# Patient Record
Sex: FEMALE | Race: WHITE | NOT HISPANIC OR LATINO | Employment: OTHER | URBAN - NONMETROPOLITAN AREA
[De-identification: names, ages, dates, MRNs, and addresses within clinical notes are randomized per-mention and may not be internally consistent; named-entity substitution may affect disease eponyms.]

---

## 2017-01-10 ENCOUNTER — HOSPITAL ENCOUNTER (OUTPATIENT)
Dept: NUCLEAR MEDICINE | Facility: HOSPITAL | Age: 47
Discharge: HOME | End: 2017-01-10

## 2017-01-10 DIAGNOSIS — G89.29 CHRONIC PAIN OF RIGHT KNEE: ICD-10-CM

## 2017-01-10 DIAGNOSIS — M25.561 CHRONIC PAIN OF RIGHT KNEE: ICD-10-CM

## 2017-01-10 PROCEDURE — A9547 IN111 OXYQUINOLINE: HCPCS

## 2017-01-10 PROCEDURE — 0 INDIUM-111 OXYQUINOLINE 1 MCI/ML SOLUTION

## 2017-01-10 RX ORDER — INDIUM IN-111 OXYQUINOLINE 1 UG/ML
SOLUTION INTRAVENOUS
Status: COMPLETED | OUTPATIENT
Start: 2017-01-10 | End: 2017-01-10

## 2017-01-10 RX ADMIN — INDIUM IN-111 OXYQUINOLINE: 1 SOLUTION INTRAVENOUS at 11:41

## 2017-01-11 ENCOUNTER — HOSPITAL ENCOUNTER (OUTPATIENT)
Dept: NUCLEAR MEDICINE | Facility: HOSPITAL | Age: 47
Discharge: HOME | End: 2017-01-11

## 2017-01-11 PROCEDURE — 78805 HC NM ABSCESS LOCALIZATION LIMITED: CPT

## 2017-01-11 PROCEDURE — A9541 TC99M SULFUR COLLOID: HCPCS

## 2017-01-11 PROCEDURE — 0 TECHNETIUM SULFUR COLLOID

## 2017-01-11 RX ADMIN — Medication 1 DOSE: at 11:42

## 2017-01-17 ENCOUNTER — HOSPITAL ENCOUNTER (OUTPATIENT)
Facility: HOSPITAL | Age: 47
Setting detail: OBSERVATION
Discharge: HOME | End: 2017-01-19
Attending: EMERGENCY MEDICINE | Admitting: INTERNAL MEDICINE

## 2017-01-17 ENCOUNTER — APPOINTMENT (OUTPATIENT)
Dept: CT IMAGING | Facility: HOSPITAL | Age: 47
End: 2017-01-17

## 2017-01-17 ENCOUNTER — APPOINTMENT (OUTPATIENT)
Dept: GENERAL RADIOLOGY | Facility: HOSPITAL | Age: 47
End: 2017-01-17

## 2017-01-17 DIAGNOSIS — R07.9 CHEST PAIN, UNSPECIFIED TYPE: Primary | ICD-10-CM

## 2017-01-17 DIAGNOSIS — R79.89 ELEVATED D-DIMER: ICD-10-CM

## 2017-01-17 DIAGNOSIS — D61.818 PANCYTOPENIA (HCC): ICD-10-CM

## 2017-01-17 LAB
ABO GROUP BLD: NORMAL
ALBUMIN SERPL-MCNC: 4.3 G/DL (ref 3.5–5)
ALBUMIN/GLOB SERPL: 1.3 G/DL (ref 1.1–2.5)
ALP SERPL-CCNC: 95 U/L (ref 24–120)
ALT SERPL W P-5'-P-CCNC: 31 U/L (ref 0–54)
AMYLASE SERPL-CCNC: 50 U/L (ref 30–110)
ANION GAP SERPL CALCULATED.3IONS-SCNC: 14 MMOL/L (ref 4–13)
APTT PPP: 43 SECONDS (ref 24.1–34.8)
AST SERPL-CCNC: 32 U/L (ref 7–45)
BASOPHILS # BLD AUTO: 0.01 10*3/MM3 (ref 0–0.2)
BASOPHILS NFR BLD AUTO: 0.4 % (ref 0–2)
BILIRUB SERPL-MCNC: 0.7 MG/DL (ref 0.1–1)
BILIRUB UR QL STRIP: NEGATIVE
BLD GP AB SCN SERPL QL: NEGATIVE
BUN BLD-MCNC: 18 MG/DL (ref 5–21)
BUN/CREAT SERPL: 12.3 (ref 7–25)
CALCIUM SPEC-SCNC: 8.8 MG/DL (ref 8.4–10.4)
CHLORIDE SERPL-SCNC: 103 MMOL/L (ref 98–110)
CLARITY UR: CLEAR
CO2 SERPL-SCNC: 23 MMOL/L (ref 24–31)
COLOR UR: YELLOW
CREAT BLD-MCNC: 1.46 MG/DL (ref 0.5–1.4)
D DIMER PPP FEU-MCNC: 2.65 MG/L (FEU) (ref 0–0.5)
DEPRECATED RDW RBC AUTO: 43.7 FL (ref 40–54)
EOSINOPHIL # BLD AUTO: 0.16 10*3/MM3 (ref 0–0.7)
EOSINOPHIL NFR BLD AUTO: 6 % (ref 0–4)
ERYTHROCYTE [DISTWIDTH] IN BLOOD BY AUTOMATED COUNT: 13.4 % (ref 12–15)
FERRITIN SERPL-MCNC: 148 NG/ML (ref 6.24–137)
FOLATE SERPL-MCNC: 12.2 NG/ML
GFR SERPL CREATININE-BSD FRML MDRD: 38 ML/MIN/1.73
GLOBULIN UR ELPH-MCNC: 3.3 GM/DL
GLUCOSE BLD-MCNC: 94 MG/DL (ref 70–100)
GLUCOSE UR STRIP-MCNC: NEGATIVE MG/DL
HCT VFR BLD AUTO: 35.1 % (ref 37–47)
HGB BLD-MCNC: 11.5 G/DL (ref 12–16)
HGB UR QL STRIP.AUTO: NEGATIVE
HOLD SPECIMEN: NORMAL
HOLD SPECIMEN: NORMAL
IMM GRANULOCYTES # BLD: 0 10*3/MM3 (ref 0–0.03)
IMM GRANULOCYTES NFR BLD: 0 % (ref 0–5)
INR PPP: 1.05 (ref 0.91–1.09)
IRON 24H UR-MRATE: 59 MCG/DL (ref 42–180)
IRON SATN MFR SERPL: 21 % (ref 20–45)
KETONES UR QL STRIP: NEGATIVE
LEUKOCYTE ESTERASE UR QL STRIP.AUTO: NEGATIVE
LIPASE SERPL-CCNC: 94 U/L (ref 23–203)
LYMPHOCYTES # BLD AUTO: 0.47 10*3/MM3 (ref 0.72–4.86)
LYMPHOCYTES NFR BLD AUTO: 17.7 % (ref 15–45)
MCH RBC QN AUTO: 29.4 PG (ref 28–32)
MCHC RBC AUTO-ENTMCNC: 32.8 G/DL (ref 33–36)
MCV RBC AUTO: 89.8 FL (ref 82–98)
MONOCYTES # BLD AUTO: 0.45 10*3/MM3 (ref 0.19–1.3)
MONOCYTES NFR BLD AUTO: 16.9 % (ref 4–12)
NEUTROPHILS # BLD AUTO: 1.57 10*3/MM3 (ref 1.87–8.4)
NEUTROPHILS NFR BLD AUTO: 59 % (ref 39–78)
NITRITE UR QL STRIP: NEGATIVE
NT-PROBNP SERPL-MCNC: 556 PG/ML (ref 0–450)
PH UR STRIP.AUTO: 6.5 [PH] (ref 5–8)
PLATELET # BLD AUTO: 121 10*3/MM3 (ref 130–400)
PMV BLD AUTO: 9.8 FL (ref 6–12)
POTASSIUM BLD-SCNC: 3.7 MMOL/L (ref 3.5–5.3)
PROT SERPL-MCNC: 7.6 G/DL (ref 6.3–8.7)
PROT UR QL STRIP: NEGATIVE
PROTHROMBIN TIME: 14 SECONDS (ref 11.9–14.6)
RBC # BLD AUTO: 3.91 10*6/MM3 (ref 4.2–5.4)
RH BLD: POSITIVE
SODIUM BLD-SCNC: 140 MMOL/L (ref 135–145)
SP GR UR STRIP: 1.01 (ref 1–1.03)
TIBC SERPL-MCNC: 280 MCG/DL (ref 225–420)
TROPONIN I SERPL-MCNC: 0 NG/ML (ref 0–0.07)
TROPONIN I SERPL-MCNC: 0 NG/ML (ref 0–0.07)
UROBILINOGEN UR QL STRIP: NORMAL
VIT B12 BLD-MCNC: 480 PG/ML (ref 239–931)
WBC NRBC COR # BLD: 2.66 10*3/MM3 (ref 4.8–10.8)
WHOLE BLOOD HOLD SPECIMEN: NORMAL
WHOLE BLOOD HOLD SPECIMEN: NORMAL

## 2017-01-17 PROCEDURE — 86901 BLOOD TYPING SEROLOGIC RH(D): CPT

## 2017-01-17 PROCEDURE — 25010000002 HYDROMORPHONE PER 4 MG: Performed by: EMERGENCY MEDICINE

## 2017-01-17 PROCEDURE — 93010 ELECTROCARDIOGRAM REPORT: CPT | Performed by: INTERNAL MEDICINE

## 2017-01-17 PROCEDURE — 82746 ASSAY OF FOLIC ACID SERUM: CPT | Performed by: FAMILY MEDICINE

## 2017-01-17 PROCEDURE — 85379 FIBRIN DEGRADATION QUANT: CPT | Performed by: EMERGENCY MEDICINE

## 2017-01-17 PROCEDURE — 81003 URINALYSIS AUTO W/O SCOPE: CPT | Performed by: EMERGENCY MEDICINE

## 2017-01-17 PROCEDURE — 85730 THROMBOPLASTIN TIME PARTIAL: CPT | Performed by: EMERGENCY MEDICINE

## 2017-01-17 PROCEDURE — G0378 HOSPITAL OBSERVATION PER HR: HCPCS

## 2017-01-17 PROCEDURE — 96372 THER/PROPH/DIAG INJ SC/IM: CPT

## 2017-01-17 PROCEDURE — 84484 ASSAY OF TROPONIN QUANT: CPT | Performed by: INTERNAL MEDICINE

## 2017-01-17 PROCEDURE — 82607 VITAMIN B-12: CPT | Performed by: FAMILY MEDICINE

## 2017-01-17 PROCEDURE — 83540 ASSAY OF IRON: CPT | Performed by: FAMILY MEDICINE

## 2017-01-17 PROCEDURE — 84484 ASSAY OF TROPONIN QUANT: CPT

## 2017-01-17 PROCEDURE — 25010000002 ONDANSETRON PER 1 MG: Performed by: EMERGENCY MEDICINE

## 2017-01-17 PROCEDURE — 96376 TX/PRO/DX INJ SAME DRUG ADON: CPT

## 2017-01-17 PROCEDURE — 96374 THER/PROPH/DIAG INJ IV PUSH: CPT

## 2017-01-17 PROCEDURE — 71010 HC CHEST PA OR AP: CPT

## 2017-01-17 PROCEDURE — 83690 ASSAY OF LIPASE: CPT | Performed by: EMERGENCY MEDICINE

## 2017-01-17 PROCEDURE — 85610 PROTHROMBIN TIME: CPT | Performed by: EMERGENCY MEDICINE

## 2017-01-17 PROCEDURE — 25010000002 HYDROMORPHONE PER 4 MG: Performed by: FAMILY MEDICINE

## 2017-01-17 PROCEDURE — 93005 ELECTROCARDIOGRAM TRACING: CPT | Performed by: EMERGENCY MEDICINE

## 2017-01-17 PROCEDURE — 86900 BLOOD TYPING SEROLOGIC ABO: CPT

## 2017-01-17 PROCEDURE — 80053 COMPREHEN METABOLIC PANEL: CPT | Performed by: EMERGENCY MEDICINE

## 2017-01-17 PROCEDURE — 85025 COMPLETE CBC W/AUTO DIFF WBC: CPT | Performed by: EMERGENCY MEDICINE

## 2017-01-17 PROCEDURE — 84443 ASSAY THYROID STIM HORMONE: CPT | Performed by: NURSE PRACTITIONER

## 2017-01-17 PROCEDURE — 96375 TX/PRO/DX INJ NEW DRUG ADDON: CPT

## 2017-01-17 PROCEDURE — 82150 ASSAY OF AMYLASE: CPT | Performed by: EMERGENCY MEDICINE

## 2017-01-17 PROCEDURE — 93005 ELECTROCARDIOGRAM TRACING: CPT

## 2017-01-17 PROCEDURE — 82728 ASSAY OF FERRITIN: CPT | Performed by: FAMILY MEDICINE

## 2017-01-17 PROCEDURE — 83550 IRON BINDING TEST: CPT | Performed by: FAMILY MEDICINE

## 2017-01-17 PROCEDURE — 99285 EMERGENCY DEPT VISIT HI MDM: CPT

## 2017-01-17 PROCEDURE — 25010000002 ONDANSETRON PER 1 MG: Performed by: FAMILY MEDICINE

## 2017-01-17 PROCEDURE — 83880 ASSAY OF NATRIURETIC PEPTIDE: CPT | Performed by: EMERGENCY MEDICINE

## 2017-01-17 PROCEDURE — 86850 RBC ANTIBODY SCREEN: CPT

## 2017-01-17 RX ORDER — TOPIRAMATE 100 MG/1
100 TABLET, FILM COATED ORAL EVERY 12 HOURS SCHEDULED
Status: DISCONTINUED | OUTPATIENT
Start: 2017-01-17 | End: 2017-01-19 | Stop reason: HOSPADM

## 2017-01-17 RX ORDER — HYDROCODONE BITARTRATE AND ACETAMINOPHEN 5; 325 MG/1; MG/1
1 TABLET ORAL EVERY 6 HOURS PRN
Status: DISCONTINUED | OUTPATIENT
Start: 2017-01-17 | End: 2017-01-19 | Stop reason: HOSPADM

## 2017-01-17 RX ORDER — ONDANSETRON 2 MG/ML
4 INJECTION INTRAMUSCULAR; INTRAVENOUS ONCE
Status: COMPLETED | OUTPATIENT
Start: 2017-01-17 | End: 2017-01-17

## 2017-01-17 RX ORDER — ACETAMINOPHEN 325 MG/1
650 TABLET ORAL EVERY 6 HOURS PRN
Status: DISCONTINUED | OUTPATIENT
Start: 2017-01-17 | End: 2017-01-19 | Stop reason: HOSPADM

## 2017-01-17 RX ORDER — SODIUM CHLORIDE 0.9 % (FLUSH) 0.9 %
1-10 SYRINGE (ML) INJECTION AS NEEDED
Status: DISCONTINUED | OUTPATIENT
Start: 2017-01-17 | End: 2017-01-19 | Stop reason: HOSPADM

## 2017-01-17 RX ORDER — NITROGLYCERIN 0.4 MG/1
0.4 TABLET SUBLINGUAL
Status: DISCONTINUED | OUTPATIENT
Start: 2017-01-17 | End: 2017-01-19 | Stop reason: HOSPADM

## 2017-01-17 RX ORDER — GABAPENTIN 300 MG/1
300 CAPSULE ORAL 3 TIMES DAILY
Status: DISCONTINUED | OUTPATIENT
Start: 2017-01-17 | End: 2017-01-19 | Stop reason: HOSPADM

## 2017-01-17 RX ORDER — DOCUSATE SODIUM 100 MG/1
100 CAPSULE, LIQUID FILLED ORAL DAILY PRN
Status: DISCONTINUED | OUTPATIENT
Start: 2017-01-17 | End: 2017-01-19 | Stop reason: HOSPADM

## 2017-01-17 RX ORDER — OXYBUTYNIN CHLORIDE 5 MG/1
5 TABLET, EXTENDED RELEASE ORAL DAILY
Status: DISCONTINUED | OUTPATIENT
Start: 2017-01-18 | End: 2017-01-19 | Stop reason: HOSPADM

## 2017-01-17 RX ORDER — LEVOTHYROXINE SODIUM 88 UG/1
88 TABLET ORAL
Status: DISCONTINUED | OUTPATIENT
Start: 2017-01-18 | End: 2017-01-19 | Stop reason: HOSPADM

## 2017-01-17 RX ORDER — ONDANSETRON 2 MG/ML
4 INJECTION INTRAMUSCULAR; INTRAVENOUS EVERY 6 HOURS PRN
Status: DISCONTINUED | OUTPATIENT
Start: 2017-01-17 | End: 2017-01-19 | Stop reason: HOSPADM

## 2017-01-17 RX ORDER — ASPIRIN 81 MG/1
81 TABLET ORAL DAILY
Status: DISCONTINUED | OUTPATIENT
Start: 2017-01-18 | End: 2017-01-19 | Stop reason: HOSPADM

## 2017-01-17 RX ORDER — DULOXETIN HYDROCHLORIDE 30 MG/1
60 CAPSULE, DELAYED RELEASE ORAL DAILY
Status: DISCONTINUED | OUTPATIENT
Start: 2017-01-18 | End: 2017-01-19 | Stop reason: HOSPADM

## 2017-01-17 RX ORDER — IPRATROPIUM BROMIDE AND ALBUTEROL SULFATE 2.5; .5 MG/3ML; MG/3ML
3 SOLUTION RESPIRATORY (INHALATION) EVERY 4 HOURS PRN
Status: DISCONTINUED | OUTPATIENT
Start: 2017-01-17 | End: 2017-01-19 | Stop reason: HOSPADM

## 2017-01-17 RX ORDER — PANTOPRAZOLE SODIUM 40 MG/1
40 TABLET, DELAYED RELEASE ORAL
Status: DISCONTINUED | OUTPATIENT
Start: 2017-01-18 | End: 2017-01-19 | Stop reason: HOSPADM

## 2017-01-17 RX ORDER — BUSPIRONE HYDROCHLORIDE 5 MG/1
5 TABLET ORAL NIGHTLY
Status: DISCONTINUED | OUTPATIENT
Start: 2017-01-17 | End: 2017-01-19 | Stop reason: HOSPADM

## 2017-01-17 RX ADMIN — ONDANSETRON 4 MG: 2 INJECTION, SOLUTION INTRAMUSCULAR; INTRAVENOUS at 18:01

## 2017-01-17 RX ADMIN — ONDANSETRON HYDROCHLORIDE 4 MG: 2 SOLUTION INTRAMUSCULAR; INTRAVENOUS at 21:02

## 2017-01-17 RX ADMIN — HYDROMORPHONE HYDROCHLORIDE 1 MG: 1 INJECTION, SOLUTION INTRAMUSCULAR; INTRAVENOUS; SUBCUTANEOUS at 18:02

## 2017-01-17 RX ADMIN — GABAPENTIN 300 MG: 300 CAPSULE ORAL at 23:59

## 2017-01-17 RX ADMIN — METOPROLOL TARTRATE 25 MG: 25 TABLET, FILM COATED ORAL at 23:59

## 2017-01-17 RX ADMIN — TOPIRAMATE 100 MG: 100 TABLET, FILM COATED ORAL at 23:59

## 2017-01-17 RX ADMIN — ENOXAPARIN SODIUM 100 MG: 100 INJECTION SUBCUTANEOUS at 23:59

## 2017-01-17 RX ADMIN — HYDROMORPHONE HYDROCHLORIDE 1 MG: 1 INJECTION, SOLUTION INTRAMUSCULAR; INTRAVENOUS; SUBCUTANEOUS at 21:02

## 2017-01-17 RX ADMIN — BUSPIRONE HYDROCHLORIDE 5 MG: 5 TABLET ORAL at 23:59

## 2017-01-17 NOTE — IP AVS SNAPSHOT
AFTER VISIT SUMMARY             Stacy Lynn           About your hospitalization     You were admitted on:  January 17, 2017 You last received care in the:  [address]       Procedures & Surgeries         Medications    If you or your caregiver advised us that you are currently taking a medication and that medication is marked below as “Resume”, this simply indicates that we have reviewed those medications to make sure our new therapy recommendations do not interfere.  If you have concerns about medications other than those new ones which we are prescribing today, please consult the physician who prescribed them (or your primary physician).  Our review of your home medications is not meant to indicate that we are directing their use.             Your Medications      CONTINUE taking these medications     busPIRone 5 MG tablet   Take 1 tablet by mouth every night.   Last time this was given:  1/18/2017  8:59 PM   Commonly known as:  BUSPAR           docusate sodium 100 MG capsule   Take 1 capsule by mouth daily as needed for constipation.   Commonly known as:  COLACE           DULoxetine 60 MG capsule   Take 1 capsule by mouth daily.   Last time this was given:  1/19/2017  8:00 AM   Commonly known as:  CYMBALTA           esomeprazole 40 MG capsule   Take 1 capsule by mouth daily.   Commonly known as:  nexIUM   Notes to Patient:  Resume previous home dosing regimen           gabapentin 300 MG capsule   Take 1 capsule by mouth 3 (three) times a day. Needs night time dose   Last time this was given:  1/19/2017  8:00 AM   Commonly known as:  NEURONTIN           HYDROcodone-acetaminophen 5-500 MG per tablet   Take 1 tablet by mouth every 6 (six) hours. May take two tablets   Commonly known as:  VICODIN   Notes to Patient:  Every 6 hours           levothyroxine 75 MCG tablet   Take 88 mcg by mouth Daily.   Last time this was given:  1/19/2017  5:47 AM   Commonly known as:  SYNTHROID, LEVOTHROID           metoprolol tartrate 50 MG tablet   Take 50 mg by mouth daily.   Last time this was given:  1/19/2017  8:00 AM   Commonly known as:  LOPRESSOR           oxybutynin XL 5 MG 24 hr tablet   Take 1 tablet by mouth daily.   Last time this was given:  1/19/2017  8:00 AM   Commonly known as:  DITROPAN-XL           topiramate 50 MG tablet   Take 2 tablets by mouth 2 (two) times a day. Needs night dose   Last time this was given:  1/19/2017  8:00 AM   Commonly known as:  TOPAMAX             STOP taking these medications     tolterodine LA 4 MG 24 hr capsule   Commonly known as:  DETROL LA                      Your Medications      Your Medication List           Morning Noon Evening Bedtime As Needed    busPIRone 5 MG tablet   Take 1 tablet by mouth every night.   Commonly known as:  BUSPAR                                   docusate sodium 100 MG capsule   Take 1 capsule by mouth daily as needed for constipation.   Commonly known as:  COLACE                                   DULoxetine 60 MG capsule   Take 1 capsule by mouth daily.   Commonly known as:  CYMBALTA                                   esomeprazole 40 MG capsule   Take 1 capsule by mouth daily.   Commonly known as:  nexIUM   Notes to Patient:  Resume previous home dosing regimen                                gabapentin 300 MG capsule   Take 1 capsule by mouth 3 (three) times a day. Needs night time dose   Commonly known as:  NEURONTIN                                         HYDROcodone-acetaminophen 5-500 MG per tablet   Take 1 tablet by mouth every 6 (six) hours. May take two tablets   Commonly known as:  VICODIN   Notes to Patient:  Every 6 hours                                levothyroxine 75 MCG tablet   Take 88 mcg by mouth Daily.   Commonly known as:  SYNTHROID, LEVOTHROID                                   metoprolol tartrate 50 MG tablet   Take 50 mg by mouth daily.   Commonly known as:  LOPRESSOR                                   oxybutynin XL 5 MG 24 hr  tablet   Take 1 tablet by mouth daily.   Commonly known as:  DITROPAN-XL                                   topiramate 50 MG tablet   Take 2 tablets by mouth 2 (two) times a day. Needs night dose   Commonly known as:  TOPAMAX                                               Instructions for After Discharge        Activity Instructions     Activity as Tolerated                 Diet Instructions     Diet: Cardiac; Thin Liquids, No Restrictions       Discharge Diet:  Cardiac   Fluid Consistency:  Thin Liquids, No Restrictions             Discharge References/Attachments     CHEST PAIN (NONSPECIFIC), EASY-TO-READ (ENGLISH)       Follow-ups for After Discharge        Follow-up Information     Follow up with Elliott Waters DO. Call on 1/26/2017.    Specialty:  Internal Medicine    Why:  1/26/2017  st 10:45 am    Contact information:    [address]  [place] [address] [phone]        Referrals and Follow-ups to Schedule     Additional Follow-Up    As directed    Please schedule Lexiscan SPECT study post discharge. Dx is chest pain. Dr. Garcia is ordering physician.             Scheduled Appointments     Jan 26, 2017  8:00 AM [facility] PAD CARD NM INJ with PAD NM INJ ROOM   [facility] ([place])    [address] [place] [id number] [phone]           No food or drink after midnight prior to your test. May have only a sip of water with allowed medications. (Please see list below of meds not allowed) No caffeine or chocolate 24 hours prior to you test. (this includes coffee, tea, soda, all decaffeinated beverages, cappuccino flavorings, noooz or vivarian, diet medications, excedrin, anacin or any energy drinks) Wear comfortable clothing and walking shoes. Bring your insurance cards with you. Bring all medications in their original bottles. If you are diabetic, do not take your diabetic medications after consulting with your primary care physician. If you take insulin, only take half the  dose after consulting with your primary care physician. Please hold the following medications for 48 hours prior to your test after consulting with your primary care physician (acebutolo, aggrenox, atenolol, bisoprolol, betaxolol, betapace, calan, cardizem, carvadilol, coreg, corgard, corzide, dilacor xr, diltiazem, inderal, inderal la, isoptin, karlone, labetolol, levatol, nadolol, normodyne, penbutolol, pindolol, propanolol, sectral, sotalol, tenoretic, tiazic, timolol, bystolic, toprol xl, lopressor, metoprolol, trandata, verapamil, verelan, visken, zebeta, zisc, theophylline, aleyda-dur, sio-phyline, qulbron-t, primatene, persantine, dipyrimadiole)            Jan 26, 2017  9:15 AM CST    PAD CARD NM SCAN with PAD NM SCAN ROOM   [facility] CARDIOLOG[facility] ([place])    [address] [place] [id number] [phone]            Jan 26, 2017 10:00 AM CST    PAD CARD NM STRESS VT with PAD STRESS LAB 3   [address] [address] [phone]            Jan 26, 2017 10:45 AM CST    PAD CARD NM SCAN with PAD NM SCAN ROOM   [address] [address] [phone]            May 05, 2017 11:30 AM CDT   Follow Up with Roby Garcia MD   [facility] (--)    [address] [place] [id number] [phone]           Arrive 15 minutes prior to appointment.            Additional instructions:      Please show up at 7:30 am for your [facility]iscan [facility]. Bring a current medications list with you to this appointment.              MyChart Signup     Our records indicate that you have declined [facility]. If you would like to sign up for MyChart, please email [email or web address] or call [phone] to obtain an activation code.         Summary of Your Hospitalization        Reason for  Hospitalization     Your primary diagnosis was:  Chest Pain    Your diagnoses also included:  High Blood Pressure, Sarcoidosis, Congenital Heart Disease      Care Providers     Provider Service Role Specialty    Barrett Zeng DO -- Attending Provider Hospitalist    Roby Garcia MD -- Consulting Physician  Cardiology      Your Allergies  Date Reviewed: 1/18/2017    Allergen Reactions    Tape Not Noted      Patient Belongings Returned     Document Return of Belongings Flowsheet     Were the patient bedside belongings sent home?   Yes   Belongings Retrieved from Security & Sent Home   N/A    Belongings Sent to Safe   --   Medications Retrieved from Pharmacy & Sent Home   N/A              More Information      Nonspecific Chest Pain  It is often hard to find the cause of chest pain. There is always a chance that your pain could be related to something serious, such as a heart attack or a blood clot in your lungs. Chest pain can also be caused by conditions that are not life-threatening. If you have chest pain, it is very important to follow up with your doctor.    HOME CARE  · If you were prescribed an antibiotic medicine, finish it all even if you start to feel better.  · Avoid any activities that cause chest pain.  · Do not use any tobacco products, including cigarettes, chewing tobacco, or electronic cigarettes. If you need help quitting, ask your doctor.  · Do not drink alcohol.  · Take medicines only as told by your doctor.  · Keep all follow-up visits as told by your doctor. This is important. This includes any further testing if your chest pain does not go away.  · Your doctor may tell you to keep your head raised (elevated) while you sleep.  · Make lifestyle changes as told by your doctor. These may include:    Getting regular exercise. Ask your doctor to suggest some activities that are safe for you.    Eating a heart-healthy diet. Your doctor or a diet specialist (dietitian) can help you to learn healthy  eating options.    Maintaining a healthy weight.    Managing diabetes, if necessary.    Reducing stress.  GET HELP IF:  · Your chest pain does not go away, even after treatment.  · You have a rash with blisters on your chest.  · You have a fever.  GET HELP RIGHT AWAY IF:  · Your chest pain is worse.  · You have an increasing cough, or you cough up blood.  · You have severe belly (abdominal) pain.  · You feel extremely weak.  · You pass out (faint).  · You have chills.  · You have sudden, unexplained chest discomfort.  · You have sudden, unexplained discomfort in your arms, back, neck, or jaw.  · You have shortness of breath at any time.  · You suddenly start to sweat, or your skin gets clammy.  · You feel nauseous.  · You vomit.  · You suddenly feel light-headed or dizzy.  · Your heart begins to beat quickly, or it feels like it is skipping beats.  These symptoms may be an emergency. Do not wait to see if the symptoms will go away. Get medical help right away. Call your local emergency services (911 in the [place] Do not drive yourself to the hospital.     This information is not intended to replace advice given to you by your health care provider. Make sure you discuss any questions you have with your health care provider.     Document Released: 06/05/2009 Document Revised: 01/08/2016 Document Reviewed: 07/24/2015  [email or web address] Interactive Patient Education ©2016 [email or web address]            SYMPTOMS OF A STROKE    Call 911 or have someone take you to the Emergency Department if you have any of the following:    · Sudden numbness or weakness of your face, arm or leg especially on one side of the body  · Sudden confusion, diffiiculty speaking or trouble understanding   · Changes in your vision or loss of sight in one eye  · Sudden severe headache with no known cause  · sudden dizziness, trouble walking, loss of balance or coordination    It is important to seek emergency care right away if you have further stroke symptoms.  If you get emergency help quickly, the powerful clot-dissolving medicines can reduce the disabilities caused by a stroke.     For more information:    American Stroke Association  1-891-6-STROKE  www.strokeassociation.org           IF YOU SMOKE OR USE TOBACCO PLEASE READ THE FOLLOWING:    Why is smoking bad for me?  Smoking increases the risk of heart disease, lung disease, vascular disease, stroke, and cancer.     If you smoke, STOP!    If you would like more information on quitting smoking, please visit the [employer] website: [email or web address]-[email or web address]   This link will provide additional resources including the QUIT line and the Beat the Pack support groups.     For more information:    American Cancer Society  (534) 186-8890    American Heart Association  1-866.606.1039               YOU ARE THE MOST IMPORTANT FACTOR IN YOUR RECOVERY.     Follow all instructions carefully.     I have reviewed my discharge instructions with my nurse, including the following information, if applicable:     Information about my illness and diagnosis   Follow up appointments (including lab draws)   Wound Care   Equipment Needs   Medications (new and continuing) along with side effects   Preventative information such as vaccines and smoking cessations   Diet   Pain   I know when to contact my Doctor's office or seek emergency care      I want my nurse to describe the side effects of my medications: YES NO   If the answer is no, I understand the side effects of my medications: YES NO   My nurse described the side effects of my medications in a way that I could understand: YES NO   I have taken my personal belongings and my own medications with me at discharge: YES NO            I have received this information and my questions have been answered. I have discussed any concerns I see with this plan with the nurse or physician. I understand these instructions.    Signature of Patient or  Responsible Person: _____________________________________    Date: _________________  Time: __________________    Signature of Healthcare Provider: _______________________________________  Date: _________________  Time: __________________

## 2017-01-18 ENCOUNTER — APPOINTMENT (OUTPATIENT)
Dept: NUCLEAR MEDICINE | Facility: HOSPITAL | Age: 47
End: 2017-01-18

## 2017-01-18 PROBLEM — Q21.12 PFO (PATENT FORAMEN OVALE): Status: ACTIVE | Noted: 2017-01-18

## 2017-01-18 LAB
ANION GAP SERPL CALCULATED.3IONS-SCNC: 11 MMOL/L (ref 4–13)
BUN BLD-MCNC: 16 MG/DL (ref 5–21)
BUN/CREAT SERPL: 10.1 (ref 7–25)
CALCIUM SPEC-SCNC: 8.9 MG/DL (ref 8.4–10.4)
CHLORIDE SERPL-SCNC: 105 MMOL/L (ref 98–110)
CO2 SERPL-SCNC: 26 MMOL/L (ref 24–31)
CREAT BLD-MCNC: 1.58 MG/DL (ref 0.5–1.4)
DEPRECATED RDW RBC AUTO: 44.9 FL (ref 40–54)
ERYTHROCYTE [DISTWIDTH] IN BLOOD BY AUTOMATED COUNT: 13.5 % (ref 12–15)
GFR SERPL CREATININE-BSD FRML MDRD: 35 ML/MIN/1.73
GLUCOSE BLD-MCNC: 96 MG/DL (ref 70–100)
HCT VFR BLD AUTO: 31.8 % (ref 37–47)
HGB BLD-MCNC: 10.2 G/DL (ref 12–16)
MCH RBC QN AUTO: 29 PG (ref 28–32)
MCHC RBC AUTO-ENTMCNC: 32.1 G/DL (ref 33–36)
MCV RBC AUTO: 90.3 FL (ref 82–98)
PLATELET # BLD AUTO: 102 10*3/MM3 (ref 130–400)
PMV BLD AUTO: 9.7 FL (ref 6–12)
POTASSIUM BLD-SCNC: 4.3 MMOL/L (ref 3.5–5.3)
RBC # BLD AUTO: 3.52 10*6/MM3 (ref 4.2–5.4)
SODIUM BLD-SCNC: 142 MMOL/L (ref 135–145)
TROPONIN I SERPL-MCNC: <0.012 NG/ML (ref 0–0.03)
TSH SERPL DL<=0.05 MIU/L-ACNC: 3.32 MIU/ML (ref 0.47–4.68)
WBC NRBC COR # BLD: 1.82 10*3/MM3 (ref 4.8–10.8)

## 2017-01-18 PROCEDURE — 84484 ASSAY OF TROPONIN QUANT: CPT | Performed by: INTERNAL MEDICINE

## 2017-01-18 PROCEDURE — A9558 XE133 XENON 10MCI: HCPCS | Performed by: INTERNAL MEDICINE

## 2017-01-18 PROCEDURE — 80048 BASIC METABOLIC PNL TOTAL CA: CPT | Performed by: INTERNAL MEDICINE

## 2017-01-18 PROCEDURE — G0378 HOSPITAL OBSERVATION PER HR: HCPCS

## 2017-01-18 PROCEDURE — A9540 TC99M MAA: HCPCS | Performed by: INTERNAL MEDICINE

## 2017-01-18 PROCEDURE — 78582 LUNG VENTILAT&PERFUS IMAGING: CPT

## 2017-01-18 PROCEDURE — 96372 THER/PROPH/DIAG INJ SC/IM: CPT

## 2017-01-18 PROCEDURE — 25010000002 ENOXAPARIN PER 10 MG: Performed by: INTERNAL MEDICINE

## 2017-01-18 PROCEDURE — 93010 ELECTROCARDIOGRAM REPORT: CPT | Performed by: INTERNAL MEDICINE

## 2017-01-18 PROCEDURE — 99204 OFFICE O/P NEW MOD 45 MIN: CPT | Performed by: INTERNAL MEDICINE

## 2017-01-18 PROCEDURE — 93005 ELECTROCARDIOGRAM TRACING: CPT | Performed by: INTERNAL MEDICINE

## 2017-01-18 PROCEDURE — 85027 COMPLETE CBC AUTOMATED: CPT | Performed by: INTERNAL MEDICINE

## 2017-01-18 PROCEDURE — 0 TECHNETIUM ALBUMIN AGGREGATED: Performed by: INTERNAL MEDICINE

## 2017-01-18 PROCEDURE — 94799 UNLISTED PULMONARY SVC/PX: CPT

## 2017-01-18 PROCEDURE — 0 XENON XE 133: Performed by: INTERNAL MEDICINE

## 2017-01-18 RX ORDER — SODIUM CHLORIDE 9 MG/ML
75 INJECTION, SOLUTION INTRAVENOUS CONTINUOUS
Status: DISCONTINUED | OUTPATIENT
Start: 2017-01-18 | End: 2017-01-19

## 2017-01-18 RX ADMIN — GABAPENTIN 300 MG: 300 CAPSULE ORAL at 20:59

## 2017-01-18 RX ADMIN — HYDROCODONE BITARTRATE AND ACETAMINOPHEN 1 TABLET: 5; 325 TABLET ORAL at 09:22

## 2017-01-18 RX ADMIN — Medication 10.2 MILLICURIE: at 12:55

## 2017-01-18 RX ADMIN — PANTOPRAZOLE SODIUM 40 MG: 40 TABLET, DELAYED RELEASE ORAL at 05:56

## 2017-01-18 RX ADMIN — GABAPENTIN 300 MG: 300 CAPSULE ORAL at 09:21

## 2017-01-18 RX ADMIN — BUSPIRONE HYDROCHLORIDE 5 MG: 5 TABLET ORAL at 20:59

## 2017-01-18 RX ADMIN — METOPROLOL TARTRATE 25 MG: 25 TABLET, FILM COATED ORAL at 09:21

## 2017-01-18 RX ADMIN — Medication 1 DOSE: at 12:59

## 2017-01-18 RX ADMIN — SODIUM CHLORIDE 75 ML/HR: 9 INJECTION, SOLUTION INTRAVENOUS at 10:43

## 2017-01-18 RX ADMIN — ASPIRIN 81 MG: 81 TABLET ORAL at 09:21

## 2017-01-18 RX ADMIN — OXYBUTYNIN CHLORIDE 5 MG: 5 TABLET, FILM COATED, EXTENDED RELEASE ORAL at 09:21

## 2017-01-18 RX ADMIN — GABAPENTIN 300 MG: 300 CAPSULE ORAL at 15:00

## 2017-01-18 RX ADMIN — NITROGLYCERIN 0.4 MG: 0.4 TABLET SUBLINGUAL at 09:09

## 2017-01-18 RX ADMIN — DULOXETINE HYDROCHLORIDE 60 MG: 30 CAPSULE, DELAYED RELEASE ORAL at 09:21

## 2017-01-18 RX ADMIN — TOPIRAMATE 100 MG: 100 TABLET, FILM COATED ORAL at 09:21

## 2017-01-18 RX ADMIN — ENOXAPARIN SODIUM 100 MG: 100 INJECTION SUBCUTANEOUS at 15:00

## 2017-01-18 RX ADMIN — NITROGLYCERIN 0.4 MG: 0.4 TABLET SUBLINGUAL at 09:18

## 2017-01-18 RX ADMIN — TOPIRAMATE 100 MG: 100 TABLET, FILM COATED ORAL at 20:59

## 2017-01-18 RX ADMIN — METOPROLOL TARTRATE 25 MG: 25 TABLET, FILM COATED ORAL at 20:59

## 2017-01-18 RX ADMIN — LEVOTHYROXINE SODIUM 88 MCG: 88 TABLET ORAL at 05:56

## 2017-01-18 NOTE — H&P
DATE OF ENCOUNTER:  01/17/2017   TIME OF ENCOUNTER: 11:08 p.m.     ADMITTING PHYSICIAN:  Enrrique Martin MD     PRIMARY CARE PHYSICIAN: Elliott Waters MD  PRIMARY CARDIOLOGIST:  Roby Garcia MD    HISTORY OF PRESENT ILLNESS: Ms. Lynn is a 46-year-old  female who presents at [facility] due to a chief complaint of chest pain. She describes her chest pain as a pressure sensation. Her chest pain started at approximately 10:30 a.m. this morning, lasted for 1 hour and then resolved. Her chest pain recurred at 1:00 p.m. and she elected to present to the emergency department for evaluation. Her chest pain does not seem to radiate. She describes her chest pain as a pressure sensation, which localizes primarily to the left side. In addition, she also relates a multitude of other symptoms including chest palpitations, shortness of breath with exertion, chronic lower extremity edema, recurring abdominal pain, diarrhea, and GI reflux. Ms. Lynn underwent a cardiac stress testing in 11/2016 and she relates those results were unremarkable. It is my understanding that Dr. Garcia of the Cardiology service interpreted those results. She presents now requesting additional evaluation and treatment for recurring chest pain. Presently, she is resting comfortably and is in no apparent distress. She is noted to have a significant element of psychomotor retardation in her speech. It is my understanding she received a dose of Dilaudid earlier.     REVIEW OF SYSTEMS: Otherwise unremarkable from a cardiovascular, pulmonary, gastrointestinal, genitourinary, neurologic, psychiatric, metabolic, and constitutional standpoint, except as noted. She has had no unusual fatigue, malaise, lethargy, or generalized weakness recently. She has had no definite fevers or chills, although she relates frequently feeling cold. She has had no sweats. She relates her appetite has been poor. She has lost approximately 40 pounds over the past  year. She relates speech pressure-type chest pain as noted above. In addition, she relates recurring episodes of chest palpitations. She experiences shortness of breath with exertion, but not at rest. She has chronic lower extremity edema, which has not apparently worsened recently. She has no orthopnea. She relates having a recurring nonproductive cough. She has had no wheezing or hemoptysis. She relates a history of chronic recurring abdominal pain, which she relates is due to irritable bowel syndrome. She also relates experiencing nausea, but no vomiting. She has difficulties with recurring diarrhea, which she relates is due to irritable bowel syndrome. She has had no constipation. She has had no dysphagia or odynophagia. She relates a history of GI reflux. She has no history of hematemesis, hematochezia, or melena. She has had no flank pain, pelvic pain, hematuria, or dysuria. She has had no skin rashes, arthralgias, or myalgias. She has had no headache, confusion, memory deficits, or loss of consciousness. She has had no changes in her vision or hearing. She has had no acute motor or sensory deficits. She relates a chronic gait deficit. She does not use a cane, walker, or wheelchair, but relates that she has to hold on to furniture when she attempts to ambulate. She has no dizziness or falls.     PAST MEDICAL HISTORY:   1.  Hypertension.   2.  Hypothyroidism.   3.  Irritable bowel syndrome.   4.  Gastroesophageal reflux disease.   5.  Chronic pain syndrome.   6.  Chronic opiate use.   7.  Scoliosis.   8.  Sarcoidosis.   9.  Anxiety disorder.   10.  Depression.   11.  Obesity.     PAST SURGICAL HISTORY:  1.  Status post surgical repair of her right patella on 2 occasions.   2.  Status post left knee surgery.   3.  Status post left heel surgery with nerve resection due to chronic pain.   4.  Status post left shoulder repair.   5.  Status post bilateral ulnar nerve release due to entrapment.   6.  Status post  appendectomy.   7.  Status post hysterectomy.   8.  Status post cholecystectomy.   9.  Status post esophageal dilatation.     ALLERGIES:  SHE IS ALLERGIC TO INTOLERANT TO TAPE ADHESIVE.     USUAL HOME MEDICATIONS:  1.  BuSpar 5 mg p.o. at bedtime.   2.  Colace 100 mg p.o. daily p.r.n. for constipation.   3.  Cymbalta 60 mg p.o. daily.   4.  Nexium 40 mg p.o. daily.   5.  Neurontin 300 mg p.o. t.i.d.   6.  Norco 5 mg 1 p.o. q.6 h. p.r.n. for pain.   7.  Synthroid 0.088 mg p.o. daily.   8.  Metoprolol 50 mg p.o. daily.   9.  Ditropan XL 5 mg p.o. daily.   10.  Detrol LA 4 mg p.o. daily.   11.  Topamax 50 mg b.i.d.     SOCIAL HISTORY: Significant for being a resident of [place], [place]. She lives with her sister, brother, 6 cats, and 2 dogs. She is not . She has no children. She is disabled. She has a high school education. She has no history of tobacco, alcohol, or drug use. She has no particular [employer] affiliation. She has no recent history of travel outside this region.     She designates her sister, Caroline Baxter, to serve as a SURROGATE FOR HEALTHCARE MATTERS should such become necessary.     She is a FULL CODE.     FAMILY HISTORY: Significant for having a brother and sister. Her brother has a history of Parkinson's disease. Her sister has diabetes and thyroid disease. Her father is now  due to uncertain causes. Her mother is  due to cerebral hemorrhage. Her mother also had a history of lupus, heart disease, and thyroid disease.     PHYSICAL EXAMINATION:  VITAL SIGNS: Temperature is 97, pulse 103, respirations 20 and unlabored, blood pressure is 126/73, and O2 saturation is 99%, breathing ambient air. Weight is 219 pounds.   GENERAL: This is a 46-year-old  female appearing her documented age. She is resting comfortably in bed. She is in no apparent distress. She is articulate in her speech. She is interactive and cooperative. She proves to be a fairly good historian, although  she is unable to recall certain elements of her medical history. There is also noticeable psychomotor retardation in her speech.   HEAD AND NECK: Essentially unremarkable except as noted. I see no signs of acute trauma.   EYES, NOSE, AND THROAT:  Appear grossly unremarkable. Sclerae are clear. There is no discharge from the nostrils. Mucous membranes are moist. She is edentulous.   NECK: Supple. She has no cervical or clavicular adenopathy. She has no definite carotid bruits. There are no masses of the head or neck. Neck veins do not appear pathologically distended. Head and neck exam is suboptimal due to the patient's body habitus.   CARDIAC: Reveals S1 and S2 with a regular rhythm. She has no definite murmurs, rubs, or gallops.   LUNGS: Bilateral breath sounds are clear to auscultation throughout. She has no rales, wheezes, or rhonchi.   ABDOMEN: Reveals bowel sounds to be present. Her abdomen is nontender, nondistended, soft, and obese.   EXTREMITIES: Lower extremity exam reveals 2+ bilateral lower extremity edema. She has no calf tenderness or erythema.   NEUROLOGIC: Reveals the patient to be awake and alert. She seems oriented to person, place, time, and situation. Cranial nerves II-XII are intact. She exhibits no definite focal, motor, or sensory deficits. She seems able to move her extremities without difficulty. Her gait was not tested.   PSYCHIATRIC: Reveals her mood to be stable. Affect is flat. Thought processes are organized in that she is able to answer questions appropriately and provide a coherent history. Speech is fluent.  However, there is an element of psychomotor retardation as noted. There is no flight of ideas. There are no obvious short-term or long-term memory deficits.     DIAGNOSTIC DATA: Laboratory studies demonstrate a sodium of 140, potassium 3.7, chloride 103, CO2 of 23, BUN 18, creatinine 1.46, glucose 94, and total calcium 8.8.     Liver function testing is essentially unremarkable.  CBC demonstrates a white blood cell count of 2.66, hemoglobin 11.5, hematocrit 35.1, and platelet count of 121,000.     Amylase and lipase are unremarkable.     Prothrombin time is 14.0 with a PTT of 43.     D-dimer is elevated at 2.65.     Vitamin B12 level is 48.     Folate is 12.2.     Ferritin level is 148.     Iron, total iron binding capacity, and iron saturation are all normal limits.     ProBNP is 556.     Urinalysis is uniformly unremarkable.     Troponin level is 0.00.      EKG demonstrates sinus rhythm of 81 beats per minute. Nonspecific and T wave changes are noted. Cardiac echocardiogram dated 09/26/2016 demonstrates a left ventricular function with an estimated ejection of 65%. Diastolic dysfunction is noted. No valvular abnormalities are identified. There is right to left shunt seen on the bubble study consistent with PFO.     IMPRESSION:  1.  Atypical chest pain.   2.  Elevated D-dimer.   3.  Hypertension.   4.  Chronic kidney disease?   5.  Hypothyroidism.   6.  Sarcoidosis.   7.  Irritable bowel syndrome.   8.  Chronic pain syndrome.   9.  Mild pancytopenia.   10.  Obesity.     PLAN: At this time, Ms. Lynn will be admitted to [facility] for further evaluation and treatment. Her admitting diagnoses are as noted. Her condition at this time is judged to be stable. She will be placed on telemetry.     I have asked the nursing staff to obtain vital signs per protocol. She will be confined to bedrest with bathroom privileges with assistance. Her ALLERGY TO TAPE ADHESIVE is noted. I have asked the nursing staff to monitor input and output. Daily weights will be obtained. She will be maintained on a regular diet. IV fluids will be saline locked. Oxygen will be used as needed to maintain her O2 saturation greater than 92%. She is a FULL CODE. Fall precautions are to be instituted.     Initial admitting medications:   1.  Aspirin 81 mg p.o. daily.   2.  BuSpar 5 mg p.o. at bedtime.   3.   Cymbalta 60 mg p.o. daily.   4.  Lovenox (pharmacy to dose).   5.  Neurontin 300 mg p.o. t.i.d.   6.  Synthroid 0.088 mg p.o. daily.   7.  Metoprolol 25 mg p.o. q.12 h.   8.  Ditropan XL 5 mg p.o. daily.   9.  Protonix 40 mg p.o. daily.   10.  Topamax 100 mg p.o. q.12 h.   11.  Tylenol 650 mg p.o. q.6 h. p.r.n. for fever and/or discomfort.   12.  Colace 100 mg p.o. daily p.r.n. for constipation.   13.  Norco 5 mg 1 p.o. q.6 h. p.r.n. for pain.   14.  DuoNeb 1 unit q.4 h. p.r.n. for shortness of breath.   15.  Nitroglycerin sublingual tablets 0.4 mg p.o. p.r.n. for chest pain.   16.  Zofran 4 mg IV q.6 h. p.r.n. for nausea and vomiting.     I have asked the pharmacy to dose Lovenox due to her elevated D-dimer and chest pain. Of note, she appears to have an element of chronic kidney disease.     I will schedule a nuclear medicine stress test.     I will schedule routine followup laboratory studies from in the morning. Additional troponin levels are pending.     I will continue to follow Ms. Lynn closely through the night pending the return of the hospitalist team in the morning. The nursing staff may certainly call should they have any questions or concerns. Please refer to the medical record for additional information, orders, and/or comments.         cc:               ELBERT Jaquez/[id number]  D:  01/18/2017 00:28:09(Eastern Time)  T:  01/18/2017 09:03:52(Eastern Time)  Voice ID:  [id number]/Document ID:  [id number]

## 2017-01-18 NOTE — ED PROVIDER NOTES
Subjective chest pain  History of Present Illness   Stacy is a 46-year-old white female who presents today with chief complaint of chest pain.  She tells me she has a very strong family history of coronary artery disease.  She however has no stent placement.  She has had no nausea vomiting. s He does have a history of PSVT and the tract has been ablated as a result.  She sees an EP doctor.  Although he is from [place] he comes here and sees her.  She is currently being maintained on metoprolol.  She has had no recent fevers chills.    Review of Systems   Constitutional: Negative.    HENT: Negative.    Eyes: Negative.    Respiratory: Positive for shortness of breath.    Cardiovascular: Positive for chest pain.   Gastrointestinal: Negative.    Endocrine: Negative.    Genitourinary: Negative.    Musculoskeletal: Negative.    Skin: Negative.    Allergic/Immunologic: Negative.    Neurological: Negative.    Hematological: Negative.    Psychiatric/Behavioral: Negative.    All other systems reviewed and are negative.      Past Medical History   Diagnosis Date   • Chronic pain    • Degeneration of intervertebral disc of high cervical region    • Hypertension    • Hyperthyroidism    • Irritable bowel syndrome    • Macular degeneration    • PFO (patent foramen ovale) 09/2016   • PSVT (paroxysmal supraventricular tachycardia)      s/p ablation per Dr. Diallo 4/2016   • Restless leg syndrome    • Sarcoidosis    • Scoliosis        Allergies   Allergen Reactions   • Tape        Past Surgical History   Procedure Laterality Date   • Replacement total knee     • Patella surgery     • Lateral epicondyle release     • Shoulder arthroscopy     • Ulnar nerve decompression     • Knee arthroscopy     • Hysterectomy     • Cholecystectomy     • Kidney stone surgery     • Cardiac ablation  04/2016     SVT; Dr. Diallo        Family History   Problem Relation Age of Onset   • Arrhythmia Mother    • Heart disease Mother    • Heart disease Father     • Heart disease Brother    • Heart disease Maternal Aunt    • Heart disease Maternal Uncle    • Heart disease Paternal Aunt    • Heart disease Paternal Uncle    • Heart disease Maternal Grandmother    • Heart disease Maternal Grandfather    • Heart disease Paternal Grandmother    • Heart disease Paternal Grandfather        Social History     Social History   • Marital status: Single     Spouse name: N/A   • Number of children: N/A   • Years of education: N/A     Social History Main Topics   • Smoking status: Never Smoker   • Smokeless tobacco: None   • Alcohol use No   • Drug use: No   • Sexual activity: Defer     Other Topics Concern   • None     Social History Narrative       Prior to Admission medications    Medication Sig Start Date End Date Taking? Authorizing Provider   busPIRone (BUSPAR) 5 MG tablet Take 1 tablet by mouth every night.   Yes Historical Provider, MD   docusate sodium (COLACE) 100 MG capsule Take 1 capsule by mouth daily as needed for constipation.   Yes Historical Provider, MD   DULoxetine (CYMBALTA) 60 MG capsule Take 1 capsule by mouth daily.   Yes Historical Provider, MD   esomeprazole (NexIUM) 40 MG capsule Take 1 capsule by mouth daily.   Yes Historical Provider, MD   gabapentin (NEURONTIN) 300 MG capsule Take 1 capsule by mouth 3 (three) times a day. Needs night time dose   Yes Historical Provider, MD   HYDROcodone-acetaminophen (VICODIN) 5-500 MG per tablet Take 1 tablet by mouth every 6 (six) hours. May take two tablets   Yes Historical Provider, MD   levothyroxine (SYNTHROID, LEVOTHROID) 75 MCG tablet Take 88 mcg by mouth Daily.   Yes Historical Provider, MD   metoprolol tartrate (LOPRESSOR) 50 MG tablet Take 50 mg by mouth daily.   Yes Historical Provider, MD   oxybutynin XL (DITROPAN-XL) 5 MG 24 hr tablet Take 1 tablet by mouth daily.   Yes Historical Provider, MD   tolterodine LA (DETROL LA) 4 MG 24 hr capsule Take 4 mg by mouth Daily.   Yes Historical Provider, MD   topiramate  (TOPAMAX) 50 MG tablet Take 2 tablets by mouth 2 (two) times a day. Needs night dose   Yes Historical Provider, MD       Medications   ondansetron (ZOFRAN) injection 4 mg (4 mg Intravenous Given 1/17/17 1801)   HYDROmorphone (DILAUDID) injection 1 mg (1 mg Intravenous Given 1/17/17 1802)   HYDROmorphone (DILAUDID) injection 1 mg (1 mg Intravenous Given 1/17/17 2102)   ondansetron (ZOFRAN) injection 4 mg (4 mg Intravenous Given 1/17/17 2102)   enoxaparin (LOVENOX) syringe 100 mg (100 mg Subcutaneous Given 1/17/17 2359)   xenon xe 133 gas 10 mCi 10.2 millicurie (10.2 millicuries Inhalation Given 1/18/17 1255)   technetium albumin aggregated (MAA) solution 1 dose (1 dose Intravenous Given 1/18/17 1259)       Objective   Physical Exam   Constitutional: She is oriented to person, place, and time. She appears well-developed and well-nourished.   HENT:   Head: Normocephalic and atraumatic.   Right Ear: External ear normal.   Left Ear: External ear normal.   Nose: Nose normal.   Mouth/Throat: Oropharynx is clear and moist.   Eyes: Conjunctivae and EOM are normal. Pupils are equal, round, and reactive to light. Right eye exhibits no discharge. Left eye exhibits no discharge.   Neck: Normal range of motion. Neck supple. No thyromegaly present.   Cardiovascular: Normal rate, regular rhythm, normal heart sounds and intact distal pulses.  Exam reveals no friction rub.    No murmur heard.  Pulmonary/Chest: Effort normal and breath sounds normal. No respiratory distress.   Abdominal: Soft. Bowel sounds are normal. She exhibits no distension. There is no tenderness.   Musculoskeletal: Normal range of motion. She exhibits no edema or deformity.   Neurological: She is alert and oriented to person, place, and time. She has normal reflexes. No cranial nerve deficit.   Skin: Skin is warm and dry. No rash noted.   Psychiatric: Judgment normal.   Nursing note and vitals reviewed.      Procedures         Visit Vitals   • /69 (BP  "Location: Left arm, Patient Position: Lying)   • Pulse 62   • Temp 97.2 °F (36.2 °C) (Temporal Artery )   • Resp 16   • Ht 63\" (160 cm)   • Wt 225 lb 6.4 oz (102 kg)   • SpO2 98%   • BMI 39.93 kg/m2       Lab Results (last 24 hours)     ** No results found for the last 24 hours. **          NM Lung Ventilation Perfusion   Final Result      XR Chest 1 View   Final Result   1. Poor inspiration with vascular crowding centrally.   2. Patchy infiltrate in the lung bases, likely atelectasis. Pneumonia   less likely.       Electronically Signed By-Dr. Rohan Blackmon MD On:1/17/2017 6:08 PM EST   This report was finalized on 01/17/2017 17:08 by Dr. Rohan Blackmon MD.              ED Course  ED Course   Comment By Time   She will be signed out to Dr. arms for final disposition purposes thank you Avani Arce MD 01/17 1850   Patient found to have mild pancytopenia that is chronic.  Also found to have an elevated d-dimer.  Patient refused VQ scan also refused CT scan secondary to concerns about dye use.  Patient will be admitted to the service of Dr. Enrrique Martin.  Patient in improved condition pain at this time resolved. Casi Willard,  01/17 2051          MDM  Number of Diagnoses or Management Options  Chest pain, unspecified type: new and requires workup  Elevated d-dimer: new and requires workup  Pancytopenia: new and requires workup     Amount and/or Complexity of Data Reviewed  Clinical lab tests: ordered and reviewed  Tests in the radiology section of CPT®: ordered and reviewed  Discuss the patient with other providers: yes    Risk of Complications, Morbidity, and/or Mortality  Presenting problems: high  Diagnostic procedures: high  Management options: high    Patient Progress  Patient progress: stable      Differential diagnosis included but was not limited to Chest pain: Myocardial Ischemia, Pneumonia, GERD, PE, Pneumothrax, pericarditis, aspiration pneumonia and myocarditis>>. All labs and imaging " results were reviewed by Avani Arce MD    Final diagnoses:   Chest pain, unspecified type   Elevated d-dimer   Pancytopenia        Avani Arce MD  01/29/17 1284

## 2017-01-18 NOTE — PLAN OF CARE
Problem: Patient Care Overview (Adult)  Goal: Plan of Care Review  Outcome: Ongoing (interventions implemented as appropriate)    01/18/17 0415   Coping/Psychosocial Response Interventions   Plan Of Care Reviewed With patient   Patient Care Overview   Progress progress toward functional goals as expected   Outcome Evaluation   Outcome Summary/Follow up Plan Pt admitted with unspecified cp. tele nsr. fall risk. pt resting comfortably. vss.         Problem: Pain, Acute (Adult)  Goal: Identify Related Risk Factors and Signs and Symptoms  Outcome: Ongoing (interventions implemented as appropriate)  Goal: Acceptable Pain Control/Comfort Level  Outcome: Ongoing (interventions implemented as appropriate)    Problem: Fall Risk (Adult)  Goal: Identify Related Risk Factors and Signs and Symptoms  Outcome: Ongoing (interventions implemented as appropriate)  Goal: Absence of Falls  Outcome: Ongoing (interventions implemented as appropriate)

## 2017-01-18 NOTE — CONSULTS
"Pharmacy Anticoagulation Note - Lovenox  Stacy Lynn is a 46 y.o. female on Enoxaparin 1 mg/kg sq Q12H to rule out DVT/PE.    [Ht: 63\" (160 cm); Wt: 219 lb 3.2 oz (99.4 kg);  BMI: Body mass index is 38.83 kg/(m^2).]  Estimated Creatinine Clearance: 53 mL/min (by C-G formula based on Cr of 1.46).   Lab Results   Component Value Date    INR 1.05 01/17/2017    INR 1.12 (H) 01/17/2016    INR 1.06 12/23/2015    PROTIME 14.0 01/17/2017    PROTIME 14.7 (H) 01/17/2016    PROTIME 14.1 12/23/2015      Lab Results   Component Value Date    HGB 11.5 (L) 01/17/2017    HGB 11.6 (L) 09/27/2016    HGB 10.6 (L) 09/26/2016      Lab Results   Component Value Date     (L) 01/17/2017     (L) 09/27/2016    PLT 95 (L) 09/26/2016     Assessment/Plan:  Pharmacy to dose Lovenox initial consult for DVT prophylaxis, followed by Pharmacy consult to dose for concern of possible PE. Based on current renal function, started Lovenox 1 mg/kg sq q12h until DVT/PE can be ruled out. Pharmacy will continue to monitor renal function and adjust dose accordingly.    Lalo Costa, [facility]  01/17/17 11:41 PM     "

## 2017-01-18 NOTE — PLAN OF CARE
Problem: Pain, Acute (Adult)  Goal: Identify Related Risk Factors and Signs and Symptoms  Outcome: Outcome(s) achieved Date Met:  01/18/17 01/18/17 1516   Pain, Acute   Related Risk Factors (Acute Pain) persistent pain;knowledge deficit;patient perception   Signs and Symptoms (Acute Pain) sleep pattern alteration       Goal: Acceptable Pain Control/Comfort Level  Outcome: Ongoing (interventions implemented as appropriate)    Problem: Fall Risk (Adult)  Goal: Identify Related Risk Factors and Signs and Symptoms  Outcome: Outcome(s) achieved Date Met:  01/18/17  Goal: Absence of Falls  Outcome: Ongoing (interventions implemented as appropriate)

## 2017-01-18 NOTE — CONSULTS
[facility] HEART GROUP CONSULT NOTE    Referring Provider: Enrrique Martin MD    Reason for Consultation: Chest Pain     Chief Complaint   Patient presents with   • Chest Pain       Subjective .     History of present illness:  Stacy Lynn is a 46 y.o. female with a known PMH significant for HTN, SVT, PFO,  Sarcoidosis, DDD with chronic pain, and hyperthyroidism, who presented to [facility] via ED on 1/17 with complaints of chest pain. She was admitted to  telemetry under the care of the hospitalist team. Cardiology was consulted on 1/18. Troponin negative x4. No acute ECG changes. Lexiscan has been ordered by the primary team. Patient had a stress echo obtained 9/2016, which was ready by Dr. Garcia as low risk for ischemia.    History  Past Medical History   Diagnosis Date   • Chronic pain    • Degeneration of intervertebral disc of high cervical region    • Hypertension    • Hyperthyroidism    • Irritable bowel syndrome    • Macular degeneration    • PFO (patent foramen ovale) 09/2016   • PSVT (paroxysmal supraventricular tachycardia)      s/p ablation per Dr. Diallo 4/2016   • Restless leg syndrome    • Sarcoidosis    • Scoliosis    ,   Past Surgical History   Procedure Laterality Date   • Replacement total knee     • Patella surgery     • Lateral epicondyle release     • Shoulder arthroscopy     • Ulnar nerve decompression     • Knee arthroscopy     • Hysterectomy     • Cholecystectomy     • Kidney stone surgery     • Cardiac ablation  04/2016     SVT; Dr. Diallo    ,   Family History   Problem Relation Age of Onset   • Arrhythmia Mother    • Heart disease Mother    • Heart disease Father    • Heart disease Brother    • Heart disease Maternal Aunt    • Heart disease Maternal Uncle    • Heart disease Paternal Aunt    • Heart disease Paternal Uncle    • Heart disease Maternal Grandmother    • Heart disease Maternal Grandfather    • Heart disease Paternal Grandmother    • Heart disease Paternal Grandfather    ,    Social History   Substance Use Topics   • Smoking status: Never Smoker   • Smokeless tobacco: None   • Alcohol use No   ,     Medications  Current Facility-Administered Medications   Medication Dose Route Frequency Provider Last Rate Last Dose   • acetaminophen (TYLENOL) tablet 650 mg  650 mg Oral Q6H PRN Enrrique Martin MD       • aspirin EC tablet 81 mg  81 mg Oral Daily Enrrique Martin MD   81 mg at 01/18/17 0921   • busPIRone (BUSPAR) tablet 5 mg  5 mg Oral Nightly Enrrique Martin MD   5 mg at 01/17/17 9069   • docusate sodium (COLACE) capsule 100 mg  100 mg Oral Daily PRN Enrrique Martin MD       • DULoxetine (CYMBALTA) DR capsule 60 mg  60 mg Oral Daily Enrrique Martin MD   60 mg at 01/18/17 0921   • [START ON 1/19/2017] enoxaparin (LOVENOX) syringe 30 mg  30 mg Subcutaneous Q24H Barrett Zeng DO       • gabapentin (NEURONTIN) capsule 300 mg  300 mg Oral TID Enrrique Martin MD   300 mg at 01/18/17 1500   • HYDROcodone-acetaminophen (NORCO) 5-325 MG per tablet 1 tablet  1 tablet Oral Q6H PRN Enrrique Martin MD   1 tablet at 01/18/17 0922   • ipratropium-albuterol (DUO-NEB) nebulizer solution 3 mL  3 mL Nebulization Q4H PRN Enrrique Martin MD       • levothyroxine (SYNTHROID, LEVOTHROID) tablet 88 mcg  88 mcg Oral Q AM Enrrique Martin MD   88 mcg at 01/18/17 0556   • metoprolol tartrate (LOPRESSOR) tablet 25 mg  25 mg Oral Q12H Enrrique Martin MD   25 mg at 01/18/17 0921   • nitroglycerin (NITROSTAT) SL tablet 0.4 mg  0.4 mg Sublingual Q5 Min PRN Enrrique Martin MD   0.4 mg at 01/18/17 0918   • ondansetron (ZOFRAN) injection 4 mg  4 mg Intravenous Q6H PRN Enrrique Martin MD       • oxybutynin XL (DITROPAN-XL) 24 hr tablet 5 mg  5 mg Oral Daily Enrrique Martin MD   5 mg at 01/18/17 0921   • pantoprazole (PROTONIX) EC tablet 40 mg  40 mg Oral Q AM Enrrique Martin MD   40 mg at 01/18/17 0556   • Pharmacy Consult   Does not apply Continuous PRN Enrrique Martin MD       • sodium chloride 0.9 % flush 1-10 mL  1-10 mL Intravenous PRN Enrrique EDDY  "MD Mario       • sodium chloride 0.9 % infusion  75 mL/hr Intravenous Continuous BETINA Zavala 75 mL/hr at 01/18/17 1455 75 mL/hr at 01/18/17 1455   • topiramate (TOPAMAX) tablet 100 mg  100 mg Oral Q12H Enrrique Martin MD   100 mg at 01/18/17 0921       Allergies:  Tape    Review of Systems  Review of Systems   Constitution: Positive for weakness and malaise/fatigue. Negative for fever.   Cardiovascular: Positive for chest pain and leg swelling (chronic). Negative for irregular heartbeat, near-syncope, orthopnea, palpitations, paroxysmal nocturnal dyspnea and syncope.   Respiratory: Positive for shortness of breath. Negative for cough, sputum production and wheezing.    Gastrointestinal: Positive for nausea. Negative for bloating, abdominal pain and vomiting.   Psychiatric/Behavioral: The patient is nervous/anxious.        Objective     Physical Exam:  Patient Vitals for the past 24 hrs:   BP Temp Temp src Pulse Resp SpO2 Height Weight   01/18/17 1202 105/63 97.9 °F (36.6 °C) Tympanic 70 18 97 % - -   01/18/17 0922 116/69 - - 75 - 100 % - -   01/18/17 0919 115/60 - - 75 - - - -   01/18/17 0905 111/70 - - 72 18 100 % - -   01/18/17 0735 116/80 97.6 °F (36.4 °C) Oral 88 18 100 % - -   01/17/17 2225 126/73 97 °F (36.1 °C) Temporal Art 107 20 99 % 63\" (160 cm) 219 lb 3.2 oz (99.4 kg)   01/17/17 2119 - 98.1 °F (36.7 °C) - - - - - -   01/17/17 2101 119/82 - - 93 - 99 % - -   01/17/17 2047 106/84 - - 95 - 100 % - -   01/17/17 2032 108/64 - - 81 - 98 % - -   01/17/17 2017 129/87 - - 96 - 100 % - -   01/17/17 2002 117/92 - - 90 - 100 % - -   01/17/17 1959 145/87 - - 80 - 99 % - -   01/17/17 1947 132/84 - - 84 - 98 % - -   01/17/17 1931 131/84 - - 85 - 100 % - -   01/17/17 1916 126/77 - - 87 - 100 % - -   01/17/17 1901 133/84 - - 84 - 98 % - -   01/17/17 1601 122/82 98.6 °F (37 °C) Temporal Art 93 20 100 % 63\" (160 cm) 223 lb (101 kg)     Physical Exam   Constitutional: She is oriented to person, place, and time. She " appears well-developed and well-nourished. She is cooperative. No distress.   HENT:   Head: Normocephalic and atraumatic.   Cardiovascular: Normal rate, regular rhythm and normal heart sounds.    Pulmonary/Chest: Effort normal. No respiratory distress. She exhibits tenderness.   Musculoskeletal: She exhibits edema (chronic BLE non pitting edema).   Neurological: She is alert and oriented to person, place, and time.   Skin: Skin is warm and dry. No rash noted. She is not diaphoretic.   Vitals reviewed.      Results Review:   I reviewed the patient's new clinical results.  Lab Results   Component Value Date    WBC 1.82 (C) 01/18/2017    HGB 10.2 (L) 01/18/2017    HCT 31.8 (L) 01/18/2017    MCV 90.3 01/18/2017     (L) 01/18/2017     Lab Results   Component Value Date    GLUCOSE 96 01/18/2017    CALCIUM 8.9 01/18/2017     01/18/2017    K 4.3 01/18/2017    CO2 26.0 01/18/2017     01/18/2017    BUN 16 01/18/2017    CREATININE 1.58 (H) 01/18/2017    EGFRIFAFRI  09/25/2016      Comment:      <15 Indicative of kidney failure.    EGFRIFNONA 35 (L) 01/18/2017    BCR 10.1 01/18/2017    ANIONGAP 11.0 01/18/2017     Lab Results   Component Value Date    CKMB 0.25 01/17/2016    TROPONINI <0.012 01/18/2017     Lab Results   Component Value Date    CHOL 159 09/26/2016     Lab Results   Component Value Date    TRIG 117 09/26/2016     Lab Results   Component Value Date    HDL 37 (L) 09/26/2016       Lab Results   Component Value Date    ECHOEFEST 65 09/26/2016       Imaging Results (last 72 hours)     Procedure Component Value Units Date/Time    XR Chest 1 View [[id number]] Collected:  01/17/17 1707     Updated:  01/17/17 1710    Narrative:       EXAMINATION:  XR CHEST 1 VW-  1/17/2017 4:49 PM CST     HISTORY: Chest pain.     COMPARISON: 07/27/2016.     FINDINGS:  There is poor inspiration. There is vascular crowding  centrally. There is minimal infiltrate in the lung bases. There is no  pleural effusion. The heart is  normal in size. There is scoliosis of the  thoracic spine versus positioning artifact.       Impression:       1. Poor inspiration with vascular crowding centrally.  2. Patchy infiltrate in the lung bases, likely atelectasis. Pneumonia  less likely.     Electronically Signed By-Dr. Rohan Blackmon MD On:1/17/2017 6:08 PM EST  This report was finalized on 01/17/2017 17:08 by Dr. Rohan Blackmon MD.            Principal Problem:    Chest pain, unspecified, atypical with recent low risk DSE     Active Problems:    Sarcoidosis    Essential hypertension    PFO (patent foramen ovale)      Plan   1. Lexiscan ordered per primary team- unable to be obtained today as the patient is currently drinking a Coke. Will defer obtaining Deb to Dr. Prabhakar.   2. Monitor telemetry    Further orders per Dr. Prabhakar upon his evaluation of the patient. Dr. Garcia will resume care tomorrow.     Thank you for the consultation, cardiology will gladly continue to follow.     BETINA Bloom        Please note this cardiology consultation note is the result of a face to face consultation with the patient, in addition to reviewing medical records at length by myself, BETINA Flores.     Time: More than 50% of time spent in counseling and coordination of care:  Total face-to-face/floor time 40 min.  Time spent in counseling 25 min. Counseling included the following topics: lab results, ECG results, assessment, plan of care

## 2017-01-18 NOTE — PROGRESS NOTES
[facility]  INPATIENT PROGRESS NOTE    Length of Stay: 0  Date of Admission: 1/17/2017  Primary Care Physician: Elliott Waters DO    Subjective   Chief Complaint: Chest pain    HPI   Patient admitted for workup due to chest pain.  This is been occurring since late the last year at which time she underwent complete cardiac workup by Dr. Garcia in September.  Today she reports intermittent chest pressure and squeezing, occurred earlier this morning none currently.  She has chronic back pain and is disabled.  She denies shortness of breathing or abdominal pain.  She reports intermittent lower extremity edema however there is none at this time. She sees [facility], states she has had an upper GI study in the past positive for reflux for which she takes Nexium.  She also has IBS.  Looking back at previous labs she has chronic kidney disease stage III, however she is unaware of this.She is stable at this time.    Review of Systems   Cardiovascular: Positive for chest pain (intermittent, squeezing pressure).   Musculoskeletal: Positive for back pain (chronic).      All pertinent negatives and positives are as above. All other systems have been reviewed and are negative unless otherwise stated.     Objective    Temp:  [97 °F (36.1 °C)-98.6 °F (37 °C)] 97.6 °F (36.4 °C)  Heart Rate:  [] 75  Resp:  [18-20] 18  BP: (106-145)/(60-92) 116/69    Physical Exam   Constitutional: She is oriented to person, place, and time. She appears well-developed and well-nourished. No distress.   Morbidly obese   HENT:   Head: Normocephalic and atraumatic.   Eyes: Conjunctivae and EOM are normal. Pupils are equal, round, and reactive to light. No scleral icterus.   Neck: Normal range of motion. Neck supple. No JVD present. No tracheal deviation present.   Cardiovascular: Normal rate, regular rhythm, normal heart sounds and intact distal pulses.  Exam reveals no gallop.    No murmur  heard.  Pulmonary/Chest: Effort normal and breath sounds normal. No respiratory distress. She has no wheezes. She has no rales.   Abdominal: Soft. Bowel sounds are normal. She exhibits no distension. There is no tenderness. There is no guarding.   Musculoskeletal: Normal range of motion. She exhibits no edema.   Neurological: She is alert and oriented to person, place, and time.   No obvious deficits noted.   Skin: Skin is warm and dry. No rash noted. She is not diaphoretic. No erythema. No pallor.   Psychiatric: She has a normal mood and affect. Her behavior is normal.   Vitals reviewed.    Results Review:  Recent Results (from the past 12 hour(s))   Troponin    Collection Time: 01/17/17 11:37 PM   Result Value Ref Range    Troponin I <0.012 0.000 - 0.034 ng/mL   Troponin    Collection Time: 01/18/17  7:13 AM   Result Value Ref Range    Troponin I <0.012 0.000 - 0.034 ng/mL   Basic Metabolic Panel    Collection Time: 01/18/17  7:13 AM   Result Value Ref Range    Glucose 96 70 - 100 mg/dL    BUN 16 5 - 21 mg/dL    Creatinine 1.58 (H) 0.50 - 1.40 mg/dL    Sodium 142 135 - 145 mmol/L    Potassium 4.3 3.5 - 5.3 mmol/L    Chloride 105 98 - 110 mmol/L    CO2 26.0 24.0 - 31.0 mmol/L    Calcium 8.9 8.4 - 10.4 mg/dL    eGFR Non African Amer 35 (L) >60 mL/min/1.73    BUN/Creatinine Ratio 10.1 7.0 - 25.0    Anion Gap 11.0 4.0 - 13.0 mmol/L   CBC (No Diff)    Collection Time: 01/18/17  7:13 AM   Result Value Ref Range    WBC 1.82 (C) 4.80 - 10.80 10*3/mm3    RBC 3.52 (L) 4.20 - 5.40 10*6/mm3    Hemoglobin 10.2 (L) 12.0 - 16.0 g/dL    Hematocrit 31.8 (L) 37.0 - 47.0 %    MCV 90.3 82.0 - 98.0 fL    MCH 29.0 28.0 - 32.0 pg    MCHC 32.1 (L) 33.0 - 36.0 g/dL    RDW 13.5 12.0 - 15.0 %    RDW-SD 44.9 40.0 - 54.0 fl    MPV 9.7 6.0 - 12.0 fL    Platelets 102 (L) 130 - 400 10*3/mm3     Radiology Data:    Imaging Results (last 24 hours)     Procedure Component Value Units Date/Time    XR Chest 1 View [[id number]] Collected:  01/17/17 1388      Updated:  01/17/17 1710    Narrative:       EXAMINATION:  XR CHEST 1 VW-  1/17/2017 4:49 PM CST     HISTORY: Chest pain.     COMPARISON: 07/27/2016.     FINDINGS:  There is poor inspiration. There is vascular crowding  centrally. There is minimal infiltrate in the lung bases. There is no  pleural effusion. The heart is normal in size. There is scoliosis of the  thoracic spine versus positioning artifact.       Impression:       1. Poor inspiration with vascular crowding centrally.  2. Patchy infiltrate in the lung bases, likely atelectasis. Pneumonia  less likely.     Electronically Signed By-Dr. Rohan Blackmon MD On:1/17/2017 6:08 PM EST  This report was finalized on 01/17/2017 17:08 by Dr. Rohan Blackmon MD.        No intake or output data in the 24 hours ending 01/18/17 1103    Allergies   Allergen Reactions   • Tape      Scheduled meds:     aspirin 81 mg Oral Daily   busPIRone 5 mg Oral Nightly   DULoxetine 60 mg Oral Daily   enoxaparin 1 mg/kg Subcutaneous Q12H   gabapentin 300 mg Oral TID   levothyroxine 88 mcg Oral Q AM   metoprolol tartrate 25 mg Oral Q12H   oxybutynin XL 5 mg Oral Daily   pantoprazole 40 mg Oral Q AM   topiramate 100 mg Oral Q12H     PRN meds:  •  acetaminophen  •  docusate sodium  •  HYDROcodone-acetaminophen  •  ipratropium-albuterol  •  nitroglycerin  •  ondansetron  •  Pharmacy Consult  •  sodium chloride    Assessment/Plan     Active Problems:  Chest pain - previous w/u 9/2016 negative  Elevated d-dimer  CKD stage 3  Hypothyroidism  Sarcoidosis  Pancytopenia (anemia substrates wnl)  HTN  Chronic pain syndrome  ECHO 9/26/2016  Interpretation Summary   · Left ventricular function is normal. Estimated EF = 65%.  · Left ventricular diastolic dysfunction (grade I) consistent with impaired relaxation.  · There is normal right ventricular size and function.  · All vavles are structurally and functionally normal with no hemodynamically significant disease.  · There is right to left shunt  seen on bubble study consistent with a PFO.         Plan:  1. Cardiology consulted, Dr Garcia  2. Stat VQ scan  3. Labs in am: CBC with diff, BMP  4. Add gentle hydration: NS at 75ml/hr  5. TSH today    Discharge Planning:  Possibly tomorrow.     BETINA Colindres   01/18/17   11:03 AM    I personally evaluated and examined the patient in conjunction with BETINA Cassidy and agree with the assessment, treatment plan, and disposition of the patient as recorded by her. My history, exam, and further recommendations are:     She was admitted to Dr. Roby Garcia in September 2016.  At that point in time she was also complaining of chest pain.  She had a negative dobutamine stress echocardiogram. She comes back again with atypical chest pain. She had an elevated d-dimer which is likely secondary to her sarcoidosis and chronic kidney disease.  A VQ scan was done today read as low probability for pulmonary embolism.  She was evaluated by cardiology earlier today.  It seems a Lexiscan was in order.  They were going to do this today, but the patient was drinking caffeine today. Dr. Prabhakar is going to evaluate her in the absence of Dr. Garcia according to Lisbeth Tapia's note.  I will defer further cardiac risk stratification to him.    I am going to discontinue the therapeutic Lovenox.  I do not think this represents acute coronary syndrome.    Check a lipid panel and HbA1c. Her TSH is adequate at 3.320.    Barrett Zeng,   01/18/17  2:55 PM

## 2017-01-19 VITALS
BODY MASS INDEX: 39.94 KG/M2 | SYSTOLIC BLOOD PRESSURE: 128 MMHG | DIASTOLIC BLOOD PRESSURE: 69 MMHG | RESPIRATION RATE: 16 BRPM | OXYGEN SATURATION: 98 % | TEMPERATURE: 97.2 F | HEIGHT: 63 IN | WEIGHT: 225.4 LBS | HEART RATE: 62 BPM

## 2017-01-19 LAB
ANION GAP SERPL CALCULATED.3IONS-SCNC: 12 MMOL/L (ref 4–13)
ARTICHOKE IGE QN: 95 MG/DL (ref 0–99)
BASOPHILS # BLD AUTO: 0.03 10*3/MM3 (ref 0–0.2)
BASOPHILS NFR BLD AUTO: 1 % (ref 0–2)
BUN BLD-MCNC: 13 MG/DL (ref 5–21)
BUN/CREAT SERPL: 8.8 (ref 7–25)
CALCIUM SPEC-SCNC: 9.1 MG/DL (ref 8.4–10.4)
CHLORIDE SERPL-SCNC: 104 MMOL/L (ref 98–110)
CHOLEST SERPL-MCNC: 163 MG/DL (ref 130–200)
CO2 SERPL-SCNC: 26 MMOL/L (ref 24–31)
CREAT BLD-MCNC: 1.48 MG/DL (ref 0.5–1.4)
DEPRECATED RDW RBC AUTO: 42.6 FL (ref 40–54)
EOSINOPHIL # BLD AUTO: 0.19 10*3/MM3 (ref 0–0.7)
EOSINOPHIL NFR BLD AUTO: 6.5 % (ref 0–4)
ERYTHROCYTE [DISTWIDTH] IN BLOOD BY AUTOMATED COUNT: 13.1 % (ref 12–15)
GFR SERPL CREATININE-BSD FRML MDRD: 38 ML/MIN/1.73
GLUCOSE BLD-MCNC: 104 MG/DL (ref 70–100)
HBA1C MFR BLD: 5 %
HCT VFR BLD AUTO: 36.8 % (ref 37–47)
HDLC SERPL-MCNC: 44 MG/DL
HGB BLD-MCNC: 12 G/DL (ref 12–16)
LDLC/HDLC SERPL: 2.2 {RATIO}
LYMPHOCYTES # BLD AUTO: 0.81 10*3/MM3 (ref 0.72–4.86)
LYMPHOCYTES NFR BLD AUTO: 27.7 % (ref 15–45)
MCH RBC QN AUTO: 29.4 PG (ref 28–32)
MCHC RBC AUTO-ENTMCNC: 32.6 G/DL (ref 33–36)
MCV RBC AUTO: 90.2 FL (ref 82–98)
MONOCYTES # BLD AUTO: 0.32 10*3/MM3 (ref 0.19–1.3)
MONOCYTES NFR BLD AUTO: 11 % (ref 4–12)
NEUTROPHILS # BLD AUTO: 1.57 10*3/MM3 (ref 1.87–8.4)
NEUTROPHILS NFR BLD AUTO: 53.8 % (ref 39–78)
PLATELET # BLD AUTO: 154 10*3/MM3 (ref 130–400)
PMV BLD AUTO: 9.7 FL (ref 6–12)
POTASSIUM BLD-SCNC: 3.8 MMOL/L (ref 3.5–5.3)
RBC # BLD AUTO: 4.08 10*6/MM3 (ref 4.2–5.4)
SODIUM BLD-SCNC: 142 MMOL/L (ref 135–145)
TRIGL SERPL-MCNC: 110 MG/DL (ref 0–149)
WBC NRBC COR # BLD: 2.92 10*3/MM3 (ref 4.8–10.8)

## 2017-01-19 PROCEDURE — 85025 COMPLETE CBC W/AUTO DIFF WBC: CPT | Performed by: NURSE PRACTITIONER

## 2017-01-19 PROCEDURE — 80048 BASIC METABOLIC PNL TOTAL CA: CPT | Performed by: NURSE PRACTITIONER

## 2017-01-19 PROCEDURE — G0378 HOSPITAL OBSERVATION PER HR: HCPCS

## 2017-01-19 PROCEDURE — 80061 LIPID PANEL: CPT | Performed by: INTERNAL MEDICINE

## 2017-01-19 PROCEDURE — 96361 HYDRATE IV INFUSION ADD-ON: CPT

## 2017-01-19 PROCEDURE — 99213 OFFICE O/P EST LOW 20 MIN: CPT | Performed by: INTERNAL MEDICINE

## 2017-01-19 PROCEDURE — 96372 THER/PROPH/DIAG INJ SC/IM: CPT

## 2017-01-19 PROCEDURE — 25010000002 ENOXAPARIN PER 10 MG: Performed by: INTERNAL MEDICINE

## 2017-01-19 PROCEDURE — 83036 HEMOGLOBIN GLYCOSYLATED A1C: CPT | Performed by: INTERNAL MEDICINE

## 2017-01-19 RX ADMIN — SODIUM CHLORIDE 75 ML/HR: 9 INJECTION, SOLUTION INTRAVENOUS at 00:37

## 2017-01-19 RX ADMIN — METOPROLOL TARTRATE 25 MG: 25 TABLET, FILM COATED ORAL at 08:00

## 2017-01-19 RX ADMIN — ASPIRIN 81 MG: 81 TABLET ORAL at 08:00

## 2017-01-19 RX ADMIN — ENOXAPARIN SODIUM 30 MG: 30 INJECTION SUBCUTANEOUS at 08:04

## 2017-01-19 RX ADMIN — GABAPENTIN 300 MG: 300 CAPSULE ORAL at 08:00

## 2017-01-19 RX ADMIN — LEVOTHYROXINE SODIUM 88 MCG: 88 TABLET ORAL at 05:47

## 2017-01-19 RX ADMIN — OXYBUTYNIN CHLORIDE 5 MG: 5 TABLET, FILM COATED, EXTENDED RELEASE ORAL at 08:00

## 2017-01-19 RX ADMIN — TOPIRAMATE 100 MG: 100 TABLET, FILM COATED ORAL at 08:00

## 2017-01-19 RX ADMIN — DULOXETINE HYDROCHLORIDE 60 MG: 30 CAPSULE, DELAYED RELEASE ORAL at 08:00

## 2017-01-19 RX ADMIN — PANTOPRAZOLE SODIUM 40 MG: 40 TABLET, DELAYED RELEASE ORAL at 05:47

## 2017-01-19 NOTE — CONSULTS
"Pharmacy Anticoagulation Note - Lovenox  Stacy Lynn is a 46 y.o. female on Enoxaparin 30 mg Q24H for indication of vte ppx.    [Ht: 63\" (160 cm); Wt: 225 lb 6.4 oz (102 kg);  BMI: Body mass index is 39.93 kg/(m^2).]  Estimated Creatinine Clearance: 53 mL/min (by C-G formula based on Cr of 1.48).   Lab Results   Component Value Date    INR 1.05 01/17/2017    INR 1.12 (H) 01/17/2016    INR 1.06 12/23/2015    PROTIME 14.0 01/17/2017    PROTIME 14.7 (H) 01/17/2016    PROTIME 14.1 12/23/2015      Lab Results   Component Value Date    HGB 12.0 01/19/2017    HGB 10.2 (L) 01/18/2017    HGB 11.5 (L) 01/17/2017      Lab Results   Component Value Date     01/19/2017     (L) 01/18/2017     (L) 01/17/2017     Assessment/Plan:  Changed dose to lovenox 40 mg q 24hrs due to renal function    PATRICE Mi RPH  01/19/17 8:55 AM     "

## 2017-01-19 NOTE — PLAN OF CARE
Problem: Patient Care Overview (Adult)  Goal: Plan of Care Review  Outcome: Ongoing (interventions implemented as appropriate)    01/19/17 0235   Coping/Psychosocial Response Interventions   Plan Of Care Reviewed With patient   Patient Care Overview   Progress improving   Outcome Evaluation   Outcome Summary/Follow up Plan rested well. pt to have stress test this am. npo since midnight       Goal: Adult Individualization and Mutuality  Outcome: Ongoing (interventions implemented as appropriate)  Goal: Discharge Needs Assessment  Outcome: Ongoing (interventions implemented as appropriate)    Problem: Pain, Acute (Adult)  Goal: Acceptable Pain Control/Comfort Level  Outcome: Ongoing (interventions implemented as appropriate)    Problem: Fall Risk (Adult)  Goal: Absence of Falls  Outcome: Outcome(s) achieved Date Met:  01/19/17

## 2017-01-19 NOTE — DISCHARGE SUMMARY
[facility] Medicine Services  DISCHARGE SUMMARY       Date of Admission: 1/17/2017  Date of Discharge:  1/19/2017  Primary Care Physician: Elliott Waters DO    Presenting Problem/History of Present Illness:  Chest pain, unspecified type [R07.9]     Final Discharge Diagnoses:  Hospital Problem List     * (Principal)Chest pain, unspecified    Sarcoidosis    Essential hypertension    PFO (patent foramen ovale)    Overview Signed 1/18/2017 11:59 AM by BETINA Bloom     Per 9/2016 Echo            Discharge Diagnoses:  Chest pain - previous w/u 9/2016 negative  Elevated d-dimer  CKD stage 3  Hypothyroidism  Sarcoidosis  Pancytopenia (anemia substrates wnl)  HTN  Chronic pain syndrome  ECHO 9/26/2016 Interpretation Summary   · Left ventricular function is normal. Estimated EF = 65%.  · Left ventricular diastolic dysfunction (grade I) consistent with impaired relaxation.  · There is normal right ventricular size and function.  · All vavles are structurally and functionally normal with no hemodynamically significant disease.  · There is right to left shunt seen on bubble study consistent with a PFO.             Consults: Dr. Roby Garcia Cardiology    Procedures Performed: none    Pertinent Test Results:   Imaging Results (last 72 hours)     Procedure Component Value Units Date/Time    XR Chest 1 View [[id number]] Collected:  01/17/17 1707     Updated:  01/17/17 1710    Narrative:       EXAMINATION:  XR CHEST 1 VW-  1/17/2017 4:49 PM CST     HISTORY: Chest pain.     COMPARISON: 07/27/2016.     FINDINGS:  There is poor inspiration. There is vascular crowding  centrally. There is minimal infiltrate in the lung bases. There is no  pleural effusion. The heart is normal in size. There is scoliosis of the  thoracic spine versus positioning artifact.       Impression:       1. Poor inspiration with vascular crowding centrally.  2. Patchy infiltrate in the lung bases, likely atelectasis.  Pneumonia  less likely.     Electronically Signed By-Dr. Rohan Blackmon MD On:1/17/2017 6:08 PM EST  This report was finalized on 01/17/2017 17:08 by Dr. Rohan Blackmon MD.    NM Lung Ventilation Perfusion [[id number]] Collected:  01/18/17 1344     Updated:  01/18/17 1347    Narrative:       EXAMINATION: NM LUNG VENTILATION PERFUSION-     1/18/2017 12:59 PM CST     HISTORY: chest pain, elevated d-dimer; R07.9-Chest pain, unspecified;  R79.1-Abnormal coagulation profile; D61.818-Other pancytopenia     Nuclear medicine ventilation/perfusion lung scan.     Comparison is made with a chest x-ray from 01/17/2017 at 4:47 PM.     Dose: 10.2 mCi xenon gas.  Route of administration: Ventilation.  Dose: 4.9 mCi technetium MAA.  Route of administration: IV injection.     Ventilation imaging shows mild air trapping.  Perfusion imaging shows no focal mismatched defect.     Summary:  1. Low probability for pulmonary embolism.           Electronically Signed By-Dr. Gene Burch MD On:1/18/2017 2:45 PM EST  This report was finalized on 01/18/2017 13:45 by Dr. Gene Burch MD.        Lab Results (last 72 hours)     Procedure Component Value Units Date/Time    POC Troponin, Rapid [[id number]]  (Normal) Collected:  01/17/17 1625    Specimen:  Blood Updated:  01/17/17 1641     Troponin I 0.00 ng/mL       Serial Number: [id number]    : [id number]       CBC Auto Differential [[id number]]  (Abnormal) Collected:  01/17/17 1622    Specimen:  Blood Updated:  01/17/17 1656     WBC 2.66 (L) 10*3/mm3      RBC 3.91 (L) 10*6/mm3      Hemoglobin 11.5 (L) g/dL      Hematocrit 35.1 (L) %      MCV 89.8 fL      MCH 29.4 pg      MCHC 32.8 (L) g/dL      RDW 13.4 %      RDW-SD 43.7 fl      MPV 9.8 fL      Platelets 121 (L) 10*3/mm3      Neutrophil % 59.0 %      Lymphocyte % 17.7 %      Monocyte % 16.9 (H) %      Eosinophil % 6.0 (H) %      Basophil % 0.4 %      Immature Grans % 0.0 %      Neutrophils, Absolute 1.57 (L) 10*3/mm3      Lymphocytes, Absolute  0.47 (L) 10*3/mm3      Monocytes, Absolute 0.45 10*3/mm3      Eosinophils, Absolute 0.16 10*3/mm3      Basophils, Absolute 0.01 10*3/mm3      Immature Grans, Absolute 0.00 10*3/mm3     CBC & Differential [[id number]] Collected:  01/17/17 1622    Specimen:  Blood Updated:  01/17/17 1656    Narrative:       The following orders were created for panel order CBC & Differential.  Procedure                               Abnormality         Status                     ---------                               -----------         ------                     CBC Auto Differential[[id number]]         Abnormal            Final result                 Please view results for these tests on the individual orders.    Protime-INR [[id number]]  (Normal) Collected:  01/17/17 1623    Specimen:  Blood Updated:  01/17/17 1657     Protime 14.0 Seconds      INR 1.05     aPTT [[id number]]  (Abnormal) Collected:  01/17/17 1623    Specimen:  Blood Updated:  01/17/17 1657     PTT 43.0 (H) seconds     D-dimer, Quantitative [[id number]]  (Abnormal) Collected:  01/17/17 1623    Specimen:  Blood Updated:  01/17/17 1657     D-Dimer, Quantitative 2.65 (H) mg/L (FEU)     Narrative:       Reference Range is 0-0.50 mg/L FEU. However, results <0.50 mg/L FEU tends to rule out DVT or PE. Results >0.50 mg/L FEU are not useful in predicting absence or presence of DVT or PE.    BNP [[id number]]  (Abnormal) Collected:  01/17/17 1622    Specimen:  Blood Updated:  01/17/17 1706     proBNP 556.0 (H) pg/mL     Comprehensive Metabolic Panel [[id number]]  (Abnormal) Collected:  01/17/17 1622    Specimen:  Blood Updated:  01/17/17 1932     Glucose 94 mg/dL      BUN 18 mg/dL      Creatinine 1.46 (H) mg/dL      Sodium 140 mmol/L      Potassium 3.7 mmol/L      Chloride 103 mmol/L      CO2 23.0 (L) mmol/L      Calcium 8.8 mg/dL      Total Protein 7.6 g/dL      Albumin 4.30 g/dL      ALT (SGPT) 31 U/L      AST (SGOT) 32 U/L      Alkaline Phosphatase 95 U/L      Total Bilirubin 0.7 mg/dL       eGFR Non  Amer 38 (L) mL/min/1.73      Globulin 3.3 gm/dL      A/G Ratio 1.3 g/dL      BUN/Creatinine Ratio 12.3      Anion Gap 14.0 (H) mmol/L     Amylase [[id number]]  (Normal) Collected:  01/17/17 1622    Specimen:  Blood Updated:  01/17/17 1932     Amylase 50 U/L     Lipase [[id number]]  (Normal) Collected:  01/17/17 1622    Specimen:  Blood Updated:  01/17/17 1932     Lipase 94 U/L     POC Troponin, Rapid [[id number]]  (Normal) Collected:  01/17/17 1950    Specimen:  Blood Updated:  01/17/17 2005     Troponin I 0.00 ng/mL       Serial Number: [id number]    : [id number]       Urinalysis With / Culture If Indicated [[id number]]  (Normal) Collected:  01/17/17 1959    Specimen:  Urine from Urine, Clean Catch Updated:  01/17/17 2011     Color, UA Yellow      Appearance, UA Clear      pH, UA 6.5      Specific Gravity, UA 1.011      Glucose, UA Negative      Ketones, UA Negative      Bilirubin, UA Negative      Blood, UA Negative      Protein, UA Negative      Leuk Esterase, UA Negative      Nitrite, UA Negative      Urobilinogen, UA 0.2 E.U./dL     Narrative:       Urine microscopic not indicated.    Light Blue Top [[id number]] Collected:  01/17/17 1623    Specimen:  Blood Updated:  01/17/17 2101     Extra Tube hold for add-on       Auto resulted       Green Top (Gel) [[id number]] Collected:  01/17/17 1622    Specimen:  Blood Updated:  01/17/17 2101     Extra Tube Hold for add-ons.       Auto resulted.       Lavender Top [[id number]] Collected:  01/17/17 1622    Specimen:  Blood Updated:  01/17/17 2101     Extra Tube hold for add-on       Auto resulted       Red Top [[id number]] Collected:  01/17/17 1623    Specimen:  Blood Updated:  01/17/17 2101     Extra Tube Hold for add-ons.       Auto resulted.       [place] Draw [[id number]] Collected:  01/17/17 1622    Specimen:  Blood Updated:  01/17/17 2105    Narrative:       The following orders were created for panel order [place] Draw.  Procedure                                Abnormality         Status                     ---------                               -----------         ------                     Light Blue Top[[id number]]                                    Final result               Green Top (Gel)[[id number]]                                   Final result               Lavender Top[[id number]]                                      Final result               Red Top[[id number]]                                           Final result               Green Top (No Gel)[[id number]]                                                             Please view results for these tests on the individual orders.    Iron Profile [[id number]]  (Normal) Collected:  01/17/17 1623    Specimen:  Blood Updated:  01/17/17 2111     Iron 59 mcg/dL      TIBC 280 mcg/dL      Iron Saturation 21 %     Ferritin [[id number]]  (Abnormal) Collected:  01/17/17 1623    Specimen:  Blood Updated:  01/17/17 2139     Ferritin 148.00 (H) ng/mL     Vitamin B12 [[id number]]  (Normal) Collected:  01/17/17 1623    Specimen:  Blood Updated:  01/17/17 2210     Vitamin B-12 480 pg/mL     Folate [[id number]] Collected:  01/17/17 1623    Specimen:  Blood Updated:  01/17/17 2210     Folate 12.20 ng/mL     Troponin [[id number]]  (Normal) Collected:  01/17/17 2337    Specimen:  Blood Updated:  01/18/17 0102     Troponin I <0.012 ng/mL     CBC (No Diff) [[id number]]  (Abnormal) Collected:  01/18/17 0713    Specimen:  Blood Updated:  01/18/17 0744     WBC 1.82 (C) 10*3/mm3      RBC 3.52 (L) 10*6/mm3      Hemoglobin 10.2 (L) g/dL      Hematocrit 31.8 (L) %      MCV 90.3 fL      MCH 29.0 pg      MCHC 32.1 (L) g/dL      RDW 13.5 %      RDW-SD 44.9 fl      MPV 9.7 fL      Platelets 102 (L) 10*3/mm3     Basic Metabolic Panel [[id number]]  (Abnormal) Collected:  01/18/17 0713    Specimen:  Blood Updated:  01/18/17 0759     Glucose 96 mg/dL      BUN 16 mg/dL      Creatinine 1.58 (H) mg/dL      Sodium 142 mmol/L      Potassium 4.3 mmol/L      Chloride 105 mmol/L       CO2 26.0 mmol/L      Calcium 8.9 mg/dL      eGFR Non African Amer 35 (L) mL/min/1.73      BUN/Creatinine Ratio 10.1      Anion Gap 11.0 mmol/L     Narrative:       GFR Normal >60  Chronic Kidney Disease <60  Kidney Failure <15    Troponin [[id number]]  (Normal) Collected:  01/18/17 0713    Specimen:  Blood Updated:  01/18/17 0812     Troponin I <0.012 ng/mL     TSH [[id number]]  (Normal) Collected:  01/17/17 1623    Specimen:  Blood Updated:  01/18/17 1134     TSH 3.320 mIU/mL     Troponin [[id number]]  (Normal) Collected:  01/18/17 1340    Specimen:  Blood Updated:  01/18/17 1422     Troponin I <0.012 ng/mL     Basic Metabolic Panel [[id number]]  (Abnormal) Collected:  01/19/17 0538    Specimen:  Blood Updated:  01/19/17 0647     Glucose 104 (H) mg/dL      BUN 13 mg/dL      Creatinine 1.48 (H) mg/dL      Sodium 142 mmol/L      Potassium 3.8 mmol/L      Chloride 104 mmol/L      CO2 26.0 mmol/L      Calcium 9.1 mg/dL      eGFR Non African Amer 38 (L) mL/min/1.73      BUN/Creatinine Ratio 8.8      Anion Gap 12.0 mmol/L     Narrative:       GFR Normal >60  Chronic Kidney Disease <60  Kidney Failure <15    Lipid Panel [[id number]]  (Abnormal) Collected:  01/19/17 0538    Specimen:  Blood Updated:  01/19/17 0658     Total Cholesterol 163 mg/dL      Triglycerides 110 mg/dL      HDL Cholesterol 44 (L) mg/dL      LDL Cholesterol  95 mg/dL      LDL/HDL Ratio 2.20     CBC & Differential [[id number]] Collected:  01/19/17 0538    Specimen:  Blood Updated:  01/19/17 0707    Narrative:       The following orders were created for panel order CBC & Differential.  Procedure                               Abnormality         Status                     ---------                               -----------         ------                     CBC Auto Differential[[id number]]         Abnormal            Final result                 Please view results for these tests on the individual orders.    CBC Auto Differential [[id number]]  (Abnormal)  Collected:  01/19/17 0538    Specimen:  Blood Updated:  01/19/17 0707     WBC 2.92 (L) 10*3/mm3      RBC 4.08 (L) 10*6/mm3      Hemoglobin 12.0 g/dL      Hematocrit 36.8 (L) %      MCV 90.2 fL      MCH 29.4 pg      MCHC 32.6 (L) g/dL      RDW 13.1 %      RDW-SD 42.6 fl      MPV 9.7 fL      Platelets 154 10*3/mm3      Neutrophil % 53.8 %      Lymphocyte % 27.7 %      Monocyte % 11.0 %      Eosinophil % 6.5 (H) %      Basophil % 1.0 %      Neutrophils, Absolute 1.57 (L) 10*3/mm3      Lymphocytes, Absolute 0.81 10*3/mm3      Monocytes, Absolute 0.32 10*3/mm3      Eosinophils, Absolute 0.19 10*3/mm3      Basophils, Absolute 0.03 10*3/mm3     Hemoglobin A1c [[id number]] Collected:  01/19/17 0538    Specimen:  Blood Updated:  01/19/17 0835     Hemoglobin A1C 5.0 %     Narrative:       Less than 6.0           Non-Diabetic Range  6.0-7.0                 ADA Therapeutic Target  Greater than 7.0        Action Suggested          Chief Complaint on Day of Discharge: weakness/tiredness    Hospital Course:  The patient is a 46 y.o. female who follows with Dr. Elliott Waters for her primary care. She has a past medical history significant for HTN, IBS, GERD, sarcoidosis, chronic pain syndrome, and hypothyroidism. She [facility]/17/2017 with complaints of chest pain. It started as left sided chest pressure. She was admitted to the hospitalist service and consultation was sought from Dr. Roby Garcia with cardiology. Serial troponin's were negative. She underwent VQ scan to rule out pulmonary embolism which revealed low probability of pulmonary embolism. She was scheduled for a Lexiscan SPECT study today, however, secondary to the VQ scan and the fact that she was drinking a caffeinated beverage last pm, this will be done as an outpatient. Her chest pain resolved. Hgb A1C was 5.0. She is deemed stable for discharge to have outpatient RAJAN SPECT study. We did suggest if her cardiac testing is once again negative, consideration of  "esophageal spasm would be appropriate. Please note that pt has already had a dobutamine stress echo in the past which was negative.     Condition on Discharge:  stable    Physical Exam on Discharge:  Visit Vitals   • /69 (BP Location: Left arm, Patient Position: Lying)   • Pulse 62   • Temp 97.2 °F (36.2 °C) (Temporal Artery )   • Resp 16   • Ht 63\" (160 cm)   • Wt 225 lb 6.4 oz (102 kg)   • SpO2 98%   • BMI 39.93 kg/m2     Physical Exam   Constitutional: She is oriented to person, place, and time. She appears well-developed and well-nourished.   HENT:   Head: Normocephalic and atraumatic.   Eyes: Conjunctivae and EOM are normal. Pupils are equal, round, and reactive to light.   Neck: Neck supple. No JVD present. No thyromegaly present.   Cardiovascular: Normal rate, regular rhythm, normal heart sounds and intact distal pulses.  Exam reveals no gallop and no friction rub.    No murmur heard.  Pulmonary/Chest: Effort normal and breath sounds normal. No respiratory distress. She has no wheezes. She has no rales. She exhibits no tenderness.   Abdominal: Soft. Bowel sounds are normal. She exhibits no distension. There is no tenderness. There is no rebound and no guarding.   Musculoskeletal: Normal range of motion. She exhibits no edema, tenderness or deformity.   Lymphadenopathy:     She has no cervical adenopathy.   Neurological: She is alert and oriented to person, place, and time. She displays normal reflexes. No cranial nerve deficit. She exhibits normal muscle tone.   Skin: Skin is warm and dry. No rash noted.   Psychiatric: She has a normal mood and affect. Her behavior is normal. Judgment and thought content normal.     Discharge Disposition:  Home or Self Care    Discharge Medications:   Stacy yLnn   Home Medication Instructions RAMOS:[id number]    Printed on:01/19/17 0907   Medication Information                      busPIRone (BUSPAR) 5 MG tablet  Take 1 tablet by mouth every night.           "   docusate sodium (COLACE) 100 MG capsule  Take 1 capsule by mouth daily as needed for constipation.             DULoxetine (CYMBALTA) 60 MG capsule  Take 1 capsule by mouth daily.             esomeprazole (NexIUM) 40 MG capsule  Take 1 capsule by mouth daily.             gabapentin (NEURONTIN) 300 MG capsule  Take 1 capsule by mouth 3 (three) times a day. Needs night time dose             HYDROcodone-acetaminophen (VICODIN) 5-500 MG per tablet  Take 1 tablet by mouth every 6 (six) hours. May take two tablets             levothyroxine (SYNTHROID, LEVOTHROID) 75 MCG tablet  Take 88 mcg by mouth Daily.             metoprolol tartrate (LOPRESSOR) 50 MG tablet  Take 50 mg by mouth daily.             oxybutynin XL (DITROPAN-XL) 5 MG 24 hr tablet  Take 1 tablet by mouth daily.             topiramate (TOPAMAX) 50 MG tablet  Take 2 tablets by mouth 2 (two) times a day. Needs night dose               Discharge Diet:   Diet Instructions     Diet: Cardiac; Thin Liquids, No Restrictions       Discharge Diet:  Cardiac   Fluid Consistency:  Thin Liquids, No Restrictions               Activity at Discharge:   Activity Instructions     Activity as Tolerated                   Discharge Care Plan/Instructions: pt will be discharged today to follow up with her PCP. Bartolo will be outpatient.     Follow-up Appointments:   Dr. Waters in 1 week.  Future Appointments  Date Time Provider Department Center   5/5/2017 11:30 AM Roby Garcia MD [facility] CD [facility] None     Test Results Pending at Discharge: none    BETINA Chung  01/19/17  9:07 AM    Time: 35 minutes    Please note that portions of this note may have been completed with a voice recognition program. Efforts were made to edit the dictations, but occasionally words are mistranscribed.    I personally evaluated and examined the patient in conjunction with BEITNA Birch and agree with the assessment, treatment plan, and disposition of the patient as recorded by her.  My history, exam, and further recommendations are:     She is unable to complete a Lexiscan for several more days given the fact that she just underwent a VQ scan.  This was discussed with Dr. Roby Garcia from cardiology.  He feels the patient is suitable for discharge and outpatient Lexiscan.  This will be set up by he and Dr. Waters.  I discussed this with Mrs. Miller today.  She feels fine with doing this.  She feels well enough to go home.  No recurrence of chest pain.  Dr. Garcia and I have both fell in the past that this is musculoskeletal in nature.  It is typically described as a soreness. Her kidney function is better than baseline.  Her platelets are normal today.    Barrett Zeng,   01/19/17  10:16 AM

## 2017-01-19 NOTE — PROGRESS NOTES
"    RE:  Stacy Lynn  :  1970         Subjective: Patient continues to complain of chest tightness today.  No other new complaints or issues.      ROS: Pertinent positives and negatives listed above.  All other systems reviewed and negative.    Objective:   Visit Vitals   • /77 (BP Location: Left arm, Patient Position: Lying)   • Pulse 67   • Temp 97.4 °F (36.3 °C) (Temporal Artery )   • Resp 18   • Ht 63\" (160 cm)   • Wt 225 lb 6.4 oz (102 kg)   • SpO2 97%   • BMI 39.93 kg/m2     Temp:  [97.4 °F (36.3 °C)-98.5 °F (36.9 °C)] 97.4 °F (36.3 °C)  Heart Rate:  [67-88] 67  Resp:  [18] 18  BP: (105-141)/(45-80) 141/77    Intake/Output Summary (Last 24 hours) at 17 0730  Last data filed at 17 0037   Gross per 24 hour   Intake   1450 ml   Output      0 ml   Net   1450 ml       Current Facility-Administered Medications:   •  acetaminophen (TYLENOL) tablet 650 mg, 650 mg, Oral, Q6H PRN, Enrrique Martin MD  •  aspirin EC tablet 81 mg, 81 mg, Oral, Daily, Enrrique Martin MD, 81 mg at 17  •  busPIRone (BUSPAR) tablet 5 mg, 5 mg, Oral, Nightly, Enrrique Martin MD, 5 mg at 17  •  docusate sodium (COLACE) capsule 100 mg, 100 mg, Oral, Daily PRN, Enrrique Martin MD  •  DULoxetine (CYMBALTA) DR capsule 60 mg, 60 mg, Oral, Daily, Enrrique Martin MD, 60 mg at 17  •  enoxaparin (LOVENOX) syringe 30 mg, 30 mg, Subcutaneous, Q24H, Barrett Zeng DO  •  gabapentin (NEURONTIN) capsule 300 mg, 300 mg, Oral, TID, Enrrique Martin MD, 300 mg at 17  •  HYDROcodone-acetaminophen (NORCO) 5-325 MG per tablet 1 tablet, 1 tablet, Oral, Q6H PRN, Enrrique Martin MD, 1 tablet at 17 0922  •  ipratropium-albuterol (DUO-NEB) nebulizer solution 3 mL, 3 mL, Nebulization, Q4H PRN, Enrrique Martin MD  •  levothyroxine (SYNTHROID, LEVOTHROID) tablet 88 mcg, 88 mcg, Oral, Q AM, Enrrique Matrin MD, 88 mcg at 17 0547  •  metoprolol tartrate (LOPRESSOR) tablet 25 mg, 25 mg, Oral, Q12H, Enrrique Martin, " MD, 25 mg at 01/18/17 2059  •  nitroglycerin (NITROSTAT) SL tablet 0.4 mg, 0.4 mg, Sublingual, Q5 Min PRN, Enrrique Martin MD, 0.4 mg at 01/18/17 0918  •  ondansetron (ZOFRAN) injection 4 mg, 4 mg, Intravenous, Q6H PRN, Enrrique Martin MD  •  oxybutynin XL (DITROPAN-XL) 24 hr tablet 5 mg, 5 mg, Oral, Daily, Enrrique Martin MD, 5 mg at 01/18/17 0921  •  pantoprazole (PROTONIX) EC tablet 40 mg, 40 mg, Oral, Q AM, Enrrique Martin MD, 40 mg at 01/19/17 0547  •  Pharmacy Consult, , Does not apply, Continuous PRN, Enrrique Martin MD  •  sodium chloride 0.9 % flush 1-10 mL, 1-10 mL, Intravenous, PRN, Enrrique Martin MD  •  topiramate (TOPAMAX) tablet 100 mg, 100 mg, Oral, Q12H, Enrrique Martin MD, 100 mg at 01/18/17 2059    Physical Exam:   Gen: Alert and oriented x3, no acute distress  Heart: Regular rate and rhythm, no murmurs, rubs, or gallops  Chest: Lungs clear to auscultation bilaterally, normal respiratory effort  Abd: Obese, soft, non tender, bowel sounds are positive  Ext: No clubbing, cyanosis, or edema      Lab Results (last 24 hours)     Procedure Component Value Units Date/Time    CBC (No Diff) [[id number]]  (Abnormal) Collected:  01/18/17 0713    Specimen:  Blood Updated:  01/18/17 0744     WBC 1.82 (C) 10*3/mm3      RBC 3.52 (L) 10*6/mm3      Hemoglobin 10.2 (L) g/dL      Hematocrit 31.8 (L) %      MCV 90.3 fL      MCH 29.0 pg      MCHC 32.1 (L) g/dL      RDW 13.5 %      RDW-SD 44.9 fl      MPV 9.7 fL      Platelets 102 (L) 10*3/mm3     Basic Metabolic Panel [[id number]]  (Abnormal) Collected:  01/18/17 0713    Specimen:  Blood Updated:  01/18/17 0759     Glucose 96 mg/dL      BUN 16 mg/dL      Creatinine 1.58 (H) mg/dL      Sodium 142 mmol/L      Potassium 4.3 mmol/L      Chloride 105 mmol/L      CO2 26.0 mmol/L      Calcium 8.9 mg/dL      eGFR Non African Amer 35 (L) mL/min/1.73      BUN/Creatinine Ratio 10.1      Anion Gap 11.0 mmol/L     Narrative:       GFR Normal >60  Chronic Kidney Disease <60  Kidney Failure <15     Troponin [[id number]]  (Normal) Collected:  01/18/17 0713    Specimen:  Blood Updated:  01/18/17 0812     Troponin I <0.012 ng/mL     TSH [[id number]]  (Normal) Collected:  01/17/17 1623    Specimen:  Blood Updated:  01/18/17 1134     TSH 3.320 mIU/mL     Troponin [[id number]]  (Normal) Collected:  01/18/17 1340    Specimen:  Blood Updated:  01/18/17 1422     Troponin I <0.012 ng/mL     Hemoglobin A1c [[id number]] Collected:  01/19/17 0538    Specimen:  Blood Updated:  01/19/17 0618    Basic Metabolic Panel [[id number]]  (Abnormal) Collected:  01/19/17 0538    Specimen:  Blood Updated:  01/19/17 0647     Glucose 104 (H) mg/dL      BUN 13 mg/dL      Creatinine 1.48 (H) mg/dL      Sodium 142 mmol/L      Potassium 3.8 mmol/L      Chloride 104 mmol/L      CO2 26.0 mmol/L      Calcium 9.1 mg/dL      eGFR Non African Amer 38 (L) mL/min/1.73      BUN/Creatinine Ratio 8.8      Anion Gap 12.0 mmol/L     Narrative:       GFR Normal >60  Chronic Kidney Disease <60  Kidney Failure <15    Lipid Panel [[id number]]  (Abnormal) Collected:  01/19/17 0538    Specimen:  Blood Updated:  01/19/17 0658     Total Cholesterol 163 mg/dL      Triglycerides 110 mg/dL      HDL Cholesterol 44 (L) mg/dL      LDL Cholesterol  95 mg/dL      LDL/HDL Ratio 2.20     CBC & Differential [[id number]] Collected:  01/19/17 0538    Specimen:  Blood Updated:  01/19/17 0707    Narrative:       The following orders were created for panel order CBC & Differential.  Procedure                               Abnormality         Status                     ---------                               -----------         ------                     CBC Auto Differential[[id number]]         Abnormal            Final result                 Please view results for these tests on the individual orders.    CBC Auto Differential [[id number]]  (Abnormal) Collected:  01/19/17 0538    Specimen:  Blood Updated:  01/19/17 0707     WBC 2.92 (L) 10*3/mm3      RBC 4.08 (L) 10*6/mm3      Hemoglobin 12.0  g/dL      Hematocrit 36.8 (L) %      MCV 90.2 fL      MCH 29.4 pg      MCHC 32.6 (L) g/dL      RDW 13.1 %      RDW-SD 42.6 fl      MPV 9.7 fL      Platelets 154 10*3/mm3      Neutrophil % 53.8 %      Lymphocyte % 27.7 %      Monocyte % 11.0 %      Eosinophil % 6.5 (H) %      Basophil % 1.0 %      Neutrophils, Absolute 1.57 (L) 10*3/mm3      Lymphocytes, Absolute 0.81 10*3/mm3      Monocytes, Absolute 0.32 10*3/mm3      Eosinophils, Absolute 0.19 10*3/mm3      Basophils, Absolute 0.03 10*3/mm3         Imaging Results (last 24 hours)     Procedure Component Value Units Date/Time    NM Lung Ventilation Perfusion [[id number]] Collected:  01/18/17 1344     Updated:  01/18/17 1347    Narrative:       EXAMINATION: NM LUNG VENTILATION PERFUSION-     1/18/2017 12:59 PM CST     HISTORY: chest pain, elevated d-dimer; R07.9-Chest pain, unspecified;  R79.1-Abnormal coagulation profile; D61.818-Other pancytopenia     Nuclear medicine ventilation/perfusion lung scan.     Comparison is made with a chest x-ray from 01/17/2017 at 4:47 PM.     Dose: 10.2 mCi xenon gas.  Route of administration: Ventilation.  Dose: 4.9 mCi technetium MAA.  Route of administration: IV injection.     Ventilation imaging shows mild air trapping.  Perfusion imaging shows no focal mismatched defect.     Summary:  1. Low probability for pulmonary embolism.           Electronically Signed By-Dr. Gene uBrch MD On:1/18/2017 2:45 PM EST  This report was finalized on 01/18/2017 13:45 by Dr. Gene Burch MD.            Assessment:   1. Atypical chest pain: troponin negative x 3, recent negative DSE.  Now admitted with recurrent symptoms.  2. Sarcoidosis  3. Hypertension  4. CKD stage III  5. Paroxysmal SVT  6. Obesity      Plan:   - Lexiscan SPECT has been ordered for today  - If negative for ischemia can be discharged home without need for any further cardiac work up

## 2017-01-19 NOTE — DISCHARGE INSTR - APPOINTMENTS
[facility] show up at 7:30 am [facility]. Bring a current medications list with you to this appointment.

## 2017-01-26 ENCOUNTER — APPOINTMENT (OUTPATIENT)
Dept: CARDIOLOGY | Facility: HOSPITAL | Age: 47
End: 2017-01-26
Attending: INTERNAL MEDICINE

## 2017-01-30 ENCOUNTER — APPOINTMENT (OUTPATIENT)
Dept: CARDIOLOGY | Facility: HOSPITAL | Age: 47
End: 2017-01-30
Attending: INTERNAL MEDICINE

## 2017-01-30 ENCOUNTER — HOSPITAL ENCOUNTER (OUTPATIENT)
Dept: CARDIOLOGY | Facility: HOSPITAL | Age: 47
End: 2017-01-30
Attending: INTERNAL MEDICINE

## 2017-02-01 ENCOUNTER — TRANSCRIBE ORDERS (OUTPATIENT)
Dept: ADMINISTRATIVE | Facility: HOSPITAL | Age: 47
End: 2017-02-01

## 2017-02-01 ENCOUNTER — HOSPITAL ENCOUNTER (OUTPATIENT)
Dept: CARDIOLOGY | Facility: HOSPITAL | Age: 47
Discharge: HOME | End: 2017-02-01
Attending: INTERNAL MEDICINE

## 2017-02-01 ENCOUNTER — HOSPITAL ENCOUNTER (OUTPATIENT)
Dept: CARDIOLOGY | Facility: HOSPITAL | Age: 47
Discharge: HOME | End: 2017-02-01
Attending: INTERNAL MEDICINE | Admitting: INTERNAL MEDICINE

## 2017-02-01 VITALS — DIASTOLIC BLOOD PRESSURE: 97 MMHG | SYSTOLIC BLOOD PRESSURE: 129 MMHG | HEART RATE: 83 BPM

## 2017-02-01 DIAGNOSIS — R07.9 CHEST PAIN, UNSPECIFIED TYPE: ICD-10-CM

## 2017-02-01 DIAGNOSIS — R07.9 CHEST PAIN, UNSPECIFIED TYPE: Primary | ICD-10-CM

## 2017-02-01 PROCEDURE — A9500 TC99M SESTAMIBI: HCPCS | Performed by: INTERNAL MEDICINE

## 2017-02-01 PROCEDURE — 93017 CV STRESS TEST TRACING ONLY: CPT

## 2017-02-01 PROCEDURE — 25010000002 REGADENOSON 0.4 MG/5ML SOLUTION: Performed by: INTERNAL MEDICINE

## 2017-02-01 PROCEDURE — 78451 HT MUSCLE IMAGE SPECT SING: CPT

## 2017-02-01 PROCEDURE — 78452 HT MUSCLE IMAGE SPECT MULT: CPT

## 2017-02-01 PROCEDURE — 78452 HT MUSCLE IMAGE SPECT MULT: CPT | Performed by: INTERNAL MEDICINE

## 2017-02-01 PROCEDURE — 93018 CV STRESS TEST I&R ONLY: CPT | Performed by: INTERNAL MEDICINE

## 2017-02-01 PROCEDURE — 0 TECHNETIUM SESTAMIBI: Performed by: INTERNAL MEDICINE

## 2017-02-01 RX ADMIN — Medication 1 DOSE: at 08:15

## 2017-02-01 RX ADMIN — REGADENOSON 0.4 MG: 0.08 INJECTION, SOLUTION INTRAVENOUS at 10:15

## 2017-02-01 RX ADMIN — Medication 1 DOSE: at 10:25

## 2017-02-09 LAB
BH CV NUCLEAR PRIOR STUDY: 2
BH CV STRESS BP STAGE 1: NORMAL
BH CV STRESS COMMENTS STAGE 1: NORMAL
BH CV STRESS DOSE REGADENOSON STAGE 1: 0.4
BH CV STRESS DURATION MIN STAGE 1: 0
BH CV STRESS DURATION SEC STAGE 1: 10
BH CV STRESS HR STAGE 1: 149
BH CV STRESS PROTOCOL 1: NORMAL
BH CV STRESS RECOVERY BP: NORMAL MMHG
BH CV STRESS RECOVERY HR: 100 BPM
BH CV STRESS STAGE 1: 1
LV EF NUC BP: 72 %
MAXIMAL PREDICTED HEART RATE: 172 BPM
PERCENT MAX PREDICTED HR: 86.63 %
STRESS BASELINE BP: NORMAL MMHG
STRESS BASELINE HR: 83 BPM
STRESS PERCENT HR: 102 %
STRESS POST EXERCISE DUR SEC: 10 SEC
STRESS POST PEAK BP: NORMAL MMHG
STRESS POST PEAK HR: 149 BPM
STRESS TARGET HR: 146 BPM

## 2017-03-06 ENCOUNTER — TRANSCRIBE ORDERS (OUTPATIENT)
Dept: ADMINISTRATIVE | Facility: HOSPITAL | Age: 47
End: 2017-03-06

## 2017-03-06 ENCOUNTER — HOSPITAL ENCOUNTER (OUTPATIENT)
Dept: GENERAL RADIOLOGY | Facility: HOSPITAL | Age: 47
Discharge: HOME | End: 2017-03-06
Attending: INTERNAL MEDICINE | Admitting: INTERNAL MEDICINE

## 2017-03-06 ENCOUNTER — HOSPITAL ENCOUNTER (OUTPATIENT)
Dept: GENERAL RADIOLOGY | Facility: HOSPITAL | Age: 47
Discharge: HOME | End: 2017-03-06
Attending: PAIN MEDICINE

## 2017-03-06 ENCOUNTER — HOSPITAL ENCOUNTER (OUTPATIENT)
Dept: GENERAL RADIOLOGY | Facility: HOSPITAL | Age: 47
Discharge: HOME | End: 2017-03-06
Attending: PAIN MEDICINE | Admitting: PAIN MEDICINE

## 2017-03-06 DIAGNOSIS — M51.16 NEURITIS OR RADICULITIS DUE TO RUPTURE OF LUMBAR INTERVERTEBRAL DISC: ICD-10-CM

## 2017-03-06 DIAGNOSIS — M47.812 CERVICAL SPONDYLOSIS WITHOUT MYELOPATHY: Primary | ICD-10-CM

## 2017-03-06 DIAGNOSIS — D86.1 LYMPH NODE SARCOIDOSIS: Primary | ICD-10-CM

## 2017-03-06 DIAGNOSIS — M47.816 FACET DEGENERATION OF LUMBAR REGION: ICD-10-CM

## 2017-03-06 PROCEDURE — 72050 X-RAY EXAM NECK SPINE 4/5VWS: CPT

## 2017-03-06 PROCEDURE — 72110 X-RAY EXAM L-2 SPINE 4/>VWS: CPT

## 2017-03-06 PROCEDURE — 71020 HC CHEST PA AND LATERAL: CPT

## 2017-03-11 ENCOUNTER — APPOINTMENT (OUTPATIENT)
Dept: CT IMAGING | Facility: HOSPITAL | Age: 47
End: 2017-03-11

## 2017-03-11 ENCOUNTER — APPOINTMENT (OUTPATIENT)
Dept: GENERAL RADIOLOGY | Facility: HOSPITAL | Age: 47
End: 2017-03-11

## 2017-03-11 ENCOUNTER — HOSPITAL ENCOUNTER (EMERGENCY)
Facility: HOSPITAL | Age: 47
Discharge: HOME | End: 2017-03-11
Attending: EMERGENCY MEDICINE | Admitting: EMERGENCY MEDICINE

## 2017-03-11 VITALS
RESPIRATION RATE: 16 BRPM | BODY MASS INDEX: 38.8 KG/M2 | HEIGHT: 63 IN | OXYGEN SATURATION: 98 % | SYSTOLIC BLOOD PRESSURE: 101 MMHG | HEART RATE: 74 BPM | DIASTOLIC BLOOD PRESSURE: 58 MMHG | TEMPERATURE: 98.4 F | WEIGHT: 219 LBS

## 2017-03-11 DIAGNOSIS — R06.00 DYSPNEA, UNSPECIFIED TYPE: ICD-10-CM

## 2017-03-11 DIAGNOSIS — R19.7 DIARRHEA OF PRESUMED INFECTIOUS ORIGIN: Primary | ICD-10-CM

## 2017-03-11 DIAGNOSIS — R10.9 ABDOMINAL PAIN, UNSPECIFIED LOCATION: ICD-10-CM

## 2017-03-11 DIAGNOSIS — R42 DIZZINESS: ICD-10-CM

## 2017-03-11 LAB
ALBUMIN SERPL-MCNC: 3.9 G/DL (ref 3.5–5)
ALBUMIN/GLOB SERPL: 1.3 G/DL (ref 1.1–2.5)
ALP SERPL-CCNC: 69 U/L (ref 24–120)
ALT SERPL W P-5'-P-CCNC: 22 U/L (ref 0–54)
AMYLASE SERPL-CCNC: 62 U/L (ref 30–110)
ANION GAP SERPL CALCULATED.3IONS-SCNC: 10 MMOL/L (ref 4–13)
APTT PPP: 40.8 SECONDS (ref 24.1–34.8)
AST SERPL-CCNC: 21 U/L (ref 7–45)
BASOPHILS # BLD MANUAL: 0.02 10*3/MM3 (ref 0–0.2)
BASOPHILS NFR BLD AUTO: 1 % (ref 0–2)
BILIRUB SERPL-MCNC: 0.6 MG/DL (ref 0.1–1)
BILIRUB UR QL STRIP: NEGATIVE
BUN BLD-MCNC: 20 MG/DL (ref 5–21)
BUN/CREAT SERPL: 12.6 (ref 7–25)
CALCIUM SPEC-SCNC: 8.9 MG/DL (ref 8.4–10.4)
CHLORIDE SERPL-SCNC: 107 MMOL/L (ref 98–110)
CLARITY UR: CLEAR
CO2 SERPL-SCNC: 24 MMOL/L (ref 24–31)
COLOR UR: YELLOW
CREAT BLD-MCNC: 1.59 MG/DL (ref 0.5–1.4)
DEPRECATED RDW RBC AUTO: 48.9 FL (ref 40–54)
EOSINOPHIL # BLD MANUAL: 0.1 10*3/MM3 (ref 0–0.7)
EOSINOPHIL NFR BLD MANUAL: 5 % (ref 0–4)
ERYTHROCYTE [DISTWIDTH] IN BLOOD BY AUTOMATED COUNT: 14.6 % (ref 12–15)
FLUAV AG NPH QL: NEGATIVE
FLUBV AG NPH QL IA: NEGATIVE
GFR SERPL CREATININE-BSD FRML MDRD: 35 ML/MIN/1.73
GLOBULIN UR ELPH-MCNC: 3 GM/DL
GLUCOSE BLD-MCNC: 90 MG/DL (ref 70–100)
GLUCOSE UR STRIP-MCNC: NEGATIVE MG/DL
HCT VFR BLD AUTO: 34.6 % (ref 37–47)
HGB BLD-MCNC: 11 G/DL (ref 12–16)
HGB UR QL STRIP.AUTO: NEGATIVE
INR PPP: 1.07 (ref 0.91–1.09)
KETONES UR QL STRIP: NEGATIVE
LEUKOCYTE ESTERASE UR QL STRIP.AUTO: NEGATIVE
LIPASE SERPL-CCNC: 76 U/L (ref 23–203)
LYMPHOCYTES # BLD MANUAL: 0.35 10*3/MM3 (ref 0.72–4.86)
LYMPHOCYTES NFR BLD MANUAL: 14 % (ref 4–12)
LYMPHOCYTES NFR BLD MANUAL: 18 % (ref 15–45)
MCH RBC QN AUTO: 28.9 PG (ref 28–32)
MCHC RBC AUTO-ENTMCNC: 31.8 G/DL (ref 33–36)
MCV RBC AUTO: 91.1 FL (ref 82–98)
MONOCYTES # BLD AUTO: 0.27 10*3/MM3 (ref 0.19–1.3)
NEUTROPHILS # BLD AUTO: 1.19 10*3/MM3 (ref 1.87–8.4)
NEUTROPHILS NFR BLD MANUAL: 62 % (ref 39–78)
NITRITE UR QL STRIP: NEGATIVE
NT-PROBNP SERPL-MCNC: 595 PG/ML (ref 0–450)
PH UR STRIP.AUTO: 5.5 [PH] (ref 5–8)
PLAT MORPH BLD: NORMAL
PLATELET # BLD AUTO: 115 10*3/MM3 (ref 130–400)
PMV BLD AUTO: 9.7 FL (ref 6–12)
POTASSIUM BLD-SCNC: 4 MMOL/L (ref 3.5–5.3)
PROT SERPL-MCNC: 6.9 G/DL (ref 6.3–8.7)
PROT UR QL STRIP: NEGATIVE
PROTHROMBIN TIME: 14.2 SECONDS (ref 11.9–14.6)
RBC # BLD AUTO: 3.8 10*6/MM3 (ref 4.2–5.4)
RBC MORPH BLD: NORMAL
SODIUM BLD-SCNC: 141 MMOL/L (ref 135–145)
SP GR UR STRIP: 1.01 (ref 1–1.03)
TROPONIN I SERPL-MCNC: 0 NG/ML (ref 0–0.07)
UROBILINOGEN UR QL STRIP: NORMAL
WBC MORPH BLD: NORMAL
WBC NRBC COR # BLD: 1.92 10*3/MM3 (ref 4.8–10.8)

## 2017-03-11 PROCEDURE — 85025 COMPLETE CBC W/AUTO DIFF WBC: CPT | Performed by: EMERGENCY MEDICINE

## 2017-03-11 PROCEDURE — 82150 ASSAY OF AMYLASE: CPT | Performed by: EMERGENCY MEDICINE

## 2017-03-11 PROCEDURE — 80053 COMPREHEN METABOLIC PANEL: CPT | Performed by: EMERGENCY MEDICINE

## 2017-03-11 PROCEDURE — 25010000002 PROMETHAZINE PER 50 MG: Performed by: EMERGENCY MEDICINE

## 2017-03-11 PROCEDURE — 71010 HC CHEST PA OR AP: CPT

## 2017-03-11 PROCEDURE — 84484 ASSAY OF TROPONIN QUANT: CPT

## 2017-03-11 PROCEDURE — 74176 CT ABD & PELVIS W/O CONTRAST: CPT

## 2017-03-11 PROCEDURE — 83880 ASSAY OF NATRIURETIC PEPTIDE: CPT | Performed by: EMERGENCY MEDICINE

## 2017-03-11 PROCEDURE — 25010000002 HYDROMORPHONE PER 4 MG: Performed by: EMERGENCY MEDICINE

## 2017-03-11 PROCEDURE — 87804 INFLUENZA ASSAY W/OPTIC: CPT | Performed by: EMERGENCY MEDICINE

## 2017-03-11 PROCEDURE — 85610 PROTHROMBIN TIME: CPT | Performed by: EMERGENCY MEDICINE

## 2017-03-11 PROCEDURE — 85730 THROMBOPLASTIN TIME PARTIAL: CPT | Performed by: EMERGENCY MEDICINE

## 2017-03-11 PROCEDURE — 85007 BL SMEAR W/DIFF WBC COUNT: CPT | Performed by: EMERGENCY MEDICINE

## 2017-03-11 PROCEDURE — 96375 TX/PRO/DX INJ NEW DRUG ADDON: CPT

## 2017-03-11 PROCEDURE — 96374 THER/PROPH/DIAG INJ IV PUSH: CPT

## 2017-03-11 PROCEDURE — 83690 ASSAY OF LIPASE: CPT | Performed by: EMERGENCY MEDICINE

## 2017-03-11 PROCEDURE — 99284 EMERGENCY DEPT VISIT MOD MDM: CPT

## 2017-03-11 PROCEDURE — 70450 CT HEAD/BRAIN W/O DYE: CPT

## 2017-03-11 PROCEDURE — 93010 ELECTROCARDIOGRAM REPORT: CPT | Performed by: INTERNAL MEDICINE

## 2017-03-11 PROCEDURE — 81003 URINALYSIS AUTO W/O SCOPE: CPT | Performed by: EMERGENCY MEDICINE

## 2017-03-11 PROCEDURE — 96361 HYDRATE IV INFUSION ADD-ON: CPT

## 2017-03-11 PROCEDURE — 93005 ELECTROCARDIOGRAM TRACING: CPT | Performed by: EMERGENCY MEDICINE

## 2017-03-11 RX ORDER — DICYCLOMINE HCL 20 MG
20 TABLET ORAL EVERY 6 HOURS
Qty: 20 TABLET | Refills: 0 | Status: SHIPPED | OUTPATIENT
Start: 2017-03-11 | End: 2017-05-15

## 2017-03-11 RX ORDER — PROMETHAZINE HYDROCHLORIDE 25 MG/ML
6.25 INJECTION, SOLUTION INTRAMUSCULAR; INTRAVENOUS ONCE
Status: COMPLETED | OUTPATIENT
Start: 2017-03-11 | End: 2017-03-11

## 2017-03-11 RX ORDER — SODIUM CHLORIDE 9 MG/ML
125 INJECTION, SOLUTION INTRAVENOUS CONTINUOUS
Status: DISCONTINUED | OUTPATIENT
Start: 2017-03-11 | End: 2017-03-11 | Stop reason: HOSPADM

## 2017-03-11 RX ADMIN — PROMETHAZINE HYDROCHLORIDE 6.25 MG: 25 INJECTION INTRAMUSCULAR; INTRAVENOUS at 16:50

## 2017-03-11 RX ADMIN — HYDROMORPHONE HYDROCHLORIDE 1 MG: 1 INJECTION, SOLUTION INTRAMUSCULAR; INTRAVENOUS; SUBCUTANEOUS at 16:50

## 2017-03-11 RX ADMIN — SODIUM CHLORIDE 125 ML/HR: 9 INJECTION, SOLUTION INTRAVENOUS at 16:52

## 2017-03-11 NOTE — ED PROVIDER NOTES
Subjective dizziness, shortness of breath, diarrhea and abdominal pain  History of Present Illness  The pt presents today with complaint of dizziness, shortness of breath, diarrhea and abdominal pain.  She tells me that she started feeling ill last night with the abdominal pain.   She has been short of breath for a long time now.  She tells me that she has a history of sarcoid for which she sees Dr. Scherer.  Dr. Scherer told her that she can no longer get any CAT scans of her chest due to the cancerous potential.  She claims that she has.  She however denies that there is been any blood in the diarrhea.  She has had nausea but has been able to take Zofran so that has prevented her from vomiting.  She has had multiple episodes apparently of diarrhea.  She is now concerned about the possibility of dehydration.  She has been short of breath for quite some time now and she tells me that she does have a cough and the cough is intermittently productive of this the sticky yellowish sputum.  Review of Systems   Constitutional: Negative.    HENT: Negative.    Eyes: Negative.    Respiratory: Positive for shortness of breath.    Cardiovascular: Negative.    Gastrointestinal: Positive for abdominal pain, diarrhea and nausea.   Endocrine: Negative.    Genitourinary: Negative.    Musculoskeletal: Negative.    Skin: Negative.    Allergic/Immunologic: Negative.    Neurological: Positive for dizziness.   Hematological: Negative.    Psychiatric/Behavioral: Negative.    All other systems reviewed and are negative.      Past Medical History   Diagnosis Date   • Chronic pain    • Degeneration of intervertebral disc of high cervical region    • Hypertension    • Hyperthyroidism    • Irritable bowel syndrome    • Macular degeneration    • PFO (patent foramen ovale) 09/2016   • PSVT (paroxysmal supraventricular tachycardia)      s/p ablation per Dr. Diallo 4/2016   • Restless leg syndrome    • Sarcoidosis    • Scoliosis        Allergies    Allergen Reactions   • Tape        Past Surgical History   Procedure Laterality Date   • Replacement total knee     • Patella surgery     • Lateral epicondyle release     • Shoulder arthroscopy     • Ulnar nerve decompression     • Knee arthroscopy     • Hysterectomy     • Cholecystectomy     • Kidney stone surgery     • Cardiac ablation  04/2016     SVT; Dr. Diallo        Family History   Problem Relation Age of Onset   • Arrhythmia Mother    • Heart disease Mother    • Heart disease Father    • Heart disease Brother    • Heart disease Maternal Aunt    • Heart disease Maternal Uncle    • Heart disease Paternal Aunt    • Heart disease Paternal Uncle    • Heart disease Maternal Grandmother    • Heart disease Maternal Grandfather    • Heart disease Paternal Grandmother    • Heart disease Paternal Grandfather        Social History     Social History   • Marital status: Single     Spouse name: N/A   • Number of children: N/A   • Years of education: N/A     Social History Main Topics   • Smoking status: Never Smoker   • Smokeless tobacco: None   • Alcohol use No   • Drug use: No   • Sexual activity: Defer     Other Topics Concern   • None     Social History Narrative           Objective   Physical Exam   Constitutional: She is oriented to person, place, and time. She appears well-developed and well-nourished.   HENT:   Head: Normocephalic and atraumatic.   Right Ear: External ear normal.   Left Ear: External ear normal.   Nose: Nose normal.   Mouth/Throat: Oropharynx is clear and moist.   Eyes: Conjunctivae and EOM are normal. Pupils are equal, round, and reactive to light. Right eye exhibits no discharge. Left eye exhibits no discharge.   Neck: Normal range of motion. Neck supple. No thyromegaly present.   Cardiovascular: Normal rate, regular rhythm, normal heart sounds and intact distal pulses.  Exam reveals no friction rub.    No murmur heard.  Pulmonary/Chest: Effort normal and breath sounds normal. No respiratory  distress.   Abdominal: Soft. Bowel sounds are normal. She exhibits no distension. There is no tenderness.   Musculoskeletal: Normal range of motion. She exhibits no edema or deformity.   Neurological: She is alert and oriented to person, place, and time. She has normal reflexes. No cranial nerve deficit.   Skin: Skin is warm and dry. No rash noted.   Psychiatric: Judgment normal.   Nursing note and vitals reviewed.      Procedures         ED Course  ED Course   Comment By Time   Took over from Dr. Arce at shift change.  Told her testing actually looks pretty good and this seems to mostly viral illness that will pass with time. Landry Benito Jr., MD 03/11 2021                  [facility]    Final diagnoses:   Diarrhea of presumed infectious origin   Abdominal pain, unspecified location   Dyspnea, unspecified type   Dizziness            Landry Benito Jr., MD  03/11/17 2021

## 2017-03-22 LAB
BASOPHILS ABSOLUTE: 0 K/UL (ref 0–0.2)
BASOPHILS RELATIVE PERCENT: 0.7 % (ref 0–1)
C-REACTIVE PROTEIN: 1.1 MG/L (ref 0–5)
EOSINOPHILS ABSOLUTE: 0.1 K/UL (ref 0–0.6)
EOSINOPHILS RELATIVE PERCENT: 3.7 % (ref 0–5)
HCT VFR BLD CALC: 35.4 % (ref 37–47)
HEMOGLOBIN: 11.5 G/DL (ref 12–16)
LYMPHOCYTES ABSOLUTE: 0.5 K/UL (ref 1.1–4.5)
LYMPHOCYTES RELATIVE PERCENT: 16.9 % (ref 20–40)
MCH RBC QN AUTO: 30.2 PG (ref 27–31)
MCHC RBC AUTO-ENTMCNC: 32.5 G/DL (ref 33–37)
MCV RBC AUTO: 92.9 FL (ref 81–99)
MONOCYTES ABSOLUTE: 0.3 K/UL (ref 0–0.9)
MONOCYTES RELATIVE PERCENT: 11 % (ref 0–10)
NEUTROPHILS ABSOLUTE: 1.8 K/UL (ref 1.5–7.5)
NEUTROPHILS RELATIVE PERCENT: 67.7 % (ref 50–65)
PDW BLD-RTO: 14.8 % (ref 11.5–14.5)
PLATELET # BLD: 115 K/UL (ref 130–400)
PMV BLD AUTO: 9.8 FL (ref 7.4–10.4)
RBC # BLD: 3.81 M/UL (ref 4.2–5.4)
SEDIMENTATION RATE, ERYTHROCYTE: 29 MM/HR (ref 0–20)
WBC # BLD: 2.7 K/UL (ref 4.8–10.8)

## 2017-03-27 ENCOUNTER — TRANSCRIBE ORDERS (OUTPATIENT)
Dept: ADMINISTRATIVE | Facility: HOSPITAL | Age: 47
End: 2017-03-27

## 2017-03-27 DIAGNOSIS — D86.1 LYMPH NODE SARCOIDOSIS: Primary | ICD-10-CM

## 2017-04-10 ENCOUNTER — HOSPITAL ENCOUNTER (OUTPATIENT)
Dept: CT IMAGING | Facility: HOSPITAL | Age: 47
Discharge: HOME | End: 2017-04-10
Attending: INTERNAL MEDICINE | Admitting: INTERNAL MEDICINE

## 2017-04-10 DIAGNOSIS — D86.1 LYMPH NODE SARCOIDOSIS: ICD-10-CM

## 2017-04-10 LAB
CREAT BLD-MCNC: 1.4 MG/DL (ref 0.5–1.4)
CREAT BLDA-MCNC: 1.4 MG/DL (ref 0.6–1.3)
GFR SERPL CREATININE-BSD FRML MDRD: 40 ML/MIN/1.73

## 2017-04-10 PROCEDURE — 82565 ASSAY OF CREATININE: CPT

## 2017-04-10 PROCEDURE — 82565 ASSAY OF CREATININE: CPT | Performed by: INTERNAL MEDICINE

## 2017-04-10 PROCEDURE — 0 IOPAMIDOL 61 % SOLUTION: Performed by: INTERNAL MEDICINE

## 2017-04-10 PROCEDURE — 71260 CT THORAX DX C+: CPT

## 2017-04-10 RX ADMIN — IOPAMIDOL 100 ML: 612 INJECTION, SOLUTION INTRAVENOUS at 10:45

## 2017-04-14 ENCOUNTER — HOSPITAL ENCOUNTER (OUTPATIENT)
Dept: PREADMISSION TESTING | Age: 47
Discharge: HOME | End: 2017-04-14
Payer: MEDICARE

## 2017-04-14 VITALS — WEIGHT: 216 LBS | HEIGHT: 63 IN | BODY MASS INDEX: 38.27 KG/M2

## 2017-04-14 LAB
BASE EXCESS ARTERIAL: 2.3 MMOL/L (ref -2–2)
CARBOXYHEMOGLOBIN ARTERIAL: 1.1 % (ref 0–5)
HCO3 ARTERIAL: 26.4 MMOL/L (ref 22–26)
HEMOGLOBIN, ART, EXTENDED: 10 G/DL (ref 12–16)
METHEMOGLOBIN ARTERIAL: 1 %
O2 CONTENT ARTERIAL: 13.8 ML/DL
O2 SAT, ARTERIAL: 96.8 %
O2 THERAPY: ABNORMAL
PCO2 ARTERIAL: 38 MMHG (ref 35–45)
PH ARTERIAL: 7.45 (ref 7.35–7.45)
PO2 ARTERIAL: 94 MMHG (ref 80–100)
POTASSIUM, WHOLE BLOOD: 4.7

## 2017-04-14 PROCEDURE — 87070 CULTURE OTHR SPECIMN AEROBIC: CPT

## 2017-04-14 PROCEDURE — 84132 ASSAY OF SERUM POTASSIUM: CPT

## 2017-04-14 PROCEDURE — 36600 WITHDRAWAL OF ARTERIAL BLOOD: CPT

## 2017-04-14 PROCEDURE — 93005 ELECTROCARDIOGRAM TRACING: CPT

## 2017-04-14 PROCEDURE — 82803 BLOOD GASES ANY COMBINATION: CPT

## 2017-04-14 RX ORDER — HYDROCODONE BITARTRATE AND ACETAMINOPHEN 5; 325 MG/1; MG/1
1 TABLET ORAL EVERY 6 HOURS PRN
COMMUNITY
End: 2019-12-12 | Stop reason: ALTCHOICE

## 2017-04-16 LAB — MRSA CULTURE ONLY: NORMAL

## 2017-04-17 LAB
EKG P AXIS: 51 DEGREES
EKG P-R INTERVAL: 156 MS
EKG Q-T INTERVAL: 338 MS
EKG QRS DURATION: 90 MS
EKG QTC CALCULATION (BAZETT): 399 MS
EKG T AXIS: 31 DEGREES

## 2017-05-01 ENCOUNTER — INFUSION (OUTPATIENT)
Dept: ONCOLOGY | Facility: HOSPITAL | Age: 47
End: 2017-05-01

## 2017-05-01 VITALS
SYSTOLIC BLOOD PRESSURE: 125 MMHG | RESPIRATION RATE: 20 BRPM | TEMPERATURE: 98.3 F | WEIGHT: 218 LBS | BODY MASS INDEX: 38.62 KG/M2 | HEIGHT: 63 IN | HEART RATE: 109 BPM | DIASTOLIC BLOOD PRESSURE: 82 MMHG | OXYGEN SATURATION: 100 %

## 2017-05-01 DIAGNOSIS — D50.9 IRON DEFICIENCY ANEMIA, UNSPECIFIED IRON DEFICIENCY ANEMIA TYPE: Primary | ICD-10-CM

## 2017-05-01 PROCEDURE — 25010000002 IRON DEXTRAN PER 50 MG: Performed by: INTERNAL MEDICINE

## 2017-05-01 PROCEDURE — 96365 THER/PROPH/DIAG IV INF INIT: CPT

## 2017-05-01 PROCEDURE — 25010000002 ONDANSETRON PER 1 MG: Performed by: INTERNAL MEDICINE

## 2017-05-01 PROCEDURE — 96375 TX/PRO/DX INJ NEW DRUG ADDON: CPT

## 2017-05-01 PROCEDURE — 25010000002 HYDROCORTISONE SODIUM SUCCINATE 100 MG RECONSTITUTED SOLUTION: Performed by: INTERNAL MEDICINE

## 2017-05-01 PROCEDURE — 25010000002 DIPHENHYDRAMINE PER 50 MG: Performed by: INTERNAL MEDICINE

## 2017-05-01 RX ORDER — DIPHENHYDRAMINE HYDROCHLORIDE 50 MG/ML
25 INJECTION INTRAMUSCULAR; INTRAVENOUS ONCE
Status: COMPLETED | OUTPATIENT
Start: 2017-05-01 | End: 2017-05-01

## 2017-05-01 RX ORDER — SODIUM CHLORIDE 9 MG/ML
250 INJECTION, SOLUTION INTRAVENOUS ONCE
Status: CANCELLED | OUTPATIENT
Start: 2017-05-01

## 2017-05-01 RX ORDER — ONDANSETRON 2 MG/ML
8 INJECTION INTRAMUSCULAR; INTRAVENOUS ONCE
Status: COMPLETED | OUTPATIENT
Start: 2017-05-01 | End: 2017-05-01

## 2017-05-01 RX ORDER — SODIUM CHLORIDE 9 MG/ML
250 INJECTION, SOLUTION INTRAVENOUS ONCE
Status: COMPLETED | OUTPATIENT
Start: 2017-05-01 | End: 2017-05-01

## 2017-05-01 RX ORDER — FAMOTIDINE 10 MG/ML
20 INJECTION, SOLUTION INTRAVENOUS ONCE
Status: COMPLETED | OUTPATIENT
Start: 2017-05-01 | End: 2017-05-01

## 2017-05-01 RX ADMIN — HYDROCORTISONE SODIUM SUCCINATE 100 MG: 100 INJECTION, POWDER, FOR SOLUTION INTRAMUSCULAR; INTRAVENOUS at 11:18

## 2017-05-01 RX ADMIN — IRON DEXTRAN 500 MG: 50 INJECTION INTRAMUSCULAR; INTRAVENOUS at 09:48

## 2017-05-01 RX ADMIN — FAMOTIDINE 20 MG: 10 INJECTION, SOLUTION INTRAVENOUS at 11:10

## 2017-05-01 RX ADMIN — DIPHENHYDRAMINE HYDROCHLORIDE 25 MG: 50 INJECTION, SOLUTION INTRAMUSCULAR; INTRAVENOUS at 11:05

## 2017-05-01 RX ADMIN — SODIUM CHLORIDE 250 ML: 9 INJECTION, SOLUTION INTRAVENOUS at 09:30

## 2017-05-01 RX ADMIN — ONDANSETRON HYDROCHLORIDE 8 MG: 2 SOLUTION INTRAMUSCULAR; INTRAVENOUS at 11:44

## 2017-05-04 ENCOUNTER — ANESTHESIA (OUTPATIENT)
Dept: OPERATING ROOM | Age: 47
DRG: 467 | End: 2017-05-04
Payer: MEDICARE

## 2017-05-04 ENCOUNTER — HOSPITAL ENCOUNTER (INPATIENT)
Age: 47
LOS: 6 days | Discharge: HOME HEALTH | DRG: 467 | End: 2017-05-10
Attending: ORTHOPAEDIC SURGERY | Admitting: ORTHOPAEDIC SURGERY
Payer: MEDICARE

## 2017-05-04 ENCOUNTER — APPOINTMENT (OUTPATIENT)
Dept: GENERAL RADIOLOGY | Age: 47
DRG: 467 | End: 2017-05-04
Attending: ORTHOPAEDIC SURGERY
Payer: MEDICARE

## 2017-05-04 ENCOUNTER — ANESTHESIA EVENT (OUTPATIENT)
Dept: OPERATING ROOM | Age: 47
DRG: 467 | End: 2017-05-04
Payer: MEDICARE

## 2017-05-04 VITALS
DIASTOLIC BLOOD PRESSURE: 61 MMHG | OXYGEN SATURATION: 100 % | SYSTOLIC BLOOD PRESSURE: 114 MMHG | RESPIRATION RATE: 8 BRPM | TEMPERATURE: 97.6 F

## 2017-05-04 LAB
ABO/RH: NORMAL
ANTIBODY SCREEN: NORMAL
APTT: 36.6 SEC (ref 26–36.2)
C-REACTIVE PROTEIN: 1.1 MG/L (ref 0–5)
INR BLD: 1.11 (ref 0.88–1.18)
PROTHROMBIN TIME: 14.2 SEC (ref 12–14.6)
SEDIMENTATION RATE, ERYTHROCYTE: 36 MM/HR (ref 0–20)

## 2017-05-04 PROCEDURE — 85730 THROMBOPLASTIN TIME PARTIAL: CPT

## 2017-05-04 PROCEDURE — 6370000000 HC RX 637 (ALT 250 FOR IP): Performed by: ORTHOPAEDIC SURGERY

## 2017-05-04 PROCEDURE — 87070 CULTURE OTHR SPECIMN AEROBIC: CPT

## 2017-05-04 PROCEDURE — 2500000003 HC RX 250 WO HCPCS: Performed by: ORTHOPAEDIC SURGERY

## 2017-05-04 PROCEDURE — 2580000003 HC RX 258: Performed by: ANESTHESIOLOGY

## 2017-05-04 PROCEDURE — 3600000005 HC SURGERY LEVEL 5 BASE: Performed by: ORTHOPAEDIC SURGERY

## 2017-05-04 PROCEDURE — 87077 CULTURE AEROBIC IDENTIFY: CPT

## 2017-05-04 PROCEDURE — 86140 C-REACTIVE PROTEIN: CPT

## 2017-05-04 PROCEDURE — 2720000001 HC MISC SURG SUPPLY STERILE $51-500: Performed by: ORTHOPAEDIC SURGERY

## 2017-05-04 PROCEDURE — 2580000003 HC RX 258: Performed by: ORTHOPAEDIC SURGERY

## 2017-05-04 PROCEDURE — 0SPC09Z REMOVAL OF LINER FROM RIGHT KNEE JOINT, OPEN APPROACH: ICD-10-PCS | Performed by: ORTHOPAEDIC SURGERY

## 2017-05-04 PROCEDURE — C1776 JOINT DEVICE (IMPLANTABLE): HCPCS | Performed by: ORTHOPAEDIC SURGERY

## 2017-05-04 PROCEDURE — 7100000001 HC PACU RECOVERY - ADDTL 15 MIN: Performed by: ORTHOPAEDIC SURGERY

## 2017-05-04 PROCEDURE — 3700000000 HC ANESTHESIA ATTENDED CARE: Performed by: ORTHOPAEDIC SURGERY

## 2017-05-04 PROCEDURE — 0SUV09Z SUPPLEMENT RIGHT KNEE JOINT, TIBIAL SURFACE WITH LINER, OPEN APPROACH: ICD-10-PCS | Performed by: ORTHOPAEDIC SURGERY

## 2017-05-04 PROCEDURE — 73560 X-RAY EXAM OF KNEE 1 OR 2: CPT

## 2017-05-04 PROCEDURE — 85610 PROTHROMBIN TIME: CPT

## 2017-05-04 PROCEDURE — 0SRC0J9 REPLACEMENT OF RIGHT KNEE JOINT WITH SYNTHETIC SUBSTITUTE, CEMENTED, OPEN APPROACH: ICD-10-PCS | Performed by: ORTHOPAEDIC SURGERY

## 2017-05-04 PROCEDURE — 6360000002 HC RX W HCPCS: Performed by: ORTHOPAEDIC SURGERY

## 2017-05-04 PROCEDURE — 2700000000 HC OXYGEN THERAPY PER DAY

## 2017-05-04 PROCEDURE — 86850 RBC ANTIBODY SCREEN: CPT

## 2017-05-04 PROCEDURE — 87102 FUNGUS ISOLATION CULTURE: CPT

## 2017-05-04 PROCEDURE — 2500000003 HC RX 250 WO HCPCS: Performed by: NURSE ANESTHETIST, CERTIFIED REGISTERED

## 2017-05-04 PROCEDURE — 87185 SC STD ENZYME DETCJ PER NZM: CPT

## 2017-05-04 PROCEDURE — 3600000015 HC SURGERY LEVEL 5 ADDTL 15MIN: Performed by: ORTHOPAEDIC SURGERY

## 2017-05-04 PROCEDURE — 86900 BLOOD TYPING SEROLOGIC ABO: CPT

## 2017-05-04 PROCEDURE — 87176 TISSUE HOMOGENIZATION CULTR: CPT

## 2017-05-04 PROCEDURE — 1210000000 HC MED SURG R&B

## 2017-05-04 PROCEDURE — 86901 BLOOD TYPING SEROLOGIC RH(D): CPT

## 2017-05-04 PROCEDURE — 3700000001 HC ADD 15 MINUTES (ANESTHESIA): Performed by: ORTHOPAEDIC SURGERY

## 2017-05-04 PROCEDURE — 94664 DEMO&/EVAL PT USE INHALER: CPT

## 2017-05-04 PROCEDURE — 7100000000 HC PACU RECOVERY - FIRST 15 MIN: Performed by: ORTHOPAEDIC SURGERY

## 2017-05-04 PROCEDURE — 85652 RBC SED RATE AUTOMATED: CPT

## 2017-05-04 PROCEDURE — 6360000002 HC RX W HCPCS: Performed by: NURSE ANESTHETIST, CERTIFIED REGISTERED

## 2017-05-04 PROCEDURE — 87205 SMEAR GRAM STAIN: CPT

## 2017-05-04 PROCEDURE — 36415 COLL VENOUS BLD VENIPUNCTURE: CPT

## 2017-05-04 PROCEDURE — C1713 ANCHOR/SCREW BN/BN,TIS/BN: HCPCS | Performed by: ORTHOPAEDIC SURGERY

## 2017-05-04 PROCEDURE — 0SPC0JZ REMOVAL OF SYNTHETIC SUBSTITUTE FROM RIGHT KNEE JOINT, OPEN APPROACH: ICD-10-PCS | Performed by: ORTHOPAEDIC SURGERY

## 2017-05-04 DEVICE — SLEEVE TIB H40MM AP27MM ML37MM MTPHSEAL TI PORCOAT POR REV: Type: IMPLANTABLE DEVICE | Site: KNEE | Status: FUNCTIONAL

## 2017-05-04 DEVICE — IMPLANTABLE DEVICE: Type: IMPLANTABLE DEVICE | Site: KNEE | Status: FUNCTIONAL

## 2017-05-04 DEVICE — STEM FEM L75MM DIA14MM UNIV KNEE FLUT PRESSFIT FOR ROT HNG: Type: IMPLANTABLE DEVICE | Site: KNEE | Status: FUNCTIONAL

## 2017-05-04 DEVICE — COMPONENT PAT DIA35MM DST KNEE POLY OVL DOME 3 PEG NP CEM: Type: IMPLANTABLE DEVICE | Site: KNEE | Status: FUNCTIONAL

## 2017-05-04 DEVICE — STEM FEM L115MM DIA16MM UNIV KNEE PRESSFIT FLUT FOR ROT HNG 867430] JNJ DEPUY SYNTHES ORTHOPEDICS]: Type: IMPLANTABLE DEVICE | Site: KNEE | Status: FUNCTIONAL

## 2017-05-04 DEVICE — DISCONTINUED USE 416978 CEMENT PALACOS R SING DOSE 40GR: Type: IMPLANTABLE DEVICE | Site: KNEE | Status: FUNCTIONAL

## 2017-05-04 DEVICE — ADAPTER FEM 5DEG KNEE PFC SIG: Type: IMPLANTABLE DEVICE | Site: KNEE | Status: FUNCTIONAL

## 2017-05-04 DEVICE — TRAY TIB SZ 3 AP45.8MM ML69.6MM THK4.8MM CEM KEELED MOB: Type: IMPLANTABLE DEVICE | Site: KNEE | Status: FUNCTIONAL

## 2017-05-04 RX ORDER — OXYBUTYNIN CHLORIDE 5 MG/1
5 TABLET, EXTENDED RELEASE ORAL DAILY
Status: DISCONTINUED | OUTPATIENT
Start: 2017-05-04 | End: 2017-05-10 | Stop reason: HOSPADM

## 2017-05-04 RX ORDER — TOPIRAMATE 25 MG/1
50 TABLET ORAL 2 TIMES DAILY
Status: DISCONTINUED | OUTPATIENT
Start: 2017-05-04 | End: 2017-05-10 | Stop reason: HOSPADM

## 2017-05-04 RX ORDER — MORPHINE SULFATE 4 MG/ML
2 INJECTION, SOLUTION INTRAMUSCULAR; INTRAVENOUS EVERY 5 MIN PRN
Status: DISCONTINUED | OUTPATIENT
Start: 2017-05-04 | End: 2017-05-04 | Stop reason: HOSPADM

## 2017-05-04 RX ORDER — SODIUM CHLORIDE 0.9 % (FLUSH) 0.9 %
10 SYRINGE (ML) INJECTION PRN
Status: DISCONTINUED | OUTPATIENT
Start: 2017-05-04 | End: 2017-05-04 | Stop reason: HOSPADM

## 2017-05-04 RX ORDER — CELECOXIB 200 MG/1
200 CAPSULE ORAL DAILY
Status: DISCONTINUED | OUTPATIENT
Start: 2017-05-04 | End: 2017-05-10 | Stop reason: HOSPADM

## 2017-05-04 RX ORDER — FENTANYL CITRATE 50 UG/ML
INJECTION, SOLUTION INTRAMUSCULAR; INTRAVENOUS PRN
Status: DISCONTINUED | OUTPATIENT
Start: 2017-05-04 | End: 2017-05-04 | Stop reason: SDUPTHER

## 2017-05-04 RX ORDER — SCOLOPAMINE TRANSDERMAL SYSTEM 1 MG/1
1 PATCH, EXTENDED RELEASE TRANSDERMAL ONCE
Status: DISCONTINUED | OUTPATIENT
Start: 2017-05-04 | End: 2017-05-07

## 2017-05-04 RX ORDER — MORPHINE SULFATE 4 MG/ML
4 INJECTION, SOLUTION INTRAMUSCULAR; INTRAVENOUS
Status: DISCONTINUED | OUTPATIENT
Start: 2017-05-04 | End: 2017-05-10 | Stop reason: HOSPADM

## 2017-05-04 RX ORDER — ONDANSETRON 2 MG/ML
4 INJECTION INTRAMUSCULAR; INTRAVENOUS EVERY 6 HOURS PRN
Status: DISCONTINUED | OUTPATIENT
Start: 2017-05-04 | End: 2017-05-10 | Stop reason: HOSPADM

## 2017-05-04 RX ORDER — SODIUM CHLORIDE, SODIUM LACTATE, POTASSIUM CHLORIDE, CALCIUM CHLORIDE 600; 310; 30; 20 MG/100ML; MG/100ML; MG/100ML; MG/100ML
INJECTION, SOLUTION INTRAVENOUS CONTINUOUS
Status: DISCONTINUED | OUTPATIENT
Start: 2017-05-04 | End: 2017-05-04

## 2017-05-04 RX ORDER — LABETALOL HYDROCHLORIDE 5 MG/ML
INJECTION, SOLUTION INTRAVENOUS PRN
Status: DISCONTINUED | OUTPATIENT
Start: 2017-05-04 | End: 2017-05-04 | Stop reason: SDUPTHER

## 2017-05-04 RX ORDER — PROMETHAZINE HYDROCHLORIDE 25 MG/ML
6.25 INJECTION, SOLUTION INTRAMUSCULAR; INTRAVENOUS EVERY 6 HOURS PRN
Status: DISCONTINUED | OUTPATIENT
Start: 2017-05-04 | End: 2017-05-10 | Stop reason: HOSPADM

## 2017-05-04 RX ORDER — SODIUM CHLORIDE 0.9 % (FLUSH) 0.9 %
10 SYRINGE (ML) INJECTION EVERY 12 HOURS SCHEDULED
Status: DISCONTINUED | OUTPATIENT
Start: 2017-05-04 | End: 2017-05-04 | Stop reason: HOSPADM

## 2017-05-04 RX ORDER — DEXAMETHASONE SODIUM PHOSPHATE 10 MG/ML
10 INJECTION INTRAMUSCULAR; INTRAVENOUS ONCE
Status: COMPLETED | OUTPATIENT
Start: 2017-05-04 | End: 2017-05-04

## 2017-05-04 RX ORDER — OXYCODONE HYDROCHLORIDE 5 MG/1
10 TABLET ORAL EVERY 4 HOURS PRN
Status: DISCONTINUED | OUTPATIENT
Start: 2017-05-04 | End: 2017-05-05

## 2017-05-04 RX ORDER — MIDAZOLAM HYDROCHLORIDE 1 MG/ML
2 INJECTION INTRAMUSCULAR; INTRAVENOUS
Status: DISCONTINUED | OUTPATIENT
Start: 2017-05-04 | End: 2017-05-04 | Stop reason: HOSPADM

## 2017-05-04 RX ORDER — MORPHINE SULFATE 4 MG/ML
4 INJECTION, SOLUTION INTRAMUSCULAR; INTRAVENOUS EVERY 5 MIN PRN
Status: DISCONTINUED | OUTPATIENT
Start: 2017-05-04 | End: 2017-05-04 | Stop reason: HOSPADM

## 2017-05-04 RX ORDER — MEPERIDINE HYDROCHLORIDE 50 MG/ML
12.5 INJECTION INTRAMUSCULAR; INTRAVENOUS; SUBCUTANEOUS EVERY 5 MIN PRN
Status: DISCONTINUED | OUTPATIENT
Start: 2017-05-04 | End: 2017-05-04 | Stop reason: HOSPADM

## 2017-05-04 RX ORDER — LABETALOL HYDROCHLORIDE 5 MG/ML
5 INJECTION, SOLUTION INTRAVENOUS EVERY 10 MIN PRN
Status: DISCONTINUED | OUTPATIENT
Start: 2017-05-04 | End: 2017-05-04 | Stop reason: HOSPADM

## 2017-05-04 RX ORDER — PROMETHAZINE HYDROCHLORIDE 25 MG/ML
6.25 INJECTION, SOLUTION INTRAMUSCULAR; INTRAVENOUS
Status: DISCONTINUED | OUTPATIENT
Start: 2017-05-04 | End: 2017-05-04 | Stop reason: HOSPADM

## 2017-05-04 RX ORDER — OXYCODONE HCL 10 MG/1
10 TABLET, FILM COATED, EXTENDED RELEASE ORAL ONCE
Status: COMPLETED | OUTPATIENT
Start: 2017-05-04 | End: 2017-05-04

## 2017-05-04 RX ORDER — OXYCODONE HYDROCHLORIDE 5 MG/1
20 TABLET ORAL EVERY 4 HOURS PRN
Status: DISCONTINUED | OUTPATIENT
Start: 2017-05-04 | End: 2017-05-05

## 2017-05-04 RX ORDER — 0.9 % SODIUM CHLORIDE 0.9 %
500 INTRAVENOUS SOLUTION INTRAVENOUS PRN
Status: DISCONTINUED | OUTPATIENT
Start: 2017-05-04 | End: 2017-05-10 | Stop reason: HOSPADM

## 2017-05-04 RX ORDER — FENTANYL CITRATE 50 UG/ML
25 INJECTION, SOLUTION INTRAMUSCULAR; INTRAVENOUS
Status: DISCONTINUED | OUTPATIENT
Start: 2017-05-04 | End: 2017-05-04 | Stop reason: HOSPADM

## 2017-05-04 RX ORDER — DULOXETIN HYDROCHLORIDE 60 MG/1
60 CAPSULE, DELAYED RELEASE ORAL DAILY
Status: DISCONTINUED | OUTPATIENT
Start: 2017-05-04 | End: 2017-05-10 | Stop reason: HOSPADM

## 2017-05-04 RX ORDER — GABAPENTIN 300 MG/1
300 CAPSULE ORAL 3 TIMES DAILY
Status: DISCONTINUED | OUTPATIENT
Start: 2017-05-04 | End: 2017-05-10 | Stop reason: HOSPADM

## 2017-05-04 RX ORDER — OXYBUTYNIN CHLORIDE 5 MG/1
5 TABLET, EXTENDED RELEASE ORAL NIGHTLY
Status: DISCONTINUED | OUTPATIENT
Start: 2017-05-04 | End: 2017-05-10 | Stop reason: HOSPADM

## 2017-05-04 RX ORDER — MORPHINE SULFATE 4 MG/ML
2 INJECTION, SOLUTION INTRAMUSCULAR; INTRAVENOUS
Status: DISCONTINUED | OUTPATIENT
Start: 2017-05-04 | End: 2017-05-10 | Stop reason: HOSPADM

## 2017-05-04 RX ORDER — ASPIRIN 81 MG/1
81 TABLET ORAL 2 TIMES DAILY
Status: DISCONTINUED | OUTPATIENT
Start: 2017-05-04 | End: 2017-05-10 | Stop reason: HOSPADM

## 2017-05-04 RX ORDER — DIPHENHYDRAMINE HCL 25 MG
25 TABLET ORAL EVERY 6 HOURS PRN
Status: DISCONTINUED | OUTPATIENT
Start: 2017-05-04 | End: 2017-05-10 | Stop reason: HOSPADM

## 2017-05-04 RX ORDER — TRANEXAMIC ACID 650 1/1
1950 TABLET ORAL ONCE
Status: COMPLETED | OUTPATIENT
Start: 2017-05-04 | End: 2017-05-04

## 2017-05-04 RX ORDER — ENALAPRILAT 2.5 MG/2ML
1.25 INJECTION INTRAVENOUS
Status: DISCONTINUED | OUTPATIENT
Start: 2017-05-04 | End: 2017-05-04 | Stop reason: HOSPADM

## 2017-05-04 RX ORDER — ONDANSETRON 2 MG/ML
INJECTION INTRAMUSCULAR; INTRAVENOUS PRN
Status: DISCONTINUED | OUTPATIENT
Start: 2017-05-04 | End: 2017-05-04 | Stop reason: SDUPTHER

## 2017-05-04 RX ORDER — LIDOCAINE HYDROCHLORIDE 10 MG/ML
INJECTION, SOLUTION INFILTRATION; PERINEURAL PRN
Status: DISCONTINUED | OUTPATIENT
Start: 2017-05-04 | End: 2017-05-04 | Stop reason: SDUPTHER

## 2017-05-04 RX ORDER — TRAMADOL HYDROCHLORIDE 50 MG/1
50 TABLET ORAL EVERY 6 HOURS PRN
Status: DISCONTINUED | OUTPATIENT
Start: 2017-05-04 | End: 2017-05-10 | Stop reason: HOSPADM

## 2017-05-04 RX ORDER — HYDRALAZINE HYDROCHLORIDE 20 MG/ML
5 INJECTION INTRAMUSCULAR; INTRAVENOUS EVERY 10 MIN PRN
Status: DISCONTINUED | OUTPATIENT
Start: 2017-05-04 | End: 2017-05-04 | Stop reason: HOSPADM

## 2017-05-04 RX ORDER — LEVOTHYROXINE SODIUM 88 UG/1
88 TABLET ORAL DAILY
Status: DISCONTINUED | OUTPATIENT
Start: 2017-05-05 | End: 2017-05-10 | Stop reason: HOSPADM

## 2017-05-04 RX ORDER — FENTANYL CITRATE 50 UG/ML
50 INJECTION, SOLUTION INTRAMUSCULAR; INTRAVENOUS
Status: DISCONTINUED | OUTPATIENT
Start: 2017-05-04 | End: 2017-05-04 | Stop reason: HOSPADM

## 2017-05-04 RX ORDER — SODIUM CHLORIDE 0.9 % (FLUSH) 0.9 %
10 SYRINGE (ML) INJECTION EVERY 12 HOURS SCHEDULED
Status: DISCONTINUED | OUTPATIENT
Start: 2017-05-04 | End: 2017-05-10 | Stop reason: HOSPADM

## 2017-05-04 RX ORDER — DOCUSATE SODIUM 100 MG/1
100 CAPSULE, LIQUID FILLED ORAL 2 TIMES DAILY
Status: DISCONTINUED | OUTPATIENT
Start: 2017-05-04 | End: 2017-05-04

## 2017-05-04 RX ORDER — ACETAMINOPHEN 325 MG/1
650 TABLET ORAL EVERY 4 HOURS PRN
Status: DISCONTINUED | OUTPATIENT
Start: 2017-05-04 | End: 2017-05-10 | Stop reason: HOSPADM

## 2017-05-04 RX ORDER — PROPOFOL 10 MG/ML
INJECTION, EMULSION INTRAVENOUS PRN
Status: DISCONTINUED | OUTPATIENT
Start: 2017-05-04 | End: 2017-05-04 | Stop reason: SDUPTHER

## 2017-05-04 RX ORDER — ACETAMINOPHEN 500 MG
1000 TABLET ORAL ONCE
Status: COMPLETED | OUTPATIENT
Start: 2017-05-04 | End: 2017-05-04

## 2017-05-04 RX ORDER — HYDROCODONE BITARTRATE AND ACETAMINOPHEN 5; 325 MG/1; MG/1
1 TABLET ORAL EVERY 6 HOURS PRN
Status: DISCONTINUED | OUTPATIENT
Start: 2017-05-04 | End: 2017-05-10 | Stop reason: HOSPADM

## 2017-05-04 RX ORDER — PANTOPRAZOLE SODIUM 40 MG/1
40 TABLET, DELAYED RELEASE ORAL
Status: DISCONTINUED | OUTPATIENT
Start: 2017-05-05 | End: 2017-05-10 | Stop reason: HOSPADM

## 2017-05-04 RX ORDER — DIPHENHYDRAMINE HYDROCHLORIDE 50 MG/ML
12.5 INJECTION INTRAMUSCULAR; INTRAVENOUS
Status: DISCONTINUED | OUTPATIENT
Start: 2017-05-04 | End: 2017-05-04 | Stop reason: HOSPADM

## 2017-05-04 RX ORDER — DOCUSATE SODIUM 100 MG/1
100 CAPSULE, LIQUID FILLED ORAL 2 TIMES DAILY
Status: DISCONTINUED | OUTPATIENT
Start: 2017-05-04 | End: 2017-05-10 | Stop reason: HOSPADM

## 2017-05-04 RX ORDER — METOCLOPRAMIDE HYDROCHLORIDE 5 MG/ML
10 INJECTION INTRAMUSCULAR; INTRAVENOUS
Status: DISCONTINUED | OUTPATIENT
Start: 2017-05-04 | End: 2017-05-04 | Stop reason: HOSPADM

## 2017-05-04 RX ORDER — BUSPIRONE HYDROCHLORIDE 5 MG/1
5 TABLET ORAL 2 TIMES DAILY
Status: DISCONTINUED | OUTPATIENT
Start: 2017-05-04 | End: 2017-05-10 | Stop reason: HOSPADM

## 2017-05-04 RX ORDER — SODIUM CHLORIDE 9 MG/ML
INJECTION, SOLUTION INTRAVENOUS CONTINUOUS
Status: DISCONTINUED | OUTPATIENT
Start: 2017-05-04 | End: 2017-05-05

## 2017-05-04 RX ORDER — SODIUM CHLORIDE 0.9 % (FLUSH) 0.9 %
10 SYRINGE (ML) INJECTION PRN
Status: DISCONTINUED | OUTPATIENT
Start: 2017-05-04 | End: 2017-05-10 | Stop reason: HOSPADM

## 2017-05-04 RX ORDER — LIDOCAINE HYDROCHLORIDE 10 MG/ML
1 INJECTION, SOLUTION EPIDURAL; INFILTRATION; INTRACAUDAL; PERINEURAL
Status: DISCONTINUED | OUTPATIENT
Start: 2017-05-04 | End: 2017-05-04 | Stop reason: HOSPADM

## 2017-05-04 RX ORDER — ROCURONIUM BROMIDE 10 MG/ML
INJECTION, SOLUTION INTRAVENOUS PRN
Status: DISCONTINUED | OUTPATIENT
Start: 2017-05-04 | End: 2017-05-04 | Stop reason: SDUPTHER

## 2017-05-04 RX ADMIN — FENTANYL CITRATE 150 MCG: 50 INJECTION INTRAMUSCULAR; INTRAVENOUS at 11:47

## 2017-05-04 RX ADMIN — MORPHINE SULFATE 2 MG: 4 INJECTION, SOLUTION INTRAMUSCULAR; INTRAVENOUS at 21:39

## 2017-05-04 RX ADMIN — ASPIRIN 81 MG: 81 TABLET, COATED ORAL at 21:41

## 2017-05-04 RX ADMIN — OXYCODONE HYDROCHLORIDE 10 MG: 10 TABLET, FILM COATED, EXTENDED RELEASE ORAL at 09:14

## 2017-05-04 RX ADMIN — SODIUM CHLORIDE: 9 INJECTION, SOLUTION INTRAVENOUS at 16:22

## 2017-05-04 RX ADMIN — PROPOFOL 150 MG: 10 INJECTION, EMULSION INTRAVENOUS at 11:47

## 2017-05-04 RX ADMIN — TRANEXAMIC ACID 1950 MG: 650 TABLET ORAL at 09:14

## 2017-05-04 RX ADMIN — Medication 2 G: at 12:00

## 2017-05-04 RX ADMIN — TOPIRAMATE 50 MG: 25 TABLET ORAL at 21:41

## 2017-05-04 RX ADMIN — Medication 2 G: at 21:40

## 2017-05-04 RX ADMIN — SODIUM CHLORIDE, SODIUM LACTATE, POTASSIUM CHLORIDE, AND CALCIUM CHLORIDE: 600; 310; 30; 20 INJECTION, SOLUTION INTRAVENOUS at 11:33

## 2017-05-04 RX ADMIN — BUSPIRONE HYDROCHLORIDE 5 MG: 5 TABLET ORAL at 21:40

## 2017-05-04 RX ADMIN — DEXAMETHASONE SODIUM PHOSPHATE 10 MG: 10 INJECTION INTRAMUSCULAR; INTRAVENOUS at 11:59

## 2017-05-04 RX ADMIN — FENTANYL CITRATE 100 MCG: 50 INJECTION INTRAMUSCULAR; INTRAVENOUS at 12:15

## 2017-05-04 RX ADMIN — ONDANSETRON HYDROCHLORIDE 4 MG: 2 INJECTION, SOLUTION INTRAVENOUS at 13:36

## 2017-05-04 RX ADMIN — LIDOCAINE HYDROCHLORIDE 50 MG: 10 INJECTION, SOLUTION INFILTRATION; PERINEURAL at 11:47

## 2017-05-04 RX ADMIN — HYDROMORPHONE HYDROCHLORIDE 0.5 MG: 1 INJECTION, SOLUTION INTRAMUSCULAR; INTRAVENOUS; SUBCUTANEOUS at 14:16

## 2017-05-04 RX ADMIN — ACETAMINOPHEN 1000 MG: 500 TABLET ORAL at 09:14

## 2017-05-04 RX ADMIN — OXYCODONE HYDROCHLORIDE 10 MG: 5 TABLET ORAL at 19:42

## 2017-05-04 RX ADMIN — OXYBUTYNIN CHLORIDE 5 MG: 5 TABLET, EXTENDED RELEASE ORAL at 21:40

## 2017-05-04 RX ADMIN — SODIUM CHLORIDE, SODIUM LACTATE, POTASSIUM CHLORIDE, AND CALCIUM CHLORIDE: 600; 310; 30; 20 INJECTION, SOLUTION INTRAVENOUS at 13:35

## 2017-05-04 RX ADMIN — GABAPENTIN 300 MG: 300 CAPSULE ORAL at 21:40

## 2017-05-04 RX ADMIN — SUGAMMADEX 200 MG: 100 INJECTION, SOLUTION INTRAVENOUS at 14:16

## 2017-05-04 RX ADMIN — DOCUSATE SODIUM 100 MG: 100 CAPSULE, LIQUID FILLED ORAL at 21:40

## 2017-05-04 RX ADMIN — ONDANSETRON 4 MG: 2 INJECTION INTRAMUSCULAR; INTRAVENOUS at 16:22

## 2017-05-04 RX ADMIN — MORPHINE SULFATE 4 MG: 4 INJECTION, SOLUTION INTRAMUSCULAR; INTRAVENOUS at 16:22

## 2017-05-04 RX ADMIN — SODIUM CHLORIDE, POTASSIUM CHLORIDE, SODIUM LACTATE AND CALCIUM CHLORIDE: 600; 310; 30; 20 INJECTION, SOLUTION INTRAVENOUS at 09:14

## 2017-05-04 RX ADMIN — LABETALOL HYDROCHLORIDE 10 MG: 5 INJECTION, SOLUTION INTRAVENOUS at 12:22

## 2017-05-04 RX ADMIN — HYDROMORPHONE HYDROCHLORIDE 0.5 MG: 1 INJECTION, SOLUTION INTRAMUSCULAR; INTRAVENOUS; SUBCUTANEOUS at 14:44

## 2017-05-04 RX ADMIN — ROCURONIUM BROMIDE 50 MG: 10 INJECTION INTRAVENOUS at 11:47

## 2017-05-04 ASSESSMENT — PAIN SCALES - GENERAL
PAINLEVEL_OUTOF10: 0
PAINLEVEL_OUTOF10: 0
PAINLEVEL_OUTOF10: 10
PAINLEVEL_OUTOF10: 10
PAINLEVEL_OUTOF10: 6

## 2017-05-05 LAB
ANION GAP SERPL CALCULATED.3IONS-SCNC: 11 MMOL/L (ref 7–19)
BUN BLDV-MCNC: 13 MG/DL (ref 6–20)
CALCIUM SERPL-MCNC: 8.4 MG/DL (ref 8.6–10)
CHLORIDE BLD-SCNC: 103 MMOL/L (ref 98–111)
CO2: 25 MMOL/L (ref 22–29)
CREAT SERPL-MCNC: 1.3 MG/DL (ref 0.5–0.9)
FOLATE: 3 NG/ML (ref 4.8–37.3)
GFR NON-AFRICAN AMERICAN: 44
GLUCOSE BLD-MCNC: 120 MG/DL (ref 74–109)
HCT VFR BLD CALC: 26.3 % (ref 37–47)
HEMOGLOBIN: 8.1 G/DL (ref 12–16)
IRON SATURATION: 28 % (ref 14–50)
IRON: 64 UG/DL (ref 37–145)
POTASSIUM SERPL-SCNC: 4.3 MMOL/L (ref 3.5–5)
SODIUM BLD-SCNC: 139 MMOL/L (ref 136–145)
TOTAL IRON BINDING CAPACITY: 227 UG/DL (ref 250–400)
VITAMIN B-12: 491 PG/ML (ref 211–946)
VITAMIN D 25-HYDROXY: <5 NG/ML

## 2017-05-05 PROCEDURE — 2700000000 HC OXYGEN THERAPY PER DAY

## 2017-05-05 PROCEDURE — 82306 VITAMIN D 25 HYDROXY: CPT

## 2017-05-05 PROCEDURE — 82746 ASSAY OF FOLIC ACID SERUM: CPT

## 2017-05-05 PROCEDURE — 85018 HEMOGLOBIN: CPT

## 2017-05-05 PROCEDURE — 97162 PT EVAL MOD COMPLEX 30 MIN: CPT

## 2017-05-05 PROCEDURE — G8979 MOBILITY GOAL STATUS: HCPCS

## 2017-05-05 PROCEDURE — 6370000000 HC RX 637 (ALT 250 FOR IP): Performed by: ORTHOPAEDIC SURGERY

## 2017-05-05 PROCEDURE — 83550 IRON BINDING TEST: CPT

## 2017-05-05 PROCEDURE — 1210000000 HC MED SURG R&B

## 2017-05-05 PROCEDURE — 85014 HEMATOCRIT: CPT

## 2017-05-05 PROCEDURE — G8978 MOBILITY CURRENT STATUS: HCPCS

## 2017-05-05 PROCEDURE — 82607 VITAMIN B-12: CPT

## 2017-05-05 PROCEDURE — 36415 COLL VENOUS BLD VENIPUNCTURE: CPT

## 2017-05-05 PROCEDURE — 83540 ASSAY OF IRON: CPT

## 2017-05-05 PROCEDURE — 80048 BASIC METABOLIC PNL TOTAL CA: CPT

## 2017-05-05 PROCEDURE — 6360000002 HC RX W HCPCS: Performed by: ORTHOPAEDIC SURGERY

## 2017-05-05 RX ORDER — HYDROMORPHONE HYDROCHLORIDE 8 MG/1
8 TABLET ORAL EVERY 4 HOURS PRN
Status: DISCONTINUED | OUTPATIENT
Start: 2017-05-05 | End: 2017-05-10 | Stop reason: HOSPADM

## 2017-05-05 RX ORDER — HYDROMORPHONE HYDROCHLORIDE 2 MG/1
4 TABLET ORAL EVERY 4 HOURS PRN
Status: DISCONTINUED | OUTPATIENT
Start: 2017-05-05 | End: 2017-05-10 | Stop reason: HOSPADM

## 2017-05-05 RX ORDER — ERGOCALCIFEROL 1.25 MG/1
50000 CAPSULE ORAL WEEKLY
Status: DISCONTINUED | OUTPATIENT
Start: 2017-05-05 | End: 2017-05-10 | Stop reason: HOSPADM

## 2017-05-05 RX ADMIN — DOCUSATE SODIUM 100 MG: 100 CAPSULE, LIQUID FILLED ORAL at 08:19

## 2017-05-05 RX ADMIN — HYDROMORPHONE HYDROCHLORIDE 4 MG: 2 TABLET ORAL at 12:44

## 2017-05-05 RX ADMIN — TOPIRAMATE 50 MG: 25 TABLET ORAL at 08:20

## 2017-05-05 RX ADMIN — MORPHINE SULFATE 4 MG: 4 INJECTION, SOLUTION INTRAMUSCULAR; INTRAVENOUS at 06:31

## 2017-05-05 RX ADMIN — MORPHINE SULFATE 4 MG: 4 INJECTION, SOLUTION INTRAMUSCULAR; INTRAVENOUS at 19:56

## 2017-05-05 RX ADMIN — MORPHINE SULFATE 4 MG: 4 INJECTION, SOLUTION INTRAMUSCULAR; INTRAVENOUS at 08:20

## 2017-05-05 RX ADMIN — MORPHINE SULFATE 4 MG: 4 INJECTION, SOLUTION INTRAMUSCULAR; INTRAVENOUS at 00:25

## 2017-05-05 RX ADMIN — MORPHINE SULFATE 4 MG: 4 INJECTION, SOLUTION INTRAMUSCULAR; INTRAVENOUS at 04:15

## 2017-05-05 RX ADMIN — ASPIRIN 81 MG: 81 TABLET, COATED ORAL at 20:08

## 2017-05-05 RX ADMIN — BUSPIRONE HYDROCHLORIDE 5 MG: 5 TABLET ORAL at 08:20

## 2017-05-05 RX ADMIN — OXYBUTYNIN CHLORIDE 5 MG: 5 TABLET, EXTENDED RELEASE ORAL at 20:08

## 2017-05-05 RX ADMIN — DULOXETINE HYDROCHLORIDE 60 MG: 60 CAPSULE, DELAYED RELEASE ORAL at 08:19

## 2017-05-05 RX ADMIN — TOPIRAMATE 50 MG: 25 TABLET ORAL at 20:08

## 2017-05-05 RX ADMIN — MORPHINE SULFATE 4 MG: 4 INJECTION, SOLUTION INTRAMUSCULAR; INTRAVENOUS at 10:51

## 2017-05-05 RX ADMIN — ACETAMINOPHEN 650 MG: 325 TABLET, FILM COATED ORAL at 20:07

## 2017-05-05 RX ADMIN — GABAPENTIN 300 MG: 300 CAPSULE ORAL at 08:19

## 2017-05-05 RX ADMIN — Medication 2 G: at 04:15

## 2017-05-05 RX ADMIN — TRAMADOL HYDROCHLORIDE 50 MG: 50 TABLET, FILM COATED ORAL at 07:51

## 2017-05-05 RX ADMIN — ASPIRIN 81 MG: 81 TABLET, COATED ORAL at 08:19

## 2017-05-05 RX ADMIN — HYDROMORPHONE HYDROCHLORIDE 4 MG: 2 TABLET ORAL at 14:19

## 2017-05-05 RX ADMIN — OXYCODONE HYDROCHLORIDE 10 MG: 5 TABLET ORAL at 05:28

## 2017-05-05 RX ADMIN — PANTOPRAZOLE SODIUM 40 MG: 40 TABLET, DELAYED RELEASE ORAL at 06:12

## 2017-05-05 RX ADMIN — BUSPIRONE HYDROCHLORIDE 5 MG: 5 TABLET ORAL at 20:08

## 2017-05-05 RX ADMIN — GABAPENTIN 300 MG: 300 CAPSULE ORAL at 12:45

## 2017-05-05 RX ADMIN — GABAPENTIN 300 MG: 300 CAPSULE ORAL at 20:07

## 2017-05-05 RX ADMIN — OXYCODONE HYDROCHLORIDE 20 MG: 5 TABLET ORAL at 09:40

## 2017-05-05 RX ADMIN — HYDROMORPHONE HYDROCHLORIDE 4 MG: 2 TABLET ORAL at 23:10

## 2017-05-05 RX ADMIN — OXYBUTYNIN CHLORIDE 5 MG: 5 TABLET, FILM COATED, EXTENDED RELEASE ORAL at 08:20

## 2017-05-05 RX ADMIN — LEVOTHYROXINE SODIUM 88 MCG: 88 TABLET ORAL at 06:12

## 2017-05-05 RX ADMIN — HYDROMORPHONE HYDROCHLORIDE 4 MG: 2 TABLET ORAL at 17:46

## 2017-05-05 RX ADMIN — DOCUSATE SODIUM 100 MG: 100 CAPSULE, LIQUID FILLED ORAL at 20:07

## 2017-05-05 ASSESSMENT — ENCOUNTER SYMPTOMS
SORE THROAT: 0
SPUTUM PRODUCTION: 0
COUGH: 0
DIARRHEA: 0
HEMOPTYSIS: 0
BLOOD IN STOOL: 0
SHORTNESS OF BREATH: 0
HEARTBURN: 0
STRIDOR: 0
NAUSEA: 0
WHEEZING: 0
BLURRED VISION: 0
DOUBLE VISION: 0
CONSTIPATION: 0
ABDOMINAL PAIN: 0

## 2017-05-05 ASSESSMENT — PAIN SCALES - GENERAL
PAINLEVEL_OUTOF10: 4
PAINLEVEL_OUTOF10: 9
PAINLEVEL_OUTOF10: 10
PAINLEVEL_OUTOF10: 10
PAINLEVEL_OUTOF10: 8
PAINLEVEL_OUTOF10: 3
PAINLEVEL_OUTOF10: 10
PAINLEVEL_OUTOF10: 9
PAINLEVEL_OUTOF10: 10
PAINLEVEL_OUTOF10: 4
PAINLEVEL_OUTOF10: 3
PAINLEVEL_OUTOF10: 10
PAINLEVEL_OUTOF10: 10
PAINLEVEL_OUTOF10: 9
PAINLEVEL_OUTOF10: 10

## 2017-05-06 LAB
ANION GAP SERPL CALCULATED.3IONS-SCNC: 10 MMOL/L (ref 7–19)
BUN BLDV-MCNC: 12 MG/DL (ref 6–20)
CALCIUM SERPL-MCNC: 8 MG/DL (ref 8.6–10)
CHLORIDE BLD-SCNC: 101 MMOL/L (ref 98–111)
CO2: 24 MMOL/L (ref 22–29)
CREAT SERPL-MCNC: 1.3 MG/DL (ref 0.5–0.9)
GFR NON-AFRICAN AMERICAN: 44
GLUCOSE BLD-MCNC: 107 MG/DL (ref 74–109)
HCT VFR BLD CALC: 24.8 % (ref 37–47)
HEMOGLOBIN: 7.7 G/DL (ref 12–16)
POTASSIUM SERPL-SCNC: 4 MMOL/L (ref 3.5–5)
SODIUM BLD-SCNC: 135 MMOL/L (ref 136–145)

## 2017-05-06 PROCEDURE — 1210000000 HC MED SURG R&B

## 2017-05-06 PROCEDURE — 80048 BASIC METABOLIC PNL TOTAL CA: CPT

## 2017-05-06 PROCEDURE — 85018 HEMOGLOBIN: CPT

## 2017-05-06 PROCEDURE — 85014 HEMATOCRIT: CPT

## 2017-05-06 PROCEDURE — 6360000002 HC RX W HCPCS: Performed by: ORTHOPAEDIC SURGERY

## 2017-05-06 PROCEDURE — 2580000003 HC RX 258: Performed by: ORTHOPAEDIC SURGERY

## 2017-05-06 PROCEDURE — 6370000000 HC RX 637 (ALT 250 FOR IP): Performed by: ORTHOPAEDIC SURGERY

## 2017-05-06 PROCEDURE — 36415 COLL VENOUS BLD VENIPUNCTURE: CPT

## 2017-05-06 PROCEDURE — 97116 GAIT TRAINING THERAPY: CPT

## 2017-05-06 PROCEDURE — 97530 THERAPEUTIC ACTIVITIES: CPT

## 2017-05-06 RX ADMIN — DOCUSATE SODIUM 100 MG: 100 CAPSULE, LIQUID FILLED ORAL at 22:13

## 2017-05-06 RX ADMIN — BUSPIRONE HYDROCHLORIDE 5 MG: 5 TABLET ORAL at 07:48

## 2017-05-06 RX ADMIN — ONDANSETRON 4 MG: 2 INJECTION INTRAMUSCULAR; INTRAVENOUS at 07:48

## 2017-05-06 RX ADMIN — GABAPENTIN 300 MG: 300 CAPSULE ORAL at 07:48

## 2017-05-06 RX ADMIN — DOCUSATE SODIUM 100 MG: 100 CAPSULE, LIQUID FILLED ORAL at 07:47

## 2017-05-06 RX ADMIN — PANTOPRAZOLE SODIUM 40 MG: 40 TABLET, DELAYED RELEASE ORAL at 07:48

## 2017-05-06 RX ADMIN — MORPHINE SULFATE 4 MG: 4 INJECTION, SOLUTION INTRAMUSCULAR; INTRAVENOUS at 12:09

## 2017-05-06 RX ADMIN — OXYBUTYNIN CHLORIDE 5 MG: 5 TABLET, EXTENDED RELEASE ORAL at 22:12

## 2017-05-06 RX ADMIN — LEVOTHYROXINE SODIUM 88 MCG: 88 TABLET ORAL at 07:47

## 2017-05-06 RX ADMIN — HYDROMORPHONE HYDROCHLORIDE 8 MG: 8 TABLET ORAL at 15:15

## 2017-05-06 RX ADMIN — HYDROMORPHONE HYDROCHLORIDE 8 MG: 8 TABLET ORAL at 03:57

## 2017-05-06 RX ADMIN — ASPIRIN 81 MG: 81 TABLET, COATED ORAL at 07:56

## 2017-05-06 RX ADMIN — Medication 10 ML: at 22:13

## 2017-05-06 RX ADMIN — DULOXETINE HYDROCHLORIDE 60 MG: 60 CAPSULE, DELAYED RELEASE ORAL at 07:48

## 2017-05-06 RX ADMIN — TOPIRAMATE 50 MG: 25 TABLET ORAL at 07:48

## 2017-05-06 RX ADMIN — HYDROMORPHONE HYDROCHLORIDE 8 MG: 8 TABLET ORAL at 09:52

## 2017-05-06 RX ADMIN — MORPHINE SULFATE 4 MG: 4 INJECTION, SOLUTION INTRAMUSCULAR; INTRAVENOUS at 18:40

## 2017-05-06 RX ADMIN — Medication 10 ML: at 07:53

## 2017-05-06 RX ADMIN — BUSPIRONE HYDROCHLORIDE 5 MG: 5 TABLET ORAL at 22:12

## 2017-05-06 RX ADMIN — MORPHINE SULFATE 4 MG: 4 INJECTION, SOLUTION INTRAMUSCULAR; INTRAVENOUS at 01:16

## 2017-05-06 RX ADMIN — TOPIRAMATE 50 MG: 25 TABLET ORAL at 22:12

## 2017-05-06 RX ADMIN — MORPHINE SULFATE 4 MG: 4 INJECTION, SOLUTION INTRAMUSCULAR; INTRAVENOUS at 07:48

## 2017-05-06 RX ADMIN — ASPIRIN 81 MG: 81 TABLET, COATED ORAL at 22:12

## 2017-05-06 RX ADMIN — HYDROMORPHONE HYDROCHLORIDE 8 MG: 8 TABLET ORAL at 22:13

## 2017-05-06 RX ADMIN — OXYBUTYNIN CHLORIDE 5 MG: 5 TABLET, FILM COATED, EXTENDED RELEASE ORAL at 07:47

## 2017-05-06 RX ADMIN — GABAPENTIN 300 MG: 300 CAPSULE ORAL at 14:30

## 2017-05-06 RX ADMIN — GABAPENTIN 300 MG: 300 CAPSULE ORAL at 22:12

## 2017-05-06 ASSESSMENT — PAIN SCALES - GENERAL
PAINLEVEL_OUTOF10: 10
PAINLEVEL_OUTOF10: 9
PAINLEVEL_OUTOF10: 10
PAINLEVEL_OUTOF10: 10
PAINLEVEL_OUTOF10: 8
PAINLEVEL_OUTOF10: 8
PAINLEVEL_OUTOF10: 10
PAINLEVEL_OUTOF10: 10
PAINLEVEL_OUTOF10: 8
PAINLEVEL_OUTOF10: 9
PAINLEVEL_OUTOF10: 10

## 2017-05-06 ASSESSMENT — PAIN DESCRIPTION - ORIENTATION: ORIENTATION: RIGHT

## 2017-05-06 ASSESSMENT — PAIN DESCRIPTION - LOCATION: LOCATION: KNEE

## 2017-05-06 ASSESSMENT — PAIN DESCRIPTION - PAIN TYPE: TYPE: SURGICAL PAIN

## 2017-05-07 LAB
ANION GAP SERPL CALCULATED.3IONS-SCNC: 12 MMOL/L (ref 7–19)
BUN BLDV-MCNC: 13 MG/DL (ref 6–20)
CALCIUM SERPL-MCNC: 8.4 MG/DL (ref 8.6–10)
CHLORIDE BLD-SCNC: 99 MMOL/L (ref 98–111)
CO2: 26 MMOL/L (ref 22–29)
CREAT SERPL-MCNC: 1.1 MG/DL (ref 0.5–0.9)
GFR NON-AFRICAN AMERICAN: 53
GLUCOSE BLD-MCNC: 116 MG/DL (ref 74–109)
HCT VFR BLD CALC: 25.9 % (ref 37–47)
HEMOGLOBIN: 8 G/DL (ref 12–16)
POTASSIUM SERPL-SCNC: 4.3 MMOL/L (ref 3.5–5)
SODIUM BLD-SCNC: 137 MMOL/L (ref 136–145)

## 2017-05-07 PROCEDURE — 2700000000 HC OXYGEN THERAPY PER DAY

## 2017-05-07 PROCEDURE — 6370000000 HC RX 637 (ALT 250 FOR IP): Performed by: ORTHOPAEDIC SURGERY

## 2017-05-07 PROCEDURE — 1210000000 HC MED SURG R&B

## 2017-05-07 PROCEDURE — 2580000003 HC RX 258: Performed by: INTERNAL MEDICINE

## 2017-05-07 PROCEDURE — 2580000003 HC RX 258: Performed by: ORTHOPAEDIC SURGERY

## 2017-05-07 PROCEDURE — 6360000002 HC RX W HCPCS: Performed by: INTERNAL MEDICINE

## 2017-05-07 PROCEDURE — 85014 HEMATOCRIT: CPT

## 2017-05-07 PROCEDURE — 85018 HEMOGLOBIN: CPT

## 2017-05-07 PROCEDURE — 80048 BASIC METABOLIC PNL TOTAL CA: CPT

## 2017-05-07 PROCEDURE — 36415 COLL VENOUS BLD VENIPUNCTURE: CPT

## 2017-05-07 PROCEDURE — 6360000002 HC RX W HCPCS: Performed by: ORTHOPAEDIC SURGERY

## 2017-05-07 RX ORDER — VANCOMYCIN HYDROCHLORIDE 1 G/200ML
1000 INJECTION, SOLUTION INTRAVENOUS EVERY 12 HOURS
Status: DISCONTINUED | OUTPATIENT
Start: 2017-05-07 | End: 2017-05-07 | Stop reason: DRUGHIGH

## 2017-05-07 RX ADMIN — ASPIRIN 81 MG: 81 TABLET, COATED ORAL at 19:50

## 2017-05-07 RX ADMIN — TOPIRAMATE 50 MG: 25 TABLET ORAL at 08:53

## 2017-05-07 RX ADMIN — DOCUSATE SODIUM 100 MG: 100 CAPSULE, LIQUID FILLED ORAL at 08:53

## 2017-05-07 RX ADMIN — HYDROMORPHONE HYDROCHLORIDE 8 MG: 8 TABLET ORAL at 23:48

## 2017-05-07 RX ADMIN — CELECOXIB 200 MG: 200 CAPSULE ORAL at 08:51

## 2017-05-07 RX ADMIN — GABAPENTIN 300 MG: 300 CAPSULE ORAL at 19:50

## 2017-05-07 RX ADMIN — HYDROMORPHONE HYDROCHLORIDE 8 MG: 8 TABLET ORAL at 19:50

## 2017-05-07 RX ADMIN — HYDROMORPHONE HYDROCHLORIDE 8 MG: 8 TABLET ORAL at 02:07

## 2017-05-07 RX ADMIN — ASPIRIN 81 MG: 81 TABLET, COATED ORAL at 08:53

## 2017-05-07 RX ADMIN — TOPIRAMATE 50 MG: 25 TABLET ORAL at 19:50

## 2017-05-07 RX ADMIN — VANCOMYCIN HYDROCHLORIDE 1500 MG: 1 INJECTION, POWDER, LYOPHILIZED, FOR SOLUTION INTRAVENOUS at 21:19

## 2017-05-07 RX ADMIN — BUSPIRONE HYDROCHLORIDE 5 MG: 5 TABLET ORAL at 08:53

## 2017-05-07 RX ADMIN — HYDROMORPHONE HYDROCHLORIDE 8 MG: 8 TABLET ORAL at 08:52

## 2017-05-07 RX ADMIN — OXYBUTYNIN CHLORIDE 5 MG: 5 TABLET, FILM COATED, EXTENDED RELEASE ORAL at 08:52

## 2017-05-07 RX ADMIN — Medication 10 ML: at 08:51

## 2017-05-07 RX ADMIN — Medication 10 ML: at 19:51

## 2017-05-07 RX ADMIN — DULOXETINE HYDROCHLORIDE 60 MG: 60 CAPSULE, DELAYED RELEASE ORAL at 08:51

## 2017-05-07 RX ADMIN — ONDANSETRON 4 MG: 2 INJECTION INTRAMUSCULAR; INTRAVENOUS at 08:51

## 2017-05-07 RX ADMIN — LEVOTHYROXINE SODIUM 88 MCG: 88 TABLET ORAL at 08:52

## 2017-05-07 RX ADMIN — GABAPENTIN 300 MG: 300 CAPSULE ORAL at 08:52

## 2017-05-07 RX ADMIN — PANTOPRAZOLE SODIUM 40 MG: 40 TABLET, DELAYED RELEASE ORAL at 08:53

## 2017-05-07 RX ADMIN — HYDROMORPHONE HYDROCHLORIDE 8 MG: 8 TABLET ORAL at 11:27

## 2017-05-07 RX ADMIN — OXYBUTYNIN CHLORIDE 5 MG: 5 TABLET, EXTENDED RELEASE ORAL at 19:50

## 2017-05-07 RX ADMIN — BUSPIRONE HYDROCHLORIDE 5 MG: 5 TABLET ORAL at 19:50

## 2017-05-07 RX ADMIN — GABAPENTIN 300 MG: 300 CAPSULE ORAL at 14:50

## 2017-05-07 RX ADMIN — DOCUSATE SODIUM 100 MG: 100 CAPSULE, LIQUID FILLED ORAL at 19:50

## 2017-05-07 ASSESSMENT — PAIN DESCRIPTION - ORIENTATION
ORIENTATION: RIGHT
ORIENTATION: RIGHT

## 2017-05-07 ASSESSMENT — ENCOUNTER SYMPTOMS
VOMITING: 0
COUGH: 0
SHORTNESS OF BREATH: 0
NAUSEA: 0
BLURRED VISION: 0
DIARRHEA: 0
DOUBLE VISION: 0

## 2017-05-07 ASSESSMENT — PAIN SCALES - GENERAL
PAINLEVEL_OUTOF10: 10
PAINLEVEL_OUTOF10: 10
PAINLEVEL_OUTOF10: 8
PAINLEVEL_OUTOF10: 4
PAINLEVEL_OUTOF10: 8
PAINLEVEL_OUTOF10: 10
PAINLEVEL_OUTOF10: 9

## 2017-05-07 ASSESSMENT — PAIN DESCRIPTION - PAIN TYPE: TYPE: ACUTE PAIN;SURGICAL PAIN

## 2017-05-07 ASSESSMENT — PAIN DESCRIPTION - LOCATION
LOCATION: KNEE
LOCATION: KNEE

## 2017-05-08 PROCEDURE — 1210000000 HC MED SURG R&B

## 2017-05-08 PROCEDURE — 6370000000 HC RX 637 (ALT 250 FOR IP): Performed by: ORTHOPAEDIC SURGERY

## 2017-05-08 PROCEDURE — 2580000003 HC RX 258: Performed by: ORTHOPAEDIC SURGERY

## 2017-05-08 PROCEDURE — 97116 GAIT TRAINING THERAPY: CPT

## 2017-05-08 PROCEDURE — 6360000002 HC RX W HCPCS: Performed by: INTERNAL MEDICINE

## 2017-05-08 PROCEDURE — 97530 THERAPEUTIC ACTIVITIES: CPT

## 2017-05-08 PROCEDURE — 2700000000 HC OXYGEN THERAPY PER DAY

## 2017-05-08 PROCEDURE — 97165 OT EVAL LOW COMPLEX 30 MIN: CPT

## 2017-05-08 PROCEDURE — G8987 SELF CARE CURRENT STATUS: HCPCS

## 2017-05-08 PROCEDURE — 2580000003 HC RX 258: Performed by: INTERNAL MEDICINE

## 2017-05-08 PROCEDURE — 6360000002 HC RX W HCPCS: Performed by: ORTHOPAEDIC SURGERY

## 2017-05-08 PROCEDURE — G8988 SELF CARE GOAL STATUS: HCPCS

## 2017-05-08 RX ADMIN — PROMETHAZINE HYDROCHLORIDE 6.25 MG: 25 INJECTION, SOLUTION INTRAMUSCULAR; INTRAVENOUS at 09:18

## 2017-05-08 RX ADMIN — OXYBUTYNIN CHLORIDE 5 MG: 5 TABLET, FILM COATED, EXTENDED RELEASE ORAL at 09:12

## 2017-05-08 RX ADMIN — CELECOXIB 200 MG: 200 CAPSULE ORAL at 09:12

## 2017-05-08 RX ADMIN — DOCUSATE SODIUM 100 MG: 100 CAPSULE, LIQUID FILLED ORAL at 09:12

## 2017-05-08 RX ADMIN — LEVOTHYROXINE SODIUM 88 MCG: 88 TABLET ORAL at 06:00

## 2017-05-08 RX ADMIN — ASPIRIN 81 MG: 81 TABLET, COATED ORAL at 21:23

## 2017-05-08 RX ADMIN — HYDROMORPHONE HYDROCHLORIDE 8 MG: 8 TABLET ORAL at 05:59

## 2017-05-08 RX ADMIN — PANTOPRAZOLE SODIUM 40 MG: 40 TABLET, DELAYED RELEASE ORAL at 05:59

## 2017-05-08 RX ADMIN — DULOXETINE HYDROCHLORIDE 60 MG: 60 CAPSULE, DELAYED RELEASE ORAL at 09:12

## 2017-05-08 RX ADMIN — GABAPENTIN 300 MG: 300 CAPSULE ORAL at 13:56

## 2017-05-08 RX ADMIN — Medication 10 ML: at 09:18

## 2017-05-08 RX ADMIN — GABAPENTIN 300 MG: 300 CAPSULE ORAL at 09:12

## 2017-05-08 RX ADMIN — VANCOMYCIN HYDROCHLORIDE 1500 MG: 1 INJECTION, POWDER, LYOPHILIZED, FOR SOLUTION INTRAVENOUS at 21:22

## 2017-05-08 RX ADMIN — TOPIRAMATE 50 MG: 25 TABLET ORAL at 21:23

## 2017-05-08 RX ADMIN — DOCUSATE SODIUM 100 MG: 100 CAPSULE, LIQUID FILLED ORAL at 21:23

## 2017-05-08 RX ADMIN — HYDROMORPHONE HYDROCHLORIDE 8 MG: 8 TABLET ORAL at 10:23

## 2017-05-08 RX ADMIN — BUSPIRONE HYDROCHLORIDE 5 MG: 5 TABLET ORAL at 09:12

## 2017-05-08 RX ADMIN — TOPIRAMATE 50 MG: 25 TABLET ORAL at 09:12

## 2017-05-08 RX ADMIN — GABAPENTIN 300 MG: 300 CAPSULE ORAL at 21:23

## 2017-05-08 RX ADMIN — ASPIRIN 81 MG: 81 TABLET, COATED ORAL at 09:12

## 2017-05-08 RX ADMIN — OXYBUTYNIN CHLORIDE 5 MG: 5 TABLET, EXTENDED RELEASE ORAL at 21:23

## 2017-05-08 RX ADMIN — ONDANSETRON 4 MG: 2 INJECTION INTRAMUSCULAR; INTRAVENOUS at 01:03

## 2017-05-08 RX ADMIN — BUSPIRONE HYDROCHLORIDE 5 MG: 5 TABLET ORAL at 21:23

## 2017-05-08 ASSESSMENT — ENCOUNTER SYMPTOMS
VOMITING: 0
COUGH: 0
BLURRED VISION: 0
SHORTNESS OF BREATH: 0
NAUSEA: 1
DOUBLE VISION: 0
DIARRHEA: 0

## 2017-05-08 ASSESSMENT — PAIN DESCRIPTION - ORIENTATION: ORIENTATION: RIGHT

## 2017-05-08 ASSESSMENT — PAIN SCALES - GENERAL
PAINLEVEL_OUTOF10: 4
PAINLEVEL_OUTOF10: 10
PAINLEVEL_OUTOF10: 9
PAINLEVEL_OUTOF10: 10
PAINLEVEL_OUTOF10: 4
PAINLEVEL_OUTOF10: 10

## 2017-05-08 ASSESSMENT — PAIN DESCRIPTION - DESCRIPTORS: DESCRIPTORS: ACHING

## 2017-05-08 ASSESSMENT — PAIN DESCRIPTION - LOCATION: LOCATION: KNEE

## 2017-05-09 PROCEDURE — 97116 GAIT TRAINING THERAPY: CPT

## 2017-05-09 PROCEDURE — 2580000003 HC RX 258: Performed by: ORTHOPAEDIC SURGERY

## 2017-05-09 PROCEDURE — 2700000000 HC OXYGEN THERAPY PER DAY

## 2017-05-09 PROCEDURE — 1210000000 HC MED SURG R&B

## 2017-05-09 PROCEDURE — 97530 THERAPEUTIC ACTIVITIES: CPT

## 2017-05-09 PROCEDURE — 6370000000 HC RX 637 (ALT 250 FOR IP): Performed by: ORTHOPAEDIC SURGERY

## 2017-05-09 PROCEDURE — 6360000002 HC RX W HCPCS: Performed by: ORTHOPAEDIC SURGERY

## 2017-05-09 RX ORDER — BISACODYL 10 MG
10 SUPPOSITORY, RECTAL RECTAL 2 TIMES DAILY PRN
Status: DISCONTINUED | OUTPATIENT
Start: 2017-05-09 | End: 2017-05-10 | Stop reason: HOSPADM

## 2017-05-09 RX ADMIN — Medication 10 ML: at 21:00

## 2017-05-09 RX ADMIN — LEVOTHYROXINE SODIUM 88 MCG: 88 TABLET ORAL at 05:46

## 2017-05-09 RX ADMIN — ASPIRIN 81 MG: 81 TABLET, COATED ORAL at 22:43

## 2017-05-09 RX ADMIN — DOCUSATE SODIUM 100 MG: 100 CAPSULE, LIQUID FILLED ORAL at 08:24

## 2017-05-09 RX ADMIN — GABAPENTIN 300 MG: 300 CAPSULE ORAL at 21:20

## 2017-05-09 RX ADMIN — TOPIRAMATE 50 MG: 25 TABLET ORAL at 09:59

## 2017-05-09 RX ADMIN — PROMETHAZINE HYDROCHLORIDE 6.25 MG: 25 INJECTION, SOLUTION INTRAMUSCULAR; INTRAVENOUS at 08:25

## 2017-05-09 RX ADMIN — PANTOPRAZOLE SODIUM 40 MG: 40 TABLET, DELAYED RELEASE ORAL at 05:46

## 2017-05-09 RX ADMIN — ASPIRIN 81 MG: 81 TABLET, COATED ORAL at 08:24

## 2017-05-09 RX ADMIN — TOPIRAMATE 50 MG: 25 TABLET ORAL at 21:20

## 2017-05-09 RX ADMIN — HYDROCODONE BITARTRATE AND ACETAMINOPHEN 1 TABLET: 5; 325 TABLET ORAL at 05:46

## 2017-05-09 RX ADMIN — BUSPIRONE HYDROCHLORIDE 5 MG: 5 TABLET ORAL at 08:24

## 2017-05-09 RX ADMIN — DOCUSATE SODIUM 100 MG: 100 CAPSULE, LIQUID FILLED ORAL at 21:20

## 2017-05-09 RX ADMIN — ONDANSETRON 4 MG: 2 INJECTION INTRAMUSCULAR; INTRAVENOUS at 13:59

## 2017-05-09 RX ADMIN — OXYBUTYNIN CHLORIDE 5 MG: 5 TABLET, FILM COATED, EXTENDED RELEASE ORAL at 08:24

## 2017-05-09 RX ADMIN — GABAPENTIN 300 MG: 300 CAPSULE ORAL at 13:59

## 2017-05-09 RX ADMIN — OXYBUTYNIN CHLORIDE 5 MG: 5 TABLET, EXTENDED RELEASE ORAL at 21:20

## 2017-05-09 RX ADMIN — BUSPIRONE HYDROCHLORIDE 5 MG: 5 TABLET ORAL at 21:20

## 2017-05-09 RX ADMIN — CELECOXIB 200 MG: 200 CAPSULE ORAL at 08:24

## 2017-05-09 RX ADMIN — DULOXETINE HYDROCHLORIDE 60 MG: 60 CAPSULE, DELAYED RELEASE ORAL at 08:25

## 2017-05-09 RX ADMIN — Medication 10 ML: at 08:26

## 2017-05-09 RX ADMIN — GABAPENTIN 300 MG: 300 CAPSULE ORAL at 08:24

## 2017-05-09 ASSESSMENT — PAIN SCALES - GENERAL
PAINLEVEL_OUTOF10: 10
PAINLEVEL_OUTOF10: 2
PAINLEVEL_OUTOF10: 10
PAINLEVEL_OUTOF10: 0

## 2017-05-09 ASSESSMENT — ENCOUNTER SYMPTOMS
VOMITING: 0
DIARRHEA: 0
SHORTNESS OF BREATH: 0
COUGH: 0
BLURRED VISION: 0
DOUBLE VISION: 0
NAUSEA: 1

## 2017-05-10 VITALS
TEMPERATURE: 97.9 F | HEIGHT: 63 IN | WEIGHT: 216 LBS | BODY MASS INDEX: 38.27 KG/M2 | OXYGEN SATURATION: 97 % | SYSTOLIC BLOOD PRESSURE: 120 MMHG | HEART RATE: 145 BPM | DIASTOLIC BLOOD PRESSURE: 84 MMHG | RESPIRATION RATE: 14 BRPM

## 2017-05-10 PROCEDURE — 6370000000 HC RX 637 (ALT 250 FOR IP): Performed by: ORTHOPAEDIC SURGERY

## 2017-05-10 PROCEDURE — 2700000000 HC OXYGEN THERAPY PER DAY

## 2017-05-10 RX ORDER — CELECOXIB 200 MG/1
200 CAPSULE ORAL DAILY
Qty: 14 CAPSULE | Refills: 0 | Status: ON HOLD | OUTPATIENT
Start: 2017-05-10 | End: 2018-05-08

## 2017-05-10 RX ORDER — DOCUSATE SODIUM 100 MG/1
100 CAPSULE, LIQUID FILLED ORAL DAILY
Qty: 20 CAPSULE | Refills: 0 | Status: SHIPPED | OUTPATIENT
Start: 2017-05-10 | End: 2020-02-17

## 2017-05-10 RX ORDER — HYDROMORPHONE HYDROCHLORIDE 2 MG/1
TABLET ORAL
Qty: 60 TABLET | Refills: 0 | Status: SHIPPED | OUTPATIENT
Start: 2017-05-10 | End: 2018-05-01

## 2017-05-10 RX ORDER — TRAMADOL HYDROCHLORIDE 50 MG/1
50 TABLET ORAL EVERY 6 HOURS PRN
Qty: 60 TABLET | Refills: 0 | Status: SHIPPED | OUTPATIENT
Start: 2017-05-10 | End: 2017-05-20

## 2017-05-10 RX ORDER — ASPIRIN 81 MG/1
81 TABLET ORAL 2 TIMES DAILY
Qty: 60 TABLET | Refills: 0 | Status: SHIPPED | OUTPATIENT
Start: 2017-05-10 | End: 2018-05-01

## 2017-05-10 RX ADMIN — ASPIRIN 81 MG: 81 TABLET, COATED ORAL at 08:05

## 2017-05-10 RX ADMIN — DULOXETINE HYDROCHLORIDE 60 MG: 60 CAPSULE, DELAYED RELEASE ORAL at 08:04

## 2017-05-10 RX ADMIN — HYDROMORPHONE HYDROCHLORIDE 8 MG: 8 TABLET ORAL at 10:46

## 2017-05-10 RX ADMIN — PANTOPRAZOLE SODIUM 40 MG: 40 TABLET, DELAYED RELEASE ORAL at 05:28

## 2017-05-10 RX ADMIN — BUSPIRONE HYDROCHLORIDE 5 MG: 5 TABLET ORAL at 08:05

## 2017-05-10 RX ADMIN — HYDROCODONE BITARTRATE AND ACETAMINOPHEN 1 TABLET: 5; 325 TABLET ORAL at 01:21

## 2017-05-10 RX ADMIN — DOCUSATE SODIUM 100 MG: 100 CAPSULE, LIQUID FILLED ORAL at 08:05

## 2017-05-10 RX ADMIN — CELECOXIB 200 MG: 200 CAPSULE ORAL at 08:05

## 2017-05-10 RX ADMIN — LEVOTHYROXINE SODIUM 88 MCG: 88 TABLET ORAL at 05:28

## 2017-05-10 RX ADMIN — TOPIRAMATE 50 MG: 25 TABLET ORAL at 08:05

## 2017-05-10 RX ADMIN — GABAPENTIN 300 MG: 300 CAPSULE ORAL at 08:05

## 2017-05-10 ASSESSMENT — ENCOUNTER SYMPTOMS
DIARRHEA: 0
DOUBLE VISION: 0
VOMITING: 0
SHORTNESS OF BREATH: 0
NAUSEA: 1
COUGH: 0
BLURRED VISION: 0

## 2017-05-10 ASSESSMENT — PAIN SCALES - GENERAL
PAINLEVEL_OUTOF10: 0
PAINLEVEL_OUTOF10: 2
PAINLEVEL_OUTOF10: 9
PAINLEVEL_OUTOF10: 4

## 2017-05-14 LAB
ANAEROBIC CULTURE: ABNORMAL
ANAEROBIC CULTURE: NORMAL
ANAEROBIC CULTURE: NORMAL
CULTURE SURGICAL: ABNORMAL
CULTURE SURGICAL: NORMAL
CULTURE SURGICAL: NORMAL
GRAM STAIN RESULT: ABNORMAL
GRAM STAIN RESULT: NORMAL
GRAM STAIN RESULT: NORMAL
ORGANISM: ABNORMAL

## 2017-05-15 ENCOUNTER — HOSPITAL ENCOUNTER (EMERGENCY)
Facility: HOSPITAL | Age: 47
Discharge: HOME | End: 2017-05-15
Attending: EMERGENCY MEDICINE | Admitting: EMERGENCY MEDICINE

## 2017-05-15 ENCOUNTER — APPOINTMENT (OUTPATIENT)
Dept: GENERAL RADIOLOGY | Facility: HOSPITAL | Age: 47
End: 2017-05-15

## 2017-05-15 ENCOUNTER — APPOINTMENT (OUTPATIENT)
Dept: CT IMAGING | Facility: HOSPITAL | Age: 47
End: 2017-05-15

## 2017-05-15 VITALS
DIASTOLIC BLOOD PRESSURE: 78 MMHG | SYSTOLIC BLOOD PRESSURE: 125 MMHG | WEIGHT: 216 LBS | RESPIRATION RATE: 17 BRPM | BODY MASS INDEX: 38.27 KG/M2 | OXYGEN SATURATION: 100 % | HEART RATE: 97 BPM | HEIGHT: 63 IN | TEMPERATURE: 98.6 F

## 2017-05-15 DIAGNOSIS — R00.0 TACHYCARDIA: ICD-10-CM

## 2017-05-15 DIAGNOSIS — R06.00 DYSPNEA, UNSPECIFIED TYPE: Primary | ICD-10-CM

## 2017-05-15 LAB
ALBUMIN SERPL-MCNC: 3.8 G/DL (ref 3.5–5)
ALBUMIN/GLOB SERPL: 1.2 G/DL (ref 1.1–2.5)
ALP SERPL-CCNC: 107 U/L (ref 24–120)
ALT SERPL W P-5'-P-CCNC: 18 U/L (ref 0–54)
ANION GAP SERPL CALCULATED.3IONS-SCNC: 14 MMOL/L (ref 4–13)
AST SERPL-CCNC: 26 U/L (ref 7–45)
BASOPHILS # BLD AUTO: 0.04 10*3/MM3 (ref 0–0.2)
BASOPHILS NFR BLD AUTO: 1.4 % (ref 0–2)
BILIRUB SERPL-MCNC: 0.8 MG/DL (ref 0.1–1)
BUN BLD-MCNC: 17 MG/DL (ref 5–21)
BUN/CREAT SERPL: 12.1 (ref 7–25)
CALCIUM SPEC-SCNC: 9.1 MG/DL (ref 8.4–10.4)
CHLORIDE SERPL-SCNC: 106 MMOL/L (ref 98–110)
CO2 SERPL-SCNC: 23 MMOL/L (ref 24–31)
CREAT BLD-MCNC: 1.4 MG/DL (ref 0.5–1.4)
D DIMER PPP FEU-MCNC: 9.88 MG/L (FEU) (ref 0–0.5)
D-LACTATE SERPL-SCNC: 1.5 MMOL/L (ref 0.5–2)
DEPRECATED RDW RBC AUTO: 48.6 FL (ref 40–54)
EOSINOPHIL # BLD AUTO: 0.15 10*3/MM3 (ref 0–0.7)
EOSINOPHIL NFR BLD AUTO: 5.1 % (ref 0–4)
ERYTHROCYTE [DISTWIDTH] IN BLOOD BY AUTOMATED COUNT: 14.9 % (ref 12–15)
GFR SERPL CREATININE-BSD FRML MDRD: 40 ML/MIN/1.73
GLOBULIN UR ELPH-MCNC: 3.3 GM/DL
GLUCOSE BLD-MCNC: 109 MG/DL (ref 70–100)
HCT VFR BLD AUTO: 28.9 % (ref 37–47)
HGB BLD-MCNC: 9.5 G/DL (ref 12–16)
IMM GRANULOCYTES # BLD: 0.02 10*3/MM3 (ref 0–0.03)
IMM GRANULOCYTES NFR BLD: 0.7 % (ref 0–5)
LYMPHOCYTES # BLD AUTO: 0.47 10*3/MM3 (ref 0.72–4.86)
LYMPHOCYTES NFR BLD AUTO: 15.9 % (ref 15–45)
MCH RBC QN AUTO: 29.9 PG (ref 28–32)
MCHC RBC AUTO-ENTMCNC: 32.9 G/DL (ref 33–36)
MCV RBC AUTO: 90.9 FL (ref 82–98)
MONOCYTES # BLD AUTO: 0.59 10*3/MM3 (ref 0.19–1.3)
MONOCYTES NFR BLD AUTO: 19.9 % (ref 4–12)
NEUTROPHILS # BLD AUTO: 1.69 10*3/MM3 (ref 1.87–8.4)
NEUTROPHILS NFR BLD AUTO: 57 % (ref 39–78)
NT-PROBNP SERPL-MCNC: 216 PG/ML (ref 0–450)
PLATELET # BLD AUTO: 173 10*3/MM3 (ref 130–400)
PMV BLD AUTO: 9 FL (ref 6–12)
POTASSIUM BLD-SCNC: 3.6 MMOL/L (ref 3.5–5.3)
PROT SERPL-MCNC: 7.1 G/DL (ref 6.3–8.7)
RBC # BLD AUTO: 3.18 10*6/MM3 (ref 4.2–5.4)
SODIUM BLD-SCNC: 143 MMOL/L (ref 135–145)
TROPONIN I SERPL-MCNC: 0 NG/ML (ref 0–0.07)
WBC NRBC COR # BLD: 2.96 10*3/MM3 (ref 4.8–10.8)

## 2017-05-15 PROCEDURE — 71010 HC CHEST PA OR AP: CPT

## 2017-05-15 PROCEDURE — 80053 COMPREHEN METABOLIC PANEL: CPT | Performed by: EMERGENCY MEDICINE

## 2017-05-15 PROCEDURE — 0 IOPAMIDOL PER 1 ML: Performed by: EMERGENCY MEDICINE

## 2017-05-15 PROCEDURE — 93010 ELECTROCARDIOGRAM REPORT: CPT | Performed by: INTERNAL MEDICINE

## 2017-05-15 PROCEDURE — 83880 ASSAY OF NATRIURETIC PEPTIDE: CPT | Performed by: EMERGENCY MEDICINE

## 2017-05-15 PROCEDURE — 96361 HYDRATE IV INFUSION ADD-ON: CPT

## 2017-05-15 PROCEDURE — 87040 BLOOD CULTURE FOR BACTERIA: CPT | Performed by: EMERGENCY MEDICINE

## 2017-05-15 PROCEDURE — 85379 FIBRIN DEGRADATION QUANT: CPT | Performed by: EMERGENCY MEDICINE

## 2017-05-15 PROCEDURE — 85025 COMPLETE CBC W/AUTO DIFF WBC: CPT | Performed by: EMERGENCY MEDICINE

## 2017-05-15 PROCEDURE — 25010000002 ONDANSETRON PER 1 MG: Performed by: EMERGENCY MEDICINE

## 2017-05-15 PROCEDURE — 84484 ASSAY OF TROPONIN QUANT: CPT

## 2017-05-15 PROCEDURE — 96374 THER/PROPH/DIAG INJ IV PUSH: CPT

## 2017-05-15 PROCEDURE — 71275 CT ANGIOGRAPHY CHEST: CPT

## 2017-05-15 PROCEDURE — 25010000002 HYDROMORPHONE PER 4 MG: Performed by: EMERGENCY MEDICINE

## 2017-05-15 PROCEDURE — 83605 ASSAY OF LACTIC ACID: CPT | Performed by: EMERGENCY MEDICINE

## 2017-05-15 PROCEDURE — 93005 ELECTROCARDIOGRAM TRACING: CPT | Performed by: EMERGENCY MEDICINE

## 2017-05-15 PROCEDURE — 96375 TX/PRO/DX INJ NEW DRUG ADDON: CPT

## 2017-05-15 PROCEDURE — 99284 EMERGENCY DEPT VISIT MOD MDM: CPT

## 2017-05-15 RX ORDER — TOPIRAMATE 100 MG/1
100 TABLET, FILM COATED ORAL DAILY
COMMUNITY
End: 2018-01-11

## 2017-05-15 RX ORDER — ASPIRIN 81 MG/1
81 TABLET ORAL 2 TIMES DAILY
Status: ON HOLD | COMMUNITY
End: 2017-08-23 | Stop reason: ALTCHOICE

## 2017-05-15 RX ORDER — ONDANSETRON 2 MG/ML
4 INJECTION INTRAMUSCULAR; INTRAVENOUS ONCE
Status: COMPLETED | OUTPATIENT
Start: 2017-05-15 | End: 2017-05-15

## 2017-05-15 RX ORDER — LEVOTHYROXINE SODIUM 88 UG/1
75 TABLET ORAL DAILY
COMMUNITY
End: 2021-06-07 | Stop reason: ALTCHOICE

## 2017-05-15 RX ORDER — TRAMADOL HYDROCHLORIDE 50 MG/1
50 TABLET ORAL EVERY 6 HOURS PRN
COMMUNITY
End: 2017-06-12

## 2017-05-15 RX ORDER — OXYBUTYNIN CHLORIDE 5 MG/1
5 TABLET, EXTENDED RELEASE ORAL NIGHTLY
COMMUNITY
End: 2017-07-27

## 2017-05-15 RX ORDER — TOLTERODINE 4 MG/1
4 CAPSULE, EXTENDED RELEASE ORAL DAILY
Status: ON HOLD | COMMUNITY
End: 2018-01-11 | Stop reason: SDUPTHER

## 2017-05-15 RX ORDER — HYDROCODONE BITARTRATE AND ACETAMINOPHEN 5; 325 MG/1; MG/1
1 TABLET ORAL EVERY 6 HOURS
COMMUNITY
End: 2019-04-26

## 2017-05-15 RX ORDER — HYDROMORPHONE HYDROCHLORIDE 2 MG/1
2 TABLET ORAL EVERY 4 HOURS PRN
COMMUNITY
End: 2017-06-12

## 2017-05-15 RX ORDER — SODIUM CHLORIDE 0.9 % (FLUSH) 0.9 %
10 SYRINGE (ML) INJECTION AS NEEDED
Status: DISCONTINUED | OUTPATIENT
Start: 2017-05-15 | End: 2017-05-15 | Stop reason: HOSPADM

## 2017-05-15 RX ORDER — OXYBUTYNIN CHLORIDE 5 MG/1
5 TABLET ORAL NIGHTLY
COMMUNITY
End: 2017-05-15

## 2017-05-15 RX ADMIN — HYDROMORPHONE HYDROCHLORIDE 1 MG: 1 INJECTION, SOLUTION INTRAMUSCULAR; INTRAVENOUS; SUBCUTANEOUS at 14:48

## 2017-05-15 RX ADMIN — ONDANSETRON 4 MG: 2 INJECTION INTRAMUSCULAR; INTRAVENOUS at 14:48

## 2017-05-15 RX ADMIN — SODIUM CHLORIDE 1000 ML: 9 INJECTION, SOLUTION INTRAVENOUS at 14:02

## 2017-05-15 RX ADMIN — IOPAMIDOL 150 ML: 755 INJECTION, SOLUTION INTRAVENOUS at 16:50

## 2017-05-20 LAB
BACTERIA SPEC AEROBE CULT: NORMAL
BACTERIA SPEC AEROBE CULT: NORMAL

## 2017-06-01 ENCOUNTER — APPOINTMENT (OUTPATIENT)
Dept: GENERAL RADIOLOGY | Facility: HOSPITAL | Age: 47
End: 2017-06-01

## 2017-06-01 ENCOUNTER — HOSPITAL ENCOUNTER (INPATIENT)
Facility: HOSPITAL | Age: 47
LOS: 1 days | Discharge: HOME HEALTH | End: 2017-06-03
Attending: EMERGENCY MEDICINE | Admitting: INTERNAL MEDICINE

## 2017-06-01 DIAGNOSIS — R79.89 ELEVATED SERUM CREATININE: ICD-10-CM

## 2017-06-01 DIAGNOSIS — R10.9 ABDOMINAL PAIN, UNSPECIFIED LOCATION: ICD-10-CM

## 2017-06-01 DIAGNOSIS — R53.1 GENERALIZED WEAKNESS: Primary | ICD-10-CM

## 2017-06-01 DIAGNOSIS — D86.9 SARCOIDOSIS: ICD-10-CM

## 2017-06-01 LAB
ARTERIAL PATENCY WRIST A: ABNORMAL
ATMOSPHERIC PRESS: ABNORMAL MMHG
BASE EXCESS BLDA CALC-SCNC: -1.5 MMOL/L (ref -2–2)
BDY SITE: ABNORMAL
COHGB MFR BLD: 0.1 % (ref 0–5.1)
GAS FLOW AIRWAY: 2 LPM
HCO3 BLDA-SCNC: 19.9 MMOL/L (ref 22–26)
HCT VFR BLD CALC: 32 % (ref 38–51)
HGB BLDA-MCNC: 11 G/DL (ref 12–16)
METHGB BLD QL: 0.3 % (ref 0.4–1.5)
MODALITY: ABNORMAL
OXYHGB MFR BLDV: 98.4 % (ref 94–97)
PCO2 BLDA: 24.2 MM HG (ref 35–45)
PH BLDA: 7.53 PH UNITS (ref 7.35–7.45)
PO2 BLDA: 122.1 MM HG (ref 80–100)
POTASSIUM BLDA-SCNC: 4.08 MMOL/L (ref 3.5–5)
SODIUM BLDA-SCNC: 140.3 MMOL/L (ref 135–145)

## 2017-06-01 PROCEDURE — 71010 HC CHEST PA OR AP: CPT

## 2017-06-01 PROCEDURE — 93005 ELECTROCARDIOGRAM TRACING: CPT

## 2017-06-01 PROCEDURE — 82805 BLOOD GASES W/O2 SATURATION: CPT

## 2017-06-01 PROCEDURE — 93005 ELECTROCARDIOGRAM TRACING: CPT | Performed by: NURSE PRACTITIONER

## 2017-06-01 PROCEDURE — 93010 ELECTROCARDIOGRAM REPORT: CPT | Performed by: INTERNAL MEDICINE

## 2017-06-01 PROCEDURE — 36600 WITHDRAWAL OF ARTERIAL BLOOD: CPT

## 2017-06-01 PROCEDURE — 82375 ASSAY CARBOXYHB QUANT: CPT

## 2017-06-01 PROCEDURE — 99285 EMERGENCY DEPT VISIT HI MDM: CPT

## 2017-06-01 PROCEDURE — 83050 HGB METHEMOGLOBIN QUAN: CPT

## 2017-06-01 RX ORDER — ONDANSETRON 2 MG/ML
4 INJECTION INTRAMUSCULAR; INTRAVENOUS ONCE
Status: COMPLETED | OUTPATIENT
Start: 2017-06-01 | End: 2017-06-02

## 2017-06-02 ENCOUNTER — APPOINTMENT (OUTPATIENT)
Dept: CT IMAGING | Facility: HOSPITAL | Age: 47
End: 2017-06-02

## 2017-06-02 PROBLEM — R53.1 GENERALIZED WEAKNESS: Status: ACTIVE | Noted: 2017-06-02

## 2017-06-02 PROBLEM — N17.9 ARF (ACUTE RENAL FAILURE) (HCC): Status: ACTIVE | Noted: 2017-06-02

## 2017-06-02 PROBLEM — E86.9 VOLUME DEPLETION, GASTROINTESTINAL LOSS: Status: ACTIVE | Noted: 2017-06-02

## 2017-06-02 PROBLEM — A08.4: Status: ACTIVE | Noted: 2017-06-02

## 2017-06-02 LAB
ALBUMIN SERPL-MCNC: 4 G/DL (ref 3.5–5)
ALBUMIN/GLOB SERPL: 1.1 G/DL (ref 1.1–2.5)
ALP SERPL-CCNC: 97 U/L (ref 24–120)
ALT SERPL W P-5'-P-CCNC: 20 U/L (ref 0–54)
AMYLASE SERPL-CCNC: 60 U/L (ref 30–110)
ANION GAP SERPL CALCULATED.3IONS-SCNC: 10 MMOL/L (ref 4–13)
ANION GAP SERPL CALCULATED.3IONS-SCNC: 13 MMOL/L (ref 4–13)
APTT PPP: 38.7 SECONDS (ref 24.1–34.8)
AST SERPL-CCNC: 26 U/L (ref 7–45)
BASOPHILS # BLD AUTO: 0.02 10*3/MM3 (ref 0–0.2)
BASOPHILS NFR BLD AUTO: 0.7 % (ref 0–2)
BILIRUB SERPL-MCNC: 0.9 MG/DL (ref 0.1–1)
BILIRUB UR QL STRIP: NEGATIVE
BUN BLD-MCNC: 15 MG/DL (ref 5–21)
BUN BLD-MCNC: 19 MG/DL (ref 5–21)
BUN/CREAT SERPL: 11.3 (ref 7–25)
BUN/CREAT SERPL: 11.5 (ref 7–25)
CALCIUM SPEC-SCNC: 8.2 MG/DL (ref 8.4–10.4)
CALCIUM SPEC-SCNC: 9.2 MG/DL (ref 8.4–10.4)
CHLORIDE SERPL-SCNC: 107 MMOL/L (ref 98–110)
CHLORIDE SERPL-SCNC: 108 MMOL/L (ref 98–110)
CLARITY UR: CLEAR
CO2 SERPL-SCNC: 22 MMOL/L (ref 24–31)
CO2 SERPL-SCNC: 24 MMOL/L (ref 24–31)
COLOR UR: YELLOW
CREAT BLD-MCNC: 1.3 MG/DL (ref 0.5–1.4)
CREAT BLD-MCNC: 1.68 MG/DL (ref 0.5–1.4)
D-LACTATE SERPL-SCNC: 1.3 MMOL/L (ref 0.5–2)
DEPRECATED RDW RBC AUTO: 49.1 FL (ref 40–54)
EOSINOPHIL # BLD AUTO: 0.2 10*3/MM3 (ref 0–0.7)
EOSINOPHIL NFR BLD AUTO: 7.1 % (ref 0–4)
ERYTHROCYTE [DISTWIDTH] IN BLOOD BY AUTOMATED COUNT: 14.4 % (ref 12–15)
GFR SERPL CREATININE-BSD FRML MDRD: 32 ML/MIN/1.73
GFR SERPL CREATININE-BSD FRML MDRD: 44 ML/MIN/1.73
GLOBULIN UR ELPH-MCNC: 3.5 GM/DL
GLUCOSE BLD-MCNC: 105 MG/DL (ref 70–100)
GLUCOSE BLD-MCNC: 92 MG/DL (ref 70–100)
GLUCOSE UR STRIP-MCNC: NEGATIVE MG/DL
HCT VFR BLD AUTO: 33.5 % (ref 37–47)
HGB BLD-MCNC: 10.7 G/DL (ref 12–16)
HGB UR QL STRIP.AUTO: NEGATIVE
HOLD SPECIMEN: NORMAL
IMM GRANULOCYTES # BLD: 0.01 10*3/MM3 (ref 0–0.03)
IMM GRANULOCYTES NFR BLD: 0.4 % (ref 0–5)
INR PPP: 1.05 (ref 0.91–1.09)
KETONES UR QL STRIP: NEGATIVE
LEUKOCYTE ESTERASE UR QL STRIP.AUTO: NEGATIVE
LIPASE SERPL-CCNC: 205 U/L (ref 23–203)
LYMPHOCYTES # BLD AUTO: 0.42 10*3/MM3 (ref 0.72–4.86)
LYMPHOCYTES NFR BLD AUTO: 14.9 % (ref 15–45)
MCH RBC QN AUTO: 29.6 PG (ref 28–32)
MCHC RBC AUTO-ENTMCNC: 31.9 G/DL (ref 33–36)
MCV RBC AUTO: 92.5 FL (ref 82–98)
MONOCYTES # BLD AUTO: 0.31 10*3/MM3 (ref 0.19–1.3)
MONOCYTES NFR BLD AUTO: 11 % (ref 4–12)
NEUTROPHILS # BLD AUTO: 1.86 10*3/MM3 (ref 1.87–8.4)
NEUTROPHILS NFR BLD AUTO: 65.9 % (ref 39–78)
NITRITE UR QL STRIP: NEGATIVE
PH UR STRIP.AUTO: 8 [PH] (ref 5–8)
PLATELET # BLD AUTO: 131 10*3/MM3 (ref 130–400)
PMV BLD AUTO: 9.2 FL (ref 6–12)
POTASSIUM BLD-SCNC: 4 MMOL/L (ref 3.5–5.3)
POTASSIUM BLD-SCNC: 4.4 MMOL/L (ref 3.5–5.3)
PROCALCITONIN SERPL-MCNC: <0.25 NG/ML
PROT SERPL-MCNC: 7.5 G/DL (ref 6.3–8.7)
PROT UR QL STRIP: ABNORMAL
PROTHROMBIN TIME: 14 SECONDS (ref 11.9–14.6)
RBC # BLD AUTO: 3.62 10*6/MM3 (ref 4.2–5.4)
SODIUM BLD-SCNC: 140 MMOL/L (ref 135–145)
SODIUM BLD-SCNC: 144 MMOL/L (ref 135–145)
SP GR UR STRIP: 1.02 (ref 1–1.03)
TROPONIN I SERPL-MCNC: 0 NG/ML (ref 0–0.07)
TSH SERPL DL<=0.05 MIU/L-ACNC: 3.36 MIU/ML (ref 0.47–4.68)
UROBILINOGEN UR QL STRIP: ABNORMAL
WBC NRBC COR # BLD: 2.82 10*3/MM3 (ref 4.8–10.8)
WHOLE BLOOD HOLD SPECIMEN: NORMAL
WHOLE BLOOD HOLD SPECIMEN: NORMAL

## 2017-06-02 PROCEDURE — 74176 CT ABD & PELVIS W/O CONTRAST: CPT

## 2017-06-02 PROCEDURE — 87040 BLOOD CULTURE FOR BACTERIA: CPT | Performed by: NURSE PRACTITIONER

## 2017-06-02 PROCEDURE — 85025 COMPLETE CBC W/AUTO DIFF WBC: CPT | Performed by: NURSE PRACTITIONER

## 2017-06-02 PROCEDURE — 25010000002 ENOXAPARIN PER 10 MG: Performed by: FAMILY MEDICINE

## 2017-06-02 PROCEDURE — 83605 ASSAY OF LACTIC ACID: CPT | Performed by: NURSE PRACTITIONER

## 2017-06-02 PROCEDURE — 85610 PROTHROMBIN TIME: CPT | Performed by: INTERNAL MEDICINE

## 2017-06-02 PROCEDURE — 84443 ASSAY THYROID STIM HORMONE: CPT | Performed by: NURSE PRACTITIONER

## 2017-06-02 PROCEDURE — 71250 CT THORAX DX C-: CPT

## 2017-06-02 PROCEDURE — 83690 ASSAY OF LIPASE: CPT | Performed by: NURSE PRACTITIONER

## 2017-06-02 PROCEDURE — 25010000002 ONDANSETRON PER 1 MG: Performed by: NURSE PRACTITIONER

## 2017-06-02 PROCEDURE — 80053 COMPREHEN METABOLIC PANEL: CPT | Performed by: NURSE PRACTITIONER

## 2017-06-02 PROCEDURE — 84145 PROCALCITONIN (PCT): CPT | Performed by: NURSE PRACTITIONER

## 2017-06-02 PROCEDURE — 82150 ASSAY OF AMYLASE: CPT | Performed by: NURSE PRACTITIONER

## 2017-06-02 PROCEDURE — 81003 URINALYSIS AUTO W/O SCOPE: CPT | Performed by: INTERNAL MEDICINE

## 2017-06-02 PROCEDURE — 85730 THROMBOPLASTIN TIME PARTIAL: CPT | Performed by: INTERNAL MEDICINE

## 2017-06-02 PROCEDURE — 84484 ASSAY OF TROPONIN QUANT: CPT

## 2017-06-02 RX ORDER — SODIUM CHLORIDE 9 MG/ML
100 INJECTION, SOLUTION INTRAVENOUS CONTINUOUS
Status: DISCONTINUED | OUTPATIENT
Start: 2017-06-02 | End: 2017-06-03 | Stop reason: HOSPADM

## 2017-06-02 RX ORDER — DOCUSATE SODIUM 100 MG/1
100 CAPSULE, LIQUID FILLED ORAL DAILY PRN
Status: DISCONTINUED | OUTPATIENT
Start: 2017-06-02 | End: 2017-06-03 | Stop reason: HOSPADM

## 2017-06-02 RX ORDER — DULOXETIN HYDROCHLORIDE 30 MG/1
60 CAPSULE, DELAYED RELEASE ORAL DAILY
Status: DISCONTINUED | OUTPATIENT
Start: 2017-06-02 | End: 2017-06-03 | Stop reason: HOSPADM

## 2017-06-02 RX ORDER — OXYBUTYNIN CHLORIDE 5 MG/1
5 TABLET, EXTENDED RELEASE ORAL DAILY
Status: DISCONTINUED | OUTPATIENT
Start: 2017-06-02 | End: 2017-06-02

## 2017-06-02 RX ORDER — ASPIRIN 81 MG/1
81 TABLET ORAL 2 TIMES DAILY
Status: DISCONTINUED | OUTPATIENT
Start: 2017-06-02 | End: 2017-06-03 | Stop reason: HOSPADM

## 2017-06-02 RX ORDER — IPRATROPIUM BROMIDE AND ALBUTEROL SULFATE 2.5; .5 MG/3ML; MG/3ML
3 SOLUTION RESPIRATORY (INHALATION) EVERY 4 HOURS PRN
Status: DISCONTINUED | OUTPATIENT
Start: 2017-06-02 | End: 2017-06-03 | Stop reason: HOSPADM

## 2017-06-02 RX ORDER — HYDROMORPHONE HYDROCHLORIDE 2 MG/1
4 TABLET ORAL EVERY 4 HOURS PRN
Status: DISCONTINUED | OUTPATIENT
Start: 2017-06-02 | End: 2017-06-02

## 2017-06-02 RX ORDER — TRAMADOL HYDROCHLORIDE 50 MG/1
50 TABLET ORAL EVERY 6 HOURS PRN
Status: DISCONTINUED | OUTPATIENT
Start: 2017-06-02 | End: 2017-06-03 | Stop reason: HOSPADM

## 2017-06-02 RX ORDER — LANOLIN ALCOHOL/MO/W.PET/CERES
400 CREAM (GRAM) TOPICAL DAILY
COMMUNITY
End: 2017-09-07

## 2017-06-02 RX ORDER — OXYBUTYNIN CHLORIDE 5 MG/1
5 TABLET, EXTENDED RELEASE ORAL NIGHTLY
Status: DISCONTINUED | OUTPATIENT
Start: 2017-06-02 | End: 2017-06-02

## 2017-06-02 RX ORDER — TOPIRAMATE 100 MG/1
100 TABLET, FILM COATED ORAL NIGHTLY
Status: DISCONTINUED | OUTPATIENT
Start: 2017-06-02 | End: 2017-06-03 | Stop reason: HOSPADM

## 2017-06-02 RX ORDER — GABAPENTIN 300 MG/1
300 CAPSULE ORAL 3 TIMES DAILY
Status: DISCONTINUED | OUTPATIENT
Start: 2017-06-02 | End: 2017-06-03 | Stop reason: HOSPADM

## 2017-06-02 RX ORDER — LINEZOLID 2 MG/ML
600 INJECTION, SOLUTION INTRAVENOUS EVERY 12 HOURS
Status: DISCONTINUED | OUTPATIENT
Start: 2017-06-02 | End: 2017-06-02

## 2017-06-02 RX ORDER — ONDANSETRON 2 MG/ML
4 INJECTION INTRAMUSCULAR; INTRAVENOUS EVERY 6 HOURS PRN
Status: DISCONTINUED | OUTPATIENT
Start: 2017-06-02 | End: 2017-06-03 | Stop reason: HOSPADM

## 2017-06-02 RX ORDER — TOPIRAMATE 25 MG/1
50 TABLET ORAL DAILY
Status: DISCONTINUED | OUTPATIENT
Start: 2017-06-02 | End: 2017-06-03 | Stop reason: HOSPADM

## 2017-06-02 RX ORDER — PANTOPRAZOLE SODIUM 40 MG/10ML
40 INJECTION, POWDER, LYOPHILIZED, FOR SOLUTION INTRAVENOUS
Status: DISCONTINUED | OUTPATIENT
Start: 2017-06-02 | End: 2017-06-03 | Stop reason: HOSPADM

## 2017-06-02 RX ORDER — HYDROMORPHONE HYDROCHLORIDE 2 MG/1
2 TABLET ORAL EVERY 4 HOURS PRN
Status: DISCONTINUED | OUTPATIENT
Start: 2017-06-02 | End: 2017-06-02

## 2017-06-02 RX ORDER — BUSPIRONE HYDROCHLORIDE 5 MG/1
5 TABLET ORAL DAILY PRN
Status: DISCONTINUED | OUTPATIENT
Start: 2017-06-02 | End: 2017-06-02

## 2017-06-02 RX ORDER — HYDROCODONE BITARTRATE AND ACETAMINOPHEN 5; 325 MG/1; MG/1
1 TABLET ORAL EVERY 6 HOURS PRN
Status: DISCONTINUED | OUTPATIENT
Start: 2017-06-02 | End: 2017-06-03 | Stop reason: HOSPADM

## 2017-06-02 RX ORDER — LEVOTHYROXINE SODIUM 88 UG/1
88 TABLET ORAL DAILY
Status: DISCONTINUED | OUTPATIENT
Start: 2017-06-02 | End: 2017-06-03 | Stop reason: HOSPADM

## 2017-06-02 RX ADMIN — TOPIRAMATE 50 MG: 25 TABLET, FILM COATED ORAL at 09:51

## 2017-06-02 RX ADMIN — PANTOPRAZOLE SODIUM 40 MG: 40 INJECTION, POWDER, FOR SOLUTION INTRAVENOUS at 06:41

## 2017-06-02 RX ADMIN — GABAPENTIN 300 MG: 300 CAPSULE ORAL at 22:16

## 2017-06-02 RX ADMIN — HYDROCODONE BITARTRATE AND ACETAMINOPHEN 1 TABLET: 5; 325 TABLET ORAL at 07:53

## 2017-06-02 RX ADMIN — ERTAPENEM SODIUM 1 G: 1 INJECTION, POWDER, LYOPHILIZED, FOR SOLUTION INTRAMUSCULAR; INTRAVENOUS at 06:11

## 2017-06-02 RX ADMIN — ASPIRIN 81 MG: 81 TABLET ORAL at 09:51

## 2017-06-02 RX ADMIN — LEVOTHYROXINE SODIUM 88 MCG: 88 TABLET ORAL at 09:51

## 2017-06-02 RX ADMIN — TOPIRAMATE 100 MG: 100 TABLET, FILM COATED ORAL at 22:16

## 2017-06-02 RX ADMIN — DULOXETINE HYDROCHLORIDE 60 MG: 30 CAPSULE, DELAYED RELEASE ORAL at 09:51

## 2017-06-02 RX ADMIN — GABAPENTIN 300 MG: 300 CAPSULE ORAL at 16:15

## 2017-06-02 RX ADMIN — ONDANSETRON 4 MG: 2 INJECTION INTRAMUSCULAR; INTRAVENOUS at 00:34

## 2017-06-02 RX ADMIN — SODIUM CHLORIDE 100 ML/HR: 9 INJECTION, SOLUTION INTRAVENOUS at 06:11

## 2017-06-02 RX ADMIN — SODIUM CHLORIDE 100 ML/HR: 9 INJECTION, SOLUTION INTRAVENOUS at 17:25

## 2017-06-02 RX ADMIN — SODIUM CHLORIDE 1000 ML: 9 INJECTION, SOLUTION INTRAVENOUS at 00:34

## 2017-06-02 RX ADMIN — GABAPENTIN 300 MG: 300 CAPSULE ORAL at 09:51

## 2017-06-02 RX ADMIN — ASPIRIN 81 MG: 81 TABLET ORAL at 17:25

## 2017-06-02 RX ADMIN — ENOXAPARIN SODIUM 40 MG: 40 INJECTION SUBCUTANEOUS at 16:15

## 2017-06-02 NOTE — PLAN OF CARE
Problem: Patient Care Overview (Adult)  Goal: Plan of Care Review  Outcome: Ongoing (interventions implemented as appropriate)    06/02/17 5326   Coping/Psychosocial Response Interventions   Plan Of Care Reviewed With patient   Patient Care Overview   Progress no change       Goal: Adult Individualization and Mutuality  Outcome: Ongoing (interventions implemented as appropriate)  Goal: Discharge Needs Assessment  Outcome: Ongoing (interventions implemented as appropriate)    Problem: Fall Risk (Adult)  Goal: Absence of Falls  Outcome: Ongoing (interventions implemented as appropriate)    Problem: Mobility, Physical Impaired (Adult)  Goal: Enhanced Mobility Skills  Outcome: Ongoing (interventions implemented as appropriate)  Goal: Enhanced Functionality Ability  Outcome: Ongoing (interventions implemented as appropriate)

## 2017-06-02 NOTE — ED PROVIDER NOTES
Subjective   HPI Comments: Patient is a 47-year-old female with history of sarcoidosis for which she takes methotrexate 2.5 mg once weekly.  Patient reportedly has been having generalized weakness, shortness of breath, nausea and abdominal pain for last 2 weeks but this became worse on Thursday (06/01/17).      History provided by:  Patient (Patient's sister)  History limited by:  Acuity of condition      Review of Systems   HENT: Negative.    Eyes: Negative.    Respiratory: Positive for shortness of breath.    Cardiovascular: Negative.    Gastrointestinal: Positive for abdominal pain and nausea. Negative for diarrhea and vomiting.   Endocrine: Negative.    Genitourinary: Negative.    Musculoskeletal: Negative.    Skin: Negative.    Allergic/Immunologic: Negative.    Neurological: Positive for weakness.   Hematological: Negative.    Psychiatric/Behavioral: Negative.    All other systems reviewed and are negative.      Past Medical History:   Diagnosis Date   • Chronic pain    • Degeneration of intervertebral disc of high cervical region    • Hypertension    • Hyperthyroidism    • Irritable bowel syndrome    • Macular degeneration    • PFO (patent foramen ovale) 09/2016   • PSVT (paroxysmal supraventricular tachycardia)     s/p ablation per Dr. Diallo 4/2016   • Restless leg syndrome    • Sarcoidosis    • Scoliosis        Allergies   Allergen Reactions   • Tape        Past Surgical History:   Procedure Laterality Date   • CARDIAC ABLATION  04/2016    SVT; Dr. Diallo    • CHOLECYSTECTOMY     • HYSTERECTOMY     • KIDNEY STONE SURGERY     • KNEE ARTHROSCOPY     • LATERAL EPICONDYLE RELEASE     • PATELLA SURGERY     • REPLACEMENT TOTAL KNEE     • SHOULDER ARTHROSCOPY     • ULNAR NERVE DECOMPRESSION         Family History   Problem Relation Age of Onset   • Arrhythmia Mother    • Heart disease Mother    • Heart disease Father    • Heart disease Brother    • Heart disease Maternal Aunt    • Heart disease Maternal Uncle    •  Heart disease Paternal Aunt    • Heart disease Paternal Uncle    • Heart disease Maternal Grandmother    • Heart disease Maternal Grandfather    • Heart disease Paternal Grandmother    • Heart disease Paternal Grandfather        Social History     Social History   • Marital status: Single     Spouse name: N/A   • Number of children: N/A   • Years of education: N/A     Social History Main Topics   • Smoking status: Never Smoker   • Smokeless tobacco: None   • Alcohol use No   • Drug use: No   • Sexual activity: Defer     Other Topics Concern   • None     Social History Narrative           Objective   Physical Exam   Constitutional: She is oriented to person, place, and time.   Female that appears moderately ill.   HENT:   Head: Normocephalic and atraumatic.   Right Ear: External ear normal.   Left Ear: External ear normal.   Nose: Nose normal.   Mouth/Throat: Oropharynx is clear and moist.   Eyes: Conjunctivae and EOM are normal. Pupils are equal, round, and reactive to light. Right eye exhibits no discharge. Left eye exhibits no discharge.   Neck: Normal range of motion. Neck supple. No tracheal deviation present. No thyromegaly present.   Cardiovascular: Normal rate, regular rhythm, normal heart sounds and intact distal pulses.    No murmur heard.  Pulmonary/Chest: Effort normal and breath sounds normal. No respiratory distress. She exhibits no tenderness.   Abdominal: Soft. She exhibits no distension. There is tenderness (Mild generalized abdominal pain to palpation.).   Musculoskeletal: She exhibits no edema, tenderness or deformity.   Moderate generalized weakness.   Neurological: She is alert and oriented to person, place, and time. No cranial nerve deficit.   Skin: Skin is warm and dry. No erythema. No pallor.   Psychiatric: She has a normal mood and affect. Judgment normal.   Nursing note and vitals reviewed.      Procedures         ED Course  ED Course   Comment By Time   Reviewed pt and pt care plan with   ida- also in agreement with pt care plan. Pending labs and ct scan at this time. Care of pt transferred to dr prieto at this time Naty Woodward, APRN 06/01 2441   Patient's case has just been discussed with Dr. Tania Tapia (hospitalist) and she agrees to admit the patient for observation. Lester Prieto MD 06/02 0412                  MDM  Number of Diagnoses or Management Options  Abdominal pain, unspecified location: new and requires workup  Elevated serum creatinine:   Generalized weakness: new and requires workup  Sarcoidosis: established and worsening     Amount and/or Complexity of Data Reviewed  Clinical lab tests: reviewed  Discuss the patient with other providers: yes    Risk of Complications, Morbidity, and/or Mortality  Presenting problems: moderate  Diagnostic procedures: moderate  Management options: low    Patient Progress  Patient progress: stable      Final diagnoses:   Generalized weakness   Sarcoidosis   Abdominal pain, unspecified location   Elevated serum creatinine            Lester Prieto MD  06/06/17 9500

## 2017-06-02 NOTE — PROGRESS NOTES
Discharge Planning Assessment  [facility]     Patient Name: Stacy Lynn  MRN: [id number]  Today's Date: 6/2/2017    Admit Date: 6/1/2017          Discharge Needs Assessment       06/02/17 1541    Living Environment    Lives With sibling(s)    Transportation Available family or friend will provide;car    Living Environment    Quality Of Family Relationships helpful    Able to Return to Prior Living Arrangements yes    Discharge Needs Assessment    Concerns To Be Addressed no discharge needs identified;denies needs/concerns at this time    Equipment Currently Used at Home walker, rolling    Discharge Planning Comments RN consulted SW for weakness (as previously documented). SW discussed this with MD and he does not feel need for PT evaluation. Patient plans home at discharge as previous. No discharge planning needs identified.             Discharge Plan       06/02/17 1539    Case Management/Social Work Plan    Plan Home Tomorrow per MD    Additional Comments SW spoke to Dr. Lindsay re patient and potential need for PT eval. Dr. Lindsay says patient does not need and plan is home tomorrow.         Discharge Placement     No information found                Demographic Summary     None            Functional Status     None            Psychosocial     None            Abuse/Neglect     None            Legal     None            Substance Abuse     None            Patient Forms     None          JASON GoldW

## 2017-06-02 NOTE — PLAN OF CARE
Problem: Patient Care Overview (Adult)  Goal: Plan of Care Review  Outcome: Ongoing (interventions implemented as appropriate)    06/02/17 0526   Coping/Psychosocial Response Interventions   Plan Of Care Reviewed With patient   Patient Care Overview   Progress no change   Outcome Evaluation   Outcome Summary/Follow up Plan admitted with generalized weakness and abdominal pain. reports history of sarcoidosis. recent right knee by Dr. Pathak at [facility]. Incision healed. O2 at 2L- patient states she wears at home. BLE edema- patient states baseline. A&O       Goal: Adult Individualization and Mutuality  Outcome: Ongoing (interventions implemented as appropriate)    06/02/17 0526   Individualization   Patient Specific Preferences patient prefers the door open          Problem: Fall Risk (Adult)  Goal: Identify Related Risk Factors and Signs and Symptoms  Outcome: Outcome(s) achieved Date Met:  06/02/17  Goal: Absence of Falls  Outcome: Ongoing (interventions implemented as appropriate)    Problem: Mobility, Physical Impaired (Adult)  Goal: Identify Related Risk Factors and Signs and Symptoms  Outcome: Outcome(s) achieved Date Met:  06/02/17  Goal: Enhanced Mobility Skills  Outcome: Ongoing (interventions implemented as appropriate)  Goal: Enhanced Functionality Ability  Outcome: Ongoing (interventions implemented as appropriate)

## 2017-06-02 NOTE — PROGRESS NOTES
Continued Stay Note   [facility]     Patient Name: Stacy Lynn  MRN: [id number]  Today's Date: 6/2/2017    Admit Date: 6/1/2017          Discharge Plan       06/02/17 1618    Case Management/Social Work Plan    Plan [facility]    Final Note    Final Note SW received a call from Yasmin Salinas with [facility] to notify that patient is current with them. Please notify [facility] at time of discharge at [phone]. Discharge summary to be faxed to [phone].            RUKHSANA Gold

## 2017-06-02 NOTE — ED PROVIDER NOTES
Subjective   HPI Comments: Patient is a 47-year-old white female presents with generalized weakness, dyspnea, abdominal pain, nausea and decreased appetite for about the past week.  She states states that she feels much worse today.  She is having some abdominal pain as well.  She denies any fever or chills. Denies chest pain     Patient is a 47 y.o. female presenting with general illness.   History provided by:  Patient and relative   used: No    Illness       Review of Systems   Constitutional: Negative.    HENT: Negative.    Eyes: Negative.    Respiratory:        Pt complains of generalized weakness and fatigue for the past several weeks. She states that she feels much worse today. Pt had knee surgery on 05/12/2017 per dr justice and family states that she has not felt well since. She was seen here on [id number] with dyspnea and weakness and had negative cta of chest. She states that she also started methotrexate 5 days ago for sarcoidosis. She states that was her first dose and she is to take it weekly. She denies any fever or chills.    Cardiovascular: Negative.    Gastrointestinal:        Pt presents with generalized abd pain and nausea for the past week. She denies any fever or chills. No diarrhea.    Endocrine: Negative.    Genitourinary: Negative.    Musculoskeletal: Negative.    Skin: Negative.    Allergic/Immunologic: Negative.    Neurological: Negative.    Hematological: Negative.    Psychiatric/Behavioral: Negative.    All other systems reviewed and are negative.      Past Medical History:   Diagnosis Date   • Chronic pain    • Degeneration of intervertebral disc of high cervical region    • Hypertension    • Hyperthyroidism    • Irritable bowel syndrome    • Macular degeneration    • PFO (patent foramen ovale) 09/2016   • PSVT (paroxysmal supraventricular tachycardia)     s/p ablation per Dr. Diallo 4/2016   • Restless leg syndrome    • Sarcoidosis    • Scoliosis        Allergies    Allergen Reactions   • Tape        Past Surgical History:   Procedure Laterality Date   • CARDIAC ABLATION  04/2016    SVT; Dr. Diallo    • CHOLECYSTECTOMY     • HYSTERECTOMY     • KIDNEY STONE SURGERY     • KNEE ARTHROSCOPY     • LATERAL EPICONDYLE RELEASE     • PATELLA SURGERY     • REPLACEMENT TOTAL KNEE     • SHOULDER ARTHROSCOPY     • ULNAR NERVE DECOMPRESSION         Family History   Problem Relation Age of Onset   • Arrhythmia Mother    • Heart disease Mother    • Heart disease Father    • Heart disease Brother    • Heart disease Maternal Aunt    • Heart disease Maternal Uncle    • Heart disease Paternal Aunt    • Heart disease Paternal Uncle    • Heart disease Maternal Grandmother    • Heart disease Maternal Grandfather    • Heart disease Paternal Grandmother    • Heart disease Paternal Grandfather        Social History     Social History   • Marital status: Single     Spouse name: N/A   • Number of children: N/A   • Years of education: N/A     Social History Main Topics   • Smoking status: Never Smoker   • Smokeless tobacco: None   • Alcohol use No   • Drug use: No   • Sexual activity: Defer     Other Topics Concern   • None     Social History Narrative       Prior to Admission medications    Medication Sig Start Date End Date Taking? Authorizing Provider   aspirin 81 MG EC tablet Take 81 mg by mouth 2 (Two) Times a Day.    Historical Provider, MD   busPIRone (BUSPAR) 5 MG tablet Take 1 tablet by mouth Daily As Needed (anxiety).    Historical Provider, MD   docusate sodium (COLACE) 100 MG capsule Take 1 capsule by mouth daily as needed for constipation.    Historical Provider, MD   DULoxetine (CYMBALTA) 60 MG capsule Take 1 capsule by mouth daily.    Historical Provider, MD   esomeprazole (NexIUM) 40 MG capsule Take 1 capsule by mouth daily.    Historical Provider, MD   gabapentin (NEURONTIN) 300 MG capsule Take 1 capsule by mouth 3 (Three) Times a Day.    Historical Provider, MD  "  HYDROcodone-acetaminophen (NORCO) 5-325 MG per tablet Take 1 tablet by mouth Every 6 (Six) Hours As Needed for Moderate Pain (4-6). 1 to 2 tablets every 4 to 6 hours    Historical Provider, MD   HYDROmorphone (DILAUDID) 2 MG tablet Take 4 mg by mouth Every 4 (Four) Hours As Needed for Moderate Pain (4-6). 2 or 4 tablets every 4 to 6 hours    Historical Provider, MD   levothyroxine (SYNTHROID, LEVOTHROID) 88 MCG tablet Take 88 mcg by mouth Daily.    Historical Provider, MD   oxybutynin XL (DITROPAN-XL) 5 MG 24 hr tablet Take 5 mg by mouth Every Night.    Historical Provider, MD   tolterodine LA (DETROL LA) 4 MG 24 hr capsule Take 4 mg by mouth Daily.    Historical Provider, MD   topiramate (TOPAMAX) 100 MG tablet Take 100 mg by mouth Every Night.    Historical Provider, MD   topiramate (TOPAMAX) 50 MG tablet Take 1 tablet by mouth Daily.    Historical Provider, MD   traMADol (ULTRAM) 50 MG tablet Take 50 mg by mouth Every 6 (Six) Hours As Needed for Moderate Pain (4-6).    Historical Provider, MD       /53 (BP Location: Left arm, Patient Position: Lying)  Pulse 81  Temp 98.3 °F (36.8 °C) (Oral)   Resp 16  Ht 63\" (160 cm)  Wt 205 lb 12.8 oz (93.4 kg)  SpO2 95%  BMI 36.46 kg/m2    Objective   Physical Exam   Constitutional: She is oriented to person, place, and time. She appears well-developed and well-nourished.   Chronically ill appearing    HENT:   Head: Normocephalic and atraumatic.   Right Ear: External ear normal.   Left Ear: External ear normal.   Nose: Nose normal.   Mouth/Throat: Oropharynx is clear and moist.   Eyes: Conjunctivae and EOM are normal. Pupils are equal, round, and reactive to light.   Neck: Normal range of motion. Neck supple. No tracheal deviation present. No thyromegaly present.   Cardiovascular: Normal rate, regular rhythm, normal heart sounds and intact distal pulses.    Pulmonary/Chest: Effort normal and breath sounds normal. No respiratory distress. She has no wheezes. She " has no rales. She exhibits no tenderness.   Abdominal: Soft. Bowel sounds are normal.   Generalized abd tenderness noted. No guarding or rebound    Musculoskeletal: Normal range of motion.   Neurological: She is alert and oriented to person, place, and time. She has normal reflexes. No cranial nerve deficit.   Skin: Skin is warm and dry.   Sl pallor noted    Psychiatric: She has a normal mood and affect. Her behavior is normal. Judgment and thought content normal.   Nursing note and vitals reviewed.      Procedures         Lab Results (last 24 hours)     ** No results found for the last 24 hours. **          CT Abdomen Pelvis Without Contrast   Final Result   1. No CT evidence of acute intra-abdominal/pelvic pathological process.   2. Stable low-attenuation hepatic lesions.   3. Centrally located nonobstructing calculus lower pole left kidney.   4. Subcentimeter lower lobe pulmonary nodules.   5. Scoliotic change of the lumbar spine with spondylosis.    6. Splenomegaly.   This report was finalized on 06/02/2017 09:18 by Dr. Nick Sandoval MD.      CT Chest Without Contrast   Final Result   1. Hilar and mediastinal lymphadenopathy appears relatively stable but   better seen on previous studies with contrast.   2. Scattered tiny pulmonary nodules measuring up to 4 mm appear   relatively stable.   3. Splenomegaly. Prior cholecystectomy.   This report was finalized on 06/02/2017 08:55 by Dr. Rohan Blackmon MD.      XR Chest 1 View   Final Result   1. No acute cardiopulmonary process.       This report was finalized on 06/02/2017 07:44 by Dr. Nick Sandoval MD.          ED Course  ED Course   Comment By Time   Reviewed pt and pt care plan with dr prieto- also in agreement with pt care plan. Pending labs and ct scan at this time. Care of pt transferred to dr prieto at this time Naty Woodward, APRN 06/01 8950   Patient's case has just been discussed with Dr. Tania Tapia (hospitalist) and she agrees to admit the patient  for observation. Lester Mercado MD 06/02 0411          MDM  Number of Diagnoses or Management Options  Abdominal pain, unspecified location:   Elevated serum creatinine:   Generalized weakness:   Sarcoidosis:       Final diagnoses:   Generalized weakness   Sarcoidosis   Abdominal pain, unspecified location   Elevated serum creatinine          Naty Woodward, APRN  06/05/17 5856

## 2017-06-02 NOTE — PROGRESS NOTES
"Received consult from Domi Murray RN for \"weakness\". After reading through patient's chart, patient is clearly weak due to medical illness. However, if MD feels necessary, a PT/OT consult may be appropriate. SW cannot make any recommendations at this point just for \"weakness\".   "

## 2017-06-02 NOTE — PROGRESS NOTES
[facility] Medicine Services  INPATIENT PROGRESS NOTE    Length of Stay: 0  Date of Admission: 6/1/2017  Primary Care Physician: Elliott Waters DO    Subjective     Chief Complaint:     Generalized weakness and diarrhea prior to admission    HPI     The patient has had no further diarrhea since admission.  She is generally tired and has some generalized muscle aches.  Her abdominal pain has significantly reduced.  CBC and overall clinical constellation of symptoms are consistent with viral illness.  The patient denies right knee pain, erythema and swelling.       Review of Systems   Constitutional: Positive for activity change and appetite change.   HENT: Negative.    Eyes: Negative.    Respiratory: Negative.    Cardiovascular: Negative.    Gastrointestinal: Positive for abdominal pain and diarrhea.   Endocrine: Negative.    Genitourinary: Negative.    Musculoskeletal: Positive for arthralgias.   Skin: Negative.    Allergic/Immunologic: Negative.    Neurological: Negative.    Hematological: Negative.    Psychiatric/Behavioral: Negative.           All pertinent negatives and positives are as above. All other systems have been reviewed and are negative unless otherwise stated.     Objective    Temp:  [97.7 °F (36.5 °C)-98.9 °F (37.2 °C)] 97.9 °F (36.6 °C)  Heart Rate:  [] 79  Resp:  [16-22] 16  BP: (110-129)/(42-81) 115/68    Lab Results (last 24 hours)     Procedure Component Value Units Date/Time    Blood Gas, Arterial With Co-Ox [[id number]]  (Abnormal) Collected:  06/01/17 2353    Specimen:  Arterial Blood Updated:  06/01/17 2355     Site Arterial: left radial     Koby's Test --      Documented in Rapid Comm        pH, Arterial 7.533 (H) pH units      pCO2, Arterial 24.2 (L) mm Hg      pO2, Arterial 122.1 (H) mm Hg      HCO3, Arterial 19.9 (L) mmol/L      Base Excess, Arterial -1.5 mmol/L      Hemoglobin, Blood Gas 11.0 (L) g/dL      Hematocrit, Blood Gas 32.0 (L) %       Oxyhemoglobin 98.4 (H) %      Methemoglobin 0.3 (L) %      Carboxyhemoglobin 0.1 %      Sodium, Arterial 140.3 mmol/L      Potassium, Arterial 4.08 mmol/L      Barometric Pressure for Blood Gas -- mmHg       Component not reported at this site.        Modality Cannula     Flow Rate 2.00 lpm     CBC Auto Differential [[id number]]  (Abnormal) Collected:  06/02/17 0017    Specimen:  Blood Updated:  06/02/17 0027     WBC 2.82 (L) 10*3/mm3      RBC 3.62 (L) 10*6/mm3      Hemoglobin 10.7 (L) g/dL      Hematocrit 33.5 (L) %      MCV 92.5 fL      MCH 29.6 pg      MCHC 31.9 (L) g/dL      RDW 14.4 %      RDW-SD 49.1 fl      MPV 9.2 fL      Platelets 131 10*3/mm3      Neutrophil % 65.9 %      Lymphocyte % 14.9 (L) %      Monocyte % 11.0 %      Eosinophil % 7.1 (H) %      Basophil % 0.7 %      Immature Grans % 0.4 %      Neutrophils, Absolute 1.86 (L) 10*3/mm3      Lymphocytes, Absolute 0.42 (L) 10*3/mm3      Monocytes, Absolute 0.31 10*3/mm3      Eosinophils, Absolute 0.20 10*3/mm3      Basophils, Absolute 0.02 10*3/mm3      Immature Grans, Absolute 0.01 10*3/mm3     Lactic Acid, Plasma [[id number]]  (Normal) Collected:  06/02/17 0017    Specimen:  Blood Updated:  06/02/17 0035     Lactate 1.3 mmol/L     Comprehensive Metabolic Panel [[id number]]  (Abnormal) Collected:  06/02/17 0017    Specimen:  Blood Updated:  06/02/17 0037     Glucose 105 (H) mg/dL      BUN 19 mg/dL      Creatinine 1.68 (H) mg/dL      Sodium 144 mmol/L      Potassium 4.4 mmol/L      Chloride 107 mmol/L      CO2 24.0 mmol/L      Calcium 9.2 mg/dL      Total Protein 7.5 g/dL      Albumin 4.00 g/dL      ALT (SGPT) 20 U/L      AST (SGOT) 26 U/L      Alkaline Phosphatase 97 U/L      Total Bilirubin 0.9 mg/dL      eGFR Non African Amer 32 (L) mL/min/1.73      Globulin 3.5 gm/dL      A/G Ratio 1.1 g/dL      BUN/Creatinine Ratio 11.3     Anion Gap 13.0 mmol/L     Amylase [[id number]]  (Normal) Collected:  06/02/17 0017    Specimen:  Blood Updated:  06/02/17 0037      Amylase 60 U/L     Lipase [[id number]]  (Abnormal) Collected:  06/02/17 0017    Specimen:  Blood Updated:  06/02/17 0037     Lipase 205 (H) U/L     POC Troponin, Rapid [[id number]]  (Normal) Collected:  06/02/17 0036    Specimen:  Blood Updated:  06/02/17 0050     Troponin I 0.00 ng/mL       Serial Number: [id number]    : [id number]       TSH [[id number]]  (Normal) Collected:  06/02/17 0017    Specimen:  Blood Updated:  06/02/17 0107     TSH 3.360 mIU/mL     Procalcitonin [[id number]]  (Normal) Collected:  06/02/17 0017    Specimen:  Blood Updated:  06/02/17 0111     Procalcitonin <0.25 ng/mL     Narrative:       Protime-INR [[id number]]  (Normal) Collected:  06/02/17 0534    Specimen:  Blood Updated:  06/02/17 0551     Protime 14.0 Seconds      INR 1.05    aPTT [[id number]]  (Abnormal) Collected:  06/02/17 0534    Specimen:  Blood Updated:  06/02/17 0551     PTT 38.7 (H) seconds     Urinalysis With / Culture If Indicated [[id number]]  (Abnormal) Collected:  06/02/17 1218    Specimen:  Urine from Urine, Clean Catch Updated:  06/02/17 1234     Color, UA Yellow     Appearance, UA Clear     pH, UA 8.0     Specific Gravity, UA 1.020     Glucose, UA Negative     Ketones, UA Negative     Bilirubin, UA Negative     Blood, UA Negative     Protein, UA Trace (A)     Leuk Esterase, UA Negative     Nitrite, UA Negative     Urobilinogen, UA 1.0 E.U./dL    Narrative:       Urine microscopic not indicated.    Blood Culture [[id number]]  (Normal) Collected:  06/02/17 0018    Specimen:  Blood from Blood, Venous Line Updated:  06/02/17 1301     Blood Culture No growth at less than 24 hours    Blood Culture [[id number]]  (Normal) Collected:  06/02/17 0028    Specimen:  Blood from Arm, Left Updated:  06/02/17 1301     Blood Culture No growth at less than 24 hours          Imaging Results (last 24 hours)     Procedure Component Value Units Date/Time    XR Chest 1 View [[id number]] Collected:  06/02/17 0709     Updated:  06/02/17 0747     Narrative:       EXAMINATION: XR CHEST 1 VW-. 6/2/2017 7:02 AM CDT     CHEST, ONE VIEW:     HISTORY: Dyspnea and weakness     COMPARISON: 05/15/2017     A single frontal chest radiograph was obtained.     FINDINGS:     The level of inspiration is shallow. The lungs are clear without  consolidating infiltrates.     The heart is normal in size, pulmonary circulation appropriate, without  heart failure.     The bony structures are intact.                                  Impression:       1. No acute cardiopulmonary process.     This report was finalized on 06/02/2017 07:44 by Dr. Nick Sandoval MD.    CT Chest Without Contrast [[id number]] Collected:  06/02/17 0800     Updated:  06/02/17 0859    Narrative:       EXAMINATION:  CT CHEST WO CONTRAST-  6/2/2017 4:39 CST     HISTORY: Shortness of breath. Sarcoidosis.     COMPARISON: 05/15/2017.     DLP: 807 mGy-cm. Automated dosage control was utilized.     TECHNIQUE: Spiral CT was performed of the chest without contrast.  Multiplanar images were reconstructed.     FINDINGS: There is hilar and mediastinal lymphadenopathy seen better on  the previous study with contrast. I doubt that there is significant  change. There is no parenchymal consolidation or pulmonary fibrosis.  There are scattered small pulmonary nodules measuring up to 4 mm in  size. These appear relatively stable compared back to 12/15/2015. There  are scattered cysts in the liver. There is splenomegaly. There has been  prior cholecystectomy.       Impression:       1. Hilar and mediastinal lymphadenopathy appears relatively stable but  better seen on previous studies with contrast.  2. Scattered tiny pulmonary nodules measuring up to 4 mm appear  relatively stable.  3. Splenomegaly. Prior cholecystectomy.  This report was finalized on 06/02/2017 08:55 by Dr. Rohan Blackmon MD.    CT Abdomen Pelvis Without Contrast [[id number]] Collected:  06/02/17 0758     Updated:  06/02/17 0921    Narrative:        EXAMINATION: CT ABDOMEN PELVIS WO CONTRAST-  6/2/2017 7:31 AM CDT     HISTORY: Diffuse abdominal pain     COMPARISON: Abdomen and pelvis CT dated 3/11/2017 and 12/16/2015     TECHNIQUE:      DLP: 807 mGy-cm. Automated exposure control was also utilized to  decrease patient radiation dose.     FINDINGS:  Lung bases are grossly clear without acute infiltrates. Stable less than  5 mm pulmonary nodule identified in the left lung base posteriorly and  laterally. A less than 5 mm pulmonary nodule is suspected in the right  lung base, along the diaphragm.     Multiple low-attenuation hepatic lesions identified, largest measuring  nearly 1 cm, that were observed previously, differential considerations  include cysts and hemangiomas. Gallbladder is surgically absent. There  is no biliary ductal dilatation.     The spleen is enlarged measuring 14 cm in [id number] length and 15.4 x  8.0 cm in greatest transaxial projection. No focal splenic lesions  identified.     There are no adrenal masses.     No pancreatic abnormality identified.     There is centrally located calcification noted in the lower pole of the  left kidney anteriorly noted previously. There are no additional urinary  tract calculi. There is no pelvocaliectasis, hydroureter, or evidence of  obstruction. Urinary bladder is mildly distended without focal bladder  wall abnormality.     Hysterectomy changes noted. There are no adnexal masses identified.     Stool and gas identified in the nondilated colon without CT evidence of  significant diverticular disease. Appendix is normal without  inflammatory changes.     The small bowel is not dilated. The duodenal sweep is imaged  appropriately.     Stomach is not distended.     Evaluation of the bowel without IV and enteric contrast is suboptimal.     There are no pathologically enlarged lymph nodes identified.     There is no ascites or pneumoperitoneum.     There is levoscoliotic change of the lumbar spine with  associated  spondylosis.       Impression:       1. No CT evidence of acute intra-abdominal/pelvic pathological process.  2. Stable low-attenuation hepatic lesions.  3. Centrally located nonobstructing calculus lower pole left kidney.  4. Subcentimeter lower lobe pulmonary nodules.  5. Scoliotic change of the lumbar spine with spondylosis.   6. Splenomegaly.  This report was finalized on 06/02/2017 09:18 by Dr. Nick Sandoval MD.             Intake/Output Summary (Last 24 hours) at 06/02/17 1512  Last data filed at 06/02/17 0034   Gross per 24 hour   Intake             1000 ml   Output                0 ml   Net             1000 ml       Physical Exam   Constitutional: She is oriented to person, place, and time. She appears well-developed and well-nourished. No distress.   HENT:   Head: Normocephalic and atraumatic.   Nose: Nose normal.   Mouth/Throat: Oropharynx is clear and moist.   Eyes: Conjunctivae are normal. No scleral icterus.   Neck: Neck supple. No JVD present. No tracheal deviation present. No thyromegaly present.   Cardiovascular: Normal rate, regular rhythm, normal heart sounds and intact distal pulses.    Pulmonary/Chest: Effort normal and breath sounds normal. No respiratory distress.   Abdominal: Soft. Bowel sounds are normal. She exhibits no distension. There is tenderness (mild diffuse with palpation).   Musculoskeletal: Normal range of motion. She exhibits no edema.   Neurological: She is oriented to person, place, and time. She has normal reflexes. No cranial nerve deficit.   Skin: Skin is warm and dry. No erythema.   Well-healed postoperative scar noted over the anterior right knee.  There is no erythema or swelling or pain noted.   Psychiatric: She has a normal mood and affect. Her behavior is normal. Judgment and thought content normal.             Results Review:  I have reviewed the labs, radiology results, and diagnostic studies since my last progress note and made treatment changes  reflective of the results.   I have reviewed the current medications.    Assessment/Plan     Hospital Problem List     * (Principal)Volume depletion, gastrointestinal loss    Viral enterocolitis    Sarcoidosis    Essential hypertension    Hypothyroidism    Iron deficiency anemia    Generalized weakness          PLAN:  Continue IV fluids  Advance diet  Activity as tolerated  Recheck BMP this evening and in a.m.  Discontinue Zyvox and Invanz as there is no evidence of acute infectious process  Plan discharge tomorrow    Roel Lindsay DO   06/02/17   3:12 PM

## 2017-06-02 NOTE — PROGRESS NOTES
Continued Stay Note   [place]     Patient Name: Stacy Lynn  MRN: [id number]  Today's Date: 6/2/2017    Admit Date: 6/1/2017          Discharge Plan       06/02/17 1539    Case Management/Social Work Plan    Plan Home Tomorrow per MD    Additional Comments SW spoke to Dr. Lindsay re patient and potential need for PT eval. Dr. Lindsay says patient does not need and plan is home tomorrow.               Discharge Codes       06/02/17 1544    Discharge Codes    Discharge Codes 01  Discharge to home            RUKHSANA Gold

## 2017-06-02 NOTE — H&P
History is obtained from the patient with whom I spoke, family at bedside, endorsement from the ER physician, and old records.     The patient was seen at approximately 4:30 a.m. on 06/02/2017.     CHIEF COMPLAINT: Weak.     HISTORY OF PRESENT ILLNESS: The patient is a 47-year-old white female with multiple medical problems. She has sarcoidosis for which she takes methotrexate and is followed by Dr. Scherer. She also has chronic leukopenia and anemia, and sees Oncology. Double endoscopy has been unrevealing.     A couple of years ago she had a right knee replacement; this subsequently got infected and she required IV antibiotics. Most recently she had her 3rd surgery, which was a revision, done by Dr. Kwon.     Starting around 10:15 p.m. yesterday, she developed weakness, shortness of breath. Denies any chest pain or fever or productive cough. She feels nauseated. She also has 7 out of 10 achy pain that goes across her abdomen. She has some diarrhea with it. She did not endorse any bleeding, however. She has had no food culprits or any recent antibiotics.     She came into the ER for the aforementioned issues and she has been afebrile here, her pulse has been 90s to 110s, and she has been hemodynamically stable saturating at 100%. Workup only revealed an elevated creatinine (history of chronic kidney disease with a baseline creatinine of around 1.4 and she is now up to 1.68). ABGs showed pH of 7.533, CO2 of 24.2, O2 of 122.1, and bicarbonate of 19.9. Lipase is only slightly elevated at 205, but it was not at pancreatitis criteria level. Her lactic acid level was negative.     PAST MEDICAL HISTORY:  1.  Right knee surgery with subsequent knee infection, status post eventual revision done about less than a month ago by Dr. Kwon.   2.  History of cardiac ablation.   3.  Thyroid disease.  4.  Anemia.  5.  Leukopenia.  6.  Sarcoidosis necessitating methotrexate.   7.  History of EGD and colonoscopy.   8.  PFO.  9.   Hypertension.   10.  History of elevated D-dimer.  11.  CKD stage 3.  12.  Hypothyroidism.   13.  Hypertension.  14.  Chronic pain syndrome.  15.  History of chest pain. Recent stress test in 02/2017 showed hyperdynamic EF of 70% and no evidence of ischemia.   16. Irritable bowel syndrome.   17.  GERD.  18.  Chronic opiate use.   19.  Scoliosis   20.  Anxiety and depression.  21.  Obesity.  22.  Left knee surgery.   23.  Left heel surgery with no resection due to chronic pain.  24.  Left shoulder repair.  25.  Bilateral ulnar nerve release due to entrapment.   26.  Appendectomy.   27.  Hysterectomy.  28.  Cholecystectomy.  29.  Esophageal dilation.     FAMILY HISTORY: Hypertension, heart disease, lupus, brain hemorrhage, Alzheimer, skin cancer, diabetes.     SOCIAL HISTORY: The patient does not drink, smoke or use drugs.     ALLERGIES: TAPE.     HOME MEDICATIONS:  1.  Methotrexate 2.5 mg 4 tablets weekly.   2.  Aspirin 81 mg b.i.d.   3.  BuSpar 5 mg daily as needed.  4.  Colace 100 mg daily as needed for constipation.   5.  Cymbalta 60 mg daily.   6.  Nexium 40 mg daily.   7.  Neurontin 300 mg t.i.d.   8.  Norco 5/325 every 6 hours p.r.n. pain.  7.  Dilaudid 4 mg every 4 hours p.r.n. pain.  8.  Synthroid 88 mcg daily.   9.  Oxybutynin XL 5 mg daily.   10.  Detrol LA 4 mg daily.  11.  Topamax 20 mg every night.  12.  Topamax 50 mg daily.  13.  Ultram 50 mg q.6 h. p.r.n. pain.     REVIEW OF SYSTEMS: Otherwise complete review of systems reviewed and negative except as mentioned in the HPI.     PHYSICAL EXAMINATION:  VITAL SIGNS: Temperature 98.9, pulse 92, respiratory rate 22, blood pressure 129/79, saturation 100%.   GENERAL: The patient is awake, alert, in no distress. The patient just looks diffusely weak.   HEENT: No scleral icterus, normal pinnae. Moist oral mucosa.   NECK: No JVD or retractions.   HEART: S1, S2. No murmur, gallop, or rub.   LUNGS: Clear to auscultation bilaterally, normal respiratory effort.    ABDOMEN: Soft, nontender, nondistended. Positive bowel sounds. No mass, hepatosplenomegaly, rebound or hernia.   EXTREMITIES: No edema.   MUSCULOSKELETAL: Normal strength to all 4 extremities. She does have exquisite pain on the right knee where the healed incision is. There is no erythema, pus or drainage. It is very tender to touch. No swelling was noted.  DERMATOLOGIC: No rash, diaphoresis, or jaundice.   PSYCHIATRIC: The patient's judgment, affect and insight are all intact.     DIAGNOSTIC DATA: ABGs showed pH 7.532, CO2 of 24.3, O2 of 122.1, bicarbonate 19.9. Troponin is negative. Sodium 144, potassium 4.4, chloride 107, bicarbonate 24, BUN and creatinine 19 and 1.68, up from 1.40. AST and ALT are 26 and 20, respectively. GFR is down to 32%. TSH was normal. Lactate 1.3. Lipase was slightly elevated at 205. White count 2.82, hemoglobin 10.7, platelets were 131,000. Hemoglobin has actually increased compared to where it was a couple of weeks ago. Diff showed lymphocytes of 14.9, 7.1 eosinophils. Blood cultures are forthcoming. Chest x-ray, per my informal read, showed no cardiomegaly. I do not see any acute cardiopulmonary abnormality. Procalcitonin was negative. EKG showed sinus tachycardia at 120 beats per minute, with no acute changes.       ASSESSMENT/PLAN:  1.  Generalized weakness. The patient actually looks ill despite her looking better on paper. The concern I have is that she is going to be immunosuppressed from taking methotrexate and by virtue of her sarcoidosis, and I am concerned that perhaps that she is developing an infectious process which has not yet been ascertained. Certainly she is at high risk of having infection. Blood cultures have been drawn. Her chest x-ray is negative. Because of her abdominal pain and shortness of breath, I would like to do a CT scan of her chest, abdomen and pelvis without contrast to look deeper for any infection or infiltrate which is not yet seen. IV fluid  hydration. Of concern, her knee is tender to the touch although it is not swollen, red or draining; I would like to have Orthopedic Surgery to reevaluate for possible infection as she has had this infection before. I will put her on broad-spectrum antibiotic therapy with Invanz and zyvox (due to her renal failure, will avoid vancomycin).  2.  Shortness of breath, as above. We will give nebulizers as needed and follow up CT scan of her chest.   3.  Abdominal pain, as previously discussed will obtain abdominal and pelvis CT without contrast. Lipase is only slightly elevated, which I do not think is pancreatitis. Clear liquid diet for now. IV pain medication and antiemetics, as well as IV Protonix.   4.  Acute on chronic renal failure. IV fluid hydration and follow up creatinine.         cc:          Tania Tapia M.D.  /[id number]  D:  06/02/2017 05:44:57(Eastern Time)  T:  06/02/2017 06:13:53(Eastern Time)  Voice ID:  [id number]/Document ID:  [id number]

## 2017-06-03 VITALS
BODY MASS INDEX: 36.46 KG/M2 | TEMPERATURE: 98.3 F | DIASTOLIC BLOOD PRESSURE: 53 MMHG | OXYGEN SATURATION: 95 % | SYSTOLIC BLOOD PRESSURE: 130 MMHG | RESPIRATION RATE: 16 BRPM | HEIGHT: 63 IN | WEIGHT: 205.8 LBS | HEART RATE: 81 BPM

## 2017-06-03 LAB
ANION GAP SERPL CALCULATED.3IONS-SCNC: 9 MMOL/L (ref 4–13)
BUN BLD-MCNC: 11 MG/DL (ref 5–21)
BUN/CREAT SERPL: 8.9 (ref 7–25)
CALCIUM SPEC-SCNC: 8.3 MG/DL (ref 8.4–10.4)
CHLORIDE SERPL-SCNC: 109 MMOL/L (ref 98–110)
CO2 SERPL-SCNC: 23 MMOL/L (ref 24–31)
CREAT BLD-MCNC: 1.24 MG/DL (ref 0.5–1.4)
GFR SERPL CREATININE-BSD FRML MDRD: 46 ML/MIN/1.73
GLUCOSE BLD-MCNC: 88 MG/DL (ref 70–100)
POTASSIUM BLD-SCNC: 4 MMOL/L (ref 3.5–5.3)
SODIUM BLD-SCNC: 141 MMOL/L (ref 135–145)

## 2017-06-03 PROCEDURE — 80048 BASIC METABOLIC PNL TOTAL CA: CPT | Performed by: FAMILY MEDICINE

## 2017-06-03 RX ADMIN — GABAPENTIN 300 MG: 300 CAPSULE ORAL at 16:09

## 2017-06-03 RX ADMIN — DULOXETINE HYDROCHLORIDE 60 MG: 30 CAPSULE, DELAYED RELEASE ORAL at 08:23

## 2017-06-03 RX ADMIN — TOPIRAMATE 50 MG: 25 TABLET, FILM COATED ORAL at 08:22

## 2017-06-03 RX ADMIN — GABAPENTIN 300 MG: 300 CAPSULE ORAL at 08:23

## 2017-06-03 RX ADMIN — ASPIRIN 81 MG: 81 TABLET ORAL at 08:23

## 2017-06-03 RX ADMIN — PANTOPRAZOLE SODIUM 40 MG: 40 INJECTION, POWDER, FOR SOLUTION INTRAVENOUS at 06:48

## 2017-06-03 RX ADMIN — LEVOTHYROXINE SODIUM 88 MCG: 88 TABLET ORAL at 08:23

## 2017-06-03 NOTE — PLAN OF CARE
Problem: Patient Care Overview (Adult)  Goal: Plan of Care Review  Outcome: Ongoing (interventions implemented as appropriate)    06/03/17 0758   Coping/Psychosocial Response Interventions   Plan Of Care Reviewed With patient   Patient Care Overview   Progress improving   Outcome Evaluation   Outcome Summary/Follow up Plan Pt. slept well through the night, O2 @ 2L, bedrest maintained, RLE swelling 3+, 2+ swelling to LLE, VSS, Dr. Felix consulted for management of right knee.          Problem: Fall Risk (Adult)  Goal: Absence of Falls  Outcome: Ongoing (interventions implemented as appropriate)    Problem: Activity Intolerance (Adult)  Goal: Identify Related Risk Factors and Signs and Symptoms  Outcome: Outcome(s) achieved Date Met:  06/03/17  Goal: Activity Tolerance  Outcome: Ongoing (interventions implemented as appropriate)  Goal: Effective Energy Conservation Techniques  Outcome: Ongoing (interventions implemented as appropriate)    Problem: Fluid Volume Deficit (Adult)  Goal: Identify Related Risk Factors and Signs and Symptoms  Outcome: Outcome(s) achieved Date Met:  06/03/17  Goal: Fluid/Electrolyte Balance  Outcome: Ongoing (interventions implemented as appropriate)  Goal: Comfort/Well Being  Outcome: Ongoing (interventions implemented as appropriate)

## 2017-06-03 NOTE — DISCHARGE SUMMARY
[facility] Medicine Services  DISCHARGE SUMMARY       Date of Admission: 6/1/2017  Date of Discharge:  6/3/2017  Primary Care Physician: Elliott Waters DO    Discharge Diagnoses:  Patient Active Problem List   Diagnosis   • Palpitations   • Thrombocytopenia   • Sarcoidosis   • Essential hypertension   • Hypothyroidism   • Paroxysmal SVT (supraventricular tachycardia)   • Dyspnea on exertion   • Obesity   • Chest pain, unspecified   • PFO (patent foramen ovale)   • Iron deficiency anemia   • Generalized weakness   • Volume depletion, gastrointestinal loss   • Viral enterocolitis     Hospital Problem List      * (Principal)Volume depletion, gastrointestinal loss     Viral enterocolitis     Sarcoidosis     Essential hypertension     Hypothyroidism     Iron deficiency anemia     Generalized weakness           Presenting Problem/History of Present Illness:  Generalized weakness [R53.1]  ARF (acute renal failure) [N17.9]     Chief Complaint on Day of Discharge:   Mild generalized weakness    History of Present Illness on Day of Discharge:   The patient's generalized weakness has significantly decreased.  She continues to have no evidence of diarrhea.  She feels very close to her typical baseline.    Hospital Course   The patient is a 47-year-old white female with multiple medical problems. She has sarcoidosis for which she takes methotrexate and is followed by Dr. Scherer. She also has chronic leukopenia and anemia, and sees Oncology. Double endoscopy has been unrevealing.    A couple of years ago she had a right knee replacement; this subsequently got infected and she required IV antibiotics. Most recently she had her 3rd surgery, which was a revision, done by Dr. Kwon.    Starting around 10:15 p.m. yesterday, she developed weakness, shortness of breath. Denies any chest pain or fever or productive cough. She feels nauseated. She also has 7 out of 10 achy pain that goes across her  abdomen. She has some diarrhea with it. She did not endorse any bleeding, however. She has had no food culprits or any recent antibiotics.    She came into the ER for the aforementioned issues and she has been afebrile here, her pulse has been 90s to 110s, and she has been hemodynamically stable saturating at 100%. Workup only revealed an elevated creatinine (history of chronic kidney disease with a baseline creatinine of around 1.4 and she is now up to 1.68). ABGs showed pH of 7.533, CO2 of 24.2, O2 of 122.1, and bicarbonate of 19.9. Lipase is only slightly elevated at 205, but it was not at pancreatitis criteria level. Her lactic acid level was negative.   ASSESSMENT/PLAN:  1. Generalized weakness. The patient actually looks ill despite her looking better on paper. The concern I have is that she is going to be immunosuppressed from taking methotrexate and by virtue of her sarcoidosis, and I am concerned that perhaps that she is developing an infectious process which has not yet been ascertained. Certainly she is at high risk of having infection. Blood cultures have been drawn. Her chest x-ray is negative. Because of her abdominal pain and shortness of breath, I would like to do a CT scan of her chest, abdomen and pelvis without contrast to look deeper for any infection or infiltrate which is not yet seen. IV fluid hydration. Of concern, her knee is tender to the touch although it is not swollen, red or draining; I would like to have Orthopedic Surgery to reevaluate for possible infection as she has had this infection before. I will put her on broad-spectrum antibiotic therapy with Invanz and zyvox (due to her renal failure, will avoid vancomycin).  2. Shortness of breath, as above. We will give nebulizers as needed and follow up CT scan of her chest.   3. Abdominal pain, as previously discussed will obtain abdominal and pelvis CT without contrast. Lipase is only slightly elevated, which I do not think is  pancreatitis. Clear liquid diet for now. IV pain medication and antiemetics, as well as IV Protonix.   4. Acute on chronic renal failure. IV fluid hydration and follow up creatinine.     The patient quickly improved.  Her abdominal pain improved significantly and was resolved at the time of discharge.  The patient's CBC was consistent with viral illness.  ABGs were consistent with a respiratory alkalosis secondary to hyperventilation.  The patient's metabolic panel indicated mild dehydration with a creatinine of 1.68 decreasing to her baseline of 1.2 after rehydration.  The patient denied right knee erythema and swelling.  She was evaluated by orthopedics guarding her knee pain and recommended that if there was further discomfort she should ice and elevate the knee.  She was instructed to follow-up with Dr. Kwon in his clinic.  The patient's gastrointestinal symptoms have completely resolved and she has been afebrile with stable vital signs as felt to be appropriate for discharge home today.       Consults:   Orthopedics    Pertinent Test Results:   Lab Results (last 7 days)     Procedure Component Value Units Date/Time    Blood Gas, Arterial With Co-Ox [[id number]]  (Abnormal) Collected:  06/01/17 2353    Specimen:  Arterial Blood Updated:  06/01/17 2355     Site Arterial: left radial     Koby's Test --      Documented in Rapid Comm        pH, Arterial 7.533 (H) pH units      pCO2, Arterial 24.2 (L) mm Hg      pO2, Arterial 122.1 (H) mm Hg      HCO3, Arterial 19.9 (L) mmol/L      Base Excess, Arterial -1.5 mmol/L      Hemoglobin, Blood Gas 11.0 (L) g/dL      Hematocrit, Blood Gas 32.0 (L) %      Oxyhemoglobin 98.4 (H) %      Methemoglobin 0.3 (L) %      Carboxyhemoglobin 0.1 %      Sodium, Arterial 140.3 mmol/L      Potassium, Arterial 4.08 mmol/L      Barometric Pressure for Blood Gas -- mmHg       Component not reported at this site.        Modality Cannula     Flow Rate 2.00 lpm     CBC Auto Differential  [[id number]]  (Abnormal) Collected:  06/02/17 0017    Specimen:  Blood Updated:  06/02/17 0027     WBC 2.82 (L) 10*3/mm3      RBC 3.62 (L) 10*6/mm3      Hemoglobin 10.7 (L) g/dL      Hematocrit 33.5 (L) %      MCV 92.5 fL      MCH 29.6 pg      MCHC 31.9 (L) g/dL      RDW 14.4 %      RDW-SD 49.1 fl      MPV 9.2 fL      Platelets 131 10*3/mm3      Neutrophil % 65.9 %      Lymphocyte % 14.9 (L) %      Monocyte % 11.0 %      Eosinophil % 7.1 (H) %      Basophil % 0.7 %      Immature Grans % 0.4 %      Neutrophils, Absolute 1.86 (L) 10*3/mm3      Lymphocytes, Absolute 0.42 (L) 10*3/mm3      Monocytes, Absolute 0.31 10*3/mm3      Eosinophils, Absolute 0.20 10*3/mm3      Basophils, Absolute 0.02 10*3/mm3      Immature Grans, Absolute 0.01 10*3/mm3     Lactic Acid, Plasma [[id number]]  (Normal) Collected:  06/02/17 0017    Specimen:  Blood Updated:  06/02/17 0035     Lactate 1.3 mmol/L     Comprehensive Metabolic Panel [[id number]]  (Abnormal) Collected:  06/02/17 0017    Specimen:  Blood Updated:  06/02/17 0037     Glucose 105 (H) mg/dL      BUN 19 mg/dL      Creatinine 1.68 (H) mg/dL      Sodium 144 mmol/L      Potassium 4.4 mmol/L      Chloride 107 mmol/L      CO2 24.0 mmol/L      Calcium 9.2 mg/dL      Total Protein 7.5 g/dL      Albumin 4.00 g/dL      ALT (SGPT) 20 U/L      AST (SGOT) 26 U/L      Alkaline Phosphatase 97 U/L      Total Bilirubin 0.9 mg/dL      eGFR Non African Amer 32 (L) mL/min/1.73      Globulin 3.5 gm/dL      A/G Ratio 1.1 g/dL      BUN/Creatinine Ratio 11.3     Anion Gap 13.0 mmol/L     Amylase [[id number]]  (Normal) Collected:  06/02/17 0017    Specimen:  Blood Updated:  06/02/17 0037     Amylase 60 U/L     Lipase [[id number]]  (Abnormal) Collected:  06/02/17 0017    Specimen:  Blood Updated:  06/02/17 0037     Lipase 205 (H) U/L     POC Troponin, Rapid [[id number]]  (Normal) Collected:  06/02/17 0036    Specimen:  Blood Updated:  06/02/17 0050     Troponin I 0.00 ng/mL       Serial Number: [id number]     : [id number]       TSH [[id number]]  (Normal) Collected:  06/02/17 0017    Specimen:  Blood Updated:  06/02/17 0107     TSH 3.360 mIU/mL     Procalcitonin [[id number]]  (Normal) Collected:  06/02/17 0017    Specimen:  Blood Updated:  06/02/17 0111     Procalcitonin <0.25 ng/mL     Narrative:       Protime-INR [[id number]]  (Normal) Collected:  06/02/17 0534    Specimen:  Blood Updated:  06/02/17 0551     Protime 14.0 Seconds      INR 1.05    aPTT [[id number]]  (Abnormal) Collected:  06/02/17 0534    Specimen:  Blood Updated:  06/02/17 0551     PTT 38.7 (H) seconds     Urinalysis With / Culture If Indicated [[id number]]  (Abnormal) Collected:  06/02/17 1218    Specimen:  Urine from Urine, Clean Catch Updated:  06/02/17 1234     Color, UA Yellow     Appearance, UA Clear     pH, UA 8.0     Specific Gravity, UA 1.020     Glucose, UA Negative     Ketones, UA Negative     Bilirubin, UA Negative     Blood, UA Negative     Protein, UA Trace (A)     Leuk Esterase, UA Negative     Nitrite, UA Negative     Urobilinogen, UA 1.0 E.U./dL    Narrative:       Urine microscopic not indicated.    Basic Metabolic Panel [[id number]]  (Abnormal) Collected:  06/02/17 1457    Specimen:  Blood Updated:  06/02/17 1532     Glucose 92 mg/dL      BUN 15 mg/dL      Creatinine 1.30 mg/dL      Sodium 140 mmol/L      Potassium 4.0 mmol/L      Chloride 108 mmol/L      CO2 22.0 (L) mmol/L      Calcium 8.2 (L) mg/dL      eGFR Non African Amer 44 (L) mL/min/1.73      BUN/Creatinine Ratio 11.5     Anion Gap 10.0 mmol/L     Blood Culture [[id number]]  (Normal) Collected:  06/02/17 0018    Specimen:  Blood from Blood, Venous Line Updated:  06/03/17 0101     Blood Culture No growth at 24 hours    Blood Culture [[id number]]  (Normal) Collected:  06/02/17 0028    Specimen:  Blood from Arm, Left Updated:  06/03/17 0101     Blood Culture No growth at 24 hours    Basic Metabolic Panel [[id number]]  (Abnormal) Collected:  06/03/17 0456    Specimen:  Blood Updated:   06/03/17 0601     Glucose 88 mg/dL      BUN 11 mg/dL      Creatinine 1.24 mg/dL      Sodium 141 mmol/L      Potassium 4.0 mmol/L      Chloride 109 mmol/L      CO2 23.0 (L) mmol/L      Calcium 8.3 (L) mg/dL      eGFR Non African Amer 46 (L) mL/min/1.73      BUN/Creatinine Ratio 8.9     Anion Gap 9.0 mmol/L     Narrative:       GFR Normal >60  Chronic Kidney Disease <60  Kidney Failure <15        Imaging Results (last 7 days)     Procedure Component Value Units Date/Time    XR Chest 1 View [[id number]] Collected:  06/02/17 0709     Updated:  06/02/17 0747    Narrative:       EXAMINATION: XR CHEST 1 VW-. 6/2/2017 7:02 AM CDT     CHEST, ONE VIEW:     HISTORY: Dyspnea and weakness     COMPARISON: 05/15/2017     A single frontal chest radiograph was obtained.     FINDINGS:     The level of inspiration is shallow. The lungs are clear without  consolidating infiltrates.     The heart is normal in size, pulmonary circulation appropriate, without  heart failure.     The bony structures are intact.                                  Impression:       1. No acute cardiopulmonary process.     This report was finalized on 06/02/2017 07:44 by Dr. Nick Sandoval MD.    CT Chest Without Contrast [[id number]] Collected:  06/02/17 0800     Updated:  06/02/17 0859    Narrative:       EXAMINATION:  CT CHEST WO CONTRAST-  6/2/2017 4:39 CST     HISTORY: Shortness of breath. Sarcoidosis.     COMPARISON: 05/15/2017.     DLP: 807 mGy-cm. Automated dosage control was utilized.     TECHNIQUE: Spiral CT was performed of the chest without contrast.  Multiplanar images were reconstructed.     FINDINGS: There is hilar and mediastinal lymphadenopathy seen better on  the previous study with contrast. I doubt that there is significant  change. There is no parenchymal consolidation or pulmonary fibrosis.  There are scattered small pulmonary nodules measuring up to 4 mm in  size. These appear relatively stable compared back to 12/15/2015. There  are  scattered cysts in the liver. There is splenomegaly. There has been  prior cholecystectomy.       Impression:       1. Hilar and mediastinal lymphadenopathy appears relatively stable but  better seen on previous studies with contrast.  2. Scattered tiny pulmonary nodules measuring up to 4 mm appear  relatively stable.  3. Splenomegaly. Prior cholecystectomy.  This report was finalized on 06/02/2017 08:55 by Dr. Rohan Blackmon MD.    CT Abdomen Pelvis Without Contrast [[id number]] Collected:  06/02/17 0758     Updated:  06/02/17 0921    Narrative:       EXAMINATION: CT ABDOMEN PELVIS WO CONTRAST-  6/2/2017 7:31 AM CDT     HISTORY: Diffuse abdominal pain     COMPARISON: Abdomen and pelvis CT dated 3/11/2017 and 12/16/2015     TECHNIQUE:      DLP: 807 mGy-cm. Automated exposure control was also utilized to  decrease patient radiation dose.     FINDINGS:  Lung bases are grossly clear without acute infiltrates. Stable less than  5 mm pulmonary nodule identified in the left lung base posteriorly and  laterally. A less than 5 mm pulmonary nodule is suspected in the right  lung base, along the diaphragm.     Multiple low-attenuation hepatic lesions identified, largest measuring  nearly 1 cm, that were observed previously, differential considerations  include cysts and hemangiomas. Gallbladder is surgically absent. There  is no biliary ductal dilatation.     The spleen is enlarged measuring 14 cm in [id number] length and 15.4 x  8.0 cm in greatest transaxial projection. No focal splenic lesions  identified.     There are no adrenal masses.     No pancreatic abnormality identified.     There is centrally located calcification noted in the lower pole of the  left kidney anteriorly noted previously. There are no additional urinary  tract calculi. There is no pelvocaliectasis, hydroureter, or evidence of  obstruction. Urinary bladder is mildly distended without focal bladder  wall abnormality.     Hysterectomy changes noted.  "There are no adnexal masses identified.     Stool and gas identified in the nondilated colon without CT evidence of  significant diverticular disease. Appendix is normal without  inflammatory changes.     The small bowel is not dilated. The duodenal sweep is imaged  appropriately.     Stomach is not distended.     Evaluation of the bowel without IV and enteric contrast is suboptimal.     There are no pathologically enlarged lymph nodes identified.     There is no ascites or pneumoperitoneum.     There is levoscoliotic change of the lumbar spine with associated  spondylosis.       Impression:       1. No CT evidence of acute intra-abdominal/pelvic pathological process.  2. Stable low-attenuation hepatic lesions.  3. Centrally located nonobstructing calculus lower pole left kidney.  4. Subcentimeter lower lobe pulmonary nodules.  5. Scoliotic change of the lumbar spine with spondylosis.   6. Splenomegaly.  This report was finalized on 06/02/2017 09:18 by Dr. Nick Sandoval MD.            Condition on Discharge:    Stable and back to baseline    Physical Exam on Discharge:  /53 (BP Location: Left arm, Patient Position: Lying)  Pulse 81  Temp 98.3 °F (36.8 °C) (Oral)   Resp 16  Ht 63\" (160 cm)  Wt 205 lb 12.8 oz (93.4 kg)  SpO2 95%  BMI 36.46 kg/m2  Physical Exam  Constitutional: She is oriented to person, place, and time. She appears well-developed and well-nourished. No distress.   HENT:   Head: Normocephalic and atraumatic.   Nose: Nose normal.   Mouth/Throat: Oropharynx is clear and moist.   Eyes: Conjunctivae are normal. No scleral icterus.   Neck: Neck supple. No JVD present. No tracheal deviation present. No thyromegaly present.   Cardiovascular: Normal rate, regular rhythm, normal heart sounds and intact distal pulses.   Pulmonary/Chest: Effort normal and breath sounds normal. No respiratory distress.   Abdominal: Soft. Bowel sounds are normal. She exhibits no distension. There is tenderness (mild " diffuse with palpation).   Musculoskeletal: Normal range of motion. She exhibits no edema.   Neurological: She is oriented to person, place, and time. She has normal reflexes. No cranial nerve deficit.   Skin: Skin is warm and dry. No erythema.   Well-healed postoperative scar noted over the anterior right knee. There is no erythema or swelling or pain noted.   Psychiatric: She has a normal mood and affect. Her behavior is normal. Judgment and thought content normal.        Discharge Disposition:  Home or Self Care    Discharge Medications:   Stacy Lynn   Home Medication Instructions RAMOS:[id number]    Printed on:06/03/17 5650   Medication Information                      aspirin 81 MG EC tablet  Take 81 mg by mouth 2 (Two) Times a Day.             busPIRone (BUSPAR) 5 MG tablet  Take 1 tablet by mouth Daily As Needed (anxiety).             docusate sodium (COLACE) 100 MG capsule  Take 1 capsule by mouth daily as needed for constipation.             DULoxetine (CYMBALTA) 60 MG capsule  Take 1 capsule by mouth daily.             esomeprazole (NexIUM) 40 MG capsule  Take 1 capsule by mouth daily.             folic acid (FOLVITE) 400 MCG tablet  Take 400 mcg by mouth Daily.             gabapentin (NEURONTIN) 300 MG capsule  Take 1 capsule by mouth 3 (Three) Times a Day.             HYDROcodone-acetaminophen (NORCO) 5-325 MG per tablet  Take 1 tablet by mouth Every 6 (Six) Hours As Needed for Moderate Pain (4-6). 1 to 2 tablets every 4 to 6 hours             HYDROmorphone (DILAUDID) 2 MG tablet  Take 2 mg by mouth Every 4 (Four) Hours As Needed for Moderate Pain (4-6). 2 or 4 tablets every 4 to 6 hours              levothyroxine (SYNTHROID, LEVOTHROID) 88 MCG tablet  Take 88 mcg by mouth Daily.             methotrexate 2.5 MG tablet  Take 10 mg by mouth 1 (One) Time Per Week. Takes 4 tabs weekly on mondays             oxybutynin XL (DITROPAN-XL) 5 MG 24 hr tablet  Take 5 mg by mouth Every Night.              tolterodine LA (DETROL LA) 4 MG 24 hr capsule  Take 4 mg by mouth Daily.             topiramate (TOPAMAX) 100 MG tablet  Take 100 mg by mouth Every Night.             topiramate (TOPAMAX) 50 MG tablet  Take 1 tablet by mouth Daily.             traMADol (ULTRAM) 50 MG tablet  Take 50 mg by mouth Every 6 (Six) Hours As Needed for Moderate Pain (4-6).                 Discharge Diet:   Diet Instructions     Diet: Regular; Thin Liquids, No Restrictions       Discharge Diet:  Regular   Fluid Consistency:  Thin Liquids, No Restrictions                 Discharge Care Plan / Instructions:   Discharge home    Activity at Discharge:   Activity Instructions     Activity as Tolerated                     Follow-up Appointments:  Primary care physician in one week    Test Results Pending at Discharge:   None     Roel Lindsay DO  06/03/17  1:18 PM    Time: Discharge Less than 30 min    Please note that portions of this note may have been completed with a voice recognition program. Efforts were made to edit the dictations, but occasionally words are mistranscribed.

## 2017-06-03 NOTE — CONSULTS
Orthopaedic Inpatient Consultation    NAME:  Stacy Lynn   : 1970  MRN: [id number]    2017 11:17 PM    Requesting Physician: Tania Tapia MD    CHIEF COMPLAINT:  right knee pain      HISTORY OF PRESENT ILLNESS:   The patient is a 47 y.o. female  Who presents to [facility] with weakness and diarrhea.  Pain is located in the right knee, rated a 3/5, dull and constant, worse with movement, better with rest and medication.  There are no associated symptoms. She states the knee pain has been constant since her revision knee replacement. She states no increase in pain lately.       Past Medical History:    Past Medical History:   Diagnosis Date   • Chronic pain    • Degeneration of intervertebral disc of high cervical region    • Hypertension    • Hyperthyroidism    • Irritable bowel syndrome    • Macular degeneration    • PFO (patent foramen ovale) 2016   • PSVT (paroxysmal supraventricular tachycardia)     s/p ablation per Dr. Diallo 2016   • Restless leg syndrome    • Sarcoidosis    • Scoliosis        Past Surgical History:    Past Surgical History:   Procedure Laterality Date   • CARDIAC ABLATION  2016    SVT; Dr. Diallo    • CHOLECYSTECTOMY     • HYSTERECTOMY     • KIDNEY STONE SURGERY     • KNEE ARTHROSCOPY     • LATERAL EPICONDYLE RELEASE     • PATELLA SURGERY     • REPLACEMENT TOTAL KNEE     • SHOULDER ARTHROSCOPY     • ULNAR NERVE DECOMPRESSION         Current Medications:   Prior to Admission medications    Medication Sig Start Date End Date Taking? Authorizing Provider   aspirin 81 MG EC tablet Take 81 mg by mouth 2 (Two) Times a Day.   Yes Historical Provider, MD   busPIRone (BUSPAR) 5 MG tablet Take 1 tablet by mouth Daily As Needed (anxiety).   Yes Historical Provider, MD   docusate sodium (COLACE) 100 MG capsule Take 1 capsule by mouth daily as needed for constipation.   Yes Historical Provider, MD   DULoxetine (CYMBALTA) 60 MG capsule Take 1 capsule by mouth daily.   Yes  Historical Provider, MD   esomeprazole (NexIUM) 40 MG capsule Take 1 capsule by mouth daily.   Yes Historical Provider, MD   folic acid (FOLVITE) 400 MCG tablet Take 400 mcg by mouth Daily.   Yes Historical Provider, MD   gabapentin (NEURONTIN) 300 MG capsule Take 1 capsule by mouth 3 (Three) Times a Day.   Yes Historical Provider, MD   HYDROcodone-acetaminophen (NORCO) 5-325 MG per tablet Take 1 tablet by mouth Every 6 (Six) Hours As Needed for Moderate Pain (4-6). 1 to 2 tablets every 4 to 6 hours   Yes Historical Provider, MD   HYDROmorphone (DILAUDID) 2 MG tablet Take 2 mg by mouth Every 4 (Four) Hours As Needed for Moderate Pain (4-6). 2 or 4 tablets every 4 to 6 hours    Yes Historical Provider, MD   levothyroxine (SYNTHROID, LEVOTHROID) 88 MCG tablet Take 88 mcg by mouth Daily.   Yes Historical Provider, MD   methotrexate 2.5 MG tablet Take 10 mg by mouth 1 (One) Time Per Week. Takes 4 tabs weekly on mondays   Yes Historical Provider, MD   oxybutynin XL (DITROPAN-XL) 5 MG 24 hr tablet Take 5 mg by mouth Every Night.   Yes Historical Provider, MD   tolterodine LA (DETROL LA) 4 MG 24 hr capsule Take 4 mg by mouth Daily.   Yes Historical Provider, MD   topiramate (TOPAMAX) 50 MG tablet Take 1 tablet by mouth Daily.   Yes Historical Provider, MD   traMADol (ULTRAM) 50 MG tablet Take 50 mg by mouth Every 6 (Six) Hours As Needed for Moderate Pain (4-6).   Yes Historical Provider, MD   topiramate (TOPAMAX) 100 MG tablet Take 100 mg by mouth Every Night.    Historical Provider, MD       Allergies:  Tape    Social History:   Social History     Social History   • Marital status: Single     Spouse name: N/A   • Number of children: N/A   • Years of education: N/A     Occupational History   • Not on file.     Social History Main Topics   • Smoking status: Never Smoker   • Smokeless tobacco: Not on file   • Alcohol use No   • Drug use: No   • Sexual activity: Defer     Other Topics Concern   • Not on file     Social  History Narrative       Family History:   Family History   Problem Relation Age of Onset   • Arrhythmia Mother    • Heart disease Mother    • Heart disease Father    • Heart disease Brother    • Heart disease Maternal Aunt    • Heart disease Maternal Uncle    • Heart disease Paternal Aunt    • Heart disease Paternal Uncle    • Heart disease Maternal Grandmother    • Heart disease Maternal Grandfather    • Heart disease Paternal Grandmother    • Heart disease Paternal Grandfather        REVIEW OF SYSTEMS:  14 point review of systems has been reviewed from the patient's emergency room visit, reviewed with the patient on today's date with no new changes.    PHYSICAL EXAM:      Physical Examination:  Vitals:   Vitals:    06/02/17 2220 06/03/17 0022 06/03/17 0446 06/03/17 0758   BP: 121/65 123/75 133/83 130/53   BP Location: Left arm Left arm Left arm Left arm   Patient Position: Lying Lying Lying Lying   Pulse: 92 84 83 81   Resp: 18 18 18 16   Temp: 97.8 °F (36.6 °C) 97.7 °F (36.5 °C) 99.6 °F (37.6 °C) 98.3 °F (36.8 °C)   TempSrc: Oral Oral Axillary Oral   SpO2: 100% 100% 100% 95%   Weight: 205 lb 12.8 oz (93.4 kg)      Height:         General:  Appears stated age, no distress.  Orientation:  Alert and oriented to time, place, and person.  Mood and Affect:  Cooperative and pleasant.  Gait:  Resting comfortably in bed.  Cardiovascular:  Symmetric 1-2 plus pulses in upper and lower extremities.  Lymph:  No cervical or inguinal lymphadenopathy noted.  Sensation:  Grossly intact to light touch.  DTR:  Normal, no pathologic reflexes.  Coordination/balance:  Normal    Musculoskeletal:  Right upper extremity exam:  There is no tenderness to palpation about the shoulder, elbow, wrist or hand.  Unrestricted full function motion is present.  Stability is normal with provocative tests, 5/5 strength, and skin is normal.      Left upper extremity exam:  There is no tenderness to palpation about the shoulder, elbow, wrist or hand.   Unrestricted full function motion is present.  Stability is normal with provocative tests, 5/5 strength, and skin is normal.     Right lower extremity exam:  There is no tenderness to palpation about the hip, knee, ankle or foot.  Unrestricted full function motion is present.  Stability is normal with provocative tests, 5/5 strength, and skin is normal.     Left lower extremity exam:  There is mild tenderness to palpation about the knee, none around the ankle or foot. Mildly tender to palpation around the knee medially and laterally. Well healed incision w/ no signs of infection/drainage/discharge. No signs of DVT.     DATA:    CBC with Differential:    Lab Results   Component Value Date    WBC 2.82 (L) 06/02/2017    RBC 3.62 (L) 06/02/2017    HGB 10.7 (L) 06/02/2017    HCT 33.5 (L) 06/02/2017     06/02/2017    MCV 92.5 06/02/2017    MCH 29.6 06/02/2017    MCHC 31.9 (L) 06/02/2017    RDW 14.4 06/02/2017    MONOSABS 0.31 06/02/2017    LYMPHSABS 0.42 (L) 06/02/2017    EOSABS 0.20 06/02/2017    BASOSABS 0.02 06/02/2017     CMP:    Lab Results   Component Value Date     06/03/2017    K 4.0 06/03/2017     06/03/2017    CO2 23.0 (L) 06/03/2017    BUN 11 06/03/2017    CREATININE 1.24 06/03/2017    GLUCOSE 88 06/03/2017    CALCIUM 8.3 (L) 06/03/2017    BILITOT 0.9 06/02/2017    ALKPHOS 97 06/02/2017    AST 26 06/02/2017    ALT 20 06/02/2017     BMP:    Lab Results   Component Value Date     06/03/2017    K 4.0 06/03/2017     06/03/2017    CO2 23.0 (L) 06/03/2017    BUN 11 06/03/2017    CREATININE 1.24 06/03/2017    CALCIUM 8.3 (L) 06/03/2017    GLUCOSE 88 06/03/2017       ----------------------------------------------------------------------------------------------------------------------  I have reviewed the radiology images above and agree with the findings dictated below    Radiology:   Imaging Results (last 24 hours)     ** No results found for the last 24 hours. **           ----------------------------------------------------------------------------------------------------------------------  Assessment:  1. Knee pain S/P Right Total Knee Revision Arthroplasty 5/2017  2. Generalized weakness.    Plan:  1) Ice and elevate right knee  2) Admit for pain control, PT/OT  3) Ok to D/C per ortho.  4) F/U with Dr. Kwon in Clinic.     Electronically signed by Colin Weir PA-C on 6/3/2017 at 12:21 PM

## 2017-06-03 NOTE — PLAN OF CARE
Problem: Patient Care Overview (Adult)  Goal: Plan of Care Review  Outcome: Ongoing (interventions implemented as appropriate)    06/03/17 1534   Coping/Psychosocial Response Interventions   Plan Of Care Reviewed With patient   Patient Care Overview   Progress improving   Outcome Evaluation   Outcome Summary/Follow up Plan No complaints of pain. Alert and orientated. Maurice Cortez. NV checks WNL. Up with assist of one.       Goal: Adult Individualization and Mutuality  Outcome: Ongoing (interventions implemented as appropriate)  Goal: Discharge Needs Assessment  Outcome: Ongoing (interventions implemented as appropriate)    Problem: Fall Risk (Adult)  Goal: Absence of Falls  Outcome: Ongoing (interventions implemented as appropriate)    Problem: Activity Intolerance (Adult)  Goal: Activity Tolerance  Outcome: Ongoing (interventions implemented as appropriate)  Goal: Effective Energy Conservation Techniques  Outcome: Ongoing (interventions implemented as appropriate)    Problem: Fluid Volume Deficit (Adult)  Goal: Fluid/Electrolyte Balance  Outcome: Ongoing (interventions implemented as appropriate)  Goal: Comfort/Well Being  Outcome: Ongoing (interventions implemented as appropriate)

## 2017-06-06 ENCOUNTER — HOSPITAL ENCOUNTER (OUTPATIENT)
Dept: NON INVASIVE DIAGNOSTICS | Age: 47
Discharge: HOME | End: 2017-06-06
Payer: MEDICARE

## 2017-06-06 ENCOUNTER — TRANSCRIBE ORDERS (OUTPATIENT)
Dept: ADMINISTRATIVE | Facility: HOSPITAL | Age: 47
End: 2017-06-06

## 2017-06-06 DIAGNOSIS — I47.1 PAROXYSMAL SUPRAVENTRICULAR TACHYCARDIA (HCC): Primary | ICD-10-CM

## 2017-06-06 LAB
FUNGUS (MYCOLOGY) CULTURE: NORMAL
KOH PREP: NORMAL

## 2017-06-06 PROCEDURE — 93225 XTRNL ECG REC<48 HRS REC: CPT

## 2017-06-06 PROCEDURE — 93227 XTRNL ECG REC<48 HR R&I: CPT | Performed by: INTERNAL MEDICINE

## 2017-06-06 PROCEDURE — 93226 XTRNL ECG REC<48 HR SCAN A/R: CPT

## 2017-06-07 LAB
BACTERIA SPEC AEROBE CULT: NORMAL
BACTERIA SPEC AEROBE CULT: NORMAL

## 2017-06-12 ENCOUNTER — OFFICE VISIT (OUTPATIENT)
Dept: CARDIOLOGY | Facility: CLINIC | Age: 47
End: 2017-06-12

## 2017-06-12 VITALS
WEIGHT: 205 LBS | SYSTOLIC BLOOD PRESSURE: 102 MMHG | HEART RATE: 102 BPM | DIASTOLIC BLOOD PRESSURE: 60 MMHG | HEIGHT: 63 IN | OXYGEN SATURATION: 96 % | BODY MASS INDEX: 36.32 KG/M2

## 2017-06-12 DIAGNOSIS — D86.9 SARCOIDOSIS: ICD-10-CM

## 2017-06-12 DIAGNOSIS — I10 ESSENTIAL HYPERTENSION: ICD-10-CM

## 2017-06-12 DIAGNOSIS — R06.09 DYSPNEA ON EXERTION: Primary | ICD-10-CM

## 2017-06-12 DIAGNOSIS — E66.09 NON MORBID OBESITY DUE TO EXCESS CALORIES: ICD-10-CM

## 2017-06-12 DIAGNOSIS — R00.2 PALPITATIONS: ICD-10-CM

## 2017-06-12 PROCEDURE — 99214 OFFICE O/P EST MOD 30 MIN: CPT | Performed by: INTERNAL MEDICINE

## 2017-06-12 PROCEDURE — 93000 ELECTROCARDIOGRAM COMPLETE: CPT | Performed by: INTERNAL MEDICINE

## 2017-06-12 NOTE — PROGRESS NOTES
Reason for Visit: cardiovascular follow up.    HPI:  Stacy Lynn is a 47 y.o. female is here today for follow-up.  She was recently admitted earlier this month with generalized weakness, acute renal failure, and diarrhea.  Ever since her knee surgery in May she has been short of breath, dizzy, weak, and has been unable to complete any activities without significant distress.  She has noted pounding in her chest and tachycardia.  She notes being put on oxygen back in April.  She follows with Dr. Scherer in pulmonary clinic and has an appointment coming up with him in July.  She had a CTA chest done on May 15 that was negative for pulmonary embolism and showed stable mediastinal and hilar lymphadenopathy.    Previous Cardiac Testing and Procedures:  - Dobutamine stress echo (09/26/2016) low risk for ischemia  - Echo (09/26/2016) EF 65%, grade 1 diastolic dysfunction, normal RV size and function, normal valves, right to left shunt on bubble consistent with PFO  - Nuclear stress (02/01/2017) normal myocardial perfusion no evidence of ischemia, EF > 70%  - CTA chest (05/15/2017) no pulmonary embolism, stable mediastinal and hilar lymphadenopathy  - Holter monitor (06/06/2017) average heart rate 97 bpm with range of .  Rare atrial ectopic beats, no symptoms reported    Patient Active Problem List   Diagnosis   • Palpitations   • Thrombocytopenia   • Sarcoidosis   • Essential hypertension   • Hypothyroidism   • Paroxysmal SVT (supraventricular tachycardia)   • Dyspnea on exertion   • Obesity   • Chest pain, unspecified   • PFO (patent foramen ovale)   • Iron deficiency anemia   • Generalized weakness   • Volume depletion, gastrointestinal loss   • Viral enterocolitis       Social History   Substance Use Topics   • Smoking status: Never Smoker   • Smokeless tobacco: Never Used   • Alcohol use No       Family History   Problem Relation Age of Onset   • Arrhythmia Mother    • Heart disease Mother    • Heart disease  Father    • Heart disease Brother    • Heart disease Maternal Aunt    • Heart disease Maternal Uncle    • Heart disease Paternal Aunt    • Heart disease Paternal Uncle    • Heart disease Maternal Grandmother    • Heart disease Maternal Grandfather    • Heart disease Paternal Grandmother    • Heart disease Paternal Grandfather        The following portions of the patient's history were reviewed and updated as appropriate: allergies, current medications, past family history, past medical history, past social history, past surgical history and problem list.      Current Outpatient Prescriptions:   •  aspirin 81 MG EC tablet, Take 81 mg by mouth 2 (Two) Times a Day., Disp: , Rfl:   •  busPIRone (BUSPAR) 5 MG tablet, Take 1 tablet by mouth Daily As Needed (anxiety)., Disp: , Rfl:   •  docusate sodium (COLACE) 100 MG capsule, Take 1 capsule by mouth daily as needed for constipation., Disp: , Rfl:   •  DULoxetine (CYMBALTA) 60 MG capsule, Take 1 capsule by mouth daily., Disp: , Rfl:   •  esomeprazole (NexIUM) 40 MG capsule, Take 1 capsule by mouth daily., Disp: , Rfl:   •  folic acid (FOLVITE) 400 MCG tablet, Take 400 mcg by mouth Daily., Disp: , Rfl:   •  gabapentin (NEURONTIN) 300 MG capsule, Take 1 capsule by mouth 3 (Three) Times a Day., Disp: , Rfl:   •  HYDROcodone-acetaminophen (NORCO) 5-325 MG per tablet, Take 1 tablet by mouth Every 6 (Six) Hours As Needed for Moderate Pain (4-6). 1 to 2 tablets every 4 to 6 hours, Disp: , Rfl:   •  levothyroxine (SYNTHROID, LEVOTHROID) 88 MCG tablet, Take 88 mcg by mouth Daily., Disp: , Rfl:   •  methotrexate 2.5 MG tablet, Take 10 mg by mouth 1 (One) Time Per Week. Takes 4 tabs weekly on mondays, Disp: , Rfl:   •  metoprolol tartrate (LOPRESSOR) 25 MG tablet, Take 25 mg by mouth Daily., Disp: , Rfl:   •  oxybutynin XL (DITROPAN-XL) 5 MG 24 hr tablet, Take 5 mg by mouth Every Night., Disp: , Rfl:   •  tolterodine LA (DETROL LA) 4 MG 24 hr capsule, Take 4 mg by mouth Daily., Disp:  ", Rfl:   •  topiramate (TOPAMAX) 100 MG tablet, Take 100 mg by mouth Every Night., Disp: , Rfl:   •  topiramate (TOPAMAX) 50 MG tablet, Take 1 tablet by mouth Daily., Disp: , Rfl:     Review of Systems   Constitution: Positive for weakness and malaise/fatigue. Negative for chills and fever.   Cardiovascular: Positive for dyspnea on exertion and palpitations. Negative for chest pain and paroxysmal nocturnal dyspnea.   Respiratory: Positive for shortness of breath. Negative for cough.    Skin: Negative for rash.   Gastrointestinal: Negative for abdominal pain and heartburn.   Neurological: Positive for dizziness and light-headedness. Negative for numbness.       Objective   /60 (BP Location: Right arm, Patient Position: Sitting, Cuff Size: Adult)  Pulse 102  Ht 63\" (160 cm)  Wt 205 lb (93 kg)  SpO2 96%  BMI 36.31 kg/m2  Physical Exam   Constitutional: She is oriented to person, place, and time. She appears well-developed and well-nourished.   HENT:   Head: Normocephalic and atraumatic.   Cardiovascular: Regular rhythm and normal heart sounds.  Tachycardia present.    No murmur heard.  Pulmonary/Chest: Effort normal and breath sounds normal.   Musculoskeletal: She exhibits no edema.   Neurological: She is alert and oriented to person, place, and time.   Skin: Skin is warm and dry.   Psychiatric: She has a normal mood and affect.       ECG 12 Lead  Date/Time: 6/12/2017 3:41 PM  Performed by: LIZ ROSALES  Authorized by: LIZ ROSALES   Comparison: compared with previous ECG from 6/1/2017  Similar to previous ECG  Rhythm: sinus tachycardia  Other findings comments: Poor R wave progression                ICD-10-CM ICD-9-CM   1. Dyspnea on exertion R06.09 786.09   2. Palpitations R00.2 785.1   3. Sarcoidosis D86.9 135   4. Essential hypertension I10 401.9   5. Non morbid obesity due to excess calories E66.09 278.00         Assessment/Plan:  1. Palpitations: Her primarily secondary to sinus tachycardia; " although, she did not report any symptoms on her Holter monitor.    2.  Dyspnea on exertion: Likely multifactorial.  No evidence of significant cardiac involvement.  She had a negative dobutamine stress echo in September and nuclear SPECT in February.  No significant structural heart disease on recent echo from September 2016 other than grade 1 diastolic dysfunction.  Her symptoms are significantly out of proportion to the mild diastolic dysfunction.  Her sarcoidosis, obesity, and deconditioning may be playing a role.  Given that her symptoms have significantly worsened since this echo was done we will repeat the echocardiogram.    3.  Sarcoidosis: Follows with Dr. Scherer in pulmonology.  Has an appointment in July.  Pulmonary disease appears stable on recent CT scan.    4.  Hypertension: Blood pressure is well controlled on current medications.    5.  Obesity: BMI 36.  Likely contributing to current symptoms.   on lifestyle modification and weight loss.

## 2017-06-13 ENCOUNTER — TRANSCRIBE ORDERS (OUTPATIENT)
Dept: ADMINISTRATIVE | Facility: HOSPITAL | Age: 47
End: 2017-06-13

## 2017-06-13 DIAGNOSIS — R06.00 DYSPNEA, UNSPECIFIED TYPE: ICD-10-CM

## 2017-06-13 DIAGNOSIS — R00.0 TACHYCARDIA: Primary | ICD-10-CM

## 2017-06-15 ENCOUNTER — HOSPITAL ENCOUNTER (OUTPATIENT)
Dept: CT IMAGING | Facility: HOSPITAL | Age: 47
Discharge: HOME | End: 2017-06-15
Attending: INTERNAL MEDICINE | Admitting: INTERNAL MEDICINE

## 2017-06-15 DIAGNOSIS — R00.0 TACHYCARDIA: ICD-10-CM

## 2017-06-15 DIAGNOSIS — R06.00 DYSPNEA, UNSPECIFIED TYPE: ICD-10-CM

## 2017-06-15 PROCEDURE — 71275 CT ANGIOGRAPHY CHEST: CPT

## 2017-06-15 PROCEDURE — 0 IOPAMIDOL PER 1 ML: Performed by: INTERNAL MEDICINE

## 2017-06-15 RX ADMIN — IOPAMIDOL 150 ML: 755 INJECTION, SOLUTION INTRAVENOUS at 11:15

## 2017-06-23 ENCOUNTER — APPOINTMENT (OUTPATIENT)
Dept: CARDIOLOGY | Facility: HOSPITAL | Age: 47
End: 2017-06-23
Attending: INTERNAL MEDICINE

## 2017-06-28 ENCOUNTER — HOSPITAL ENCOUNTER (OUTPATIENT)
Dept: CARDIOLOGY | Facility: HOSPITAL | Age: 47
Discharge: HOME | End: 2017-06-28
Attending: INTERNAL MEDICINE | Admitting: INTERNAL MEDICINE

## 2017-06-28 VITALS
WEIGHT: 205 LBS | HEIGHT: 63 IN | BODY MASS INDEX: 36.32 KG/M2 | DIASTOLIC BLOOD PRESSURE: 71 MMHG | SYSTOLIC BLOOD PRESSURE: 104 MMHG

## 2017-06-28 PROCEDURE — 93306 TTE W/DOPPLER COMPLETE: CPT

## 2017-06-28 PROCEDURE — 93306 TTE W/DOPPLER COMPLETE: CPT | Performed by: INTERNAL MEDICINE

## 2017-06-29 LAB
BH CV ECHO MEAS - AO MAX PG (FULL): 4.1 MMHG
BH CV ECHO MEAS - AO MAX PG: 6.7 MMHG
BH CV ECHO MEAS - AO MEAN PG (FULL): 2 MMHG
BH CV ECHO MEAS - AO MEAN PG: 3 MMHG
BH CV ECHO MEAS - AO ROOT AREA: 11.3 CM^2
BH CV ECHO MEAS - AO ROOT DIAM: 3.8 CM
BH CV ECHO MEAS - AO V2 MAX: 129 CM/SEC
BH CV ECHO MEAS - AO V2 MEAN: 84.7 CM/SEC
BH CV ECHO MEAS - AO V2 VTI: 26.8 CM
BH CV ECHO MEAS - AVA(I,A): 2.1 CM^2
BH CV ECHO MEAS - AVA(I,D): 2.1 CM^2
BH CV ECHO MEAS - AVA(V,A): 2 CM^2
BH CV ECHO MEAS - AVA(V,D): 2 CM^2
BH CV ECHO MEAS - CONTRAST EF 4CH: 64 ML/M^2
BH CV ECHO MEAS - EDV(CUBED): 85.2 ML
BH CV ECHO MEAS - EDV(MOD-SP4): 120 ML
BH CV ECHO MEAS - EDV(TEICH): 87.7 ML
BH CV ECHO MEAS - EF(CUBED): 73.4 %
BH CV ECHO MEAS - EF(MOD-SP4): 64 %
BH CV ECHO MEAS - EF(TEICH): 65.4 %
BH CV ECHO MEAS - ESV(CUBED): 22.7 ML
BH CV ECHO MEAS - ESV(MOD-SP4): 43.2 ML
BH CV ECHO MEAS - ESV(TEICH): 30.3 ML
BH CV ECHO MEAS - FS: 35.7 %
BH CV ECHO MEAS - IVS/LVPW: 0.92
BH CV ECHO MEAS - IVSD: 1 CM
BH CV ECHO MEAS - LA DIMENSION: 3.6 CM
BH CV ECHO MEAS - LA/AO: 0.95
BH CV ECHO MEAS - LAT PEAK E' VEL: 10 CM/SEC
BH CV ECHO MEAS - LV MASS(C)D: 159.3 GRAMS
BH CV ECHO MEAS - LV MAX PG: 2.6 MMHG
BH CV ECHO MEAS - LV MEAN PG: 1 MMHG
BH CV ECHO MEAS - LV V1 MAX: 80.5 CM/SEC
BH CV ECHO MEAS - LV V1 MEAN: 49.7 CM/SEC
BH CV ECHO MEAS - LV V1 VTI: 17.6 CM
BH CV ECHO MEAS - LVIDD: 4.4 CM
BH CV ECHO MEAS - LVIDS: 2.8 CM
BH CV ECHO MEAS - LVLD AP4: 7.6 CM
BH CV ECHO MEAS - LVLS AP4: 6.4 CM
BH CV ECHO MEAS - LVOT AREA (M): 3.1 CM^2
BH CV ECHO MEAS - LVOT AREA: 3.1 CM^2
BH CV ECHO MEAS - LVOT DIAM: 2 CM
BH CV ECHO MEAS - LVPWD: 1.1 CM
BH CV ECHO MEAS - MV A MAX VEL: 63.5 CM/SEC
BH CV ECHO MEAS - MV DEC TIME: 0.25 SEC
BH CV ECHO MEAS - MV E MAX VEL: 64 CM/SEC
BH CV ECHO MEAS - MV E/A: 1
BH CV ECHO MEAS - RAP SYSTOLE: 5 MMHG
BH CV ECHO MEAS - RVSP: 24.4 MMHG
BH CV ECHO MEAS - SV(AO): 303.9 ML
BH CV ECHO MEAS - SV(CUBED): 62.5 ML
BH CV ECHO MEAS - SV(LVOT): 55.3 ML
BH CV ECHO MEAS - SV(MOD-SP4): 76.8 ML
BH CV ECHO MEAS - SV(TEICH): 57.4 ML
BH CV ECHO MEAS - TR MAX VEL: 220 CM/SEC
E/E' RATIO: 11
LEFT ATRIUM VOLUME: 101 CM3

## 2017-07-13 ENCOUNTER — PATIENT OUTREACH (OUTPATIENT)
Dept: CASE MANAGEMENT | Facility: OTHER | Age: 47
End: 2017-07-13

## 2017-07-19 ENCOUNTER — TRANSCRIBE ORDERS (OUTPATIENT)
Dept: GENERAL RADIOLOGY | Facility: HOSPITAL | Age: 47
End: 2017-07-19

## 2017-07-19 ENCOUNTER — HOSPITAL ENCOUNTER (OUTPATIENT)
Dept: GENERAL RADIOLOGY | Facility: HOSPITAL | Age: 47
Discharge: HOME | End: 2017-07-19
Attending: INTERNAL MEDICINE | Admitting: INTERNAL MEDICINE

## 2017-07-19 DIAGNOSIS — D86.1 SARCOIDOSIS OF LYMPH NODES: Primary | ICD-10-CM

## 2017-07-19 PROCEDURE — 71020 HC CHEST PA AND LATERAL: CPT

## 2017-07-27 ENCOUNTER — OFFICE VISIT (OUTPATIENT)
Dept: GASTROENTEROLOGY | Facility: CLINIC | Age: 47
End: 2017-07-27

## 2017-07-27 VITALS
HEIGHT: 63 IN | WEIGHT: 203 LBS | TEMPERATURE: 97.8 F | OXYGEN SATURATION: 78 % | HEART RATE: 79 BPM | SYSTOLIC BLOOD PRESSURE: 102 MMHG | DIASTOLIC BLOOD PRESSURE: 74 MMHG | BODY MASS INDEX: 35.97 KG/M2

## 2017-07-27 DIAGNOSIS — R10.9 ABDOMINAL CRAMPING: ICD-10-CM

## 2017-07-27 DIAGNOSIS — R10.13 EPIGASTRIC PAIN: Primary | ICD-10-CM

## 2017-07-27 DIAGNOSIS — R63.4 WEIGHT LOSS: ICD-10-CM

## 2017-07-27 DIAGNOSIS — R68.81 EARLY SATIETY: ICD-10-CM

## 2017-07-27 DIAGNOSIS — Z79.1 NSAID LONG-TERM USE: ICD-10-CM

## 2017-07-27 DIAGNOSIS — R11.0 NAUSEA: ICD-10-CM

## 2017-07-27 DIAGNOSIS — R14.0 BLOATING: ICD-10-CM

## 2017-07-27 PROBLEM — K63.5 COLON POLYPS: Status: ACTIVE | Noted: 2017-07-27

## 2017-07-27 PROCEDURE — 99214 OFFICE O/P EST MOD 30 MIN: CPT | Performed by: NURSE PRACTITIONER

## 2017-07-27 RX ORDER — TOLTERODINE 4 MG/1
CAPSULE, EXTENDED RELEASE ORAL
COMMUNITY
End: 2017-07-27

## 2017-07-27 RX ORDER — OXYBUTYNIN CHLORIDE 5 MG/1
TABLET, EXTENDED RELEASE ORAL
Status: ON HOLD | COMMUNITY
End: 2017-08-23 | Stop reason: ALTCHOICE

## 2017-07-27 RX ORDER — ESOMEPRAZOLE MAGNESIUM 40 MG/1
40 CAPSULE, DELAYED RELEASE ORAL
Qty: 60 CAPSULE | Refills: 11 | Status: SHIPPED | OUTPATIENT
Start: 2017-07-27 | End: 2018-08-06 | Stop reason: SDUPTHER

## 2017-07-27 RX ORDER — DULOXETIN HYDROCHLORIDE 60 MG/1
60 CAPSULE, DELAYED RELEASE ORAL DAILY
COMMUNITY
End: 2022-03-14

## 2017-08-03 ENCOUNTER — TRANSCRIBE ORDERS (OUTPATIENT)
Dept: ADMINISTRATIVE | Facility: HOSPITAL | Age: 47
End: 2017-08-03

## 2017-08-03 DIAGNOSIS — M50.90 CERVICAL DISC DISEASE: Primary | ICD-10-CM

## 2017-08-07 ENCOUNTER — HOSPITAL ENCOUNTER (OUTPATIENT)
Dept: MRI IMAGING | Facility: HOSPITAL | Age: 47
Discharge: HOME | End: 2017-08-07
Attending: INTERNAL MEDICINE | Admitting: INTERNAL MEDICINE

## 2017-08-07 ENCOUNTER — TRANSCRIBE ORDERS (OUTPATIENT)
Dept: ADMINISTRATIVE | Facility: HOSPITAL | Age: 47
End: 2017-08-07

## 2017-08-07 ENCOUNTER — TELEPHONE (OUTPATIENT)
Dept: GASTROENTEROLOGY | Facility: CLINIC | Age: 47
End: 2017-08-07

## 2017-08-07 DIAGNOSIS — R20.0 ARM NUMBNESS: Primary | ICD-10-CM

## 2017-08-07 DIAGNOSIS — M50.90 CERVICAL DISC DISEASE: ICD-10-CM

## 2017-08-07 PROCEDURE — 72141 MRI NECK SPINE W/O DYE: CPT

## 2017-08-07 NOTE — TELEPHONE ENCOUNTER
"Patient called stating \" it feels like I am passing a cactus when I have a BM.\"  She denies any abdominal pain, fever, chills, nausea or vomiting. She does state that there is blood sometimes when she wipes.She said that she was having trouble with diarrhea but that has stopped and now this is worse. She also states that this all started after she had knee surgery in May. She is taking Lortab when she needs them for pain.  "

## 2017-08-07 NOTE — TELEPHONE ENCOUNTER
She is already scheduled for an endo on Wednesday, August 23rd and you have your 12 cases that day--9 colons and 3 endos. Eliana said to look at the schedule and see if I can add on colon with the endo? I wanted to ask you if this is ok on that day since this would make 13 cases on an office day?? There are no other doubles scheduled.

## 2017-08-07 NOTE — TELEPHONE ENCOUNTER
We can go ahead and schedule her for a colonoscopy on the same day as she is scheduled for her endoscopy this month.  Please check with Addie to see if there is an opening and notify the patient of this.

## 2017-08-08 RX ORDER — SODIUM, POTASSIUM,MAG SULFATES 17.5-3.13G
2 SOLUTION, RECONSTITUTED, ORAL ORAL ONCE
Qty: 2 BOTTLE | Refills: 0 | Status: SHIPPED | OUTPATIENT
Start: 2017-08-08 | End: 2017-08-08

## 2017-08-08 NOTE — TELEPHONE ENCOUNTER
I left her a v/m letting her know that we have added on the colonoscopy with her endoscopy that is scheduled for 8/23. I told her we will send over a prep to her pharmacy electronically and I will fax prep instructions there.

## 2017-08-23 ENCOUNTER — HOSPITAL ENCOUNTER (OUTPATIENT)
Facility: HOSPITAL | Age: 47
Setting detail: HOSPITAL OUTPATIENT SURGERY
Discharge: HOME | End: 2017-08-23
Attending: INTERNAL MEDICINE | Admitting: INTERNAL MEDICINE

## 2017-08-23 ENCOUNTER — ANESTHESIA EVENT (OUTPATIENT)
Dept: GASTROENTEROLOGY | Facility: HOSPITAL | Age: 47
End: 2017-08-23

## 2017-08-23 ENCOUNTER — ANESTHESIA (OUTPATIENT)
Dept: GASTROENTEROLOGY | Facility: HOSPITAL | Age: 47
End: 2017-08-23

## 2017-08-23 VITALS
OXYGEN SATURATION: 100 % | SYSTOLIC BLOOD PRESSURE: 127 MMHG | WEIGHT: 210 LBS | RESPIRATION RATE: 18 BRPM | TEMPERATURE: 97.1 F | HEIGHT: 63 IN | HEART RATE: 79 BPM | DIASTOLIC BLOOD PRESSURE: 81 MMHG | BODY MASS INDEX: 37.21 KG/M2

## 2017-08-23 DIAGNOSIS — R10.9 ABDOMINAL CRAMPING: ICD-10-CM

## 2017-08-23 DIAGNOSIS — R11.0 NAUSEA: ICD-10-CM

## 2017-08-23 DIAGNOSIS — R10.13 EPIGASTRIC PAIN: ICD-10-CM

## 2017-08-23 DIAGNOSIS — R63.4 WEIGHT LOSS: ICD-10-CM

## 2017-08-23 DIAGNOSIS — R14.0 BLOATING: ICD-10-CM

## 2017-08-23 DIAGNOSIS — R68.81 EARLY SATIETY: ICD-10-CM

## 2017-08-23 PROCEDURE — 45380 COLONOSCOPY AND BIOPSY: CPT | Performed by: INTERNAL MEDICINE

## 2017-08-23 PROCEDURE — 88305 TISSUE EXAM BY PATHOLOGIST: CPT | Performed by: INTERNAL MEDICINE

## 2017-08-23 PROCEDURE — 43239 EGD BIOPSY SINGLE/MULTIPLE: CPT | Performed by: INTERNAL MEDICINE

## 2017-08-23 PROCEDURE — 25010000002 PROPOFOL 10 MG/ML EMULSION: Performed by: NURSE ANESTHETIST, CERTIFIED REGISTERED

## 2017-08-23 RX ORDER — SODIUM CHLORIDE 0.9 % (FLUSH) 0.9 %
3 SYRINGE (ML) INJECTION AS NEEDED
Status: DISCONTINUED | OUTPATIENT
Start: 2017-08-23 | End: 2017-08-23 | Stop reason: HOSPADM

## 2017-08-23 RX ORDER — LIDOCAINE HYDROCHLORIDE 10 MG/ML
0.5 INJECTION, SOLUTION INFILTRATION; PERINEURAL ONCE AS NEEDED
Status: DISCONTINUED | OUTPATIENT
Start: 2017-08-23 | End: 2017-08-23 | Stop reason: HOSPADM

## 2017-08-23 RX ORDER — LIDOCAINE HYDROCHLORIDE 20 MG/ML
INJECTION, SOLUTION INFILTRATION; PERINEURAL AS NEEDED
Status: DISCONTINUED | OUTPATIENT
Start: 2017-08-23 | End: 2017-08-23 | Stop reason: SURG

## 2017-08-23 RX ORDER — PROPOFOL 10 MG/ML
VIAL (ML) INTRAVENOUS AS NEEDED
Status: DISCONTINUED | OUTPATIENT
Start: 2017-08-23 | End: 2017-08-23 | Stop reason: SURG

## 2017-08-23 RX ORDER — SODIUM CHLORIDE 9 MG/ML
500 INJECTION, SOLUTION INTRAVENOUS CONTINUOUS PRN
Status: DISCONTINUED | OUTPATIENT
Start: 2017-08-23 | End: 2017-08-23 | Stop reason: HOSPADM

## 2017-08-23 RX ADMIN — PROPOFOL 200 MG: 10 INJECTION, EMULSION INTRAVENOUS at 12:24

## 2017-08-23 RX ADMIN — PROPOFOL 200 MG: 10 INJECTION, EMULSION INTRAVENOUS at 12:35

## 2017-08-23 RX ADMIN — LIDOCAINE HYDROCHLORIDE 100 MG: 20 INJECTION, SOLUTION INFILTRATION; PERINEURAL at 12:24

## 2017-08-23 RX ADMIN — PROPOFOL 200 MG: 10 INJECTION, EMULSION INTRAVENOUS at 12:45

## 2017-08-23 RX ADMIN — SODIUM CHLORIDE: 0.9 INJECTION, SOLUTION INTRAVENOUS at 12:17

## 2017-08-23 RX ADMIN — PROPOFOL 200 MG: 10 INJECTION, EMULSION INTRAVENOUS at 12:28

## 2017-08-23 NOTE — PLAN OF CARE
Problem: Patient Care Overview (Adult)  Goal: Plan of Care Review  Outcome: Ongoing (interventions implemented as appropriate)    08/23/17 1252   Coping/Psychosocial Response Interventions   Plan Of Care Reviewed With patient   Patient Care Overview   Progress improving   Outcome Evaluation   Outcome Summary/Follow up Plan no problems noted         Problem: GI Endoscopy (Adult)  Goal: Signs and Symptoms of Listed Potential Problems Will be Absent or Manageable (GI Endoscopy)  Outcome: Ongoing (interventions implemented as appropriate)    08/23/17 1252   GI Endoscopy   Problems Assessed (GI Endoscopy) all   Problems Present (GI Endoscopy) none

## 2017-08-23 NOTE — PLAN OF CARE
Problem: Patient Care Overview (Adult)  Goal: Plan of Care Review  Outcome: Outcome(s) achieved Date Met:  08/23/17 08/23/17 2556   Coping/Psychosocial Response Interventions   Plan Of Care Reviewed With patient;sibling   Patient Care Overview   Progress improving   Outcome Evaluation   Outcome Summary/Follow up Plan D/C CRITERIA MET

## 2017-08-23 NOTE — ANESTHESIA PREPROCEDURE EVALUATION
Anesthesia Evaluation     Patient summary reviewed and Nursing notes reviewed   no history of anesthetic complications:  NPO Solid Status: > 8 hours  NPO Liquid Status: > 4 hours     Airway   Mallampati: I  TM distance: >3 FB  Neck ROM: full  no difficulty expected  Dental      Pulmonary     breath sounds clear to auscultation  (+) shortness of breath,     ROS comment: Sarcoidosis, on 2L o2 at all times  Cardiovascular   Exercise tolerance: poor (<4 METS)    ECG reviewed  Patient on routine beta blocker and Beta blocker given within 24 hours of surgery  Rhythm: regular  Rate: normal    (+) hypertension, dysrhythmias (svt on toprol) Tachycardia,     ROS comment: Negative stress test 2 months ago  Echo shows atrial septal aneurysm, pt has h/o PFO    Neuro/Psych- negative ROS  (-) seizures, TIA, CVA  GI/Hepatic/Renal/Endo    (+) obesity,  renal disease CRI, hypothyroidism,   (-) liver disease, diabetes    Musculoskeletal     Abdominal    Substance History - negative use     OB/GYN negative ob/gyn ROS         Other   (+) arthritis                                   Anesthesia Plan    ASA 3     general   (Care with bubbles/pfo precautions)  intravenous induction   Anesthetic plan and risks discussed with patient.

## 2017-08-23 NOTE — H&P (VIEW-ONLY)
Chief Complaint:   Chief Complaint   Patient presents with   • Heartburn     Patient is here today with weightloss, cramps, bloating, and reflux.   • Bloated   • Weight Loss         Patient ID: Stacy Lynn is a 47 y.o. female     History of Present Illness:  This is a very pleasant 47-year-old female who comes today with a referral from Dr. Frye for complaints of epigastric pain, nausea, weight loss, bloating, abdominal cramping, and early satiety.    The patient states that approximately 2 months ago she began to have some epigastric pain and loss of appetite.  She states that she was treated with Megace however this made her very nauseated.  She states over the two-month.  She seemed to begin to have more and more epigastric pain.  She states that she stayed nauseated quite a bit however had no vomiting.  She states that she began to lose her appetite and in the meantime has lost approximately 33 pounds.  He states that she notes that she is more bloated with eating most any food now.  She states she does have some abdominal cramping but this does not necessarily have to be associated with eating.  After much discussion the patient did tell me that she has had a history of NSAID use with diclofenac for approximately 1 month prior to her symptoms beginning.    The patient tells me that she also had an endoscopy at [facility] was diagnosed with sarcoidosis She thinks.  I have obtained the endoscopy performed on 2/10/16 with findings of esophageal mild lumen narrowing in the distal esophagus that was dilated.  Stomach mucosal changes concerning underlying gastritis.  Biopsies were taken along with a check for H. pylori however I do not have those results.    Past Medical History:   Diagnosis Date   • Chronic pain    • Colon polyp    • Degeneration of intervertebral disc of high cervical region    • Hypertension    • Hyperthyroidism    • Irritable bowel syndrome    • Macular degeneration    • PFO (patent foramen  wes) 09/2016   • PSVT (paroxysmal supraventricular tachycardia)     s/p ablation per Dr. Diallo 4/2016   • Restless leg syndrome    • Sarcoidosis    • Scoliosis        Past Surgical History:   Procedure Laterality Date   • CARDIAC ABLATION  04/2016    SVT; Dr. Diallo    • CHOLECYSTECTOMY     • COLONOSCOPY  10/13/2014   • HYSTERECTOMY     • KIDNEY STONE SURGERY     • KNEE ARTHROSCOPY     • KNEE SURGERY Right    • LATERAL EPICONDYLE RELEASE     • PATELLA SURGERY     • REPLACEMENT TOTAL KNEE     • SHOULDER ARTHROSCOPY     • ULNAR NERVE DECOMPRESSION           Current Outpatient Prescriptions:   •  aspirin 81 MG EC tablet, Take 81 mg by mouth 2 (Two) Times a Day., Disp: , Rfl:   •  busPIRone (BUSPAR) 5 MG tablet, Take 1 tablet by mouth Daily As Needed (anxiety)., Disp: , Rfl:   •  docusate sodium (COLACE) 100 MG capsule, Take 1 capsule by mouth daily as needed for constipation., Disp: , Rfl:   •  DULoxetine (CYMBALTA) 60 MG capsule, Take  by mouth., Disp: , Rfl:   •  folic acid (FOLVITE) 400 MCG tablet, Take 400 mcg by mouth Daily., Disp: , Rfl:   •  gabapentin (NEURONTIN) 300 MG capsule, Take 1 capsule by mouth 3 (Three) Times a Day., Disp: , Rfl:   •  HYDROcodone-acetaminophen (NORCO) 5-325 MG per tablet, Take 1 tablet by mouth Every 6 (Six) Hours As Needed for Moderate Pain (4-6). 1 to 2 tablets every 4 to 6 hours, Disp: , Rfl:   •  levothyroxine (SYNTHROID, LEVOTHROID) 88 MCG tablet, Take 88 mcg by mouth Daily., Disp: , Rfl:   •  methotrexate 2.5 MG tablet, Take 10 mg by mouth 1 (One) Time Per Week. Takes 4 tabs weekly on mondays, Disp: , Rfl:   •  metoprolol tartrate (LOPRESSOR) 25 MG tablet, Take 25 mg by mouth Daily., Disp: , Rfl:   •  oxybutynin XL (DITROPAN-XL) 5 MG 24 hr tablet, Take  by mouth., Disp: , Rfl:   •  tolterodine LA (DETROL LA) 4 MG 24 hr capsule, Take 4 mg by mouth Daily., Disp: , Rfl:   •  topiramate (TOPAMAX) 100 MG tablet, Take 100 mg by mouth Every Night., Disp: , Rfl:   •  esomeprazole  "(NEXIUM) 40 MG capsule, Take 1 capsule by mouth 2 (Two) Times a Day Before Meals., Disp: 60 capsule, Rfl: 11    Allergies   Allergen Reactions   • Adhesive Tape    • Tape        Social History     Social History   • Marital status: Single     Spouse name: N/A   • Number of children: N/A   • Years of education: N/A     Occupational History   • Not on file.     Social History Main Topics   • Smoking status: Never Smoker   • Smokeless tobacco: Never Used   • Alcohol use No   • Drug use: No   • Sexual activity: Defer     Other Topics Concern   • Not on file     Social History Narrative       Family History   Problem Relation Age of Onset   • Arrhythmia Mother    • Heart disease Mother    • Heart disease Father    • Heart disease Brother    • Heart disease Maternal Aunt    • Heart disease Maternal Uncle    • Heart disease Paternal Aunt    • Heart disease Paternal Uncle    • Heart disease Maternal Grandmother    • Heart disease Maternal Grandfather    • Heart disease Paternal Grandmother    • Heart disease Paternal Grandfather        Vitals:    07/27/17 1401   BP: 102/74   Pulse: 79   Temp: 97.8 °F (36.6 °C)   SpO2: (!) 78%   Weight: 203 lb (92.1 kg)   Height: 63\" (160 cm)       Review of Systems:    General:    Present -feeling well   Skin:    Not Present-Rash   HEENT:     Not Present-Acute visual changes or Acute hearing changes   Neck :    Not Present- swollen glands   Genitourinary:      Not Present- burning, frequency, urgency hematuria, dysuria,   Cardiovascular:   Not Present-chest pain, palpitations, or pressure   Respiratory:   Not Present- shortness of breath or cough   Gastrointestinal:  Musculoskeletal:  Neurological:  Psychiatric:   Present as mentioned in the HP    Not Present. Recent gait disturbances.    Not Present-Seizures and weakness in extremities.    Not Present- Anxiety or Depression.       Physical Exam:    General Appearance:    Alert, cooperative, in no acute distress   Psych:    Mood appropriate "    Eyes:          conjunctivae and sclerae normal, no   icterus, no pallor   ENMT:    Ears appear intact with no abnormalities noted oral mucosa moist   Neck:   No adenopathy, supple, trachea midline, no thyromegaly, no   carotid bruit, no JVD    Cardiovascular:    Regular rhythm and normal rate, normal S1 and S2, no            murmur, no gallop, no rub, no click   Gastrointestinal:     Inspection normal.  Normal bowel sounds, no masses, no organomegaly, soft round non-tender, non-distended, no guarding, no rebound or tenderness. No hepatosplenomegaly.   Skin:   No bleeding, bruising or rash   Neurologic:   nonfocal       Lab Results   Component Value Date    WBC 2.82 (L) 06/02/2017    WBC 2.96 (L) 05/15/2017    WBC 1.92 (C) 03/11/2017    HGB 10.7 (L) 06/02/2017    HGB 9.5 (L) 05/15/2017    HGB 11.0 (L) 03/11/2017    HCT 33.5 (L) 06/02/2017    HCT 28.9 (L) 05/15/2017    HCT 34.6 (L) 03/11/2017     06/02/2017     05/15/2017     (L) 03/11/2017        Lab Results   Component Value Date     06/03/2017     06/02/2017     06/02/2017    K 4.0 06/03/2017    K 4.0 06/02/2017    K 4.4 06/02/2017     06/03/2017     06/02/2017     06/02/2017    CO2 23.0 (L) 06/03/2017    CO2 22.0 (L) 06/02/2017    CO2 24.0 06/02/2017    BUN 11 06/03/2017    BUN 15 06/02/2017    BUN 19 06/02/2017    CREATININE 1.24 06/03/2017    CREATININE 1.30 06/02/2017    CREATININE 1.68 (H) 06/02/2017    BILITOT 0.9 06/02/2017    BILITOT 0.8 05/15/2017    BILITOT 0.6 03/11/2017    ALKPHOS 97 06/02/2017    ALKPHOS 107 05/15/2017    ALKPHOS 69 03/11/2017    ALT 20 06/02/2017    ALT 18 05/15/2017    ALT 22 03/11/2017    AST 26 06/02/2017    AST 26 05/15/2017    AST 21 03/11/2017    GLUCOSE 88 06/03/2017    GLUCOSE 92 06/02/2017    GLUCOSE 105 (H) 06/02/2017       Lab Results   Component Value Date    INR 1.05 06/02/2017    INR 1.07 03/11/2017    INR 1.05 01/17/2017       Assessment and Plan:    Hope was  seen today for heartburn, bloated and weight loss.    Diagnoses and all orders for this visit:    Epigastric pain  -     Case Request; Standing  -     Case Request Upper endoscopy    Nausea  -     Case Request; Standing  -     Case Request    Bloating  -     Case Request; Standing  -     Case Request    Weight loss  Comments:  Weight loss of 33lbs in approximately 2 monts  Orders:  -     Case Request; Standing  -     Case Request    Early satiety  -     Case Request; Standing  -     Case Request    Abdominal cramping  -     Case Request; Standing  -     Case Request    NSAID long-term use  Avoid NSAID use.  Other orders  -     Implement Anesthesia Orders Day of Procedure; Standing  -     Obtain Informed Consent; Standing  -     esomeprazole (NEXIUM) 40 MG capsule; Take 1 capsule by mouth 2 (Two) Times a Day Before Meals.    I have scheduled the patient for an upper endoscopy.  I will increase her Nexium to 40 mg twice a day.  She no longer takes the diclofenac but I did discuss with her to avoid all NSAIDs.  She gave verbal understanding.  There are no Patient Instructions on file for this visit.    Next follow-up appointment    The risks, benefits, and alternatives of endoscopy were reviewed with the patient today.  Risks including perforation, with or without dilation, possibly requiring surgery.  Additional risks include risk of bleeding.  There is also the risk of a drug reaction or problems with anesthesia.  This will be discussed with the further by the anesthesia team on the day of the procedure. The benefits include the diagnosis and management of disease of the upper digestive tract.  Alternatives to endoscopy include upper GI series, laboratory testing, radiographic evaluation, or no intervention.  The patient verbalizes understanding and agrees.      EMR Dragon/Transcription disclaimer:  Much of this encounter note is an electronic transcription/translation of spoken language to printed text. The  electronic translation of spoken language may permit erroneous, or at times, nonsensical words or phrases to be inadvertently transcribed; although I have reviewed the note for such errors, some may still exist.

## 2017-08-23 NOTE — ANESTHESIA POSTPROCEDURE EVALUATION
"Patient: Stacy Lynn    Procedure Summary     Date Anesthesia Start Anesthesia Stop Room / Location    08/23/17 1217 1252 [facility] ENDOSCOPY 5 / [facility]       Procedure Diagnosis Surgeon Provider    ESOPHAGOGASTRODUODENOSCOPY WITH ANESTHESIA (N/A Esophagus); COLONOSCOPY WITH ANESTHESIA (N/A ) Early satiety; Nausea; Bloating; Epigastric pain; Weight loss; Abdominal cramping  (Early satiety [R68.81]; Nausea [R11.0]; Bloating [R14.0]; Epigastric pain [R10.13]; Weight loss [R63.4]; Abdominal cramping [R10.9]) MD Jr Daniel CRNA          Anesthesia Type: general  Last vitals  BP        Temp        Pulse       Resp        SpO2          Post Anesthesia Care and Evaluation    Patient location during evaluation: PACU  Patient participation: complete - patient participated  Level of consciousness: awake and alert  Pain score: 1  Pain management: adequate  Airway patency: patent  Anesthetic complications: No anesthetic complications    Cardiovascular status: acceptable  Respiratory status: room air  Hydration status: acceptable    Blood pressure 160/100, pulse 115, temperature 97.1 °F (36.2 °C), temperature source Temporal Artery , resp. rate 20, height 63\" (160 cm), weight 210 lb (95.3 kg), SpO2 100 %.      "

## 2017-08-24 LAB
CYTO UR: NORMAL
LAB AP CASE REPORT: NORMAL
LAB AP CLINICAL INFORMATION: NORMAL
Lab: NORMAL
PATH REPORT.FINAL DX SPEC: NORMAL
PATH REPORT.GROSS SPEC: NORMAL

## 2017-08-25 ENCOUNTER — TELEPHONE (OUTPATIENT)
Dept: GASTROENTEROLOGY | Facility: CLINIC | Age: 47
End: 2017-08-25

## 2017-08-25 NOTE — TELEPHONE ENCOUNTER
----- Message from Jeanette Mclaughlin MD sent at 8/25/2017  7:58 AM CDT -----  I am writing to inform you that your colon and small bowel biopsies were normal.  You will need a repeat colonoscopy in 5 years.    If you have any questions, please call our office.      Sincerely,        Jeanette Mclaughlin MD

## 2017-08-28 ENCOUNTER — OFFICE VISIT (OUTPATIENT)
Dept: CARDIOLOGY | Facility: CLINIC | Age: 47
End: 2017-08-28

## 2017-08-28 VITALS
OXYGEN SATURATION: 98 % | BODY MASS INDEX: 38.27 KG/M2 | SYSTOLIC BLOOD PRESSURE: 108 MMHG | HEART RATE: 107 BPM | HEIGHT: 63 IN | DIASTOLIC BLOOD PRESSURE: 74 MMHG | WEIGHT: 216 LBS

## 2017-08-28 DIAGNOSIS — I10 ESSENTIAL HYPERTENSION: Primary | ICD-10-CM

## 2017-08-28 DIAGNOSIS — R00.0 SINUS TACHYCARDIA: ICD-10-CM

## 2017-08-28 DIAGNOSIS — E66.09 NON MORBID OBESITY DUE TO EXCESS CALORIES: ICD-10-CM

## 2017-08-28 DIAGNOSIS — R60.0 LOCALIZED EDEMA: ICD-10-CM

## 2017-08-28 PROBLEM — R07.9 CHEST PAIN, UNSPECIFIED: Status: RESOLVED | Noted: 2017-01-17 | Resolved: 2017-08-28

## 2017-08-28 PROCEDURE — 99214 OFFICE O/P EST MOD 30 MIN: CPT | Performed by: INTERNAL MEDICINE

## 2017-08-28 RX ORDER — METOPROLOL TARTRATE 50 MG/1
50 TABLET, FILM COATED ORAL
COMMUNITY
End: 2017-09-07 | Stop reason: DRUGHIGH

## 2017-08-28 NOTE — PROGRESS NOTES
Reason for Visit: cardiovascular follow up.    HPI:  Stacy Lynn is a 47 y.o. female is here today for follow-up.  She has been feeling better today.  Metoprolol was increased and she feels this has helped her overall.  Breathing has remained stable.  She continues to have lower extremity edema.  Is not able to get compression stockings on.  Has never seen a vascular surgeon.  Her breathing remains at baseline.  Echo done last visit demonstrated normal cardiac structure and function.    Previous Cardiac Testing and Procedures:  - Dobutamine stress echo (09/26/2016) low risk for ischemia  - Echo (09/26/2016) EF 65%, grade 1 diastolic dysfunction, normal RV size and function, normal valves, right to left shunt on bubble consistent with PFO  - Nuclear stress (02/01/2017) normal myocardial perfusion no evidence of ischemia, EF > 70%  - CTA chest (05/15/2017) no pulmonary embolism, stable mediastinal and hilar lymphadenopathy  - Holter monitor (06/06/2017) average heart rate 97 bpm with range of .  Rare atrial ectopic beats, no symptoms reported  - Echo (06/28/2017) EF 61-65%, normal diastolic function, normal RV size and function, no significant valvular dysfunction, atrial septal aneurysm present    Patient Active Problem List   Diagnosis   • Palpitations   • Thrombocytopenia   • Sarcoidosis   • Essential hypertension   • Hypothyroidism   • Dyspnea on exertion   • Obesity   • PFO (patent foramen ovale)   • Iron deficiency anemia   • Generalized weakness   • Volume depletion, gastrointestinal loss   • Viral enterocolitis   • Colon polyps   • Epigastric pain   • Bloating   • Early satiety   • Weight loss   • Abdominal cramping   • Nausea   • NSAID long-term use       Social History   Substance Use Topics   • Smoking status: Never Smoker   • Smokeless tobacco: Never Used   • Alcohol use No       Family History   Problem Relation Age of Onset   • Arrhythmia Mother    • Heart disease Mother    • Heart disease  Father    • Heart disease Brother    • Heart disease Maternal Aunt    • Heart disease Maternal Uncle    • Heart disease Paternal Aunt    • Heart disease Paternal Uncle    • Heart disease Maternal Grandmother    • Heart disease Maternal Grandfather    • Heart disease Paternal Grandmother    • Heart disease Paternal Grandfather        The following portions of the patient's history were reviewed and updated as appropriate: allergies, current medications, past family history, past medical history, past social history, past surgical history and problem list.      Current Outpatient Prescriptions:   •  busPIRone (BUSPAR) 5 MG tablet, Take 1 tablet by mouth Daily As Needed (anxiety)., Disp: , Rfl:   •  docusate sodium (COLACE) 100 MG capsule, Take 1 capsule by mouth daily as needed for constipation., Disp: , Rfl:   •  DULoxetine (CYMBALTA) 60 MG capsule, Take  by mouth., Disp: , Rfl:   •  esomeprazole (NEXIUM) 40 MG capsule, Take 1 capsule by mouth 2 (Two) Times a Day Before Meals., Disp: 60 capsule, Rfl: 11  •  folic acid (FOLVITE) 400 MCG tablet, Take 400 mcg by mouth Daily., Disp: , Rfl:   •  gabapentin (NEURONTIN) 300 MG capsule, Take 1 capsule by mouth 3 (Three) Times a Day., Disp: , Rfl:   •  HYDROcodone-acetaminophen (NORCO) 5-325 MG per tablet, Take 1 tablet by mouth Every 6 (Six) Hours As Needed for Moderate Pain (4-6). 1 to 2 tablets every 4 to 6 hours, Disp: , Rfl:   •  levothyroxine (SYNTHROID, LEVOTHROID) 88 MCG tablet, Take 88 mcg by mouth Daily., Disp: , Rfl:   •  methotrexate 2.5 MG tablet, Take 10 mg by mouth 1 (One) Time Per Week. Takes 4 tabs weekly on mondays, Disp: , Rfl:   •  metoprolol tartrate (LOPRESSOR) 50 MG tablet, Take 50 mg by mouth., Disp: , Rfl:   •  tolterodine LA (DETROL LA) 4 MG 24 hr capsule, Take 4 mg by mouth Daily., Disp: , Rfl:   •  topiramate (TOPAMAX) 100 MG tablet, Take 100 mg by mouth Every Night., Disp: , Rfl:     Review of Systems   Constitution: Negative for chills and fever.  "  Cardiovascular: Positive for leg swelling and palpitations. Negative for chest pain and paroxysmal nocturnal dyspnea.   Respiratory: Positive for shortness of breath. Negative for cough.    Skin: Negative for rash.   Gastrointestinal: Negative for abdominal pain and heartburn.   Neurological: Negative for dizziness and numbness.       Objective   /74 (BP Location: Left arm, Patient Position: Sitting, Cuff Size: Adult)  Pulse 107  Ht 63\" (160 cm)  Wt 216 lb (98 kg)  SpO2 98%  BMI 38.26 kg/m2  Physical Exam   Constitutional: She is oriented to person, place, and time. She appears well-developed and well-nourished.   HENT:   Head: Normocephalic and atraumatic.   Cardiovascular: Normal rate, regular rhythm and normal heart sounds.    No murmur heard.  Pulmonary/Chest: Effort normal and breath sounds normal.   Musculoskeletal: She exhibits edema (moderate to severe lower extremity).   Neurological: She is alert and oriented to person, place, and time.   Skin: Skin is warm and dry.   Psychiatric: She has a normal mood and affect.     Procedures      ICD-10-CM ICD-9-CM   1. Essential hypertension I10 401.9   2. Non morbid obesity due to excess calories E66.09 278.00   3. Localized edema R60.0 782.3   4. Sinus tachycardia R00.0 427.89         Assessment/Plan:  1. Essential hypertension: Blood pressure is well controlled today on current therapy.    2.  Obesity: BMI 38.  Her edema is likely contributing significantly.  Continue to  on diet, excise, weight loss.    3.  Localized edema: Possibly has a component of lymphedema.  This is noncardiogenic edema.  Unable to tolerate compression stockings.  We will refer to vascular surgery to see if there is any other possible options.    4.  Sinus tachycardia: Controlled on metoprolol.  No evidence of significant dysrhythmia on Holter monitor.     "

## 2017-08-31 ENCOUNTER — TRANSCRIBE ORDERS (OUTPATIENT)
Dept: ADMINISTRATIVE | Facility: HOSPITAL | Age: 47
End: 2017-08-31

## 2017-08-31 ENCOUNTER — APPOINTMENT (OUTPATIENT)
Dept: LAB | Facility: HOSPITAL | Age: 47
End: 2017-08-31

## 2017-08-31 ENCOUNTER — INFUSION (OUTPATIENT)
Dept: ONCOLOGY | Facility: HOSPITAL | Age: 47
End: 2017-08-31

## 2017-08-31 VITALS
HEART RATE: 84 BPM | BODY MASS INDEX: 39.51 KG/M2 | RESPIRATION RATE: 20 BRPM | OXYGEN SATURATION: 100 % | HEIGHT: 63 IN | WEIGHT: 223 LBS | SYSTOLIC BLOOD PRESSURE: 126 MMHG | TEMPERATURE: 97.5 F | DIASTOLIC BLOOD PRESSURE: 87 MMHG

## 2017-08-31 DIAGNOSIS — D50.9 IRON DEFICIENCY ANEMIA, UNSPECIFIED: Primary | ICD-10-CM

## 2017-08-31 DIAGNOSIS — D63.1 ANEMIA IN CHRONIC KIDNEY DISEASE (CKD): Primary | ICD-10-CM

## 2017-08-31 DIAGNOSIS — N18.9 ANEMIA IN CHRONIC KIDNEY DISEASE (CKD): Primary | ICD-10-CM

## 2017-08-31 PROBLEM — T45.1X5A ANTINEOPLASTIC CHEMOTHERAPY INDUCED ANEMIA: Status: ACTIVE | Noted: 2017-08-31

## 2017-08-31 PROBLEM — D64.81 ANTINEOPLASTIC CHEMOTHERAPY INDUCED ANEMIA: Status: ACTIVE | Noted: 2017-08-31

## 2017-08-31 LAB
ABO GROUP BLD: NORMAL
BLD GP AB SCN SERPL QL: NEGATIVE
RH BLD: POSITIVE

## 2017-08-31 PROCEDURE — 86850 RBC ANTIBODY SCREEN: CPT | Performed by: INTERNAL MEDICINE

## 2017-08-31 PROCEDURE — 86923 COMPATIBILITY TEST ELECTRIC: CPT

## 2017-08-31 PROCEDURE — 86900 BLOOD TYPING SEROLOGIC ABO: CPT | Performed by: INTERNAL MEDICINE

## 2017-08-31 PROCEDURE — 36415 COLL VENOUS BLD VENIPUNCTURE: CPT

## 2017-08-31 PROCEDURE — 86901 BLOOD TYPING SEROLOGIC RH(D): CPT | Performed by: INTERNAL MEDICINE

## 2017-08-31 PROCEDURE — 96372 THER/PROPH/DIAG INJ SC/IM: CPT

## 2017-08-31 PROCEDURE — 63510000001 EPOETIN ALFA PER 1000 UNITS: Performed by: INTERNAL MEDICINE

## 2017-08-31 RX ADMIN — ERYTHROPOIETIN 40000 UNITS: 40000 INJECTION, SOLUTION INTRAVENOUS; SUBCUTANEOUS at 15:42

## 2017-09-01 ENCOUNTER — INFUSION (OUTPATIENT)
Dept: ONCOLOGY | Facility: HOSPITAL | Age: 47
End: 2017-09-01

## 2017-09-01 DIAGNOSIS — T45.1X5A ANTINEOPLASTIC CHEMOTHERAPY INDUCED ANEMIA: ICD-10-CM

## 2017-09-01 DIAGNOSIS — D64.81 ANTINEOPLASTIC CHEMOTHERAPY INDUCED ANEMIA: ICD-10-CM

## 2017-09-01 PROCEDURE — 86900 BLOOD TYPING SEROLOGIC ABO: CPT

## 2017-09-01 PROCEDURE — 96367 TX/PROPH/DG ADDL SEQ IV INF: CPT

## 2017-09-01 PROCEDURE — 96376 TX/PRO/DX INJ SAME DRUG ADON: CPT

## 2017-09-01 PROCEDURE — 96365 THER/PROPH/DIAG IV INF INIT: CPT

## 2017-09-01 PROCEDURE — 25010000002 FUROSEMIDE PER 20 MG: Performed by: INTERNAL MEDICINE

## 2017-09-01 PROCEDURE — P9016 RBC LEUKOCYTES REDUCED: HCPCS

## 2017-09-01 PROCEDURE — 63710000001 ACETAMINOPHEN 500 MG TABLET: Performed by: INTERNAL MEDICINE

## 2017-09-01 PROCEDURE — 96375 TX/PRO/DX INJ NEW DRUG ADDON: CPT

## 2017-09-01 PROCEDURE — 96374 THER/PROPH/DIAG INJ IV PUSH: CPT

## 2017-09-01 PROCEDURE — 25010000002 DIPHENHYDRAMINE PER 50 MG: Performed by: INTERNAL MEDICINE

## 2017-09-01 PROCEDURE — A9270 NON-COVERED ITEM OR SERVICE: HCPCS | Performed by: INTERNAL MEDICINE

## 2017-09-01 PROCEDURE — 36430 TRANSFUSION BLD/BLD COMPNT: CPT

## 2017-09-01 RX ORDER — FUROSEMIDE 10 MG/ML
20 INJECTION INTRAMUSCULAR; INTRAVENOUS ONCE
Status: DISCONTINUED | OUTPATIENT
Start: 2017-09-01 | End: 2017-09-01 | Stop reason: SDUPTHER

## 2017-09-01 RX ORDER — ACETAMINOPHEN 500 MG
500 TABLET ORAL ONCE
Status: COMPLETED | OUTPATIENT
Start: 2017-09-01 | End: 2017-09-01

## 2017-09-01 RX ORDER — FUROSEMIDE 10 MG/ML
20 INJECTION INTRAMUSCULAR; INTRAVENOUS ONCE
Status: COMPLETED | OUTPATIENT
Start: 2017-09-01 | End: 2017-09-01

## 2017-09-01 RX ORDER — SODIUM CHLORIDE 9 MG/ML
250 INJECTION, SOLUTION INTRAVENOUS AS NEEDED
Status: DISCONTINUED | OUTPATIENT
Start: 2017-09-01 | End: 2017-09-01 | Stop reason: HOSPADM

## 2017-09-01 RX ORDER — ACETAMINOPHEN 500 MG
500 TABLET ORAL ONCE
Status: DISCONTINUED | OUTPATIENT
Start: 2017-09-01 | End: 2017-09-01 | Stop reason: SDUPTHER

## 2017-09-01 RX ADMIN — SODIUM CHLORIDE 250 ML: 9 INJECTION, SOLUTION INTRAVENOUS at 08:45

## 2017-09-01 RX ADMIN — FUROSEMIDE 20 MG: 10 INJECTION, SOLUTION INTRAMUSCULAR; INTRAVENOUS at 14:04

## 2017-09-01 RX ADMIN — FUROSEMIDE 20 MG: 10 INJECTION, SOLUTION INTRAMUSCULAR; INTRAVENOUS at 11:27

## 2017-09-01 RX ADMIN — DIPHENHYDRAMINE HYDROCHLORIDE 25 MG: 50 INJECTION, SOLUTION INTRAMUSCULAR; INTRAVENOUS at 08:53

## 2017-09-01 RX ADMIN — ACETAMINOPHEN 500 MG: 500 TABLET ORAL at 08:57

## 2017-09-01 NOTE — PROGRESS NOTES
0815 Patient has never received blood transfusion before. Blood transfusion teaching sheet given and discussed with patient prior to signing of consent.  Keara ALBERTO      [id number] Blood transfusion after care sheet given and discussed with patient prior to discharge.  Keara ALBERTO

## 2017-09-01 NOTE — PATIENT INSTRUCTIONS
Blood Transfusion, Care After  Refer to this sheet in the next few weeks. These instructions provide you with information about caring for yourself after your procedure. Your health care provider may also give you more specific instructions. Your treatment has been planned according to current medical practices, but problems sometimes occur. Call your health care provider if you have any problems or questions after your procedure.  WHAT TO EXPECT AFTER THE PROCEDURE  After your procedure, it is common to have:  · Bruising and soreness at the IV site.  · Chills or fever.  · Headache.  HOME CARE INSTRUCTIONS  · Take medicines only as directed by your health care provider. Ask your health care provider if you can take an over-the-counter pain reliever in case you have a fever or headache a day or two after your transfusion.  · Return to your normal activities as directed by your health care provider.  SEEK MEDICAL CARE IF:   · You develop redness or irritation at your IV site.  · You have persistent fever, chills, or headache.  · Your urine is darker than normal.  · Your urine turns pink, red, or brown.    · The white part of your eye turns yellow (jaundice).    · You feel weak after doing your normal activities.    SEEK IMMEDIATE MEDICAL CARE IF:   · You have trouble breathing.  · You have fever and chills along with:    Anxiety.    Chest or back pain.    Flushed skin.    Clammy skin.    A rapid heartbeat.    Nausea.     This information is not intended to replace advice given to you by your health care provider. Make sure you discuss any questions you have with your health care provider.     Document Released: 01/08/2016 Document Reviewed: 01/08/2016  [employer] Interactive Patient Education ©2017 [employer]

## 2017-09-02 ENCOUNTER — APPOINTMENT (OUTPATIENT)
Dept: LAB | Facility: HOSPITAL | Age: 47
End: 2017-09-02
Attending: INTERNAL MEDICINE

## 2017-09-02 ENCOUNTER — TRANSCRIBE ORDERS (OUTPATIENT)
Dept: ADMINISTRATIVE | Facility: HOSPITAL | Age: 47
End: 2017-09-02

## 2017-09-02 DIAGNOSIS — E66.9 OBESITY, UNSPECIFIED OBESITY SEVERITY, UNSPECIFIED OBESITY TYPE: ICD-10-CM

## 2017-09-02 DIAGNOSIS — R53.83 FATIGUE DUE TO DEPRESSION: ICD-10-CM

## 2017-09-02 DIAGNOSIS — E23.7: ICD-10-CM

## 2017-09-02 DIAGNOSIS — K21.9 CHRONIC GASTROESOPHAGEAL REFLUX DISEASE: ICD-10-CM

## 2017-09-02 DIAGNOSIS — F32.A DEPRESSION, ACUTE: ICD-10-CM

## 2017-09-02 DIAGNOSIS — D64.9 ANEMIA, UNSPECIFIED TYPE: ICD-10-CM

## 2017-09-02 DIAGNOSIS — K86.1 CHRONIC PANCREATITIS, UNSPECIFIED PANCREATITIS TYPE (HCC): ICD-10-CM

## 2017-09-02 DIAGNOSIS — Z83.79: ICD-10-CM

## 2017-09-02 DIAGNOSIS — D86.9 SARCOIDOSIS: ICD-10-CM

## 2017-09-02 DIAGNOSIS — E53.8 VITAMIN B12 DEFICIENCY: ICD-10-CM

## 2017-09-02 DIAGNOSIS — Z82.0 FH: MIGRAINES: ICD-10-CM

## 2017-09-02 DIAGNOSIS — R32 INCONTINENCE OF URINE IN FEMALE: ICD-10-CM

## 2017-09-02 DIAGNOSIS — M41.9 SCOLIOSIS, UNSPECIFIED SCOLIOSIS TYPE, UNSPECIFIED SPINAL REGION: ICD-10-CM

## 2017-09-02 DIAGNOSIS — M79.7 FIBROMYALGIA: ICD-10-CM

## 2017-09-02 DIAGNOSIS — H35.30 MACULAR DEGENERATION: ICD-10-CM

## 2017-09-02 DIAGNOSIS — N20.0 KIDNEY STONE: ICD-10-CM

## 2017-09-02 DIAGNOSIS — I15.9 SECONDARY HYPERTENSION: ICD-10-CM

## 2017-09-02 DIAGNOSIS — F32.A FATIGUE DUE TO DEPRESSION: ICD-10-CM

## 2017-09-02 DIAGNOSIS — D70.9 NEUTROPENIA, UNSPECIFIED TYPE (HCC): ICD-10-CM

## 2017-09-02 DIAGNOSIS — E61.1 IRON DEFICIENCY: Primary | ICD-10-CM

## 2017-09-02 DIAGNOSIS — M25.569 KNEE PAIN, UNSPECIFIED CHRONICITY, UNSPECIFIED LATERALITY: ICD-10-CM

## 2017-09-02 DIAGNOSIS — C80.1: ICD-10-CM

## 2017-09-02 LAB
ABO + RH BLD: NORMAL
ABO + RH BLD: NORMAL
BASOPHILS # BLD AUTO: 0.02 10*3/MM3 (ref 0–0.2)
BASOPHILS NFR BLD AUTO: 0.8 % (ref 0–2)
BH BB BLOOD EXPIRATION DATE: NORMAL
BH BB BLOOD EXPIRATION DATE: NORMAL
BH BB BLOOD TYPE BARCODE: 5100
BH BB BLOOD TYPE BARCODE: 5100
BH BB DISPENSE STATUS: NORMAL
BH BB DISPENSE STATUS: NORMAL
BH BB PRODUCT CODE: NORMAL
BH BB PRODUCT CODE: NORMAL
BH BB UNIT NUMBER: NORMAL
BH BB UNIT NUMBER: NORMAL
DEPRECATED RDW RBC AUTO: 59 FL (ref 40–54)
EOSINOPHIL # BLD AUTO: 0.15 10*3/MM3 (ref 0–0.7)
EOSINOPHIL NFR BLD AUTO: 5.8 % (ref 0–4)
ERYTHROCYTE [DISTWIDTH] IN BLOOD BY AUTOMATED COUNT: 17.2 % (ref 12–15)
HCT VFR BLD AUTO: 33.5 % (ref 37–47)
HGB BLD-MCNC: 10.4 G/DL (ref 12–16)
IMM GRANULOCYTES # BLD: 0 10*3/MM3 (ref 0–0.03)
IMM GRANULOCYTES NFR BLD: 0 % (ref 0–5)
LYMPHOCYTES # BLD AUTO: 0.44 10*3/MM3 (ref 0.72–4.86)
LYMPHOCYTES NFR BLD AUTO: 17.1 % (ref 15–45)
MCH RBC QN AUTO: 29.5 PG (ref 28–32)
MCHC RBC AUTO-ENTMCNC: 31 G/DL (ref 33–36)
MCV RBC AUTO: 95.2 FL (ref 82–98)
MONOCYTES # BLD AUTO: 0.49 10*3/MM3 (ref 0.19–1.3)
MONOCYTES NFR BLD AUTO: 19.1 % (ref 4–12)
NEUTROPHILS # BLD AUTO: 1.47 10*3/MM3 (ref 1.87–8.4)
NEUTROPHILS NFR BLD AUTO: 57.2 % (ref 39–78)
PLATELET # BLD AUTO: 139 10*3/MM3 (ref 130–400)
PMV BLD AUTO: 9.3 FL (ref 6–12)
RBC # BLD AUTO: 3.52 10*6/MM3 (ref 4.2–5.4)
UNIT  ABO: NORMAL
UNIT  ABO: NORMAL
UNIT  RH: NORMAL
UNIT  RH: NORMAL
WBC NRBC COR # BLD: 2.57 10*3/MM3 (ref 4.8–10.8)

## 2017-09-02 PROCEDURE — 36415 COLL VENOUS BLD VENIPUNCTURE: CPT

## 2017-09-02 PROCEDURE — 85025 COMPLETE CBC W/AUTO DIFF WBC: CPT | Performed by: INTERNAL MEDICINE

## 2017-09-05 VITALS
OXYGEN SATURATION: 100 % | DIASTOLIC BLOOD PRESSURE: 74 MMHG | WEIGHT: 221 LBS | HEIGHT: 63 IN | TEMPERATURE: 97.4 F | SYSTOLIC BLOOD PRESSURE: 123 MMHG | HEART RATE: 90 BPM | RESPIRATION RATE: 18 BRPM | BODY MASS INDEX: 39.16 KG/M2

## 2017-09-07 ENCOUNTER — OFFICE VISIT (OUTPATIENT)
Dept: VASCULAR SURGERY | Facility: CLINIC | Age: 47
End: 2017-09-07

## 2017-09-07 VITALS
HEIGHT: 63 IN | HEART RATE: 121 BPM | OXYGEN SATURATION: 99 % | WEIGHT: 210 LBS | RESPIRATION RATE: 18 BRPM | BODY MASS INDEX: 37.21 KG/M2 | DIASTOLIC BLOOD PRESSURE: 102 MMHG | SYSTOLIC BLOOD PRESSURE: 132 MMHG

## 2017-09-07 DIAGNOSIS — I83.93 VARICOSE VEINS OF BOTH LOWER EXTREMITIES: ICD-10-CM

## 2017-09-07 DIAGNOSIS — I87.2 VENOUS (PERIPHERAL) INSUFFICIENCY: Primary | ICD-10-CM

## 2017-09-07 DIAGNOSIS — I89.0 LYMPHEDEMA: ICD-10-CM

## 2017-09-07 PROBLEM — I83.813 VARICOSE VEINS OF BOTH LOWER EXTREMITIES WITH PAIN: Status: ACTIVE | Noted: 2017-09-07

## 2017-09-07 PROCEDURE — 99214 OFFICE O/P EST MOD 30 MIN: CPT | Performed by: NURSE PRACTITIONER

## 2017-09-07 RX ORDER — KETOCONAZOLE 20 MG/G
CREAM TOPICAL
COMMUNITY
Start: 2017-08-03 | End: 2017-09-07

## 2017-09-07 RX ORDER — CYPROHEPTADINE HYDROCHLORIDE 4 MG/1
4 TABLET ORAL 2 TIMES DAILY PRN
COMMUNITY
Start: 2017-08-03 | End: 2018-03-23

## 2017-09-07 RX ORDER — METOPROLOL SUCCINATE 25 MG/1
100 TABLET, EXTENDED RELEASE ORAL DAILY
COMMUNITY
Start: 2017-08-22

## 2017-09-07 RX ORDER — TRAZODONE HYDROCHLORIDE 50 MG/1
50 TABLET ORAL NIGHTLY PRN
COMMUNITY
Start: 2017-08-03 | End: 2019-10-07

## 2017-09-07 RX ORDER — ERYTHROPOIETIN 40000 [IU]/ML
1 INJECTION, SOLUTION INTRAVENOUS; SUBCUTANEOUS AS NEEDED
COMMUNITY
Start: 2017-09-06

## 2017-09-07 NOTE — PROGRESS NOTES
09/07/2017      Roby Garcia MD  [address] [facility] [address]  [facility] [address]  [place] [address]    Stacy Lynn  1970    Chief Complaint   Patient presents with   • Edema       Dear Roby Garcia MD:      HPI  I had the pleasure of seeing your patient Stacy Lynn in the office today.  Thank you kindly for this consultation.  As you recall, Stacy Lynn is a 47 y.o.  female who you are currently following for tachycardia and hypertension.  She has a history of chronic leg swelling with varicosities noted to bilateral lower extremities for the past 20 years.  She does report heaviness to her legs.  She has been prescribed compression stockings but is unable to apply.      Past Medical History:   Diagnosis Date   • Chronic pain    • Colon polyp    • Degeneration of intervertebral disc of high cervical region    • Hypertension    • Hyperthyroidism    • Irritable bowel syndrome    • Macular degeneration    • PFO (patent foramen ovale) 09/2016   • PSVT (paroxysmal supraventricular tachycardia)     s/p ablation per Dr. Diallo 4/2016   • Restless leg syndrome    • Sarcoidosis    • Scoliosis        Past Surgical History:   Procedure Laterality Date   • CARDIAC ABLATION  04/2016    SVT; Dr. Diallo    • CHOLECYSTECTOMY     • COLONOSCOPY  10/13/2014   • COLONOSCOPY N/A 8/23/2017    Procedure: COLONOSCOPY WITH ANESTHESIA;  Surgeon: Jeanette Mclaughlin MD;  Location: [facility] ENDOSCOPY;  Service:    • ENDOSCOPY N/A 8/23/2017    Procedure: ESOPHAGOGASTRODUODENOSCOPY WITH ANESTHESIA;  Surgeon: Jeanette Mclaughlin MD;  Location: [facility] ENDOSCOPY;  Service:    • HYSTERECTOMY     • KIDNEY STONE SURGERY     • KNEE ARTHROSCOPY     • KNEE SURGERY Right    • LATERAL EPICONDYLE RELEASE     • PATELLA SURGERY     • REPLACEMENT TOTAL KNEE     • SHOULDER ARTHROSCOPY     • ULNAR NERVE DECOMPRESSION         Family History   Problem Relation Age of Onset   • Arrhythmia Mother    • Heart disease Mother    • Heart disease Father    • Heart disease Brother    •  Heart disease Maternal Aunt    • Heart disease Maternal Uncle    • Heart disease Paternal Aunt    • Heart disease Paternal Uncle    • Heart disease Maternal Grandmother    • Heart disease Maternal Grandfather    • Heart disease Paternal Grandmother    • Heart disease Paternal Grandfather        Social History     Social History   • Marital status: Single     Spouse name: N/A   • Number of children: N/A   • Years of education: N/A     Occupational History   • Not on file.     Social History Main Topics   • Smoking status: Never Smoker   • Smokeless tobacco: Never Used   • Alcohol use No   • Drug use: No   • Sexual activity: Defer     Other Topics Concern   • Not on file     Social History Narrative       Allergies   Allergen Reactions   • Adhesive Tape    • Tape        Prior to Admission medications    Medication Sig Start Date End Date Taking? Authorizing Provider   busPIRone (BUSPAR) 5 MG tablet Take 1 tablet by mouth Daily As Needed (anxiety).   Yes Historical Provider, MD   cyproheptadine (PERIACTIN) 4 MG tablet  8/3/17  Yes Historical Provider, MD   docusate sodium (COLACE) 100 MG capsule Take 1 capsule by mouth daily as needed for constipation.   Yes Historical Provider, MD   DULoxetine (CYMBALTA) 60 MG capsule Take  by mouth.   Yes Historical Provider, MD   esomeprazole (NEXIUM) 40 MG capsule Take 1 capsule by mouth 2 (Two) Times a Day Before Meals. 7/27/17  Yes BETINA Matias   gabapentin (NEURONTIN) 300 MG capsule Take 1 capsule by mouth 3 (Three) Times a Day.   Yes Historical Provider, MD   HYDROcodone-acetaminophen (NORCO) 5-325 MG per tablet Take 1 tablet by mouth Every 6 (Six) Hours As Needed for Moderate Pain (4-6). 1 to 2 tablets every 4 to 6 hours   Yes Historical Provider, MD   levothyroxine (SYNTHROID, LEVOTHROID) 88 MCG tablet Take 88 mcg by mouth Daily.   Yes Historical Provider, MD   methotrexate 2.5 MG tablet Take 10 mg by mouth 1 (One) Time Per Week. Takes 4 tabs weekly on mondays   Yes  "Historical Provider, MD   metoprolol succinate XL (TOPROL-XL) 25 MG 24 hr tablet  8/22/17  Yes Historical Provider, MD   tolterodine LA (DETROL LA) 4 MG 24 hr capsule Take 4 mg by mouth Daily.   Yes Historical Provider, MD   topiramate (TOPAMAX) 100 MG tablet Take 100 mg by mouth Every Night.   Yes Historical Provider, MD   traZODone (DESYREL) 50 MG tablet  8/3/17  Yes Historical Provider, MD   PROCRIT 83000 UNIT/ML injection  9/6/17   Historical Provider, MD   folic acid (FOLVITE) 400 MCG tablet Take 400 mcg by mouth Daily.  9/7/17  Historical Provider, MD   ketoconazole (NIZORAL) 2 % cream  8/3/17 9/7/17  Historical Provider, MD   metoprolol tartrate (LOPRESSOR) 50 MG tablet Take 50 mg by mouth.  9/7/17  Historical Provider, MD       Review of Systems   Constitutional: Negative.    HENT: Negative.    Eyes: Negative.    Respiratory: Positive for shortness of breath (unchanged).    Cardiovascular: Positive for leg swelling.   Gastrointestinal: Negative.    Endocrine: Negative.    Genitourinary: Negative.    Musculoskeletal: Negative.    Skin: Negative.    Allergic/Immunologic: Negative.    Neurological: Negative.    Hematological: Negative.    Psychiatric/Behavioral: Negative.        BP (!) 132/102 (BP Location: Left arm, Patient Position: Sitting, Cuff Size: Adult)  Pulse (!) 121  Resp 18  Ht 63\" (160 cm)  Wt 210 lb (95.3 kg)  SpO2 99%  BMI 37.2 kg/m2  Physical Exam   Constitutional: She is oriented to person, place, and time. She appears well-developed and well-nourished. No distress.   obese   HENT:   Head: Normocephalic and atraumatic.   Mouth/Throat: Oropharynx is clear and moist.   Eyes: Pupils are equal, round, and reactive to light. No scleral icterus.   Neck: Normal range of motion. Neck supple. No JVD present. Carotid bruit is not present. No thyromegaly present.   Cardiovascular: Normal rate, regular rhythm, S2 normal, normal heart sounds, intact distal pulses and normal pulses.  Exam reveals no " gallop and no friction rub.    No murmur heard.  Varicose veins to bilateral lower extremities  Chronic swelling lymphedema appearance   Pulmonary/Chest: Effort normal and breath sounds normal.   Abdominal: Soft. Normal aorta and bowel sounds are normal. There is no hepatosplenomegaly.   Obese   Musculoskeletal: Normal range of motion.   Neurological: She is alert and oriented to person, place, and time. No cranial nerve deficit.   Skin: Skin is warm and dry. She is not diaphoretic.   Psychiatric: She has a normal mood and affect. Her behavior is normal. Judgment and thought content normal.   Nursing note and vitals reviewed.      No results found.    Patient Active Problem List   Diagnosis   • Palpitations   • Thrombocytopenia   • Sarcoidosis   • Essential hypertension   • Hypothyroidism   • Dyspnea on exertion   • Obesity   • PFO (patent foramen ovale)   • Iron deficiency anemia   • Generalized weakness   • Volume depletion, gastrointestinal loss   • Viral enterocolitis   • Colon polyps   • Epigastric pain   • Bloating   • Early satiety   • Weight loss   • Abdominal cramping   • Nausea   • NSAID long-term use   • Antineoplastic chemotherapy induced anemia   • Anemia in chronic kidney disease (CKD)   • Varicose veins of both lower extremities with pain   • Lymphedema         ICD-10-CM ICD-9-CM   1. Venous (peripheral) insufficiency I87.2 459.81   2. Lymphedema I89.0 457.1   3. Varicose veins of both lower extremities I83.93 454.9       Lab Frequency Next Occurrence   Holter monitor - 48 hour Once 6/20/2017   EMG & Nerve Conduction Test Once 8/12/2017       Plan: After thoroughly evaluating Stacy Lynn, I believe the best course of action is to Initially remain conservative from vascular standpoint.  She does have compression stockings however reports that she is unable to apply these.  I believe lymphedema pumps would be beneficial for her and will have the representative see her.  We will see her back in 3  months with a venous valvular insufficiency study.  She does have significant varicose veins to bilateral lower extremities and discomfort to her lower extremities with swelling.  Believe most of her swelling is related to lymphedema but she also has evidence of venous insufficiency as well.  The patient can continue taking her current medication regimen as previously planned.  This was all discussed in full with complete understanding.    Thank you for allowing me to participate in the care of your patient.  Please do not hesitate with any questions or concerns.  I will keep you aware of any further encounters with Stacy Lynn.        Sincerely yours,         BETINA Karimi, DO

## 2017-09-20 ENCOUNTER — HOSPITAL ENCOUNTER (OUTPATIENT)
Dept: NEUROLOGY | Facility: HOSPITAL | Age: 47
Discharge: HOME | End: 2017-09-20
Attending: INTERNAL MEDICINE | Admitting: INTERNAL MEDICINE

## 2017-09-20 DIAGNOSIS — R20.0 ARM NUMBNESS: ICD-10-CM

## 2017-09-20 PROCEDURE — 95911 NRV CNDJ TEST 9-10 STUDIES: CPT

## 2017-09-20 PROCEDURE — 95911 NRV CNDJ TEST 9-10 STUDIES: CPT | Performed by: PSYCHIATRY & NEUROLOGY

## 2017-09-20 PROCEDURE — 95886 MUSC TEST DONE W/N TEST COMP: CPT | Performed by: PSYCHIATRY & NEUROLOGY

## 2017-09-20 PROCEDURE — 95886 MUSC TEST DONE W/N TEST COMP: CPT

## 2017-11-09 ENCOUNTER — TELEPHONE (OUTPATIENT)
Dept: NEUROSURGERY | Age: 47
End: 2017-11-09

## 2017-12-05 ENCOUNTER — HOSPITAL ENCOUNTER (OUTPATIENT)
Dept: ULTRASOUND IMAGING | Facility: HOSPITAL | Age: 47
Discharge: HOME | End: 2017-12-05
Admitting: NURSE PRACTITIONER

## 2017-12-05 DIAGNOSIS — I87.2 VENOUS (PERIPHERAL) INSUFFICIENCY: ICD-10-CM

## 2017-12-05 PROCEDURE — 93970 EXTREMITY STUDY: CPT | Performed by: SURGERY

## 2017-12-05 PROCEDURE — 93970 EXTREMITY STUDY: CPT

## 2017-12-07 ENCOUNTER — TELEPHONE (OUTPATIENT)
Dept: VASCULAR SURGERY | Facility: CLINIC | Age: 47
End: 2017-12-07

## 2017-12-07 NOTE — TELEPHONE ENCOUNTER
Left message reminding Mrs Lynn of her appointment tomorrow at 1115 am and advised if she had any questions or needed to reschedule to please give the office a call at [phone].

## 2017-12-08 ENCOUNTER — OFFICE VISIT (OUTPATIENT)
Dept: NEUROLOGY | Facility: CLINIC | Age: 47
End: 2017-12-08

## 2017-12-08 ENCOUNTER — OFFICE VISIT (OUTPATIENT)
Dept: VASCULAR SURGERY | Facility: CLINIC | Age: 47
End: 2017-12-08

## 2017-12-08 VITALS
HEART RATE: 82 BPM | BODY MASS INDEX: 39.69 KG/M2 | WEIGHT: 224 LBS | HEIGHT: 63 IN | SYSTOLIC BLOOD PRESSURE: 138 MMHG | DIASTOLIC BLOOD PRESSURE: 68 MMHG

## 2017-12-08 VITALS
SYSTOLIC BLOOD PRESSURE: 142 MMHG | WEIGHT: 222 LBS | DIASTOLIC BLOOD PRESSURE: 68 MMHG | BODY MASS INDEX: 39.34 KG/M2 | HEIGHT: 63 IN | HEART RATE: 80 BPM

## 2017-12-08 DIAGNOSIS — Z79.02 ENCOUNTER FOR MONITORING ANTIPLATELET THERAPY: ICD-10-CM

## 2017-12-08 DIAGNOSIS — I89.0 LYMPHEDEMA: ICD-10-CM

## 2017-12-08 DIAGNOSIS — I87.2 VENOUS (PERIPHERAL) INSUFFICIENCY: Primary | ICD-10-CM

## 2017-12-08 DIAGNOSIS — G63 NEUROPATHY DUE TO MEDICAL CONDITION (HCC): Primary | ICD-10-CM

## 2017-12-08 DIAGNOSIS — Z51.81 ENCOUNTER FOR MONITORING ANTIPLATELET THERAPY: ICD-10-CM

## 2017-12-08 DIAGNOSIS — Z01.818 PREOP TESTING: ICD-10-CM

## 2017-12-08 PROCEDURE — 99214 OFFICE O/P EST MOD 30 MIN: CPT | Performed by: NURSE PRACTITIONER

## 2017-12-08 PROCEDURE — 99203 OFFICE O/P NEW LOW 30 MIN: CPT | Performed by: PHYSICIAN ASSISTANT

## 2017-12-08 RX ORDER — ONDANSETRON 4 MG/1
4 TABLET, FILM COATED ORAL EVERY 8 HOURS PRN
COMMUNITY

## 2017-12-08 RX ORDER — CARBAMAZEPINE 100 MG/1
100 TABLET, CHEWABLE ORAL 3 TIMES DAILY
Qty: 90 TABLET | Refills: 2 | Status: SHIPPED | OUTPATIENT
Start: 2017-12-08 | End: 2018-02-16 | Stop reason: DRUGHIGH

## 2017-12-19 NOTE — H&P
12/08/2017       Elliott Waters DO  [address] [place] [address]    Stacy Lynn  1970    Chief Complaint   Patient presents with   • Follow-up     Follow up for 2 leg VVI results.Complaint of bilateral leg pain and cramping.       Dear Elliott Waters DO:      HPI  I had the pleasure of seeing your patient Stacy Lynn in the office today.  As you recall, Stacy Lynn is a 47 y.o.  female who you are currently following for tachycardia and hypertension.  She has a history of chronic leg swelling with varicosities noted to bilateral lower extremities for the past 20 years.  She does report heaviness to her legs.  She has been prescribed compression stockings but does not use all the time, but has seen no results.  She has had noninvasive testing performed today, which I did review in office.      Past Medical History:   Diagnosis Date   • Chronic headaches    • Chronic pain    • Colon polyp    • Degeneration of intervertebral disc of high cervical region    • Depression    • GERD (gastroesophageal reflux disease)    • Hypertension    • Hyperthyroidism    • Irritable bowel syndrome    • Kidney stones    • Macular degeneration    • Pancreatitis, chronic    • Peripheral neuropathy    • PFO (patent foramen ovale) 09/2016   • PSVT (paroxysmal supraventricular tachycardia)     s/p ablation per Dr. Diallo 4/2016   • Restless leg syndrome    • Sarcoidosis    • Scoliosis      Past Surgical History:   Procedure Laterality Date   • CARDIAC ABLATION  04/2016    SVT; Dr. Diallo    • CHOLECYSTECTOMY     • COLONOSCOPY  10/13/2014   • COLONOSCOPY N/A 8/23/2017    Procedure: COLONOSCOPY WITH ANESTHESIA;  Surgeon: Jeanette Mclaughlin MD;  Location: [facility] ENDOSCOPY;  Service:    • ENDOSCOPY N/A 8/23/2017    Procedure: ESOPHAGOGASTRODUODENOSCOPY WITH ANESTHESIA;  Surgeon: Jeanette Mclaughlin MD;  Location: [facility] ENDOSCOPY;  Service:    • HYSTERECTOMY     • KIDNEY STONE SURGERY     • KNEE ARTHROSCOPY     • KNEE SURGERY Right    •  LATERAL EPICONDYLE RELEASE     • PATELLA SURGERY     • REPLACEMENT TOTAL KNEE     • SHOULDER ARTHROSCOPY     • ULNAR NERVE DECOMPRESSION       Family History   Problem Relation Age of Onset   • Arrhythmia Mother    • Heart disease Mother    • Kidney disease Mother    • Heart disease Father    • Diabetes Father    • Heart disease Brother    • Heart disease Maternal Aunt    • Heart disease Maternal Uncle    • Heart disease Paternal Aunt    • Heart disease Paternal Uncle    • Heart disease Maternal Grandmother    • Heart disease Maternal Grandfather    • Heart disease Paternal Grandmother    • Heart disease Paternal Grandfather      Social History   Substance Use Topics   • Smoking status: Never Smoker   • Smokeless tobacco: Never Used   • Alcohol use No     Allergies   Allergen Reactions   • Adhesive Tape    • Tape        Current Outpatient Prescriptions:   •  busPIRone (BUSPAR) 5 MG tablet, Take 1 tablet by mouth every night at bedtime., Disp: , Rfl:   •  cyproheptadine (PERIACTIN) 4 MG tablet, Take 4 mg by mouth 2 (Two) Times a Day As Needed., Disp: , Rfl:   •  docusate sodium (COLACE) 100 MG capsule, Take 1 capsule by mouth daily as needed for constipation., Disp: , Rfl:   •  DULoxetine (CYMBALTA) 60 MG capsule, Take 60 mg by mouth Daily., Disp: , Rfl:   •  esomeprazole (NEXIUM) 40 MG capsule, Take 1 capsule by mouth 2 (Two) Times a Day Before Meals. (Patient taking differently: Take 40 mg by mouth Every Morning Before Breakfast.), Disp: 60 capsule, Rfl: 11  •  folic acid (FOLVITE) 400 MCG tablet, Take 400 mcg by mouth Daily., Disp: , Rfl:   •  gabapentin (NEURONTIN) 300 MG capsule, Take 1 capsule by mouth 3 (Three) Times a Day., Disp: , Rfl:   •  HYDROcodone-acetaminophen (NORCO) 5-325 MG per tablet, Take 1 tablet by mouth Every 6 (Six) Hours As Needed for Moderate Pain (4-6). 1 to 2 tablets every 4 to 6 hours, Disp: , Rfl:   •  levothyroxine (SYNTHROID, LEVOTHROID) 88 MCG tablet, Take 88 mcg by mouth Daily.,  "Disp: , Rfl:   •  methotrexate 2.5 MG tablet, Take 10 mg by mouth 1 (One) Time Per Week. Takes 4 tabs weekly on mondays, Disp: , Rfl:   •  metoprolol succinate XL (TOPROL-XL) 25 MG 24 hr tablet, Take 25 mg by mouth 2 (Two) Times a Day., Disp: , Rfl:   •  O2 (OXYGEN), Inhale 2 L/min 1 (One) Time. Continuous, Disp: , Rfl:   •  ondansetron (ZOFRAN) 4 MG tablet, Take 4 mg by mouth Every 8 (Eight) Hours As Needed for Nausea or Vomiting., Disp: , Rfl:   •  oxybutynin (DITROPAN) 5 MG tablet, Take 5 mg by mouth 2 (Two) Times a Day., Disp: , Rfl:   •  PROCRIT 54761 UNIT/ML injection, Inject  under the skin As Needed., Disp: , Rfl:   •  tolterodine LA (DETROL LA) 4 MG 24 hr capsule, Take 4 mg by mouth Daily., Disp: , Rfl:   •  topiramate (TOPAMAX) 100 MG tablet, Take 150 mg by mouth Daily. 50mg in the morning, 100mg qhs, Disp: , Rfl:   •  traZODone (DESYREL) 50 MG tablet, Take 50 mg by mouth Every Night., Disp: , Rfl:   •  carBAMazepine (TEGretol) 100 MG chewable tablet, Chew 1 tablet 3 (Three) Times a Day., Disp: 90 tablet, Rfl: 2      Review of Systems   Constitutional: Negative.    HENT: Negative.    Eyes: Negative.    Respiratory: Positive for shortness of breath (unchanged).    Cardiovascular: Positive for leg swelling.   Gastrointestinal: Negative.    Endocrine: Negative.    Genitourinary: Negative.    Musculoskeletal: Negative.    Skin: Negative.    Allergic/Immunologic: Negative.    Neurological: Negative.    Hematological: Negative.    Psychiatric/Behavioral: Negative.        /68  Pulse 80  Ht 160 cm (63\")  Wt 101 kg (222 lb)  BMI 39.33 kg/m2  Physical Exam   Constitutional: She is oriented to person, place, and time. She appears well-developed and well-nourished. No distress.   obese   HENT:   Head: Normocephalic and atraumatic.   Mouth/Throat: Oropharynx is clear and moist.   Eyes: Pupils are equal, round, and reactive to light. No scleral icterus.   Neck: Normal range of motion. Neck supple. No JVD " present. Carotid bruit is not present. No thyromegaly present.   Cardiovascular: Normal rate, regular rhythm, S2 normal, normal heart sounds, intact distal pulses and normal pulses.  Exam reveals no gallop and no friction rub.    No murmur heard.  Varicose veins to bilateral lower extremities  Chronic swelling lymphedema appearance   Pulmonary/Chest: Effort normal and breath sounds normal.   Abdominal: Soft. Normal aorta and bowel sounds are normal. There is no hepatosplenomegaly.   Obese   Musculoskeletal: Normal range of motion.   Neurological: She is alert and oriented to person, place, and time. No cranial nerve deficit.   Skin: Skin is warm and dry. She is not diaphoretic.   Psychiatric: She has a normal mood and affect. Her behavior is normal. Judgment and thought content normal.   Nursing note and vitals reviewed.      Us Venous Doppler Lower Extremity Bilateral (duplex)    Result Date: 12/6/2017  Narrative: History: Swelling   Comments: Venous valvular insufficiency testing was performed in both legs using duplex ultrasound with rapid cuff deflation technique.  The common femoral veins, popliteal veins, posterior tibial veins, greater saphenous veins, and lesser saphenous veins were interrogated bilaterally.  On the right, the greater saphenous vein at the junction measured 6 mm. In the mid thigh measured 4.6 mm. Above the knee measured 3.6 mm. Below the knee was too small to measure. There is greater than 0.5 seconds of reflux at the junction only. In zones 5 and 6 there are branches of varicose veins that trace back to the anterior accessory saphenous vein that has greater than 0.5 seconds of reflux.  The lesser saphenous vein is within normal limits and no evidence of reflux. There is no evidence of DVT.  On the left, the greater saphenous vein at the junction measured 8.4 mm. In the mid thigh measured 2.9 mm. Above the knee measured 2.4 mm. In the mid calf measured 2.3 mm. At the ankle measured 2 mm.  There is greater than 0.5 seconds of reflux from the junction to the ankle. There is also evidence of varicose veins and zones 5 and 6 with reflux. The lesser saphenous vein is within normal limits and no evidence of reflux. As no evidence of DVT.      Impression: Impression: There is evidence of significant insufficiency in the left lower extremity greater saphenous vein. There is mild insufficiency in the right lower extremity greater saphenous vein but a moderate size anterior accessory saphenous vein branch is identified.    This report was finalized on 12/06/2017 14:44 by Dr. Kvng Montgomery MD.      Patient Active Problem List   Diagnosis   • Palpitations   • Thrombocytopenia   • Sarcoidosis   • Essential hypertension   • Hypothyroidism   • Dyspnea on exertion   • Obesity   • PFO (patent foramen ovale)   • Iron deficiency anemia   • Generalized weakness   • Volume depletion, gastrointestinal loss   • Viral enterocolitis   • Colon polyps   • Epigastric pain   • Bloating   • Early satiety   • Weight loss   • Abdominal cramping   • Nausea   • NSAID long-term use   • Antineoplastic chemotherapy induced anemia   • Anemia in chronic kidney disease (CKD)   • Varicose veins of both lower extremities with pain   • Lymphedema   • Venous (peripheral) insufficiency   • Preop testing   • Neuropathy due to medical condition         ICD-10-CM ICD-9-CM   1. Venous (peripheral) insufficiency I87.2 459.81   2. Preop testing Z01.818 V72.84   3. Encounter for monitoring antiplatelet therapy Z51.81 V58.83    Z79.02 V58.63   4. Lymphedema I89.0 457.1       Lab Frequency Next Occurrence   Holter monitor - 48 hour Once 6/20/2017   EMG & Nerve Conduction Test Once 8/12/2017       Plan: After thoroughly evaluating Stacy Lynn, I believe the best course of action is to proceed with a left lower extremity radiofrequency ablation of the greater saphenous vein.  Risks of radiofrequency ablation include, but are not limited to,  bleeding, infection, vessel damage, nerve damage, DVT, phlebitis, and pulmonary embolus.  The patient understands these risks and wishes to proceed with procedure.  She does have compression stockings however reports that she is unable to apply these.  I believe lymphedema pumps would be beneficial for her and will have the representative see her.   She does have significant varicose veins to bilateral lower extremities and discomfort to her lower extremities with swelling.  Believe most of her swelling is related to lymphedema but she also has evidence of venous insufficiency as well. She does have a family history of lymphedema Q82.0, and her mother experienced swelling for years.  She has hyperpigmentation, pitting edema along with limited mobility.  The patient's consistent use of gradient compression stockings of 20-30 mmhg along with exercise and elevation, has had limited success in managing her swelling for years.  Prognosis is fair; however, the edema will worsen without further treatment. The patient can continue taking her current medication regimen as previously planned.  This was all discussed in full with complete understanding.    Thank you for allowing me to participate in the care of your patient.  Please do not hesitate with any questions or concerns.  I will keep you aware of any further encounters with Stacy Lynn.        Sincerely yours,         BETINA Karimi, DO

## 2017-12-21 NOTE — PROGRESS NOTES
Subjective   Stacy Lynn is a 47 y.o. female is being seen for consultation today at the request of DO AIYANA Casas Comments: The patient has neuralgia symptoms in the upper extremities.  A cervical MRI has been negative EMG nerve conduction studies showing minor neuropathy changes and possible chronic radiculopathy in the C6 to C7 distribution.  The patient has been on several medications without relief.  She has an extremely complex medical history.  Medical history includes substantial issues with sarcoidosis, anemia, thrombocytopenia, chronic pain.    Neurologic Problem   The patient's primary symptoms include focal sensory loss and weakness. This is a chronic problem. The current episode started more than 1 year ago. The neurological problem developed gradually. The problem has been gradually worsening since onset. There was upper extremity focality noted. Associated symptoms include back pain, fatigue, headaches, nausea, neck pain, palpitations, shortness of breath and vomiting. Pertinent negatives include no fever. Past treatments include medication and bed rest. The treatment provided no relief. Her past medical history is significant for mood changes. There is no history of dementia or seizures.   Migraine    This is a chronic problem. The current episode started more than 1 year ago. The problem occurs intermittently. The problem has been waxing and waning. The pain is located in the bilateral, frontal, retro-orbital and temporal region. The pain does not radiate. The pain quality is similar to prior headaches. The quality of the pain is described as aching, stabbing and throbbing. The pain is severe. Associated symptoms include back pain, coughing, nausea, neck pain, numbness, photophobia, vomiting and weakness. Pertinent negatives include no fever. The symptoms are aggravated by activity, bright light, emotional stress, fatigue, noise and Valsalva. She has tried acetaminophen,  antidepressants, beta blockers, Excedrin, darkened room, cold packs, NSAIDs, triptans and oxygen for the symptoms. The treatment provided mild relief. Her past medical history is significant for migraine headaches and migraines in the family.       The following portions of the patient's history were reviewed and updated as appropriate: allergies, current medications, past family history, past medical history, past social history, past surgical history and problem list.    Review of Systems   Constitutional: Positive for fatigue. Negative for chills and fever.   Eyes: Positive for photophobia and visual disturbance.   Respiratory: Positive for cough, chest tightness, shortness of breath and wheezing.    Cardiovascular: Positive for palpitations.   Gastrointestinal: Positive for nausea and vomiting.   Endocrine: Negative.    Genitourinary: Positive for dysuria, frequency and urgency.   Musculoskeletal: Positive for back pain, extremity weakness, gait problem, neck pain and neck stiffness.   Skin: Negative.    Allergic/Immunologic: Negative.    Neurological: Positive for weakness, numbness and headaches. Negative for facial asymmetry and speech difficulty.   Hematological: Negative.    Psychiatric/Behavioral: Positive for dysphoric mood. The patient is nervous/anxious.        Objective   Physical Exam   Constitutional: She is oriented to person, place, and time. Vital signs are normal. She appears well-developed and well-nourished. No distress.   HENT:   Head: Normocephalic and atraumatic.   Right Ear: External ear normal.   Left Ear: External ear normal.   Nose: Nose normal.   Mouth/Throat: Uvula is midline, oropharynx is clear and moist and mucous membranes are normal.   Eyes: Conjunctivae, EOM and lids are normal. Pupils are equal, round, and reactive to light. No scleral icterus.   Neck: Phonation normal. Muscular tenderness present. No spinous process tenderness present. Carotid bruit is not present. Decreased  range of motion present. No thyroid mass and no thyromegaly present.   Cardiovascular: Normal rate, regular rhythm, S1 normal, S2 normal and normal heart sounds.    No murmur heard.  Pulmonary/Chest: Effort normal and breath sounds normal. No stridor. No respiratory distress. She has no wheezes. She has no rales.   Musculoskeletal: She exhibits no edema.        Cervical back: She exhibits decreased range of motion, tenderness and pain.        Lumbar back: She exhibits tenderness and pain.   Lymphadenopathy:     She has no cervical adenopathy.   Neurological: She is alert and oriented to person, place, and time. She displays no atrophy and no tremor. A sensory deficit is present. No cranial nerve deficit. She exhibits normal muscle tone. Gait abnormal. Coordination normal. GCS eye subscore is 4. GCS verbal subscore is 5. GCS motor subscore is 6.   Reflex Scores:       Tricep reflexes are 2+ on the right side and 2+ on the left side.       Bicep reflexes are 2+ on the right side and 2+ on the left side.       Brachioradialis reflexes are 2+ on the right side and 2+ on the left side.       Patellar reflexes are 2+ on the right side and 2+ on the left side.       Achilles reflexes are 2+ on the right side and 2+ on the left side.  Diffuse decrease to light touch bilateral upper extremities most closely approximating a C6 distribution.  She has some breakaway weakness with regards to , finger and wrist extension, biceps function.  There is no atrophy.    Her gait is a bit antalgic.   Skin: Skin is warm and dry. No ecchymosis, no lesion and no rash noted. No cyanosis. Nails show no clubbing.   Psychiatric: She has a normal mood and affect. Her speech is normal and behavior is normal. Judgment and thought content normal. She is not actively hallucinating. Cognition and memory are normal. She is attentive.   Nursing note and vitals reviewed.        Assessment/Plan   Hope was seen today for neurologic problem and  extremity weakness.    Diagnoses and all orders for this visit:    Neuropathy due to medical condition  -     carBAMazepine (TEGretol) 100 MG chewable tablet; Chew 1 tablet 3 (Three) Times a Day.    The patient has changes of chronic radiculopathy on her EMG some minor changes on her nerve conduction study.  Symptoms are consistent with neuralgia and have been unresponsive to several medications.  We recommended trial of low-dose carbamazepine.    No further testing was recommended today.  Cranial CT scan and cervical MRI scan are reviewed, these are normal and this is discussed with patient.    Patient's BMI is above normal parameters. Follow-up plan includes:  no follow-up required.      EMR Dragon transcription disclaimer:  Much of this encounter note is an electronic transcription/translation of spoken language to printed text.  The electronic translation of spoken language may permit erroneous, or at times, nonsensical words or phrases to be inadvertently transcribed.  The author has reviewed the note for such errors, however some may still exist.

## 2017-12-22 ENCOUNTER — TRANSCRIBE ORDERS (OUTPATIENT)
Dept: GENERAL RADIOLOGY | Facility: HOSPITAL | Age: 47
End: 2017-12-22

## 2017-12-22 ENCOUNTER — HOSPITAL ENCOUNTER (OUTPATIENT)
Dept: GENERAL RADIOLOGY | Facility: HOSPITAL | Age: 47
Discharge: HOME | End: 2017-12-22
Attending: INTERNAL MEDICINE | Admitting: INTERNAL MEDICINE

## 2017-12-22 DIAGNOSIS — D86.1 BENIGN LYMPHOGRANULOMATOSIS OF SCHAUMANN: Primary | ICD-10-CM

## 2017-12-22 PROCEDURE — 71020 HC CHEST PA AND LATERAL: CPT

## 2018-01-02 ENCOUNTER — EPISODE CHANGES (OUTPATIENT)
Dept: CASE MANAGEMENT | Facility: OTHER | Age: 48
End: 2018-01-02

## 2018-01-03 ENCOUNTER — APPOINTMENT (OUTPATIENT)
Dept: PREADMISSION TESTING | Facility: HOSPITAL | Age: 48
End: 2018-01-03

## 2018-01-03 VITALS
HEIGHT: 63 IN | RESPIRATION RATE: 18 BRPM | DIASTOLIC BLOOD PRESSURE: 76 MMHG | BODY MASS INDEX: 39.69 KG/M2 | SYSTOLIC BLOOD PRESSURE: 132 MMHG | OXYGEN SATURATION: 98 % | HEART RATE: 64 BPM | WEIGHT: 223.99 LBS

## 2018-01-03 DIAGNOSIS — I87.2 VENOUS (PERIPHERAL) INSUFFICIENCY: ICD-10-CM

## 2018-01-03 DIAGNOSIS — Z51.81 ENCOUNTER FOR MONITORING ANTIPLATELET THERAPY: ICD-10-CM

## 2018-01-03 DIAGNOSIS — Z01.818 PREOP TESTING: ICD-10-CM

## 2018-01-03 DIAGNOSIS — Z79.02 ENCOUNTER FOR MONITORING ANTIPLATELET THERAPY: ICD-10-CM

## 2018-01-03 LAB
ANION GAP SERPL CALCULATED.3IONS-SCNC: 11 MMOL/L (ref 4–13)
APTT PPP: 35.7 SECONDS (ref 24.1–34.8)
BASOPHILS # BLD AUTO: 0.03 10*3/MM3 (ref 0–0.2)
BASOPHILS NFR BLD AUTO: 1 % (ref 0–2)
BUN BLD-MCNC: 11 MG/DL (ref 5–21)
BUN/CREAT SERPL: 8.6 (ref 7–25)
CALCIUM SPEC-SCNC: 8.7 MG/DL (ref 8.4–10.4)
CHLORIDE SERPL-SCNC: 109 MMOL/L (ref 98–110)
CO2 SERPL-SCNC: 26 MMOL/L (ref 24–31)
CREAT BLD-MCNC: 1.28 MG/DL (ref 0.5–1.4)
DEPRECATED RDW RBC AUTO: 57.1 FL (ref 40–54)
EOSINOPHIL # BLD AUTO: 0.12 10*3/MM3 (ref 0–0.7)
EOSINOPHIL NFR BLD AUTO: 4.1 % (ref 0–4)
ERYTHROCYTE [DISTWIDTH] IN BLOOD BY AUTOMATED COUNT: 15.8 % (ref 12–15)
GFR SERPL CREATININE-BSD FRML MDRD: 44 ML/MIN/1.73
GLUCOSE BLD-MCNC: 72 MG/DL (ref 70–100)
HCT VFR BLD AUTO: 31.5 % (ref 37–47)
HGB BLD-MCNC: 10.3 G/DL (ref 12–16)
IMM GRANULOCYTES # BLD: 0.01 10*3/MM3 (ref 0–0.03)
IMM GRANULOCYTES NFR BLD: 0.3 % (ref 0–5)
INR PPP: 1.04 (ref 0.91–1.09)
LYMPHOCYTES # BLD AUTO: 0.57 10*3/MM3 (ref 0.72–4.86)
LYMPHOCYTES NFR BLD AUTO: 19.6 % (ref 15–45)
MCH RBC QN AUTO: 32.5 PG (ref 28–32)
MCHC RBC AUTO-ENTMCNC: 32.7 G/DL (ref 33–36)
MCV RBC AUTO: 99.4 FL (ref 82–98)
MONOCYTES # BLD AUTO: 0.23 10*3/MM3 (ref 0.19–1.3)
MONOCYTES NFR BLD AUTO: 7.9 % (ref 4–12)
NEUTROPHILS # BLD AUTO: 1.95 10*3/MM3 (ref 1.87–8.4)
NEUTROPHILS NFR BLD AUTO: 67.1 % (ref 39–78)
NRBC BLD MANUAL-RTO: 0 /100 WBC (ref 0–0)
PLATELET # BLD AUTO: 130 10*3/MM3 (ref 130–400)
PMV BLD AUTO: 9.3 FL (ref 6–12)
POTASSIUM BLD-SCNC: 3.4 MMOL/L (ref 3.5–5.3)
PROTHROMBIN TIME: 13.9 SECONDS (ref 11.9–14.6)
RBC # BLD AUTO: 3.17 10*6/MM3 (ref 4.2–5.4)
SODIUM BLD-SCNC: 146 MMOL/L (ref 135–145)
WBC NRBC COR # BLD: 2.91 10*3/MM3 (ref 4.8–10.8)

## 2018-01-03 PROCEDURE — 85610 PROTHROMBIN TIME: CPT | Performed by: NURSE PRACTITIONER

## 2018-01-03 PROCEDURE — 80048 BASIC METABOLIC PNL TOTAL CA: CPT | Performed by: NURSE PRACTITIONER

## 2018-01-03 PROCEDURE — 85025 COMPLETE CBC W/AUTO DIFF WBC: CPT | Performed by: NURSE PRACTITIONER

## 2018-01-03 PROCEDURE — 93010 ELECTROCARDIOGRAM REPORT: CPT | Performed by: INTERNAL MEDICINE

## 2018-01-03 PROCEDURE — 85730 THROMBOPLASTIN TIME PARTIAL: CPT | Performed by: NURSE PRACTITIONER

## 2018-01-03 PROCEDURE — 36415 COLL VENOUS BLD VENIPUNCTURE: CPT

## 2018-01-03 PROCEDURE — 93005 ELECTROCARDIOGRAM TRACING: CPT

## 2018-01-03 NOTE — DISCHARGE INSTRUCTIONS
DAY OF SURGERY INSTRUCTIONS        YOUR SURGEON: PERLA BARBER    PROCEDURE: LEFT SAPHENOUS VEIN RADIO FREQUENCY ABLATION    DATE OF SURGERY: 1/10/2018    ARRIVAL TIME: AS DIRECTED BY OFFICE    DAY OF SURGERY TAKE ONLY THESE MEDICATIONS: METOPROLOL      BEFORE YOU COME TO THE HOSPITAL  (Pre-op instructions)  • Do not eat, drink, smoke or chew gum after midnight the night before surgery.  This also includes no mints.  • Morning of surgery take only the medicines you have been instructed with a sip of water unless otherwise instructed  by your physician.  • Do not shave, wear makeup or dark nail polish.  • Remove all jewelry including rings.  • Leave anything you consider valuable at home.  • Leave your suitcase in the car until after your surgery.  • Bring the following with you if applicable:  o Picture ID and insurance, Medicare or Medicaid cards  o Co-pay/deductible required by insurance (cash, check, credit card)  o Copy of advance directive, living will or power-of- documents if not brought to PAT  o CPAP or BIPAP mask and tubing  o Relaxation aids (MP3 player, book, magazine)  • On the day of surgery check in at registration located at the main entrance of the hospital.       Outpatient Surgery Guidelines, Adult  Outpatient procedures are those for which the person having the procedure is allowed to go home the same day as the procedure. Various procedures are done on an outpatient basis. You should follow some general guidelines if you will be having an outpatient procedure.  LET YOUR HEALTH CARE PROVIDER KNOW ABOUT:  · Any allergies you have.  · All medicines you are taking, including vitamins, herbs, eye drops, creams, and over-the-counter medicines.  · Previous problems you or members of your family have had with the use of anesthetics.  · Any blood disorders you have.  · Previous surgeries you have had.  · Medical conditions you have.  RISKS AND COMPLICATIONS  Your health care provider will  discuss possible risks and complications with you before surgery. Common risks and complications include:    · Problems due to the use of anesthetics.  · Blood loss and replacement (does not apply to minor surgical procedures).  · Temporary increase in pain due to surgery.  · Uncorrected pain or problems that the surgery was meant to correct.  · Infection.  · New damage.  BEFORE THE PROCEDURE  · Ask your health care provider about changing or stopping your regular medicines. You may need to stop taking certain medicines in the days or weeks before the procedure.  · Stop smoking at least 2 weeks before surgery. This lowers your risk for complications during and after surgery. Ask your health care provider for help with this if needed.  · Eat your usual meals and a light supper the day before surgery. Continue fluid intake. Do not drink alcohol.  · Do not eat or drink after midnight the night before your surgery.   · Arrange for someone to take you home and to stay with you for 24 hours after the procedure. Medicine given for your procedure may affect your ability to drive or to care for yourself.  · Call your health care provider's office if you develop an illness or problem that may prevent you from safely having your procedure.  AFTER THE PROCEDURE  After surgery, you will be taken to a recovery area, where your progress will be monitored. If there are no complications, you will be allowed to go home when you are awake, stable, and taking fluids well. You may have numbness around the surgical site. Healing will take some time. You will have tenderness at the surgical site and may have some swelling and bruising. You may also have some nausea.  HOME CARE INSTRUCTIONS  · Do not drive for 24 hours, or as directed by your health care provider. Do not drive while taking prescription pain medicines.  · Do not drink alcohol for 24 hours.  · Do not make important decisions or sign legal documents for 24 hours.  · You may  resume a normal diet and activities as directed.  · Do not lift anything heavier than 10 pounds (4.5 kg) or play contact sports until your health care provider says it is okay.  · Change your bandages (dressings) as directed.  · Only take over-the-counter or prescription medicines as directed by your health care provider.  · Follow up with your health care provider as directed.  SEEK MEDICAL CARE IF:  · You have increased bleeding (more than a small spot) from the surgical site.  · You have redness, swelling, or increasing pain in the wound.  · You see pus coming from the wound.  · You have a fever.  · You notice a bad smell coming from the wound or dressing.  · You feel lightheaded or faint.  · You develop a rash.  · You have trouble breathing.  · You develop allergies.  MAKE SURE YOU:  · Understand these instructions.  · Will watch your condition.  · Will get help right away if you are not doing well or get worse.     This information is not intended to replace advice given to you by your health care provider. Make sure you discuss any questions you have with your health care provider.     Document Released: 09/12/2002 Document Revised: 05/03/2016 Document Reviewed: 05/22/2014  [employer] Interactive Patient Education ©2016 [employer]       Fall Prevention in Hospitals, Adult  As a hospital patient, your condition and the treatments you receive can increase your risk for falls. Some additional risk factors for falls in a hospital include:  · Being in an unfamiliar environment.  · Being on bed rest.  · Your surgery.  · Taking certain medicines.  · Your tubing requirements, such as intravenous (IV) therapy or catheters.  It is important that you learn how to decrease fall risks while at the hospital. Below are important tips that can help prevent falls.  SAFETY TIPS FOR PREVENTING FALLS  Talk about your risk of falling.  · Ask your health care provider why you are at risk for falling. Is it your medicine, illness,  tubing placement, or something else?  · Make a plan with your health care provider to keep you safe from falls.  · Ask your health care provider or pharmacist about side effects of your medicines. Some medicines can make you dizzy or affect your coordination.  Ask for help.  · Ask for help before getting out of bed. You may need to press your call button.  · Ask for assistance in getting safely to the toilet.  · Ask for a walker or cane to be put at your bedside. Ask that most of the side rails on your bed be placed up before your health care provider leaves the room.  · Ask family or friends to sit with you.  · Ask for things that are out of your reach, such as your glasses, hearing aids, telephone, bedside table, or call button.  Follow these tips to avoid falling:  · Stay lying or seated, rather than standing, while waiting for help.  · Wear rubber-soled slippers or shoes whenever you walk in the hospital.  · Avoid quick, sudden movements.  ¨ Change positions slowly.  ¨ Sit on the side of your bed before standing.  ¨ Stand up slowly and wait before you start to walk.  · Let your health care provider know if there is a spill on the floor.  · Pay careful attention to the medical equipment, electrical cords, and tubes around you.  · When you need help, use your call button by your bed or in the bathroom. Wait for one of your health care providers to help you.  · If you feel dizzy or unsure of your footing, return to bed and wait for assistance.  · Avoid being distracted by the TV, telephone, or another person in your room.  · Do not lean or support yourself on rolling objects, such as IV poles or bedside tables.     This information is not intended to replace advice given to you by your health care provider. Make sure you discuss any questions you have with your health care provider.     Document Released: 12/15/2001 Document Revised: 01/08/2016 Document Reviewed: 08/25/2013  [employer] Interactive Patient Education  ©2016 [employer]       Surgical Site Infections FAQs  What is a Surgical Site Infection (SSI)?  A surgical site infection is an infection that occurs after surgery in the part of the body where the surgery took place. Most patients who have surgery do not develop an infection. However, infections develop in about 1 to 3 out of every 100 patients who have surgery.  Some of the common symptoms of a surgical site infection are:  · Redness and pain around the area where you had surgery  · Drainage of cloudy fluid from your surgical wound  · Fever  Can SSIs be treated?  Yes. Most surgical site infections can be treated with antibiotics. The antibiotic given to you depends on the bacteria (germs) causing the infection. Sometimes patients with SSIs also need another surgery to treat the infection.  What are some of the things that hospitals are doing to prevent SSIs?  To prevent SSIs, doctors, nurses, and other healthcare providers:  · Clean their hands and arms up to their elbows with an antiseptic agent just before the surgery.  · Clean their hands with soap and water or an alcohol-based hand rub before and after caring for each patient.  · May remove some of your hair immediately before your surgery using electric clippers if the hair is in the same area where the procedure will occur. They should not shave you with a razor.  · Wear special hair covers, masks, gowns, and gloves during surgery to keep the surgery area clean.  · Give you antibiotics before your surgery starts. In most cases, you should get antibiotics within 60 minutes before the surgery starts and the antibiotics should be stopped within 24 hours after surgery.  · Clean the skin at the site of your surgery with a special soap that kills germs.  What can I do to help prevent SSIs?  Before your surgery:  · Tell your doctor about other medical problems you may have. Health problems such as allergies, diabetes, and obesity could affect your surgery and  your treatment.  · Quit smoking. Patients who smoke get more infections. Talk to your doctor about how you can quit before your surgery.  · Do not shave near where you will have surgery. Shaving with a razor can irritate your skin and make it easier to develop an infection.  At the time of your surgery:  · Speak up if someone tries to shave you with a razor before surgery. Ask why you need to be shaved and talk with your surgeon if you have any concerns.  · Ask if you will get antibiotics before surgery.  After your surgery:  · Make sure that your healthcare providers clean their hands before examining you, either with soap and water or an alcohol-based hand rub.  · If you do not see your providers clean their hands, please ask them to do so.  · Family and friends who visit you should not touch the surgical wound or dressings.  · Family and friends should clean their hands with soap and water or an alcohol-based hand rub before and after visiting you. If you do not see them clean their hands, ask them to clean their hands.  What do I need to do when I go home from the hospital?  · Before you go home, your doctor or nurse should explain everything you need to know about taking care of your wound. Make sure you understand how to care for your wound before you leave the hospital.  · Always clean your hands before and after caring for your wound.  · Before you go home, make sure you know who to contact if you have questions or problems after you get home.  · If you have any symptoms of an infection, such as redness and pain at the surgery site, drainage, or fever, call your doctor immediately.  If you have additional questions, please ask your doctor or nurse.  Developed and co-sponsored by The Society for Healthcare Epidemiology of [place] (SHEA); Infectious Diseases Society of [place] (IDSA); American Hospital Association; Association for Professionals in Infection Control and Epidemiology (APIC); Centers for Disease  Control and Prevention (CDC); and The Joint Commission.     This information is not intended to replace advice given to you by your health care provider. Make sure you discuss any questions you have with your health care provider.     Document Released: 12/23/2014 Document Revised: 01/08/2016 Document Reviewed: 03/02/2016  [employer] Interactive Patient Education ©2016 [employer]       [facility]  CHG 4% Patient Instruction Sheet    Preparing the Skin Before Surgery  Preparing or “prepping” skin before surgery can reduce the risk of infection at the surgical site. To make the process easier,[facility] has chosen 4% Chlorhexidine Gluconate (CHG) antiseptic solution.   The steps below outline the prepping process and should be carefully followed.                                                                                                                                                      Prep the skin at the following time(s):                                                      We recommend you shower the night before surgery, and again the morning of surgery with the 4% CHG antiseptic solution using half of the bottle and a cloth each time.  Dress in clean clothes/sleepwear after showering.  See instructions below for application.          Do not apply any lotions or moisturizers.       Do not shave the area to be prepped for at least 2 days prior to surgery.    Clipping the hair may be done immediately prior to your surgery at the hospital    if needed.    Directions:  Thoroughly rinse your body with water.  Apply 4% CHG to a cloth and wash skin gently, paying special attention to the operative site.  Rinse again thoroughly.  Once you have begun using this product do not apply anything else to your skin. If itching or redness persists, rinse affected areas and discontinue use.    When using this product:  • Keep out of eyes, ears, and mouth.  • If solution should contact these areas, rinse out promptly  and thoroughly with water.  • For external use only.  • Do not use in genital area, on your face or head.      PATIENT/FAMILY/RESPONSIBLE PARTY VERBALIZES UNDERSTANDING OF ABOVE EDUCATION.  COPY OF PAIN SCALE GIVEN AND REVIEWED WITH VERBALIZED UNDERSTANDING.

## 2018-01-10 ENCOUNTER — ANESTHESIA (OUTPATIENT)
Dept: PERIOP | Facility: HOSPITAL | Age: 48
End: 2018-01-10

## 2018-01-10 ENCOUNTER — HOSPITAL ENCOUNTER (OUTPATIENT)
Facility: HOSPITAL | Age: 48
Discharge: HOME | End: 2018-01-10
Attending: SURGERY | Admitting: SURGERY

## 2018-01-10 ENCOUNTER — APPOINTMENT (OUTPATIENT)
Dept: GENERAL RADIOLOGY | Facility: HOSPITAL | Age: 48
End: 2018-01-10

## 2018-01-10 ENCOUNTER — APPOINTMENT (OUTPATIENT)
Dept: ULTRASOUND IMAGING | Facility: HOSPITAL | Age: 48
End: 2018-01-10

## 2018-01-10 ENCOUNTER — ANESTHESIA EVENT (OUTPATIENT)
Dept: PERIOP | Facility: HOSPITAL | Age: 48
End: 2018-01-10

## 2018-01-10 ENCOUNTER — HOSPITAL ENCOUNTER (OUTPATIENT)
Facility: HOSPITAL | Age: 48
Setting detail: OBSERVATION
Discharge: HOME | End: 2018-01-11
Attending: FAMILY MEDICINE | Admitting: INTERNAL MEDICINE

## 2018-01-10 VITALS
TEMPERATURE: 96.7 F | DIASTOLIC BLOOD PRESSURE: 61 MMHG | OXYGEN SATURATION: 100 % | RESPIRATION RATE: 18 BRPM | HEART RATE: 69 BPM | SYSTOLIC BLOOD PRESSURE: 113 MMHG

## 2018-01-10 DIAGNOSIS — Z01.818 PREOP TESTING: ICD-10-CM

## 2018-01-10 DIAGNOSIS — R07.9 CHEST PAIN, UNSPECIFIED TYPE: Primary | ICD-10-CM

## 2018-01-10 DIAGNOSIS — I87.2 VENOUS (PERIPHERAL) INSUFFICIENCY: ICD-10-CM

## 2018-01-10 LAB
ABO GROUP BLD: NORMAL
BLD GP AB SCN SERPL QL: NEGATIVE
RH BLD: POSITIVE

## 2018-01-10 PROCEDURE — 36415 COLL VENOUS BLD VENIPUNCTURE: CPT

## 2018-01-10 PROCEDURE — 86900 BLOOD TYPING SEROLOGIC ABO: CPT | Performed by: NURSE PRACTITIONER

## 2018-01-10 PROCEDURE — C1888 ENDOVAS NON-CARDIAC ABL CATH: HCPCS | Performed by: SURGERY

## 2018-01-10 PROCEDURE — C1894 INTRO/SHEATH, NON-LASER: HCPCS | Performed by: SURGERY

## 2018-01-10 PROCEDURE — 86901 BLOOD TYPING SEROLOGIC RH(D): CPT | Performed by: NURSE PRACTITIONER

## 2018-01-10 PROCEDURE — 99285 EMERGENCY DEPT VISIT HI MDM: CPT

## 2018-01-10 PROCEDURE — 76937 US GUIDE VASCULAR ACCESS: CPT

## 2018-01-10 PROCEDURE — 96376 TX/PRO/DX INJ SAME DRUG ADON: CPT

## 2018-01-10 PROCEDURE — 93005 ELECTROCARDIOGRAM TRACING: CPT

## 2018-01-10 PROCEDURE — 25010000003 CEFAZOLIN IN DEXTROSE 2-4 GM/100ML-% SOLUTION: Performed by: SURGERY

## 2018-01-10 PROCEDURE — 25010000002 MIDAZOLAM PER 1 MG: Performed by: NURSE ANESTHETIST, CERTIFIED REGISTERED

## 2018-01-10 PROCEDURE — 85025 COMPLETE CBC W/AUTO DIFF WBC: CPT

## 2018-01-10 PROCEDURE — 36475 ENDOVENOUS RF 1ST VEIN: CPT | Performed by: SURGERY

## 2018-01-10 PROCEDURE — 80053 COMPREHEN METABOLIC PANEL: CPT

## 2018-01-10 PROCEDURE — 96365 THER/PROPH/DIAG IV INF INIT: CPT

## 2018-01-10 PROCEDURE — 84484 ASSAY OF TROPONIN QUANT: CPT

## 2018-01-10 PROCEDURE — 36476 ENDOVENOUS RF VEIN ADD-ON: CPT | Performed by: SURGERY

## 2018-01-10 PROCEDURE — 25010000002 PROPOFOL 10 MG/ML EMULSION: Performed by: NURSE ANESTHETIST, CERTIFIED REGISTERED

## 2018-01-10 PROCEDURE — 96375 TX/PRO/DX INJ NEW DRUG ADDON: CPT

## 2018-01-10 PROCEDURE — 25010000002 PROPOFOL 1000 MG/ML EMULSION: Performed by: NURSE ANESTHETIST, CERTIFIED REGISTERED

## 2018-01-10 PROCEDURE — 25010000002 FENTANYL CITRATE (PF) 100 MCG/2ML SOLUTION: Performed by: NURSE ANESTHETIST, CERTIFIED REGISTERED

## 2018-01-10 PROCEDURE — 86850 RBC ANTIBODY SCREEN: CPT | Performed by: NURSE PRACTITIONER

## 2018-01-10 PROCEDURE — 25010000002 DEXAMETHASONE PER 1 MG: Performed by: ANESTHESIOLOGY

## 2018-01-10 PROCEDURE — 93010 ELECTROCARDIOGRAM REPORT: CPT | Performed by: INTERNAL MEDICINE

## 2018-01-10 PROCEDURE — 71045 X-RAY EXAM CHEST 1 VIEW: CPT

## 2018-01-10 RX ORDER — FLUMAZENIL 0.1 MG/ML
0.2 INJECTION INTRAVENOUS AS NEEDED
Status: DISCONTINUED | OUTPATIENT
Start: 2018-01-10 | End: 2018-01-10 | Stop reason: HOSPADM

## 2018-01-10 RX ORDER — SODIUM CHLORIDE 0.9 % (FLUSH) 0.9 %
10 SYRINGE (ML) INJECTION AS NEEDED
Status: DISCONTINUED | OUTPATIENT
Start: 2018-01-10 | End: 2018-01-11 | Stop reason: HOSPADM

## 2018-01-10 RX ORDER — MIDAZOLAM HYDROCHLORIDE 1 MG/ML
INJECTION INTRAMUSCULAR; INTRAVENOUS AS NEEDED
Status: DISCONTINUED | OUTPATIENT
Start: 2018-01-10 | End: 2018-01-10 | Stop reason: SURG

## 2018-01-10 RX ORDER — DEXAMETHASONE SODIUM PHOSPHATE 4 MG/ML
4 INJECTION, SOLUTION INTRA-ARTICULAR; INTRALESIONAL; INTRAMUSCULAR; INTRAVENOUS; SOFT TISSUE ONCE AS NEEDED
Status: COMPLETED | OUTPATIENT
Start: 2018-01-10 | End: 2018-01-10

## 2018-01-10 RX ORDER — MIDAZOLAM HYDROCHLORIDE 1 MG/ML
1 INJECTION INTRAMUSCULAR; INTRAVENOUS
Status: DISCONTINUED | OUTPATIENT
Start: 2018-01-10 | End: 2018-01-10 | Stop reason: HOSPADM

## 2018-01-10 RX ORDER — MEPERIDINE HYDROCHLORIDE 25 MG/ML
12.5 INJECTION INTRAMUSCULAR; INTRAVENOUS; SUBCUTANEOUS
Status: DISCONTINUED | OUTPATIENT
Start: 2018-01-10 | End: 2018-01-10 | Stop reason: HOSPADM

## 2018-01-10 RX ORDER — ONDANSETRON 2 MG/ML
4 INJECTION INTRAMUSCULAR; INTRAVENOUS AS NEEDED
Status: DISCONTINUED | OUTPATIENT
Start: 2018-01-10 | End: 2018-01-10 | Stop reason: HOSPADM

## 2018-01-10 RX ORDER — CEFAZOLIN SODIUM 2 G/100ML
2 INJECTION, SOLUTION INTRAVENOUS ONCE
Status: COMPLETED | OUTPATIENT
Start: 2018-01-10 | End: 2018-01-10

## 2018-01-10 RX ORDER — SODIUM CHLORIDE 0.9 % (FLUSH) 0.9 %
3 SYRINGE (ML) INJECTION AS NEEDED
Status: DISCONTINUED | OUTPATIENT
Start: 2018-01-10 | End: 2018-01-10 | Stop reason: HOSPADM

## 2018-01-10 RX ORDER — ONDANSETRON 2 MG/ML
4 INJECTION INTRAMUSCULAR; INTRAVENOUS ONCE AS NEEDED
Status: DISCONTINUED | OUTPATIENT
Start: 2018-01-10 | End: 2018-01-10 | Stop reason: HOSPADM

## 2018-01-10 RX ORDER — HYDRALAZINE HYDROCHLORIDE 20 MG/ML
5 INJECTION INTRAMUSCULAR; INTRAVENOUS
Status: DISCONTINUED | OUTPATIENT
Start: 2018-01-10 | End: 2018-01-10 | Stop reason: HOSPADM

## 2018-01-10 RX ORDER — METOCLOPRAMIDE HYDROCHLORIDE 5 MG/ML
5 INJECTION INTRAMUSCULAR; INTRAVENOUS
Status: DISCONTINUED | OUTPATIENT
Start: 2018-01-10 | End: 2018-01-10 | Stop reason: HOSPADM

## 2018-01-10 RX ORDER — IPRATROPIUM BROMIDE AND ALBUTEROL SULFATE 2.5; .5 MG/3ML; MG/3ML
3 SOLUTION RESPIRATORY (INHALATION) ONCE AS NEEDED
Status: DISCONTINUED | OUTPATIENT
Start: 2018-01-10 | End: 2018-01-10 | Stop reason: HOSPADM

## 2018-01-10 RX ORDER — SODIUM CHLORIDE, SODIUM LACTATE, POTASSIUM CHLORIDE, CALCIUM CHLORIDE 600; 310; 30; 20 MG/100ML; MG/100ML; MG/100ML; MG/100ML
1000 INJECTION, SOLUTION INTRAVENOUS CONTINUOUS
Status: DISCONTINUED | OUTPATIENT
Start: 2018-01-10 | End: 2018-01-10 | Stop reason: HOSPADM

## 2018-01-10 RX ORDER — SODIUM CHLORIDE 9 MG/ML
INJECTION, SOLUTION INTRAVENOUS AS NEEDED
Status: DISCONTINUED | OUTPATIENT
Start: 2018-01-10 | End: 2018-01-10 | Stop reason: HOSPADM

## 2018-01-10 RX ORDER — CEFAZOLIN SODIUM 1 G/3ML
2 INJECTION, POWDER, FOR SOLUTION INTRAMUSCULAR; INTRAVENOUS ONCE
Status: DISCONTINUED | OUTPATIENT
Start: 2018-01-10 | End: 2018-01-10 | Stop reason: SDUPTHER

## 2018-01-10 RX ORDER — LABETALOL HYDROCHLORIDE 5 MG/ML
5 INJECTION, SOLUTION INTRAVENOUS
Status: DISCONTINUED | OUTPATIENT
Start: 2018-01-10 | End: 2018-01-10 | Stop reason: HOSPADM

## 2018-01-10 RX ORDER — NALOXONE HCL 0.4 MG/ML
0.04 VIAL (ML) INJECTION AS NEEDED
Status: DISCONTINUED | OUTPATIENT
Start: 2018-01-10 | End: 2018-01-10 | Stop reason: HOSPADM

## 2018-01-10 RX ORDER — PROPOFOL 10 MG/ML
VIAL (ML) INTRAVENOUS AS NEEDED
Status: DISCONTINUED | OUTPATIENT
Start: 2018-01-10 | End: 2018-01-10 | Stop reason: SURG

## 2018-01-10 RX ORDER — MIDAZOLAM HYDROCHLORIDE 1 MG/ML
2 INJECTION INTRAMUSCULAR; INTRAVENOUS
Status: DISCONTINUED | OUTPATIENT
Start: 2018-01-10 | End: 2018-01-10 | Stop reason: HOSPADM

## 2018-01-10 RX ORDER — KETAMINE HYDROCHLORIDE 50 MG/ML
INJECTION, SOLUTION, CONCENTRATE INTRAMUSCULAR; INTRAVENOUS AS NEEDED
Status: DISCONTINUED | OUTPATIENT
Start: 2018-01-10 | End: 2018-01-10 | Stop reason: SURG

## 2018-01-10 RX ORDER — FENTANYL CITRATE 50 UG/ML
INJECTION, SOLUTION INTRAMUSCULAR; INTRAVENOUS AS NEEDED
Status: DISCONTINUED | OUTPATIENT
Start: 2018-01-10 | End: 2018-01-10 | Stop reason: SURG

## 2018-01-10 RX ORDER — SODIUM CHLORIDE, SODIUM LACTATE, POTASSIUM CHLORIDE, CALCIUM CHLORIDE 600; 310; 30; 20 MG/100ML; MG/100ML; MG/100ML; MG/100ML
100 INJECTION, SOLUTION INTRAVENOUS CONTINUOUS
Status: DISCONTINUED | OUTPATIENT
Start: 2018-01-10 | End: 2018-01-10 | Stop reason: HOSPADM

## 2018-01-10 RX ORDER — SODIUM CHLORIDE 0.9 % (FLUSH) 0.9 %
1-10 SYRINGE (ML) INJECTION AS NEEDED
Status: DISCONTINUED | OUTPATIENT
Start: 2018-01-10 | End: 2018-01-10 | Stop reason: HOSPADM

## 2018-01-10 RX ORDER — HYDROCODONE BITARTRATE AND ACETAMINOPHEN 5; 325 MG/1; MG/1
1 TABLET ORAL ONCE AS NEEDED
Status: DISCONTINUED | OUTPATIENT
Start: 2018-01-10 | End: 2018-01-10 | Stop reason: HOSPADM

## 2018-01-10 RX ORDER — MORPHINE SULFATE 2 MG/ML
2 INJECTION, SOLUTION INTRAMUSCULAR; INTRAVENOUS AS NEEDED
Status: DISCONTINUED | OUTPATIENT
Start: 2018-01-10 | End: 2018-01-10 | Stop reason: HOSPADM

## 2018-01-10 RX ADMIN — KETAMINE HYDROCHLORIDE 20 MG: 50 INJECTION, SOLUTION, CONCENTRATE INTRAMUSCULAR; INTRAVENOUS at 08:40

## 2018-01-10 RX ADMIN — FENTANYL CITRATE 25 MCG: 50 INJECTION, SOLUTION INTRAMUSCULAR; INTRAVENOUS at 08:22

## 2018-01-10 RX ADMIN — FENTANYL CITRATE 25 MCG: 50 INJECTION, SOLUTION INTRAMUSCULAR; INTRAVENOUS at 08:14

## 2018-01-10 RX ADMIN — FENTANYL CITRATE 25 MCG: 50 INJECTION, SOLUTION INTRAMUSCULAR; INTRAVENOUS at 08:36

## 2018-01-10 RX ADMIN — SODIUM CHLORIDE, POTASSIUM CHLORIDE, SODIUM LACTATE AND CALCIUM CHLORIDE 1000 ML: 600; 310; 30; 20 INJECTION, SOLUTION INTRAVENOUS at 07:15

## 2018-01-10 RX ADMIN — DEXAMETHASONE SODIUM PHOSPHATE 4 MG: 4 INJECTION, SOLUTION INTRAMUSCULAR; INTRAVENOUS at 08:02

## 2018-01-10 RX ADMIN — MIDAZOLAM HYDROCHLORIDE 1 MG: 1 INJECTION, SOLUTION INTRAMUSCULAR; INTRAVENOUS at 08:52

## 2018-01-10 RX ADMIN — CEFAZOLIN SODIUM 2 G: 2 INJECTION, SOLUTION INTRAVENOUS at 08:12

## 2018-01-10 RX ADMIN — LIDOCAINE HYDROCHLORIDE 0.5 ML: 10 INJECTION, SOLUTION EPIDURAL; INFILTRATION; INTRACAUDAL; PERINEURAL at 07:13

## 2018-01-10 RX ADMIN — MIDAZOLAM HYDROCHLORIDE 2 MG: 1 INJECTION, SOLUTION INTRAMUSCULAR; INTRAVENOUS at 08:14

## 2018-01-10 RX ADMIN — KETAMINE HYDROCHLORIDE 20 MG: 50 INJECTION, SOLUTION, CONCENTRATE INTRAMUSCULAR; INTRAVENOUS at 08:15

## 2018-01-10 RX ADMIN — PROPOFOL 10 MG: 10 INJECTION, EMULSION INTRAVENOUS at 08:15

## 2018-01-10 RX ADMIN — FENTANYL CITRATE 25 MCG: 50 INJECTION, SOLUTION INTRAMUSCULAR; INTRAVENOUS at 08:18

## 2018-01-10 RX ADMIN — PROPOFOL 75 MCG/KG/MIN: 10 INJECTION, EMULSION INTRAVENOUS at 08:11

## 2018-01-10 RX ADMIN — MIDAZOLAM HYDROCHLORIDE 1 MG: 1 INJECTION, SOLUTION INTRAMUSCULAR; INTRAVENOUS at 08:22

## 2018-01-10 RX ADMIN — PROPOFOL 10 MG: 10 INJECTION, EMULSION INTRAVENOUS at 08:16

## 2018-01-10 NOTE — H&P
Stacy Lynn  [id number]  [id number]  [facility] OR/NONE  Kvng Montgomery DO  1/10/2018      HPI: I had the pleasure of seeing your patient Stacy Lynn in the office today.  As you recall, Stacy Lynn is a 47 y.o.  female who you are currently following for tachycardia and hypertension.  She has a history of chronic leg swelling with varicosities noted to bilateral lower extremities for the past 20 years.  She does report heaviness to her legs.  She has been prescribed compression stockings but does not use all the time, but has seen no results.  She has had noninvasive testing performed today, which I did review in office.         Past Medical History:   Diagnosis Date   • Chronic headaches    • Chronic pain    • Colon polyp    • Degeneration of intervertebral disc of high cervical region    • Depression    • GERD (gastroesophageal reflux disease)    • Hypertension    • Hyperthyroidism    • Irritable bowel syndrome    • Kidney stones    • Macular degeneration    • Pancreatitis, chronic    • Peripheral neuropathy    • PFO (patent foramen ovale) 09/2016   • PSVT (paroxysmal supraventricular tachycardia)     s/p ablation per Dr. Diallo 4/2016   • Restless leg syndrome    • Sarcoidosis    • Scoliosis        Past Surgical History:   Procedure Laterality Date   • CARDIAC ABLATION  04/2016    SVT; Dr. Diallo    • CHOLECYSTECTOMY     • COLONOSCOPY  10/13/2014   • COLONOSCOPY N/A 8/23/2017    Procedure: COLONOSCOPY WITH ANESTHESIA;  Surgeon: Jeanette Mclaughlin MD;  Location: [facility] ENDOSCOPY;  Service:    • ENDOSCOPY N/A 8/23/2017    Procedure: ESOPHAGOGASTRODUODENOSCOPY WITH ANESTHESIA;  Surgeon: Jeanette Mclaughlin MD;  Location: [facility] ENDOSCOPY;  Service:    • HYSTERECTOMY     • KIDNEY STONE SURGERY     • KNEE ARTHROSCOPY     • KNEE SURGERY Right    • LATERAL EPICONDYLE RELEASE     • PATELLA SURGERY     • REPLACEMENT TOTAL KNEE     • SHOULDER ARTHROSCOPY     • ULNAR NERVE DECOMPRESSION         Family History   Problem  Relation Age of Onset   • Arrhythmia Mother    • Heart disease Mother    • Kidney disease Mother    • Heart disease Father    • Diabetes Father    • Heart disease Brother    • Heart disease Maternal Aunt    • Heart disease Maternal Uncle    • Heart disease Paternal Aunt    • Heart disease Paternal Uncle    • Heart disease Maternal Grandmother    • Heart disease Maternal Grandfather    • Heart disease Paternal Grandmother    • Heart disease Paternal Grandfather        Social History     Social History   • Marital status: Single     Spouse name: N/A   • Number of children: N/A   • Years of education: N/A     Occupational History   • Not on file.     Social History Main Topics   • Smoking status: Never Smoker   • Smokeless tobacco: Never Used   • Alcohol use No   • Drug use: No   • Sexual activity: No     Other Topics Concern   • Not on file     Social History Narrative       Allergies   Allergen Reactions   • Adhesive Tape Hives   • Tape Hives       Prescriptions Prior to Admission   Medication Sig Dispense Refill Last Dose   • carBAMazepine (TEGretol) 100 MG chewable tablet Chew 1 tablet 3 (Three) Times a Day. 90 tablet 2 1/9/2018 at 1730   • cyproheptadine (PERIACTIN) 4 MG tablet Take 4 mg by mouth 2 (Two) Times a Day As Needed.   Past Week at Unknown time   • docusate sodium (COLACE) 100 MG capsule Take 1 capsule by mouth daily as needed for constipation.   Past Month at Unknown time   • DULoxetine (CYMBALTA) 60 MG capsule Take 60 mg by mouth Daily.   1/9/2018 at 1100   • esomeprazole (NEXIUM) 40 MG capsule Take 1 capsule by mouth 2 (Two) Times a Day Before Meals. (Patient taking differently: Take 40 mg by mouth Every Morning Before Breakfast.) 60 capsule 11 1/9/2018 at 1700   • folic acid (FOLVITE) 400 MCG tablet Take 400 mcg by mouth Daily.   1/9/2018 at 1100   • gabapentin (NEURONTIN) 300 MG capsule Take 1 capsule by mouth 3 (Three) Times a Day.   1/9/2018 at 2300   • HYDROcodone-acetaminophen (NORCO) 5-325 MG  per tablet Take 1 tablet by mouth Every 6 (Six) Hours As Needed for Moderate Pain (4-6). 1 to 2 tablets every 4 to 6 hours   1/9/2018 at 2300   • levothyroxine (SYNTHROID, LEVOTHROID) 88 MCG tablet Take 88 mcg by mouth Daily.   1/9/2018 at 1100   • metoprolol succinate XL (TOPROL-XL) 25 MG 24 hr tablet Take 25 mg by mouth 2 (Two) Times a Day.   1/10/2018 at 0430   • O2 (OXYGEN) Inhale 2 L/min 1 (One) Time. Continuous   1/10/2018 at Unknown time   • ondansetron (ZOFRAN) 4 MG tablet Take 4 mg by mouth Every 8 (Eight) Hours As Needed for Nausea or Vomiting.   Past Week at Unknown time   • oxybutynin (DITROPAN) 5 MG tablet Take 5 mg by mouth 2 (Two) Times a Day.   1/9/2018 at 2300   • PROCRIT 98598 UNIT/ML injection Inject 1 Units under the skin As Needed (WHEN BLOOD COUNT BELOW 10).   12/29/2017 at Unknown time   • tolterodine LA (DETROL LA) 4 MG 24 hr capsule Take 4 mg by mouth Daily.   1/9/2018 at 1100   • topiramate (TOPAMAX) 100 MG tablet Take 100 mg by mouth Daily. 100mg in the morning, 200mg qhs   1/9/2018 at 1100   • traZODone (DESYREL) 50 MG tablet Take 50 mg by mouth Every Night.   1/9/2018 at 2300   • methotrexate 2.5 MG tablet Take 10 mg by mouth 1 (One) Time Per Week. Takes 4 tabs weekly on mondays 1/7/2018       Review of Systems  Constitutional: Negative.    HENT: Negative.    Eyes: Negative.    Respiratory: Positive for shortness of breath (unchanged).    Cardiovascular: Positive for leg swelling.   Gastrointestinal: Negative.    Endocrine: Negative.    Genitourinary: Negative.    Musculoskeletal: Negative.    Skin: Negative.    Allergic/Immunologic: Negative.    Neurological: Negative.    Hematological: Negative.    Psychiatric/Behavioral: Negative.       Physical Exam  Constitutional: She is oriented to person, place, and time. She appears well-developed and well-nourished. No distress.   obese   HENT:   Head: Normocephalic and atraumatic.   Mouth/Throat: Oropharynx is clear and moist.   Eyes: Pupils  are equal, round, and reactive to light. No scleral icterus.   Neck: Normal range of motion. Neck supple. No JVD present. Carotid bruit is not present. No thyromegaly present.   Cardiovascular: Normal rate, regular rhythm, S2 normal, normal heart sounds, intact distal pulses and normal pulses.  Exam reveals no gallop and no friction rub.    No murmur heard.  Varicose veins to bilateral lower extremities  Chronic swelling lymphedema appearance   Pulmonary/Chest: Effort normal and breath sounds normal.   Abdominal: Soft. Normal aorta and bowel sounds are normal. There is no hepatosplenomegaly.   Obese   Musculoskeletal: Normal range of motion.   Neurological: She is alert and oriented to person, place, and time. No cranial nerve deficit.   Skin: Skin is warm and dry. She is not diaphoretic.   Psychiatric: She has a normal mood and affect. Her behavior is normal. Judgment and thought content normal.   Nursing note and vitals reviewed.        Impression:  1. Venous (peripheral) insufficiency         Plan:  After thoroughly evaluating Stacy Lynn, I believe the best course of action is to proceed with a left lower extremity radiofrequency ablation of the greater saphenous vein.  Risks of radiofrequency ablation include, but are not limited to, bleeding, infection, vessel damage, nerve damage, DVT, phlebitis, and pulmonary embolus.  The patient understands these risks and wishes to proceed with procedure.  She does have compression stockings however reports that she is unable to apply these.  I believe lymphedema pumps would be beneficial for her and will have the representative see her.   She does have significant varicose veins to bilateral lower extremities and discomfort to her lower extremities with swelling.  Believe most of her swelling is related to lymphedema but she also has evidence of venous insufficiency as well. She does have a family history of lymphedema Q82.0, and her mother experienced swelling for  years.  She has hyperpigmentation, pitting edema along with limited mobility.  The patient's consistent use of gradient compression stockings of 20-30 mmhg along with exercise and elevation, has had limited success in managing her swelling for years.  Prognosis is fair; however, the edema will worsen without further treatment. The patient can continue taking her current medication regimen as previously planned.  This was all discussed in full with complete understanding.    Kvng Montgomery, DO

## 2018-01-10 NOTE — ANESTHESIA POSTPROCEDURE EVALUATION
Patient: Stacy Lynn    Procedure Summary     Date Anesthesia Start Anesthesia Stop Room / Location    01/10/18 0811 0930 [facility] / [facility]       Procedure Diagnosis Surgeon Provider    LEFT SAPHENOUS VEIN RADIO FREQUENCY ABLATION (Left Leg Lower) Venous (peripheral) insufficiency; Preop testing  (Venous (peripheral) insufficiency [I87.2]; Preop testing [Z01.818]) DO Marianne Perkins CRNA          Anesthesia Type: MAC  Last vitals  BP   113/61 (01/10/18 1235)   Temp   96.7 °F (35.9 °C) (01/10/18 0927)   Pulse   69 (01/10/18 1235)   Resp   18 (01/10/18 1115)     SpO2   100 % (01/10/18 1235)     Post Anesthesia Care and Evaluation    Patient location during evaluation: bedside  Patient participation: complete - patient participated  Level of consciousness: awake and awake and alert  Pain score: 0  Pain management: adequate  Airway patency: patent  Anesthetic complications: No anesthetic complications  PONV Status: none  Cardiovascular status: acceptable  Respiratory status: acceptable  Hydration status: acceptable    Comments: Patient discharged according to acceptable [place] score per RN assessment. See nursing records for further information.     Blood pressure 113/61, pulse 69, temperature 96.7 °F (35.9 °C), temperature source Temporal Artery , resp. rate 18, SpO2 100 %.

## 2018-01-10 NOTE — OP NOTE
Stacy Lynn  1/10/2018     PREOPERATIVE DIAGNOSIS: Venous (peripheral) insufficiency [I87.2]  Preop testing [Z01.818]     POSTOPERATIVE DIAGNOSIS: Post-Op Diagnosis Codes:     * Venous (peripheral) insufficiency [I87.2]     * Preop testing [Z01.818]     PROCEDURE PERFORMED:   1.  Ultrasound-guided cannulation of the left lower extremity greater saphenous vein  2.  Radiofrequency ablation of the left lower extremity greater saphenous vein  3.  Ultrasound-guided cannulation of the left lower extremity anterior accessory saphenous vein  4.  Radiofrequency ablation of the left lower extremity anterior accessory saphenous vein     SURGEON: Kvng Montgomery DO      ANESTHESIA: Mac    PREPARATION: Routine.    STAFF: Circulator: Soraida Patel RN  Scrub Person: Hafsa Garcia  Assistant: Zaid Garcia  Vascular Ultrasound Technician: Patrica Adams    ESTIMATED BLOOD LOSS: 10 ML    SPECIMENS: None    COMPLICATIONS: None    INDICATIONS: Stacy Lynn is a 47 y.o. female who has a history of chronic leg swelling with varicosities noted to bilateral lower extremities for the past 20 years.  She does report heaviness to her legs.  She has been prescribed compression stockings but does not use all the time, but has seen no results.  She has had noninvasive testing performed today, which I did review in office.  The indications, risks, and possible complications of the procedure were explained to the patient, who voiced understanding and wished to proceed with surgery.     PROCEDURE IN DETAIL: The patient was taken to the operating room and placed on the operating table in a supine position. After Mac anesthesia was obtained, the left lower extremity was prepped and draped in a sterile manner.  Under ultrasound guidance and using a micro-puncture technique the left lower extremity greater saphenous vein was cannulated just below the knee.  The microwire was placed.  This was confirmed under ultrasound.  A small  stab incision was made with 11 blade and a 7 Danish sheath was placed.  Patient was placed in Trendelenburg position and the saphenofemoral junction was identified under ultrasound.  The catheter was placed up to the junction and pulled back 3 cm and marked.  Next under ultrasound guidance and using a micropuncture technique the anterior accessory saphenous vein was cannulated in the mid thigh.  The microwire was placed.  This was confirmed under ultrasound.  Next, tumescent fluid was instilled along the entire length of the greater saphenous vein under ultrasound guidance.  Once sufficient tumescent fluid was placed the radiofrequency ablation had commenced.  There was a total of 8 RF cycles for a total treatment length of 46 cm for a total treatment time of 2 minutes and 40 seconds.  There was an average of 9 W at an average temperature 120°C.  Upon completion of the ablation the catheter and sheath were removed.  Next, a small stab incision was made with the 11 blade and a 7 Danish sheath was placed into the anterior accessory saphenous vein.  The catheter was placed up to the junction and pulled back 3 cm and marked.  Tumescent fluid was instilled along the entire length of the vein under ultrasound guidance.  Once sufficient tumescent fluid was placed the radiofrequency ablation had commenced.  There was a total of 4 RF cycles for a total treatment length of 20 cm with a total treatment time of 1 minute and 20 seconds.  There was an average of 14 W and an average temperature 120°C.  Upon completion of the ablation the catheter and sheath removed.  Direct pressure was held for 5-10 minutes to help ensure hemostasis.  An Ace wrap was placed from the toes to the groin.  Sterile dressings were applied. The patient tolerated the procedure well. Sponge and needle counts were correct. The patient was then awakened and transferred to the outpatient center in stable condition.    Kvng Montgomery, DO  Date:  1/10/2018 Time: 9:09 AM     CC:Elliott Waters DO

## 2018-01-10 NOTE — PLAN OF CARE
Problem: Patient Care Overview (Adult)  Goal: Plan of Care Review  Outcome: Ongoing (interventions implemented as appropriate)   01/10/18 0813   Coping/Psychosocial Response Interventions   Plan Of Care Reviewed With patient   Patient Care Overview   Progress no change   Outcome Evaluation   Outcome Summary/Follow up Plan no changes in the preop holding phase

## 2018-01-10 NOTE — PLAN OF CARE
Problem: Perioperative Period (Adult)  Goal: Signs and Symptoms of Listed Potential Problems Will be Absent or Manageable (Perioperative Period)  Outcome: Ongoing (interventions implemented as appropriate)   01/10/18 0781   Perioperative Period   Problems Assessed (Perioperative Period) pain;hypothermia;hypoxia/hypoxemia;perioperative injury;situational response   Problems Present (Perioperative Period) situational response;pain

## 2018-01-10 NOTE — PLAN OF CARE
Problem: Patient Care Overview (Adult)  Goal: Plan of Care Review  Outcome: Outcome(s) achieved Date Met: 01/10/18   01/10/18 1226   Coping/Psychosocial Response Interventions   Plan Of Care Reviewed With patient   Patient Care Overview   Progress improving

## 2018-01-10 NOTE — ANESTHESIA PREPROCEDURE EVALUATION
Anesthesia Evaluation     Patient summary reviewed and Nursing notes reviewed   no history of anesthetic complications:  NPO Solid Status: > 8 hours  NPO Liquid Status: > 8 hours     Airway   Mallampati: I  TM distance: >3 FB  Neck ROM: full  no difficulty expected  Dental          Pulmonary     breath sounds clear to auscultation  (+) shortness of breath,     ROS comment: Sarcoidosis, on 2L o2 at all times  Cardiovascular   Exercise tolerance: poor (<4 METS)    ECG reviewed  Patient on routine beta blocker and Beta blocker given within 24 hours of surgery  Rhythm: regular  Rate: normal    (+) hypertension, dysrhythmias (svt on toprol) Tachycardia,     ROS comment: Negative stress test  Echo shows atrial septal aneurysm, pt has h/o PFO    Neuro/Psych- negative ROS  (-) seizures, TIA, CVA  GI/Hepatic/Renal/Endo    (+) obesity,  renal disease CRI, hypothyroidism,   (-) liver disease, diabetes    Musculoskeletal     Abdominal    Substance History - negative use     OB/GYN negative ob/gyn ROS         Other   (+) arthritis                                               Anesthesia Plan    ASA 3     MAC   (PFO precautions)  intravenous induction   Anesthetic plan and risks discussed with patient.

## 2018-01-10 NOTE — DISCHARGE INSTRUCTIONS
Moderate Conscious Sedation, Adult, Care After  Refer to this sheet in the next few weeks. These instructions provide you with information on caring for yourself after your procedure. Your health care provider may also give you more specific instructions. Your treatment has been planned according to current medical practices, but problems sometimes occur. Call your health care provider if you have any problems or questions after your procedure.  WHAT TO EXPECT AFTER THE PROCEDURE    After your procedure:  · You may feel sleepy, clumsy, and have poor balance for several hours.  · Vomiting may occur if you eat too soon after the procedure.  HOME CARE INSTRUCTIONS  · Do not participate in any activities where you could become injured for at least 24 hours. Do not:  ¨ Drive.  ¨ Swim.  ¨ Ride a bicycle.  ¨ Operate heavy machinery.  ¨ Cook.  ¨ Use power tools.  ¨ Climb ladders.  ¨ Work from a high place.  · Do not make important decisions or sign legal documents until you are improved.  · If you vomit, drink water, juice, or soup when you can drink without vomiting. Make sure you have little or no nausea before eating solid foods.  · Only take over-the-counter or prescription medicines for pain, discomfort, or fever as directed by your health care provider.  · Make sure you and your family fully understand everything about the medicines given to you, including what side effects may occur.  · You should not drink alcohol, take sleeping pills, or take medicines that cause drowsiness for at least 24 hours.  · If you smoke, do not smoke without supervision.  · If you are feeling better, you may resume normal activities 24 hours after you were sedated.  · Keep all appointments with your health care provider.  SEEK MEDICAL CARE IF:  · Your skin is pale or bluish in color.  · You continue to feel nauseous or vomit.  · Your pain is getting worse and is not helped by medicine.  · You have bleeding or swelling.  · You are still  sleepy or feeling clumsy after 24 hours.  SEEK IMMEDIATE MEDICAL CARE IF:  · You develop a rash.  · You have difficulty breathing.  · You develop any type of allergic problem.  · You have a fever.  MAKE SURE YOU:  · Understand these instructions.  · Will watch your condition.  · Will get help right away if you are not doing well or get worse.     This information is not intended to replace advice given to you by your health care provider. Make sure you discuss any questions you have with your health care provider.     Document Released: 10/08/2014 Document Revised: 01/08/2016 Document Reviewed: 10/08/2014  [employer] Interactive Patient Education ©2016 [employer]         CALL YOUR PHYSICIAN IF YOU EXPERIENCE  INCREASED PAIN NOT HELPED BY YOUR PAIN MEDICATION.        Fall Prevention in the Home      Falls can cause injuries. They can happen to people of all ages. There are many things you can do to make your home safe and to help prevent falls.    WHAT CAN I DO ON THE OUTSIDE OF MY HOME?  · Regularly fix the edges of walkways and driveways and fix any cracks.  · Remove anything that might make you trip as you walk through a door, such as a raised step or threshold.  · Trim any bushes or trees on the path to your home.  · Use bright outdoor lighting.  · Clear any walking paths of anything that might make someone trip, such as rocks or tools.  · Regularly check to see if handrails are loose or broken. Make sure that both sides of any steps have handrails.  · Any raised decks and porches should have guardrails on the edges.  · Have any leaves, snow, or ice cleared regularly.  · Use sand or salt on walking paths during winter.  · Clean up any spills in your garage right away. This includes oil or grease spills.  WHAT CAN I DO IN THE BATHROOM?    · Use night lights.  · Install grab bars by the toilet and in the tub and shower. Do not use towel bars as grab bars.  · Use non-skid mats or decals in the tub or shower.  · If  you need to sit down in the shower, use a plastic, non-slip stool.  · Keep the floor dry. Clean up any water that spills on the floor as soon as it happens.  · Remove soap buildup in the tub or shower regularly.  · Attach bath mats securely with double-sided non-slip rug tape.  · Do not have throw rugs and other things on the floor that can make you trip.  WHAT CAN I DO IN THE BEDROOM?  · Use night lights.  · Make sure that you have a light by your bed that is easy to reach.  · Do not use any sheets or blankets that are too big for your bed. They should not hang down onto the floor.  · Have a firm chair that has side arms. You can use this for support while you get dressed.  · Do not have throw rugs and other things on the floor that can make you trip.  WHAT CAN I DO IN THE KITCHEN?  · Clean up any spills right away.  · Avoid walking on wet floors.  · Keep items that you use a lot in easy-to-reach places.  · If you need to reach something above you, use a strong step stool that has a grab bar.  · Keep electrical cords out of the way.  · Do not use floor polish or wax that makes floors slippery. If you must use wax, use non-skid floor wax.  · Do not have throw rugs and other things on the floor that can make you trip.  WHAT CAN I DO WITH MY STAIRS?  · Do not leave any items on the stairs.  · Make sure that there are handrails on both sides of the stairs and use them. Fix handrails that are broken or loose. Make sure that handrails are as long as the stairways.  · Check any carpeting to make sure that it is firmly attached to the stairs. Fix any carpet that is loose or worn.  · Avoid having throw rugs at the top or bottom of the stairs. If you do have throw rugs, attach them to the floor with carpet tape.  · Make sure that you have a light switch at the top of the stairs and the bottom of the stairs. If you do not have them, ask someone to add them for you.  WHAT ELSE CAN I DO TO HELP PREVENT FALLS?  · Wear shoes  that:  ¨ Do not have high heels.  ¨ Have rubber bottoms.  ¨ Are comfortable and fit you well.  ¨ Are closed at the toe. Do not wear sandals.  · If you use a stepladder:  ¨ Make sure that it is fully opened. Do not climb a closed stepladder.  ¨ Make sure that both sides of the stepladder are locked into place.  ¨ Ask someone to hold it for you, if possible.  · Clearly amelia and make sure that you can see:  ¨ Any grab bars or handrails.  ¨ First and last steps.  ¨ Where the edge of each step is.  · Use tools that help you move around (mobility aids) if they are needed. These include:  ¨ Canes.  ¨ Walkers.  ¨ Scooters.  ¨ Crutches.  · Turn on the lights when you go into a dark area. Replace any light bulbs as soon as they burn out.  · Set up your furniture so you have a clear path. Avoid moving your furniture around.  · If any of your floors are uneven, fix them.  · If there are any pets around you, be aware of where they are.  · Review your medicines with your doctor. Some medicines can make you feel dizzy. This can increase your chance of falling.  Ask your doctor what other things that you can do to help prevent falls.     This information is not intended to replace advice given to you by your health care provider. Make sure you discuss any questions you have with your health care provider.     Document Released: 10/14/2010 Document Revised: 05/03/2016 Document Reviewed: 01/22/2016  [employer] Interactive Patient Education ©2016 [employer]     PATIENT/FAMILY/RESPONSIBLE PARTY VERBALIZES UNDERSTANDING OF ABOVE EDUCATION.  COPY OF PAIN SCALE GIVEN AND REVIEWED WITH VERBALIZED UNDERSTANDING.

## 2018-01-10 NOTE — PLAN OF CARE
Problem: Perioperative Period (Adult)  Goal: Signs and Symptoms of Listed Potential Problems Will be Absent or Manageable (Perioperative Period)  Outcome: Outcome(s) achieved Date Met: 01/10/18   01/10/18 1226   Perioperative Period   Problems Assessed (Perioperative Period) all   Problems Present (Perioperative Period) none

## 2018-01-11 VITALS
DIASTOLIC BLOOD PRESSURE: 94 MMHG | WEIGHT: 220 LBS | HEIGHT: 63 IN | SYSTOLIC BLOOD PRESSURE: 163 MMHG | OXYGEN SATURATION: 94 % | RESPIRATION RATE: 18 BRPM | BODY MASS INDEX: 38.98 KG/M2 | HEART RATE: 104 BPM | TEMPERATURE: 98.4 F

## 2018-01-11 PROBLEM — R07.9 CHEST PAIN: Status: ACTIVE | Noted: 2018-01-11

## 2018-01-11 LAB
ALBUMIN SERPL-MCNC: 3.4 G/DL (ref 3.5–5)
ALBUMIN/GLOB SERPL: 1.3 G/DL (ref 1.1–2.5)
ALP SERPL-CCNC: 57 U/L (ref 24–120)
ALT SERPL W P-5'-P-CCNC: 26 U/L (ref 0–54)
ANION GAP SERPL CALCULATED.3IONS-SCNC: 9 MMOL/L (ref 4–13)
ARTICHOKE IGE QN: 95 MG/DL (ref 0–99)
AST SERPL-CCNC: 19 U/L (ref 7–45)
BASOPHILS # BLD AUTO: 0.01 10*3/MM3 (ref 0–0.2)
BASOPHILS NFR BLD AUTO: 0.3 % (ref 0–2)
BILIRUB SERPL-MCNC: 0.4 MG/DL (ref 0.1–1)
BUN BLD-MCNC: 16 MG/DL (ref 5–21)
BUN/CREAT SERPL: 12.7 (ref 7–25)
CALCIUM SPEC-SCNC: 8.6 MG/DL (ref 8.4–10.4)
CHLORIDE SERPL-SCNC: 109 MMOL/L (ref 98–110)
CHOLEST SERPL-MCNC: 154 MG/DL (ref 130–200)
CO2 SERPL-SCNC: 28 MMOL/L (ref 24–31)
CREAT BLD-MCNC: 1.26 MG/DL (ref 0.5–1.4)
DEPRECATED RDW RBC AUTO: 54.7 FL (ref 40–54)
EOSINOPHIL # BLD AUTO: 0.09 10*3/MM3 (ref 0–0.7)
EOSINOPHIL NFR BLD AUTO: 2.9 % (ref 0–4)
ERYTHROCYTE [DISTWIDTH] IN BLOOD BY AUTOMATED COUNT: 15.1 % (ref 12–15)
GFR SERPL CREATININE-BSD FRML MDRD: 45 ML/MIN/1.73
GLOBULIN UR ELPH-MCNC: 2.7 GM/DL
GLUCOSE BLD-MCNC: 104 MG/DL (ref 70–100)
HBA1C MFR BLD: 5 %
HCT VFR BLD AUTO: 28.8 % (ref 37–47)
HDLC SERPL-MCNC: 56 MG/DL
HGB BLD-MCNC: 9.5 G/DL (ref 12–16)
HOLD SPECIMEN: NORMAL
HOLD SPECIMEN: NORMAL
IMM GRANULOCYTES # BLD: 0 10*3/MM3 (ref 0–0.03)
IMM GRANULOCYTES NFR BLD: 0 % (ref 0–5)
LDLC/HDLC SERPL: 1.39 {RATIO}
LYMPHOCYTES # BLD AUTO: 0.43 10*3/MM3 (ref 0.72–4.86)
LYMPHOCYTES NFR BLD AUTO: 14 % (ref 15–45)
MCH RBC QN AUTO: 32.4 PG (ref 28–32)
MCHC RBC AUTO-ENTMCNC: 33 G/DL (ref 33–36)
MCV RBC AUTO: 98.3 FL (ref 82–98)
MONOCYTES # BLD AUTO: 0.37 10*3/MM3 (ref 0.19–1.3)
MONOCYTES NFR BLD AUTO: 12.1 % (ref 4–12)
NEUTROPHILS # BLD AUTO: 2.17 10*3/MM3 (ref 1.87–8.4)
NEUTROPHILS NFR BLD AUTO: 70.7 % (ref 39–78)
NRBC BLD MANUAL-RTO: 0 /100 WBC (ref 0–0)
PLATELET # BLD AUTO: 105 10*3/MM3 (ref 130–400)
PMV BLD AUTO: 9.1 FL (ref 6–12)
POTASSIUM BLD-SCNC: 4 MMOL/L (ref 3.5–5.3)
PROT SERPL-MCNC: 6.1 G/DL (ref 6.3–8.7)
RBC # BLD AUTO: 2.93 10*6/MM3 (ref 4.2–5.4)
SODIUM BLD-SCNC: 146 MMOL/L (ref 135–145)
TRIGL SERPL-MCNC: 100 MG/DL (ref 0–149)
TROPONIN I SERPL-MCNC: 0.01 NG/ML (ref 0–0.03)
TROPONIN I SERPL-MCNC: 0.02 NG/ML (ref 0–0.03)
TROPONIN I SERPL-MCNC: <0.012 NG/ML (ref 0–0.03)
WBC NRBC COR # BLD: 3.07 10*3/MM3 (ref 4.8–10.8)
WHOLE BLOOD HOLD SPECIMEN: NORMAL
WHOLE BLOOD HOLD SPECIMEN: NORMAL

## 2018-01-11 PROCEDURE — 80061 LIPID PANEL: CPT | Performed by: PHYSICIAN ASSISTANT

## 2018-01-11 PROCEDURE — 84484 ASSAY OF TROPONIN QUANT: CPT

## 2018-01-11 PROCEDURE — 83036 HEMOGLOBIN GLYCOSYLATED A1C: CPT | Performed by: PHYSICIAN ASSISTANT

## 2018-01-11 PROCEDURE — 96365 THER/PROPH/DIAG IV INF INIT: CPT

## 2018-01-11 PROCEDURE — 36415 COLL VENOUS BLD VENIPUNCTURE: CPT | Performed by: PHYSICIAN ASSISTANT

## 2018-01-11 PROCEDURE — G0378 HOSPITAL OBSERVATION PER HR: HCPCS

## 2018-01-11 PROCEDURE — 93005 ELECTROCARDIOGRAM TRACING: CPT | Performed by: FAMILY MEDICINE

## 2018-01-11 PROCEDURE — 99219 PR INITIAL OBSERVATION CARE/DAY 50 MINUTES: CPT | Performed by: INTERNAL MEDICINE

## 2018-01-11 PROCEDURE — 93010 ELECTROCARDIOGRAM REPORT: CPT | Performed by: INTERNAL MEDICINE

## 2018-01-11 PROCEDURE — 96366 THER/PROPH/DIAG IV INF ADDON: CPT

## 2018-01-11 PROCEDURE — 96376 TX/PRO/DX INJ SAME DRUG ADON: CPT

## 2018-01-11 PROCEDURE — 84484 ASSAY OF TROPONIN QUANT: CPT | Performed by: INTERNAL MEDICINE

## 2018-01-11 PROCEDURE — 96375 TX/PRO/DX INJ NEW DRUG ADDON: CPT

## 2018-01-11 PROCEDURE — 25010000002 HYDROMORPHONE PER 4 MG: Performed by: FAMILY MEDICINE

## 2018-01-11 PROCEDURE — 25010000002 ONDANSETRON PER 1 MG: Performed by: PHYSICIAN ASSISTANT

## 2018-01-11 PROCEDURE — 93005 ELECTROCARDIOGRAM TRACING: CPT | Performed by: INTERNAL MEDICINE

## 2018-01-11 PROCEDURE — 25010000002 PROMETHAZINE PER 50 MG: Performed by: EMERGENCY MEDICINE

## 2018-01-11 RX ORDER — ONDANSETRON 2 MG/ML
4 INJECTION INTRAMUSCULAR; INTRAVENOUS EVERY 6 HOURS PRN
Status: DISCONTINUED | OUTPATIENT
Start: 2018-01-11 | End: 2018-01-11 | Stop reason: HOSPADM

## 2018-01-11 RX ORDER — METOPROLOL SUCCINATE 25 MG/1
25 TABLET, EXTENDED RELEASE ORAL EVERY 12 HOURS SCHEDULED
Status: DISCONTINUED | OUTPATIENT
Start: 2018-01-11 | End: 2018-01-11 | Stop reason: SDUPTHER

## 2018-01-11 RX ORDER — OXYBUTYNIN CHLORIDE 5 MG/1
5 TABLET, EXTENDED RELEASE ORAL DAILY
COMMUNITY
End: 2019-10-07

## 2018-01-11 RX ORDER — METOPROLOL SUCCINATE 50 MG/1
50 TABLET, EXTENDED RELEASE ORAL
Status: DISCONTINUED | OUTPATIENT
Start: 2018-01-11 | End: 2018-01-11 | Stop reason: HOSPADM

## 2018-01-11 RX ORDER — CYPROHEPTADINE HYDROCHLORIDE 4 MG/1
4 TABLET ORAL 2 TIMES DAILY PRN
Status: DISCONTINUED | OUTPATIENT
Start: 2018-01-11 | End: 2018-01-11 | Stop reason: HOSPADM

## 2018-01-11 RX ORDER — LANOLIN ALCOHOL/MO/W.PET/CERES
400 CREAM (GRAM) TOPICAL DAILY
Status: DISCONTINUED | OUTPATIENT
Start: 2018-01-11 | End: 2018-01-11 | Stop reason: HOSPADM

## 2018-01-11 RX ORDER — OXYBUTYNIN CHLORIDE 5 MG/1
5 TABLET, EXTENDED RELEASE ORAL DAILY
Status: DISCONTINUED | OUTPATIENT
Start: 2018-01-11 | End: 2018-01-11 | Stop reason: HOSPADM

## 2018-01-11 RX ORDER — TOPIRAMATE 100 MG/1
100 TABLET, FILM COATED ORAL NIGHTLY
Status: DISCONTINUED | OUTPATIENT
Start: 2018-01-11 | End: 2018-01-11 | Stop reason: HOSPADM

## 2018-01-11 RX ORDER — PROMETHAZINE HYDROCHLORIDE 25 MG/ML
12.5 INJECTION, SOLUTION INTRAMUSCULAR; INTRAVENOUS EVERY 6 HOURS PRN
Status: DISCONTINUED | OUTPATIENT
Start: 2018-01-11 | End: 2018-01-11 | Stop reason: HOSPADM

## 2018-01-11 RX ORDER — LEVOTHYROXINE SODIUM 88 UG/1
88 TABLET ORAL DAILY
Status: DISCONTINUED | OUTPATIENT
Start: 2018-01-11 | End: 2018-01-11 | Stop reason: HOSPADM

## 2018-01-11 RX ORDER — CARBAMAZEPINE 100 MG/1
100 TABLET, CHEWABLE ORAL 3 TIMES DAILY
Status: DISCONTINUED | OUTPATIENT
Start: 2018-01-11 | End: 2018-01-11 | Stop reason: SDUPTHER

## 2018-01-11 RX ORDER — PROMETHAZINE HYDROCHLORIDE 12.5 MG/1
12.5 SUPPOSITORY RECTAL EVERY 6 HOURS PRN
Status: DISCONTINUED | OUTPATIENT
Start: 2018-01-11 | End: 2018-01-11 | Stop reason: HOSPADM

## 2018-01-11 RX ORDER — OXYBUTYNIN CHLORIDE 5 MG/1
5 TABLET ORAL 2 TIMES DAILY
Status: DISCONTINUED | OUTPATIENT
Start: 2018-01-11 | End: 2018-01-11 | Stop reason: SDUPTHER

## 2018-01-11 RX ORDER — TOPIRAMATE 100 MG/1
100 TABLET, FILM COATED ORAL DAILY
Status: DISCONTINUED | OUTPATIENT
Start: 2018-01-11 | End: 2018-01-11 | Stop reason: SDUPTHER

## 2018-01-11 RX ORDER — TOPIRAMATE 100 MG/1
100 TABLET, FILM COATED ORAL NIGHTLY
COMMUNITY

## 2018-01-11 RX ORDER — PROMETHAZINE HYDROCHLORIDE 25 MG/1
12.5 TABLET ORAL EVERY 6 HOURS PRN
Status: DISCONTINUED | OUTPATIENT
Start: 2018-01-11 | End: 2018-01-11 | Stop reason: HOSPADM

## 2018-01-11 RX ORDER — CARBAMAZEPINE 100 MG/1
50 TABLET, CHEWABLE ORAL DAILY
Status: DISCONTINUED | OUTPATIENT
Start: 2018-01-11 | End: 2018-01-11 | Stop reason: SDUPTHER

## 2018-01-11 RX ORDER — SODIUM CHLORIDE 0.9 % (FLUSH) 0.9 %
1-10 SYRINGE (ML) INJECTION AS NEEDED
Status: DISCONTINUED | OUTPATIENT
Start: 2018-01-11 | End: 2018-01-11 | Stop reason: HOSPADM

## 2018-01-11 RX ORDER — DULOXETIN HYDROCHLORIDE 30 MG/1
60 CAPSULE, DELAYED RELEASE ORAL DAILY
Status: DISCONTINUED | OUTPATIENT
Start: 2018-01-11 | End: 2018-01-11 | Stop reason: HOSPADM

## 2018-01-11 RX ORDER — PROMETHAZINE HYDROCHLORIDE 12.5 MG/1
12.5 TABLET ORAL EVERY 6 HOURS PRN
Qty: 30 TABLET | Refills: 0 | Status: SHIPPED | OUTPATIENT
Start: 2018-01-11 | End: 2018-03-23

## 2018-01-11 RX ORDER — TRAZODONE HYDROCHLORIDE 50 MG/1
50 TABLET ORAL NIGHTLY
Status: DISCONTINUED | OUTPATIENT
Start: 2018-01-11 | End: 2018-01-11 | Stop reason: HOSPADM

## 2018-01-11 RX ORDER — ONDANSETRON 2 MG/ML
4 INJECTION INTRAMUSCULAR; INTRAVENOUS ONCE
Status: DISCONTINUED | OUTPATIENT
Start: 2018-01-11 | End: 2018-01-11

## 2018-01-11 RX ORDER — CARBAMAZEPINE 100 MG/1
100 TABLET, CHEWABLE ORAL NIGHTLY
Status: DISCONTINUED | OUTPATIENT
Start: 2018-01-11 | End: 2018-01-11

## 2018-01-11 RX ORDER — PROMETHAZINE HYDROCHLORIDE 25 MG/ML
12.5 INJECTION, SOLUTION INTRAMUSCULAR; INTRAVENOUS ONCE
Status: COMPLETED | OUTPATIENT
Start: 2018-01-11 | End: 2018-01-11

## 2018-01-11 RX ORDER — PANTOPRAZOLE SODIUM 20 MG/1
20 TABLET, DELAYED RELEASE ORAL
Status: DISCONTINUED | OUTPATIENT
Start: 2018-01-11 | End: 2018-01-11 | Stop reason: HOSPADM

## 2018-01-11 RX ORDER — DOCUSATE SODIUM 100 MG/1
100 CAPSULE, LIQUID FILLED ORAL DAILY PRN
Status: DISCONTINUED | OUTPATIENT
Start: 2018-01-11 | End: 2018-01-11 | Stop reason: HOSPADM

## 2018-01-11 RX ORDER — CARBAMAZEPINE 100 MG/1
100 TABLET, CHEWABLE ORAL EVERY 8 HOURS SCHEDULED
Status: DISCONTINUED | OUTPATIENT
Start: 2018-01-11 | End: 2018-01-11 | Stop reason: HOSPADM

## 2018-01-11 RX ORDER — NITROGLYCERIN 20 MG/100ML
5-200 INJECTION INTRAVENOUS
Status: DISCONTINUED | OUTPATIENT
Start: 2018-01-11 | End: 2018-01-11 | Stop reason: HOSPADM

## 2018-01-11 RX ORDER — TOPIRAMATE 50 MG/1
50 TABLET, FILM COATED ORAL 2 TIMES DAILY
Status: ON HOLD | COMMUNITY
End: 2018-09-06

## 2018-01-11 RX ORDER — TOPIRAMATE 25 MG/1
50 TABLET ORAL DAILY
Status: DISCONTINUED | OUTPATIENT
Start: 2018-01-11 | End: 2018-01-11 | Stop reason: HOSPADM

## 2018-01-11 RX ADMIN — LEVOTHYROXINE SODIUM 88 MCG: 88 TABLET ORAL at 16:12

## 2018-01-11 RX ADMIN — ACETAMINOPHEN 400 MCG: 400 TABLET ORAL at 16:12

## 2018-01-11 RX ADMIN — HYDROMORPHONE HYDROCHLORIDE 1 MG: 1 INJECTION, SOLUTION INTRAMUSCULAR; INTRAVENOUS; SUBCUTANEOUS at 00:36

## 2018-01-11 RX ADMIN — ONDANSETRON 4 MG: 2 INJECTION, SOLUTION INTRAMUSCULAR; INTRAVENOUS at 12:05

## 2018-01-11 RX ADMIN — TOPIRAMATE 50 MG: 25 TABLET, FILM COATED ORAL at 16:13

## 2018-01-11 RX ADMIN — METOPROLOL SUCCINATE 50 MG: 50 TABLET, FILM COATED, EXTENDED RELEASE ORAL at 16:10

## 2018-01-11 RX ADMIN — NITROGLYCERIN 10 MCG/MIN: 20 INJECTION INTRAVENOUS at 11:17

## 2018-01-11 RX ADMIN — PROMETHAZINE HYDROCHLORIDE 12.5 MG: 25 INJECTION INTRAMUSCULAR; INTRAVENOUS at 10:43

## 2018-01-11 RX ADMIN — PANTOPRAZOLE SODIUM 20 MG: 20 TABLET, DELAYED RELEASE ORAL at 16:12

## 2018-01-11 RX ADMIN — DULOXETINE HYDROCHLORIDE 60 MG: 30 CAPSULE, DELAYED RELEASE ORAL at 16:12

## 2018-01-11 RX ADMIN — HYDROMORPHONE HYDROCHLORIDE 1 MG: 1 INJECTION, SOLUTION INTRAMUSCULAR; INTRAVENOUS; SUBCUTANEOUS at 04:14

## 2018-01-11 NOTE — ED PROVIDER NOTES
[facility]  eMERGENCY dEPARTMENT eNCOUnter      Pt Name: Stacy Lynn  MRN: [id number]  Birthdate 1970  Date of evaluation: 1/11/2018      CHIEF COMPLAINT       Chief Complaint   Patient presents with   • Chest Pain       Nurses Notes reviewed and I agree except as noted in the HPI.      HISTORY OF PRESENT ILLNESS    Stacy Lynn is a 47 y.o. female who presents   Patient complains of chest pain that started approximately an hour and a half prior to arrival.  She describes the pain as severe in nature.  It is sharp and pressure-like and it radiates up to both sides of her jaw.  Patient has had a recent negative stress test.  She also describes nausea with this.  He has a history of paroxysmal supraventricular tachycardia chronic pain hypertension hyperthyroidism       REVIEW OF SYSTEMS     Review of Systems  CONSTITUION: No Fever, No chills, No activity change, No diaphoresis, No unexpected wt change.    HENT: No congestion, no dental problems, no ear pain or discharge,   EYES: No drainage, no itching, no photophobia, no visual disturbance  RESPIRATORY: No apnea, no chest tightness, no cough, no shortness of breath, no stridor, no wheezing  CARDIOVASCULAR: As per the HPI otherwise negative  GI: No abdominal distention, no abdominal pain, no rectal bleeding, no melena, no hematachezia, no diarrhea, no nausea, no vomiting  ENDOCRINE: No polydipsia, no polyphagia, no polyuria, no cold or heat intolerance  : No difficulty urinating, no dyspareunia, nodysuria, no flank pain, no frequency, no genital sore, no hematuria, no menstrual problem if female, no decreased urniation, no hesitation of urination, no vaginal discharge if female, no vaginal pain if female no penile pain if male  ALLERG/IMMUNO:  No env or food allergies, not immunocompromised  NEUROLOGICAL: No dizziness, no facial asymmetry, no Headaches, no Light-headedness, no numbess nor paresthesias, no seizures, no speech difficulty, No  syncope, no temors, no weakness  HEMATOLOGIC: No adenopathy, no unusual bleeding or bruising  MUSC: No arthralgia, no back pain, no joint swelling, no myalgias, no neck pain, no neck stiffness  SKIN: No rash, no color change, no pallor, no wound  PSYCH: No agitation, no behavior problem, no confusion, no decr concentration, no hallucinations, no suicidal ideation, no homicidal ideation, no self injury, no sleep disturbance      PAST MEDICAL HISTORY     Past Medical History:   Diagnosis Date   • Chronic headaches    • Chronic pain    • Colon polyp    • Degeneration of intervertebral disc of high cervical region    • Depression    • GERD (gastroesophageal reflux disease)    • Hypertension    • Hyperthyroidism    • Irritable bowel syndrome    • Kidney stones    • Macular degeneration    • Pancreatitis, chronic    • Peripheral neuropathy    • PFO (patent foramen ovale) 09/2016   • PSVT (paroxysmal supraventricular tachycardia)     s/p ablation per Dr. Diallo 4/2016   • Restless leg syndrome    • Sarcoidosis    • Scoliosis        SURGICAL HISTORY      has a past surgical history that includes Replacement total knee; Patella surgery; Lateral epicondyle release; Shoulder arthroscopy; Ulnar Nerve Decompression; Knee arthroscopy; Hysterectomy; Cholecystectomy; Kidney stone surgery; Cardiac Ablation (04/2016); Knee surgery (Right); Colonoscopy (10/13/2014); Esophagogastroduodenoscopy (N/A, 8/23/2017); Colonoscopy (N/A, 8/23/2017); and Varicose vein surgery (Left, 1/10/2018).    CURRENT MEDICATIONS        Medication List      ASK your doctor about these medications          carBAMazepine 100 MG chewable tablet   Commonly known as:  TEGretol   Chew 1 tablet 3 (Three) Times a Day.       cyproheptadine 4 MG tablet   Commonly known as:  PERIACTIN       docusate sodium 100 MG capsule   Commonly known as:  COLACE       DULoxetine 60 MG capsule   Commonly known as:  CYMBALTA       esomeprazole 40 MG capsule   Commonly known as:   NEXIUM   Take 1 capsule by mouth 2 (Two) Times a Day Before Meals.       folic acid 400 MCG tablet   Commonly known as:  FOLVITE       gabapentin 300 MG capsule   Commonly known as:  NEURONTIN       HYDROcodone-acetaminophen 5-325 MG per tablet   Commonly known as:  NORCO       levothyroxine 88 MCG tablet   Commonly known as:  SYNTHROID, LEVOTHROID       methotrexate 2.5 MG tablet       metoprolol succinate XL 25 MG 24 hr tablet   Commonly known as:  TOPROL-XL       O2   Commonly known as:  OXYGEN       ondansetron 4 MG tablet   Commonly known as:  ZOFRAN       oxybutynin 5 MG tablet   Commonly known as:  DITROPAN       PROCRIT 87345 UNIT/ML injection   Generic drug:  epoetin gianluca       tolterodine LA 4 MG 24 hr capsule   Commonly known as:  DETROL LA       topiramate 100 MG tablet   Commonly known as:  TOPAMAX       traZODone 50 MG tablet   Commonly known as:  DESYREL           ALLERGIES     is allergic to adhesive tape and tape.    FAMILY HISTORY     indicated that her mother is . She indicated that her father is . She indicated that the status of her brother is unknown. She indicated that the status of her maternal grandmother is unknown. She indicated that the status of her maternal grandfather is unknown. She indicated that the status of her paternal grandmother is unknown. She indicated that the status of her paternal grandfather is unknown. She indicated that the status of her maternal aunt is unknown. She indicated that the status of her maternal uncle is unknown. She indicated that the status of her paternal aunt is unknown. She indicated that the status of her paternal uncle is unknown.  family history includes Arrhythmia in her mother; Diabetes in her father; Heart disease in her brother, father, maternal aunt, maternal grandfather, maternal grandmother, maternal uncle, mother, paternal aunt, paternal grandfather, paternal grandmother, and paternal uncle; Kidney disease in her  "mother.    SOCIAL HISTORY      reports that she has never smoked. She has never used smokeless tobacco. She reports that she does not drink alcohol or use illicit drugs.    PHYSICAL EXAM     INITIAL VITALS:  height is 160 cm (63\") and weight is 99.8 kg (220 lb). Her temperature is 98.2 °F (36.8 °C). Her blood pressure is 129/88 and her pulse is 97. Her respiration is 20 and oxygen saturation is 98%.    Physical Exam    CONSTITUTIONAL: Well developed, well nourished, not diaphoretic nor distressed  HENT: Normocephalic, atraumatic, oropharynx clear and moist  EYES: PERRL, EOM normal, no discharge, no scleral icterus  NECK: ROM normal, supple, no tracheal deviation nor JVD, no stridor  CARDIOVASCULAR: Normal rate and rhythm, heart sounds normal, no rub no gallop, intact distal pulses, normal cap refill  PULMONARY: Normal effort and breath sounds, no distress, no wheezes, rhonci or rales, no chest tenderness  ABDOMINAL: Soft, nontender, no guarding, no mass, no rebound, no hernia  GENITOURINARY/ANORECTAL: deferred  MUSCKULOSKELETAL: ROM normal, no tenderness nor deformity, no edema  NEUROLOGICAL: Alert, oriented x 3, DTRS normal, CN x 10 normal, normal tone  SKIN: Warm, dry, no erythema, no rash, normal color  PSYCH: Mood and affect normal, behavior normal, thought content and judgement normal.    DIFFERENTIAL DIAGNOSIS:       DIAGNOSTIC RESULTS     EKG: All EKG's are interpreted by the Emergency Department Physician who either signs or Co-signs this chart in the absence of a cardiologist.      RADIOLOGY: non-plain film images(s) such as CT, Ultrasound and MRI are read by the radiologist.  Plain radiographic images are visualized and preliminarily interpreted by the emergency physician unless otherwise stated below.           LABS:   Lab Results (last 24 hours)     Procedure Component Value Units Date/Time    CBC & Differential [[id number]] Collected:  01/10/18 1225    Specimen:  Blood Updated:  01/11/18 0004    " Narrative:       The following orders were created for panel order CBC & Differential.  Procedure                               Abnormality         Status                     ---------                               -----------         ------                     CBC Auto Differential[[id number]]        Abnormal            Final result                 Please view results for these tests on the individual orders.    Comprehensive Metabolic Panel [[id number]]  (Abnormal) Collected:  01/10/18 2357    Specimen:  Blood Updated:  01/11/18 0013     Glucose 104 (H) mg/dL      BUN 16 mg/dL      Creatinine 1.26 mg/dL      Sodium 146 (H) mmol/L      Potassium 4.0 mmol/L      Chloride 109 mmol/L      CO2 28.0 mmol/L      Calcium 8.6 mg/dL      Total Protein 6.1 (L) g/dL      Albumin 3.40 (L) g/dL      ALT (SGPT) 26 U/L      AST (SGOT) 19 U/L      Alkaline Phosphatase 57 U/L      Total Bilirubin 0.4 mg/dL      eGFR Non African Amer 45 (L) mL/min/1.73      Globulin 2.7 gm/dL      A/G Ratio 1.3 g/dL      BUN/Creatinine Ratio 12.7     Anion Gap 9.0 mmol/L     Troponin [[id number]]  (Normal) Collected:  01/10/18 2357    Specimen:  Blood Updated:  01/11/18 0025     Troponin I 0.015 ng/mL     CBC Auto Differential [[id number]]  (Abnormal) Collected:  01/10/18 2357    Specimen:  Blood Updated:  01/11/18 0004     WBC 3.07 (L) 10*3/mm3      RBC 2.93 (L) 10*6/mm3      Hemoglobin 9.5 (L) g/dL      Hematocrit 28.8 (L) %      MCV 98.3 (H) fL      MCH 32.4 (H) pg      MCHC 33.0 g/dL      RDW 15.1 (H) %      RDW-SD 54.7 (H) fl      MPV 9.1 fL      Platelets 105 (L) 10*3/mm3      Neutrophil % 70.7 %      Lymphocyte % 14.0 (L) %      Monocyte % 12.1 (H) %      Eosinophil % 2.9 %      Basophil % 0.3 %      Immature Grans % 0.0 %      Neutrophils, Absolute 2.17 10*3/mm3      Lymphocytes, Absolute 0.43 (L) 10*3/mm3      Monocytes, Absolute 0.37 10*3/mm3      Eosinophils, Absolute 0.09 10*3/mm3      Basophils, Absolute 0.01 10*3/mm3      Immature Grans,  Absolute 0.00 10*3/mm3      nRBC 0.0 /100 WBC     Troponin [[id number]]  (Normal) Collected:  01/11/18 0224    Specimen:  Blood Updated:  01/11/18 0256     Troponin I <0.012 ng/mL     Troponin [[id number]] Collected:  01/11/18 0608    Specimen:  Blood Updated:  01/11/18 0611          EMERGENCY DEPARTMENT COURSE:   Vitals:    Vitals:    01/11/18 0246 01/11/18 0302 01/11/18 0316 01/11/18 0331   BP: 141/83 128/76 106/94 129/88   Pulse:  87 90 97   Resp:    20   Temp:       SpO2:  98% 98% 98%   Weight:       Height:           The patient was given the following medications:  Medications   sodium chloride 0.9 % flush 10 mL (not administered)   HYDROmorphone (DILAUDID) injection 1 mg (1 mg Intravenous Given 1/11/18 0036)   HYDROmorphone (DILAUDID) injection 1 mg (1 mg Intravenous Given 1/11/18 0414)       ED Course   Value Comment By Time     01/11/2018 normal sinus rhythm 79 bpm axis deviation normal intervals no acute ST wave changes noted. Casi Willard DO 01/11 0613   Troponin I: 0.018 (Reviewed) Edgar Carlson MD 01/11 0944       CRITICAL CARE:  none    CONSULTS:  none    PROCEDURES:  None    FINAL IMPRESSION      1. Chest pain, unspecified type          DISPOSITION/PLAN   Admit for further evaluation and treatment      PATIENT REFERRED TO:  No follow-up provider specified.    DISCHARGE MEDICATIONS:     Medication List      ASK your doctor about these medications          carBAMazepine 100 MG chewable tablet   Commonly known as:  TEGretol   Chew 1 tablet 3 (Three) Times a Day.       cyproheptadine 4 MG tablet   Commonly known as:  PERIACTIN       docusate sodium 100 MG capsule   Commonly known as:  COLACE       DULoxetine 60 MG capsule   Commonly known as:  CYMBALTA       esomeprazole 40 MG capsule   Commonly known as:  NEXIUM   Take 1 capsule by mouth 2 (Two) Times a Day Before Meals.       folic acid 400 MCG tablet   Commonly known as:  FOLVITE       gabapentin 300 MG capsule   Commonly known as:   NEURONTIN       HYDROcodone-acetaminophen 5-325 MG per tablet   Commonly known as:  NORCO       levothyroxine 88 MCG tablet   Commonly known as:  SYNTHROID, LEVOTHROID       methotrexate 2.5 MG tablet       metoprolol succinate XL 25 MG 24 hr tablet   Commonly known as:  TOPROL-XL       O2   Commonly known as:  OXYGEN       ondansetron 4 MG tablet   Commonly known as:  ZOFRAN       oxybutynin 5 MG tablet   Commonly known as:  DITROPAN       PROCRIT 88907 UNIT/ML injection   Generic drug:  epoetin gianluca       tolterodine LA 4 MG 24 hr capsule   Commonly known as:  DETROL LA       topiramate 100 MG tablet   Commonly known as:  TOPAMAX       traZODone 50 MG tablet   Commonly known as:  DESYREL           (Please note that portions of this note were completed with a voice recognition program.)    Casi Willard, DO Casi Willard DO  01/11/18 5995

## 2018-01-11 NOTE — H&P
[facility] HEART GROUP HISTORY AND PHYSICAL  AND DISCHARGE SUMMARY      Patient Care Team:  Elliott Waters DO as PCP - General  Elliott Waters DO as PCP - Family Medicine  Fermín Boggs MD as PCP - Claims Attributed  Roby Garcia MD as Consulting Physician (Cardiology)  Germaine Salcedo, RN as Care Coordinator (Population Health)    Chief complaint: chest pain     Subjective     Stacy Lynn is a 47 y.o. female with history of hypertension, sarcoidosis, obesity, paroxysmal supraventricular tachycardia s/p ablation who presents to [facility] ED with complaints of chest pain. The patient relates that she was sitting on her couch around 930 pm last night when she began having left sided chest pain. She says at worst this was 10/10, without radiation of pain. She describes associated nausea, vomiting and dyspnea. She tells me that she was given NTG x 2 by EMS and this mildly relieved her pain. She reports this pain has been intermittent since. She states that currently the pain has returned, 10/10. She continues to vomit upon my presentation. The patient denies any recent fever, chills, cough or similar. She reports she has had intermittent chest pain, dyspnea on exertion x 2 weeks. She denies any previous episodes of chest pain like this in the past.   PLEASE NOTE THIS IN ADDITION TO TODAY'S PREVIOUSLY TYPED H/P:   The patient was noted with unrevealing serial EKGs and biomarkers. She was initially placed on NTG drip and her pain resolved. She denies any chest pain, dyspnea or similar. Her nausea and vomiting improved after Zofran and phenergan. The patient actually was noted with sleep x 4 hours after admission. She continues with improvement and will subsequently be discharged home.     Review of Systems  Review of Systems   Constitution: Negative for malaise/fatigue and weight gain.   Cardiovascular: Positive for chest pain, dyspnea on exertion and leg swelling. Negative for claudication, irregular  heartbeat, near-syncope, orthopnea, palpitations, paroxysmal nocturnal dyspnea and syncope.   Respiratory: Negative for hemoptysis and shortness of breath.    Hematologic/Lymphatic: Negative for bleeding problem.   Skin: Negative for poor wound healing.   Musculoskeletal: Negative for myalgias.   Gastrointestinal: Positive for nausea and vomiting. Negative for melena.   Genitourinary: Negative for hematuria.   Neurological: Negative for focal weakness and light-headedness.   Psychiatric/Behavioral: Negative for memory loss.   All other systems reviewed and are negative.       History  Past Medical History:   Diagnosis Date   • Chronic headaches    • Chronic pain    • Colon polyp    • Degeneration of intervertebral disc of high cervical region    • Depression    • GERD (gastroesophageal reflux disease)    • Hypertension    • Hyperthyroidism    • Irritable bowel syndrome    • Kidney stones    • Macular degeneration    • Pancreatitis, chronic    • Peripheral neuropathy    • PFO (patent foramen ovale) 09/2016   • PSVT (paroxysmal supraventricular tachycardia)     s/p ablation per Dr. Diallo 4/2016   • Restless leg syndrome    • Sarcoidosis    • Scoliosis      Past Surgical History:   Procedure Laterality Date   • CARDIAC ABLATION  04/2016    SVT; Dr. Diallo    • CHOLECYSTECTOMY     • COLONOSCOPY  10/13/2014   • COLONOSCOPY N/A 8/23/2017    Procedure: COLONOSCOPY WITH ANESTHESIA;  Surgeon: Jeanette Mclaughlin MD;  Location: [facility] ENDOSCOPY;  Service:    • ENDOSCOPY N/A 8/23/2017    Procedure: ESOPHAGOGASTRODUODENOSCOPY WITH ANESTHESIA;  Surgeon: Jeanette Mclaughlin MD;  Location: [facility] ENDOSCOPY;  Service:    • HYSTERECTOMY     • KIDNEY STONE SURGERY     • KNEE ARTHROSCOPY     • KNEE SURGERY Right    • LATERAL EPICONDYLE RELEASE     • PATELLA SURGERY     • REPLACEMENT TOTAL KNEE     • SHOULDER ARTHROSCOPY     • ULNAR NERVE DECOMPRESSION     • VARICOSE VEIN SURGERY Left 1/10/2018    Procedure: LEFT SAPHENOUS VEIN RADIO FREQUENCY  ABLATION;  Surgeon: Kvng Montgomery DO;  Location:  [facility] OR;  Service:      Family History   Problem Relation Age of Onset   • Arrhythmia Mother    • Heart disease Mother    • Kidney disease Mother    • Heart disease Father    • Diabetes Father    • Heart disease Brother    • Heart disease Maternal Aunt    • Heart disease Maternal Uncle    • Heart disease Paternal Aunt    • Heart disease Paternal Uncle    • Heart disease Maternal Grandmother    • Heart disease Maternal Grandfather    • Heart disease Paternal Grandmother    • Heart disease Paternal Grandfather      Social History   Substance Use Topics   • Smoking status: Never Smoker   • Smokeless tobacco: Never Used   • Alcohol use No       Medications  Prior to Admission medications    Medication Sig Start Date End Date Taking? Authorizing Provider   carBAMazepine (TEGretol) 100 MG chewable tablet Chew 1 tablet 3 (Three) Times a Day. 12/8/17  Yes SILVIA Callaway   cyproheptadine (PERIACTIN) 4 MG tablet Take 4 mg by mouth 2 (Two) Times a Day As Needed. 8/3/17  Yes Historical Provider, MD   docusate sodium (COLACE) 100 MG capsule Take 1 capsule by mouth daily as needed for constipation.   Yes Historical Provider, MD   DULoxetine (CYMBALTA) 60 MG capsule Take 60 mg by mouth Daily.   Yes Historical Provider, MD   esomeprazole (NEXIUM) 40 MG capsule Take 1 capsule by mouth 2 (Two) Times a Day Before Meals.  Patient taking differently: Take 40 mg by mouth Every Morning Before Breakfast. 7/27/17  Yes BETINA Matias   folic acid (FOLVITE) 400 MCG tablet Take 400 mcg by mouth Daily.   Yes Historical Provider, MD   gabapentin (NEURONTIN) 300 MG capsule Take 1 capsule by mouth 3 (Three) Times a Day.   Yes Historical Provider, MD   HYDROcodone-acetaminophen (NORCO) 5-325 MG per tablet Take 1 tablet by mouth Every 6 (Six) Hours As Needed for Moderate Pain (4-6). 1 to 2 tablets every 4 to 6 hours   Yes Historical Provider, MD   levothyroxine (SYNTHROID,  LEVOTHROID) 88 MCG tablet Take 88 mcg by mouth Daily.   Yes Historical Provider, MD   methotrexate 2.5 MG tablet Take 10 mg by mouth 1 (One) Time Per Week. Takes 4 tabs weekly on mondays   Yes Historical Provider, MD   metoprolol succinate XL (TOPROL-XL) 25 MG 24 hr tablet Take 25 mg by mouth 2 (Two) Times a Day. 8/22/17  Yes Historical Provider, MD   O2 (OXYGEN) Inhale 2 L/min 1 (One) Time. Continuous   Yes Historical Provider, MD   ondansetron (ZOFRAN) 4 MG tablet Take 4 mg by mouth Every 8 (Eight) Hours As Needed for Nausea or Vomiting.   Yes Historical Provider, MD   oxybutynin (DITROPAN) 5 MG tablet Take 5 mg by mouth 2 (Two) Times a Day.   Yes Historical Provider, MD   PROCRIT 65175 UNIT/ML injection Inject 1 Units under the skin As Needed (WHEN BLOOD COUNT BELOW 10). 9/6/17  Yes Historical Provider, MD   tolterodine LA (DETROL LA) 4 MG 24 hr capsule Take 4 mg by mouth Daily.   Yes Historical Provider, MD   topiramate (TOPAMAX) 100 MG tablet Take 100 mg by mouth Daily. 100mg in the morning, 200mg qhs   Yes Historical Provider, MD   traZODone (DESYREL) 50 MG tablet Take 50 mg by mouth Every Night. 8/3/17  Yes Historical Provider, MD       Current Facility-Administered Medications   Medication Dose Route Frequency Provider Last Rate Last Dose   • sodium chloride 0.9 % flush 10 mL  10 mL Intravenous PRN Triage Protocol Emergency, MD         Current Outpatient Prescriptions   Medication Sig Dispense Refill   • carBAMazepine (TEGretol) 100 MG chewable tablet Chew 1 tablet 3 (Three) Times a Day. 90 tablet 2   • cyproheptadine (PERIACTIN) 4 MG tablet Take 4 mg by mouth 2 (Two) Times a Day As Needed.     • docusate sodium (COLACE) 100 MG capsule Take 1 capsule by mouth daily as needed for constipation.     • DULoxetine (CYMBALTA) 60 MG capsule Take 60 mg by mouth Daily.     • esomeprazole (NEXIUM) 40 MG capsule Take 1 capsule by mouth 2 (Two) Times a Day Before Meals. (Patient taking differently: Take 40 mg by mouth  Every Morning Before Breakfast.) 60 capsule 11   • folic acid (FOLVITE) 400 MCG tablet Take 400 mcg by mouth Daily.     • gabapentin (NEURONTIN) 300 MG capsule Take 1 capsule by mouth 3 (Three) Times a Day.     • HYDROcodone-acetaminophen (NORCO) 5-325 MG per tablet Take 1 tablet by mouth Every 6 (Six) Hours As Needed for Moderate Pain (4-6). 1 to 2 tablets every 4 to 6 hours     • levothyroxine (SYNTHROID, LEVOTHROID) 88 MCG tablet Take 88 mcg by mouth Daily.     • methotrexate 2.5 MG tablet Take 10 mg by mouth 1 (One) Time Per Week. Takes 4 tabs weekly on mondays     • metoprolol succinate XL (TOPROL-XL) 25 MG 24 hr tablet Take 25 mg by mouth 2 (Two) Times a Day.     • O2 (OXYGEN) Inhale 2 L/min 1 (One) Time. Continuous     • ondansetron (ZOFRAN) 4 MG tablet Take 4 mg by mouth Every 8 (Eight) Hours As Needed for Nausea or Vomiting.     • oxybutynin (DITROPAN) 5 MG tablet Take 5 mg by mouth 2 (Two) Times a Day.     • PROCRIT 67789 UNIT/ML injection Inject 1 Units under the skin As Needed (WHEN BLOOD COUNT BELOW 10).     • tolterodine LA (DETROL LA) 4 MG 24 hr capsule Take 4 mg by mouth Daily.     • topiramate (TOPAMAX) 100 MG tablet Take 100 mg by mouth Daily. 100mg in the morning, 200mg qhs     • traZODone (DESYREL) 50 MG tablet Take 50 mg by mouth Every Night.          Allergies:  Adhesive tape and Tape    Objective     Vital Signs  Temp:  [98.2 °F (36.8 °C)] 98.2 °F (36.8 °C)  Heart Rate:  [61-98] 82  Resp:  [14-20] 20  BP: (106-153)/(61-96) 139/94    Labs  Lab Results (last 72 hours)     Procedure Component Value Units Date/Time    CBC & Differential [[id number]] Collected:  01/10/18 6037    Specimen:  Blood Updated:  01/11/18 0004    Narrative:       The following orders were created for panel order CBC & Differential.  Procedure                               Abnormality         Status                     ---------                               -----------         ------                     CBC Auto  Differential[[id number]]        Abnormal            Final result                 Please view results for these tests on the individual orders.    CBC Auto Differential [[id number]]  (Abnormal) Collected:  01/10/18 2357    Specimen:  Blood Updated:  01/11/18 0004     WBC 3.07 (L) 10*3/mm3      RBC 2.93 (L) 10*6/mm3      Hemoglobin 9.5 (L) g/dL      Hematocrit 28.8 (L) %      MCV 98.3 (H) fL      MCH 32.4 (H) pg      MCHC 33.0 g/dL      RDW 15.1 (H) %      RDW-SD 54.7 (H) fl      MPV 9.1 fL      Platelets 105 (L) 10*3/mm3      Neutrophil % 70.7 %      Lymphocyte % 14.0 (L) %      Monocyte % 12.1 (H) %      Eosinophil % 2.9 %      Basophil % 0.3 %      Immature Grans % 0.0 %      Neutrophils, Absolute 2.17 10*3/mm3      Lymphocytes, Absolute 0.43 (L) 10*3/mm3      Monocytes, Absolute 0.37 10*3/mm3      Eosinophils, Absolute 0.09 10*3/mm3      Basophils, Absolute 0.01 10*3/mm3      Immature Grans, Absolute 0.00 10*3/mm3      nRBC 0.0 /100 WBC     Comprehensive Metabolic Panel [[id number]]  (Abnormal) Collected:  01/10/18 2357    Specimen:  Blood Updated:  01/11/18 0013     Glucose 104 (H) mg/dL      BUN 16 mg/dL      Creatinine 1.26 mg/dL      Sodium 146 (H) mmol/L      Potassium 4.0 mmol/L      Chloride 109 mmol/L      CO2 28.0 mmol/L      Calcium 8.6 mg/dL      Total Protein 6.1 (L) g/dL      Albumin 3.40 (L) g/dL      ALT (SGPT) 26 U/L      AST (SGOT) 19 U/L      Alkaline Phosphatase 57 U/L      Total Bilirubin 0.4 mg/dL      eGFR Non African Amer 45 (L) mL/min/1.73      Globulin 2.7 gm/dL      A/G Ratio 1.3 g/dL      BUN/Creatinine Ratio 12.7     Anion Gap 9.0 mmol/L     Troponin [[id number]]  (Normal) Collected:  01/10/18 2357    Specimen:  Blood Updated:  01/11/18 0025     Troponin I 0.015 ng/mL     Light Blue Top [[id number]] Collected:  01/10/18 2357    Specimen:  Blood Updated:  01/11/18 0103     Extra Tube hold for add-on      Auto resulted       Green Top (Gel) [[id number]] Collected:  01/10/18 2357    Specimen:   Blood Updated:  01/11/18 0103     Extra Tube Hold for add-ons.      Auto resulted.       Red Top [[id number]] Collected:  01/10/18 2357    Specimen:  Blood Updated:  01/11/18 0103     Extra Tube Hold for add-ons.      Auto resulted.       Russell Draw [[id number]] Collected:  01/10/18 2357    Specimen:  Blood Updated:  01/11/18 0103    Narrative:       The following orders were created for panel order Russell Draw.  Procedure                               Abnormality         Status                     ---------                               -----------         ------                     Light Blue Top[[id number]]                                   Final result               Green Top (Gel)[[id number]]                                  Final result               Lavender Top[[id number]]                                     Final result               Red Top[[id number]]                                          Final result                 Please view results for these tests on the individual orders.    Lavender Top [[id number]] Collected:  01/10/18 2357    Specimen:  Blood Updated:  01/11/18 0103     Extra Tube hold for add-on      Auto resulted       Troponin [[id number]]  (Normal) Collected:  01/11/18 0224    Specimen:  Blood Updated:  01/11/18 0256     Troponin I <0.012 ng/mL     Troponin [[id number]]  (Normal) Collected:  01/11/18 0608    Specimen:  Blood Updated:  01/11/18 0648     Troponin I 0.018 ng/mL       Specimen hemolyzed.  Results may be affected.             Physical Exam:  Physical Exam   Constitutional: She is oriented to person, place, and time. She appears well-developed and well-nourished. She appears distressed.   HENT:   Head: Normocephalic and atraumatic.   Eyes: Conjunctivae and EOM are normal. Pupils are equal, round, and reactive to light.   Neck: Normal range of motion. Neck supple. No JVD present.   Cardiovascular: Normal rate, regular rhythm, S1 normal, S2 normal, normal heart sounds, intact distal  pulses and normal pulses.    No murmur heard.  Pulmonary/Chest: Effort normal and breath sounds normal. No respiratory distress.   Abdominal: Soft. Bowel sounds are normal. She exhibits no distension.   Musculoskeletal: She exhibits no edema.   Neurological: She is alert and oriented to person, place, and time.   Skin: Skin is warm and dry.        Psychiatric: She has a normal mood and affect. Judgment normal.   Vitals reviewed.      Results Review:    I reviewed the patient's new clinical results.  I reviewed the patient's new imaging results and agree with the interpretation.  I reviewed the patient's other test results and agree with the interpretation  I personally viewed and interpreted the patient's EKG/Telemetry data    Assessment   1. Chest pain  2. Nausea/vomiting  3. Dyspnea  4. Hypertension  5. Obesity   6. Sarcoidosis  7. Patent foramen ovale  8. History of supraventricular tachycardia s/p ablation   9. Venous insufficiency: s/p cannulation and ablation yesterday     Plan   1. Admit to 4b under care of Dr. Garcia (Dr. Kim to see today).   2. Trend troponin, EKG given recurrent chest pain  3. Consider cardiac cath if patient continues with chest pain, particularly if biomarkers rise. If patient continues with chest pain and enzymes remain negative, I would consider CTA chest  4. Nitroglycerin drip, Phenergan as needed.   5. If patient's chest pain resolves with serial negative biomarkers and unrevealing EKG, would perform dobutamine stress echo later today.   6. Lipid panel, A1C for further risk stratification     The patient's chest pain is likely noncardiac in origin, relating to GI etiology as her main symptom throughout her stay was vomiting. She is to follow-up with her primary care provider as below. Again, ACS/MI was ruled out and the patient is chest pain free. Also of note, the patient has had multiple negative stress tests, including negative lexiscan 2/2017.    I discussed the patients findings  and my recommendations with patient and consulting provider.     Chula Lugo PA-C  01/11/18  10:53 AM      Please note this cardiology history and physical note is the result of a face to face consultation with the patient, in addition to reviewing medical records at length by myself, Chula Lugo PA-C.    Time: appx 45 minutes    DISCHARGE INSTRUCTIONS:   Activity: gradually resume normal activities  Diet: Regular diet  Medications: Take all medications as prescribed  Follow-up: Follow-up with primary care provider in 1 week; [facility] as needed for worsening symptoms.

## 2018-01-11 NOTE — PLAN OF CARE
Problem: Patient Care Overview (Adult)  Goal: Plan of Care Review  Outcome: Ongoing (interventions implemented as appropriate)   01/11/18 5211   Coping/Psychosocial Response Interventions   Plan Of Care Reviewed With patient   Outcome Evaluation   Outcome Summary/Follow up Plan Initial dietary assessment. Pt reports she has not been eating well for several months and has a poor appetite at this time. She reported approx. 30 lb weight loss x 6 months. When asked about any tolerable foods, pt reported there are no foods she feels like she can tolerate. Cont to encourage intake and will cont to follow.

## 2018-01-11 NOTE — PLAN OF CARE
Problem: Patient Care Overview (Adult)  Goal: Plan of Care Review  Outcome: Ongoing (interventions implemented as appropriate)   01/11/18 1554   Coping/Psychosocial Response Interventions   Plan Of Care Reviewed With patient   Outcome Evaluation   Outcome Summary/Follow up Plan PATIENT ADMITTED FROM ER WITH C/O CHEST PAIN 10/10, PATIENT PALE AND VOMITING. ZOFRAN GIVEN, NITRO GTT INFUSING, AND PATIENT HAS APPEARED TO BE ASLEEP THE PAST THREE HOURS. ENZYMES NEGATIVE. NPO FOR POSSIBLE TESTING. CONTINUE TO MONITOR.   Patient Care Overview   Progress improving     Goal: Adult Individualization and Mutuality  Outcome: Ongoing (interventions implemented as appropriate)    Goal: Discharge Needs Assessment  Outcome: Ongoing (interventions implemented as appropriate)      Problem: Acute Coronary Syndrome (ACS) (Adult)  Goal: Signs and Symptoms of Listed Potential Problems Will be Absent or Manageable (Acute Coronary Syndrome)  Outcome: Ongoing (interventions implemented as appropriate)

## 2018-01-11 NOTE — ED NOTES
Pt requesting additional pain medication.   notified, no new orders received.       Madai Good, RN  01/11/18 0233

## 2018-01-11 NOTE — DISCHARGE INSTRUCTIONS
Activity: gradually resume normal activities  Diet: Regular diet  Medications: Take all medications as prescribed  Follow-up: Follow-up with primary care provider in 1 week; [facility] Group as needed for worsening symptoms.

## 2018-01-16 ENCOUNTER — APPOINTMENT (OUTPATIENT)
Dept: ULTRASOUND IMAGING | Facility: HOSPITAL | Age: 48
End: 2018-01-16
Attending: SURGERY

## 2018-01-17 ENCOUNTER — APPOINTMENT (OUTPATIENT)
Dept: ULTRASOUND IMAGING | Facility: HOSPITAL | Age: 48
End: 2018-01-17
Attending: SURGERY

## 2018-01-19 ENCOUNTER — HOSPITAL ENCOUNTER (OUTPATIENT)
Dept: ULTRASOUND IMAGING | Facility: HOSPITAL | Age: 48
Discharge: HOME | End: 2018-01-19
Attending: SURGERY | Admitting: SURGERY

## 2018-01-19 ENCOUNTER — OFFICE VISIT (OUTPATIENT)
Dept: VASCULAR SURGERY | Facility: CLINIC | Age: 48
End: 2018-01-19

## 2018-01-19 VITALS
HEART RATE: 96 BPM | SYSTOLIC BLOOD PRESSURE: 100 MMHG | BODY MASS INDEX: 39.69 KG/M2 | DIASTOLIC BLOOD PRESSURE: 70 MMHG | HEIGHT: 63 IN | WEIGHT: 224 LBS

## 2018-01-19 DIAGNOSIS — I10 ESSENTIAL HYPERTENSION: ICD-10-CM

## 2018-01-19 DIAGNOSIS — I87.2 VENOUS (PERIPHERAL) INSUFFICIENCY: Primary | ICD-10-CM

## 2018-01-19 DIAGNOSIS — I87.2 VENOUS (PERIPHERAL) INSUFFICIENCY: ICD-10-CM

## 2018-01-19 DIAGNOSIS — I83.813 VARICOSE VEINS OF BOTH LOWER EXTREMITIES WITH PAIN: ICD-10-CM

## 2018-01-19 PROCEDURE — 99214 OFFICE O/P EST MOD 30 MIN: CPT | Performed by: NURSE PRACTITIONER

## 2018-01-19 PROCEDURE — 93971 EXTREMITY STUDY: CPT

## 2018-01-19 PROCEDURE — 93971 EXTREMITY STUDY: CPT | Performed by: SURGERY

## 2018-01-19 NOTE — PROGRESS NOTES
"12/08/2017       Elliott Waters DO  [address] [place] [address]    Stacy Lynn  1970    Chief Complaint   Patient presents with   • Follow-up     Followw up post left leg RFA.Complaint of left leg soreness at top of leg.Deniees,fever,chills,and drainage.       Dear Elliott Waters DO:      HPI  I had the pleasure of seeing your patient Stacy Lynn in the office today.  As you recall, Stacy Lynn is a 47 y.o.  female who you are currently following for tachycardia and hypertension.  She has a history of chronic leg swelling with varicosities noted to bilateral lower extremities for the past 20 years.  She does report heaviness to her legs.  She has been prescribed compression stockings but does not use all the time, but has seen no results.  She has had noninvasive testing performed today, which I did review in office.          Review of Systems   Constitutional: Negative.    HENT: Negative.    Eyes: Negative.    Respiratory: Positive for shortness of breath (unchanged).    Cardiovascular: Positive for leg swelling.   Gastrointestinal: Negative.    Endocrine: Negative.    Genitourinary: Negative.    Musculoskeletal: Negative.    Skin: Negative.    Allergic/Immunologic: Negative.    Neurological: Negative.    Hematological: Negative.    Psychiatric/Behavioral: Negative.        /70  Pulse 96  Ht 160 cm (63\")  Wt 102 kg (224 lb)  BMI 39.68 kg/m2  Physical Exam   Constitutional: She is oriented to person, place, and time. She appears well-developed and well-nourished. No distress.   obese   HENT:   Head: Normocephalic and atraumatic.   Mouth/Throat: Oropharynx is clear and moist.   Eyes: Pupils are equal, round, and reactive to light. No scleral icterus.   Neck: Normal range of motion. Neck supple. No JVD present. Carotid bruit is not present. No thyromegaly present.   Cardiovascular: Normal rate, regular rhythm, S2 normal, normal heart sounds, intact distal pulses and normal pulses.  Exam " reveals no gallop and no friction rub.    No murmur heard.  Varicose veins to bilateral lower extremities  Chronic swelling lymphedema appearance   Pulmonary/Chest: Effort normal and breath sounds normal.   Abdominal: Soft. Normal aorta and bowel sounds are normal. There is no hepatosplenomegaly.   Obese   Musculoskeletal: Normal range of motion.   Neurological: She is alert and oriented to person, place, and time. No cranial nerve deficit.   Skin: Skin is warm and dry. She is not diaphoretic.   Psychiatric: She has a normal mood and affect. Her behavior is normal. Judgment and thought content normal.   Nursing note and vitals reviewed.      Us Guided Vascular Access    Result Date: 1/10/2018  Narrative: History: Venous insufficiency.  Comments: Gray scale imaging as well as color flow duplex were used to evaluate the left lower extremity greater saphenous vein during radiofrequency ablation.  The greater saphenous vein was successfully cannulated just below the knee. The catheter was placed up to the junction of pulled back 3 cm and marked. The anterior accessory saphenous vein was also successfully cannulated in the mid thigh. The catheter was then placed up to the junction and pulled back 3 cm and marked in the anterior accessory saphenous vein.      Impression: Successful left lower extremity radiofrequency ablation. This report was finalized on 01/10/2018 12:44 by Dr. Kvng Montgomery MD.    Xr Chest 1 View    Result Date: 1/11/2018  Narrative: EXAMINATION: XR CHEST 1 VW- 1/11/2018 7:01 AM CST  HISTORY: Chest pain protocol  COMPARISON: 12/22/2017  FINDINGS: Heart is magnified but felt to be normal in size. The aorta is mildly tortuous. Low lung volumes exaggerate the interstitial and vascular markings. Allowing for this, there is some indistinctness of the pulmonary vasculature. There is no focal consolidation or effusion. No pneumothorax is identified. There is mild central bronchial wall thickening.       Impression: Question pulmonary vascular congestion, though this may be completely artifactual and related to low lung volumes. Correlate clinically. This report was finalized on 01/11/2018 07:02 by Dr. Bello Perez MD.    Us Venous Doppler Lower Extremity Left (duplex)    Result Date: 1/19/2018  Narrative: History: Swelling      Impression: Impression: 1. There is no evidence of deep venous thrombosis of the left lower extremity. 2. The greater saphenous vein is closed from zones 2 through 6. The anterior accessory branch is also closed to the mid thigh.  Comments: Left lower extremity venous duplex exam was performed using color Doppler flow, Doppler wave form analysis, and grayscale imaging, with and without compression. There is no evidence of deep venous thrombosis of the common femoral, superficial femoral, popliteal, posterior tibial, and peroneal veins. There is no evidence of clot in the right common femoral vein.  This report was finalized on 01/19/2018 16:23 by Dr. Kvng Montgomery MD.      Patient Active Problem List   Diagnosis   • Palpitations   • Thrombocytopenia   • Sarcoidosis   • Essential hypertension   • Hypothyroidism   • Dyspnea on exertion   • Obesity   • PFO (patent foramen ovale)   • Iron deficiency anemia   • Generalized weakness   • Volume depletion, gastrointestinal loss   • Viral enterocolitis   • Colon polyps   • Epigastric pain   • Bloating   • Early satiety   • Weight loss   • Abdominal cramping   • Nausea   • NSAID long-term use   • Antineoplastic chemotherapy induced anemia   • Anemia in chronic kidney disease (CKD)   • Varicose veins of both lower extremities with pain   • Lymphedema   • Venous (peripheral) insufficiency   • Neuropathy due to medical condition   • Venous insufficiency   • Chest pain   • Bloody stool   • Constipation         ICD-10-CM ICD-9-CM   1. Venous (peripheral) insufficiency I87.2 459.81   2. Varicose veins of both lower extremities with pain I83.813  454.8   3. Essential hypertension I10 401.9       Lab Frequency Next Occurrence   Holter monitor - 48 hour Once 6/20/2017   EMG & Nerve Conduction Test Once 8/12/2017       Plan: After thoroughly evaluating Stacy Lynn, I am pleased to report she is doing well status radiofrequency ablation of the left lower extremity.  She reports her swelling has improved and the heaviness feels better. She did have very slight extension into the common femoral vein on testing.  I will have her take a Xarelto starter pack for 1 month.  In 1 month, I will see her back and repeat a venous duplex.  She does have compression stockings however reports that she is unable to apply these.  I believe lymphedema pumps would be beneficial for her and will have the representative see her.   She does have significant varicose veins to bilateral lower extremities and discomfort to her lower extremities with swelling.  Believe most of her swelling is related to lymphedema but she also has evidence of venous insufficiency as well. She does have a family history of lymphedema Q82.0, and her mother experienced swelling for years.  She has hyperpigmentation, pitting edema along with limited mobility.  The patient's consistent use of gradient compression stockings of 20-30 mmhg along with exercise and elevation, has had limited success in managing her swelling for years.  Prognosis is fair; however, the edema will worsen without further treatment. The patient can continue taking her current medication regimen as previously planned.  This was all discussed in full with complete understanding.    Thank you for allowing me to participate in the care of your patient.  Please do not hesitate with any questions or concerns.  I will keep you aware of any further encounters with Stacy Lynn.        Sincerely yours,         BETINA Karimi, DO

## 2018-01-22 ENCOUNTER — OFFICE VISIT (OUTPATIENT)
Dept: GASTROENTEROLOGY | Facility: CLINIC | Age: 48
End: 2018-01-22

## 2018-01-22 VITALS
SYSTOLIC BLOOD PRESSURE: 114 MMHG | OXYGEN SATURATION: 99 % | DIASTOLIC BLOOD PRESSURE: 88 MMHG | HEART RATE: 96 BPM | TEMPERATURE: 96.2 F | WEIGHT: 224 LBS | BODY MASS INDEX: 39.69 KG/M2 | HEIGHT: 63 IN

## 2018-01-22 DIAGNOSIS — R14.0 BLOATING: ICD-10-CM

## 2018-01-22 DIAGNOSIS — R10.9 ABDOMINAL CRAMPING: ICD-10-CM

## 2018-01-22 DIAGNOSIS — K59.09 OTHER CONSTIPATION: ICD-10-CM

## 2018-01-22 DIAGNOSIS — K92.1 BLOODY STOOL: Primary | ICD-10-CM

## 2018-01-22 PROBLEM — K59.00 CONSTIPATION: Status: ACTIVE | Noted: 2018-01-22

## 2018-01-22 PROCEDURE — 99214 OFFICE O/P EST MOD 30 MIN: CPT | Performed by: NURSE PRACTITIONER

## 2018-01-22 NOTE — PATIENT INSTRUCTIONS
Constipation, Adult  Constipation is when a person:  · Poops (has a bowel movement) fewer times in a week than normal.  · Has a hard time pooping.  · Has poop that is dry, hard, or bigger than normal.  Follow these instructions at home:  Eating and drinking  · Eat foods that have a lot of fiber, such as:  ¨ Fresh fruits and vegetables.  ¨ Whole grains.  ¨ Beans.  · Eat less of foods that are high in fat, low in fiber, or overly processed, such as:  ¨ French fries.  ¨ Hamburgers.  ¨ Cookies.  ¨ Candy.  ¨ Soda.  · Drink enough fluid to keep your pee (urine) clear or pale yellow.  General instructions  · Exercise regularly or as told by your doctor.  · Go to the restroom when you feel like you need to poop. Do not hold it in.  · Take over-the-counter and prescription medicines only as told by your doctor. These include any fiber supplements.  · Do pelvic floor retraining exercises, such as:  ¨ Doing deep breathing while relaxing your lower belly (abdomen).  ¨ Relaxing your pelvic floor while pooping.  · Watch your condition for any changes.  · Keep all follow-up visits as told by your doctor. This is important.  Contact a doctor if:  · You have pain that gets worse.  · You have a fever.  · You have not pooped for 4 days.  · You throw up (vomit).  · You are not hungry.  · You lose weight.  · You are bleeding from the anus.  · You have thin, pencil-like poop (stool).  Get help right away if:  · You have a fever, and your symptoms suddenly get worse.  · You leak poop or have blood in your poop.  · Your belly feels hard or bigger than normal (is bloated).  · You have very bad belly pain.  · You feel dizzy or you faint.  This information is not intended to replace advice given to you by your health care provider. Make sure you discuss any questions you have with your health care provider.  Document Released: 06/05/2009 Document Revised: 07/07/2017 Document Reviewed: 06/07/2017  [employer] Interactive Patient Education © 2017  [employer]

## 2018-01-22 NOTE — PROGRESS NOTES
Chief Complaint:   Chief Complaint   Patient presents with   • Abdominal Pain     Patient is here today stating she is having stomach pain, gas, and constipation. She states that OTC latitives does not work, but if she does has a BM they is bright red blood and feels like she is trying to pass needles.         Patient ID: Stacy Lynn is a 47 y.o. female     History of Present Illness:This is a very pleasant 47-year-old female who was seen in our office last on 7/27/17 with complaints of epigastric pain, nausea, weight loss, bloating, abdominal cramping and early satiety.  The patient was scheduled and followed up with an EGD and colonoscopy performed on 8/23/17.  A colonoscopy was found to be normal.  The EGD revealed nonerosive gastritis and the patient was encouraged to continue to stay off of her chronic NSAID use as well as use her PPI.    Today the patient comes with complaints that she has had chronic constipation sometime after July when she had knee surgery.  She states that she did take opioids for a period of time however that was discontinued about 4 weeks after her surgery.  She states that she has quite a bit of abdominal bloating and feels as though she needs to pass gas but is unable to.  She states she has noted some blood in her stool along with the toilet water and toilet paper upon wiping only on occasion.  She states that this is on a daily think.  She states that she has tried many over-the-counter laxatives that have not worked.    Today the patient denies any nausea, vomiting, epigastric pain, pyrosis, dysphagia or hematemesis.  She denies any fever or chills.  She denies any black tarry stools.  She denies any unintentional weight loss or loss of appetite.      Past Medical History:   Diagnosis Date   • Chronic headaches    • Chronic pain    • Colon polyp    • Degeneration of intervertebral disc of high cervical region    • Depression    • GERD (gastroesophageal reflux disease)    •  Hypertension    • Hyperthyroidism    • Irritable bowel syndrome    • Kidney stones    • Macular degeneration    • Pancreatitis, chronic    • Peripheral neuropathy    • PFO (patent foramen ovale) 09/2016   • PSVT (paroxysmal supraventricular tachycardia)     s/p ablation per Dr. Diallo 4/2016   • Restless leg syndrome    • Sarcoidosis    • Scoliosis        Past Surgical History:   Procedure Laterality Date   • CARDIAC ABLATION  04/2016    SVT; Dr. Diallo    • CHOLECYSTECTOMY     • COLONOSCOPY  10/13/2014   • COLONOSCOPY N/A 8/23/2017    Procedure: COLONOSCOPY WITH ANESTHESIA;  Surgeon: Jeanette Mclaughlin MD;  Location: [facility];  Service:    • ENDOSCOPY N/A 8/23/2017    Procedure: ESOPHAGOGASTRODUODENOSCOPY WITH ANESTHESIA;  Surgeon: Jeanette Mclaughlin MD;  Location: [facility];  Service:    • HYSTERECTOMY     • KIDNEY STONE SURGERY     • KNEE ARTHROSCOPY     • KNEE SURGERY Right    • LATERAL EPICONDYLE RELEASE     • PATELLA SURGERY     • REPLACEMENT TOTAL KNEE     • SHOULDER ARTHROSCOPY     • ULNAR NERVE DECOMPRESSION     • VARICOSE VEIN SURGERY Left 1/10/2018    Procedure: LEFT SAPHENOUS VEIN RADIO FREQUENCY ABLATION;  Surgeon: Kvng Montgomery DO;  Location: [facility] OR;  Service:          Current Outpatient Prescriptions:   •  apixaban (ELIQUIS) 5 MG tablet tablet, Take 5 mg by mouth 2 (Two) Times a Day., Disp: , Rfl:   •  carBAMazepine (TEGretol) 100 MG chewable tablet, Chew 1 tablet 3 (Three) Times a Day., Disp: 90 tablet, Rfl: 2  •  cyproheptadine (PERIACTIN) 4 MG tablet, Take 4 mg by mouth 2 (Two) Times a Day As Needed for Allergies., Disp: , Rfl:   •  docusate sodium (COLACE) 100 MG capsule, Take 1 capsule by mouth daily as needed for constipation., Disp: , Rfl:   •  DULoxetine (CYMBALTA) 60 MG capsule, Take 60 mg by mouth Daily., Disp: , Rfl:   •  esomeprazole (NEXIUM) 40 MG capsule, Take 1 capsule by mouth 2 (Two) Times a Day Before Meals. (Patient taking differently: Take 40 mg by mouth Every Morning  Before Breakfast.), Disp: 60 capsule, Rfl: 11  •  folic acid (FOLVITE) 400 MCG tablet, Take 400 mcg by mouth Daily., Disp: , Rfl:   •  gabapentin (NEURONTIN) 300 MG capsule, Take 1 capsule by mouth 3 (Three) Times a Day., Disp: , Rfl:   •  HYDROcodone-acetaminophen (NORCO) 5-325 MG per tablet, Take 1 tablet by mouth Every 6 (Six) Hours As Needed for Moderate Pain (4-6). 1 to 2 tablets every 4 to 6 hours, Disp: , Rfl:   •  levothyroxine (SYNTHROID, LEVOTHROID) 88 MCG tablet, Take 88 mcg by mouth Daily., Disp: , Rfl:   •  methotrexate 2.5 MG tablet, Take 10 mg by mouth 1 (One) Time Per Week. Takes 4 tabs weekly on Mondays, Disp: , Rfl:   •  metoprolol succinate XL (TOPROL-XL) 25 MG 24 hr tablet, Take 50 mg by mouth Daily., Disp: , Rfl:   •  O2 (OXYGEN), Inhale 2 L/min 1 (One) Time. Continuous, Disp: , Rfl:   •  ondansetron (ZOFRAN) 4 MG tablet, Take 4 mg by mouth Every 8 (Eight) Hours As Needed for Nausea or Vomiting., Disp: , Rfl:   •  oxybutynin XL (DITROPAN-XL) 5 MG 24 hr tablet, Take 5 mg by mouth Daily., Disp: , Rfl:   •  PROCRIT 22873 UNIT/ML injection, Inject 1 Units under the skin As Needed (WHEN BLOOD COUNT [hemoglobin] BELOW 10)., Disp: , Rfl:   •  promethazine (PHENERGAN) 12.5 MG tablet, Take 1 tablet by mouth Every 6 (Six) Hours As Needed for Nausea or Vomiting., Disp: 30 tablet, Rfl: 0  •  topiramate (TOPAMAX) 100 MG tablet, Take 100 mg by mouth Every Night., Disp: , Rfl:   •  topiramate (TOPAMAX) 50 MG tablet, Take 50 mg by mouth Daily., Disp: , Rfl:   •  traZODone (DESYREL) 50 MG tablet, Take 50 mg by mouth Every Night., Disp: , Rfl:     Allergies   Allergen Reactions   • Adhesive Tape Hives   • Tape Hives       Social History     Social History   • Marital status: Single     Spouse name: N/A   • Number of children: N/A   • Years of education: N/A     Occupational History   • Not on file.     Social History Main Topics   • Smoking status: Never Smoker   • Smokeless tobacco: Never Used   • Alcohol use No  "  • Drug use: No   • Sexual activity: No     Other Topics Concern   • Not on file     Social History Narrative       Family History   Problem Relation Age of Onset   • Arrhythmia Mother    • Heart disease Mother    • Kidney disease Mother    • Heart disease Father    • Diabetes Father    • Heart disease Brother    • Heart disease Maternal Aunt    • Heart disease Maternal Uncle    • Heart disease Paternal Aunt    • Heart disease Paternal Uncle    • Heart disease Maternal Grandmother    • Heart disease Maternal Grandfather    • Heart disease Paternal Grandmother    • Heart disease Paternal Grandfather        Vitals:    01/22/18 1341   BP: 114/88   Pulse: 96   Temp: 96.2 °F (35.7 °C)   SpO2: 99%   Weight: 102 kg (224 lb)   Height: 160 cm (63\")       Review of Systems:    General:    Present -feeling well   Skin:    Not Present-Rash   HEENT:     Not Present-Acute visual changes or Acute hearing changes   Neck :    Not Present- swollen glands   Genitourinary:      Not Present- burning, frequency, urgency hematuria, dysuria,   Cardiovascular:   Not Present-chest pain, palpitations, or pressure   Respiratory:   Not Present- shortness of breath or cough   Gastrointestinal:  Musculoskeletal:  Neurological:  Psychiatric:   Present as mentioned in the HP    Not Present. Recent gait disturbances.    Not Present-Seizures and weakness in extremities.    Not Present- Anxiety or Depression.       Physical Exam:    General Appearance:    Alert, cooperative, in no acute distress   Psych:    Mood appropriate    Eyes:          conjunctivae and sclerae normal, no   icterus, no pallor   ENMT:    Ears appear intact with no abnormalities noted oral mucosa moist   Neck:   No adenopathy, supple, trachea midline, no thyromegaly, no   carotid bruit, no JVD    Cardiovascular:    Regular rhythm and normal rate, normal S1 and S2, no            murmur, no gallop, no rub, no click   Gastrointestinal:     Inspection normal.  Normal bowel sounds, no " masses, no organomegaly, soft round non-tender, non-distended, no guarding, no rebound or tenderness. No hepatosplenomegaly.   Skin:   No bleeding, bruising or rash   Neurologic:   nonfocal       Lab Results   Component Value Date    WBC 3.07 (L) 01/10/2018    WBC 2.91 (L) 01/03/2018    WBC 2.57 (L) 09/02/2017    HGB 9.5 (L) 01/10/2018    HGB 10.3 (L) 01/03/2018    HGB 10.4 (L) 09/02/2017    HCT 28.8 (L) 01/10/2018    HCT 31.5 (L) 01/03/2018    HCT 33.5 (L) 09/02/2017     (L) 01/10/2018     01/03/2018     09/02/2017        Lab Results   Component Value Date     (H) 01/10/2018     (H) 01/03/2018     06/03/2017    K 4.0 01/10/2018    K 3.4 (L) 01/03/2018    K 4.0 06/03/2017     01/10/2018     01/03/2018     06/03/2017    CO2 28.0 01/10/2018    CO2 26.0 01/03/2018    CO2 23.0 (L) 06/03/2017    BUN 16 01/10/2018    BUN 11 01/03/2018    BUN 11 06/03/2017    CREATININE 1.26 01/10/2018    CREATININE 1.28 01/03/2018    CREATININE 1.24 06/03/2017    BILITOT 0.4 01/10/2018    BILITOT 0.9 06/02/2017    BILITOT 0.8 05/15/2017    ALKPHOS 57 01/10/2018    ALKPHOS 97 06/02/2017    ALKPHOS 107 05/15/2017    ALT 26 01/10/2018    ALT 20 06/02/2017    ALT 18 05/15/2017    AST 19 01/10/2018    AST 26 06/02/2017    AST 26 05/15/2017    GLUCOSE 104 (H) 01/10/2018    GLUCOSE 72 01/03/2018    GLUCOSE 88 06/03/2017       Lab Results   Component Value Date    INR 1.04 01/03/2018    INR 1.05 06/02/2017    INR 1.07 03/11/2017       Assessment and Plan:    Hope was seen today for abdominal pain.    Diagnoses and all orders for this visit:    Bloody stool    Other constipation    Abdominal cramping    Bloating    At this time I do not feel that the patient needs a repeat colonoscopy as she just had a normal one in August 2017.  I did instruct her to buy over-the-counter MiraLAX and gave her directions on the dosing of this.  I will have her follow-up in 6 weeks to check and see if her  problem with constipation has resolved.  I instructed her that should her symptoms change or worsen then she should go to the ER.  She gave verbal understanding.    Patient Instructions   Constipation, Adult  Constipation is when a person:  · Poops (has a bowel movement) fewer times in a week than normal.  · Has a hard time pooping.  · Has poop that is dry, hard, or bigger than normal.  Follow these instructions at home:  Eating and drinking  · Eat foods that have a lot of fiber, such as:  ¨ Fresh fruits and vegetables.  ¨ Whole grains.  ¨ Beans.  · Eat less of foods that are high in fat, low in fiber, or overly processed, such as:  ¨ French fries.  ¨ Hamburgers.  ¨ Cookies.  ¨ Candy.  ¨ Soda.  · Drink enough fluid to keep your pee (urine) clear or pale yellow.  General instructions  · Exercise regularly or as told by your doctor.  · Go to the restroom when you feel like you need to poop. Do not hold it in.  · Take over-the-counter and prescription medicines only as told by your doctor. These include any fiber supplements.  · Do pelvic floor retraining exercises, such as:  ¨ Doing deep breathing while relaxing your lower belly (abdomen).  ¨ Relaxing your pelvic floor while pooping.  · Watch your condition for any changes.  · Keep all follow-up visits as told by your doctor. This is important.  Contact a doctor if:  · You have pain that gets worse.  · You have a fever.  · You have not pooped for 4 days.  · You throw up (vomit).  · You are not hungry.  · You lose weight.  · You are bleeding from the anus.  · You have thin, pencil-like poop (stool).  Get help right away if:  · You have a fever, and your symptoms suddenly get worse.  · You leak poop or have blood in your poop.  · Your belly feels hard or bigger than normal (is bloated).  · You have very bad belly pain.  · You feel dizzy or you faint.  This information is not intended to replace advice given to you by your health care provider. Make sure you discuss any  questions you have with your health care provider.  Document Released: 06/05/2009 Document Revised: 07/07/2017 Document Reviewed: 06/07/2017  [employer] Interactive Patient Education © 2017 [employer]        Next follow-up appointment        EMR Dragon/Transcription disclaimer:  Much of this encounter note is an electronic transcription/translation of spoken language to printed text. The electronic translation of spoken language may permit erroneous, or at times, nonsensical words or phrases to be inadvertently transcribed; although I have reviewed the note for such errors, some may still exist.

## 2018-01-30 PROBLEM — Z01.818 PREOP TESTING: Status: RESOLVED | Noted: 2017-12-08 | Resolved: 2018-01-30

## 2018-02-16 ENCOUNTER — OFFICE VISIT (OUTPATIENT)
Dept: NEUROLOGY | Facility: CLINIC | Age: 48
End: 2018-02-16

## 2018-02-16 VITALS
BODY MASS INDEX: 39.69 KG/M2 | HEIGHT: 63 IN | SYSTOLIC BLOOD PRESSURE: 118 MMHG | WEIGHT: 224 LBS | HEART RATE: 90 BPM | DIASTOLIC BLOOD PRESSURE: 82 MMHG

## 2018-02-16 DIAGNOSIS — G63 NEUROPATHY DUE TO MEDICAL CONDITION (HCC): Primary | ICD-10-CM

## 2018-02-16 PROCEDURE — 99213 OFFICE O/P EST LOW 20 MIN: CPT | Performed by: PHYSICIAN ASSISTANT

## 2018-02-16 RX ORDER — CARBAMAZEPINE 200 MG/1
200 TABLET, EXTENDED RELEASE ORAL EVERY 12 HOURS SCHEDULED
Qty: 60 TABLET | Refills: 3 | Status: SHIPPED | OUTPATIENT
Start: 2018-02-16 | End: 2021-09-13 | Stop reason: ALTCHOICE

## 2018-02-20 ENCOUNTER — OFFICE VISIT (OUTPATIENT)
Dept: VASCULAR SURGERY | Facility: CLINIC | Age: 48
End: 2018-02-20

## 2018-02-20 ENCOUNTER — HOSPITAL ENCOUNTER (OUTPATIENT)
Dept: ULTRASOUND IMAGING | Facility: HOSPITAL | Age: 48
Discharge: HOME | End: 2018-02-20
Admitting: NURSE PRACTITIONER

## 2018-02-20 VITALS
SYSTOLIC BLOOD PRESSURE: 124 MMHG | DIASTOLIC BLOOD PRESSURE: 78 MMHG | OXYGEN SATURATION: 99 % | BODY MASS INDEX: 39.16 KG/M2 | WEIGHT: 221 LBS | HEIGHT: 63 IN | HEART RATE: 82 BPM

## 2018-02-20 DIAGNOSIS — I89.0 LYMPHEDEMA: ICD-10-CM

## 2018-02-20 DIAGNOSIS — I87.2 VENOUS (PERIPHERAL) INSUFFICIENCY: ICD-10-CM

## 2018-02-20 DIAGNOSIS — I87.2 VENOUS (PERIPHERAL) INSUFFICIENCY: Primary | ICD-10-CM

## 2018-02-20 DIAGNOSIS — Z51.81 ENCOUNTER FOR MONITORING ANTIPLATELET THERAPY: ICD-10-CM

## 2018-02-20 DIAGNOSIS — I83.813 VARICOSE VEINS OF BOTH LOWER EXTREMITIES WITH PAIN: ICD-10-CM

## 2018-02-20 DIAGNOSIS — Z79.02 ENCOUNTER FOR MONITORING ANTIPLATELET THERAPY: ICD-10-CM

## 2018-02-20 DIAGNOSIS — I10 ESSENTIAL HYPERTENSION: ICD-10-CM

## 2018-02-20 PROCEDURE — 99214 OFFICE O/P EST MOD 30 MIN: CPT | Performed by: NURSE PRACTITIONER

## 2018-02-20 PROCEDURE — 93971 EXTREMITY STUDY: CPT

## 2018-02-20 PROCEDURE — 93971 EXTREMITY STUDY: CPT | Performed by: SURGERY

## 2018-02-20 NOTE — H&P
02/20/2018       Elliott Waters DO  [address] [place] [address]    Stacy Lynn  1970    Chief Complaint   Patient presents with   • Follow-up     1 month follow up on Venous Insufficiency with testing done today.       Dear Elliott Waters DO:      HPI  I had the pleasure of seeing your patient Stacy Lynn in the office today.  As you recall, Stacy Lynn is a 47 y.o.  female who you are currently following for tachycardia and hypertension.  She has a history of chronic leg swelling with varicosities noted to bilateral lower extremities for the past 20 years.  She does report heaviness to her legs.  She has been prescribed compression stockings but does not use all the time, but has seen no results.  She did undergo a radiofrequency ablation of the left lower extremity greater saphenous vein.  She has slight extension of the thrombus and has been taking Eliquis over the past month.  She reports her lef tleg is doing well, but her right leg cramping is continued. She has had noninvasive testing performed today, which I did review in office.      Past Medical History:   Diagnosis Date   • Chronic headaches    • Chronic pain    • Colon polyp    • Degeneration of intervertebral disc of high cervical region    • Depression    • GERD (gastroesophageal reflux disease)    • Hypertension    • Hyperthyroidism    • Irritable bowel syndrome    • Kidney stones    • Macular degeneration    • Pancreatitis, chronic    • Peripheral neuropathy    • PFO (patent foramen ovale) 09/2016   • PSVT (paroxysmal supraventricular tachycardia)     s/p ablation per Dr. Diallo 4/2016   • Restless leg syndrome    • Sarcoidosis    • Scoliosis      Past Surgical History:   Procedure Laterality Date   • CARDIAC ABLATION  04/2016    SVT; Dr. Diallo    • CHOLECYSTECTOMY     • COLONOSCOPY  10/13/2014   • COLONOSCOPY N/A 8/23/2017    Procedure: COLONOSCOPY WITH ANESTHESIA;  Surgeon: Jeanette Mclaughlin MD;  Location: [facility] ENDOSCOPY;  Service:     • ENDOSCOPY N/A 8/23/2017    Procedure: ESOPHAGOGASTRODUODENOSCOPY WITH ANESTHESIA;  Surgeon: Jeanette Mclaughlin MD;  Location:  [facility] ENDOSCOPY;  Service:    • HYSTERECTOMY     • KIDNEY STONE SURGERY     • KNEE ARTHROSCOPY     • KNEE SURGERY Right    • LATERAL EPICONDYLE RELEASE     • PATELLA SURGERY     • REPLACEMENT TOTAL KNEE     • SHOULDER ARTHROSCOPY     • ULNAR NERVE DECOMPRESSION     • VARICOSE VEIN SURGERY Left 1/10/2018    Procedure: LEFT SAPHENOUS VEIN RADIO FREQUENCY ABLATION;  Surgeon: Kvng Montgomery DO;  Location:  [facility] OR;  Service:      Family History   Problem Relation Age of Onset   • Arrhythmia Mother    • Heart disease Mother    • Kidney disease Mother    • Heart disease Father    • Diabetes Father    • Heart disease Brother    • Heart disease Maternal Aunt    • Heart disease Maternal Uncle    • Heart disease Paternal Aunt    • Heart disease Paternal Uncle    • Heart disease Maternal Grandmother    • Heart disease Maternal Grandfather    • Heart disease Paternal Grandmother    • Heart disease Paternal Grandfather      Social History   Substance Use Topics   • Smoking status: Never Smoker   • Smokeless tobacco: Never Used   • Alcohol use No     Allergies   Allergen Reactions   • Adhesive Tape Hives   • Tape Hives       Current Outpatient Prescriptions:   •  apixaban (ELIQUIS) 5 MG tablet tablet, Take 5 mg by mouth Daily., Disp: , Rfl:   •  carBAMazepine XR (TEGretol  XR) 200 MG 12 hr tablet, Take 1 tablet by mouth Every 12 (Twelve) Hours., Disp: 60 tablet, Rfl: 3  •  cyproheptadine (PERIACTIN) 4 MG tablet, Take 4 mg by mouth 2 (Two) Times a Day As Needed (nerves)., Disp: , Rfl:   •  docusate sodium (COLACE) 100 MG capsule, Take 1 capsule by mouth daily as needed for constipation., Disp: , Rfl:   •  DULoxetine (CYMBALTA) 60 MG capsule, Take 60 mg by mouth Daily., Disp: , Rfl:   •  esomeprazole (NEXIUM) 40 MG capsule, Take 1 capsule by mouth 2 (Two) Times a Day Before Meals. (Patient taking  differently: Take 40 mg by mouth Every Morning Before Breakfast.), Disp: 60 capsule, Rfl: 11  •  folic acid (FOLVITE) 400 MCG tablet, Take 400 mcg by mouth Daily., Disp: , Rfl:   •  gabapentin (NEURONTIN) 300 MG capsule, Take 1 capsule by mouth 3 (Three) Times a Day., Disp: , Rfl:   •  HYDROcodone-acetaminophen (NORCO) 5-325 MG per tablet, Take 1 tablet by mouth. 1 to 2 tablets every 4 to 6 hours , Disp: , Rfl:   •  methotrexate 2.5 MG tablet, Take 10 mg by mouth 1 (One) Time Per Week. Takes 4 tabs weekly on Mondays, Disp: , Rfl:   •  oxybutynin XL (DITROPAN-XL) 5 MG 24 hr tablet, Take 5 mg by mouth Daily., Disp: , Rfl:   •  PROCRIT 30989 UNIT/ML injection, Inject 1 Units under the skin As Needed (WHEN BLOOD COUNT [hemoglobin] BELOW 11)., Disp: , Rfl:   •  promethazine (PHENERGAN) 12.5 MG tablet, Take 1 tablet by mouth Every 6 (Six) Hours As Needed for Nausea or Vomiting., Disp: 30 tablet, Rfl: 0  •  topiramate (TOPAMAX) 50 MG tablet, Take 1 tablet in the morning.  Takes 2 tablets at bedtime., Disp: , Rfl:   •  traZODone (DESYREL) 50 MG tablet, Take 50 mg by mouth Every Night., Disp: , Rfl:   •  levothyroxine (SYNTHROID, LEVOTHROID) 88 MCG tablet, Take 88 mcg by mouth Daily., Disp: , Rfl:   •  metoprolol succinate XL (TOPROL-XL) 25 MG 24 hr tablet, Take 50 mg by mouth Daily., Disp: , Rfl:   •  O2 (OXYGEN), Inhale 2 L/min 1 (One) Time. Continuous, Disp: , Rfl:   •  ondansetron (ZOFRAN) 4 MG tablet, Take 4 mg by mouth Every 8 (Eight) Hours As Needed for Nausea or Vomiting., Disp: , Rfl:   •  topiramate (TOPAMAX) 100 MG tablet, Take 100 mg by mouth Every Night., Disp: , Rfl:          Review of Systems   Constitutional: Negative.    HENT: Negative.    Eyes: Negative.    Respiratory: Positive for shortness of breath (unchanged).    Cardiovascular: Positive for leg swelling.   Gastrointestinal: Negative.    Endocrine: Negative.    Genitourinary: Negative.    Musculoskeletal: Negative.    Skin: Negative.   "  Allergic/Immunologic: Negative.    Neurological: Negative.    Hematological: Negative.    Psychiatric/Behavioral: Negative.        /78  Pulse 82  Ht 160 cm (63\")  Wt 100 kg (221 lb)  SpO2 99%  BMI 39.15 kg/m2  Physical Exam   Constitutional: She is oriented to person, place, and time. She appears well-developed and well-nourished. No distress.   obese   HENT:   Head: Normocephalic and atraumatic.   Mouth/Throat: Oropharynx is clear and moist.   Eyes: Pupils are equal, round, and reactive to light. No scleral icterus.   Neck: Normal range of motion. Neck supple. No JVD present. Carotid bruit is not present. No thyromegaly present.   Cardiovascular: Normal rate, regular rhythm, S2 normal, normal heart sounds, intact distal pulses and normal pulses.  Exam reveals no gallop and no friction rub.    No murmur heard.  Varicose veins to bilateral lower extremities  Chronic swelling lymphedema appearance   Pulmonary/Chest: Effort normal and breath sounds normal.   Abdominal: Soft. Normal aorta and bowel sounds are normal. There is no hepatosplenomegaly.   Obese   Musculoskeletal: Normal range of motion.   Neurological: She is alert and oriented to person, place, and time. No cranial nerve deficit.   Skin: Skin is warm and dry. She is not diaphoretic.   Psychiatric: She has a normal mood and affect. Her behavior is normal. Judgment and thought content normal.   Nursing note and vitals reviewed.    Diagnostic Data:  Left lower extremity venous duplex shows previous clot extension resolved.      No results found.    Patient Active Problem List   Diagnosis   • Palpitations   • Thrombocytopenia   • Sarcoidosis   • Essential hypertension   • Hypothyroidism   • Dyspnea on exertion   • Obesity   • PFO (patent foramen ovale)   • Iron deficiency anemia   • Generalized weakness   • Volume depletion, gastrointestinal loss   • Viral enterocolitis   • Colon polyps   • Epigastric pain   • Bloating   • Early satiety   • Weight " loss   • Abdominal cramping   • Nausea   • NSAID long-term use   • Antineoplastic chemotherapy induced anemia   • Anemia in chronic kidney disease (CKD)   • Varicose veins of both lower extremities with pain   • Lymphedema   • Venous (peripheral) insufficiency   • Neuropathy due to medical condition   • Venous insufficiency   • Chest pain   • Bloody stool   • Constipation         ICD-10-CM ICD-9-CM   1. Venous (peripheral) insufficiency I87.2 459.81   2. Encounter for monitoring antiplatelet therapy Z51.81 V58.83    Z79.02 V58.63   3. Lymphedema I89.0 457.1   4. Varicose veins of both lower extremities with pain I83.813 454.8   5. Essential hypertension I10 401.9       Lab Frequency Next Occurrence   Holter monitor - 48 hour Once 6/20/2017   EMG & Nerve Conduction Test Once 8/12/2017       Plan: After thoroughly evaluating Stacy Lynn, I am pleased to report she is doing well status radiofrequency ablation of the left lower extremity.  She reports her swelling has improved and the heaviness feels better. The extension that was seen previously has resolved, and she can stop taking Eliquis at this time.  She would like to proceed with radiofrequency ablation of the right lower extremity. She does have an adhesive allergy, so we will not do Venaseal closure.  Risks of radiofrequency ablation include, but are not limited to, bleeding, infection, vessel damage, nerve damage, DVT, phlebitis, and pulmonary embolus.  The patient understands these risks and wishes to proceed with procedure.  She does have compression stockings however reports that she is unable to apply these.  She has been using the lymphedema pumps at home, which she believes are helping.  The patient can continue taking her current medication regimen as previously planned.  This was all discussed in full with complete understanding.    Thank you for allowing me to participate in the care of your patient.  Please do not hesitate with any questions or  concerns.  I will keep you aware of any further encounters with Stacy Lynn.        Sincerely yours,         BETINA Karimi, DO

## 2018-02-20 NOTE — PROGRESS NOTES
"02/20/2018       Elliott Waters DO  [address] [place] [address]    Stacy Lynn  1970    Chief Complaint   Patient presents with   • Follow-up     1 month follow up on Venous Insufficiency with testing done today.       Dear Elliott Waters DO:      HPI  I had the pleasure of seeing your patient Stacy Lynn in the office today.  As you recall, Stacy Lynn is a 47 y.o.  female who you are currently following for tachycardia and hypertension.  She has a history of chronic leg swelling with varicosities noted to bilateral lower extremities for the past 20 years.  She does report heaviness to her legs.  She has been prescribed compression stockings but does not use all the time, but has seen no results.  She did undergo a radiofrequency ablation of the left lower extremity greater saphenous vein.  She has slight extension of the thrombus and has been taking Eliquis over the past month.  She reports her lef tleg is doing well, but her right leg cramping is continued. She has had noninvasive testing performed today, which I did review in office.          Review of Systems   Constitutional: Negative.    HENT: Negative.    Eyes: Negative.    Respiratory: Positive for shortness of breath (unchanged).    Cardiovascular: Positive for leg swelling.   Gastrointestinal: Negative.    Endocrine: Negative.    Genitourinary: Negative.    Musculoskeletal: Negative.    Skin: Negative.    Allergic/Immunologic: Negative.    Neurological: Negative.    Hematological: Negative.    Psychiatric/Behavioral: Negative.        /78  Pulse 82  Ht 160 cm (63\")  Wt 100 kg (221 lb)  SpO2 99%  BMI 39.15 kg/m2  Physical Exam   Constitutional: She is oriented to person, place, and time. She appears well-developed and well-nourished. No distress.   obese   HENT:   Head: Normocephalic and atraumatic.   Mouth/Throat: Oropharynx is clear and moist.   Eyes: Pupils are equal, round, and reactive to light. No scleral icterus.   Neck: " Normal range of motion. Neck supple. No JVD present. Carotid bruit is not present. No thyromegaly present.   Cardiovascular: Normal rate, regular rhythm, S2 normal, normal heart sounds, intact distal pulses and normal pulses.  Exam reveals no gallop and no friction rub.    No murmur heard.  Varicose veins to bilateral lower extremities  Chronic swelling lymphedema appearance   Pulmonary/Chest: Effort normal and breath sounds normal.   Abdominal: Soft. Normal aorta and bowel sounds are normal. There is no hepatosplenomegaly.   Obese   Musculoskeletal: Normal range of motion.   Neurological: She is alert and oriented to person, place, and time. No cranial nerve deficit.   Skin: Skin is warm and dry. She is not diaphoretic.   Psychiatric: She has a normal mood and affect. Her behavior is normal. Judgment and thought content normal.   Nursing note and vitals reviewed.    Diagnostic Data:  Left lower extremity venous duplex shows previous clot extension resolved.      No results found.    Patient Active Problem List   Diagnosis   • Palpitations   • Thrombocytopenia   • Sarcoidosis   • Essential hypertension   • Hypothyroidism   • Dyspnea on exertion   • Obesity   • PFO (patent foramen ovale)   • Iron deficiency anemia   • Generalized weakness   • Volume depletion, gastrointestinal loss   • Viral enterocolitis   • Colon polyps   • Epigastric pain   • Bloating   • Early satiety   • Weight loss   • Abdominal cramping   • Nausea   • NSAID long-term use   • Antineoplastic chemotherapy induced anemia   • Anemia in chronic kidney disease (CKD)   • Varicose veins of both lower extremities with pain   • Lymphedema   • Venous (peripheral) insufficiency   • Neuropathy due to medical condition   • Venous insufficiency   • Chest pain   • Bloody stool   • Constipation         ICD-10-CM ICD-9-CM   1. Venous (peripheral) insufficiency I87.2 459.81   2. Encounter for monitoring antiplatelet therapy Z51.81 V58.83    Z79.02 V58.63   3.  Lymphedema I89.0 457.1   4. Varicose veins of both lower extremities with pain I83.813 454.8   5. Essential hypertension I10 401.9       Lab Frequency Next Occurrence   Holter monitor - 48 hour Once 6/20/2017   EMG & Nerve Conduction Test Once 8/12/2017       Plan: After thoroughly evaluating Stacy Lynn, I am pleased to report she is doing well status radiofrequency ablation of the left lower extremity.  She reports her swelling has improved and the heaviness feels better. The extension that was seen previously has resolved, and she can stop taking Eliquis at this time.  She would like to proceed with radiofrequency ablation of the right lower extremity. She does have an adhesive allergy, so we will not do Venaseal closure.  Risks of radiofrequency ablation include, but are not limited to, bleeding, infection, vessel damage, nerve damage, DVT, phlebitis, and pulmonary embolus.  The patient understands these risks and wishes to proceed with procedure.  She does have compression stockings however reports that she is unable to apply these.  She has been using the lymphedema pumps at home, which she believes are helping.  The patient can continue taking her current medication regimen as previously planned.  This was all discussed in full with complete understanding.    Thank you for allowing me to participate in the care of your patient.  Please do not hesitate with any questions or concerns.  I will keep you aware of any further encounters with Stacy Lynn.        Sincerely yours,         BETINA Karimi, DO

## 2018-03-06 NOTE — PROGRESS NOTES
Subjective   Stacy Lynn is a 47 y.o. female is here today for follow-up.    HPI Comments: The patient has neuralgia symptoms in the upper extremities.  A cervical MRI has been negative EMG nerve conduction studies showing minor neuropathy changes and possible chronic radiculopathy in the C6 to C7 distribution.  The patient has been on several medications without relief.  She has an extremely complex medical history.  Medical history includes substantial issues with sarcoidosis, anemia, thrombocytopenia, chronic pain.    Migraine    This is a chronic problem. The current episode started more than 1 year ago. The problem occurs intermittently. The problem has been gradually improving. The pain is located in the bilateral, frontal, retro-orbital and temporal region. The pain does not radiate. The pain quality is similar to prior headaches. The quality of the pain is described as aching, stabbing and throbbing. The pain is severe. Associated symptoms include back pain, coughing, neck pain, numbness and weakness. Pertinent negatives include no fever. The symptoms are aggravated by activity, bright light, emotional stress, fatigue, noise and Valsalva. She has tried acetaminophen, antidepressants, beta blockers, Excedrin, darkened room, cold packs, NSAIDs, triptans and oxygen for the symptoms. The treatment provided significant relief. Her past medical history is significant for migraine headaches and migraines in the family.   Peripheral Neuropathy   This is a chronic problem. The current episode started more than 1 year ago. The problem occurs daily. The problem has been unchanged. Associated symptoms include coughing, fatigue, neck pain, numbness and weakness. Pertinent negatives include no congestion, fever or headaches. The symptoms are aggravated by exertion. She has tried rest, position changes and relaxation (Multiple neuropathic pain medicines) for the symptoms. The treatment provided mild relief.   Neurologic  Problem   The patient's primary symptoms include focal sensory loss and weakness. This is a chronic problem. The current episode started more than 1 year ago. The neurological problem developed gradually. The problem has been gradually worsening since onset. There was upper extremity focality noted. Associated symptoms include back pain, fatigue, neck pain and shortness of breath. Pertinent negatives include no fever or headaches. Past treatments include medication and bed rest. The treatment provided mild relief. Her past medical history is significant for mood changes. There is no history of dementia or seizures.       The following portions of the patient's history were reviewed and updated as appropriate: allergies, current medications, past family history, past medical history, past social history, past surgical history and problem list.    Review of Systems   Constitutional: Positive for fatigue. Negative for fever.   HENT: Negative for congestion, nosebleeds and trouble swallowing.    Eyes: Negative.    Respiratory: Positive for cough, shortness of breath and wheezing.    Cardiovascular: Negative.    Gastrointestinal: Negative.    Endocrine: Negative.    Genitourinary: Positive for frequency and urgency.   Musculoskeletal: Positive for back pain, gait problem, neck pain and neck stiffness.   Skin: Negative.    Allergic/Immunologic: Negative.    Neurological: Positive for weakness and numbness. Negative for facial asymmetry, speech difficulty and headaches.   Hematological: Negative.    Psychiatric/Behavioral: Positive for dysphoric mood. The patient is nervous/anxious.        Objective   Physical Exam   Constitutional: She is oriented to person, place, and time. Vital signs are normal. She appears well-developed and well-nourished. No distress.   HENT:   Head: Normocephalic and atraumatic.   Right Ear: External ear normal.   Left Ear: External ear normal.   Nose: Nose normal.   Mouth/Throat: Uvula is midline,  oropharynx is clear and moist and mucous membranes are normal.   Eyes: Conjunctivae, EOM and lids are normal. Pupils are equal, round, and reactive to light. No scleral icterus.   Neck: Phonation normal. Muscular tenderness present. No spinous process tenderness present. Carotid bruit is not present. Decreased range of motion present. No thyroid mass and no thyromegaly present.   Cardiovascular: Normal rate, regular rhythm, S1 normal, S2 normal and normal heart sounds.    No murmur heard.  Pulmonary/Chest: Effort normal and breath sounds normal. No stridor. No respiratory distress. She has no wheezes. She has no rales.   Musculoskeletal: She exhibits no edema.        Cervical back: She exhibits decreased range of motion, tenderness and pain.        Lumbar back: She exhibits tenderness and pain.   Lymphadenopathy:     She has no cervical adenopathy.   Neurological: She is alert and oriented to person, place, and time. She displays no atrophy and no tremor. A sensory deficit is present. No cranial nerve deficit. She exhibits normal muscle tone. Gait abnormal. Coordination normal. GCS eye subscore is 4. GCS verbal subscore is 5. GCS motor subscore is 6.   Reflex Scores:       Tricep reflexes are 2+ on the right side and 2+ on the left side.       Bicep reflexes are 2+ on the right side and 2+ on the left side.       Brachioradialis reflexes are 2+ on the right side and 2+ on the left side.       Patellar reflexes are 2+ on the right side and 2+ on the left side.       Achilles reflexes are 2+ on the right side and 2+ on the left side.  Diffuse decrease to light touch bilateral upper extremities most closely approximating a C6 distribution.  She has some breakaway weakness with regards to , finger and wrist extension, biceps function.  There is no atrophy.    Her gait is a bit antalgic.   Skin: Skin is warm and dry. No ecchymosis, no lesion and no rash noted. No cyanosis. Nails show no clubbing.   Psychiatric: She  has a normal mood and affect. Her speech is normal and behavior is normal. Judgment and thought content normal. She is not actively hallucinating. Cognition and memory are normal. She is attentive.   Nursing note and vitals reviewed.        Assessment/Plan   Hope was seen today for migraine and peripheral neuropathy.    Diagnoses and all orders for this visit:    Neuropathy due to medical condition  -     carBAMazepine XR (TEGretol  XR) 200 MG 12 hr tablet; Take 1 tablet by mouth Every 12 (Twelve) Hours.    Will adjust her Tegretol.  I talked with her about the need for occasional labs.    10 minutes of a 15 minute outpatient visit was spent in counseling and coordination of care today.        EMR Dragon transcription disclaimer:  Much of this encounter note is an electronic transcription/translation of spoken language to printed text.  The electronic translation of spoken language may permit erroneous, or at times, nonsensical words or phrases to be inadvertently transcribed.  The author has reviewed the note for such errors, however some may still exist.

## 2018-03-12 ENCOUNTER — APPOINTMENT (OUTPATIENT)
Dept: LAB | Facility: HOSPITAL | Age: 48
End: 2018-03-12
Attending: INTERNAL MEDICINE

## 2018-03-12 ENCOUNTER — TELEPHONE (OUTPATIENT)
Dept: VASCULAR SURGERY | Facility: CLINIC | Age: 48
End: 2018-03-12

## 2018-03-12 ENCOUNTER — TRANSCRIBE ORDERS (OUTPATIENT)
Dept: ADMINISTRATIVE | Facility: HOSPITAL | Age: 48
End: 2018-03-12

## 2018-03-12 DIAGNOSIS — D86.1 SARCOIDOSIS OF LYMPH NODES: ICD-10-CM

## 2018-03-12 DIAGNOSIS — J96.11 CHRONIC RESPIRATORY FAILURE WITH HYPOXIA (HCC): Primary | ICD-10-CM

## 2018-03-12 LAB
ALBUMIN SERPL-MCNC: 3.9 G/DL (ref 3.5–5)
ALP SERPL-CCNC: 64 U/L (ref 24–120)
ALT SERPL W P-5'-P-CCNC: 31 U/L (ref 0–54)
AST SERPL-CCNC: 22 U/L (ref 7–45)
BILIRUB CONJ SERPL-MCNC: 0 MG/DL (ref 0–0.3)
BILIRUB INDIRECT SERPL-MCNC: 0 MG/DL (ref 0–1.1)
BILIRUB SERPL-MCNC: <0.1 MG/DL (ref 0.1–1)
PROT SERPL-MCNC: 6.9 G/DL (ref 6.3–8.7)

## 2018-03-12 PROCEDURE — 80076 HEPATIC FUNCTION PANEL: CPT | Performed by: INTERNAL MEDICINE

## 2018-03-12 PROCEDURE — 36415 COLL VENOUS BLD VENIPUNCTURE: CPT

## 2018-03-12 NOTE — TELEPHONE ENCOUNTER
Left message for patient to return my phone call in reffernce to her upcoming sx.  Left all contact information for patient to return my call.

## 2018-03-16 ENCOUNTER — TRANSCRIBE ORDERS (OUTPATIENT)
Dept: GENERAL RADIOLOGY | Facility: HOSPITAL | Age: 48
End: 2018-03-16

## 2018-03-16 ENCOUNTER — HOSPITAL ENCOUNTER (OUTPATIENT)
Dept: GENERAL RADIOLOGY | Facility: HOSPITAL | Age: 48
Discharge: HOME | End: 2018-03-16
Attending: INTERNAL MEDICINE | Admitting: INTERNAL MEDICINE

## 2018-03-16 DIAGNOSIS — D86.1 BENIGN LYMPHOGRANULOMATOSIS OF SCHAUMANN: Primary | ICD-10-CM

## 2018-03-16 PROCEDURE — 71046 X-RAY EXAM CHEST 2 VIEWS: CPT

## 2018-03-23 ENCOUNTER — APPOINTMENT (OUTPATIENT)
Dept: PREADMISSION TESTING | Facility: HOSPITAL | Age: 48
End: 2018-03-23

## 2018-03-23 VITALS
OXYGEN SATURATION: 100 % | SYSTOLIC BLOOD PRESSURE: 142 MMHG | HEART RATE: 73 BPM | DIASTOLIC BLOOD PRESSURE: 82 MMHG | HEIGHT: 63 IN | WEIGHT: 231.92 LBS | BODY MASS INDEX: 41.09 KG/M2

## 2018-03-23 DIAGNOSIS — Z79.02 ENCOUNTER FOR MONITORING ANTIPLATELET THERAPY: ICD-10-CM

## 2018-03-23 DIAGNOSIS — I87.2 VENOUS (PERIPHERAL) INSUFFICIENCY: ICD-10-CM

## 2018-03-23 DIAGNOSIS — Z51.81 ENCOUNTER FOR MONITORING ANTIPLATELET THERAPY: ICD-10-CM

## 2018-03-23 LAB
ANION GAP SERPL CALCULATED.3IONS-SCNC: 10 MMOL/L (ref 4–13)
APTT PPP: 33.8 SECONDS (ref 24.1–34.8)
BASOPHILS # BLD AUTO: 0.03 10*3/MM3 (ref 0–0.2)
BASOPHILS NFR BLD AUTO: 1.1 % (ref 0–2)
BUN BLD-MCNC: 10 MG/DL (ref 5–21)
BUN/CREAT SERPL: 9.3 (ref 7–25)
CALCIUM SPEC-SCNC: 8.5 MG/DL (ref 8.4–10.4)
CHLORIDE SERPL-SCNC: 110 MMOL/L (ref 98–110)
CO2 SERPL-SCNC: 24 MMOL/L (ref 24–31)
CREAT BLD-MCNC: 1.07 MG/DL (ref 0.5–1.4)
DEPRECATED RDW RBC AUTO: 49.5 FL (ref 40–54)
EOSINOPHIL # BLD AUTO: 0.12 10*3/MM3 (ref 0–0.7)
EOSINOPHIL NFR BLD AUTO: 4.2 % (ref 0–4)
ERYTHROCYTE [DISTWIDTH] IN BLOOD BY AUTOMATED COUNT: 13.6 % (ref 12–15)
GFR SERPL CREATININE-BSD FRML MDRD: 55 ML/MIN/1.73
GLUCOSE BLD-MCNC: 99 MG/DL (ref 70–100)
HCT VFR BLD AUTO: 31.5 % (ref 37–47)
HGB BLD-MCNC: 10.6 G/DL (ref 12–16)
IMM GRANULOCYTES # BLD: 0.01 10*3/MM3 (ref 0–0.03)
IMM GRANULOCYTES NFR BLD: 0.4 % (ref 0–5)
INR PPP: 0.99 (ref 0.91–1.09)
LYMPHOCYTES # BLD AUTO: 0.66 10*3/MM3 (ref 0.72–4.86)
LYMPHOCYTES NFR BLD AUTO: 23.2 % (ref 15–45)
MCH RBC QN AUTO: 33.5 PG (ref 28–32)
MCHC RBC AUTO-ENTMCNC: 33.7 G/DL (ref 33–36)
MCV RBC AUTO: 99.7 FL (ref 82–98)
MONOCYTES # BLD AUTO: 0.39 10*3/MM3 (ref 0.19–1.3)
MONOCYTES NFR BLD AUTO: 13.7 % (ref 4–12)
NEUTROPHILS # BLD AUTO: 1.63 10*3/MM3 (ref 1.87–8.4)
NEUTROPHILS NFR BLD AUTO: 57.4 % (ref 39–78)
NRBC BLD MANUAL-RTO: 0 /100 WBC (ref 0–0)
PLATELET # BLD AUTO: 132 10*3/MM3 (ref 130–400)
PMV BLD AUTO: 9.3 FL (ref 6–12)
POTASSIUM BLD-SCNC: 3.7 MMOL/L (ref 3.5–5.3)
PROTHROMBIN TIME: 13.4 SECONDS (ref 11.9–14.6)
RBC # BLD AUTO: 3.16 10*6/MM3 (ref 4.2–5.4)
SODIUM BLD-SCNC: 144 MMOL/L (ref 135–145)
WBC NRBC COR # BLD: 2.84 10*3/MM3 (ref 4.8–10.8)

## 2018-03-23 PROCEDURE — 36415 COLL VENOUS BLD VENIPUNCTURE: CPT

## 2018-03-23 PROCEDURE — 85610 PROTHROMBIN TIME: CPT | Performed by: NURSE PRACTITIONER

## 2018-03-23 PROCEDURE — 85730 THROMBOPLASTIN TIME PARTIAL: CPT | Performed by: NURSE PRACTITIONER

## 2018-03-23 PROCEDURE — 85025 COMPLETE CBC W/AUTO DIFF WBC: CPT | Performed by: NURSE PRACTITIONER

## 2018-03-23 PROCEDURE — 80048 BASIC METABOLIC PNL TOTAL CA: CPT | Performed by: NURSE PRACTITIONER

## 2018-03-23 RX ORDER — BUSPIRONE HYDROCHLORIDE 5 MG/1
5 TABLET ORAL 2 TIMES DAILY
COMMUNITY
End: 2019-07-31

## 2018-03-23 RX ORDER — TOLTERODINE 4 MG/1
4 CAPSULE, EXTENDED RELEASE ORAL DAILY
COMMUNITY
End: 2019-10-07

## 2018-03-30 ENCOUNTER — APPOINTMENT (OUTPATIENT)
Dept: ULTRASOUND IMAGING | Facility: HOSPITAL | Age: 48
End: 2018-03-30

## 2018-03-30 ENCOUNTER — ANESTHESIA EVENT (OUTPATIENT)
Dept: PERIOP | Facility: HOSPITAL | Age: 48
End: 2018-03-30

## 2018-03-30 ENCOUNTER — APPOINTMENT (OUTPATIENT)
Dept: INTERVENTIONAL RADIOLOGY/VASCULAR | Facility: HOSPITAL | Age: 48
End: 2018-03-30

## 2018-03-30 ENCOUNTER — ANESTHESIA (OUTPATIENT)
Dept: PERIOP | Facility: HOSPITAL | Age: 48
End: 2018-03-30

## 2018-03-30 ENCOUNTER — HOSPITAL ENCOUNTER (OUTPATIENT)
Facility: HOSPITAL | Age: 48
Discharge: HOME | End: 2018-03-30
Attending: SURGERY | Admitting: SURGERY

## 2018-03-30 VITALS
SYSTOLIC BLOOD PRESSURE: 127 MMHG | TEMPERATURE: 97.8 F | RESPIRATION RATE: 16 BRPM | DIASTOLIC BLOOD PRESSURE: 74 MMHG | OXYGEN SATURATION: 100 % | HEART RATE: 72 BPM

## 2018-03-30 DIAGNOSIS — I87.2 VENOUS (PERIPHERAL) INSUFFICIENCY: ICD-10-CM

## 2018-03-30 LAB
ABO GROUP BLD: NORMAL
BLD GP AB SCN SERPL QL: NEGATIVE
RH BLD: POSITIVE
T&S EXPIRATION DATE: NORMAL

## 2018-03-30 PROCEDURE — 25010000002 FENTANYL CITRATE (PF) 100 MCG/2ML SOLUTION: Performed by: NURSE ANESTHETIST, CERTIFIED REGISTERED

## 2018-03-30 PROCEDURE — 86850 RBC ANTIBODY SCREEN: CPT | Performed by: NURSE PRACTITIONER

## 2018-03-30 PROCEDURE — 36475 ENDOVENOUS RF 1ST VEIN: CPT | Performed by: SURGERY

## 2018-03-30 PROCEDURE — C1894 INTRO/SHEATH, NON-LASER: HCPCS | Performed by: SURGERY

## 2018-03-30 PROCEDURE — 0 IOVERSOL 74 % SOLUTION: Performed by: SURGERY

## 2018-03-30 PROCEDURE — 86901 BLOOD TYPING SEROLOGIC RH(D): CPT | Performed by: NURSE PRACTITIONER

## 2018-03-30 PROCEDURE — 86900 BLOOD TYPING SEROLOGIC ABO: CPT | Performed by: NURSE PRACTITIONER

## 2018-03-30 PROCEDURE — C1769 GUIDE WIRE: HCPCS | Performed by: SURGERY

## 2018-03-30 PROCEDURE — C1888 ENDOVAS NON-CARDIAC ABL CATH: HCPCS | Performed by: SURGERY

## 2018-03-30 PROCEDURE — 75820 VEIN X-RAY ARM/LEG: CPT

## 2018-03-30 PROCEDURE — 76000 FLUOROSCOPY <1 HR PHYS/QHP: CPT

## 2018-03-30 PROCEDURE — 76937 US GUIDE VASCULAR ACCESS: CPT

## 2018-03-30 PROCEDURE — 25010000002 PROPOFOL 1000 MG/ML EMULSION: Performed by: NURSE ANESTHETIST, CERTIFIED REGISTERED

## 2018-03-30 RX ORDER — SODIUM CHLORIDE 0.9 % (FLUSH) 0.9 %
1-10 SYRINGE (ML) INJECTION AS NEEDED
Status: DISCONTINUED | OUTPATIENT
Start: 2018-03-30 | End: 2018-03-30 | Stop reason: HOSPADM

## 2018-03-30 RX ORDER — METOPROLOL TARTRATE 5 MG/5ML
2.5 INJECTION INTRAVENOUS
Status: CANCELLED | OUTPATIENT
Start: 2018-03-30

## 2018-03-30 RX ORDER — LABETALOL HYDROCHLORIDE 5 MG/ML
5 INJECTION, SOLUTION INTRAVENOUS
Status: CANCELLED | OUTPATIENT
Start: 2018-03-30

## 2018-03-30 RX ORDER — SODIUM CHLORIDE 0.9 % (FLUSH) 0.9 %
3 SYRINGE (ML) INJECTION AS NEEDED
Status: DISCONTINUED | OUTPATIENT
Start: 2018-03-30 | End: 2018-03-30 | Stop reason: HOSPADM

## 2018-03-30 RX ORDER — MIDAZOLAM HYDROCHLORIDE 1 MG/ML
1 INJECTION INTRAMUSCULAR; INTRAVENOUS
Status: DISCONTINUED | OUTPATIENT
Start: 2018-03-30 | End: 2018-03-30 | Stop reason: HOSPADM

## 2018-03-30 RX ORDER — SODIUM CHLORIDE 9 MG/ML
INJECTION, SOLUTION INTRAVENOUS AS NEEDED
Status: DISCONTINUED | OUTPATIENT
Start: 2018-03-30 | End: 2018-03-30 | Stop reason: HOSPADM

## 2018-03-30 RX ORDER — SODIUM CHLORIDE, SODIUM LACTATE, POTASSIUM CHLORIDE, CALCIUM CHLORIDE 600; 310; 30; 20 MG/100ML; MG/100ML; MG/100ML; MG/100ML
9 INJECTION, SOLUTION INTRAVENOUS CONTINUOUS
Status: DISCONTINUED | OUTPATIENT
Start: 2018-03-30 | End: 2018-03-30 | Stop reason: HOSPADM

## 2018-03-30 RX ORDER — KETAMINE HYDROCHLORIDE 50 MG/ML
INJECTION, SOLUTION, CONCENTRATE INTRAMUSCULAR; INTRAVENOUS AS NEEDED
Status: DISCONTINUED | OUTPATIENT
Start: 2018-03-30 | End: 2018-03-30 | Stop reason: SURG

## 2018-03-30 RX ORDER — MEPERIDINE HYDROCHLORIDE 25 MG/ML
12.5 INJECTION INTRAMUSCULAR; INTRAVENOUS; SUBCUTANEOUS
Status: CANCELLED | OUTPATIENT
Start: 2018-03-30 | End: 2018-03-31

## 2018-03-30 RX ORDER — HYDROCODONE BITARTRATE AND ACETAMINOPHEN 5; 325 MG/1; MG/1
1 TABLET ORAL ONCE AS NEEDED
Status: DISCONTINUED | OUTPATIENT
Start: 2018-03-30 | End: 2018-03-30 | Stop reason: HOSPADM

## 2018-03-30 RX ORDER — MIDAZOLAM HYDROCHLORIDE 1 MG/ML
2 INJECTION INTRAMUSCULAR; INTRAVENOUS
Status: DISCONTINUED | OUTPATIENT
Start: 2018-03-30 | End: 2018-03-30 | Stop reason: HOSPADM

## 2018-03-30 RX ORDER — IPRATROPIUM BROMIDE AND ALBUTEROL SULFATE 2.5; .5 MG/3ML; MG/3ML
3 SOLUTION RESPIRATORY (INHALATION) ONCE AS NEEDED
Status: CANCELLED | OUTPATIENT
Start: 2018-03-30

## 2018-03-30 RX ORDER — ONDANSETRON 2 MG/ML
4 INJECTION INTRAMUSCULAR; INTRAVENOUS ONCE AS NEEDED
Status: CANCELLED | OUTPATIENT
Start: 2018-03-30

## 2018-03-30 RX ORDER — HYDRALAZINE HYDROCHLORIDE 20 MG/ML
5 INJECTION INTRAMUSCULAR; INTRAVENOUS
Status: CANCELLED | OUTPATIENT
Start: 2018-03-30

## 2018-03-30 RX ORDER — DIPHENHYDRAMINE HYDROCHLORIDE 50 MG/ML
12.5 INJECTION INTRAMUSCULAR; INTRAVENOUS
Status: CANCELLED | OUTPATIENT
Start: 2018-03-30

## 2018-03-30 RX ORDER — NALOXONE HCL 0.4 MG/ML
0.4 VIAL (ML) INJECTION AS NEEDED
Status: CANCELLED | OUTPATIENT
Start: 2018-03-30

## 2018-03-30 RX ORDER — FENTANYL CITRATE 50 UG/ML
25 INJECTION, SOLUTION INTRAMUSCULAR; INTRAVENOUS
Status: DISCONTINUED | OUTPATIENT
Start: 2018-03-30 | End: 2018-03-30 | Stop reason: HOSPADM

## 2018-03-30 RX ORDER — FENTANYL CITRATE 50 UG/ML
INJECTION, SOLUTION INTRAMUSCULAR; INTRAVENOUS AS NEEDED
Status: DISCONTINUED | OUTPATIENT
Start: 2018-03-30 | End: 2018-03-30 | Stop reason: SURG

## 2018-03-30 RX ORDER — MORPHINE SULFATE 2 MG/ML
2 INJECTION, SOLUTION INTRAMUSCULAR; INTRAVENOUS
Status: CANCELLED | OUTPATIENT
Start: 2018-03-30 | End: 2018-03-30

## 2018-03-30 RX ORDER — DEXTROSE MONOHYDRATE 25 G/50ML
12.5 INJECTION, SOLUTION INTRAVENOUS AS NEEDED
Status: DISCONTINUED | OUTPATIENT
Start: 2018-03-30 | End: 2018-03-30 | Stop reason: HOSPADM

## 2018-03-30 RX ORDER — SODIUM CHLORIDE, SODIUM LACTATE, POTASSIUM CHLORIDE, CALCIUM CHLORIDE 600; 310; 30; 20 MG/100ML; MG/100ML; MG/100ML; MG/100ML
1000 INJECTION, SOLUTION INTRAVENOUS CONTINUOUS
Status: DISCONTINUED | OUTPATIENT
Start: 2018-03-30 | End: 2018-03-30 | Stop reason: HOSPADM

## 2018-03-30 RX ADMIN — PROPOFOL 100 MCG/KG/MIN: 10 INJECTION, EMULSION INTRAVENOUS at 08:21

## 2018-03-30 RX ADMIN — SODIUM CHLORIDE, POTASSIUM CHLORIDE, SODIUM LACTATE AND CALCIUM CHLORIDE 1000 ML: 600; 310; 30; 20 INJECTION, SOLUTION INTRAVENOUS at 06:16

## 2018-03-30 RX ADMIN — Medication 2 G: at 08:13

## 2018-03-30 RX ADMIN — KETAMINE HYDROCHLORIDE 100 MG: 50 INJECTION, SOLUTION INTRAMUSCULAR; INTRAVENOUS at 08:21

## 2018-03-30 RX ADMIN — LIDOCAINE HYDROCHLORIDE 0.5 ML: 10 INJECTION, SOLUTION EPIDURAL; INFILTRATION; INTRACAUDAL; PERINEURAL at 06:16

## 2018-03-30 RX ADMIN — FENTANYL CITRATE 100 MCG: 50 INJECTION, SOLUTION INTRAMUSCULAR; INTRAVENOUS at 08:21

## 2018-03-30 NOTE — ANESTHESIA POSTPROCEDURE EVALUATION
Patient: Stacy Lynn    Procedure Summary     Date:  03/30/18 Room / Location:   [facility] OR [address] [place] [address] [facility]    Anesthesia Start:  0813 Anesthesia Stop:  0906    Procedure:  RIGHT LOWER EXTREMITY VENAGRAM, RIGHT LOWER EXTREMITY SAPHENOUS VEIN RADIO FREQUENCY ABLATION (Right Leg Lower) Diagnosis:       Venous (peripheral) insufficiency      (Venous (peripheral) insufficiency [I87.2])    Surgeon:  Kvng Montgomery DO Provider:  Barrett Mari CRNA    Anesthesia Type:  MAC ASA Status:  3          Anesthesia Type: MAC  Last vitals  BP   132/78 (03/30/18 0930)   Temp   97.8 °F (36.6 °C) (03/30/18 0910)   Pulse   68 (03/30/18 0930)   Resp   16 (03/30/18 0930)     SpO2   100 % (03/30/18 0930)     Post Anesthesia Care and Evaluation    Patient location during evaluation: PACU  Patient participation: complete - patient participated  Level of consciousness: awake and alert  Pain management: adequate  Airway patency: patent  Anesthetic complications: No anesthetic complications    Cardiovascular status: acceptable  Respiratory status: acceptable  Hydration status: acceptable    Comments: Blood pressure 132/78, pulse 68, temperature 97.8 °F (36.6 °C), temperature source Temporal Artery , resp. rate 16, SpO2 100 %.    Pt discharged from PACU based on efren score >8

## 2018-03-30 NOTE — ANESTHESIA PREPROCEDURE EVALUATION
Anesthesia Evaluation     Patient summary reviewed and Nursing notes reviewed   no history of anesthetic complications:  NPO Solid Status: > 8 hours             Airway   Mallampati: I  TM distance: >3 FB  Neck ROM: full  no difficulty expected  Dental          Pulmonary     breath sounds clear to auscultation  (+) shortness of breath,   (-) not a smoker    ROS comment: Sarcoidosis, on 2L o2 at all times  Cardiovascular   Exercise tolerance: poor (<4 METS)    ECG reviewed  Patient on routine beta blocker and Beta blocker given within 24 hours of surgery  Rhythm: regular  Rate: normal    (+) hypertension, dysrhythmias (svt on toprol) Tachycardia,     ROS comment: Negative stress test  Echo shows atrial septal aneurysm, pt has h/o PFO    Neuro/Psych  (-) seizures, TIA, CVA  GI/Hepatic/Renal/Endo    (+) obesity,   renal disease CRI, hypothyroidism,   (-) liver disease, diabetes    Musculoskeletal     Abdominal    Substance History      OB/GYN          Other   (+) arthritis                       Anesthesia Plan    ASA 3     MAC   (PFO precautions)  intravenous induction   Anesthetic plan and risks discussed with patient.

## 2018-04-02 ENCOUNTER — TELEPHONE (OUTPATIENT)
Dept: VASCULAR SURGERY | Facility: CLINIC | Age: 48
End: 2018-04-02

## 2018-04-02 NOTE — TELEPHONE ENCOUNTER
Patient called stating a quarter size blister developed at steri-strip site below right knee.Denies fever,chills,fatigue,purelent drainage,or odor.Has follow up appointment in office for Monday.Encouraged to see PCP today or walk in clinic if not urgent/emergent.Encouraged to seek care in emergency department if she develops emergent symptoms.Voiced understanding.

## 2018-04-04 ENCOUNTER — HOSPITAL ENCOUNTER (EMERGENCY)
Facility: HOSPITAL | Age: 48
Discharge: HOME | End: 2018-04-04
Attending: EMERGENCY MEDICINE | Admitting: EMERGENCY MEDICINE

## 2018-04-04 VITALS
HEIGHT: 63 IN | HEART RATE: 88 BPM | OXYGEN SATURATION: 100 % | BODY MASS INDEX: 39.34 KG/M2 | DIASTOLIC BLOOD PRESSURE: 79 MMHG | WEIGHT: 222 LBS | RESPIRATION RATE: 16 BRPM | TEMPERATURE: 98.1 F | SYSTOLIC BLOOD PRESSURE: 125 MMHG

## 2018-04-04 DIAGNOSIS — S80.811A ABRASION, RIGHT LOWER LEG, INITIAL ENCOUNTER: Primary | ICD-10-CM

## 2018-04-04 PROCEDURE — 99283 EMERGENCY DEPT VISIT LOW MDM: CPT

## 2018-04-04 RX ORDER — SULFAMETHOXAZOLE AND TRIMETHOPRIM 800; 160 MG/1; MG/1
1 TABLET ORAL 2 TIMES DAILY
Qty: 20 TABLET | Refills: 0 | Status: ON HOLD | OUTPATIENT
Start: 2018-04-04 | End: 2018-09-06

## 2018-04-04 NOTE — ED NOTES
Subjective   She had a vein ablation on her right leg last week by Dr. Montgomery.  The Steri-Strip in the right lower leg came off 2 days ago.  She noticed a sore over the skin had apparently peeled off on the right lower leg yesterday.  It is painful in the short nature.  She called Dr. Montgomery's office and was to see her primary care physician but they told her they would not see her for this problem and he come to the emergency room.        History provided by:  Patient   used: No    Lower Extremity Issue   Location:  Leg  Time since incident:  1 day  Injury: no    Leg location:  R leg  Pain details:     Quality:  Sharp    Radiates to:  Does not radiate    Severity:  Moderate    Onset quality:  Sudden    Duration:  1 day    Timing:  Constant    Progression:  Unchanged  Chronicity:  New  Dislocation: no    Foreign body present:  No foreign bodies  Tetanus status:  Unknown  Prior injury to area:  No  Relieved by:  Nothing  Worsened by:  Nothing  Ineffective treatments:  None tried  Associated symptoms: no back pain, no decreased ROM, no fatigue, no fever, no itching, no muscle weakness, no neck pain, no numbness, no stiffness, no swelling and no tingling    Risk factors: no concern for non-accidental trauma, no frequent fractures, no known bone disorder, no obesity and no recent illness        Review of Systems   Constitutional: Negative.  Negative for fatigue and fever.   HENT: Negative.    Respiratory: Negative.    Cardiovascular: Negative.    Gastrointestinal: Negative.    Genitourinary: Negative.    Musculoskeletal: Negative.  Negative for back pain, neck pain and stiffness.   Skin: Negative for itching.   Neurological: Negative.    All other systems reviewed and are negative.      Past Medical History:   Diagnosis Date   • Chest pain     NERVE PAIN IN LUNGS   • Chronic headaches    • Chronic pain    • Chronic respiratory failure    • Colon polyp    • Degeneration of intervertebral disc of  high cervical region    • Depression    • GERD (gastroesophageal reflux disease)    • Hypertension    • Hyperthyroidism    • Irritable bowel syndrome    • Kidney stones    • Macular degeneration    • Orthopnea    • Pancreatitis, chronic    • Peripheral neuropathy    • PFO (patent foramen ovale) 09/2016   • PSVT (paroxysmal supraventricular tachycardia)     s/p ablation per Dr. Diallo 4/2016   • Restless leg syndrome    • Sarcoidosis    • Scoliosis    • Septic joint    • Venous insufficiency, peripheral     RIGHT LEG       Allergies   Allergen Reactions   • Adhesive Tape Hives   • Tape Hives       Past Surgical History:   Procedure Laterality Date   • CARDIAC ABLATION  04/2016    SVT; Dr. Diallo    • CHOLECYSTECTOMY     • COLONOSCOPY  10/13/2014   • COLONOSCOPY N/A 8/23/2017    Procedure: COLONOSCOPY WITH ANESTHESIA;  Surgeon: Jeanette Mclaughlin MD;  Location: [facility] ENDOSCOPY;  Service:    • ENDOSCOPY N/A 8/23/2017    Procedure: ESOPHAGOGASTRODUODENOSCOPY WITH ANESTHESIA;  Surgeon: Jeanette Mclaughlin MD;  Location: [facility] ENDOSCOPY;  Service:    • ENDOVENOUS ABLATION SAPHENOUS VEIN W/ LASER Right 03/30/2018   • HYSTERECTOMY     • JOINT REPLACEMENT Right     PATELLA REPLACED   • KIDNEY STONE SURGERY     • KNEE ARTHROSCOPY     • KNEE SURGERY Right    • LATERAL EPICONDYLE RELEASE     • PATELLA SURGERY     • REPLACEMENT TOTAL KNEE     • SHOULDER ARTHROSCOPY     • ULNAR NERVE DECOMPRESSION     • VARICOSE VEIN SURGERY Left 1/10/2018    Procedure: LEFT SAPHENOUS VEIN RADIO FREQUENCY ABLATION;  Surgeon: Kvng Montgomery DO;  Location: [facility] OR;  Service:    • VARICOSE VEIN SURGERY Right 3/30/2018    Procedure: RIGHT LOWER EXTREMITY VENAGRAM, RIGHT LOWER EXTREMITY SAPHENOUS VEIN RADIO FREQUENCY ABLATION;  Surgeon: Kvng Montgomery DO;  Location:  [facility] OR;  Service: Vascular       Family History   Problem Relation Age of Onset   • Arrhythmia Mother    • Heart disease Mother    • Kidney disease Mother    • Heart disease Father     • Diabetes Father    • Heart disease Brother    • Heart disease Maternal Aunt    • Heart disease Maternal Uncle    • Heart disease Paternal Aunt    • Heart disease Paternal Uncle    • Heart disease Maternal Grandmother    • Heart disease Maternal Grandfather    • Heart disease Paternal Grandmother    • Heart disease Paternal Grandfather        Social History     Social History   • Marital status: Single     Social History Main Topics   • Smoking status: Never Smoker   • Smokeless tobacco: Never Used   • Alcohol use No   • Drug use: No   • Sexual activity: Defer     Other Topics Concern   • Not on file       Prior to Admission medications    Medication Sig Start Date End Date Taking? Authorizing Provider   busPIRone (BUSPAR) 5 MG tablet Take 5 mg by mouth Daily.    Historical Provider, MD   carBAMazepine XR (TEGretol  XR) 200 MG 12 hr tablet Take 1 tablet by mouth Every 12 (Twelve) Hours. 2/16/18   SILVIA Callaway   docusate sodium (COLACE) 100 MG capsule Take 1 capsule by mouth Daily As Needed for Constipation (MAY TAKE 1 OR 2 PILLS AS NEEDED).    Historical Provider, MD   DULoxetine (CYMBALTA) 60 MG capsule Take 60 mg by mouth Daily.    Historical Provider, MD   esomeprazole (NEXIUM) 40 MG capsule Take 1 capsule by mouth 2 (Two) Times a Day Before Meals.  Patient taking differently: Take 40 mg by mouth 2 (Two) Times a Day. 7/27/17   BETINA Matias   folic acid (FOLVITE) 400 MCG tablet Take 400 mcg by mouth Daily.    Historical Provider, MD   gabapentin (NEURONTIN) 300 MG capsule Take 1 capsule by mouth 3 (Three) Times a Day.    Historical Provider, MD   HYDROcodone-acetaminophen (NORCO) 5-325 MG per tablet Take 1 tablet by mouth Every 6 (Six) Hours. 1 to 2 tablets every 4 to 6 hours     Historical Provider, MD   levothyroxine (SYNTHROID, LEVOTHROID) 88 MCG tablet Take 75 mcg by mouth Daily.    Historical Provider, MD   methotrexate 2.5 MG tablet Take 10 mg by mouth 1 (One) Time Per Week. Takes 4  tabs weekly on Mondays    Historical Provider, MD   metoprolol succinate XL (TOPROL-XL) 25 MG 24 hr tablet Take 100 mg by mouth Daily. 8/22/17   Historical Provider, MD   O2 (OXYGEN) Inhale 2 L/min 1 (One) Time. Continuous    Historical Provider, MD   ondansetron (ZOFRAN) 4 MG tablet Take 4 mg by mouth Every 8 (Eight) Hours As Needed for Nausea or Vomiting.    Historical Provider, MD   oxybutynin XL (DITROPAN-XL) 5 MG 24 hr tablet Take 5 mg by mouth Daily.    Historical Provider, MD   PROCRIT 73763 UNIT/ML injection Inject 1 Units under the skin As Needed (WHEN BLOOD COUNT [hemoglobin] BELOW 11). 9/6/17   [place] Provider, MD   sulfamethoxazole-trimethoprim (BACTRIM DS,SEPTRA DS) 800-160 MG per tablet Take 1 tablet by mouth 2 (Two) Times a Day. 4/4/18   Landry Benito Jr., MD   tolterodine LA (DETROL LA) 4 MG 24 hr capsule Take 4 mg by mouth Daily.    Historical Provider, MD   topiramate (TOPAMAX) 100 MG tablet Take 100 mg by mouth Every Night.    Historical Provider, MD   topiramate (TOPAMAX) 50 MG tablet Take 50 mg by mouth 2 (Two) Times a Day. Take 1 tablet in the morning.  Takes 2 tablets at bedtime.    Historical Provider, MD   traZODone (DESYREL) 50 MG tablet Take 50 mg by mouth At Night As Needed for Sleep. 8/3/17   Historical Provider, MD       Medications - No data to display    Vitals:    04/04/18 0709   BP: 125/79   Pulse: 88   Resp: 16   Temp: 98.1 °F (36.7 °C)   SpO2: 100%         Objective   Physical Exam   Constitutional: She is oriented to person, place, and time. She appears well-developed and well-nourished.   Musculoskeletal: Normal range of motion.   Neurological: She is alert and oriented to person, place, and time.   Skin:   There is a nickel sized area where the skin has sloughed off on the medial proximal right lower leg area.  This is little below the knee.  It appears to be near reduce the top layer superficial skin has peeled off.  She does have a lot of swelling in the sling that  essentially chronic edema and as the same on the left side.  There is no laceration.  There is no purulence noted.  There is a mild rim of erythema.  She has good pulses and sensation.   Psychiatric: She has a normal mood and affect. Her behavior is normal.   Nursing note and vitals reviewed.      Procedures         Lab Results (last 24 hours)     ** No results found for the last 24 hours. **          No orders to display       ED Course  ED Course   Comment By Time   Told patient that this appears to be area where skin sloughed off but does not appear to be infected which was her concern.  However, she has an autoimmune disorder so we will cover to be safe.  Otherwise wound looks clean and shoot heal fine. Landry Benito Jr., MD 04/04 4617          MDM  Number of Diagnoses or Management Options  Abrasion, right lower leg, initial encounter: new and does not require workup  Risk of Complications, Morbidity, and/or Mortality  Presenting problems: low  Diagnostic procedures: low  Management options: low    Patient Progress  Patient progress: stable      Final diagnoses:   Abrasion, right lower leg, initial encounter          Landry Benito Jr., MD  04/04/18 2770

## 2018-04-05 ENCOUNTER — TELEPHONE (OUTPATIENT)
Dept: VASCULAR SURGERY | Facility: CLINIC | Age: 48
End: 2018-04-05

## 2018-04-05 NOTE — TELEPHONE ENCOUNTER
Patient called to reschedule appointment and testing.Would like to be scheduled for 4-11th or 13th with test same date.Rescheduled appointments for 4/13/18.Patient mailed reminders for appointments.Left voice mail to call office regarding new times and date.

## 2018-04-09 ENCOUNTER — APPOINTMENT (OUTPATIENT)
Dept: ULTRASOUND IMAGING | Facility: HOSPITAL | Age: 48
End: 2018-04-09
Attending: SURGERY

## 2018-04-12 ENCOUNTER — TELEPHONE (OUTPATIENT)
Dept: VASCULAR SURGERY | Facility: CLINIC | Age: 48
End: 2018-04-12

## 2018-04-13 ENCOUNTER — HOSPITAL ENCOUNTER (OUTPATIENT)
Dept: ULTRASOUND IMAGING | Facility: HOSPITAL | Age: 48
Discharge: HOME | End: 2018-04-13
Attending: SURGERY | Admitting: SURGERY

## 2018-04-13 ENCOUNTER — OFFICE VISIT (OUTPATIENT)
Dept: VASCULAR SURGERY | Facility: CLINIC | Age: 48
End: 2018-04-13

## 2018-04-13 VITALS
HEIGHT: 63 IN | DIASTOLIC BLOOD PRESSURE: 88 MMHG | SYSTOLIC BLOOD PRESSURE: 138 MMHG | WEIGHT: 235 LBS | HEART RATE: 74 BPM | OXYGEN SATURATION: 99 % | BODY MASS INDEX: 41.64 KG/M2

## 2018-04-13 DIAGNOSIS — I87.2 VENOUS (PERIPHERAL) INSUFFICIENCY: ICD-10-CM

## 2018-04-13 DIAGNOSIS — I89.0 LYMPHEDEMA: ICD-10-CM

## 2018-04-13 DIAGNOSIS — I87.2 VENOUS INSUFFICIENCY: Primary | ICD-10-CM

## 2018-04-13 PROCEDURE — 99213 OFFICE O/P EST LOW 20 MIN: CPT | Performed by: NURSE PRACTITIONER

## 2018-04-13 PROCEDURE — 93971 EXTREMITY STUDY: CPT

## 2018-04-13 PROCEDURE — 93971 EXTREMITY STUDY: CPT | Performed by: SURGERY

## 2018-04-13 NOTE — PROGRESS NOTES
"04/13/2018       Elliott Waters DO  [address] [place] [address]    Stacy Lynn  1970    Chief Complaint   Patient presents with   • Follow-up     Post-OP Follow Up RFA. Patient states no stroke like symptoms. She describes right leg very sore and knot at the top.        Dear Elliott Waters DO:      HPI  I had the pleasure of seeing your patient Stacy Lynn in the office today.  As you recall, Stacy Lynn is a 48 y.o.  female who you are currently following for tachycardia and hypertension.  She has a history of chronic leg swelling with varicosities noted to bilateral lower extremities for the past 20 years.  She does report heaviness to her legs.  She has been prescribed compression stockings but does not use all the time, but has seen no results.  She did undergo a radiofrequency ablation of the left lower extremity greater saphenous vein, and most recently the right.  She did have a blister to the right leg with some tenderness at the top.  She has had noninvasive testing performed today, which I did review in office.          Review of Systems   Constitutional: Negative.    HENT: Negative.    Eyes: Negative.    Respiratory: Positive for shortness of breath (unchanged).    Cardiovascular: Positive for leg swelling.   Gastrointestinal: Negative.    Endocrine: Negative.    Genitourinary: Negative.    Musculoskeletal: Negative.    Skin: Negative.    Allergic/Immunologic: Negative.    Neurological: Negative.    Hematological: Negative.    Psychiatric/Behavioral: Negative.        /88   Pulse 74   Ht 160 cm (63\")   Wt 107 kg (235 lb)   SpO2 99%   BMI 41.63 kg/m²   Physical Exam   Constitutional: She is oriented to person, place, and time. She appears well-developed and well-nourished. No distress.   obese   HENT:   Head: Normocephalic and atraumatic.   Mouth/Throat: Oropharynx is clear and moist.   Eyes: Pupils are equal, round, and reactive to light. No scleral icterus.   Neck: Normal " range of motion. Neck supple. No JVD present. Carotid bruit is not present. No thyromegaly present.   Cardiovascular: Normal rate, regular rhythm, S2 normal, normal heart sounds, intact distal pulses and normal pulses.  Exam reveals no gallop and no friction rub.    No murmur heard.  Varicose veins to bilateral lower extremities  Chronic swelling lymphedema appearance   Pulmonary/Chest: Effort normal and breath sounds normal.   Abdominal: Soft. Normal aorta and bowel sounds are normal. There is no hepatosplenomegaly.   Obese   Musculoskeletal: Normal range of motion.   Neurological: She is alert and oriented to person, place, and time. No cranial nerve deficit.   Skin: Skin is warm and dry. She is not diaphoretic.   Psychiatric: She has a normal mood and affect. Her behavior is normal. Judgment and thought content normal.   Nursing note and vitals reviewed.    Diagnostic Data:  History: Swelling     IMPRESSION:  Impression:   1. There is no evidence of deep venous thrombosis of the right lower  extremity.  2. The greater saphenous vein is closed from zones 2 through 6.     Comments: Right lower extremity venous duplex exam was performed using  color Doppler flow, Doppler wave form analysis, and grayscale imaging,  with and without compression. There is no evidence of deep venous  thrombosis of the common femoral, superficial femoral, popliteal,  posterior tibial, and peroneal veins. There is no thrombus identified in  the saphenofemoral junction. There is no evidence of clot in the left  common femoral vein.     This report was finalized on 04/15/2018 09:36 by Dr. Kvng Montgomery MD.    Patient Active Problem List   Diagnosis   • Palpitations   • Thrombocytopenia   • Sarcoidosis   • Essential hypertension   • Hypothyroidism   • Dyspnea on exertion   • Obesity   • PFO (patent foramen ovale)   • Iron deficiency anemia   • Generalized weakness   • Volume depletion, gastrointestinal loss   • Viral enterocolitis   •  Colon polyps   • Epigastric pain   • Bloating   • Early satiety   • Weight loss   • Abdominal cramping   • Nausea   • NSAID long-term use   • Antineoplastic chemotherapy induced anemia   • Anemia in chronic kidney disease (CKD)   • Varicose veins of both lower extremities with pain   • Lymphedema   • Venous (peripheral) insufficiency   • Neuropathy due to medical condition   • Venous insufficiency   • Chest pain   • Bloody stool   • Constipation         ICD-10-CM ICD-9-CM   1. Venous insufficiency I87.2 459.81   2. Lymphedema I89.0 457.1       Lab Frequency Next Occurrence   Holter monitor - 48 hour Once 6/20/2017   EMG & Nerve Conduction Test Once 8/12/2017       Plan: After thoroughly evaluating Stacy Lynn, I am pleased to report she is doing well status radiofrequency ablation of the right lower extremity.  She reports her swelling has improved and the heaviness feels better.  SHe does have a tender area that on testing was a thrombosed varicose vein.  I did explain to apply warm compresses. And that at some point she should try to wear compression.  She has been using the lymphedema pumps at home, which she believes are helping.  We will see her back in 1 year, or sooner if any problems develop.  The patient can continue taking her current medication regimen as previously planned.  This was all discussed in full with complete understanding.    Thank you for allowing me to participate in the care of your patient.  Please do not hesitate with any questions or concerns.  I will keep you aware of any further encounters with Stacy Lynn.        Sincerely yours,         BETINA Karimi, DO

## 2018-05-01 ENCOUNTER — HOSPITAL ENCOUNTER (OUTPATIENT)
Dept: PREADMISSION TESTING | Age: 48
Discharge: HOME | End: 2018-05-05
Payer: MEDICARE

## 2018-05-01 VITALS — WEIGHT: 233 LBS | BODY MASS INDEX: 41.29 KG/M2 | HEIGHT: 63 IN

## 2018-05-01 LAB
ANION GAP SERPL CALCULATED.3IONS-SCNC: 11 MMOL/L (ref 7–19)
BASOPHILS ABSOLUTE: 0 K/UL (ref 0–0.2)
BASOPHILS RELATIVE PERCENT: 1.2 % (ref 0–1)
BUN BLDV-MCNC: 18 MG/DL (ref 6–20)
CALCIUM SERPL-MCNC: 8.9 MG/DL (ref 8.6–10)
CHLORIDE BLD-SCNC: 104 MMOL/L (ref 98–111)
CO2: 24 MMOL/L (ref 22–29)
CREAT SERPL-MCNC: 1.1 MG/DL (ref 0.5–0.9)
EOSINOPHILS ABSOLUTE: 0.1 K/UL (ref 0–0.6)
EOSINOPHILS RELATIVE PERCENT: 4.6 % (ref 0–5)
GFR NON-AFRICAN AMERICAN: 53
GLUCOSE BLD-MCNC: 79 MG/DL (ref 74–109)
HCT VFR BLD CALC: 34.1 % (ref 37–47)
HEMOGLOBIN: 11 G/DL (ref 12–16)
LYMPHOCYTES ABSOLUTE: 0.5 K/UL (ref 1.1–4.5)
LYMPHOCYTES RELATIVE PERCENT: 19.2 % (ref 20–40)
MCH RBC QN AUTO: 34.2 PG (ref 27–31)
MCHC RBC AUTO-ENTMCNC: 32.3 G/DL (ref 33–37)
MCV RBC AUTO: 105.9 FL (ref 81–99)
MONOCYTES ABSOLUTE: 0.3 K/UL (ref 0–0.9)
MONOCYTES RELATIVE PERCENT: 12.7 % (ref 0–10)
NEUTROPHILS ABSOLUTE: 1.6 K/UL (ref 1.5–7.5)
NEUTROPHILS RELATIVE PERCENT: 61.9 % (ref 50–65)
PDW BLD-RTO: 14.3 % (ref 11.5–14.5)
PLATELET # BLD: 153 K/UL (ref 130–400)
PMV BLD AUTO: 9.5 FL (ref 9.4–12.3)
POTASSIUM SERPL-SCNC: 3.9 MMOL/L (ref 3.5–5)
RBC # BLD: 3.22 M/UL (ref 4.2–5.4)
SODIUM BLD-SCNC: 139 MMOL/L (ref 136–145)
WBC # BLD: 2.6 K/UL (ref 4.8–10.8)

## 2018-05-01 PROCEDURE — 93005 ELECTROCARDIOGRAM TRACING: CPT

## 2018-05-01 PROCEDURE — 80048 BASIC METABOLIC PNL TOTAL CA: CPT

## 2018-05-01 PROCEDURE — 85025 COMPLETE CBC W/AUTO DIFF WBC: CPT

## 2018-05-01 RX ORDER — METOPROLOL SUCCINATE 50 MG/1
50 TABLET, EXTENDED RELEASE ORAL DAILY
COMMUNITY
End: 2021-11-30 | Stop reason: SDUPTHER

## 2018-05-01 RX ORDER — TRAZODONE HYDROCHLORIDE 50 MG/1
50 TABLET ORAL NIGHTLY
COMMUNITY
End: 2019-01-17

## 2018-05-01 RX ORDER — CARBAMAZEPINE 100 MG/1
100 TABLET, CHEWABLE ORAL 3 TIMES DAILY
COMMUNITY
End: 2020-02-17

## 2018-05-01 RX ORDER — LANOLIN ALCOHOL/MO/W.PET/CERES
400 CREAM (GRAM) TOPICAL DAILY
Status: ON HOLD | COMMUNITY
End: 2018-05-08

## 2018-05-01 RX ORDER — CYPROHEPTADINE HYDROCHLORIDE 4 MG/1
4 TABLET ORAL 2 TIMES DAILY PRN
COMMUNITY
End: 2020-02-17

## 2018-05-01 RX ORDER — LEVOTHYROXINE SODIUM 88 UG/1
88 TABLET ORAL DAILY
COMMUNITY
End: 2021-09-29

## 2018-05-02 LAB
EKG P AXIS: 58 DEGREES
EKG P-R INTERVAL: 166 MS
EKG Q-T INTERVAL: 378 MS
EKG QRS DURATION: 96 MS
EKG QTC CALCULATION (BAZETT): 388 MS
EKG T AXIS: 37 DEGREES

## 2018-05-08 ENCOUNTER — ANESTHESIA (OUTPATIENT)
Dept: OPERATING ROOM | Age: 48
End: 2018-05-08
Payer: MEDICARE

## 2018-05-08 ENCOUNTER — ANESTHESIA EVENT (OUTPATIENT)
Dept: OPERATING ROOM | Age: 48
End: 2018-05-08
Payer: MEDICARE

## 2018-05-08 ENCOUNTER — HOSPITAL ENCOUNTER (OUTPATIENT)
Age: 48
Setting detail: OUTPATIENT SURGERY
Discharge: HOME | End: 2018-05-08
Attending: ORTHOPAEDIC SURGERY | Admitting: ORTHOPAEDIC SURGERY
Payer: MEDICARE

## 2018-05-08 VITALS
HEART RATE: 75 BPM | WEIGHT: 224 LBS | HEIGHT: 63 IN | BODY MASS INDEX: 39.69 KG/M2 | DIASTOLIC BLOOD PRESSURE: 86 MMHG | TEMPERATURE: 97.9 F | SYSTOLIC BLOOD PRESSURE: 122 MMHG | OXYGEN SATURATION: 98 % | RESPIRATION RATE: 16 BRPM

## 2018-05-08 VITALS
DIASTOLIC BLOOD PRESSURE: 64 MMHG | OXYGEN SATURATION: 97 % | SYSTOLIC BLOOD PRESSURE: 119 MMHG | RESPIRATION RATE: 1 BRPM

## 2018-05-08 PROCEDURE — 6360000002 HC RX W HCPCS: Performed by: ORTHOPAEDIC SURGERY

## 2018-05-08 PROCEDURE — 3700000000 HC ANESTHESIA ATTENDED CARE: Performed by: ORTHOPAEDIC SURGERY

## 2018-05-08 PROCEDURE — 2580000003 HC RX 258: Performed by: ORTHOPAEDIC SURGERY

## 2018-05-08 PROCEDURE — 2500000003 HC RX 250 WO HCPCS: Performed by: NURSE ANESTHETIST, CERTIFIED REGISTERED

## 2018-05-08 PROCEDURE — 3700000001 HC ADD 15 MINUTES (ANESTHESIA): Performed by: ORTHOPAEDIC SURGERY

## 2018-05-08 PROCEDURE — 7100000010 HC PHASE II RECOVERY - FIRST 15 MIN: Performed by: ORTHOPAEDIC SURGERY

## 2018-05-08 PROCEDURE — 3600000013 HC SURGERY LEVEL 3 ADDTL 15MIN: Performed by: ORTHOPAEDIC SURGERY

## 2018-05-08 PROCEDURE — 7100000001 HC PACU RECOVERY - ADDTL 15 MIN: Performed by: ORTHOPAEDIC SURGERY

## 2018-05-08 PROCEDURE — 7100000011 HC PHASE II RECOVERY - ADDTL 15 MIN: Performed by: ORTHOPAEDIC SURGERY

## 2018-05-08 PROCEDURE — 6360000002 HC RX W HCPCS: Performed by: NURSE ANESTHETIST, CERTIFIED REGISTERED

## 2018-05-08 PROCEDURE — 7100000000 HC PACU RECOVERY - FIRST 15 MIN: Performed by: ORTHOPAEDIC SURGERY

## 2018-05-08 PROCEDURE — 3600000003 HC SURGERY LEVEL 3 BASE: Performed by: ORTHOPAEDIC SURGERY

## 2018-05-08 PROCEDURE — 2720000001 HC MISC SURG SUPPLY STERILE $51-500: Performed by: ORTHOPAEDIC SURGERY

## 2018-05-08 PROCEDURE — 2500000003 HC RX 250 WO HCPCS: Performed by: ORTHOPAEDIC SURGERY

## 2018-05-08 RX ORDER — MORPHINE SULFATE 4 MG/ML
4 INJECTION, SOLUTION INTRAMUSCULAR; INTRAVENOUS EVERY 5 MIN PRN
Status: DISCONTINUED | OUTPATIENT
Start: 2018-05-08 | End: 2018-05-08 | Stop reason: HOSPADM

## 2018-05-08 RX ORDER — METOCLOPRAMIDE HYDROCHLORIDE 5 MG/ML
10 INJECTION INTRAMUSCULAR; INTRAVENOUS
Status: DISCONTINUED | OUTPATIENT
Start: 2018-05-08 | End: 2018-05-08 | Stop reason: HOSPADM

## 2018-05-08 RX ORDER — PROMETHAZINE HYDROCHLORIDE 25 MG/ML
6.25 INJECTION, SOLUTION INTRAMUSCULAR; INTRAVENOUS
Status: DISCONTINUED | OUTPATIENT
Start: 2018-05-08 | End: 2018-05-08 | Stop reason: HOSPADM

## 2018-05-08 RX ORDER — ENALAPRILAT 2.5 MG/2ML
1.25 INJECTION INTRAVENOUS
Status: DISCONTINUED | OUTPATIENT
Start: 2018-05-08 | End: 2018-05-08 | Stop reason: HOSPADM

## 2018-05-08 RX ORDER — LIDOCAINE HYDROCHLORIDE 10 MG/ML
INJECTION, SOLUTION EPIDURAL; INFILTRATION; INTRACAUDAL; PERINEURAL PRN
Status: DISCONTINUED | OUTPATIENT
Start: 2018-05-08 | End: 2018-05-08 | Stop reason: SDUPTHER

## 2018-05-08 RX ORDER — SODIUM CHLORIDE, SODIUM LACTATE, POTASSIUM CHLORIDE, CALCIUM CHLORIDE 600; 310; 30; 20 MG/100ML; MG/100ML; MG/100ML; MG/100ML
INJECTION, SOLUTION INTRAVENOUS CONTINUOUS
Status: DISCONTINUED | OUTPATIENT
Start: 2018-05-08 | End: 2018-05-08 | Stop reason: HOSPADM

## 2018-05-08 RX ORDER — HYDROMORPHONE HCL IN 0.9% NACL 0.5 MG/ML
0.5 SYRINGE (ML) INTRAVENOUS EVERY 5 MIN PRN
Status: DISCONTINUED | OUTPATIENT
Start: 2018-05-08 | End: 2018-05-08 | Stop reason: HOSPADM

## 2018-05-08 RX ORDER — HYDROCODONE BITARTRATE AND ACETAMINOPHEN 7.5; 325 MG/1; MG/1
1-2 TABLET ORAL
Qty: 40 TABLET | Refills: 0 | Status: SHIPPED | OUTPATIENT
Start: 2018-05-08 | End: 2018-05-11

## 2018-05-08 RX ORDER — PROPOFOL 10 MG/ML
INJECTION, EMULSION INTRAVENOUS CONTINUOUS PRN
Status: DISCONTINUED | OUTPATIENT
Start: 2018-05-08 | End: 2018-05-08 | Stop reason: SDUPTHER

## 2018-05-08 RX ORDER — MORPHINE SULFATE 4 MG/ML
2 INJECTION, SOLUTION INTRAMUSCULAR; INTRAVENOUS EVERY 5 MIN PRN
Status: DISCONTINUED | OUTPATIENT
Start: 2018-05-08 | End: 2018-05-08 | Stop reason: HOSPADM

## 2018-05-08 RX ORDER — DIPHENHYDRAMINE HYDROCHLORIDE 50 MG/ML
12.5 INJECTION INTRAMUSCULAR; INTRAVENOUS
Status: DISCONTINUED | OUTPATIENT
Start: 2018-05-08 | End: 2018-05-08 | Stop reason: HOSPADM

## 2018-05-08 RX ORDER — LABETALOL HYDROCHLORIDE 5 MG/ML
5 INJECTION, SOLUTION INTRAVENOUS EVERY 10 MIN PRN
Status: DISCONTINUED | OUTPATIENT
Start: 2018-05-08 | End: 2018-05-08 | Stop reason: HOSPADM

## 2018-05-08 RX ORDER — HYDRALAZINE HYDROCHLORIDE 20 MG/ML
5 INJECTION INTRAMUSCULAR; INTRAVENOUS EVERY 10 MIN PRN
Status: DISCONTINUED | OUTPATIENT
Start: 2018-05-08 | End: 2018-05-08 | Stop reason: HOSPADM

## 2018-05-08 RX ORDER — ONDANSETRON 2 MG/ML
INJECTION INTRAMUSCULAR; INTRAVENOUS PRN
Status: DISCONTINUED | OUTPATIENT
Start: 2018-05-08 | End: 2018-05-08 | Stop reason: SDUPTHER

## 2018-05-08 RX ORDER — BUPIVACAINE HYDROCHLORIDE 5 MG/ML
INJECTION, SOLUTION PERINEURAL PRN
Status: DISCONTINUED | OUTPATIENT
Start: 2018-05-08 | End: 2018-05-08 | Stop reason: HOSPADM

## 2018-05-08 RX ORDER — HYDROMORPHONE HCL IN 0.9% NACL 0.5 MG/ML
0.25 SYRINGE (ML) INTRAVENOUS EVERY 5 MIN PRN
Status: DISCONTINUED | OUTPATIENT
Start: 2018-05-08 | End: 2018-05-08 | Stop reason: HOSPADM

## 2018-05-08 RX ORDER — MEPERIDINE HYDROCHLORIDE 50 MG/ML
12.5 INJECTION INTRAMUSCULAR; INTRAVENOUS; SUBCUTANEOUS EVERY 5 MIN PRN
Status: DISCONTINUED | OUTPATIENT
Start: 2018-05-08 | End: 2018-05-08 | Stop reason: HOSPADM

## 2018-05-08 RX ORDER — PROPOFOL 10 MG/ML
INJECTION, EMULSION INTRAVENOUS PRN
Status: DISCONTINUED | OUTPATIENT
Start: 2018-05-08 | End: 2018-05-08 | Stop reason: SDUPTHER

## 2018-05-08 RX ORDER — MIDAZOLAM HYDROCHLORIDE 1 MG/ML
INJECTION INTRAMUSCULAR; INTRAVENOUS PRN
Status: DISCONTINUED | OUTPATIENT
Start: 2018-05-08 | End: 2018-05-08 | Stop reason: SDUPTHER

## 2018-05-08 RX ADMIN — PROPOFOL 100 MCG/KG/MIN: 10 INJECTION, EMULSION INTRAVENOUS at 07:08

## 2018-05-08 RX ADMIN — PROPOFOL 30 MG: 10 INJECTION, EMULSION INTRAVENOUS at 07:08

## 2018-05-08 RX ADMIN — SODIUM CHLORIDE, SODIUM LACTATE, POTASSIUM CHLORIDE, AND CALCIUM CHLORIDE: 600; 310; 30; 20 INJECTION, SOLUTION INTRAVENOUS at 06:00

## 2018-05-08 RX ADMIN — ONDANSETRON HYDROCHLORIDE 4 MG: 2 SOLUTION INTRAMUSCULAR; INTRAVENOUS at 07:10

## 2018-05-08 RX ADMIN — PROPOFOL 20 MG: 10 INJECTION, EMULSION INTRAVENOUS at 07:27

## 2018-05-08 RX ADMIN — MIDAZOLAM HYDROCHLORIDE 2 MG: 1 INJECTION, SOLUTION INTRAMUSCULAR; INTRAVENOUS at 06:59

## 2018-05-08 RX ADMIN — PROPOFOL 20 MG: 10 INJECTION, EMULSION INTRAVENOUS at 07:16

## 2018-05-08 RX ADMIN — PROPOFOL 20 MG: 10 INJECTION, EMULSION INTRAVENOUS at 07:29

## 2018-05-08 RX ADMIN — HYDROMORPHONE HYDROCHLORIDE 0.25 MG: 1 INJECTION, SOLUTION INTRAMUSCULAR; INTRAVENOUS; SUBCUTANEOUS at 07:14

## 2018-05-08 RX ADMIN — LIDOCAINE HYDROCHLORIDE 50 MG: 10 INJECTION, SOLUTION EPIDURAL; INFILTRATION; INTRACAUDAL; PERINEURAL at 07:08

## 2018-05-08 RX ADMIN — HYDROMORPHONE HYDROCHLORIDE 0.25 MG: 1 INJECTION, SOLUTION INTRAMUSCULAR; INTRAVENOUS; SUBCUTANEOUS at 07:19

## 2018-05-08 RX ADMIN — SODIUM CHLORIDE, SODIUM LACTATE, POTASSIUM CHLORIDE, AND CALCIUM CHLORIDE: 600; 310; 30; 20 INJECTION, SOLUTION INTRAVENOUS at 07:23

## 2018-05-08 RX ADMIN — Medication 2 G: at 07:09

## 2018-05-08 ASSESSMENT — LIFESTYLE VARIABLES: SMOKING_STATUS: 0

## 2018-07-24 ENCOUNTER — TRANSCRIBE ORDERS (OUTPATIENT)
Dept: ADMINISTRATIVE | Facility: HOSPITAL | Age: 48
End: 2018-07-24

## 2018-07-24 DIAGNOSIS — R06.02 BREATH SHORTNESS: ICD-10-CM

## 2018-07-24 DIAGNOSIS — J96.11 CHRONIC HYPOXEMIC RESPIRATORY FAILURE (HCC): ICD-10-CM

## 2018-07-24 DIAGNOSIS — R07.1 CHEST PAIN ON BREATHING: Primary | ICD-10-CM

## 2018-07-26 ENCOUNTER — HOSPITAL ENCOUNTER (OUTPATIENT)
Dept: CT IMAGING | Facility: HOSPITAL | Age: 48
Discharge: HOME | End: 2018-07-26
Attending: INTERNAL MEDICINE | Admitting: INTERNAL MEDICINE

## 2018-07-26 DIAGNOSIS — J96.11 CHRONIC HYPOXEMIC RESPIRATORY FAILURE (HCC): ICD-10-CM

## 2018-07-26 DIAGNOSIS — R06.02 BREATH SHORTNESS: ICD-10-CM

## 2018-07-26 DIAGNOSIS — R07.1 CHEST PAIN ON BREATHING: ICD-10-CM

## 2018-07-26 LAB — CREAT BLDA-MCNC: 1.2 MG/DL (ref 0.6–1.3)

## 2018-07-26 PROCEDURE — 71275 CT ANGIOGRAPHY CHEST: CPT

## 2018-07-26 PROCEDURE — 0 IOPAMIDOL PER 1 ML: Performed by: INTERNAL MEDICINE

## 2018-07-26 PROCEDURE — 82565 ASSAY OF CREATININE: CPT

## 2018-07-26 RX ADMIN — IOPAMIDOL 110 ML: 755 INJECTION, SOLUTION INTRAVENOUS at 14:15

## 2018-08-07 RX ORDER — ESOMEPRAZOLE MAGNESIUM 40 MG/1
CAPSULE, DELAYED RELEASE ORAL
Qty: 60 CAPSULE | Refills: 11 | Status: SHIPPED | OUTPATIENT
Start: 2018-08-07 | End: 2019-08-08 | Stop reason: SDUPTHER

## 2018-08-29 ENCOUNTER — OFFICE VISIT (OUTPATIENT)
Dept: GASTROENTEROLOGY | Facility: CLINIC | Age: 48
End: 2018-08-29

## 2018-08-29 VITALS
HEIGHT: 63 IN | OXYGEN SATURATION: 98 % | SYSTOLIC BLOOD PRESSURE: 128 MMHG | HEART RATE: 68 BPM | BODY MASS INDEX: 43.94 KG/M2 | TEMPERATURE: 96.5 F | WEIGHT: 248 LBS | DIASTOLIC BLOOD PRESSURE: 72 MMHG

## 2018-08-29 DIAGNOSIS — K62.89 ANAL OR RECTAL PAIN: ICD-10-CM

## 2018-08-29 DIAGNOSIS — K62.5 RECTAL BLEEDING: Primary | ICD-10-CM

## 2018-08-29 DIAGNOSIS — R19.8 RECTAL PRESSURE: ICD-10-CM

## 2018-08-29 DIAGNOSIS — K64.8 OTHER HEMORRHOIDS: ICD-10-CM

## 2018-08-29 DIAGNOSIS — Z86.010 HX OF COLONIC POLYPS: ICD-10-CM

## 2018-08-29 PROBLEM — Z86.0100 HX OF COLONIC POLYPS: Status: ACTIVE | Noted: 2018-08-29

## 2018-08-29 PROCEDURE — 99214 OFFICE O/P EST MOD 30 MIN: CPT | Performed by: NURSE PRACTITIONER

## 2018-08-29 RX ORDER — SODIUM, POTASSIUM,MAG SULFATES 17.5-3.13G
2 SOLUTION, RECONSTITUTED, ORAL ORAL ONCE
Qty: 2 BOTTLE | Refills: 0 | Status: SHIPPED | OUTPATIENT
Start: 2018-08-29 | End: 2018-08-29

## 2018-08-29 RX ORDER — HYDROCORTISONE ACETATE 25 MG/1
25 SUPPOSITORY RECTAL 2 TIMES DAILY PRN
Qty: 30 SUPPOSITORY | Refills: 0 | Status: SHIPPED | OUTPATIENT
Start: 2018-08-29 | End: 2019-01-31

## 2018-09-06 ENCOUNTER — ANESTHESIA EVENT (OUTPATIENT)
Dept: GASTROENTEROLOGY | Facility: HOSPITAL | Age: 48
End: 2018-09-06

## 2018-09-06 ENCOUNTER — ANESTHESIA (OUTPATIENT)
Dept: GASTROENTEROLOGY | Facility: HOSPITAL | Age: 48
End: 2018-09-06

## 2018-09-06 ENCOUNTER — HOSPITAL ENCOUNTER (OUTPATIENT)
Facility: HOSPITAL | Age: 48
Setting detail: HOSPITAL OUTPATIENT SURGERY
Discharge: HOME | End: 2018-09-06
Attending: INTERNAL MEDICINE | Admitting: INTERNAL MEDICINE

## 2018-09-06 ENCOUNTER — TELEPHONE (OUTPATIENT)
Dept: GASTROENTEROLOGY | Facility: CLINIC | Age: 48
End: 2018-09-06

## 2018-09-06 VITALS
OXYGEN SATURATION: 100 % | HEART RATE: 77 BPM | DIASTOLIC BLOOD PRESSURE: 74 MMHG | RESPIRATION RATE: 14 BRPM | BODY MASS INDEX: 44.05 KG/M2 | HEIGHT: 63 IN | WEIGHT: 248.6 LBS | SYSTOLIC BLOOD PRESSURE: 123 MMHG | TEMPERATURE: 97.2 F

## 2018-09-06 PROCEDURE — 25010000002 PROPOFOL 10 MG/ML EMULSION: Performed by: NURSE ANESTHETIST, CERTIFIED REGISTERED

## 2018-09-06 PROCEDURE — 45378 DIAGNOSTIC COLONOSCOPY: CPT | Performed by: INTERNAL MEDICINE

## 2018-09-06 RX ORDER — SODIUM CHLORIDE 0.9 % (FLUSH) 0.9 %
3 SYRINGE (ML) INJECTION AS NEEDED
Status: DISCONTINUED | OUTPATIENT
Start: 2018-09-06 | End: 2018-09-06 | Stop reason: HOSPADM

## 2018-09-06 RX ORDER — LIDOCAINE HYDROCHLORIDE 20 MG/ML
INJECTION, SOLUTION INFILTRATION; PERINEURAL AS NEEDED
Status: DISCONTINUED | OUTPATIENT
Start: 2018-09-06 | End: 2018-09-06 | Stop reason: SURG

## 2018-09-06 RX ORDER — PROPOFOL 10 MG/ML
VIAL (ML) INTRAVENOUS AS NEEDED
Status: DISCONTINUED | OUTPATIENT
Start: 2018-09-06 | End: 2018-09-06 | Stop reason: SURG

## 2018-09-06 RX ORDER — SODIUM CHLORIDE 9 MG/ML
500 INJECTION, SOLUTION INTRAVENOUS CONTINUOUS PRN
Status: DISCONTINUED | OUTPATIENT
Start: 2018-09-06 | End: 2018-09-06 | Stop reason: HOSPADM

## 2018-09-06 RX ADMIN — SODIUM CHLORIDE 500 ML: 9 INJECTION, SOLUTION INTRAVENOUS at 13:23

## 2018-09-06 RX ADMIN — LIDOCAINE HYDROCHLORIDE 80 MG: 20 INJECTION, SOLUTION INFILTRATION; PERINEURAL at 13:38

## 2018-09-06 RX ADMIN — PROPOFOL 550 MG: 10 INJECTION, EMULSION INTRAVENOUS at 13:38

## 2018-09-06 RX ADMIN — LIDOCAINE HYDROCHLORIDE 0.5 ML: 10 INJECTION, SOLUTION EPIDURAL; INFILTRATION; INTRACAUDAL; PERINEURAL at 13:24

## 2018-09-06 NOTE — ANESTHESIA PREPROCEDURE EVALUATION
Anesthesia Evaluation     Patient summary reviewed and Nursing notes reviewed   no history of anesthetic complications:  NPO Solid Status: > 8 hours  NPO Liquid Status: > 8 hours           Airway   Mallampati: I  TM distance: >3 FB  Neck ROM: full  no difficulty expected  Dental          Pulmonary     breath sounds clear to auscultation  (+) shortness of breath,   (-) not a smoker    ROS comment: Sarcoidosis, on 2L o2 at all times  Cardiovascular   Exercise tolerance: poor (<4 METS)    ECG reviewed  Patient on routine beta blocker and Beta blocker given within 24 hours of surgery  Rhythm: regular  Rate: normal    (+) hypertension, dysrhythmias (svt on toprol) Tachycardia,     ROS comment: Negative stress test  Echo shows atrial septal aneurysm, pt has h/o PFO    Neuro/Psych  (-) seizures, TIA, CVA  GI/Hepatic/Renal/Endo    (+) obesity,   renal disease CRI, hypothyroidism,   (-) liver disease, diabetes    Musculoskeletal     Abdominal    Substance History      OB/GYN          Other   (+) arthritis                       Anesthesia Plan    ASA 3     MAC   (PFO precautions)  intravenous induction   Anesthetic plan, all risks, benefits, and alternatives have been provided, discussed and informed consent has been obtained with: patient.

## 2018-09-06 NOTE — H&P (VIEW-ONLY)
"Chief Complaint:   Chief Complaint   Patient presents with   • Rectal Bleeding     Patient states that she has pain around her rectum when she has a BM and a lot of blood when she wipes. Also states that since Friday she feels like something is stuck in her esophagus.         Patient ID: Stacy Lynn is a 48 y.o. female     History of Present Illness: This is a very pleasant 48-year-old female who comes today with complaints of rectal pain and bleeding.  The patient states that since her last office visit she has been using MiraLAX which has worked well.  She states that she did have to cut this down as it was causing some diarrhea however it works.  The patient states that within the past 2 weeks she has been having bleeding noted upon wiping after a bowel movement.  She states that she also has rectal pain and some itching.  She states that she has this feeling of \"rectal pressure\".  She states that this can occur even without a bowel movement.  She states that she has used some over-the-counter hemorrhoid cream for the itching which has helped somewhat.    The patient denies any nausea, vomiting, epigastric pain, dysphagia, pyrosis or hematemesis.  The patient denies any fever or chills.  Denies any melena or hematochezia.  Denies any unintentional weight loss or loss of appetite.    The patient's last colonoscopy was performed on 8/23/17 that was found to be normal.  The patient does carry a history of colon polyps.    Past Medical History:   Diagnosis Date   • Chest pain     NERVE PAIN IN LUNGS   • Chronic headaches    • Chronic pain    • Chronic respiratory failure (CMS/HCC)    • Colon polyp    • Degeneration of intervertebral disc of high cervical region    • Depression    • GERD (gastroesophageal reflux disease)    • Hypertension    • Hyperthyroidism    • Irritable bowel syndrome    • Kidney stones    • Macular degeneration    • Orthopnea    • Pancreatitis, chronic (CMS/HCC)    • Peripheral neuropathy    • " PFO (patent foramen ovale) 09/2016   • PSVT (paroxysmal supraventricular tachycardia) (CMS/HCC)     s/p ablation per Dr. Diallo 4/2016   • Restless leg syndrome    • Sarcoidosis    • Scoliosis    • Septic joint (CMS/HCC)    • Venous insufficiency, peripheral     RIGHT LEG       Past Surgical History:   Procedure Laterality Date   • CARDIAC ABLATION  04/2016    SVT; Dr. Diallo    • CHOLECYSTECTOMY     • COLONOSCOPY  10/13/2014   • COLONOSCOPY N/A 8/23/2017    Procedure: COLONOSCOPY WITH ANESTHESIA;  Surgeon: Jeanette Mclaughlin MD;  Location: [facility];  Service:    • ENDOSCOPY N/A 8/23/2017    Procedure: ESOPHAGOGASTRODUODENOSCOPY WITH ANESTHESIA;  Surgeon: Jeanette Mclaughlin MD;  Location: [facility] ENDOSCOPY;  Service:    • ENDOVENOUS ABLATION SAPHENOUS VEIN W/ LASER Right 03/30/2018   • HYSTERECTOMY     • JOINT REPLACEMENT Right     PATELLA REPLACED   • KIDNEY STONE SURGERY     • KNEE ARTHROSCOPY     • KNEE SURGERY Right    • LATERAL EPICONDYLE RELEASE     • PATELLA SURGERY     • REPLACEMENT TOTAL KNEE     • SHOULDER ARTHROSCOPY     • ULNAR NERVE DECOMPRESSION     • VARICOSE VEIN SURGERY Left 1/10/2018    Procedure: LEFT SAPHENOUS VEIN RADIO FREQUENCY ABLATION;  Surgeon: Kvng Montgomery DO;  Location:  [facility] OR;  Service:    • VARICOSE VEIN SURGERY Right 3/30/2018    Procedure: RIGHT LOWER EXTREMITY VENAGRAM, RIGHT LOWER EXTREMITY SAPHENOUS VEIN RADIO FREQUENCY ABLATION;  Surgeon: Kvng Montgomery DO;  Location:  [facility];  Service: Vascular         Current Outpatient Prescriptions:   •  busPIRone (BUSPAR) 5 MG tablet, Take 5 mg by mouth Daily., Disp: , Rfl:   •  carBAMazepine XR (TEGretol  XR) 200 MG 12 hr tablet, Take 1 tablet by mouth Every 12 (Twelve) Hours., Disp: 60 tablet, Rfl: 3  •  docusate sodium (COLACE) 100 MG capsule, Take 1 capsule by mouth Daily As Needed for Constipation (MAY TAKE 1 OR 2 PILLS AS NEEDED)., Disp: , Rfl:   •  DULoxetine (CYMBALTA) 60 MG capsule, Take 60 mg by mouth Daily., Disp: ,  Rfl:   •  esomeprazole (nexIUM) 40 MG capsule, TAKE ONE CAPSULE BY MOUTH TWICE DAILY BEFORE  MEALS, Disp: 60 capsule, Rfl: 11  •  folic acid (FOLVITE) 400 MCG tablet, Take 400 mcg by mouth Daily., Disp: , Rfl:   •  gabapentin (NEURONTIN) 300 MG capsule, Take 1 capsule by mouth 3 (Three) Times a Day., Disp: , Rfl:   •  HYDROcodone-acetaminophen (NORCO) 5-325 MG per tablet, Take 1 tablet by mouth Every 6 (Six) Hours. 1 to 2 tablets every 4 to 6 hours , Disp: , Rfl:   •  levothyroxine (SYNTHROID, LEVOTHROID) 88 MCG tablet, Take 75 mcg by mouth Daily., Disp: , Rfl:   •  methotrexate 2.5 MG tablet, Take 10 mg by mouth 1 (One) Time Per Week. Takes 4 tabs weekly on Mondays, Disp: , Rfl:   •  metoprolol succinate XL (TOPROL-XL) 25 MG 24 hr tablet, Take 100 mg by mouth Daily., Disp: , Rfl:   •  O2 (OXYGEN), Inhale 2 L/min 1 (One) Time. Continuous, Disp: , Rfl:   •  ondansetron (ZOFRAN) 4 MG tablet, Take 4 mg by mouth Every 8 (Eight) Hours As Needed for Nausea or Vomiting., Disp: , Rfl:   •  oxybutynin XL (DITROPAN-XL) 5 MG 24 hr tablet, Take 5 mg by mouth Daily., Disp: , Rfl:   •  PROCRIT 79763 UNIT/ML injection, Inject 1 Units under the skin As Needed (WHEN BLOOD COUNT [hemoglobin] BELOW 11)., Disp: , Rfl:   •  sulfamethoxazole-trimethoprim (BACTRIM DS,SEPTRA DS) 800-160 MG per tablet, Take 1 tablet by mouth 2 (Two) Times a Day., Disp: 20 tablet, Rfl: 0  •  tolterodine LA (DETROL LA) 4 MG 24 hr capsule, Take 4 mg by mouth Daily., Disp: , Rfl:   •  topiramate (TOPAMAX) 100 MG tablet, Take 100 mg by mouth Every Night., Disp: , Rfl:   •  topiramate (TOPAMAX) 50 MG tablet, Take 50 mg by mouth 2 (Two) Times a Day. Take 1 tablet in the morning.  Takes 2 tablets at bedtime., Disp: , Rfl:   •  traZODone (DESYREL) 50 MG tablet, Take 50 mg by mouth At Night As Needed for Sleep., Disp: , Rfl:   •  hydrocortisone (ANUSOL-HC) 25 MG suppository, Insert 1 suppository into the rectum 2 (Two) Times a Day As Needed for Hemorrhoids (rectal  "discomfort)., Disp: 30 suppository, Rfl: 0  •  sodium-potassium-magnesium sulfates (SUPREP BOWEL PREP KIT) 17.5-3.13-1.6 GM/180ML solution oral solution, Take 2 bottles by mouth 1 (One) Time for 1 dose. Split dose prep as directed by office instructions provided.  2 bottles = one kit., Disp: 2 bottle, Rfl: 0    Allergies   Allergen Reactions   • Adhesive Tape Hives   • Tape Hives       Social History     Social History   • Marital status: Single     Spouse name: N/A   • Number of children: N/A   • Years of education: N/A     Occupational History   • Not on file.     Social History Main Topics   • Smoking status: Never Smoker   • Smokeless tobacco: Never Used   • Alcohol use No   • Drug use: No   • Sexual activity: Defer     Other Topics Concern   • Not on file     Social History Narrative   • No narrative on file       Family History   Problem Relation Age of Onset   • Arrhythmia Mother    • Heart disease Mother    • Kidney disease Mother    • Heart disease Father    • Diabetes Father    • Heart disease Brother    • Heart disease Maternal Aunt    • Heart disease Maternal Uncle    • Heart disease Paternal Aunt    • Heart disease Paternal Uncle    • Heart disease Maternal Grandmother    • Heart disease Maternal Grandfather    • Heart disease Paternal Grandmother    • Heart disease Paternal Grandfather    • Colon cancer Neg Hx    • Colon polyps Neg Hx        Vitals:    08/29/18 1310   BP: 128/72   Pulse: 68   Temp: 96.5 °F (35.8 °C)   SpO2: 98%   Weight: 112 kg (248 lb)   Height: 160 cm (63\")       Review of Systems:    General:    Present -feeling well   Skin:    Not Present-Rash   HEENT:     Not Present-Acute visual changes or Acute hearing changes   Neck :    Not Present- swollen glands   Genitourinary:      Not Present- burning, frequency, urgency hematuria, dysuria,   Cardiovascular:   Not Present-chest pain, palpitations, or pressure   Respiratory:   Not Present- shortness of breath or cough "   Gastrointestinal:  Musculoskeletal:  Neurological:  Psychiatric:   Present as mentioned in the HP    Not Present. Recent gait disturbances.    Not Present-Seizures and weakness in extremities.    Not Present- Anxiety or Depression.       Physical Exam:    General Appearance:    Alert, cooperative, in no acute distress   Psych:    Mood appropriate    Eyes:          conjunctivae and sclerae normal, no   icterus, no pallor   ENMT:    Ears appear intact with no abnormalities noted oral mucosa moist   Neck:   No adenopathy, supple, trachea midline, no thyromegaly, no   carotid bruit, no JVD    Cardiovascular:    Regular rhythm and normal rate, normal S1 and S2, no            murmur, no gallop, no rub, no click   Gastrointestinal:     Inspection normal.  Normal bowel sounds, no masses, no organomegaly, soft round non-tender, non-distended, no guarding, no rebound or tenderness.  On rectal exam I see no external hemorrhoids I palpate one small possible internal hemorrhoid.  I do not note any rectal fissures or abnormalities around the anal area.  No hepatosplenomegaly.   Skin:   No bleeding, bruising or rash   Neurologic:   nonfocal       Lab Results   Component Value Date    WBC 2.84 (L) 03/23/2018    WBC 3.07 (L) 01/10/2018    WBC 2.91 (L) 01/03/2018    HGB 10.6 (L) 03/23/2018    HGB 9.5 (L) 01/10/2018    HGB 10.3 (L) 01/03/2018    HCT 31.5 (L) 03/23/2018    HCT 28.8 (L) 01/10/2018    HCT 31.5 (L) 01/03/2018     03/23/2018     (L) 01/10/2018     01/03/2018        Lab Results   Component Value Date     03/23/2018     (H) 01/10/2018     (H) 01/03/2018    K 3.7 03/23/2018    K 4.0 01/10/2018    K 3.4 (L) 01/03/2018     03/23/2018     01/10/2018     01/03/2018    CO2 24.0 03/23/2018    CO2 28.0 01/10/2018    CO2 26.0 01/03/2018    BUN 10 03/23/2018    BUN 16 01/10/2018    BUN 11 01/03/2018    CREATININE 1.20 07/26/2018    CREATININE 1.07 03/23/2018    CREATININE 1.26  01/10/2018    BILITOT <0.1 (L) 03/12/2018    BILITOT 0.4 01/10/2018    BILITOT 0.9 06/02/2017    ALKPHOS 64 03/12/2018    ALKPHOS 57 01/10/2018    ALKPHOS 97 06/02/2017    ALT 31 03/12/2018    ALT 26 01/10/2018    ALT 20 06/02/2017    AST 22 03/12/2018    AST 19 01/10/2018    AST 26 06/02/2017    GLUCOSE 99 03/23/2018    GLUCOSE 104 (H) 01/10/2018    GLUCOSE 72 01/03/2018       Lab Results   Component Value Date    INR 0.99 03/23/2018    INR 1.04 01/03/2018    INR 1.05 06/02/2017       Body mass index is 43.93 kg/m². Patient's Body mass index is 43.93 kg/m². BMI is above normal parameters. Recommendations include: no follow-up required.    Assessment and Plan:  Assessment/Plan   Hope was seen today for rectal bleeding.    Diagnoses and all orders for this visit:    Rectal bleeding  -     Case Request; Standing  -     Case Request    Anal or rectal pain  -     Case Request; Standing  -     Case Request    Rectal pressure    Hx of colonic polyps  -     Case Request; Standing  -     Case Request    Other hemorrhoids  Comments:  Anusol suppositories Rx sent to pharmacy  Orders:  -     Case Request; Standing  -     Case Request    Other orders  -     Follow Anesthesia Guidelines / Standing Orders; Future  -     Implement Anesthesia Orders Day of Procedure; Standing  -     Obtain Informed Consent; Standing  -     Verify bowel prep was successful; Standing  -     sodium-potassium-magnesium sulfates (SUPREP BOWEL PREP KIT) 17.5-3.13-1.6 GM/180ML solution oral solution; Take 2 bottles by mouth 1 (One) Time for 1 dose. Split dose prep as directed by office instructions provided.  2 bottles = one kit.  -     hydrocortisone (ANUSOL-HC) 25 MG suppository; Insert 1 suppository into the rectum 2 (Two) Times a Day As Needed for Hemorrhoids (rectal discomfort).             There are no Patient Instructions on file for this visit.    Next follow-up appointment      The risks, benefits, and alternatives of colonoscopy were reviewed  with the patient today.  Risks including perforation of the colon possibly requiring surgery or colostomy.  Additional risks include risk of bleeding from biopsies or removal of colon tissue.  There is also the risk of a drug reaction or problems with anesthesia.  This will be discussed with the further by the anesthesia team on the day of the procedure.  Lastly there is a possibility of missing a colon polyp or cancer.  The benefits include the diagnosis and management of disease of the colon and rectum.  Alternatives to colonoscopy include barium enema, laboratory testing, radiographic evaluation, or no intervention.  The patient verbalizes understanding and agrees.    EMR Dragon/Transcription disclaimer:  Much of this encounter note is an electronic transcription/translation of spoken language to printed text. The electronic translation of spoken language may permit erroneous, or at times, nonsensical words or phrases to be inadvertently transcribed; although I have reviewed the note for such errors, some may still exist.

## 2018-09-06 NOTE — ANESTHESIA POSTPROCEDURE EVALUATION
Patient: Stacy Lynn    Procedure Summary     Date:  09/06/18 Room / Location:   [facility] /  [facility]    Anesthesia Start:  1333 Anesthesia Stop:  1358    Procedure:  COLONOSCOPY WITH ANESTHESIA (N/A ) Diagnosis:       Anal or rectal pain      Rectal bleeding      Hx of colonic polyps      Other hemorrhoids      (Anal or rectal pain [K62.89])      (Rectal bleeding [K62.5])      (Hx of colonic polyps [Z86.010])      (Other hemorrhoids [K64.8])    Surgeon:  Jeanette Mclaughlin MD Provider:  Lloy High CRNA    Anesthesia Type:  MAC ASA Status:  3          Anesthesia Type: MAC  Last vitals  BP   147/99 (09/06/18 1258)   Temp   97.2 °F (36.2 °C) (09/06/18 1258)   Pulse   88 (09/06/18 1258)   Resp   20 (09/06/18 1258)     SpO2   100 % (09/06/18 1258)     Post Anesthesia Care and Evaluation    Patient location during evaluation: PHASE II  Patient participation: complete - patient participated  Level of consciousness: awake and alert  Pain score: 0  Pain management: adequate  Airway patency: patent  Anesthetic complications: No anesthetic complications  PONV Status: none  Cardiovascular status: acceptable  Respiratory status: acceptable  Hydration status: acceptable  No anesthesia care post op

## 2018-10-11 ENCOUNTER — EPISODE CHANGES (OUTPATIENT)
Dept: CASE MANAGEMENT | Facility: OTHER | Age: 48
End: 2018-10-11

## 2018-10-30 ENCOUNTER — OFFICE VISIT (OUTPATIENT)
Dept: PULMONOLOGY | Facility: CLINIC | Age: 48
End: 2018-10-30

## 2018-10-30 VITALS
HEIGHT: 63 IN | BODY MASS INDEX: 46.07 KG/M2 | WEIGHT: 260 LBS | OXYGEN SATURATION: 100 % | RESPIRATION RATE: 16 BRPM | SYSTOLIC BLOOD PRESSURE: 138 MMHG | HEART RATE: 68 BPM | DIASTOLIC BLOOD PRESSURE: 76 MMHG

## 2018-10-30 DIAGNOSIS — Q21.12 PFO (PATENT FORAMEN OVALE): ICD-10-CM

## 2018-10-30 DIAGNOSIS — I89.0 LYMPHEDEMA: ICD-10-CM

## 2018-10-30 DIAGNOSIS — D86.9 SARCOIDOSIS: Primary | ICD-10-CM

## 2018-10-30 DIAGNOSIS — J96.11 CHRONIC RESPIRATORY FAILURE WITH HYPOXIA (HCC): ICD-10-CM

## 2018-10-30 DIAGNOSIS — E66.01 MORBIDLY OBESE (HCC): ICD-10-CM

## 2018-10-30 DIAGNOSIS — D69.6 THROMBOCYTOPENIA (HCC): ICD-10-CM

## 2018-10-30 DIAGNOSIS — Z79.899 ENCOUNTER FOR LONG-TERM CURRENT USE OF HIGH RISK MEDICATION: ICD-10-CM

## 2018-10-30 PROCEDURE — 99214 OFFICE O/P EST MOD 30 MIN: CPT | Performed by: INTERNAL MEDICINE

## 2018-10-30 RX ORDER — AZITHROMYCIN 250 MG/1
TABLET, FILM COATED ORAL
Qty: 6 TABLET | Refills: 0 | Status: SHIPPED | OUTPATIENT
Start: 2018-10-30 | End: 2019-02-22

## 2018-10-30 RX ORDER — DICLOFENAC SODIUM AND MISOPROSTOL 75; 200 MG/1; UG/1
1 TABLET, DELAYED RELEASE ORAL 2 TIMES DAILY
COMMUNITY
End: 2019-10-07 | Stop reason: ALTCHOICE

## 2018-10-30 RX ORDER — TIZANIDINE 4 MG/1
4 TABLET ORAL NIGHTLY PRN
COMMUNITY

## 2018-10-30 NOTE — PATIENT INSTRUCTIONS
Methotrexate tablets  What is this medicine?  METHOTREXATE (METH oh TREX ate) is a chemotherapy drug used to treat cancer including breast cancer, leukemia, and lymphoma. This medicine can also be used to treat psoriasis and certain kinds of arthritis.  This medicine may be used for other purposes; ask your health care provider or pharmacist if you have questions.  COMMON BRAND NAME(S): Rheumatrex, Trexall  What should I tell my health care provider before I take this medicine?  They need to know if you have any of these conditions:  -fluid in the stomach area or lungs  -if you often drink alcohol  -infection or immune system problems  -kidney disease or on hemodialysis  -liver disease  -low blood counts, like low white cell, platelet, or red cell counts  -lung disease  -radiation therapy  -stomach ulcers  -ulcerative colitis  -an unusual or allergic reaction to methotrexate, other medicines, foods, dyes, or preservatives  -pregnant or trying to get pregnant  -breast-feeding  How should I use this medicine?  Take this medicine by mouth with a glass of water. Follow the directions on the prescription label. Take your medicine at regular intervals. Do not take it more often than directed. Do not stop taking except on your doctor's advice.  Make sure you know why you are taking this medicine and how often you should take it. If this medicine is used for a condition that is not cancer, like arthritis or psoriasis, it should be taken weekly, NOT daily. Taking this medicine more often than directed can cause serious side effects, even death.  Talk to your healthcare provider about safe handling and disposal of this medicine. You may need to take special precautions.  Talk to your pediatrician regarding the use of this medicine in children. While this drug may be prescribed for selected conditions, precautions do apply.  Overdosage: If you think you have taken too much of this medicine contact a poison control center or  emergency room at once.  NOTE: This medicine is only for you. Do not share this medicine with others.  What if I miss a dose?  If you miss a dose, talk with your doctor or health care professional. Do not take double or extra doses.  What may interact with this medicine?  This medicine may interact with the following medication:  -acitretin  -aspirin and aspirin-like medicines including salicylates  -azathioprine  -certain antibiotics like penicillins, tetracycline, and chloramphenicol  -cyclosporine  -gold  -hydroxychloroquine  -live virus vaccines  -NSAIDs, medicines for pain and inflammation, like ibuprofen or naproxen  -other cytotoxic agents  -penicillamine  -phenylbutazone  -phenytoin  -probenecid  -retinoids such as isotretinoin and tretinoin  -steroid medicines like prednisone or cortisone  -sulfonamides like sulfasalazine and trimethoprim/sulfamethoxazole  -theophylline  This list may not describe all possible interactions. Give your health care provider a list of all the medicines, herbs, non-prescription drugs, or dietary supplements you use. Also tell them if you smoke, drink alcohol, or use illegal drugs. Some items may interact with your medicine.  What should I watch for while using this medicine?  Avoid alcoholic drinks.  This medicine can make you more sensitive to the sun. Keep out of the sun. If you cannot avoid being in the sun, wear protective clothing and use sunscreen. Do not use sun lamps or tanning beds/booths.  You may need blood work done while you are taking this medicine.  Call your doctor or health care professional for advice if you get a fever, chills or sore throat, or other symptoms of a cold or flu. Do not treat yourself. This drug decreases your body's ability to fight infections. Try to avoid being around people who are sick.  This medicine may increase your risk to bruise or bleed. Call your doctor or health care professional if you notice any unusual bleeding.  Check with your  doctor or health care professional if you get an attack of severe diarrhea, nausea and vomiting, or if you sweat a lot. The loss of too much body fluid can make it dangerous for you to take this medicine.  Talk to your doctor about your risk of cancer. You may be more at risk for certain types of cancers if you take this medicine.  Both men and women must use effective birth control with this medicine. Do not become pregnant while taking this medicine or until at least 1 normal menstrual cycle has occurred after stopping it. Women should inform their doctor if they wish to become pregnant or think they might be pregnant. Men should not father a child while taking this medicine and for 3 months after stopping it. There is a potential for serious side effects to an unborn child. Talk to your health care professional or pharmacist for more information. Do not breast-feed an infant while taking this medicine.  What side effects may I notice from receiving this medicine?  Side effects that you should report to your doctor or health care professional as soon as possible:  -allergic reactions like skin rash, itching or hives, swelling of the face, lips, or tongue  -breathing problems or shortness of breath  -diarrhea  -dry, nonproductive cough  -low blood counts - this medicine may decrease the number of white blood cells, red blood cells and platelets. You may be at increased risk for infections and bleeding.  -mouth sores  -redness, blistering, peeling or loosening of the skin, including inside the mouth  -signs of infection - fever or chills, cough, sore throat, pain or trouble passing urine  -signs and symptoms of bleeding such as bloody or black, tarry stools; red or dark-brown urine; spitting up blood or brown material that looks like coffee grounds; red spots on the skin; unusual bruising or bleeding from the eye, gums, or nose  -signs and symptoms of kidney injury like trouble passing urine or change in the amount  of urine  -signs and symptoms of liver injury like dark yellow or brown urine; general ill feeling or flu-like symptoms; light-colored stools; loss of appetite; nausea; right upper belly pain; unusually weak or tired; yellowing of the eyes or skin  Side effects that usually do not require medical attention (report to your doctor or health care professional if they continue or are bothersome):  -dizziness  -hair loss  -tiredness  -upset stomach  -vomiting  This list may not describe all possible side effects. Call your doctor for medical advice about side effects. You may report side effects to FDA at 1-923-TYI-3322.  Where should I keep my medicine?  Keep out of the reach of children.  Store at room temperature between 20 and 25 degrees C (68 and 77 degrees F). Protect from light. Throw away any unused medicine after the expiration date.  NOTE: This sheet is a summary. It may not cover all possible information. If you have questions about this medicine, talk to your doctor, pharmacist, or health care provider.  © 2018 [employer]/Gold Standard (2016-08-22 05:39:22)  Sarcoidosis  Sarcoidosis is a disease that causes inflammation in your organs and other areas of your body. The lungs are most often affected (pulmonary sarcoidosis). Sarcoidosis can also affect your lymph nodes, liver, eyes, skin, or any other body tissue.  When you have sarcoidosis, small clumps of tissue (granulomas) form in the affected area of your body. Granulomas are made up of your body’s defense (immune) cells. Inflammation results when your body reacts to a harmful substance. Normally, inflammation goes away after immune cells get rid of the harmful substance. In sarcoidosis, the immune cells form granulomas instead.  What are the causes?  The exact cause of sarcoidosis is not known. Something triggers the immune system to respond, such as dust, chemicals, bacteria, or a virus.  What increases the risk?  You may be at a greater risk for  sarcoidosis if you:  · Have a family history of the disease.  · Are .  · Are of [place]  ancestry.  · Are 20-50 years old.  · Are female.    What are the signs or symptoms?  Many people with sarcoidosis have no symptoms. Others have very mild symptoms. Sarcoidosis most often affects the lungs. Symptoms include:  · Chest pain.  · Coughing.  · Wheezing.  · Shortness of breath.    Other common symptoms include:  · Night sweats.  · Weight loss.  · Fatigue.  · Depression.  · A sense of uneasiness.    How is this diagnosed?  Sarcoidosis may be diagnosed by:  · Medical history and physical exam.  · Chest X-ray. This looks for granulomas in your lungs.  · Lung function tests. These measure your breathing and look for problems related to sarcoidosis.  · Examining a sample of tissue under a microscope (biopsy).    How is this treated?  Sarcoidosis usually clears up without treatment. You may take medicines to reduce inflammation or relieve symptoms. These may include:  · Prednisone. This steroid reduces inflammation related to sarcoidosis.  · Chloroquine or hydroxychloroquine. These are antimalarial medicines used to treat sarcoidosis that affects the skin or brain.  · Methotrexate, leflunomide, or azathioprine. These medicines affect the immune system and can help with sarcoidosis in the joints, eyes, skin, or lungs.  · Inhalers. Inhaled medicines can help you breathe if sarcoidosis is affecting your lungs.    Follow these instructions at home:  · Do not use any tobacco products, including cigarettes, chewing tobacco, or electronic cigarettes. If you need help quitting, ask your health care provider.  · Avoid secondhand smoke.  · Avoid irritating dust and chemicals. Stay indoors on days when air quality is poor in your area.  · Take medicines only as directed by your health care provider.  Contact a health care provider if:  · You have vision problems.  · You have shortness of breath.  · You have a  dry, persistent cough.  · You have an irregular heartbeat.  · You have pain or ache in your joints, hands, or feet.  · You have an unexplained rash.  Get help right away if:  You have chest pain.  This information is not intended to replace advice given to you by your health care provider. Make sure you discuss any questions you have with your health care provider.  Document Released: 10/18/2005 Document Revised: 05/25/2017 Document Reviewed: 04/15/2015  [employer] Patient Education © 2018 [employer]  BMI for Adults  Body mass index (BMI) is a number that is calculated from a person's weight and height. In most adults, the number is used to find how much of an adult's weight is made up of fat. BMI is not as accurate as a direct measure of body fat.  How is BMI calculated?  BMI is calculated by dividing weight in kilograms by height in meters squared. It can also be calculated by dividing weight in pounds by height in inches squared, then multiplying the resulting number by 703. Charts are available to help you find your BMI quickly and easily without doing this calculation.  How is BMI interpreted?  Health care professionals use BMI charts to identify whether an adult is underweight, at a normal weight, or overweight based on the following guidelines:  · Underweight: BMI less than 18.5.  · Normal weight: BMI between 18.5 and 24.9.  · Overweight: BMI between 25 and 29.9.  · Obese: BMI of 30 and above.    BMI is usually interpreted the same for males and females.  Weight includes both fat and muscle, so someone with a muscular build, such as an athlete, may have a BMI that is higher than 24.9. In cases like these, BMI may not accurately depict body fat. To determine if excess body fat is the cause of a BMI of 25 or higher, further assessments may need to be done by a health care provider.  Why is BMI a useful tool?  BMI is used to identify a possible weight problem that may be related to a medical problem  or may increase the risk for medical problems. BMI can also be used to promote changes to reach a healthy weight.  This information is not intended to replace advice given to you by your health care provider. Make sure you discuss any questions you have with your health care provider.  Document Released: 08/29/2005 Document Revised: 04/27/2017 Document Reviewed: 05/15/2015  [employer] Interactive Patient Education © 2018 [employer]

## 2018-10-31 NOTE — PROGRESS NOTES
Subjective   Stacy Lynn is a 48 y.o. female.     Chief Complaint   Patient presents with   • Shortness of Breath        History of Present Illness   She has moderate intermittent shortness of breath unimproved in chest for the past 3 months associated with frequent cough,alleviated by rest and oxygen.  Pt is compliant with oxygen and benefits from it.  She is immune suppressed with MTX and is coughing green sputum.  We have had problems getting her medicines and asmanex was no longer covered, changed to pulmicort. These do not help as much as the breo did.  She reports no allergies and declines flu shots.    Medical History   has a past medical history of Chest pain; Chronic headaches; Chronic pain; Chronic respiratory failure (CMS/HCC); Colon polyp; Degeneration of intervertebral disc of high cervical region; Depression; GERD (gastroesophageal reflux disease); Hypertension; Hyperthyroidism; Irritable bowel syndrome; Kidney stones; Macular degeneration; Orthopnea; Pancreatitis, chronic (CMS/HCC); Peripheral neuropathy; PFO (patent foramen ovale) (09/2016); PSVT (paroxysmal supraventricular tachycardia) (CMS/HCC); Restless leg syndrome; Sarcoidosis; Scoliosis; Septic joint (CMS/HCC); and Venous insufficiency, peripheral.  family history includes Arrhythmia in her mother; Diabetes in her father; Heart disease in her brother, father, maternal aunt, maternal grandfather, maternal grandmother, maternal uncle, mother, paternal aunt, paternal grandfather, paternal grandmother, and paternal uncle; Kidney disease in her mother.   reports that she has never smoked. She has never used smokeless tobacco. She reports that she does not drink alcohol or use drugs.  Review of Systems   Constitutional: Negative for fever.   Musculoskeletal: Negative for arthralgias.   Skin: Negative for rash.     ------------------------------------  Objective     Physical Exam   Constitutional: She appears well-developed. She does not appear  ill. No distress. Nasal cannula in place.   HENT:   Head: Atraumatic.   Nose: Nose normal.   Eyes: Conjunctivae and EOM are normal. No scleral icterus.   Neck: Neck supple.   Cardiovascular: Normal rate, regular rhythm, S1 normal and S2 normal.    Pulmonary/Chest: Effort normal and breath sounds normal.   Abdominal: Soft. She exhibits no distension. There is no tenderness.   obese   Musculoskeletal: She exhibits edema (legs). She exhibits no deformity.   Lymphadenopathy:     She has no cervical adenopathy.   Neurological: She is alert.   Skin: Skin is warm. No rash noted.   Psychiatric: She has a normal mood and affect.     ------------------------------------  Assessment/Plan   Hope was seen today for shortness of breath.    Diagnoses and all orders for this visit:    Sarcoidosis  -     Respiratory Culture - Sputum, Cough; Future  -     Hepatic Function Panel; Future  -     XR Chest 2 View; Future    Lymphedema    Chronic respiratory failure with hypoxia (CMS/HCC)    PFO (patent foramen ovale)    Encounter for long-term current use of high risk medication    Thrombocytopenia (CMS/HCC)    Morbidly obese (CMS/HCC)    Other orders  -     azithromycin (ZITHROMAX) 250 MG tablet; Take 2 tablets the first day, then 1 tablet daily for 4 days.  -     Discontinue: Fluticasone Furoate-Vilanterol (BREO ELLIPTA) 200-25 MCG/INH aerosol powder ; Inhale 1 puff Daily for 30 days.  -     methotrexate 2.5 MG tablet; Take 4 tablets by mouth 1 (One) Time Per Week. Takes 4 tabs weekly on Mondays  -     Fluticasone Furoate-Vilanterol (BREO ELLIPTA) 200-25 MCG/INH aerosol powder ; Inhale 1 puff Daily for 30 days.      Patient's Body mass index is 46.06 kg/m². BMI is above normal parameters. Recommendations include: educational material.    Pt has sarcoidosis, with sarcoid symptoms improved and stable on 10 mg per week of mtx.  Need to recheck liver enzymes with next cbc.  Purulent sputum in setting of immune suppression, check sputum  culture.  Begin abx, steroids., cxr, max dose breo.  Can supplement with current supply of asmanex or pulmicort if needed.  Last liver enzymes in march are reviewed, normal.

## 2018-11-09 ENCOUNTER — HOSPITAL ENCOUNTER (OUTPATIENT)
Dept: GENERAL RADIOLOGY | Facility: HOSPITAL | Age: 48
Discharge: HOME | End: 2018-11-09
Attending: INTERNAL MEDICINE | Admitting: INTERNAL MEDICINE

## 2018-11-09 DIAGNOSIS — D86.9 SARCOIDOSIS: ICD-10-CM

## 2018-11-09 PROCEDURE — 71046 X-RAY EXAM CHEST 2 VIEWS: CPT

## 2019-01-11 ENCOUNTER — LAB REQUISITION (OUTPATIENT)
Dept: LAB | Facility: HOSPITAL | Age: 49
End: 2019-01-11

## 2019-01-11 DIAGNOSIS — Z00.00 ENCOUNTER FOR GENERAL ADULT MEDICAL EXAMINATION WITHOUT ABNORMAL FINDINGS: ICD-10-CM

## 2019-01-11 LAB
FERRITIN SERPL-MCNC: 132 NG/ML (ref 11.1–264)
FOLATE SERPL-MCNC: >20 NG/ML
IRON 24H UR-MRATE: 87 MCG/DL (ref 42–180)
IRON SATN MFR SERPL: 31 % (ref 20–45)
TIBC SERPL-MCNC: 283 MCG/DL (ref 225–420)
VIT B12 BLD-MCNC: 542 PG/ML (ref 239–931)

## 2019-01-11 PROCEDURE — 82728 ASSAY OF FERRITIN: CPT | Performed by: INTERNAL MEDICINE

## 2019-01-11 PROCEDURE — 82607 VITAMIN B-12: CPT | Performed by: INTERNAL MEDICINE

## 2019-01-11 PROCEDURE — 83540 ASSAY OF IRON: CPT | Performed by: INTERNAL MEDICINE

## 2019-01-11 PROCEDURE — 82746 ASSAY OF FOLIC ACID SERUM: CPT | Performed by: INTERNAL MEDICINE

## 2019-01-11 PROCEDURE — 83550 IRON BINDING TEST: CPT | Performed by: INTERNAL MEDICINE

## 2019-01-17 ENCOUNTER — OFFICE VISIT (OUTPATIENT)
Dept: GASTROENTEROLOGY | Age: 49
End: 2019-01-17
Payer: MEDICARE

## 2019-01-17 VITALS
WEIGHT: 274.2 LBS | HEIGHT: 63 IN | BODY MASS INDEX: 48.58 KG/M2 | OXYGEN SATURATION: 97 % | DIASTOLIC BLOOD PRESSURE: 98 MMHG | SYSTOLIC BLOOD PRESSURE: 124 MMHG | HEART RATE: 67 BPM

## 2019-01-17 DIAGNOSIS — K62.5 RECTAL BLEEDING: Primary | ICD-10-CM

## 2019-01-17 DIAGNOSIS — K58.1 IRRITABLE BOWEL SYNDROME WITH CONSTIPATION: ICD-10-CM

## 2019-01-17 DIAGNOSIS — K62.89 RECTAL PAIN: ICD-10-CM

## 2019-01-17 PROCEDURE — 3017F COLORECTAL CA SCREEN DOC REV: CPT | Performed by: NURSE PRACTITIONER

## 2019-01-17 PROCEDURE — G8484 FLU IMMUNIZE NO ADMIN: HCPCS | Performed by: NURSE PRACTITIONER

## 2019-01-17 PROCEDURE — 1036F TOBACCO NON-USER: CPT | Performed by: NURSE PRACTITIONER

## 2019-01-17 PROCEDURE — G8417 CALC BMI ABV UP PARAM F/U: HCPCS | Performed by: NURSE PRACTITIONER

## 2019-01-17 PROCEDURE — 99214 OFFICE O/P EST MOD 30 MIN: CPT | Performed by: NURSE PRACTITIONER

## 2019-01-17 PROCEDURE — G8427 DOCREV CUR MEDS BY ELIG CLIN: HCPCS | Performed by: NURSE PRACTITIONER

## 2019-01-17 RX ORDER — DICYCLOMINE HYDROCHLORIDE 10 MG/1
10 CAPSULE ORAL EVERY 6 HOURS PRN
Qty: 90 CAPSULE | Refills: 11 | Status: SHIPPED | OUTPATIENT
Start: 2019-01-17 | End: 2020-02-17

## 2019-01-17 RX ORDER — BUDESONIDE 180 UG/1
AEROSOL, POWDER RESPIRATORY (INHALATION) DAILY PRN
Status: ON HOLD | COMMUNITY
Start: 2018-11-08 | End: 2019-03-15 | Stop reason: ALTCHOICE

## 2019-01-17 RX ORDER — BUSPIRONE HYDROCHLORIDE 10 MG/1
10 TABLET ORAL PRN
COMMUNITY
Start: 2018-12-07 | End: 2020-02-17

## 2019-01-17 RX ORDER — SUCRALFATE 1 G/1
1 TABLET ORAL 2 TIMES DAILY
Qty: 60 TABLET | Refills: 0 | Status: ON HOLD | OUTPATIENT
Start: 2019-01-17 | End: 2019-03-15 | Stop reason: ALTCHOICE

## 2019-01-17 RX ORDER — TIZANIDINE 4 MG/1
4 TABLET ORAL 3 TIMES DAILY PRN
COMMUNITY

## 2019-01-17 RX ORDER — POLYETHYLENE GLYCOL 3350 17 G/17G
17 POWDER, FOR SOLUTION ORAL EVERY OTHER DAY
COMMUNITY
End: 2020-02-17

## 2019-01-17 RX ORDER — DICLOFENAC SODIUM 75 MG/1
75 TABLET, DELAYED RELEASE ORAL 2 TIMES DAILY
COMMUNITY
Start: 2018-12-07 | End: 2020-02-17

## 2019-01-17 ASSESSMENT — ENCOUNTER SYMPTOMS
BACK PAIN: 1
ANAL BLEEDING: 1
SORE THROAT: 0
SHORTNESS OF BREATH: 1
VOMITING: 0
BLOOD IN STOOL: 0
CONSTIPATION: 1
ABDOMINAL DISTENTION: 0
RECTAL PAIN: 0
CHEST TIGHTNESS: 0
NAUSEA: 0
DIARRHEA: 1
COUGH: 0
VOICE CHANGE: 0
ABDOMINAL PAIN: 1

## 2019-01-31 ENCOUNTER — OFFICE VISIT (OUTPATIENT)
Dept: PULMONOLOGY | Facility: CLINIC | Age: 49
End: 2019-01-31

## 2019-01-31 VITALS
SYSTOLIC BLOOD PRESSURE: 130 MMHG | DIASTOLIC BLOOD PRESSURE: 60 MMHG | WEIGHT: 266 LBS | HEIGHT: 63 IN | OXYGEN SATURATION: 99 % | HEART RATE: 86 BPM | BODY MASS INDEX: 47.13 KG/M2

## 2019-01-31 DIAGNOSIS — D86.9 SARCOIDOSIS: ICD-10-CM

## 2019-01-31 DIAGNOSIS — I89.0 LYMPHEDEMA: ICD-10-CM

## 2019-01-31 DIAGNOSIS — R04.2 HEMOPTYSIS: Primary | ICD-10-CM

## 2019-01-31 DIAGNOSIS — Q21.12 PFO (PATENT FORAMEN OVALE): ICD-10-CM

## 2019-01-31 DIAGNOSIS — J96.11 CHRONIC RESPIRATORY FAILURE WITH HYPOXIA (HCC): ICD-10-CM

## 2019-01-31 DIAGNOSIS — E66.01 MORBIDLY OBESE (HCC): ICD-10-CM

## 2019-01-31 PROCEDURE — 99214 OFFICE O/P EST MOD 30 MIN: CPT | Performed by: INTERNAL MEDICINE

## 2019-01-31 RX ORDER — ALBUTEROL SULFATE 90 UG/1
AEROSOL, METERED RESPIRATORY (INHALATION)
COMMUNITY
Start: 2018-11-08 | End: 2019-10-07 | Stop reason: ALTCHOICE

## 2019-01-31 RX ORDER — CEFDINIR 300 MG/1
300 CAPSULE ORAL 2 TIMES DAILY
Qty: 20 CAPSULE | Refills: 0 | Status: SHIPPED | OUTPATIENT
Start: 2019-01-31 | End: 2019-02-10

## 2019-02-01 ENCOUNTER — INFUSION (OUTPATIENT)
Dept: ONCOLOGY | Facility: HOSPITAL | Age: 49
End: 2019-02-01

## 2019-02-01 ENCOUNTER — LAB (OUTPATIENT)
Dept: LAB | Facility: HOSPITAL | Age: 49
End: 2019-02-01

## 2019-02-01 ENCOUNTER — TRANSCRIBE ORDERS (OUTPATIENT)
Dept: ONCOLOGY | Facility: HOSPITAL | Age: 49
End: 2019-02-01

## 2019-02-01 VITALS
WEIGHT: 269.4 LBS | OXYGEN SATURATION: 100 % | SYSTOLIC BLOOD PRESSURE: 122 MMHG | HEART RATE: 71 BPM | BODY MASS INDEX: 47.73 KG/M2 | HEIGHT: 63 IN | DIASTOLIC BLOOD PRESSURE: 64 MMHG | TEMPERATURE: 98.8 F

## 2019-02-01 DIAGNOSIS — D50.9 IRON DEFICIENCY ANEMIA, UNSPECIFIED IRON DEFICIENCY ANEMIA TYPE: ICD-10-CM

## 2019-02-01 DIAGNOSIS — D63.1 ANEMIA IN STAGE 4 CHRONIC KIDNEY DISEASE (HCC): Primary | ICD-10-CM

## 2019-02-01 DIAGNOSIS — D50.9 IRON DEFICIENCY ANEMIA, UNSPECIFIED IRON DEFICIENCY ANEMIA TYPE: Primary | ICD-10-CM

## 2019-02-01 DIAGNOSIS — N18.4 ANEMIA IN STAGE 4 CHRONIC KIDNEY DISEASE (HCC): Primary | ICD-10-CM

## 2019-02-01 LAB
BASOPHILS # BLD AUTO: 0.04 10*3/MM3 (ref 0–0.2)
BASOPHILS NFR BLD AUTO: 1.1 % (ref 0–2)
DEPRECATED RDW RBC AUTO: 54.9 FL (ref 40–54)
EOSINOPHIL # BLD AUTO: 0.12 10*3/MM3 (ref 0–0.7)
EOSINOPHIL NFR BLD AUTO: 3.3 % (ref 0–4)
ERYTHROCYTE [DISTWIDTH] IN BLOOD BY AUTOMATED COUNT: 14.6 % (ref 12–15)
HCT VFR BLD AUTO: 34 % (ref 37–47)
HGB BLD-MCNC: 10.9 G/DL (ref 12–16)
IMM GRANULOCYTES # BLD AUTO: 0 10*3/MM3 (ref 0–0.03)
IMM GRANULOCYTES NFR BLD AUTO: 0 % (ref 0–5)
LYMPHOCYTES # BLD AUTO: 0.76 10*3/MM3 (ref 0.72–4.86)
LYMPHOCYTES NFR BLD AUTO: 20.7 % (ref 15–45)
MCH RBC QN AUTO: 32.9 PG (ref 28–32)
MCHC RBC AUTO-ENTMCNC: 32.1 G/DL (ref 33–36)
MCV RBC AUTO: 102.7 FL (ref 82–98)
MONOCYTES # BLD AUTO: 0.44 10*3/MM3 (ref 0.19–1.3)
MONOCYTES NFR BLD AUTO: 12 % (ref 4–12)
NEUTROPHILS # BLD AUTO: 2.32 10*3/MM3 (ref 1.87–8.4)
NEUTROPHILS NFR BLD AUTO: 62.9 % (ref 39–78)
NRBC BLD AUTO-RTO: 0 /100 WBC (ref 0–0)
PLATELET # BLD AUTO: 180 10*3/MM3 (ref 130–400)
PMV BLD AUTO: 9.6 FL (ref 6–12)
RBC # BLD AUTO: 3.31 10*6/MM3 (ref 4.2–5.4)
WBC NRBC COR # BLD: 3.68 10*3/MM3 (ref 4.8–10.8)

## 2019-02-01 PROCEDURE — 85025 COMPLETE CBC W/AUTO DIFF WBC: CPT | Performed by: INTERNAL MEDICINE

## 2019-02-01 PROCEDURE — 25010000002 EPOETIN ALFA PER 1000 UNITS: Performed by: INTERNAL MEDICINE

## 2019-02-01 PROCEDURE — 96401 CHEMO ANTI-NEOPL SQ/IM: CPT

## 2019-02-01 PROCEDURE — 36415 COLL VENOUS BLD VENIPUNCTURE: CPT

## 2019-02-01 PROCEDURE — 96372 THER/PROPH/DIAG INJ SC/IM: CPT

## 2019-02-01 RX ADMIN — ERYTHROPOIETIN 40000 UNITS: 40000 INJECTION, SOLUTION INTRAVENOUS; SUBCUTANEOUS at 14:19

## 2019-02-04 ENCOUNTER — TRANSCRIBE ORDERS (OUTPATIENT)
Dept: ADMINISTRATIVE | Facility: HOSPITAL | Age: 49
End: 2019-02-04

## 2019-02-04 DIAGNOSIS — D64.9 ANEMIA, UNSPECIFIED TYPE: ICD-10-CM

## 2019-02-04 DIAGNOSIS — D50.9 IRON DEFICIENCY ANEMIA, UNSPECIFIED IRON DEFICIENCY ANEMIA TYPE: Primary | ICD-10-CM

## 2019-02-04 DIAGNOSIS — N18.9 CHRONIC KIDNEY DISEASE, UNSPECIFIED CKD STAGE: ICD-10-CM

## 2019-02-08 ENCOUNTER — APPOINTMENT (OUTPATIENT)
Dept: LAB | Facility: HOSPITAL | Age: 49
End: 2019-02-08

## 2019-02-22 ENCOUNTER — LAB (OUTPATIENT)
Dept: LAB | Facility: HOSPITAL | Age: 49
End: 2019-02-22

## 2019-02-22 ENCOUNTER — INFUSION (OUTPATIENT)
Dept: ONCOLOGY | Facility: HOSPITAL | Age: 49
End: 2019-02-22

## 2019-02-22 VITALS
DIASTOLIC BLOOD PRESSURE: 98 MMHG | WEIGHT: 273 LBS | SYSTOLIC BLOOD PRESSURE: 140 MMHG | BODY MASS INDEX: 48.37 KG/M2 | HEART RATE: 106 BPM | OXYGEN SATURATION: 100 % | HEIGHT: 63 IN | RESPIRATION RATE: 20 BRPM | TEMPERATURE: 98.2 F

## 2019-02-22 DIAGNOSIS — N18.9 CHRONIC KIDNEY DISEASE, UNSPECIFIED CKD STAGE: ICD-10-CM

## 2019-02-22 DIAGNOSIS — D64.9 ANEMIA, UNSPECIFIED TYPE: ICD-10-CM

## 2019-02-22 DIAGNOSIS — D50.9 IRON DEFICIENCY ANEMIA, UNSPECIFIED IRON DEFICIENCY ANEMIA TYPE: ICD-10-CM

## 2019-02-22 LAB
BASOPHILS # BLD AUTO: 0.04 10*3/MM3 (ref 0–0.2)
BASOPHILS NFR BLD AUTO: 1 % (ref 0–2)
DEPRECATED RDW RBC AUTO: 50.4 FL (ref 40–54)
EOSINOPHIL # BLD AUTO: 0.18 10*3/MM3 (ref 0–0.7)
EOSINOPHIL NFR BLD AUTO: 4.3 % (ref 0–4)
ERYTHROCYTE [DISTWIDTH] IN BLOOD BY AUTOMATED COUNT: 13.7 % (ref 12–15)
FERRITIN SERPL-MCNC: 102 NG/ML (ref 11.1–264)
FOLATE SERPL-MCNC: >20 NG/ML (ref 4.78–24.2)
HCT VFR BLD AUTO: 34.6 % (ref 37–47)
HGB BLD-MCNC: 11.1 G/DL (ref 12–16)
IMM GRANULOCYTES # BLD AUTO: 0.03 10*3/MM3 (ref 0–0.05)
IMM GRANULOCYTES NFR BLD AUTO: 0.7 % (ref 0–5)
IRON 24H UR-MRATE: 86 MCG/DL (ref 42–180)
IRON 24H UR-MRATE: 86 MCG/DL (ref 42–180)
IRON SATN MFR SERPL: 29 % (ref 20–45)
LYMPHOCYTES # BLD AUTO: 1.04 10*3/MM3 (ref 0.72–4.86)
LYMPHOCYTES NFR BLD AUTO: 25 % (ref 15–45)
MCH RBC QN AUTO: 32.3 PG (ref 28–32)
MCHC RBC AUTO-ENTMCNC: 32.1 G/DL (ref 33–36)
MCV RBC AUTO: 100.6 FL (ref 82–98)
MONOCYTES # BLD AUTO: 0.45 10*3/MM3 (ref 0.19–1.3)
MONOCYTES NFR BLD AUTO: 10.8 % (ref 4–12)
NEUTROPHILS # BLD AUTO: 2.42 10*3/MM3 (ref 1.87–8.4)
NEUTROPHILS NFR BLD AUTO: 58.2 % (ref 39–78)
NRBC BLD AUTO-RTO: 0 /100 WBC (ref 0–0)
PLATELET # BLD AUTO: 153 10*3/MM3 (ref 130–400)
PMV BLD AUTO: 10.1 FL (ref 6–12)
RBC # BLD AUTO: 3.44 10*6/MM3 (ref 4.2–5.4)
TIBC SERPL-MCNC: 300 MCG/DL (ref 225–420)
VIT B12 BLD-MCNC: 567 PG/ML (ref 239–931)
WBC NRBC COR # BLD: 4.16 10*3/MM3 (ref 4.8–10.8)

## 2019-02-22 PROCEDURE — 82607 VITAMIN B-12: CPT | Performed by: INTERNAL MEDICINE

## 2019-02-22 PROCEDURE — 82746 ASSAY OF FOLIC ACID SERUM: CPT | Performed by: INTERNAL MEDICINE

## 2019-02-22 PROCEDURE — 85025 COMPLETE CBC W/AUTO DIFF WBC: CPT | Performed by: INTERNAL MEDICINE

## 2019-02-22 PROCEDURE — 36415 COLL VENOUS BLD VENIPUNCTURE: CPT

## 2019-02-22 PROCEDURE — 83550 IRON BINDING TEST: CPT | Performed by: INTERNAL MEDICINE

## 2019-02-22 PROCEDURE — 83540 ASSAY OF IRON: CPT | Performed by: INTERNAL MEDICINE

## 2019-02-22 PROCEDURE — 82728 ASSAY OF FERRITIN: CPT | Performed by: INTERNAL MEDICINE

## 2019-02-22 NOTE — PROGRESS NOTES
Injection held today per MD parameters.  Patient reminded to call MD w/any concerns or questions prior to RTC in 1 week.  Patient v/belen Albarado RN

## 2019-02-25 ENCOUNTER — HOSPITAL ENCOUNTER (OUTPATIENT)
Dept: GENERAL RADIOLOGY | Facility: HOSPITAL | Age: 49
Discharge: HOME | End: 2019-02-25
Admitting: INTERNAL MEDICINE

## 2019-02-25 ENCOUNTER — TRANSCRIBE ORDERS (OUTPATIENT)
Dept: GENERAL RADIOLOGY | Facility: HOSPITAL | Age: 49
End: 2019-02-25

## 2019-02-25 DIAGNOSIS — Z78.0 POST-MENOPAUSAL: ICD-10-CM

## 2019-02-25 DIAGNOSIS — Z78.0 POST-MENOPAUSAL: Primary | ICD-10-CM

## 2019-02-25 PROCEDURE — 77080 DXA BONE DENSITY AXIAL: CPT

## 2019-03-08 ENCOUNTER — INFUSION (OUTPATIENT)
Dept: ONCOLOGY | Facility: HOSPITAL | Age: 49
End: 2019-03-08

## 2019-03-08 ENCOUNTER — HOSPITAL ENCOUNTER (OUTPATIENT)
Dept: MAMMOGRAPHY | Facility: HOSPITAL | Age: 49
Discharge: HOME | End: 2019-03-08
Admitting: INTERNAL MEDICINE

## 2019-03-08 ENCOUNTER — TRANSCRIBE ORDERS (OUTPATIENT)
Dept: ADMINISTRATIVE | Facility: HOSPITAL | Age: 49
End: 2019-03-08

## 2019-03-08 ENCOUNTER — LAB (OUTPATIENT)
Dept: LAB | Facility: HOSPITAL | Age: 49
End: 2019-03-08

## 2019-03-08 DIAGNOSIS — D64.9 ANEMIA, UNSPECIFIED TYPE: ICD-10-CM

## 2019-03-08 DIAGNOSIS — D50.9 IRON DEFICIENCY ANEMIA, UNSPECIFIED IRON DEFICIENCY ANEMIA TYPE: Primary | ICD-10-CM

## 2019-03-08 DIAGNOSIS — Z12.39 SCREENING BREAST EXAMINATION: ICD-10-CM

## 2019-03-08 DIAGNOSIS — N18.9 CHRONIC KIDNEY DISEASE, UNSPECIFIED CKD STAGE: ICD-10-CM

## 2019-03-08 DIAGNOSIS — Z12.39 SCREENING BREAST EXAMINATION: Primary | ICD-10-CM

## 2019-03-08 LAB
BASOPHILS # BLD AUTO: 0.03 10*3/MM3 (ref 0–0.2)
BASOPHILS NFR BLD AUTO: 0.9 % (ref 0–2)
DEPRECATED RDW RBC AUTO: 48.3 FL (ref 40–54)
EOSINOPHIL # BLD AUTO: 0.13 10*3/MM3 (ref 0–0.7)
EOSINOPHIL NFR BLD AUTO: 3.8 % (ref 0–4)
ERYTHROCYTE [DISTWIDTH] IN BLOOD BY AUTOMATED COUNT: 13.5 % (ref 12–15)
FERRITIN SERPL-MCNC: 110 NG/ML (ref 11.1–264)
FOLATE SERPL-MCNC: >20 NG/ML (ref 4.78–24.2)
HCT VFR BLD AUTO: 34.2 % (ref 37–47)
HGB BLD-MCNC: 11.2 G/DL (ref 12–16)
HOLD SPECIMEN: NORMAL
IMM GRANULOCYTES # BLD AUTO: 0.01 10*3/MM3 (ref 0–0.05)
IMM GRANULOCYTES NFR BLD AUTO: 0.3 % (ref 0–5)
IRON 24H UR-MRATE: 99 MCG/DL (ref 42–180)
IRON 24H UR-MRATE: 99 MCG/DL (ref 42–180)
IRON SATN MFR SERPL: 32 % (ref 20–45)
LYMPHOCYTES # BLD AUTO: 0.73 10*3/MM3 (ref 0.72–4.86)
LYMPHOCYTES NFR BLD AUTO: 21.2 % (ref 15–45)
MCH RBC QN AUTO: 32 PG (ref 28–32)
MCHC RBC AUTO-ENTMCNC: 32.7 G/DL (ref 33–36)
MCV RBC AUTO: 97.7 FL (ref 82–98)
MONOCYTES # BLD AUTO: 0.25 10*3/MM3 (ref 0.19–1.3)
MONOCYTES NFR BLD AUTO: 7.2 % (ref 4–12)
NEUTROPHILS # BLD AUTO: 2.3 10*3/MM3 (ref 1.87–8.4)
NEUTROPHILS NFR BLD AUTO: 66.6 % (ref 39–78)
NRBC BLD AUTO-RTO: 0 /100 WBC (ref 0–0)
PLATELET # BLD AUTO: 167 10*3/MM3 (ref 130–400)
PMV BLD AUTO: 10 FL (ref 6–12)
RBC # BLD AUTO: 3.5 10*6/MM3 (ref 4.2–5.4)
TIBC SERPL-MCNC: 307 MCG/DL (ref 225–420)
VIT B12 BLD-MCNC: 550 PG/ML (ref 239–931)
WBC NRBC COR # BLD: 3.45 10*3/MM3 (ref 4.8–10.8)

## 2019-03-08 PROCEDURE — 82746 ASSAY OF FOLIC ACID SERUM: CPT | Performed by: INTERNAL MEDICINE

## 2019-03-08 PROCEDURE — 83540 ASSAY OF IRON: CPT | Performed by: INTERNAL MEDICINE

## 2019-03-08 PROCEDURE — 82607 VITAMIN B-12: CPT | Performed by: INTERNAL MEDICINE

## 2019-03-08 PROCEDURE — 85025 COMPLETE CBC W/AUTO DIFF WBC: CPT | Performed by: INTERNAL MEDICINE

## 2019-03-08 PROCEDURE — 77067 SCR MAMMO BI INCL CAD: CPT

## 2019-03-08 PROCEDURE — 36415 COLL VENOUS BLD VENIPUNCTURE: CPT

## 2019-03-08 PROCEDURE — 83550 IRON BINDING TEST: CPT | Performed by: INTERNAL MEDICINE

## 2019-03-08 PROCEDURE — 82728 ASSAY OF FERRITIN: CPT | Performed by: INTERNAL MEDICINE

## 2019-03-08 PROCEDURE — 77063 BREAST TOMOSYNTHESIS BI: CPT

## 2019-03-14 ENCOUNTER — ANESTHESIA EVENT (OUTPATIENT)
Dept: ENDOSCOPY | Age: 49
End: 2019-03-14
Payer: MEDICARE

## 2019-03-15 ENCOUNTER — ANESTHESIA (OUTPATIENT)
Dept: ENDOSCOPY | Age: 49
End: 2019-03-15
Payer: MEDICARE

## 2019-03-15 ENCOUNTER — HOSPITAL ENCOUNTER (OUTPATIENT)
Age: 49
Setting detail: OUTPATIENT SURGERY
Discharge: HOME | End: 2019-03-15
Attending: INTERNAL MEDICINE | Admitting: INTERNAL MEDICINE
Payer: MEDICARE

## 2019-03-15 VITALS
SYSTOLIC BLOOD PRESSURE: 106 MMHG | RESPIRATION RATE: 14 BRPM | DIASTOLIC BLOOD PRESSURE: 61 MMHG | OXYGEN SATURATION: 96 %

## 2019-03-15 VITALS
WEIGHT: 260 LBS | DIASTOLIC BLOOD PRESSURE: 78 MMHG | HEIGHT: 63 IN | RESPIRATION RATE: 19 BRPM | TEMPERATURE: 98.4 F | BODY MASS INDEX: 46.07 KG/M2 | HEART RATE: 79 BPM | OXYGEN SATURATION: 100 % | SYSTOLIC BLOOD PRESSURE: 135 MMHG

## 2019-03-15 PROCEDURE — 7100000010 HC PHASE II RECOVERY - FIRST 15 MIN: Performed by: INTERNAL MEDICINE

## 2019-03-15 PROCEDURE — 2500000003 HC RX 250 WO HCPCS: Performed by: NURSE ANESTHETIST, CERTIFIED REGISTERED

## 2019-03-15 PROCEDURE — 3700000001 HC ADD 15 MINUTES (ANESTHESIA): Performed by: INTERNAL MEDICINE

## 2019-03-15 PROCEDURE — 3700000000 HC ANESTHESIA ATTENDED CARE: Performed by: INTERNAL MEDICINE

## 2019-03-15 PROCEDURE — 88305 TISSUE EXAM BY PATHOLOGIST: CPT

## 2019-03-15 PROCEDURE — 2500000003 HC RX 250 WO HCPCS: Performed by: INTERNAL MEDICINE

## 2019-03-15 PROCEDURE — 6360000002 HC RX W HCPCS: Performed by: NURSE ANESTHETIST, CERTIFIED REGISTERED

## 2019-03-15 PROCEDURE — 3609008300 HC SIGMOIDOSCOPY W/BIOPSY SINGLE/MULTIPLE: Performed by: INTERNAL MEDICINE

## 2019-03-15 PROCEDURE — 2580000003 HC RX 258: Performed by: INTERNAL MEDICINE

## 2019-03-15 PROCEDURE — 45331 SIGMOIDOSCOPY AND BIOPSY: CPT | Performed by: INTERNAL MEDICINE

## 2019-03-15 PROCEDURE — 2709999900 HC NON-CHARGEABLE SUPPLY: Performed by: INTERNAL MEDICINE

## 2019-03-15 PROCEDURE — 7100000011 HC PHASE II RECOVERY - ADDTL 15 MIN: Performed by: INTERNAL MEDICINE

## 2019-03-15 RX ORDER — SODIUM CHLORIDE, SODIUM LACTATE, POTASSIUM CHLORIDE, CALCIUM CHLORIDE 600; 310; 30; 20 MG/100ML; MG/100ML; MG/100ML; MG/100ML
INJECTION, SOLUTION INTRAVENOUS CONTINUOUS
Status: DISCONTINUED | OUTPATIENT
Start: 2019-03-15 | End: 2019-03-15 | Stop reason: HOSPADM

## 2019-03-15 RX ORDER — LANOLIN ALCOHOL/MO/W.PET/CERES
400 CREAM (GRAM) TOPICAL DAILY
COMMUNITY
End: 2021-09-29 | Stop reason: ALTCHOICE

## 2019-03-15 RX ORDER — LIDOCAINE HYDROCHLORIDE 20 MG/ML
INJECTION, SOLUTION INFILTRATION; PERINEURAL PRN
Status: DISCONTINUED | OUTPATIENT
Start: 2019-03-15 | End: 2019-03-15 | Stop reason: SDUPTHER

## 2019-03-15 RX ORDER — PROPOFOL 10 MG/ML
INJECTION, EMULSION INTRAVENOUS PRN
Status: DISCONTINUED | OUTPATIENT
Start: 2019-03-15 | End: 2019-03-15 | Stop reason: SDUPTHER

## 2019-03-15 RX ORDER — LIDOCAINE HYDROCHLORIDE 10 MG/ML
1 INJECTION, SOLUTION EPIDURAL; INFILTRATION; INTRACAUDAL; PERINEURAL ONCE
Status: COMPLETED | OUTPATIENT
Start: 2019-03-15 | End: 2019-03-15

## 2019-03-15 RX ADMIN — LIDOCAINE HYDROCHLORIDE 1 ML: 10 INJECTION, SOLUTION EPIDURAL; INFILTRATION; INTRACAUDAL; PERINEURAL at 12:29

## 2019-03-15 RX ADMIN — LIDOCAINE HYDROCHLORIDE 40 MG: 20 INJECTION, SOLUTION INFILTRATION; PERINEURAL at 13:32

## 2019-03-15 RX ADMIN — SODIUM CHLORIDE, POTASSIUM CHLORIDE, SODIUM LACTATE AND CALCIUM CHLORIDE: 600; 310; 30; 20 INJECTION, SOLUTION INTRAVENOUS at 12:29

## 2019-03-15 RX ADMIN — PROPOFOL 300 MG: 10 INJECTION, EMULSION INTRAVENOUS at 13:32

## 2019-03-15 ASSESSMENT — PAIN - FUNCTIONAL ASSESSMENT: PAIN_FUNCTIONAL_ASSESSMENT: 0-10

## 2019-03-29 ENCOUNTER — INFUSION (OUTPATIENT)
Dept: ONCOLOGY | Facility: HOSPITAL | Age: 49
End: 2019-03-29

## 2019-03-29 ENCOUNTER — LAB (OUTPATIENT)
Dept: LAB | Facility: HOSPITAL | Age: 49
End: 2019-03-29

## 2019-03-29 VITALS
WEIGHT: 274 LBS | TEMPERATURE: 98 F | HEART RATE: 77 BPM | OXYGEN SATURATION: 100 % | BODY MASS INDEX: 48.55 KG/M2 | SYSTOLIC BLOOD PRESSURE: 129 MMHG | HEIGHT: 63 IN | DIASTOLIC BLOOD PRESSURE: 81 MMHG | RESPIRATION RATE: 20 BRPM

## 2019-03-29 DIAGNOSIS — N18.4 ANEMIA IN STAGE 4 CHRONIC KIDNEY DISEASE (HCC): Primary | ICD-10-CM

## 2019-03-29 DIAGNOSIS — D63.1 ANEMIA IN STAGE 4 CHRONIC KIDNEY DISEASE (HCC): Primary | ICD-10-CM

## 2019-03-29 DIAGNOSIS — D50.9 IRON DEFICIENCY ANEMIA, UNSPECIFIED IRON DEFICIENCY ANEMIA TYPE: Primary | ICD-10-CM

## 2019-03-29 LAB
BASOPHILS # BLD AUTO: 0.03 10*3/MM3 (ref 0–0.2)
BASOPHILS NFR BLD AUTO: 1 % (ref 0–2)
DEPRECATED RDW RBC AUTO: 49.5 FL (ref 40–54)
EOSINOPHIL # BLD AUTO: 0.11 10*3/MM3 (ref 0–0.7)
EOSINOPHIL NFR BLD AUTO: 3.6 % (ref 0–4)
ERYTHROCYTE [DISTWIDTH] IN BLOOD BY AUTOMATED COUNT: 13.8 % (ref 12–15)
HCT VFR BLD AUTO: 32 % (ref 37–47)
HGB BLD-MCNC: 10.4 G/DL (ref 12–16)
HOLD SPECIMEN: NORMAL
IMM GRANULOCYTES # BLD AUTO: 0.01 10*3/MM3 (ref 0–0.05)
IMM GRANULOCYTES NFR BLD AUTO: 0.3 % (ref 0–5)
LYMPHOCYTES # BLD AUTO: 0.95 10*3/MM3 (ref 0.72–4.86)
LYMPHOCYTES NFR BLD AUTO: 31.4 % (ref 15–45)
MCH RBC QN AUTO: 32.4 PG (ref 28–32)
MCHC RBC AUTO-ENTMCNC: 32.5 G/DL (ref 33–36)
MCV RBC AUTO: 99.7 FL (ref 82–98)
MONOCYTES # BLD AUTO: 0.28 10*3/MM3 (ref 0.19–1.3)
MONOCYTES NFR BLD AUTO: 9.2 % (ref 4–12)
NEUTROPHILS # BLD AUTO: 1.65 10*3/MM3 (ref 1.87–8.4)
NEUTROPHILS NFR BLD AUTO: 54.5 % (ref 39–78)
NRBC BLD AUTO-RTO: 0 /100 WBC (ref 0–0)
PLATELET # BLD AUTO: 157 10*3/MM3 (ref 130–400)
PMV BLD AUTO: 9.9 FL (ref 6–12)
RBC # BLD AUTO: 3.21 10*6/MM3 (ref 4.2–5.4)
WBC NRBC COR # BLD: 3.03 10*3/MM3 (ref 4.8–10.8)

## 2019-03-29 PROCEDURE — 36415 COLL VENOUS BLD VENIPUNCTURE: CPT

## 2019-03-29 PROCEDURE — 25010000002 EPOETIN ALFA PER 1000 UNITS: Performed by: INTERNAL MEDICINE

## 2019-03-29 PROCEDURE — 85025 COMPLETE CBC W/AUTO DIFF WBC: CPT | Performed by: INTERNAL MEDICINE

## 2019-03-29 PROCEDURE — 96372 THER/PROPH/DIAG INJ SC/IM: CPT

## 2019-03-29 RX ORDER — OXYCODONE HYDROCHLORIDE AND ACETAMINOPHEN 5; 325 MG/1; MG/1
1 TABLET ORAL EVERY 6 HOURS PRN
COMMUNITY
End: 2019-07-26

## 2019-03-29 RX ADMIN — ERYTHROPOIETIN 40000 UNITS: 40000 INJECTION, SOLUTION INTRAVENOUS; SUBCUTANEOUS at 13:50

## 2019-04-05 ENCOUNTER — LAB (OUTPATIENT)
Dept: LAB | Facility: HOSPITAL | Age: 49
End: 2019-04-05

## 2019-04-05 ENCOUNTER — INFUSION (OUTPATIENT)
Dept: ONCOLOGY | Facility: HOSPITAL | Age: 49
End: 2019-04-05

## 2019-04-05 VITALS
RESPIRATION RATE: 20 BRPM | HEIGHT: 63 IN | TEMPERATURE: 97.4 F | SYSTOLIC BLOOD PRESSURE: 122 MMHG | HEART RATE: 77 BPM | OXYGEN SATURATION: 100 % | DIASTOLIC BLOOD PRESSURE: 78 MMHG | BODY MASS INDEX: 48.37 KG/M2 | WEIGHT: 273 LBS

## 2019-04-05 DIAGNOSIS — D64.9 ANEMIA, UNSPECIFIED TYPE: ICD-10-CM

## 2019-04-05 DIAGNOSIS — N18.9 CHRONIC KIDNEY DISEASE, UNSPECIFIED CKD STAGE: ICD-10-CM

## 2019-04-05 DIAGNOSIS — D50.9 IRON DEFICIENCY ANEMIA, UNSPECIFIED IRON DEFICIENCY ANEMIA TYPE: ICD-10-CM

## 2019-04-05 LAB
BASOPHILS # BLD AUTO: 0.02 10*3/MM3 (ref 0–0.2)
BASOPHILS NFR BLD AUTO: 0.6 % (ref 0–2)
DEPRECATED RDW RBC AUTO: 51.3 FL (ref 40–54)
EOSINOPHIL # BLD AUTO: 0.12 10*3/MM3 (ref 0–0.7)
EOSINOPHIL NFR BLD AUTO: 3.9 % (ref 0–4)
ERYTHROCYTE [DISTWIDTH] IN BLOOD BY AUTOMATED COUNT: 14.3 % (ref 12–15)
FERRITIN SERPL-MCNC: 80.3 NG/ML (ref 11.1–264)
FOLATE SERPL-MCNC: >20 NG/ML (ref 4.78–24.2)
HCT VFR BLD AUTO: 34.1 % (ref 37–47)
HGB BLD-MCNC: 11 G/DL (ref 12–16)
IMM GRANULOCYTES # BLD AUTO: 0 10*3/MM3 (ref 0–0.05)
IMM GRANULOCYTES NFR BLD AUTO: 0 % (ref 0–5)
IRON 24H UR-MRATE: 70 MCG/DL (ref 42–180)
IRON 24H UR-MRATE: 70 MCG/DL (ref 42–180)
IRON SATN MFR SERPL: 24 % (ref 20–45)
LYMPHOCYTES # BLD AUTO: 0.82 10*3/MM3 (ref 0.72–4.86)
LYMPHOCYTES NFR BLD AUTO: 26.5 % (ref 15–45)
MCH RBC QN AUTO: 32.3 PG (ref 28–32)
MCHC RBC AUTO-ENTMCNC: 32.3 G/DL (ref 33–36)
MCV RBC AUTO: 100 FL (ref 82–98)
MONOCYTES # BLD AUTO: 0.36 10*3/MM3 (ref 0.19–1.3)
MONOCYTES NFR BLD AUTO: 11.7 % (ref 4–12)
NEUTROPHILS # BLD AUTO: 1.77 10*3/MM3 (ref 1.87–8.4)
NEUTROPHILS NFR BLD AUTO: 57.3 % (ref 39–78)
NRBC BLD AUTO-RTO: 0 /100 WBC (ref 0–0)
PLATELET # BLD AUTO: 174 10*3/MM3 (ref 130–400)
PMV BLD AUTO: 9.6 FL (ref 6–12)
RBC # BLD AUTO: 3.41 10*6/MM3 (ref 4.2–5.4)
TIBC SERPL-MCNC: 290 MCG/DL (ref 225–420)
VIT B12 BLD-MCNC: 571 PG/ML (ref 239–931)
WBC NRBC COR # BLD: 3.09 10*3/MM3 (ref 4.8–10.8)

## 2019-04-05 PROCEDURE — 85025 COMPLETE CBC W/AUTO DIFF WBC: CPT | Performed by: INTERNAL MEDICINE

## 2019-04-05 PROCEDURE — 83550 IRON BINDING TEST: CPT | Performed by: INTERNAL MEDICINE

## 2019-04-05 PROCEDURE — 36415 COLL VENOUS BLD VENIPUNCTURE: CPT

## 2019-04-05 PROCEDURE — 82728 ASSAY OF FERRITIN: CPT | Performed by: INTERNAL MEDICINE

## 2019-04-05 PROCEDURE — 82607 VITAMIN B-12: CPT | Performed by: INTERNAL MEDICINE

## 2019-04-05 PROCEDURE — 83540 ASSAY OF IRON: CPT | Performed by: INTERNAL MEDICINE

## 2019-04-05 PROCEDURE — 82746 ASSAY OF FOLIC ACID SERUM: CPT | Performed by: INTERNAL MEDICINE

## 2019-04-12 ENCOUNTER — TELEPHONE (OUTPATIENT)
Dept: VASCULAR SURGERY | Facility: CLINIC | Age: 49
End: 2019-04-12

## 2019-04-12 NOTE — TELEPHONE ENCOUNTER
Pt needs to cancel appointment for Monday 4-15 with Sylvia. Pt stated she would call back to Rs when she had her new calendar.

## 2019-04-17 ENCOUNTER — LAB REQUISITION (OUTPATIENT)
Dept: LAB | Facility: HOSPITAL | Age: 49
End: 2019-04-17

## 2019-04-17 DIAGNOSIS — Z00.00 ENCOUNTER FOR GENERAL ADULT MEDICAL EXAMINATION WITHOUT ABNORMAL FINDINGS: ICD-10-CM

## 2019-04-17 LAB
ALBUMIN SERPL-MCNC: 4.2 G/DL (ref 3.5–5)
ALBUMIN/GLOB SERPL: 1.8 G/DL (ref 1.1–2.5)
ALP SERPL-CCNC: 67 U/L (ref 24–120)
ALT SERPL W P-5'-P-CCNC: 19 U/L (ref 0–54)
ANION GAP SERPL CALCULATED.3IONS-SCNC: 11 MMOL/L (ref 4–13)
AST SERPL-CCNC: 18 U/L (ref 7–45)
BILIRUB SERPL-MCNC: 0.5 MG/DL (ref 0.1–1)
BUN BLD-MCNC: 14 MG/DL (ref 5–21)
BUN/CREAT SERPL: 11.3 (ref 7–25)
CALCIUM SPEC-SCNC: 9 MG/DL (ref 8.4–10.4)
CHLORIDE SERPL-SCNC: 106 MMOL/L (ref 98–110)
CO2 SERPL-SCNC: 23 MMOL/L (ref 24–31)
CREAT BLD-MCNC: 1.24 MG/DL (ref 0.5–1.4)
GFR SERPL CREATININE-BSD FRML MDRD: 46 ML/MIN/1.73
GLOBULIN UR ELPH-MCNC: 2.4 GM/DL
GLUCOSE BLD-MCNC: 81 MG/DL (ref 70–100)
POTASSIUM BLD-SCNC: 4 MMOL/L (ref 3.5–5.3)
PROT SERPL-MCNC: 6.6 G/DL (ref 6.3–8.7)
SODIUM BLD-SCNC: 140 MMOL/L (ref 135–145)

## 2019-04-17 PROCEDURE — 80053 COMPREHEN METABOLIC PANEL: CPT | Performed by: INTERNAL MEDICINE

## 2019-04-18 ENCOUNTER — TRANSCRIBE ORDERS (OUTPATIENT)
Dept: ADMINISTRATIVE | Facility: HOSPITAL | Age: 49
End: 2019-04-18

## 2019-04-18 DIAGNOSIS — D50.9 IRON DEFICIENCY ANEMIA, UNSPECIFIED IRON DEFICIENCY ANEMIA TYPE: Primary | ICD-10-CM

## 2019-04-26 ENCOUNTER — APPOINTMENT (OUTPATIENT)
Dept: LAB | Facility: HOSPITAL | Age: 49
End: 2019-04-26

## 2019-04-26 ENCOUNTER — INFUSION (OUTPATIENT)
Dept: ONCOLOGY | Facility: HOSPITAL | Age: 49
End: 2019-04-26

## 2019-04-26 ENCOUNTER — TRANSCRIBE ORDERS (OUTPATIENT)
Dept: ADMINISTRATIVE | Facility: HOSPITAL | Age: 49
End: 2019-04-26

## 2019-04-26 VITALS
RESPIRATION RATE: 19 BRPM | HEART RATE: 73 BPM | OXYGEN SATURATION: 98 % | TEMPERATURE: 97.9 F | HEIGHT: 63 IN | SYSTOLIC BLOOD PRESSURE: 117 MMHG | BODY MASS INDEX: 48.55 KG/M2 | DIASTOLIC BLOOD PRESSURE: 83 MMHG | WEIGHT: 274 LBS

## 2019-04-26 DIAGNOSIS — D63.1 ANEMIA IN CHRONIC KIDNEY DISEASE, UNSPECIFIED CKD STAGE: ICD-10-CM

## 2019-04-26 DIAGNOSIS — D50.9 IRON DEFICIENCY ANEMIA, UNSPECIFIED IRON DEFICIENCY ANEMIA TYPE: Primary | ICD-10-CM

## 2019-04-26 DIAGNOSIS — D63.1 ANEMIA IN STAGE 4 CHRONIC KIDNEY DISEASE (HCC): ICD-10-CM

## 2019-04-26 DIAGNOSIS — N18.9 CHRONIC KIDNEY DISEASE, UNSPECIFIED CKD STAGE: ICD-10-CM

## 2019-04-26 DIAGNOSIS — N18.9 ANEMIA IN CHRONIC KIDNEY DISEASE, UNSPECIFIED CKD STAGE: ICD-10-CM

## 2019-04-26 DIAGNOSIS — N18.4 ANEMIA IN STAGE 4 CHRONIC KIDNEY DISEASE (HCC): ICD-10-CM

## 2019-04-26 LAB
BASOPHILS # BLD AUTO: 0.03 10*3/MM3 (ref 0–0.2)
BASOPHILS NFR BLD AUTO: 1 % (ref 0–2)
DEPRECATED RDW RBC AUTO: 49.8 FL (ref 40–54)
EOSINOPHIL # BLD AUTO: 0.12 10*3/MM3 (ref 0–0.7)
EOSINOPHIL NFR BLD AUTO: 4 % (ref 0–4)
ERYTHROCYTE [DISTWIDTH] IN BLOOD BY AUTOMATED COUNT: 13.8 % (ref 12–15)
HCT VFR BLD AUTO: 32.3 % (ref 37–47)
HGB BLD-MCNC: 10.2 G/DL (ref 12–16)
IMM GRANULOCYTES # BLD AUTO: 0.01 10*3/MM3 (ref 0–0.05)
IMM GRANULOCYTES NFR BLD AUTO: 0.3 % (ref 0–5)
LYMPHOCYTES # BLD AUTO: 0.73 10*3/MM3 (ref 0.72–4.86)
LYMPHOCYTES NFR BLD AUTO: 24.6 % (ref 15–45)
MCH RBC QN AUTO: 31.4 PG (ref 28–32)
MCHC RBC AUTO-ENTMCNC: 31.6 G/DL (ref 33–36)
MCV RBC AUTO: 99.4 FL (ref 82–98)
MONOCYTES # BLD AUTO: 0.34 10*3/MM3 (ref 0.19–1.3)
MONOCYTES NFR BLD AUTO: 11.4 % (ref 4–12)
NEUTROPHILS # BLD AUTO: 1.74 10*3/MM3 (ref 1.87–8.4)
NEUTROPHILS NFR BLD AUTO: 58.7 % (ref 39–78)
NRBC BLD AUTO-RTO: 0 /100 WBC (ref 0–0.2)
PLATELET # BLD AUTO: 123 10*3/MM3 (ref 130–400)
PMV BLD AUTO: 9.9 FL (ref 6–12)
RBC # BLD AUTO: 3.25 10*6/MM3 (ref 4.2–5.4)
WBC NRBC COR # BLD: 2.97 10*3/MM3 (ref 4.8–10.8)

## 2019-04-26 PROCEDURE — 25010000002 EPOETIN ALFA PER 1000 UNITS: Performed by: INTERNAL MEDICINE

## 2019-04-26 PROCEDURE — 96372 THER/PROPH/DIAG INJ SC/IM: CPT

## 2019-04-26 PROCEDURE — 25010000002 FERRIC CARBOXYMALTOSE 750 MG/15ML SOLUTION 15 ML VIAL: Performed by: INTERNAL MEDICINE

## 2019-04-26 PROCEDURE — 96365 THER/PROPH/DIAG IV INF INIT: CPT

## 2019-04-26 PROCEDURE — 85025 COMPLETE CBC W/AUTO DIFF WBC: CPT | Performed by: INTERNAL MEDICINE

## 2019-04-26 RX ADMIN — ERYTHROPOIETIN 40000 UNITS: 40000 INJECTION, SOLUTION INTRAVENOUS; SUBCUTANEOUS at 15:05

## 2019-04-26 RX ADMIN — FERRIC CARBOXYMALTOSE INJECTION 750 MG: 50 INJECTION, SOLUTION INTRAVENOUS at 14:38

## 2019-04-30 ENCOUNTER — APPOINTMENT (OUTPATIENT)
Dept: LAB | Facility: HOSPITAL | Age: 49
End: 2019-04-30

## 2019-04-30 ENCOUNTER — APPOINTMENT (OUTPATIENT)
Dept: ONCOLOGY | Facility: HOSPITAL | Age: 49
End: 2019-04-30

## 2019-05-03 ENCOUNTER — APPOINTMENT (OUTPATIENT)
Dept: ONCOLOGY | Facility: HOSPITAL | Age: 49
End: 2019-05-03

## 2019-05-07 ENCOUNTER — APPOINTMENT (OUTPATIENT)
Dept: ONCOLOGY | Facility: HOSPITAL | Age: 49
End: 2019-05-07

## 2019-05-07 ENCOUNTER — APPOINTMENT (OUTPATIENT)
Dept: LAB | Facility: HOSPITAL | Age: 49
End: 2019-05-07

## 2019-05-10 ENCOUNTER — APPOINTMENT (OUTPATIENT)
Dept: ONCOLOGY | Facility: HOSPITAL | Age: 49
End: 2019-05-10

## 2019-05-10 ENCOUNTER — APPOINTMENT (OUTPATIENT)
Dept: LAB | Facility: HOSPITAL | Age: 49
End: 2019-05-10

## 2019-05-14 ENCOUNTER — APPOINTMENT (OUTPATIENT)
Dept: ONCOLOGY | Facility: HOSPITAL | Age: 49
End: 2019-05-14

## 2019-05-17 ENCOUNTER — APPOINTMENT (OUTPATIENT)
Dept: LAB | Facility: HOSPITAL | Age: 49
End: 2019-05-17

## 2019-05-21 ENCOUNTER — APPOINTMENT (OUTPATIENT)
Dept: ONCOLOGY | Facility: HOSPITAL | Age: 49
End: 2019-05-21

## 2019-05-24 ENCOUNTER — APPOINTMENT (OUTPATIENT)
Dept: LAB | Facility: HOSPITAL | Age: 49
End: 2019-05-24

## 2019-05-28 ENCOUNTER — APPOINTMENT (OUTPATIENT)
Dept: ONCOLOGY | Facility: HOSPITAL | Age: 49
End: 2019-05-28

## 2019-05-31 ENCOUNTER — APPOINTMENT (OUTPATIENT)
Dept: LAB | Facility: HOSPITAL | Age: 49
End: 2019-05-31

## 2019-06-04 ENCOUNTER — APPOINTMENT (OUTPATIENT)
Dept: ONCOLOGY | Facility: HOSPITAL | Age: 49
End: 2019-06-04

## 2019-06-07 ENCOUNTER — INFUSION (OUTPATIENT)
Dept: ONCOLOGY | Facility: HOSPITAL | Age: 49
End: 2019-06-07

## 2019-06-07 ENCOUNTER — LAB (OUTPATIENT)
Dept: LAB | Facility: HOSPITAL | Age: 49
End: 2019-06-07

## 2019-06-07 DIAGNOSIS — D63.1 ANEMIA IN CHRONIC KIDNEY DISEASE, UNSPECIFIED CKD STAGE: ICD-10-CM

## 2019-06-07 DIAGNOSIS — D50.9 IRON DEFICIENCY ANEMIA, UNSPECIFIED IRON DEFICIENCY ANEMIA TYPE: ICD-10-CM

## 2019-06-07 DIAGNOSIS — N18.9 CHRONIC KIDNEY DISEASE, UNSPECIFIED CKD STAGE: ICD-10-CM

## 2019-06-07 DIAGNOSIS — N18.9 ANEMIA IN CHRONIC KIDNEY DISEASE, UNSPECIFIED CKD STAGE: ICD-10-CM

## 2019-06-07 LAB
BASOPHILS # BLD AUTO: 0.02 10*3/MM3 (ref 0–0.2)
BASOPHILS NFR BLD AUTO: 0.6 % (ref 0–2)
DEPRECATED RDW RBC AUTO: 52.6 FL (ref 40–54)
EOSINOPHIL # BLD AUTO: 0.16 10*3/MM3 (ref 0–0.7)
EOSINOPHIL NFR BLD AUTO: 4.7 % (ref 0–4)
ERYTHROCYTE [DISTWIDTH] IN BLOOD BY AUTOMATED COUNT: 14.5 % (ref 12–15)
FERRITIN SERPL-MCNC: 228 NG/ML (ref 11.1–264)
FOLATE SERPL-MCNC: >20 NG/ML (ref 4.78–24.2)
HCT VFR BLD AUTO: 33.5 % (ref 37–47)
HGB BLD-MCNC: 11 G/DL (ref 12–16)
IMM GRANULOCYTES # BLD AUTO: 0.01 10*3/MM3 (ref 0–0.05)
IMM GRANULOCYTES NFR BLD AUTO: 0.3 % (ref 0–5)
IRON 24H UR-MRATE: 85 MCG/DL (ref 42–180)
IRON SATN MFR SERPL: 32 % (ref 20–45)
LYMPHOCYTES # BLD AUTO: 0.97 10*3/MM3 (ref 0.72–4.86)
LYMPHOCYTES NFR BLD AUTO: 28.5 % (ref 15–45)
MCH RBC QN AUTO: 32.6 PG (ref 28–32)
MCHC RBC AUTO-ENTMCNC: 32.8 G/DL (ref 33–36)
MCV RBC AUTO: 99.4 FL (ref 82–98)
MONOCYTES # BLD AUTO: 0.34 10*3/MM3 (ref 0.19–1.3)
MONOCYTES NFR BLD AUTO: 10 % (ref 4–12)
NEUTROPHILS # BLD AUTO: 1.9 10*3/MM3 (ref 1.87–8.4)
NEUTROPHILS NFR BLD AUTO: 55.9 % (ref 39–78)
NRBC BLD AUTO-RTO: 0 /100 WBC (ref 0–0.2)
PLATELET # BLD AUTO: 156 10*3/MM3 (ref 130–400)
PMV BLD AUTO: 9.8 FL (ref 6–12)
RBC # BLD AUTO: 3.37 10*6/MM3 (ref 4.2–5.4)
TIBC SERPL-MCNC: 262 MCG/DL (ref 225–420)
WBC NRBC COR # BLD: 3.4 10*3/MM3 (ref 4.8–10.8)

## 2019-06-07 PROCEDURE — 83540 ASSAY OF IRON: CPT | Performed by: INTERNAL MEDICINE

## 2019-06-07 PROCEDURE — 82746 ASSAY OF FOLIC ACID SERUM: CPT | Performed by: INTERNAL MEDICINE

## 2019-06-07 PROCEDURE — 85025 COMPLETE CBC W/AUTO DIFF WBC: CPT | Performed by: INTERNAL MEDICINE

## 2019-06-07 PROCEDURE — 83550 IRON BINDING TEST: CPT | Performed by: INTERNAL MEDICINE

## 2019-06-07 PROCEDURE — 36415 COLL VENOUS BLD VENIPUNCTURE: CPT

## 2019-06-07 PROCEDURE — 82728 ASSAY OF FERRITIN: CPT | Performed by: INTERNAL MEDICINE

## 2019-06-07 NOTE — PROGRESS NOTES
1434 Patients labs do not require an injection. Patient left in stable condition. Rosanne Alvarez RN

## 2019-06-11 ENCOUNTER — APPOINTMENT (OUTPATIENT)
Dept: ONCOLOGY | Facility: HOSPITAL | Age: 49
End: 2019-06-11

## 2019-06-18 ENCOUNTER — APPOINTMENT (OUTPATIENT)
Dept: ONCOLOGY | Facility: HOSPITAL | Age: 49
End: 2019-06-18

## 2019-06-25 ENCOUNTER — APPOINTMENT (OUTPATIENT)
Dept: ONCOLOGY | Facility: HOSPITAL | Age: 49
End: 2019-06-25

## 2019-07-26 ENCOUNTER — LAB (OUTPATIENT)
Dept: LAB | Facility: HOSPITAL | Age: 49
End: 2019-07-26

## 2019-07-26 ENCOUNTER — INFUSION (OUTPATIENT)
Dept: ONCOLOGY | Facility: HOSPITAL | Age: 49
End: 2019-07-26

## 2019-07-26 VITALS
BODY MASS INDEX: 48.37 KG/M2 | OXYGEN SATURATION: 99 % | WEIGHT: 273 LBS | TEMPERATURE: 99.2 F | HEART RATE: 82 BPM | HEIGHT: 63 IN | RESPIRATION RATE: 19 BRPM | DIASTOLIC BLOOD PRESSURE: 72 MMHG | SYSTOLIC BLOOD PRESSURE: 133 MMHG

## 2019-07-26 DIAGNOSIS — D50.9 IRON DEFICIENCY ANEMIA, UNSPECIFIED IRON DEFICIENCY ANEMIA TYPE: ICD-10-CM

## 2019-07-26 DIAGNOSIS — N18.4 ANEMIA IN STAGE 4 CHRONIC KIDNEY DISEASE (HCC): Primary | ICD-10-CM

## 2019-07-26 DIAGNOSIS — D63.1 ANEMIA IN CHRONIC KIDNEY DISEASE, UNSPECIFIED CKD STAGE: ICD-10-CM

## 2019-07-26 DIAGNOSIS — D63.1 ANEMIA IN STAGE 4 CHRONIC KIDNEY DISEASE (HCC): Primary | ICD-10-CM

## 2019-07-26 DIAGNOSIS — N18.9 CHRONIC KIDNEY DISEASE, UNSPECIFIED CKD STAGE: ICD-10-CM

## 2019-07-26 DIAGNOSIS — N18.9 ANEMIA IN CHRONIC KIDNEY DISEASE, UNSPECIFIED CKD STAGE: ICD-10-CM

## 2019-07-26 LAB
BASOPHILS # BLD AUTO: 0.03 10*3/MM3 (ref 0–0.2)
BASOPHILS NFR BLD AUTO: 1.1 % (ref 0–2)
DEPRECATED RDW RBC AUTO: 50.6 FL (ref 40–54)
EOSINOPHIL # BLD AUTO: 0.15 10*3/MM3 (ref 0–0.7)
EOSINOPHIL NFR BLD AUTO: 5.3 % (ref 0–4)
ERYTHROCYTE [DISTWIDTH] IN BLOOD BY AUTOMATED COUNT: 13.2 % (ref 12–15)
FERRITIN SERPL-MCNC: 161 NG/ML (ref 11.1–264)
FOLATE SERPL-MCNC: >20 NG/ML (ref 4.78–24.2)
HCT VFR BLD AUTO: 32.9 % (ref 37–47)
HGB BLD-MCNC: 10.4 G/DL (ref 12–16)
IMM GRANULOCYTES # BLD AUTO: 0.01 10*3/MM3 (ref 0–0.05)
IMM GRANULOCYTES NFR BLD AUTO: 0.4 % (ref 0–5)
IRON 24H UR-MRATE: 77 MCG/DL (ref 42–180)
IRON SATN MFR SERPL: 29 % (ref 20–45)
LYMPHOCYTES # BLD AUTO: 0.78 10*3/MM3 (ref 0.72–4.86)
LYMPHOCYTES NFR BLD AUTO: 27.7 % (ref 15–45)
MCH RBC QN AUTO: 32.8 PG (ref 28–32)
MCHC RBC AUTO-ENTMCNC: 31.6 G/DL (ref 33–36)
MCV RBC AUTO: 103.8 FL (ref 82–98)
MONOCYTES # BLD AUTO: 0.3 10*3/MM3 (ref 0.19–1.3)
MONOCYTES NFR BLD AUTO: 10.6 % (ref 4–12)
NEUTROPHILS # BLD AUTO: 1.55 10*3/MM3 (ref 1.87–8.4)
NEUTROPHILS NFR BLD AUTO: 54.9 % (ref 39–78)
NRBC BLD AUTO-RTO: 0 /100 WBC (ref 0–0.2)
PLATELET # BLD AUTO: 146 10*3/MM3 (ref 130–400)
PMV BLD AUTO: 10 FL (ref 6–12)
RBC # BLD AUTO: 3.17 10*6/MM3 (ref 4.2–5.4)
TIBC SERPL-MCNC: 263 MCG/DL (ref 225–420)
WBC NRBC COR # BLD: 2.82 10*3/MM3 (ref 4.8–10.8)

## 2019-07-26 PROCEDURE — 83550 IRON BINDING TEST: CPT | Performed by: INTERNAL MEDICINE

## 2019-07-26 PROCEDURE — 82746 ASSAY OF FOLIC ACID SERUM: CPT | Performed by: INTERNAL MEDICINE

## 2019-07-26 PROCEDURE — 25010000002 EPOETIN ALFA PER 1000 UNITS: Performed by: INTERNAL MEDICINE

## 2019-07-26 PROCEDURE — 83540 ASSAY OF IRON: CPT | Performed by: INTERNAL MEDICINE

## 2019-07-26 PROCEDURE — 96372 THER/PROPH/DIAG INJ SC/IM: CPT

## 2019-07-26 PROCEDURE — 85025 COMPLETE CBC W/AUTO DIFF WBC: CPT | Performed by: INTERNAL MEDICINE

## 2019-07-26 PROCEDURE — 36415 COLL VENOUS BLD VENIPUNCTURE: CPT

## 2019-07-26 PROCEDURE — 82728 ASSAY OF FERRITIN: CPT | Performed by: INTERNAL MEDICINE

## 2019-07-26 RX ORDER — MORPHINE SULFATE 15 MG/1
15 TABLET, FILM COATED, EXTENDED RELEASE ORAL 2 TIMES DAILY
COMMUNITY
End: 2021-12-27 | Stop reason: ALTCHOICE

## 2019-07-26 RX ADMIN — ERYTHROPOIETIN 40000 UNITS: 40000 INJECTION, SOLUTION INTRAVENOUS; SUBCUTANEOUS at 14:49

## 2019-07-31 ENCOUNTER — OFFICE VISIT (OUTPATIENT)
Dept: PULMONOLOGY | Facility: CLINIC | Age: 49
End: 2019-07-31

## 2019-07-31 VITALS
HEART RATE: 78 BPM | BODY MASS INDEX: 49.82 KG/M2 | SYSTOLIC BLOOD PRESSURE: 134 MMHG | HEIGHT: 63 IN | WEIGHT: 281.2 LBS | OXYGEN SATURATION: 98 % | DIASTOLIC BLOOD PRESSURE: 78 MMHG

## 2019-07-31 DIAGNOSIS — D69.6 THROMBOCYTOPENIA (HCC): ICD-10-CM

## 2019-07-31 DIAGNOSIS — Q21.12 PFO (PATENT FORAMEN OVALE): ICD-10-CM

## 2019-07-31 DIAGNOSIS — J96.11 CHRONIC RESPIRATORY FAILURE WITH HYPOXIA (HCC): ICD-10-CM

## 2019-07-31 DIAGNOSIS — I89.0 LYMPHEDEMA: ICD-10-CM

## 2019-07-31 DIAGNOSIS — E66.01 MORBIDLY OBESE (HCC): ICD-10-CM

## 2019-07-31 DIAGNOSIS — D86.9 SARCOIDOSIS: Primary | ICD-10-CM

## 2019-07-31 PROCEDURE — 99214 OFFICE O/P EST MOD 30 MIN: CPT | Performed by: INTERNAL MEDICINE

## 2019-07-31 RX ORDER — BUSPIRONE HYDROCHLORIDE 10 MG/1
10 TABLET ORAL
COMMUNITY
Start: 2018-12-07 | End: 2021-09-13 | Stop reason: ALTCHOICE

## 2019-07-31 RX ORDER — POLYETHYLENE GLYCOL 3350 17 G/17G
17 POWDER, FOR SOLUTION ORAL
COMMUNITY

## 2019-07-31 RX ORDER — DOCUSATE SODIUM 100 MG/1
100 CAPSULE, LIQUID FILLED ORAL
COMMUNITY
Start: 2017-05-10

## 2019-07-31 RX ORDER — GABAPENTIN 600 MG/1
600 TABLET ORAL EVERY 6 HOURS
Refills: 1 | COMMUNITY
Start: 2019-05-21 | End: 2019-10-07 | Stop reason: ALTCHOICE

## 2019-07-31 RX ORDER — PREDNISONE 10 MG/1
TABLET ORAL
Qty: 31 TABLET | Refills: 0 | Status: SHIPPED | OUTPATIENT
Start: 2019-07-31 | End: 2019-10-07 | Stop reason: ALTCHOICE

## 2019-07-31 NOTE — PROGRESS NOTES
Subjective   Stacy Lynn is a 49 y.o. female.     Background: A patient with hx sarcoidosis and chronic respiratory failure    Chief Complaint   Patient presents with   • F/u of Sarcoidosis        History of Present Illness   She says she is not doing well.  She is still chronically short of breath continuously in the chest unchanged for several months, aggravated by heat, associated with an intermittent pain under her right breast, feeling like she has been kicked in the chest.  She has had chills when air conditioning is on at home.  She has had night sweats.  No rashes,  She has pains in hand joints, rheumatologist apppointment pending.  She is taking mtx but no prednisone now.    Medical/Family/Social History   has a past medical history of Chest pain, Chronic headaches, Chronic pain, Chronic respiratory failure (CMS/HCC), Colon polyp, Degeneration of intervertebral disc of high cervical region, Depression, GERD (gastroesophageal reflux disease), Hypertension, Hyperthyroidism, Irritable bowel syndrome, Kidney stones, Macular degeneration, Orthopnea, Pancreatitis, chronic (CMS/HCC), Peripheral neuropathy, PFO (patent foramen ovale) (09/2016), PSVT (paroxysmal supraventricular tachycardia) (CMS/HCC), Restless leg syndrome, Sarcoidosis, Scoliosis, Septic joint (CMS/HCC), and Venous insufficiency, peripheral.   has a past surgical history that includes Replacement total knee; Patella surgery; Lateral epicondyle release; Shoulder arthroscopy; Ulnar Nerve Decompression; Knee arthroscopy; Hysterectomy; Cholecystectomy; Kidney stone surgery; Cardiac Ablation (04/2016); Knee surgery (Right); Colonoscopy (10/13/2014); Esophagogastroduodenoscopy (N/A, 8/23/2017); Colonoscopy (N/A, 8/23/2017); Varicose vein surgery (Left, 1/10/2018); Joint replacement (Right); Varicose vein surgery (Right, 3/30/2018); Endovenous ablation saphenous vein w/ laser (Right, 03/30/2018); and Colonoscopy (N/A, 9/6/2018).  family history  includes Arrhythmia in her mother; Diabetes in her father; Heart disease in her brother, father, maternal aunt, maternal grandfather, maternal grandmother, maternal uncle, mother, paternal aunt, paternal grandfather, paternal grandmother, and paternal uncle; Kidney disease in her mother.   reports that she has never smoked. She has never used smokeless tobacco. She reports that she does not drink alcohol or use drugs.  Allergies   Allergen Reactions   • Adhesive Tape Hives   • Tape Hives     Medications    Current Outpatient Medications:   •  busPIRone (BUSPAR) 10 MG tablet, Take 10 mg by mouth., Disp: , Rfl:   •  carBAMazepine XR (TEGretol  XR) 200 MG 12 hr tablet, Take 1 tablet by mouth Every 12 (Twelve) Hours., Disp: 60 tablet, Rfl: 3  •  diclofenac-misoprostol (ARTHROTEC 75) 75-0.2 MG EC tablet, Take 1 tablet by mouth 2 (Two) Times a Day., Disp: , Rfl:   •  docusate sodium (COLACE) 100 MG capsule, Take 100 mg by mouth., Disp: , Rfl:   •  DULoxetine (CYMBALTA) 60 MG capsule, Take 60 mg by mouth Daily., Disp: , Rfl:   •  esomeprazole (nexIUM) 40 MG capsule, TAKE ONE CAPSULE BY MOUTH TWICE DAILY BEFORE  MEALS, Disp: 60 capsule, Rfl: 11  •  Fluticasone Furoate-Vilanterol (BREO ELLIPTA) 200-25 MCG/INH inhaler, , Disp: , Rfl:   •  folic acid (FOLVITE) 400 MCG tablet, Take 400 mcg by mouth Daily., Disp: , Rfl:   •  gabapentin (NEURONTIN) 300 MG capsule, Take 1 capsule by mouth 3 (Three) Times a Day., Disp: , Rfl:   •  gabapentin (NEURONTIN) 600 MG tablet, Take 600 mg by mouth Every 6 (Six) Hours., Disp: , Rfl: 1  •  levothyroxine (SYNTHROID, LEVOTHROID) 88 MCG tablet, Take 75 mcg by mouth Daily., Disp: , Rfl:   •  LYRICA 100 MG capsule, Take 100 mg by mouth Every 8 (Eight) Hours As Needed., Disp: , Rfl: 1  •  methotrexate 2.5 MG tablet, Take 4 tablets by mouth 1 (One) Time Per Week. Takes 4 tabs weekly on Mondays, Disp: 16 tablet, Rfl: 11  •  metoprolol succinate XL (TOPROL-XL) 25 MG 24 hr tablet, Take 100 mg by mouth  "Daily., Disp: , Rfl:   •  Mometasone Furoate 200 MCG/ACT aerosol, Inhale 1 puff., Disp: , Rfl:   •  Morphine (MS CONTIN) 15 MG 12 hr tablet, Take 15 mg by mouth 2 (Two) Times a Day., Disp: , Rfl:   •  O2 (OXYGEN), Inhale 2 L/min 1 (One) Time. Continuous, Disp: , Rfl:   •  ondansetron (ZOFRAN) 4 MG tablet, Take 4 mg by mouth Every 8 (Eight) Hours As Needed for Nausea or Vomiting., Disp: , Rfl:   •  oxybutynin XL (DITROPAN-XL) 5 MG 24 hr tablet, Take 5 mg by mouth Daily., Disp: , Rfl:   •  polyethylene glycol (MIRALAX) powder, Take 17 g by mouth., Disp: , Rfl:   •  PROCRIT 50814 UNIT/ML injection, Inject 1 Units under the skin As Needed (WHEN BLOOD COUNT [hemoglobin] BELOW 11)., Disp: , Rfl:   •  tiZANidine (ZANAFLEX) 4 MG tablet, Take 4 mg by mouth At Night As Needed for Muscle Spasms., Disp: , Rfl:   •  tolterodine LA (DETROL LA) 4 MG 24 hr capsule, Take 4 mg by mouth Daily., Disp: , Rfl:   •  topiramate (TOPAMAX) 100 MG tablet, Take 100 mg by mouth Every Night., Disp: , Rfl:   •  traZODone (DESYREL) 50 MG tablet, Take 50 mg by mouth At Night As Needed for Sleep., Disp: , Rfl:   •  VENTOLIN  (90 Base) MCG/ACT inhaler, , Disp: , Rfl:   •  predniSONE (DELTASONE) 10 MG tablet, Take 4 tabs daily x 3 days, then take 3 tabs daily x 3 days, then take 2 tabs daily x 3 days, then take 1 tab daily x 3 days, Disp: 31 tablet, Rfl: 0    Review of Systems   Constitutional: Positive for chills and diaphoresis.   Respiratory: Positive for cough and shortness of breath.    Cardiovascular: Positive for chest pain (on right side).     Objective   /78   Pulse 78   Ht 160 cm (63\")   Wt 128 kg (281 lb 3.2 oz)   SpO2 98% Comment: 2L  BMI 49.81 kg/m²   Physical Exam   Constitutional: She appears well-developed. She does not appear ill. No distress.   HENT:   Head: Atraumatic.   Nose: Nose normal.   Eyes: Conjunctivae and EOM are normal. No scleral icterus.   Neck: Neck supple.   Cardiovascular: Normal rate, regular " rhythm, S1 normal and S2 normal.   Pulmonary/Chest: Effort normal and breath sounds normal. She exhibits tenderness (right side).   Abdominal: Soft. She exhibits no distension. There is no tenderness.   Musculoskeletal: She exhibits edema. She exhibits no deformity.   Lymphadenopathy:     She has no cervical adenopathy.   Neurological: She is alert.   Skin: Skin is warm. No rash noted.   Psychiatric: She has a normal mood and affect.     -----------------------------------------------------------------------------------------------  Assessment/Plan   Problem List Items Addressed This Visit        Cardiovascular and Mediastinum    PFO (patent foramen ovale)    Overview     Per 9/2016 Echo             Respiratory    Chronic respiratory failure with hypoxia (CMS/HCC)       Digestive    Morbidly obese (CMS/HCC)       Hematopoietic and Hemostatic    Thrombocytopenia (CMS/HCC)       Other    Sarcoidosis - Primary    Relevant Medications    predniSONE (DELTASONE) 10 MG tablet    Other Relevant Orders    XR Chest 2 View    Hepatic Function Panel    CBC & Differential    Lymphedema        Patient's Body mass index is 49.81 kg/m². BMI is above normal parameters. Recommendations include: referral to primary care.      Repeat course of prednisone for now to see if the sweats and joint pains respond  Add mucinex for thick sputum  Check cxr.  Check liver panel for possible liver toxicity. Lymphedema stable   continue oxygen    Electronically signed by Celestine Scherer MD, 7/31/2019, 12:21 PM

## 2019-08-05 ENCOUNTER — RESULTS ENCOUNTER (OUTPATIENT)
Dept: PULMONOLOGY | Facility: CLINIC | Age: 49
End: 2019-08-05

## 2019-08-05 ENCOUNTER — APPOINTMENT (OUTPATIENT)
Dept: LAB | Facility: HOSPITAL | Age: 49
End: 2019-08-05

## 2019-08-05 ENCOUNTER — HOSPITAL ENCOUNTER (OUTPATIENT)
Dept: GENERAL RADIOLOGY | Facility: HOSPITAL | Age: 49
Discharge: HOME | End: 2019-08-05
Admitting: INTERNAL MEDICINE

## 2019-08-05 DIAGNOSIS — D86.9 SARCOIDOSIS: ICD-10-CM

## 2019-08-05 PROCEDURE — 71046 X-RAY EXAM CHEST 2 VIEWS: CPT

## 2019-08-08 DIAGNOSIS — K21.9 GASTROESOPHAGEAL REFLUX DISEASE, ESOPHAGITIS PRESENCE NOT SPECIFIED: Primary | ICD-10-CM

## 2019-08-08 RX ORDER — ESOMEPRAZOLE MAGNESIUM 40 MG/1
CAPSULE, DELAYED RELEASE ORAL
Qty: 60 CAPSULE | Refills: 0 | Status: SHIPPED | OUTPATIENT
Start: 2019-08-08

## 2019-08-09 ENCOUNTER — HOSPITAL ENCOUNTER (EMERGENCY)
Facility: HOSPITAL | Age: 49
Discharge: HOME | End: 2019-08-10
Admitting: EMERGENCY MEDICINE

## 2019-08-09 ENCOUNTER — APPOINTMENT (OUTPATIENT)
Dept: CT IMAGING | Facility: HOSPITAL | Age: 49
End: 2019-08-09

## 2019-08-09 ENCOUNTER — APPOINTMENT (OUTPATIENT)
Dept: GENERAL RADIOLOGY | Facility: HOSPITAL | Age: 49
End: 2019-08-09

## 2019-08-09 ENCOUNTER — INFUSION (OUTPATIENT)
Dept: ONCOLOGY | Facility: HOSPITAL | Age: 49
End: 2019-08-09

## 2019-08-09 ENCOUNTER — APPOINTMENT (OUTPATIENT)
Dept: LAB | Facility: HOSPITAL | Age: 49
End: 2019-08-09

## 2019-08-09 VITALS
DIASTOLIC BLOOD PRESSURE: 110 MMHG | HEIGHT: 64 IN | RESPIRATION RATE: 18 BRPM | TEMPERATURE: 99.2 F | WEIGHT: 262.8 LBS | BODY MASS INDEX: 44.86 KG/M2 | HEART RATE: 79 BPM | SYSTOLIC BLOOD PRESSURE: 160 MMHG | OXYGEN SATURATION: 100 %

## 2019-08-09 DIAGNOSIS — R07.81 RIB PAIN: Primary | ICD-10-CM

## 2019-08-09 LAB
ALBUMIN SERPL-MCNC: 4.5 G/DL (ref 3.5–5)
ALBUMIN SERPL-MCNC: 4.5 G/DL (ref 3.5–5)
ALBUMIN/GLOB SERPL: 1.6 G/DL (ref 1.1–2.5)
ALP SERPL-CCNC: 75 U/L (ref 24–120)
ALP SERPL-CCNC: 79 U/L (ref 24–120)
ALT SERPL W P-5'-P-CCNC: 21 U/L (ref 0–54)
ALT SERPL W P-5'-P-CCNC: 25 U/L (ref 0–54)
ANION GAP SERPL CALCULATED.3IONS-SCNC: 8 MMOL/L (ref 4–13)
APTT PPP: 34.7 SECONDS (ref 24.1–35)
AST SERPL-CCNC: 25 U/L (ref 7–45)
AST SERPL-CCNC: 26 U/L (ref 7–45)
BASOPHILS # BLD AUTO: 0.03 10*3/MM3 (ref 0–0.2)
BASOPHILS # BLD AUTO: 0.03 10*3/MM3 (ref 0–0.2)
BASOPHILS NFR BLD AUTO: 0.9 % (ref 0–1.5)
BASOPHILS NFR BLD AUTO: 1 % (ref 0–1.5)
BILIRUB CONJ SERPL-MCNC: 0 MG/DL (ref 0–0.3)
BILIRUB INDIRECT SERPL-MCNC: 0.5 MG/DL (ref 0–1.1)
BILIRUB SERPL-MCNC: 0.8 MG/DL (ref 0.1–1)
BILIRUB SERPL-MCNC: 0.9 MG/DL (ref 0.1–1)
BUN BLD-MCNC: 18 MG/DL (ref 5–21)
BUN/CREAT SERPL: 15.7 (ref 7–25)
CALCIUM SPEC-SCNC: 9.2 MG/DL (ref 8.4–10.4)
CHLORIDE SERPL-SCNC: 105 MMOL/L (ref 98–110)
CO2 SERPL-SCNC: 28 MMOL/L (ref 24–31)
CREAT BLD-MCNC: 1.15 MG/DL (ref 0.5–1.4)
D DIMER PPP FEU-MCNC: 0.94 MG/L (FEU) (ref 0–0.5)
D-LACTATE SERPL-SCNC: 0.8 MMOL/L (ref 0.5–2)
DEPRECATED RDW RBC AUTO: 48.4 FL (ref 37–54)
DEPRECATED RDW RBC AUTO: 48.9 FL (ref 37–54)
EOSINOPHIL # BLD AUTO: 0.15 10*3/MM3 (ref 0–0.4)
EOSINOPHIL # BLD AUTO: 0.16 10*3/MM3 (ref 0–0.4)
EOSINOPHIL NFR BLD AUTO: 4.6 % (ref 0.3–6.2)
EOSINOPHIL NFR BLD AUTO: 5.4 % (ref 0.3–6.2)
ERYTHROCYTE [DISTWIDTH] IN BLOOD BY AUTOMATED COUNT: 13.2 % (ref 12.3–15.4)
ERYTHROCYTE [DISTWIDTH] IN BLOOD BY AUTOMATED COUNT: 13.3 % (ref 12.3–15.4)
GFR SERPL CREATININE-BSD FRML MDRD: 50 ML/MIN/1.73
GLOBULIN UR ELPH-MCNC: 2.9 GM/DL
GLUCOSE BLD-MCNC: 100 MG/DL (ref 70–100)
HCT VFR BLD AUTO: 36.8 % (ref 34–46.6)
HCT VFR BLD AUTO: 38.3 % (ref 34–46.6)
HGB BLD-MCNC: 11.8 G/DL (ref 12–15.9)
HGB BLD-MCNC: 12.2 G/DL (ref 12–15.9)
IMM GRANULOCYTES # BLD AUTO: 0.01 10*3/MM3 (ref 0–0.05)
IMM GRANULOCYTES NFR BLD AUTO: 0.3 % (ref 0–0.5)
INR PPP: 0.96 (ref 0.91–1.09)
LYMPHOCYTES # BLD AUTO: 0.84 10*3/MM3 (ref 0.7–3.1)
LYMPHOCYTES # BLD AUTO: 0.87 10*3/MM3 (ref 0.7–3.1)
LYMPHOCYTES NFR BLD AUTO: 25.7 % (ref 19.6–45.3)
LYMPHOCYTES NFR BLD AUTO: 29.3 % (ref 19.6–45.3)
MCH RBC QN AUTO: 31.9 PG (ref 26.6–33)
MCH RBC QN AUTO: 32.2 PG (ref 26.6–33)
MCHC RBC AUTO-ENTMCNC: 31.9 G/DL (ref 31.5–35.7)
MCHC RBC AUTO-ENTMCNC: 32.1 G/DL (ref 31.5–35.7)
MCV RBC AUTO: 100.3 FL (ref 79–97)
MCV RBC AUTO: 100.5 FL (ref 79–97)
MONOCYTES # BLD AUTO: 0.41 10*3/MM3 (ref 0.1–0.9)
MONOCYTES # BLD AUTO: 0.44 10*3/MM3 (ref 0.1–0.9)
MONOCYTES NFR BLD AUTO: 13.5 % (ref 5–12)
MONOCYTES NFR BLD AUTO: 13.8 % (ref 5–12)
NEUTROPHILS # BLD AUTO: 1.49 10*3/MM3 (ref 1.7–7)
NEUTROPHILS # BLD AUTO: 1.8 10*3/MM3 (ref 1.7–7)
NEUTROPHILS NFR BLD AUTO: 50.2 % (ref 42.7–76)
NEUTROPHILS NFR BLD AUTO: 55 % (ref 42.7–76)
NRBC BLD AUTO-RTO: 0 /100 WBC (ref 0–0.2)
PLATELET # BLD AUTO: 139 10*3/MM3 (ref 140–450)
PLATELET # BLD AUTO: 143 10*3/MM3 (ref 140–450)
PMV BLD AUTO: 10 FL (ref 6–12)
PMV BLD AUTO: 10.8 FL (ref 6–12)
POTASSIUM BLD-SCNC: 3.9 MMOL/L (ref 3.5–5.3)
PROT SERPL-MCNC: 7.4 G/DL (ref 6.3–8.7)
PROT SERPL-MCNC: 7.6 G/DL (ref 6.3–8.7)
PROTHROMBIN TIME: 13.1 SECONDS (ref 11.9–14.6)
RBC # BLD AUTO: 3.66 10*6/MM3 (ref 3.77–5.28)
RBC # BLD AUTO: 3.82 10*6/MM3 (ref 3.77–5.28)
SODIUM BLD-SCNC: 141 MMOL/L (ref 135–145)
TROPONIN I SERPL-MCNC: 0.02 NG/ML (ref 0–0.03)
TROPONIN I SERPL-MCNC: 0.02 NG/ML (ref 0–0.03)
TROPONIN I SERPL-MCNC: <0.012 NG/ML (ref 0–0.03)
WBC NRBC COR # BLD: 2.97 10*3/MM3 (ref 3.4–10.8)
WBC NRBC COR # BLD: 3.27 10*3/MM3 (ref 3.4–10.8)

## 2019-08-09 PROCEDURE — 96375 TX/PRO/DX INJ NEW DRUG ADDON: CPT

## 2019-08-09 PROCEDURE — 82248 BILIRUBIN DIRECT: CPT | Performed by: INTERNAL MEDICINE

## 2019-08-09 PROCEDURE — 85730 THROMBOPLASTIN TIME PARTIAL: CPT | Performed by: NURSE PRACTITIONER

## 2019-08-09 PROCEDURE — 25010000002 ONDANSETRON PER 1 MG: Performed by: NURSE PRACTITIONER

## 2019-08-09 PROCEDURE — G0463 HOSPITAL OUTPT CLINIC VISIT: HCPCS

## 2019-08-09 PROCEDURE — 93005 ELECTROCARDIOGRAM TRACING: CPT | Performed by: EMERGENCY MEDICINE

## 2019-08-09 PROCEDURE — 93010 ELECTROCARDIOGRAM REPORT: CPT | Performed by: INTERNAL MEDICINE

## 2019-08-09 PROCEDURE — 85610 PROTHROMBIN TIME: CPT | Performed by: NURSE PRACTITIONER

## 2019-08-09 PROCEDURE — 71275 CT ANGIOGRAPHY CHEST: CPT

## 2019-08-09 PROCEDURE — 87040 BLOOD CULTURE FOR BACTERIA: CPT | Performed by: NURSE PRACTITIONER

## 2019-08-09 PROCEDURE — 93005 ELECTROCARDIOGRAM TRACING: CPT | Performed by: NURSE PRACTITIONER

## 2019-08-09 PROCEDURE — 85025 COMPLETE CBC W/AUTO DIFF WBC: CPT | Performed by: INTERNAL MEDICINE

## 2019-08-09 PROCEDURE — 36415 COLL VENOUS BLD VENIPUNCTURE: CPT | Performed by: INTERNAL MEDICINE

## 2019-08-09 PROCEDURE — 80053 COMPREHEN METABOLIC PANEL: CPT | Performed by: NURSE PRACTITIONER

## 2019-08-09 PROCEDURE — 99284 EMERGENCY DEPT VISIT MOD MDM: CPT

## 2019-08-09 PROCEDURE — 71045 X-RAY EXAM CHEST 1 VIEW: CPT

## 2019-08-09 PROCEDURE — 84484 ASSAY OF TROPONIN QUANT: CPT | Performed by: NURSE PRACTITIONER

## 2019-08-09 PROCEDURE — 85379 FIBRIN DEGRADATION QUANT: CPT | Performed by: NURSE PRACTITIONER

## 2019-08-09 PROCEDURE — 85025 COMPLETE CBC W/AUTO DIFF WBC: CPT | Performed by: NURSE PRACTITIONER

## 2019-08-09 PROCEDURE — 0 IOPAMIDOL PER 1 ML: Performed by: NURSE PRACTITIONER

## 2019-08-09 PROCEDURE — 25010000002 KETOROLAC TROMETHAMINE PER 15 MG: Performed by: NURSE PRACTITIONER

## 2019-08-09 PROCEDURE — 83605 ASSAY OF LACTIC ACID: CPT | Performed by: NURSE PRACTITIONER

## 2019-08-09 PROCEDURE — 96374 THER/PROPH/DIAG INJ IV PUSH: CPT

## 2019-08-09 PROCEDURE — 80076 HEPATIC FUNCTION PANEL: CPT | Performed by: INTERNAL MEDICINE

## 2019-08-09 RX ORDER — ONDANSETRON 2 MG/ML
4 INJECTION INTRAMUSCULAR; INTRAVENOUS ONCE
Status: COMPLETED | OUTPATIENT
Start: 2019-08-09 | End: 2019-08-09

## 2019-08-09 RX ORDER — KETOROLAC TROMETHAMINE 15 MG/ML
15 INJECTION, SOLUTION INTRAMUSCULAR; INTRAVENOUS ONCE
Status: COMPLETED | OUTPATIENT
Start: 2019-08-09 | End: 2019-08-09

## 2019-08-09 RX ADMIN — ONDANSETRON 4 MG: 2 INJECTION INTRAMUSCULAR; INTRAVENOUS at 17:04

## 2019-08-09 RX ADMIN — KETOROLAC TROMETHAMINE 15 MG: 15 INJECTION, SOLUTION INTRAMUSCULAR; INTRAVENOUS at 21:52

## 2019-08-09 RX ADMIN — IOPAMIDOL 67 ML: 755 INJECTION, SOLUTION INTRAVENOUS at 20:45

## 2019-08-09 NOTE — ED PROVIDER NOTES
Subjective   Patient is a 49-year-old female presents emergency department with chief complaints of right sided chest discomfort.  Patient tells this examiner she has had intermittent right rib pain for a few weeks.  She complains of pain underneath her right breast in particular with cough, deep breathing cough or movement.  Patient describes having a chronic cough as she has history of sarcoidosis.  She states her cough is been somewhat productive describing green to yellowish productive phlegm.  Patient states however this is also chronic for her as well.  She denies any recorded fevers however states that she has had chills.  Patient has history of normocytic anemia as well as having had history of pancytopenia.  She is followed by Dr. Melva an with oncology.  Patient tells this examiner she gets blood work every Friday to ascertain if she needs Procrit infusions.  Patient states that she has parameters that they go by and today she did not need Procrit infusion.  She states however she was noted to have an elevated blood pressure that was unusual for her and reported to the nursing staff symptoms of chest pain.  Therefore she was sent to the emergency department for further evaluation and treatment.  As described above patient does have chest pain worse with deep breathing or movement.  She reports chills without recorded fevers.  She has had night sweats as well.  She has had nausea without any vomiting.  She denies any abdominal pain.        Pain With Breathing   Severity:  Moderate  Chronicity:  New  Context:  Reports pain underneath the right breast, pain with deep breathing or with movement, reports a chronic cough, history of sarcoidosis  Associated symptoms: chest pain, congestion, cough, diarrhea and nausea    Associated symptoms: no abdominal pain, no fever, no myalgias, no shortness of breath, no vomiting and no wheezing    Associated symptoms comment:  Reports chronic diarrhea secondary to  irritable bowel syndrome      Review of Systems   Constitutional: Positive for chills. Negative for fever.   HENT: Positive for congestion.    Respiratory: Positive for cough. Negative for shortness of breath and wheezing.    Cardiovascular: Positive for chest pain.   Gastrointestinal: Positive for diarrhea and nausea. Negative for abdominal pain and vomiting.   Musculoskeletal: Negative.  Negative for myalgias, neck pain and neck stiffness.   Skin: Negative.    All other systems reviewed and are negative.      Past Medical History:   Diagnosis Date   • Chest pain     NERVE PAIN IN LUNGS   • Chronic headaches    • Chronic pain    • Chronic respiratory failure (CMS/HCC)    • Colon polyp    • Degeneration of intervertebral disc of high cervical region    • Depression    • GERD (gastroesophageal reflux disease)    • Hypertension    • Hyperthyroidism    • Irritable bowel syndrome    • Kidney stones    • Macular degeneration    • Orthopnea    • Pancreatitis, chronic (CMS/HCC)    • Peripheral neuropathy    • PFO (patent foramen ovale) 09/2016   • PSVT (paroxysmal supraventricular tachycardia) (CMS/HCC)     s/p ablation per Dr. Diallo 4/2016   • Restless leg syndrome    • Sarcoidosis    • Scoliosis    • Septic joint (CMS/HCC)    • Venous insufficiency, peripheral     RIGHT LEG       Allergies   Allergen Reactions   • Adhesive Tape Hives   • Tape Hives       Past Surgical History:   Procedure Laterality Date   • CARDIAC ABLATION  04/2016    SVT; Dr. Diallo    • CHOLECYSTECTOMY     • COLONOSCOPY  10/13/2014   • COLONOSCOPY N/A 8/23/2017    Procedure: COLONOSCOPY WITH ANESTHESIA;  Surgeon: Jeanette Mclaughlin MD;  Location: [facility] ENDOSCOPY;  Service:    • COLONOSCOPY N/A 9/6/2018    Procedure: COLONOSCOPY WITH ANESTHESIA;  Surgeon: Jeanette Mclaughlin MD;  Location: [facility] ENDOSCOPY;  Service: Gastroenterology   • ENDOSCOPY N/A 8/23/2017    Procedure: ESOPHAGOGASTRODUODENOSCOPY WITH ANESTHESIA;  Surgeon: Jeanette Mlcaughlin MD;  Location:   [facility] ENDOSCOPY;  Service:    • ENDOVENOUS ABLATION SAPHENOUS VEIN W/ LASER Right 03/30/2018   • HYSTERECTOMY     • JOINT REPLACEMENT Right     PATELLA REPLACED   • KIDNEY STONE SURGERY     • KNEE ARTHROSCOPY     • KNEE SURGERY Right    • LATERAL EPICONDYLE RELEASE     • PATELLA SURGERY     • REPLACEMENT TOTAL KNEE     • SHOULDER ARTHROSCOPY     • ULNAR NERVE DECOMPRESSION     • VARICOSE VEIN SURGERY Left 1/10/2018    Procedure: LEFT SAPHENOUS VEIN RADIO FREQUENCY ABLATION;  Surgeon: Kvng Montgomery DO;  Location:  [facility] OR;  Service:    • VARICOSE VEIN SURGERY Right 3/30/2018    Procedure: RIGHT LOWER EXTREMITY VENAGRAM, RIGHT LOWER EXTREMITY SAPHENOUS VEIN RADIO FREQUENCY ABLATION;  Surgeon: Kvng Montgomery DO;  Location:  [facility] OR;  Service: Vascular       Family History   Problem Relation Age of Onset   • Arrhythmia Mother    • Heart disease Mother    • Kidney disease Mother    • Heart disease Father    • Diabetes Father    • Heart disease Brother    • Heart disease Maternal Aunt    • Heart disease Maternal Uncle    • Heart disease Paternal Aunt    • Heart disease Paternal Uncle    • Heart disease Maternal Grandmother    • Heart disease Maternal Grandfather    • Heart disease Paternal Grandmother    • Heart disease Paternal Grandfather    • Colon cancer Neg Hx    • Colon polyps Neg Hx        Social History     Socioeconomic History   • Marital status: Single     Spouse name: Not on file   • Number of children: Not on file   • Years of education: Not on file   • Highest education level: Not on file   Tobacco Use   • Smoking status: Never Smoker   • Smokeless tobacco: Never Used   Substance and Sexual Activity   • Alcohol use: No   • Drug use: No   • Sexual activity: Defer           Objective   Physical Exam   Constitutional: She is oriented to person, place, and time. She appears well-developed and well-nourished.   HENT:   Head: Normocephalic and atraumatic.   Right Ear: External ear normal.   Left Ear:  External ear normal.   Nose: Nose normal.   Eyes: Conjunctivae and EOM are normal. Pupils are equal, round, and reactive to light.   Neck: Normal range of motion. Neck supple.   Cardiovascular: Normal rate, regular rhythm, normal heart sounds and intact distal pulses.   Pulmonary/Chest: Effort normal. She has rales. She exhibits tenderness.   Abdominal: Soft. Bowel sounds are normal.   Musculoskeletal: Normal range of motion.   Neurological: She is alert and oriented to person, place, and time.   Skin: Skin is warm and dry. Capillary refill takes less than 2 seconds.   Psychiatric: She has a normal mood and affect. Her behavior is normal. Judgment and thought content normal.   Nursing note and vitals reviewed.      Procedures           ED Course chest pain appears to be reproducible and musculoskeletal.  However she has had an elevation in her second troponin as compared to the first.  Other labs are unremarkable.  CTA of the chest is negative for pulmonary embolus.  Plan at this time is to do a third set and treat the chest wall pain.  Third troponin is pending.  This will be a turnover for Anejl VEGA. See his note for details.                HEART Score (for prediction of 6-week risk of major adverse cardiac event) reviewed and/or performed as part of the patient evaluation and treatment planning process.  The result associated with this review/performance is: 2       MDM  Number of Diagnoses or Management Options  Rib pain: new and requires workup     Amount and/or Complexity of Data Reviewed  Clinical lab tests: ordered and reviewed  Tests in the radiology section of CPT®: ordered and reviewed  Decide to obtain previous medical records or to obtain history from someone other than the patient: yes  Discuss the patient with other providers: yes    Risk of Complications, Morbidity, and/or Mortality  Presenting problems: moderate  Diagnostic procedures: moderate  Management options: moderate    Patient  Progress  Patient progress: improved        Final diagnoses:   Rib pain            Shawna Vieyra, APRN  08/10/19 1024

## 2019-08-09 NOTE — PROGRESS NOTES
"2414-Called Dr. Boggs's office, spoke with EDMUND Mariee, reported patient assessment:  Nauseated x 2 weeks, has had pain under right breast area into rib cage about 2 weeks also, low grade temp 99.2, BP 1660/110 manually; she said she just doesn't feel well; also ANC 1.49, but no Neupogen ordered for her.  No Procirit required today, did not meet parameters.  Per Jaylene, \"take patient to ER to be evaluated.\"  Patient taken by wheelchair to ER.  "

## 2019-08-10 VITALS
OXYGEN SATURATION: 98 % | HEIGHT: 63 IN | HEART RATE: 66 BPM | SYSTOLIC BLOOD PRESSURE: 148 MMHG | WEIGHT: 262 LBS | RESPIRATION RATE: 18 BRPM | BODY MASS INDEX: 46.42 KG/M2 | TEMPERATURE: 98.2 F | DIASTOLIC BLOOD PRESSURE: 59 MMHG

## 2019-08-10 NOTE — ED NOTES
Pt is upset at this time and states she has been here for hours and that I need to get what paperwork ready to get her out of here, Anjel has been notified of this request, pt's discharge papers have been printed, pt also reports that no provider updated her, I informed Anjel of this as he states he was just updating the pt, Anjel and at bedside before discharge, pt reports she will be telling her md about her wait, Anjel and I informed her about the way and why she was here for a few hours but we were able to rule everything out and apologized for the wait     Wandy Thayer RN  08/10/19 0029

## 2019-08-10 NOTE — ED PROVIDER NOTES
Subjective   History of Present Illness    Review of Systems    Past Medical History:   Diagnosis Date   • Chest pain     NERVE PAIN IN LUNGS   • Chronic headaches    • Chronic pain    • Chronic respiratory failure (CMS/HCC)    • Colon polyp    • Degeneration of intervertebral disc of high cervical region    • Depression    • GERD (gastroesophageal reflux disease)    • Hypertension    • Hyperthyroidism    • Irritable bowel syndrome    • Kidney stones    • Macular degeneration    • Orthopnea    • Pancreatitis, chronic (CMS/HCC)    • Peripheral neuropathy    • PFO (patent foramen ovale) 09/2016   • PSVT (paroxysmal supraventricular tachycardia) (CMS/HCC)     s/p ablation per Dr. Diallo 4/2016   • Restless leg syndrome    • Sarcoidosis    • Scoliosis    • Septic joint (CMS/HCC)    • Venous insufficiency, peripheral     RIGHT LEG       Allergies   Allergen Reactions   • Adhesive Tape Hives   • Tape Hives       Past Surgical History:   Procedure Laterality Date   • CARDIAC ABLATION  04/2016    SVT; Dr. Dilalo    • CHOLECYSTECTOMY     • COLONOSCOPY  10/13/2014   • COLONOSCOPY N/A 8/23/2017    Procedure: COLONOSCOPY WITH ANESTHESIA;  Surgeon: Jeanette Mclaughlin MD;  Location: [facility] ENDOSCOPY;  Service:    • COLONOSCOPY N/A 9/6/2018    Procedure: COLONOSCOPY WITH ANESTHESIA;  Surgeon: Jeanette Mclaughlin MD;  Location: [facility] ENDOSCOPY;  Service: Gastroenterology   • ENDOSCOPY N/A 8/23/2017    Procedure: ESOPHAGOGASTRODUODENOSCOPY WITH ANESTHESIA;  Surgeon: Jeanette Mclaughlin MD;  Location: [facility] ENDOSCOPY;  Service:    • ENDOVENOUS ABLATION SAPHENOUS VEIN W/ LASER Right 03/30/2018   • HYSTERECTOMY     • JOINT REPLACEMENT Right     PATELLA REPLACED   • KIDNEY STONE SURGERY     • KNEE ARTHROSCOPY     • KNEE SURGERY Right    • LATERAL EPICONDYLE RELEASE     • PATELLA SURGERY     • REPLACEMENT TOTAL KNEE     • SHOULDER ARTHROSCOPY     • ULNAR NERVE DECOMPRESSION     • VARICOSE VEIN SURGERY Left 1/10/2018    Procedure: LEFT SAPHENOUS VEIN  RADIO FREQUENCY ABLATION;  Surgeon: Kvng Montgomery DO;  Location:  [facility] OR;  Service:    • VARICOSE VEIN SURGERY Right 3/30/2018    Procedure: RIGHT LOWER EXTREMITY VENAGRAM, RIGHT LOWER EXTREMITY SAPHENOUS VEIN RADIO FREQUENCY ABLATION;  Surgeon: Kvng Montgomery DO;  Location:  [facility] OR;  Service: Vascular       Family History   Problem Relation Age of Onset   • Arrhythmia Mother    • Heart disease Mother    • Kidney disease Mother    • Heart disease Father    • Diabetes Father    • Heart disease Brother    • Heart disease Maternal Aunt    • Heart disease Maternal Uncle    • Heart disease Paternal Aunt    • Heart disease Paternal Uncle    • Heart disease Maternal Grandmother    • Heart disease Maternal Grandfather    • Heart disease Paternal Grandmother    • Heart disease Paternal Grandfather    • Colon cancer Neg Hx    • Colon polyps Neg Hx        Social History     Socioeconomic History   • Marital status: Single     Spouse name: Not on file   • Number of children: Not on file   • Years of education: Not on file   • Highest education level: Not on file   Tobacco Use   • Smoking status: Never Smoker   • Smokeless tobacco: Never Used   Substance and Sexual Activity   • Alcohol use: No   • Drug use: No   • Sexual activity: Defer           Objective   Physical Exam    Procedures           ED Course                  MDM  Number of Diagnoses or Management Options  Diagnosis management comments: Please see BETINA Randolph note for HPI PE ROS     3 negative troponins, negative CTA, will dc patient in stable condition and offer strong return precautions        Amount and/or Complexity of Data Reviewed  Clinical lab tests: reviewed and ordered  Tests in the radiology section of CPT®: ordered and reviewed  Tests in the medicine section of CPT®: ordered and reviewed  Decide to obtain previous medical records or to obtain history from someone other than the patient: yes    Risk of Complications, Morbidity, and/or  Mortality  Presenting problems: moderate  Diagnostic procedures: moderate  Management options: moderate    Patient Progress  Patient progress: stable        Final diagnoses:   Rib pain            Anjel Dunbar PA-C  08/10/19 0018

## 2019-08-14 LAB
BACTERIA SPEC AEROBE CULT: NORMAL
BACTERIA SPEC AEROBE CULT: NORMAL

## 2019-09-20 ENCOUNTER — LAB (OUTPATIENT)
Dept: LAB | Facility: HOSPITAL | Age: 49
End: 2019-09-20

## 2019-09-20 ENCOUNTER — INFUSION (OUTPATIENT)
Dept: ONCOLOGY | Facility: HOSPITAL | Age: 49
End: 2019-09-20

## 2019-09-20 VITALS
WEIGHT: 255 LBS | HEART RATE: 77 BPM | BODY MASS INDEX: 45.18 KG/M2 | RESPIRATION RATE: 20 BRPM | SYSTOLIC BLOOD PRESSURE: 157 MMHG | HEIGHT: 63 IN | OXYGEN SATURATION: 100 % | DIASTOLIC BLOOD PRESSURE: 94 MMHG | TEMPERATURE: 98.4 F

## 2019-09-20 DIAGNOSIS — N18.9 ANEMIA IN CHRONIC KIDNEY DISEASE, UNSPECIFIED CKD STAGE: ICD-10-CM

## 2019-09-20 DIAGNOSIS — D63.1 ANEMIA IN CHRONIC KIDNEY DISEASE, UNSPECIFIED CKD STAGE: ICD-10-CM

## 2019-09-20 DIAGNOSIS — D50.9 IRON DEFICIENCY ANEMIA, UNSPECIFIED IRON DEFICIENCY ANEMIA TYPE: ICD-10-CM

## 2019-09-20 DIAGNOSIS — N18.9 CHRONIC KIDNEY DISEASE, UNSPECIFIED CKD STAGE: ICD-10-CM

## 2019-09-20 LAB
BASOPHILS # BLD AUTO: 0.03 10*3/MM3 (ref 0–0.2)
BASOPHILS NFR BLD AUTO: 0.9 % (ref 0–1.5)
DEPRECATED RDW RBC AUTO: 45.5 FL (ref 37–54)
EOSINOPHIL # BLD AUTO: 0.12 10*3/MM3 (ref 0–0.4)
EOSINOPHIL NFR BLD AUTO: 3.4 % (ref 0.3–6.2)
ERYTHROCYTE [DISTWIDTH] IN BLOOD BY AUTOMATED COUNT: 12.8 % (ref 12.3–15.4)
HCT VFR BLD AUTO: 37.8 % (ref 34–46.6)
HGB BLD-MCNC: 12.2 G/DL (ref 12–15.9)
HOLD SPECIMEN: NORMAL
HOLD SPECIMEN: NORMAL
IMM GRANULOCYTES # BLD AUTO: 0.01 10*3/MM3 (ref 0–0.05)
IMM GRANULOCYTES NFR BLD AUTO: 0.3 % (ref 0–0.5)
LYMPHOCYTES # BLD AUTO: 0.95 10*3/MM3 (ref 0.7–3.1)
LYMPHOCYTES NFR BLD AUTO: 27.3 % (ref 19.6–45.3)
MCH RBC QN AUTO: 31.8 PG (ref 26.6–33)
MCHC RBC AUTO-ENTMCNC: 32.3 G/DL (ref 31.5–35.7)
MCV RBC AUTO: 98.4 FL (ref 79–97)
MONOCYTES # BLD AUTO: 0.36 10*3/MM3 (ref 0.1–0.9)
MONOCYTES NFR BLD AUTO: 10.3 % (ref 5–12)
NEUTROPHILS # BLD AUTO: 2.01 10*3/MM3 (ref 1.7–7)
NEUTROPHILS NFR BLD AUTO: 57.8 % (ref 42.7–76)
NRBC BLD AUTO-RTO: 0 /100 WBC (ref 0–0.2)
PLATELET # BLD AUTO: 150 10*3/MM3 (ref 140–450)
PMV BLD AUTO: 9.7 FL (ref 6–12)
RBC # BLD AUTO: 3.84 10*6/MM3 (ref 3.77–5.28)
WBC NRBC COR # BLD: 3.48 10*3/MM3 (ref 3.4–10.8)

## 2019-09-20 PROCEDURE — 85025 COMPLETE CBC W/AUTO DIFF WBC: CPT | Performed by: INTERNAL MEDICINE

## 2019-09-20 PROCEDURE — 36415 COLL VENOUS BLD VENIPUNCTURE: CPT

## 2019-09-20 PROCEDURE — G0463 HOSPITAL OUTPT CLINIC VISIT: HCPCS

## 2019-09-20 NOTE — PROGRESS NOTES
Patient procrit not indicated and held per orders.  Patient discharged in stable condition w/o intervention. KASSIE Albarado RN

## 2019-09-27 ENCOUNTER — LAB (OUTPATIENT)
Dept: LAB | Facility: HOSPITAL | Age: 49
End: 2019-09-27

## 2019-09-27 ENCOUNTER — INFUSION (OUTPATIENT)
Dept: ONCOLOGY | Facility: HOSPITAL | Age: 49
End: 2019-09-27

## 2019-09-27 DIAGNOSIS — N18.9 ANEMIA IN CHRONIC KIDNEY DISEASE, UNSPECIFIED CKD STAGE: ICD-10-CM

## 2019-09-27 DIAGNOSIS — D50.9 IRON DEFICIENCY ANEMIA, UNSPECIFIED IRON DEFICIENCY ANEMIA TYPE: ICD-10-CM

## 2019-09-27 DIAGNOSIS — N18.9 CHRONIC KIDNEY DISEASE, UNSPECIFIED CKD STAGE: ICD-10-CM

## 2019-09-27 DIAGNOSIS — D63.1 ANEMIA IN CHRONIC KIDNEY DISEASE, UNSPECIFIED CKD STAGE: ICD-10-CM

## 2019-09-27 LAB
BASOPHILS # BLD AUTO: 0.03 10*3/MM3 (ref 0–0.2)
BASOPHILS NFR BLD AUTO: 0.8 % (ref 0–1.5)
DEPRECATED RDW RBC AUTO: 45.5 FL (ref 37–54)
EOSINOPHIL # BLD AUTO: 0.15 10*3/MM3 (ref 0–0.4)
EOSINOPHIL NFR BLD AUTO: 4.1 % (ref 0.3–6.2)
ERYTHROCYTE [DISTWIDTH] IN BLOOD BY AUTOMATED COUNT: 12.5 % (ref 12.3–15.4)
FERRITIN SERPL-MCNC: 298.9 NG/ML (ref 13–150)
HCT VFR BLD AUTO: 35.7 % (ref 34–46.6)
HGB BLD-MCNC: 11.5 G/DL (ref 12–15.9)
IRON 24H UR-MRATE: 81 MCG/DL (ref 37–145)
IRON 24H UR-MRATE: 81 MCG/DL (ref 37–145)
IRON SATN MFR SERPL: 25 % (ref 20–50)
LYMPHOCYTES # BLD AUTO: 0.92 10*3/MM3 (ref 0.7–3.1)
LYMPHOCYTES NFR BLD AUTO: 25.3 % (ref 19.6–45.3)
MCH RBC QN AUTO: 31.6 PG (ref 26.6–33)
MCHC RBC AUTO-ENTMCNC: 32.2 G/DL (ref 31.5–35.7)
MCV RBC AUTO: 98.1 FL (ref 79–97)
MONOCYTES # BLD AUTO: 0.4 10*3/MM3 (ref 0.1–0.9)
MONOCYTES NFR BLD AUTO: 11 % (ref 5–12)
NEUTROPHILS # BLD AUTO: 2.11 10*3/MM3 (ref 1.7–7)
NEUTROPHILS NFR BLD AUTO: 58.2 % (ref 42.7–76)
PLATELET # BLD AUTO: 138 10*3/MM3 (ref 140–450)
PMV BLD AUTO: 10 FL (ref 6–12)
RBC # BLD AUTO: 3.64 10*6/MM3 (ref 3.77–5.28)
TIBC SERPL-MCNC: 328 MCG/DL (ref 298–536)
TRANSFERRIN SERPL-MCNC: 220 MG/DL (ref 200–360)
WBC NRBC COR # BLD: 3.63 10*3/MM3 (ref 3.4–10.8)

## 2019-09-27 PROCEDURE — 36415 COLL VENOUS BLD VENIPUNCTURE: CPT

## 2019-09-27 PROCEDURE — 82607 VITAMIN B-12: CPT | Performed by: INTERNAL MEDICINE

## 2019-09-27 PROCEDURE — 83540 ASSAY OF IRON: CPT | Performed by: INTERNAL MEDICINE

## 2019-09-27 PROCEDURE — G0463 HOSPITAL OUTPT CLINIC VISIT: HCPCS

## 2019-09-27 PROCEDURE — 85025 COMPLETE CBC W/AUTO DIFF WBC: CPT | Performed by: INTERNAL MEDICINE

## 2019-09-27 PROCEDURE — 82746 ASSAY OF FOLIC ACID SERUM: CPT | Performed by: INTERNAL MEDICINE

## 2019-09-27 PROCEDURE — 84466 ASSAY OF TRANSFERRIN: CPT | Performed by: INTERNAL MEDICINE

## 2019-09-27 PROCEDURE — 82728 ASSAY OF FERRITIN: CPT | Performed by: INTERNAL MEDICINE

## 2019-09-27 NOTE — PROGRESS NOTES
CBC reviewed, procrit not indicated.  Patient discharged in stable condition and w/o intervention.  KASSIE Albarado RN

## 2019-09-28 LAB
FOLATE SERPL-MCNC: 9.53 NG/ML (ref 4.78–24.2)
VIT B12 BLD-MCNC: 343 PG/ML (ref 211–946)

## 2019-10-05 NOTE — PROGRESS NOTES
[facility] GROUP HEMATOLOGY AND ONCOLOGY  [address]  [facility] [address] [phone]    Patient Name: Stacy Lynn  Encounter Date: 10/07/2019  YOB: 1970  Patient Number: [id number]      REASON FOR VISIT: Ms. Stacy Lynn is a 49-year-old female who returns in followup of anemia from chronic kidney disease, with leukopenia and intermittent thrombocytopenia without unifying diagnosis (likely contribution from methotrexate). She is seen 5.5 months since last receipt of IV Injectafer and 25.5 months since last receipt of 2 units PRBC transfusions. She is here alone. History is obtained from the patient who is considered to be a reliable historian. The history is muddled by liberal symptom reporting.     DIAGNOSTIC ABNORMALITIES:   1. Labs, 09/16/2014 Dr. Waters's office. TIBC 303 (225 - 420), serum iron 43 (42 - 180), and iron saturation 14.2%.   2. Labs, 10/03/2014. Hemoglobin 9.8, hematocrit 30.2, MCV 94.1, platelets 157,000, WBC 2.9, with 52.3 segs (ANC 1.5), 36.8 lymphocytes, 10.9 mid cells. Glucose 80, BUN 16, creatinine 0.9 (GFR 71.6), sodium 142, potassium 3.9, chloride 109, bicarbonate 19, calcium 8.6, albumin 3.5, total protein 6.3, bilirubin 0.4, alkaline phosphatase 67, , AST 12, ALT 20, phosphorus 4.6 (each normal). Serum iron 49, iron saturation 15.8%, ferritin 32, , folate 13.6. HIV antibodies screen nonreactive. ELENA negative. T4 of 7.1, TSH 0.6, sed rate 8 (each normal). Rheumatoid factor less than 10. ANCA less than 1:20.  3. Colonoscopy, 10/13/2014: - One 7 mm polyp in the ascending colon resected and retrieved. The examination was otherwise normal on direct and retroflexion views. Recommendation: - Repeat colonoscopy in 5 years for surveillance.  4. Comprehensive Report, 10/15/2014: FINAL DIAGNOSIS. Peripheral blood: Leukopenia/neutropenia; normocytic anemia. Comprehensive Comments: The cause of the  cytopenias is not apparent from review of this peripheral blood smear. Differential diagnosis includes chronic disease, autoimmunity, drug effects, infection, etc. Although there are no morphologic features of myeloid dysplasia, please be aware that peripheral blood findings are not always representative of underlying bone marrow abnormalities. FLOW IMPRESSION: Peripheral blood: No increase in myeloid or lymphoid blasts identified. Findings consistent with mild granulocytic left shift. No immunophenotypically abnormal B- or T-cell population identified.  5. EGD performed on 10/16/2014 at [facility]. IMPRESSION: Normal esophagus. Normal stomach. Normal first part of the duodenum and 2nd part of the duodenum Biopsied . Pathology: Small bowel biopsy. Benign small intestinal type mucosa with no significant histopathologic changes.  6. Labs, 05/29/2015. IgG 827, IgA 168, IgM 140. Immunofixation showed no monoclonal immunoglobulins detected. Free kappa light chain 1.26, free lambda light chain 1.17, kappa/lambda light chains 1.08 (each normal).  7. UPIEP, 06/09/2015. 24-hour urine 82.8 mg. Immunofixation not reported.  8. [facility], CT-guided bone marrow, 06/29/2015. Impression: Technically successful CT-guided bone marrow aspirate without immediate complications. No core bone marrow sample could be obtained. PATHOLOGY: Surgical pathology performed on 06/29/2015 at [facility] was revealing for post iliac crest bone marrow biopsy and aspirate: Normocellular bone marrow of 40%. No increase in blasts. Iron staining is 1+. Flow cytometry shows no evidence of an abnormal myeloid maturation, increase in blasts, or lymphoproliferative disorder. Complete blood count and peripheral blood smear review: Normochromic normocytic anemia. Leukopenia. No circulating blasts.  9. Peripheral blood comprehensive report, 06/19/2017: PANCYTOPENIA. COMPREHENSIVE DIAGNOSIS.. Review of peripheral blood smear: Leukopenia with  relative reactive appearing eosinophilia. Normochromic, normocytic anemia. Mild thrombocytopenia. See comment. COMPREHENSIVE COMMENT. When compared to a peripheral blood specimen reviewed on this patient in 10/2014 ([id number]), the white blood cell count has decreased from 2.7 K/mL to 1.8 currently. The hemoglobin level has also decreased from 9.9 g/dL to 8.7 g/dL. The platelet counts are similar. As stated in the prior specimen, the cause of the cytopenias is not apparent from the peripheral blood smear examination. Etiologies to consider include chronic diseases particularly autoimmune disease, infections and drugs/toxins. Myelodysplasia is in the differential diagnosis but that diagnosis can be difficult to establish on a peripheral blood specimen. Correlation recommended. Flow cytometry study after erythrolysis reveals no circulating myeloid or lymphoid blasts. Myeloid and monocytic lineages are represented by mature granulocytes and monocytes. Phenotypically unremarkable eosinophils are mildly elevated at 10.5% of the total WBC. Basophils are not increased. It must be noted that the diagnosis of a myeloid stem cell disorder, if clinically suspected, is best made using bone marrow specimens. Peripheral blood is not always representative of abnormalities involving the bone marrow. The B cells show no evidence of clonality or antigenic aberrancy. The T cells show normal expression of the pan T-cell antigens. The NK cells account for 23.7% of the total lymphocytes.  10. [facility]: Endoscopy: 08/23/2017. Impressions: - Normal esophagus. - Non-erosive gastritis. Biopsied. - Normal first part of the duodenum and 2nd part of the duodenum. Biopsied.   11. [facility]: Colonoscopy: 08/23/2017. Impressions: - The entire examined colon is normal. Biopsied.     PREVIOUS INTERVENTIONS:   1. IV Venofer, 11/10/2014 - 11/20/2014 (1000 mg)  2. INFeD 500 mg on 07/19/2016 and 05/01/2017  3. 2 units PRBC transfusions,  09/01/2017  4. Injectafer 750 mg IV, 04/23/2019        Problem List Items Addressed This Visit     None         No history exists.       PAST MEDICAL HISTORY:  ALLERGIES:  Allergies   Allergen Reactions   • Adhesive Tape Hives   • Tape Hives     CURRENT MEDICATIONS:  Outpatient Encounter Medications as of 10/7/2019   Medication Sig Dispense Refill   • buPROPion XL (WELLBUTRIN XL) 150 MG 24 hr tablet TAKE 1 TABLET BY MOUTH ONCE DAILY FOR DEPRESSION  2   • busPIRone (BUSPAR) 10 MG tablet Take 10 mg by mouth.     • carBAMazepine XR (TEGretol  XR) 200 MG 12 hr tablet Take 1 tablet by mouth Every 12 (Twelve) Hours. 60 tablet 3   • docusate sodium (COLACE) 100 MG capsule Take 100 mg by mouth.     • DULoxetine (CYMBALTA) 60 MG capsule Take 60 mg by mouth Daily.     • esomeprazole (nexIUM) 40 MG capsule TAKE 1 CAPSULE BY MOUTH TWICE DAILY BEFORE  MEALS 60 capsule 0   • Fluticasone Furoate-Vilanterol (BREO ELLIPTA) 200-25 MCG/INH inhaler      • folic acid (FOLVITE) 400 MCG tablet Take 400 mcg by mouth Daily.     • levothyroxine (SYNTHROID, LEVOTHROID) 88 MCG tablet Take 75 mcg by mouth Daily.     • LYRICA 100 MG capsule Take 100 mg by mouth Every 8 (Eight) Hours As Needed.  1   • methotrexate 2.5 MG tablet Take 4 tablets by mouth 1 (One) Time Per Week. Takes 4 tabs weekly on Mondays 16 tablet 11   • metoprolol succinate XL (TOPROL-XL) 25 MG 24 hr tablet Take 100 mg by mouth Daily.     • Mometasone Furoate 200 MCG/ACT aerosol Inhale 1 puff.     • Morphine (MS CONTIN) 15 MG 12 hr tablet Take 15 mg by mouth 2 (Two) Times a Day.     • O2 (OXYGEN) Inhale 2 L/min 1 (One) Time. Continuous     • ondansetron (ZOFRAN) 4 MG tablet Take 4 mg by mouth Every 8 (Eight) Hours As Needed for Nausea or Vomiting.     • oxybutynin (DITROPAN) 5 MG tablet Take 5 mg by mouth every night at bedtime.  5   • polyethylene glycol (MIRALAX) powder Take 17 g by mouth.     • PROCRIT 59069 UNIT/ML injection Inject 1 Units under the skin As Needed (WHEN  BLOOD COUNT [hemoglobin] BELOW 11).     • tiZANidine (ZANAFLEX) 4 MG tablet Take 4 mg by mouth At Night As Needed for Muscle Spasms.     • topiramate (TOPAMAX) 100 MG tablet Take 100 mg by mouth Every Night.     • [DISCONTINUED] diclofenac-misoprostol (ARTHROTEC 75) 75-0.2 MG EC tablet Take 1 tablet by mouth 2 (Two) Times a Day.     • [DISCONTINUED] gabapentin (NEURONTIN) 300 MG capsule Take 1 capsule by mouth 3 (Three) Times a Day.     • [DISCONTINUED] gabapentin (NEURONTIN) 600 MG tablet Take 600 mg by mouth Every 6 (Six) Hours.  1   • [DISCONTINUED] oxybutynin XL (DITROPAN-XL) 5 MG 24 hr tablet Take 5 mg by mouth Daily.     • [DISCONTINUED] predniSONE (DELTASONE) 10 MG tablet Take 4 tabs daily x 3 days, then take 3 tabs daily x 3 days, then take 2 tabs daily x 3 days, then take 1 tab daily x 3 days 31 tablet 0   • [DISCONTINUED] tolterodine LA (DETROL LA) 4 MG 24 hr capsule Take 4 mg by mouth Daily.     • [DISCONTINUED] traZODone (DESYREL) 50 MG tablet Take 50 mg by mouth At Night As Needed for Sleep.     • [DISCONTINUED] VENTOLIN  (90 Base) MCG/ACT inhaler        No facility-administered encounter medications on file as of 10/7/2019.      ADULT ILLNESSES:   Iron deficiency anemia ( ICD-10:D50.9 ;Iron deficiency anemia, unspecified   Anemia ( ICD-10:D64.9 ;Anemia, unspecified   Anemia in chronic kidney disease ( ICD-10:D63.1 ;Anemia in chronic kidney disease   Chronic kidney disease ( ICD-10:N18.3 ;Chronic kidney disease, stage 3 (moderate)   Chronic pancreatitis ( ICD-10:K86.1 ;Other chronic pancreatitis   Depression ( ICD-10:F32.9 ;Major depressive disorder, single episode, unspecified   Fatigue ( ICD-10:R53.82 ;Chronic fatigue, unspecified   Fibromyalgia syndrome ( ICD-10:M79.7 ;Fibromyalgia   Gastroesophageal reflux disease ( ICD-10:K21.9 ;Gastro-esophageal reflux disease without esophagitis   Hypertension ( ICD-10:I10 ;Essential (primary) hypertension   Hypothyroidism ( ICD-10:E03.9 ;Hypothyroidism,  unspecified   Irritable bowel syndrome ( ICD-10:K58.9 ;Irritable bowel syndrome without diarrhea   Kidney stone ( ICD-10:N20.0 ;Calculus of kidney   Knee pain ( meniscal disorder,Dr. Dasilva; ICD-10:M25.569 ;Pain in unspecified knee )   Leukopenia ( ICD-10:D72.819 ;Decreased white blood cell count, unspecified   Macular degeneration ( ICD-10:H35.30 ;Unspecified macular degeneration   Migraines ( ICD-10:G43.909 ;Migraine, unspecified, not intractable, without status migrainosus   Obesity ( ICD-10:E66.9 ;Obesity, unspecified   Peripheral neuropathy ( ICD-10:G62.9 ;Polyneuropathy, unspecified   Sarcoidosis ( ICD-10:D86.0 ;Sarcoidosis of lung   Scoliosis ( ICD-10:M41.9 ;Scoliosis, unspecified   Urge incontinence of urine ( ICD-10:N39.41 ;Urge incontinence   Vitamin B12 deficiency ( ICD-10:E53.8 ;Deficiency of other specified B group vitamins    SURGERIES:   Total knee replacement, right, 11/06/2015, Dr. Dasilva   Colonoscopy, 2009   Shoulder repair, epicondyle, right, 2002   Shoulder repair, arthroscopy, 2001   Ulnar transposition; ulnar neuropathy, 2002,1999   Kidney stone, 2001   Hysterectomy, total, 2004   Colonoscopy, 09/06/2018. Impression: The examination was otherwise normal on direct and retroflexion views. No specimens collected. Repeat 5 years   Colonoscopy, 08/23/2017. [facility]. Impression> The entire examined colon is normal. Biopsied   Decompression of median nerve, (carpal tunnel release), 05/08/2018, Dr. Kwon   Operative procedure on knee, replacement, 01/2014, Dr. Dasilva   Endoscopy, 08/23/2017, [facility]. Impression: Normal esophagus. Non-erosive gastritis. Biopsied. Normal 1st part of the duodenum and 2nd part of the duodenum. Biopsied   Radiofrequency ablation (RFA) left leg on 01/10/2018 by Dr. Montgomery. Left lower extremity venous ultrasound, 01/19/2018. No deep venous thrombosis (DVT) left lower extremity. Was started on Eliquis. Lymphedema pumps and compression  stockings prescribed. Followup 1 month   Revision surgery was done last 05/04/2017. Dr. Kwon   Cholecystectomy, remote      ADULT ILLNESSES:  Patient Active Problem List   Diagnosis Code   • Palpitations R00.2   • Thrombocytopenia (CMS/HCC) D69.6   • Sarcoidosis D86.9   • Essential hypertension I10   • Hypothyroidism E03.9   • Dyspnea on exertion R06.09   • Morbidly obese (CMS/[facility]) E66.01   • PFO (patent foramen ovale) Q21.1   • Iron deficiency anemia D50.9   • Generalized weakness R53.1   • Volume depletion, gastrointestinal loss E86.9   • Viral enterocolitis A08.4   • Colon polyps K63.5   • Epigastric pain R10.13   • Bloating R14.0   • Early satiety R68.81   • Weight loss R63.4   • Abdominal cramping R10.9   • Nausea R11.0   • NSAID long-term use Z79.1   • 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, T45.1X5A   • Anemia in chronic kidney disease (CKD) N18.9, D63.1   • Varicose veins of both lower extremities with pain I83.813   • Lymphedema I89.0   • Venous (peripheral) insufficiency I87.2   • Neuropathy due to medical condition (CMS/[facility]) G63   • Venous insufficiency I87.2   • Chest pain R07.9   • Bloody stool K92.1   • Constipation K59.00   • Anal or rectal pain K62.89   • Rectal bleeding K62.5   • Hx of colonic polyps Z86.010   • Other hemorrhoids K64.8   • Chronic respiratory failure with hypoxia (CMS/[facility]) J96.11   • Diarrhea R19.7   • Irritable bowel syndrome with constipation K58.1     SURGERIES:  Past Surgical History:   Procedure Laterality Date   • CARDIAC ABLATION  04/2016    SVT; Dr. Diallo    • CHOLECYSTECTOMY     • COLONOSCOPY  10/13/2014   • COLONOSCOPY N/A 8/23/2017    Procedure: COLONOSCOPY WITH ANESTHESIA;  Surgeon: Jeanette Mclaughlin MD;  Location: [facility] ENDOSCOPY;  Service:    • COLONOSCOPY N/A 9/6/2018    Procedure: COLONOSCOPY WITH ANESTHESIA;  Surgeon: Jeanette Mclaughlin MD;  Location: [facility] ENDOSCOPY;  Service: Gastroenterology   • ENDOSCOPY N/A 8/23/2017    Procedure:  ESOPHAGOGASTRODUODENOSCOPY WITH ANESTHESIA;  Surgeon: Jeanette Mclaughlin MD;  Location:  [facility] ENDOSCOPY;  Service:    • ENDOVENOUS ABLATION SAPHENOUS VEIN W/ LASER Right 03/30/2018   • HYSTERECTOMY     • JOINT REPLACEMENT Right     PATELLA REPLACED   • KIDNEY STONE SURGERY     • KNEE ARTHROSCOPY     • KNEE SURGERY Right    • LATERAL EPICONDYLE RELEASE     • PATELLA SURGERY     • REPLACEMENT TOTAL KNEE     • SHOULDER ARTHROSCOPY     • ULNAR NERVE DECOMPRESSION     • VARICOSE VEIN SURGERY Left 1/10/2018    Procedure: LEFT SAPHENOUS VEIN RADIO FREQUENCY ABLATION;  Surgeon: Kvng Montgomery DO;  Location:  [facility] OR;  Service:    • VARICOSE VEIN SURGERY Right 3/30/2018    Procedure: RIGHT LOWER EXTREMITY VENAGRAM, RIGHT LOWER EXTREMITY SAPHENOUS VEIN RADIO FREQUENCY ABLATION;  Surgeon: Kvng Montgomery DO;  Location:  [facility] OR;  Service: Vascular     HEALTH MAINTENANCE ITEMS:  Health Maintenance Due   Topic Date Due   • TDAP/TD VACCINES (1 - Tdap) 04/04/1987   • PAP SMEAR  05/01/2017   • ZOSTER VACCINE (1 of 2) 04/04/2018   • INFLUENZA VACCINE  08/01/2019       <no information>  Last Completed Colonoscopy       Status Date      COLONOSCOPY Done 3/15/2019 Ext Proc: [facility] COLONOSCOPY W/BIOPSY SINGLE/MULTIPLE     Patient has more history with this topic...        Immunization History   Administered Date(s) Administered   • Pneumococcal Conjugate 13-Valent (PCV13) 04/01/2019   • Pneumococcal Polysaccharide (PPSV23) 10/01/2018     Last Completed Mammogram       Status Date      MAMMOGRAM Done 11/4/2013 Ext Proc: [facility] SCREENINGMAMMOGRAPHYDIGITAL     Patient has more history with this topic...            FAMILY HISTORY:  Family History   Problem Relation Age of Onset   • Arrhythmia Mother    • Heart disease Mother    • Kidney disease Mother    • Heart disease Father    • Diabetes Father    • Heart disease Brother    • Heart disease Maternal Aunt    • Heart disease Maternal Uncle    • Heart disease Paternal Aunt    • Heart disease  "Paternal Uncle    • Heart disease Maternal Grandmother    • Heart disease Maternal Grandfather    • Heart disease Paternal Grandmother    • Heart disease Paternal Grandfather    • Colon cancer Neg Hx    • Colon polyps Neg Hx      SOCIAL HISTORY:  Social History     Socioeconomic History   • Marital status: Single     Spouse name: Not on file   • Number of children: Not on file   • Years of education: Not on file   • Highest education level: Not on file   Tobacco Use   • Smoking status: Never Smoker   • Smokeless tobacco: Never Used   Substance and Sexual Activity   • Alcohol use: No   • Drug use: No   • Sexual activity: Defer       REVIEW OF SYSTEMS:  Constitutional:   The patient's appetite is fair. \"Just there.\" Her energy is chronically low. She manages her personal ADLs but still tires easily. She lives with her sister and brother. She manages her ADLs including helping with light indoor chores and helps with errands but does not drive. She has lost 1 pound (no weight changes at her prior visit) since her last visit. She has no fevers or chills but has non-drenching night sweats. Her sleep habits seem appropriate.  Ear/Nose/Throat/Mouth:   She reports no ear pains, sinus symptoms, sore throat, nosebleeds, or sore tongue. She has migraine headaches. She denies any hoarseness, change in voice quality, or hemoptysis.   Ocular:   She reports no eye pain, significant change in visual acuity, double vision, or blurry vision.  Respiratory:   She reports baseline exertional dyspnea and is short of breath with her routine activities. She has chronic cough with thick, white/yellow phlegm production but has no significant shortness of breathing at rest or unexplained chest wall pain. Says she is off prednisone since last 08/2016 by Dr. Scherer (for the sarcoid). Says the methotrexate has been cut down to 10 mg weekly per Dr. Scherer. Has been on round the clock O2 since 04/2018.  Cardiovascular:   She reports no exertional " "chest pain, chest pressure, or chest heaviness. She reports no claudication. She reports no palpitations or symptomatic orthostasis.  Gastrointestinal:   She reports chronic nausea but no dysphagia, vomiting, or postprandial abdominal pain but admits to early satiety, increased bloating, and cramping. She has no change in bowel habits without dark discoloration of the stool (off oral iron). She reports intermittent rectal bleeding when she is constipated. She has intermittent diarrhea from IBS. Is on fiber supplements. Is intolerant of oral iron (cramps, constipation). Last repeat colonoscopy, 09/06/2018 (above). Hemorrhoids?.  Genitourinary:   She reports no urinary burning, frequency, dribbling, or discoloration. She reports difficulty controlling her bladder and has trouble holding in her urine requiring use of protective pads. She has no need to urinate frequently through the night.  Musculoskeletal:   She reports persistent right knee discomfort in spite of total knee replacement (TKR). Says revision surgery was done last 05/04/2017 - Dr. Kwon. She has chronic arthralgias of the hips and lower back with distal radiculitis to the buttocks. She has myalgias of the calves and has nighttime leg cramping. She is now seen by Dr. Schmitt of Pain Management. Says a nerve stimulator couldn't be placed due to her scoliosis. Is now on oral morphine.  \"It's helped.\"  Extremities:   She reports chronic trouble with fluid retention and significant leg swelling. Is using compression leg pumps bid since 02/07/2018.  Endocrine:   She reports no problems with excess thirst, excessive urination, vasomotor instability.  Heme/Lymphatic:   She reports easy bruising but no unexplained bleeding, petechial rashes, or swollen glands.  Skin:   She reports chronic itching and rashes but no lesions which won't heal.  Neuro:   She reports postural dizziness but no loss of consciousness, seizures, or fainting spells. She reports no " "weakness of her face, arms, or legs. She has no difficulty with speech. She has no tremors. She has relief of paresthesias of the right hand since the carpal tunnel release on 05/08/2018 (Dr. Kwon).  Psych:   She admits to depression and anxiety. She reports occasional mood swings. She reports no history of suicide attempts.        VITAL SIGNS: /68   Pulse 101   Temp 96.2 °F (35.7 °C)   Resp 16   Ht 160 cm (63\")   Wt 118 kg (259 lb 6.4 oz)   SpO2 98%   Breastfeeding? No   BMI 45.95 kg/m² Body surface area is 2.16 meters squared.  Pain Score    10/07/19 1537   PainSc:   7         PHYSICAL EXAMINATION:   General:   She is a very-pleasant, obese, and modestly-kept middle-aged female who is comfortable at rest. She arrived in the exam room ambulatory. She appears to be her stated age. Her skin color is still a bit pale.   Head/Neck:   The patient is anicteric and atraumatic. The oropharynx, mouth, and throat are clear. She is wearing O2 via cannula, tethered to a portable O2 tank. Her dentition is poor. The trachea is midline. The neck is supple without evidence of jugular venous distention or cervical adenopathy.   Eyes:   The pupils are equal, round, and reactive to light. The extraocular movements are full. There is no scleral jaundice or erythema.   Chest:   The respiratory efforts are normal and unhindered. The breath sounds are diminished bilaterally with vague basilar rales. There are no wheezes, rhonchi, or asymmetry of breath sounds.  Cardiovascular:   The patient has a regular cardiac rate and rhythm without murmurs, rubs, or gallops. The peripheral pulses are equal and full.  Abdomen:   The belly is soft and globose. Her habitus precludes an adequate exam but there is no rebound or guarding. There is no organomegaly, mass-effect, or tenderness. Bowel sounds are active and of normal character.  Extremities:   There is no evidence of cyanosis, clubbing, with 3+ (chronic) leg edema. "   Rheumatologic:   There is no overt evidence of rheumatoid deformities of the hands. There is no sausaging of the fingers. There is no sign of active synovitis. The gait is slow and antalgic, still favoring the right leg/knee.  Cutaneous:   There are no overt rashes, disseminated lesions, purpura, or petechiae.   Lymphatics:   There is no evidence of adenopathy in the cervical, supraclavicular, or axillary areas.   Neurologic:   The patient is alert, oriented, cooperative, and pleasant. She is appropriately conversant. She ambulated into the exam room without assistance and transferred from chair to exam table unaided. There is no overt dysfunction of the motor, sensory, cerebellar systems.  Psych:   Mood and affect are appropriate for circumstance. Eye contact is appropriate. Normal judgement and decision making.        LABS    Lab Results - Last 18 Months   Lab Units 09/27/19  1410 09/20/19  1356 08/09/19  1608 08/09/19  1410 07/26/19  1433 06/07/19  1416 04/26/19  1438 04/05/19  1342   HEMOGLOBIN g/dL 11.5* 12.2 12.2 11.8* 10.4* 11.0* 10.2* 11.0*   HEMATOCRIT % 35.7 37.8 38.3 36.8 32.9* 33.5* 32.3* 34.1*   MCV fL 98.1* 98.4* 100.3* 100.5* 103.8* 99.4* 99.4* 100.0*   WBC 10*3/mm3 3.63 3.48 3.27* 2.97* 2.82* 3.40* 2.97* 3.09*   RDW % 12.5 12.8 13.3 13.2 13.2 14.5 13.8 14.3   MPV fL 10.0 9.7 10.8 10.0 10.0 9.8 9.9 9.6   PLATELETS 10*3/mm3 138* 150 143 139* 146 156 123* 174   IMM GRAN % %  --  0.3 0.3  --  0.4 0.3 0.3 0.0   NEUTROS ABS 10*3/mm3 2.11 2.01 1.80 1.49* 1.55* 1.90 1.74* 1.77*   LYMPHS ABS 10*3/mm3 0.92 0.95 0.84 0.87 0.78 0.97 0.73 0.82   MONOS ABS 10*3/mm3 0.40 0.36 0.44 0.41 0.30 0.34 0.34 0.36   EOS ABS 10*3/mm3 0.15 0.12 0.15 0.16 0.15 0.16 0.12 0.12   BASOS ABS 10*3/mm3 0.03 0.03 0.03 0.03 0.03 0.02 0.03 0.02   IMMATURE GRANS (ABS) 10*3/mm3  --  0.01 0.01  --  0.01 0.01 0.01 0.00   NRBC /100 WBC  --  0.0 0.0  --  0.0 0.0 0.0 0.0       Lab Results - Last 18 Months   Lab Units 08/09/19  9564  08/09/19  1410 04/17/19  1600 07/26/18  1411 05/01/18  1600   GLUCOSE mg/dL 100  --  81  --  79   SODIUM mmol/L 141  --  140  --  139   POTASSIUM mmol/L 3.9  --  4.0  --  3.9   TOTAL CO2 mmol/L  --   --   --   --  24   CO2 mmol/L 28.0  --  23.0*  --   --    CHLORIDE mmol/L 105  --  106  --  104   ANION GAP mmol/L 8.0  --  11.0  --  11   CREATININE mg/dL 1.15  --  1.24 1.20 1.1*   BUN mg/dL 18  --  14  --  18   BUN / CREAT RATIO  15.7  --  11.3  --   --    CALCIUM mg/dL 9.2  --  9.0  --  8.9   EGFR IF NONAFRICN AM mL/min/1.73 50*  --  46*  --  53*   ALK PHOS U/L 75 79 67  --   --    TOTAL PROTEIN g/dL 7.4 7.6 6.6  --   --    ALT (SGPT) U/L 21 25 19  --   --    AST (SGOT) U/L 25 26 18  --   --    BILIRUBIN mg/dL 0.8 0.9 0.5  --   --    ALBUMIN g/dL 4.50 4.50 4.20  --   --    GLOBULIN gm/dL 2.9  --  2.4  --   --        No results for input(s): MSPIKE, KAPPALAMB, IGLFLC, URICACID, FREEKAPPAL, CEA, LDH, REFLABREPO in the last 34564 hours.    Lab Results - Last 18 Months   Lab Units 09/27/19  1410 07/26/19  1433 06/07/19  1416 04/05/19  1342 03/08/19  1330 02/22/19  1328   IRON mcg/dL 81  81 77 85 70  70 99  99 86  86   TIBC mcg/dL 328 263 262 290 307 300   IRON SATURATION % 25 29 32 24 32 29   FERRITIN ng/mL 298.90* 161.00 228.00 80.30 110.00 102.00   FOLATE ng/mL 9.53 >20.00 >20.00 >20.00 >20.00 >20.00       ASSESSMENT:   1. Macrocytic anemia, with contributions from iron deficiency (1+ marrow iron), iron underutilization/anemia of chronic disease and chronic kidney disease and methotrexate. [place] Hgb 7.9, 08/31/2017 requiring 2 units PRBC transfusions on 09/01/2017.   a. Negative EGD/colonoscopy (above), negative SPIEP, negative UPIEP. Stable, Hgb 11 on 04/05/2019 (prior range: Hgb 7.9 - 12.3).   b. Endoscopy: 08/23/2017. Impressions: - Normal esophagus. - Non-erosive gastritis. Biopsied. - Normal 1st part of the duodenum and 2nd part of the duodenum. Biopsied.   c. [facility]: Colonoscopy: 08/23/2017.  Impressions: - The entire examined colon is normal. Biopsied.   d. Colonoscopy, 09/06/2018. Impressions: - The examination was otherwise normal on direct and retroflexion views. No specimens collected. Repeat 5 years  2. Leukopenia, with adequate ANC. Stable, WBC 3.64; ANC 2.11, 09/27/2019 (prior range: WBC 2.45 - 4.2; ANC 1.05 -     2.6). No unifying diagnosis. Likely medication (methotrexate) associated.   3. Thrombocytopenia. Stable, 138,000 on 09/27/2019 (prior range: 115,000 - 188,000).  4. Chronic kidney disease, Stage III. Baseline GFR 56.6 mL/min, 05/29/2015. Stable, GFR 50 mL/min on 08/09/2018 (prior range: 42.5 - 56.6 mL/min).   5. Hypothyroidism.   6. Gastroesophageal reflux disease, now with early satiety, bloating, cramps.   7. B12 deficiency. Depleted again. Receives B12 injections per Dr. Waters. Previously received B12, 1000 mcg IM daily x 3 then weekly x 4 beginning 09/07/2018.   8. Fibromyalgia with chronic pain syndrome.   9. Irritable bowel syndrome with chronic diarrhea. Colonoscopy, 10/2014.   10. Depression.   11. Chronic neck and low back pain with scoliosis. She is now seen by Dr. Schmitt of Pain Management. Says a nerve stimulator will be placed on 04/23/2019.   12. Peripheral neuropathy and swelling of the legs.   a. Seen by Dr. Montgomery, 09/07/2017 and 12/08/2017. Impression: Venous insufficiency and lymphedema.   b. Underwent RFA left leg on 01/10/2018 by Dr. Montgomery. Lower extremity venous ultrasound, 01/19/2018. No deep venous thrombosis (DVT) left lower extremity. Was started on anticoagulation (Eliquis). Lymphedema pumps and compression stockings prescribed.   13. Degenerative joint disease (DJD) knees. Underwent right knee surgery on 11/06/2015 and revision on 05/04/2017. Dr. Kwon.   14. Obesity. Has stabilized.   15. Pulmonary sarcoidosis. Followed by Dr. Scherer. Was started on methotrexate sometime 05/2017. Is now on O2 since 04/2018.   16. Chronic fatigue, contributions from all  above.   17. Admitted [facility] 09/25/2016 through 09/26/2016 for chest pain. DSE negative. Discharged for non-cardiac chest pain.   18. Leg edema. Underwent RFA left leg on 01/10/2018 by Dr. Montgomery. LE venous US, 01/19/2018. No DVT left LE. Eliquis stopped last 02/2018. Lymphedema pumps and compression stockings prescribed.   19. Right wrist carpal tunnel symptoms. Relieved since surgery last 05/08/2018 (Dr. Kwon).   20. Rectal bleeding. Colonoscopy, 09/06/2018. Impressions: The examination was otherwise normal on direct and retroflexion views. No specimens collected. Repeat 5 years.    PLAN:   1.  Re: Apprised of labs from 08/09/2019 and 09/27/2019 with stable CBC, low (stable) GFR, repleted serum iron, repleted Fe sat (INFeD, 05/01/2017), normal folate and repleted B12.   2. Previously reviewed office encounter from Dr. Scherer, 07/17/2017 and 09/26. At the former visit, they fielded her complaints about her sarcoid associated respiratory difficulties, chest discomfort, leg swelling.   3. Reminded of the peripheral blood comprehensive report, 06/19 (above). Again non-diagnostic (no peripheral blood evidence for dysplasia and negative flow cytometry).   4. Previously discussed the 1+ marrow iron, but otherwise non-diagnostic bone marrow biopsy.   5. Previously discussed the previously normal immunoglobulin levels, negative TAVIA for monoclonal proteins, normal kappa/lambda light chains, negative 24 hour UPIEP, normal ESR, negative rheumatoid factor, normal TSH/T4, normal B12/folate, negative HIV screen, low iron studies, negative ELENA, normal LDH and negative comprehensive blood report (no peripheral blood evidence for dysplasia and negative flow cytometry).   6. Again reviewed her medications. Done. Needs her methotrexate. Diclofenac, Nexium (blood dyscrasias), gabapentin, Lortab (neutropenia, thrombocytopenia), Topamax (anemia, leukopenia). Would discontinue as many of these as possible. Defer to Dr. Waters.   7.  Draw serum iron, Fe sat, ferritin, B12, folate every 4 weeks.   8. CBC weekly with Procrit 40,000 units subcutaneously if Hgb less than 11 and less than 33 - [facility] pharmacy or office   9. Continue management per primary care and other specialists.   10. Advance Directive discussed.  11. Return to the [place] office in 24 weeks with pre-office CMP, B12/folate, serum iron, Fe sat, ferritin, and CBC with differential.     MEDICAL DECISION MAKING: Moderate Complexity   AMOUNT OF DATA: Moderate    TIME SPENT: Face-to-face time on this encounter, as defined by the American Medical Association in the 2019 Current Procedural Terminology codebook; assessment, record review, lab review, planning and education - 35 minutes (greater than 50% face-to-face).     cc: MD Weston Ambrose MD

## 2019-10-07 ENCOUNTER — OFFICE VISIT (OUTPATIENT)
Dept: ONCOLOGY | Facility: CLINIC | Age: 49
End: 2019-10-07

## 2019-10-07 VITALS
BODY MASS INDEX: 45.96 KG/M2 | HEART RATE: 101 BPM | RESPIRATION RATE: 16 BRPM | OXYGEN SATURATION: 98 % | DIASTOLIC BLOOD PRESSURE: 68 MMHG | HEIGHT: 63 IN | WEIGHT: 259.4 LBS | SYSTOLIC BLOOD PRESSURE: 110 MMHG | TEMPERATURE: 96.2 F

## 2019-10-07 DIAGNOSIS — D63.1 ANEMIA IN STAGE 3 CHRONIC KIDNEY DISEASE (HCC): Primary | ICD-10-CM

## 2019-10-07 DIAGNOSIS — D50.9 IRON DEFICIENCY ANEMIA, UNSPECIFIED IRON DEFICIENCY ANEMIA TYPE: ICD-10-CM

## 2019-10-07 DIAGNOSIS — N18.30 ANEMIA IN STAGE 3 CHRONIC KIDNEY DISEASE (HCC): Primary | ICD-10-CM

## 2019-10-07 PROBLEM — R19.7 DIARRHEA: Status: ACTIVE | Noted: 2019-10-07

## 2019-10-07 PROBLEM — K58.1 IRRITABLE BOWEL SYNDROME WITH CONSTIPATION: Status: ACTIVE | Noted: 2019-01-17

## 2019-10-07 PROCEDURE — 99214 OFFICE O/P EST MOD 30 MIN: CPT | Performed by: INTERNAL MEDICINE

## 2019-10-07 RX ORDER — OXYBUTYNIN CHLORIDE 5 MG/1
5 TABLET ORAL
Refills: 5 | COMMUNITY
Start: 2019-09-23

## 2019-10-07 RX ORDER — BUPROPION HYDROCHLORIDE 150 MG/1
TABLET ORAL
Refills: 2 | COMMUNITY
Start: 2019-09-23 | End: 2021-09-13 | Stop reason: ALTCHOICE

## 2019-10-25 ENCOUNTER — LAB (OUTPATIENT)
Dept: LAB | Facility: HOSPITAL | Age: 49
End: 2019-10-25

## 2019-10-25 ENCOUNTER — INFUSION (OUTPATIENT)
Dept: ONCOLOGY | Facility: HOSPITAL | Age: 49
End: 2019-10-25

## 2019-10-25 VITALS
DIASTOLIC BLOOD PRESSURE: 82 MMHG | HEIGHT: 63 IN | BODY MASS INDEX: 46.78 KG/M2 | SYSTOLIC BLOOD PRESSURE: 132 MMHG | RESPIRATION RATE: 20 BRPM | OXYGEN SATURATION: 100 % | TEMPERATURE: 98 F | HEART RATE: 76 BPM | WEIGHT: 264 LBS

## 2019-10-25 DIAGNOSIS — N18.30 ANEMIA IN STAGE 3 CHRONIC KIDNEY DISEASE (HCC): ICD-10-CM

## 2019-10-25 DIAGNOSIS — D63.1 ANEMIA IN STAGE 3 CHRONIC KIDNEY DISEASE (HCC): ICD-10-CM

## 2019-10-25 DIAGNOSIS — D50.9 IRON DEFICIENCY ANEMIA, UNSPECIFIED IRON DEFICIENCY ANEMIA TYPE: ICD-10-CM

## 2019-10-25 DIAGNOSIS — D63.1 ANEMIA IN STAGE 4 CHRONIC KIDNEY DISEASE (HCC): Primary | ICD-10-CM

## 2019-10-25 DIAGNOSIS — N18.4 ANEMIA IN STAGE 4 CHRONIC KIDNEY DISEASE (HCC): Primary | ICD-10-CM

## 2019-10-25 LAB
ALBUMIN SERPL-MCNC: 4.1 G/DL (ref 3.5–5.2)
ALBUMIN/GLOB SERPL: 1.6 G/DL
ALP SERPL-CCNC: 146 U/L (ref 39–117)
ALT SERPL W P-5'-P-CCNC: 64 U/L (ref 1–33)
ANION GAP SERPL CALCULATED.3IONS-SCNC: 11 MMOL/L (ref 5–15)
AST SERPL-CCNC: 53 U/L (ref 1–32)
BASOPHILS # BLD AUTO: 0.03 10*3/MM3 (ref 0–0.2)
BASOPHILS NFR BLD AUTO: 1 % (ref 0–1.5)
BILIRUB SERPL-MCNC: 0.3 MG/DL (ref 0.2–1.2)
BUN BLD-MCNC: 13 MG/DL (ref 6–20)
BUN/CREAT SERPL: 10.9 (ref 7–25)
CALCIUM SPEC-SCNC: 8.5 MG/DL (ref 8.6–10.5)
CHLORIDE SERPL-SCNC: 109 MMOL/L (ref 98–107)
CO2 SERPL-SCNC: 23 MMOL/L (ref 22–29)
CREAT BLD-MCNC: 1.19 MG/DL (ref 0.57–1)
DEPRECATED RDW RBC AUTO: 48.3 FL (ref 37–54)
EOSINOPHIL # BLD AUTO: 0.16 10*3/MM3 (ref 0–0.4)
EOSINOPHIL NFR BLD AUTO: 5.3 % (ref 0.3–6.2)
ERYTHROCYTE [DISTWIDTH] IN BLOOD BY AUTOMATED COUNT: 13.8 % (ref 12.3–15.4)
FERRITIN SERPL-MCNC: 375.3 NG/ML (ref 13–150)
GFR SERPL CREATININE-BSD FRML MDRD: 48 ML/MIN/1.73
GLOBULIN UR ELPH-MCNC: 2.5 GM/DL
GLUCOSE BLD-MCNC: 93 MG/DL (ref 65–99)
HCT VFR BLD AUTO: 29.9 % (ref 34–46.6)
HGB BLD-MCNC: 9.8 G/DL (ref 12–15.9)
IRON 24H UR-MRATE: 72 MCG/DL (ref 37–145)
IRON SATN MFR SERPL: 28 % (ref 20–50)
LYMPHOCYTES # BLD AUTO: 0.82 10*3/MM3 (ref 0.7–3.1)
LYMPHOCYTES NFR BLD AUTO: 27 % (ref 19.6–45.3)
MCH RBC QN AUTO: 31.5 PG (ref 26.6–33)
MCHC RBC AUTO-ENTMCNC: 32.8 G/DL (ref 31.5–35.7)
MCV RBC AUTO: 96.1 FL (ref 79–97)
MONOCYTES # BLD AUTO: 0.3 10*3/MM3 (ref 0.1–0.9)
MONOCYTES NFR BLD AUTO: 9.9 % (ref 5–12)
NEUTROPHILS # BLD AUTO: 1.72 10*3/MM3 (ref 1.7–7)
NEUTROPHILS NFR BLD AUTO: 56.5 % (ref 42.7–76)
PLATELET # BLD AUTO: 132 10*3/MM3 (ref 140–450)
PMV BLD AUTO: 10 FL (ref 6–12)
POTASSIUM BLD-SCNC: 3.7 MMOL/L (ref 3.5–5.2)
PROT SERPL-MCNC: 6.6 G/DL (ref 6–8.5)
RBC # BLD AUTO: 3.11 10*6/MM3 (ref 3.77–5.28)
SODIUM BLD-SCNC: 143 MMOL/L (ref 136–145)
TIBC SERPL-MCNC: 258 MCG/DL (ref 298–536)
TRANSFERRIN SERPL-MCNC: 173 MG/DL (ref 200–360)
WBC NRBC COR # BLD: 3.04 10*3/MM3 (ref 3.4–10.8)

## 2019-10-25 PROCEDURE — 82607 VITAMIN B-12: CPT | Performed by: INTERNAL MEDICINE

## 2019-10-25 PROCEDURE — 36415 COLL VENOUS BLD VENIPUNCTURE: CPT

## 2019-10-25 PROCEDURE — 25010000002 EPOETIN ALFA-EPBX 40000 UNIT/ML SOLUTION: Performed by: INTERNAL MEDICINE

## 2019-10-25 PROCEDURE — 84466 ASSAY OF TRANSFERRIN: CPT | Performed by: INTERNAL MEDICINE

## 2019-10-25 PROCEDURE — 82728 ASSAY OF FERRITIN: CPT | Performed by: INTERNAL MEDICINE

## 2019-10-25 PROCEDURE — 96372 THER/PROPH/DIAG INJ SC/IM: CPT

## 2019-10-25 PROCEDURE — 82746 ASSAY OF FOLIC ACID SERUM: CPT | Performed by: INTERNAL MEDICINE

## 2019-10-25 PROCEDURE — 83540 ASSAY OF IRON: CPT | Performed by: INTERNAL MEDICINE

## 2019-10-25 PROCEDURE — 80053 COMPREHEN METABOLIC PANEL: CPT | Performed by: INTERNAL MEDICINE

## 2019-10-25 PROCEDURE — 85025 COMPLETE CBC W/AUTO DIFF WBC: CPT | Performed by: INTERNAL MEDICINE

## 2019-10-25 RX ADMIN — EPOETIN ALFA-EPBX 40000 UNITS: 40000 INJECTION, SOLUTION INTRAVENOUS; SUBCUTANEOUS at 14:21

## 2019-10-26 LAB
FOLATE SERPL-MCNC: 11.4 NG/ML (ref 4.78–24.2)
VIT B12 BLD-MCNC: 406 PG/ML (ref 211–946)

## 2019-12-06 ENCOUNTER — LAB (OUTPATIENT)
Dept: LAB | Facility: HOSPITAL | Age: 49
End: 2019-12-06

## 2019-12-06 ENCOUNTER — INFUSION (OUTPATIENT)
Dept: ONCOLOGY | Facility: HOSPITAL | Age: 49
End: 2019-12-06

## 2019-12-06 VITALS
SYSTOLIC BLOOD PRESSURE: 137 MMHG | HEART RATE: 74 BPM | WEIGHT: 270 LBS | DIASTOLIC BLOOD PRESSURE: 95 MMHG | BODY MASS INDEX: 47.84 KG/M2 | HEIGHT: 63 IN | TEMPERATURE: 98 F

## 2019-12-06 DIAGNOSIS — D50.9 IRON DEFICIENCY ANEMIA, UNSPECIFIED IRON DEFICIENCY ANEMIA TYPE: ICD-10-CM

## 2019-12-06 DIAGNOSIS — N18.4 ANEMIA IN STAGE 4 CHRONIC KIDNEY DISEASE (HCC): Primary | ICD-10-CM

## 2019-12-06 DIAGNOSIS — D63.1 ANEMIA IN STAGE 3 CHRONIC KIDNEY DISEASE (HCC): Primary | ICD-10-CM

## 2019-12-06 DIAGNOSIS — D63.1 ANEMIA IN STAGE 4 CHRONIC KIDNEY DISEASE (HCC): Primary | ICD-10-CM

## 2019-12-06 DIAGNOSIS — N18.30 ANEMIA IN STAGE 3 CHRONIC KIDNEY DISEASE (HCC): Primary | ICD-10-CM

## 2019-12-06 LAB
BASOPHILS # BLD AUTO: 0.03 10*3/MM3 (ref 0–0.2)
BASOPHILS NFR BLD AUTO: 1.1 % (ref 0–1.5)
DEPRECATED RDW RBC AUTO: 51.8 FL (ref 37–54)
EOSINOPHIL # BLD AUTO: 0.2 10*3/MM3 (ref 0–0.4)
EOSINOPHIL NFR BLD AUTO: 7.1 % (ref 0.3–6.2)
ERYTHROCYTE [DISTWIDTH] IN BLOOD BY AUTOMATED COUNT: 14.2 % (ref 12.3–15.4)
HCT VFR BLD AUTO: 30.3 % (ref 34–46.6)
HGB BLD-MCNC: 9.7 G/DL (ref 12–15.9)
HOLD SPECIMEN: NORMAL
IMM GRANULOCYTES # BLD AUTO: 0 10*3/MM3 (ref 0–0.05)
IMM GRANULOCYTES NFR BLD AUTO: 0 % (ref 0–0.5)
LYMPHOCYTES # BLD AUTO: 0.75 10*3/MM3 (ref 0.7–3.1)
LYMPHOCYTES NFR BLD AUTO: 26.6 % (ref 19.6–45.3)
MCH RBC QN AUTO: 31.9 PG (ref 26.6–33)
MCHC RBC AUTO-ENTMCNC: 32 G/DL (ref 31.5–35.7)
MCV RBC AUTO: 99.7 FL (ref 79–97)
MONOCYTES # BLD AUTO: 0.31 10*3/MM3 (ref 0.1–0.9)
MONOCYTES NFR BLD AUTO: 11 % (ref 5–12)
NEUTROPHILS # BLD AUTO: 1.53 10*3/MM3 (ref 1.7–7)
NEUTROPHILS NFR BLD AUTO: 54.2 % (ref 42.7–76)
NRBC BLD AUTO-RTO: 0 /100 WBC (ref 0–0.2)
PLATELET # BLD AUTO: 130 10*3/MM3 (ref 140–450)
PMV BLD AUTO: 10.1 FL (ref 6–12)
RBC # BLD AUTO: 3.04 10*6/MM3 (ref 3.77–5.28)
WBC NRBC COR # BLD: 2.82 10*3/MM3 (ref 3.4–10.8)

## 2019-12-06 PROCEDURE — 85025 COMPLETE CBC W/AUTO DIFF WBC: CPT | Performed by: INTERNAL MEDICINE

## 2019-12-06 PROCEDURE — 25010000002 EPOETIN ALFA-EPBX 40000 UNIT/ML SOLUTION: Performed by: INTERNAL MEDICINE

## 2019-12-06 PROCEDURE — 96372 THER/PROPH/DIAG INJ SC/IM: CPT

## 2019-12-06 PROCEDURE — 36415 COLL VENOUS BLD VENIPUNCTURE: CPT

## 2019-12-06 RX ADMIN — EPOETIN ALFA-EPBX 40000 UNITS: 40000 INJECTION, SOLUTION INTRAVENOUS; SUBCUTANEOUS at 15:12

## 2019-12-12 ENCOUNTER — OFFICE VISIT (OUTPATIENT)
Dept: GASTROENTEROLOGY | Age: 49
End: 2019-12-12
Payer: MEDICARE

## 2019-12-12 VITALS
HEART RATE: 88 BPM | SYSTOLIC BLOOD PRESSURE: 110 MMHG | DIASTOLIC BLOOD PRESSURE: 70 MMHG | WEIGHT: 272 LBS | HEIGHT: 63 IN | OXYGEN SATURATION: 98 % | BODY MASS INDEX: 48.2 KG/M2

## 2019-12-12 DIAGNOSIS — K64.8 INTERNAL HEMORRHOIDS: ICD-10-CM

## 2019-12-12 DIAGNOSIS — K60.2 ANAL FISSURE: ICD-10-CM

## 2019-12-12 DIAGNOSIS — K58.1 IRRITABLE BOWEL SYNDROME WITH CONSTIPATION: Primary | ICD-10-CM

## 2019-12-12 PROCEDURE — G8427 DOCREV CUR MEDS BY ELIG CLIN: HCPCS | Performed by: NURSE PRACTITIONER

## 2019-12-12 PROCEDURE — G8484 FLU IMMUNIZE NO ADMIN: HCPCS | Performed by: NURSE PRACTITIONER

## 2019-12-12 PROCEDURE — 99214 OFFICE O/P EST MOD 30 MIN: CPT | Performed by: NURSE PRACTITIONER

## 2019-12-12 PROCEDURE — 1036F TOBACCO NON-USER: CPT | Performed by: NURSE PRACTITIONER

## 2019-12-12 PROCEDURE — 3017F COLORECTAL CA SCREEN DOC REV: CPT | Performed by: NURSE PRACTITIONER

## 2019-12-12 PROCEDURE — G8417 CALC BMI ABV UP PARAM F/U: HCPCS | Performed by: NURSE PRACTITIONER

## 2019-12-12 RX ORDER — MORPHINE SULFATE 15 MG/1
15 TABLET, FILM COATED, EXTENDED RELEASE ORAL EVERY 8 HOURS
COMMUNITY
End: 2021-09-29 | Stop reason: ALTCHOICE

## 2019-12-12 RX ORDER — POLYETHYLENE GLYCOL 3350 17 G/17G
POWDER, FOR SOLUTION ORAL
Qty: 510 G | Refills: 11 | Status: SHIPPED | OUTPATIENT
Start: 2019-12-12 | End: 2022-10-04

## 2019-12-12 RX ORDER — PREGABALIN 100 MG/1
CAPSULE ORAL
Refills: 3 | COMMUNITY
Start: 2019-10-15 | End: 2020-02-17

## 2019-12-12 RX ORDER — BUPROPION HYDROCHLORIDE 150 MG/1
TABLET ORAL
COMMUNITY
Start: 2019-09-23 | End: 2020-02-17

## 2019-12-12 ASSESSMENT — ENCOUNTER SYMPTOMS
ANAL BLEEDING: 1
ABDOMINAL DISTENTION: 0
CHEST TIGHTNESS: 0
SORE THROAT: 0
CONSTIPATION: 1
DIARRHEA: 0
BLOOD IN STOOL: 0
BACK PAIN: 1
RECTAL PAIN: 0
VOICE CHANGE: 0
NAUSEA: 0
SHORTNESS OF BREATH: 1
VOMITING: 0
COUGH: 0
ABDOMINAL PAIN: 1

## 2020-01-03 ENCOUNTER — APPOINTMENT (OUTPATIENT)
Dept: LAB | Facility: HOSPITAL | Age: 50
End: 2020-01-03

## 2020-01-03 ENCOUNTER — APPOINTMENT (OUTPATIENT)
Dept: ONCOLOGY | Facility: HOSPITAL | Age: 50
End: 2020-01-03

## 2020-01-10 ENCOUNTER — LAB (OUTPATIENT)
Dept: LAB | Facility: HOSPITAL | Age: 50
End: 2020-01-10

## 2020-01-10 ENCOUNTER — INFUSION (OUTPATIENT)
Dept: ONCOLOGY | Facility: HOSPITAL | Age: 50
End: 2020-01-10

## 2020-01-10 VITALS
HEIGHT: 63 IN | SYSTOLIC BLOOD PRESSURE: 108 MMHG | HEART RATE: 84 BPM | BODY MASS INDEX: 47.66 KG/M2 | WEIGHT: 269 LBS | OXYGEN SATURATION: 100 % | DIASTOLIC BLOOD PRESSURE: 62 MMHG | TEMPERATURE: 98.3 F

## 2020-01-10 DIAGNOSIS — N18.4 ANEMIA IN STAGE 4 CHRONIC KIDNEY DISEASE (HCC): Primary | ICD-10-CM

## 2020-01-10 DIAGNOSIS — N18.9 CHRONIC KIDNEY DISEASE, UNSPECIFIED CKD STAGE: ICD-10-CM

## 2020-01-10 DIAGNOSIS — N18.9 ANEMIA IN CHRONIC KIDNEY DISEASE, UNSPECIFIED CKD STAGE: ICD-10-CM

## 2020-01-10 DIAGNOSIS — D50.9 IRON DEFICIENCY ANEMIA, UNSPECIFIED IRON DEFICIENCY ANEMIA TYPE: ICD-10-CM

## 2020-01-10 DIAGNOSIS — N18.30 ANEMIA IN STAGE 3 CHRONIC KIDNEY DISEASE (HCC): ICD-10-CM

## 2020-01-10 DIAGNOSIS — D63.1 ANEMIA IN STAGE 3 CHRONIC KIDNEY DISEASE (HCC): ICD-10-CM

## 2020-01-10 DIAGNOSIS — D63.1 ANEMIA IN CHRONIC KIDNEY DISEASE, UNSPECIFIED CKD STAGE: ICD-10-CM

## 2020-01-10 DIAGNOSIS — D63.1 ANEMIA IN STAGE 4 CHRONIC KIDNEY DISEASE (HCC): Primary | ICD-10-CM

## 2020-01-10 LAB
ALBUMIN SERPL-MCNC: 4 G/DL (ref 3.5–5.2)
ALBUMIN/GLOB SERPL: 1.5 G/DL
ALP SERPL-CCNC: 68 U/L (ref 39–117)
ALT SERPL W P-5'-P-CCNC: 10 U/L (ref 1–33)
ANION GAP SERPL CALCULATED.3IONS-SCNC: 12 MMOL/L (ref 5–15)
AST SERPL-CCNC: 13 U/L (ref 1–32)
BASOPHILS # BLD AUTO: 0.02 10*3/MM3 (ref 0–0.2)
BASOPHILS NFR BLD AUTO: 0.8 % (ref 0–1.5)
BILIRUB SERPL-MCNC: 0.2 MG/DL (ref 0.2–1.2)
BUN BLD-MCNC: 36 MG/DL (ref 6–20)
BUN/CREAT SERPL: 24.2 (ref 7–25)
CALCIUM SPEC-SCNC: 8.4 MG/DL (ref 8.6–10.5)
CHLORIDE SERPL-SCNC: 108 MMOL/L (ref 98–107)
CO2 SERPL-SCNC: 22 MMOL/L (ref 22–29)
CREAT BLD-MCNC: 1.49 MG/DL (ref 0.57–1)
DEPRECATED RDW RBC AUTO: 55.7 FL (ref 37–54)
EOSINOPHIL # BLD AUTO: 0.17 10*3/MM3 (ref 0–0.4)
EOSINOPHIL NFR BLD AUTO: 6.7 % (ref 0.3–6.2)
ERYTHROCYTE [DISTWIDTH] IN BLOOD BY AUTOMATED COUNT: 15.1 % (ref 12.3–15.4)
FERRITIN SERPL-MCNC: 302.5 NG/ML (ref 13–150)
GFR SERPL CREATININE-BSD FRML MDRD: 37 ML/MIN/1.73
GLOBULIN UR ELPH-MCNC: 2.7 GM/DL
GLUCOSE BLD-MCNC: 107 MG/DL (ref 65–99)
HCT VFR BLD AUTO: 32.3 % (ref 34–46.6)
HGB BLD-MCNC: 10.1 G/DL (ref 12–15.9)
HOLD SPECIMEN: NORMAL
IMM GRANULOCYTES # BLD AUTO: 0.01 10*3/MM3 (ref 0–0.05)
IMM GRANULOCYTES NFR BLD AUTO: 0.4 % (ref 0–0.5)
IRON 24H UR-MRATE: 50 MCG/DL (ref 37–145)
IRON 24H UR-MRATE: 50 MCG/DL (ref 37–145)
IRON SATN MFR SERPL: 18 % (ref 20–50)
LYMPHOCYTES # BLD AUTO: 0.77 10*3/MM3 (ref 0.7–3.1)
LYMPHOCYTES NFR BLD AUTO: 30.2 % (ref 19.6–45.3)
MCH RBC QN AUTO: 31.1 PG (ref 26.6–33)
MCHC RBC AUTO-ENTMCNC: 31.3 G/DL (ref 31.5–35.7)
MCV RBC AUTO: 99.4 FL (ref 79–97)
MONOCYTES # BLD AUTO: 0.31 10*3/MM3 (ref 0.1–0.9)
MONOCYTES NFR BLD AUTO: 12.2 % (ref 5–12)
NEUTROPHILS # BLD AUTO: 1.27 10*3/MM3 (ref 1.7–7)
NEUTROPHILS NFR BLD AUTO: 49.7 % (ref 42.7–76)
NRBC BLD AUTO-RTO: 0 /100 WBC (ref 0–0.2)
PLATELET # BLD AUTO: 108 10*3/MM3 (ref 140–450)
PMV BLD AUTO: 10.6 FL (ref 6–12)
POTASSIUM BLD-SCNC: 4.4 MMOL/L (ref 3.5–5.2)
PROT SERPL-MCNC: 6.7 G/DL (ref 6–8.5)
RBC # BLD AUTO: 3.25 10*6/MM3 (ref 3.77–5.28)
SODIUM BLD-SCNC: 142 MMOL/L (ref 136–145)
TIBC SERPL-MCNC: 279 MCG/DL (ref 298–536)
TRANSFERRIN SERPL-MCNC: 187 MG/DL (ref 200–360)
WBC NRBC COR # BLD: 2.55 10*3/MM3 (ref 3.4–10.8)

## 2020-01-10 PROCEDURE — 80053 COMPREHEN METABOLIC PANEL: CPT

## 2020-01-10 PROCEDURE — 83540 ASSAY OF IRON: CPT

## 2020-01-10 PROCEDURE — 82746 ASSAY OF FOLIC ACID SERUM: CPT

## 2020-01-10 PROCEDURE — 82607 VITAMIN B-12: CPT

## 2020-01-10 PROCEDURE — 36415 COLL VENOUS BLD VENIPUNCTURE: CPT

## 2020-01-10 PROCEDURE — 25010000002 EPOETIN ALFA-EPBX 40000 UNIT/ML SOLUTION: Performed by: INTERNAL MEDICINE

## 2020-01-10 PROCEDURE — 82728 ASSAY OF FERRITIN: CPT

## 2020-01-10 PROCEDURE — 84466 ASSAY OF TRANSFERRIN: CPT

## 2020-01-10 PROCEDURE — 85025 COMPLETE CBC W/AUTO DIFF WBC: CPT

## 2020-01-10 PROCEDURE — 96372 THER/PROPH/DIAG INJ SC/IM: CPT

## 2020-01-10 RX ADMIN — EPOETIN ALFA-EPBX 40000 UNITS: 40000 INJECTION, SOLUTION INTRAVENOUS; SUBCUTANEOUS at 14:59

## 2020-01-11 LAB
FOLATE SERPL-MCNC: 5.78 NG/ML (ref 4.78–24.2)
FOLATE SERPL-MCNC: 5.82 NG/ML (ref 4.78–24.2)
VIT B12 BLD-MCNC: 357 PG/ML (ref 211–946)

## 2020-01-22 ENCOUNTER — TELEPHONE (OUTPATIENT)
Dept: GASTROENTEROLOGY | Age: 50
End: 2020-01-22

## 2020-01-22 NOTE — TELEPHONE ENCOUNTER
Assessment:      1. Irritable bowel syndrome with constipation    2. Anal fissure    3. Internal hemorrhoids                        Plan:      Nifedipine 0.2% + lidocaine 2.0% génesis for anal fissure. Apply pea size amount twice daily. Written rx given     Miralax one teaspoon daily mixed as directed until you are having daily bowel movement. If no bm for 4 days increase to two teaspoons daily and gradually increase every 3-4 days until desired bowel movement is achieved. If you do not have bm for more than 4 days please use magnesim citrate, 1/2 bottle, to prevent becoming obstructed but continue to use miralax daily.     If your stools become too loose or too frequent on daily dose you may reduce the amount taken daily. Make reductions gradually, every 3-4 days. Adjust dose, more or less, as needed for desired bm. You should have a soft bm at least every 3 days.      Miralax is safe and effective for long term relief of chronic constipation.          Last OV BG aprn 12-12-19. OV sooner than the one she has on 3-12-20, she is now scheduled with Kiera Carrillo on 1-30-20. Patient called today from [phone] said the Nifedipine + Lidocaine cream is not helping at all, she said the Miralax Bid is not helping at all, patient is crying due to bad rectal pain and needs to have a BM but she is so constipated it will not come out and she has rectal bleeding. I told the patient she can for a few days increase the Miralax to Tid to Qid with water, if this is no help can try [facility] or MOM to see if this will loosen her bowels, I told the patient if her pain gets higher and her RB increases to go to ED if she feels she has an emergent issue. I will send to  aprn at high priority for further recommendations.  Darryl cordero

## 2020-01-29 NOTE — PROGRESS NOTES
constipation 20 capsule 0    tolterodine (DETROL LA) 4 MG ER capsule Take 4 mg by mouth daily as needed       topiramate (TOPAMAX) 50 MG tablet Take 50 mg by mouth 2 times daily Indications: Backache       esomeprazole (NEXIUM) 40 MG capsule Take 40 mg by mouth every morning (before breakfast)      oxybutynin (DITROPAN-XL) 5 MG CR tablet Take 5 mg by mouth daily      DULoxetine (CYMBALTA) 60 MG capsule Take 60 mg by mouth daily Indications: Mood Changes        No current facility-administered medications for this visit. Allergies   Allergen Reactions    Tape [Adhesive Thresea Certain         reports that she has never smoked. She has never used smokeless tobacco. She reports that she does not drink alcohol or use drugs. Review of Systems   Constitutional: Negative for appetite change, fever and unexpected weight change. HENT: Negative for sore throat and voice change. Respiratory: Positive for shortness of breath. Negative for cough and chest tightness. Cardiovascular: Negative for chest pain, palpitations and leg swelling. Gastrointestinal: Positive for abdominal pain, anal bleeding and constipation. Negative for abdominal distention, blood in stool, diarrhea, nausea, rectal pain and vomiting. Abdominal cramping   Musculoskeletal: Positive for arthralgias and back pain. Negative for gait problem. Skin: Negative for pallor, rash and wound. Neurological: Negative for dizziness, weakness and light-headedness. Hematological: Negative for adenopathy. Does not bruise/bleed easily. All other systems reviewed and are negative. Objective:   Physical Exam  Constitutional:       General: She is not in acute distress. Appearance: Normal appearance. She is well-developed. Comments: /80   Pulse 80   Ht 5' 3\" (1.6 m)   Wt 271 lb (122.9 kg)   SpO2 98%   BMI 48.01 kg/m²      Eyes:      General: No scleral icterus.      Conjunctiva/sclera: Conjunctivae normal.   Neck:

## 2020-01-30 ENCOUNTER — OFFICE VISIT (OUTPATIENT)
Dept: GASTROENTEROLOGY | Age: 50
End: 2020-01-30
Payer: MEDICARE

## 2020-01-30 VITALS
HEART RATE: 80 BPM | BODY MASS INDEX: 48.02 KG/M2 | OXYGEN SATURATION: 98 % | SYSTOLIC BLOOD PRESSURE: 135 MMHG | WEIGHT: 271 LBS | DIASTOLIC BLOOD PRESSURE: 80 MMHG | HEIGHT: 63 IN

## 2020-01-30 PROCEDURE — G8427 DOCREV CUR MEDS BY ELIG CLIN: HCPCS | Performed by: NURSE PRACTITIONER

## 2020-01-30 PROCEDURE — 1036F TOBACCO NON-USER: CPT | Performed by: NURSE PRACTITIONER

## 2020-01-30 PROCEDURE — 3017F COLORECTAL CA SCREEN DOC REV: CPT | Performed by: NURSE PRACTITIONER

## 2020-01-30 PROCEDURE — G8484 FLU IMMUNIZE NO ADMIN: HCPCS | Performed by: NURSE PRACTITIONER

## 2020-01-30 PROCEDURE — 99214 OFFICE O/P EST MOD 30 MIN: CPT | Performed by: NURSE PRACTITIONER

## 2020-01-30 PROCEDURE — G8417 CALC BMI ABV UP PARAM F/U: HCPCS | Performed by: NURSE PRACTITIONER

## 2020-01-30 RX ORDER — OXYCODONE AND ACETAMINOPHEN 7.5; 3 MG/1; MG/1
1 TABLET ORAL EVERY 6 HOURS
Status: ON HOLD | COMMUNITY
Start: 2019-10-15 | End: 2022-03-24 | Stop reason: HOSPADM

## 2020-01-30 ASSESSMENT — ENCOUNTER SYMPTOMS
ANAL BLEEDING: 1
CONSTIPATION: 1
NAUSEA: 0
BLOOD IN STOOL: 0
DIARRHEA: 0
ABDOMINAL PAIN: 1
COUGH: 0
SORE THROAT: 0
SHORTNESS OF BREATH: 1
BACK PAIN: 1
ABDOMINAL DISTENTION: 0
RECTAL PAIN: 0
CHEST TIGHTNESS: 0
VOICE CHANGE: 0
VOMITING: 0

## 2020-02-11 ENCOUNTER — OFFICE VISIT (OUTPATIENT)
Dept: SURGERY | Age: 50
End: 2020-02-11
Payer: MEDICARE

## 2020-02-11 VITALS
BODY MASS INDEX: 47.13 KG/M2 | SYSTOLIC BLOOD PRESSURE: 132 MMHG | WEIGHT: 266 LBS | HEIGHT: 63 IN | DIASTOLIC BLOOD PRESSURE: 78 MMHG

## 2020-02-11 PROCEDURE — G8484 FLU IMMUNIZE NO ADMIN: HCPCS | Performed by: PHYSICIAN ASSISTANT

## 2020-02-11 PROCEDURE — 3017F COLORECTAL CA SCREEN DOC REV: CPT | Performed by: PHYSICIAN ASSISTANT

## 2020-02-11 PROCEDURE — G8427 DOCREV CUR MEDS BY ELIG CLIN: HCPCS | Performed by: PHYSICIAN ASSISTANT

## 2020-02-11 PROCEDURE — 99203 OFFICE O/P NEW LOW 30 MIN: CPT | Performed by: PHYSICIAN ASSISTANT

## 2020-02-11 PROCEDURE — G8417 CALC BMI ABV UP PARAM F/U: HCPCS | Performed by: PHYSICIAN ASSISTANT

## 2020-02-11 PROCEDURE — 1036F TOBACCO NON-USER: CPT | Performed by: PHYSICIAN ASSISTANT

## 2020-02-11 NOTE — PROGRESS NOTES
SUBJECTIVE:  Ms. Stewart Lechuga is a 44 y.o. female that was referred by Naomie Garcia due to problems with a chronic anal fissure. Ms. Mariama Gillis has a history of taking opioids for pain. She was noted to get extremely constipated and resulted in an anal fissure. She was seen by Naomie Garcia and was placed on Dr. Austin Cohen crack cream with sitz bath's. After a month of treatment the patient reports that she has not improved and continues to suffer from the pain. Physical examination the office confirmed a left lateral anal fissure with mild bleeding noted to exam.  Risks, benefits, and options were discussed with the patient. She is agreeable to accept all risk and proceed with a closed lateral sphincterotomy by Dr. Javier Lei. Allergies: Tape Jason Renan tape]    Current Outpatient Medications   Medication Sig Dispense Refill    oxyCODONE-acetaminophen (PERCOCET) 5-325 MG per tablet Take 1 tablet by mouth every morning (before breakfast).  morphine (MS CONTIN) 15 MG extended release tablet Take 15 mg by mouth every 8 hours.  polyethylene glycol (MIRALAX) powder 1 tsp daily for constipation.  Adjust dose for desired bm 305 g 11    folic acid (FOLVITE) 993 MCG tablet Take 400 mcg by mouth daily      OXYGEN Inhale 2 L/min into the lungs continuous       epoetin cheryl (PROCRIT) 04414 UNIT/ML injection Inject 1 Units into the skin as needed      tiZANidine (ZANAFLEX) 4 MG tablet Take 4 mg by mouth 3 times daily as needed      levothyroxine (SYNTHROID) 88 MCG tablet Take 88 mcg by mouth daily      metoprolol succinate (TOPROL XL) 25 MG extended release tablet Take 50 mg by mouth daily       tolterodine (DETROL LA) 4 MG ER capsule Take 4 mg by mouth daily       topiramate (TOPAMAX) 50 MG tablet Take 100 mg by mouth nightly Indications: Backache       esomeprazole (NEXIUM) 40 MG capsule Take 40 mg by mouth 2 times daily       oxybutynin (DITROPAN-XL) 5 MG CR tablet Take 5 mg by mouth every evening       DULoxetine (CYMBALTA) 60 MG capsule Take 60 mg by mouth daily Indications: Mood Changes       pregabalin (LYRICA) 100 MG capsule Take 100 mg by mouth 3 times daily.  buPROPion (WELLBUTRIN XL) 150 MG extended release tablet Take 150 mg by mouth every morning      traZODone (DESYREL) 50 MG tablet Take 50 mg by mouth nightly      methotrexate (RHEUMATREX) 2.5 MG chemo tablet Take 10 mg by mouth once a week EVERY MONDAY       No current facility-administered medications for this visit.         Past Medical History:   Diagnosis Date    Asthma     B12 deficiency     Chronic back pain     Chronic pancreatitis (HCC)     Colon polyps     COPD (chronic obstructive pulmonary disease) (HCC)     Sarcoidosis - O2 at 2L at all times    Depression     Fibromyalgia     GERD (gastroesophageal reflux disease)     Headache     Hypertension     Hyperthyroidism     Irritable bowel syndrome     Kidney stones     Neuropathy     On home oxygen therapy     2 liters    Paroxysmal supraventricular tachycardia (HCC)     PONV (postoperative nausea and vomiting)     Restless legs syndrome     Sarcoidosis 11/01/2016    Scoliosis     SVT (supraventricular tachycardia) (HCC)      Past Surgical History:   Procedure Laterality Date    ABLATION OF DYSRHYTHMIC FOCUS  2016    SVT    CHOLECYSTECTOMY      COLONOSCOPY  10/13/2014    Dr. Aniceto Donahue AP-5 yr recall    COLONOSCOPY  05/20/2008    Dr. Abdi Sneed: unremarkable    COLONOSCOPY  08/23/2017    Dr FloresM-5 yr recall    COLONOSCOPY  09/06/2018    Dr. Tomasa Schultz, normal-5 yr recall    HYSTERECTOMY      JOINT REPLACEMENT  12/30/2013    Patella joint replacement    LITHOTRIPSY      PLANTAR FASCIA SURGERY Left     UT REVISE MEDIAN N/CARPAL TUNNEL SURG Right 5/8/2018    CARPAL TUNNEL RELEASE performed by Jermaine Marc MD at [place] [id number] Right 5/4/2017    KNEE TOTAL ARTHROPLASTY REVISION performed by Jermaine Marc MD at exam.  Musculoskeletal: Normal range of motion. General: No swelling or tenderness. Right lower leg: No edema. Left lower leg: No edema. Lymphadenopathy:      Cervical: No cervical adenopathy. Skin:     General: Skin is warm and dry. Findings: No erythema. Neurological:      General: No focal deficit present. Mental Status: She is alert and oriented to person, place, and time. Coordination: Coordination normal.   Psychiatric:         Mood and Affect: Mood normal.         Behavior: Behavior normal.         Thought Content: Thought content normal.         Judgment: Judgment normal.         ASSESSMENT:   Diagnosis Orders   1. Chronic anterior anal fissure         PLAN:  The risks, benefits, and options were discussed with the pt. She  is willing to accept all risks and proceed with a Lateral Sphincterotomy. The surgical risks included but not limited to Bleeding, infection, risk of recurrence, and etc. The anesthetic risks included heart attacks, strokes, pneumonia, phlebitis, etc.  She is willing to accept all risks and proceed with surgery. No guarantees have been given.       Electronically signed by José Manuel Painter PA-C on 2/11/20 at 2:15 PM

## 2020-02-14 ENCOUNTER — LAB (OUTPATIENT)
Dept: LAB | Facility: HOSPITAL | Age: 50
End: 2020-02-14

## 2020-02-14 ENCOUNTER — INFUSION (OUTPATIENT)
Dept: ONCOLOGY | Facility: HOSPITAL | Age: 50
End: 2020-02-14

## 2020-02-14 VITALS
DIASTOLIC BLOOD PRESSURE: 85 MMHG | RESPIRATION RATE: 18 BRPM | WEIGHT: 258 LBS | TEMPERATURE: 98.6 F | SYSTOLIC BLOOD PRESSURE: 123 MMHG | BODY MASS INDEX: 45.71 KG/M2 | OXYGEN SATURATION: 99 % | HEART RATE: 93 BPM | HEIGHT: 63 IN

## 2020-02-14 DIAGNOSIS — N18.9 CHRONIC KIDNEY DISEASE, UNSPECIFIED CKD STAGE: ICD-10-CM

## 2020-02-14 DIAGNOSIS — N18.30 ANEMIA IN STAGE 3 CHRONIC KIDNEY DISEASE (HCC): ICD-10-CM

## 2020-02-14 DIAGNOSIS — D63.1 ANEMIA IN CHRONIC KIDNEY DISEASE, UNSPECIFIED CKD STAGE: ICD-10-CM

## 2020-02-14 DIAGNOSIS — D50.9 IRON DEFICIENCY ANEMIA, UNSPECIFIED IRON DEFICIENCY ANEMIA TYPE: ICD-10-CM

## 2020-02-14 DIAGNOSIS — N18.9 ANEMIA IN CHRONIC KIDNEY DISEASE, UNSPECIFIED CKD STAGE: ICD-10-CM

## 2020-02-14 DIAGNOSIS — D63.1 ANEMIA IN STAGE 3 CHRONIC KIDNEY DISEASE (HCC): ICD-10-CM

## 2020-02-14 LAB
ALBUMIN SERPL-MCNC: 4.8 G/DL (ref 3.5–5.2)
ALBUMIN/GLOB SERPL: 1.6 G/DL
ALP SERPL-CCNC: 91 U/L (ref 39–117)
ALT SERPL W P-5'-P-CCNC: 11 U/L (ref 1–33)
ANION GAP SERPL CALCULATED.3IONS-SCNC: 13 MMOL/L (ref 5–15)
AST SERPL-CCNC: 15 U/L (ref 1–32)
BASOPHILS # BLD AUTO: 0.05 10*3/MM3 (ref 0–0.2)
BASOPHILS NFR BLD AUTO: 1.2 % (ref 0–1.5)
BILIRUB SERPL-MCNC: 0.4 MG/DL (ref 0.2–1.2)
BUN BLD-MCNC: 22 MG/DL (ref 6–20)
BUN/CREAT SERPL: 15.9 (ref 7–25)
CALCIUM SPEC-SCNC: 9.1 MG/DL (ref 8.6–10.5)
CHLORIDE SERPL-SCNC: 107 MMOL/L (ref 98–107)
CO2 SERPL-SCNC: 21 MMOL/L (ref 22–29)
CREAT BLD-MCNC: 1.38 MG/DL (ref 0.57–1)
DEPRECATED RDW RBC AUTO: 49.8 FL (ref 37–54)
EOSINOPHIL # BLD AUTO: 0.13 10*3/MM3 (ref 0–0.4)
EOSINOPHIL NFR BLD AUTO: 3 % (ref 0.3–6.2)
ERYTHROCYTE [DISTWIDTH] IN BLOOD BY AUTOMATED COUNT: 14.3 % (ref 12.3–15.4)
FERRITIN SERPL-MCNC: 219.1 NG/ML (ref 13–150)
GFR SERPL CREATININE-BSD FRML MDRD: 40 ML/MIN/1.73
GLOBULIN UR ELPH-MCNC: 3 GM/DL
GLUCOSE BLD-MCNC: 112 MG/DL (ref 65–99)
HCT VFR BLD AUTO: 40.4 % (ref 34–46.6)
HGB BLD-MCNC: 12.9 G/DL (ref 12–15.9)
HOLD SPECIMEN: NORMAL
IMM GRANULOCYTES # BLD AUTO: 0.01 10*3/MM3 (ref 0–0.05)
IMM GRANULOCYTES NFR BLD AUTO: 0.2 % (ref 0–0.5)
IRON 24H UR-MRATE: 62 MCG/DL (ref 37–145)
IRON 24H UR-MRATE: 62 MCG/DL (ref 37–145)
IRON SATN MFR SERPL: 20 % (ref 20–50)
LYMPHOCYTES # BLD AUTO: 0.98 10*3/MM3 (ref 0.7–3.1)
LYMPHOCYTES NFR BLD AUTO: 22.9 % (ref 19.6–45.3)
MCH RBC QN AUTO: 30.4 PG (ref 26.6–33)
MCHC RBC AUTO-ENTMCNC: 31.9 G/DL (ref 31.5–35.7)
MCV RBC AUTO: 95.3 FL (ref 79–97)
MONOCYTES # BLD AUTO: 0.43 10*3/MM3 (ref 0.1–0.9)
MONOCYTES NFR BLD AUTO: 10 % (ref 5–12)
NEUTROPHILS # BLD AUTO: 2.68 10*3/MM3 (ref 1.7–7)
NEUTROPHILS NFR BLD AUTO: 62.7 % (ref 42.7–76)
NRBC BLD AUTO-RTO: 0 /100 WBC (ref 0–0.2)
PLATELET # BLD AUTO: 159 10*3/MM3 (ref 140–450)
PMV BLD AUTO: 10.5 FL (ref 6–12)
POTASSIUM BLD-SCNC: 4 MMOL/L (ref 3.5–5.2)
PROT SERPL-MCNC: 7.8 G/DL (ref 6–8.5)
RBC # BLD AUTO: 4.24 10*6/MM3 (ref 3.77–5.28)
SODIUM BLD-SCNC: 141 MMOL/L (ref 136–145)
TIBC SERPL-MCNC: 310 MCG/DL (ref 298–536)
TRANSFERRIN SERPL-MCNC: 208 MG/DL (ref 200–360)
WBC NRBC COR # BLD: 4.28 10*3/MM3 (ref 3.4–10.8)

## 2020-02-14 PROCEDURE — 36415 COLL VENOUS BLD VENIPUNCTURE: CPT

## 2020-02-14 PROCEDURE — G0463 HOSPITAL OUTPT CLINIC VISIT: HCPCS

## 2020-02-14 PROCEDURE — 82607 VITAMIN B-12: CPT

## 2020-02-14 PROCEDURE — 82728 ASSAY OF FERRITIN: CPT

## 2020-02-14 PROCEDURE — 80053 COMPREHEN METABOLIC PANEL: CPT

## 2020-02-14 PROCEDURE — 82746 ASSAY OF FOLIC ACID SERUM: CPT

## 2020-02-14 PROCEDURE — 83540 ASSAY OF IRON: CPT

## 2020-02-14 PROCEDURE — 84466 ASSAY OF TRANSFERRIN: CPT

## 2020-02-14 PROCEDURE — 85025 COMPLETE CBC W/AUTO DIFF WBC: CPT

## 2020-02-15 LAB
FOLATE SERPL-MCNC: 10.6 NG/ML (ref 4.78–24.2)
FOLATE SERPL-MCNC: 11.7 NG/ML (ref 4.78–24.2)
VIT B12 BLD-MCNC: 372 PG/ML (ref 211–946)

## 2020-02-17 ENCOUNTER — PREP FOR PROCEDURE (OUTPATIENT)
Dept: SURGERY | Age: 50
End: 2020-02-17

## 2020-02-17 ENCOUNTER — HOSPITAL ENCOUNTER (OUTPATIENT)
Dept: PREADMISSION TESTING | Age: 50
Discharge: HOME | End: 2020-02-21
Payer: MEDICARE

## 2020-02-17 VITALS — BODY MASS INDEX: 47.13 KG/M2 | WEIGHT: 266 LBS | HEIGHT: 63 IN

## 2020-02-17 LAB
BASE EXCESS ARTERIAL: -7.1 MMOL/L (ref -2–2)
CARBOXYHEMOGLOBIN ARTERIAL: 1.1 % (ref 0–5)
HCO3 ARTERIAL: 17.8 MMOL/L (ref 22–26)
HEMOGLOBIN, ART, EXTENDED: 11.8 G/DL (ref 12–16)
METHEMOGLOBIN ARTERIAL: 0.8 %
O2 CONTENT ARTERIAL: 16.3 ML/DL
O2 SAT, ARTERIAL: 97.5 %
O2 THERAPY: ABNORMAL
PCO2 ARTERIAL: 33 MMHG (ref 35–45)
PH ARTERIAL: 7.34 (ref 7.35–7.45)
PO2 ARTERIAL: 111 MMHG (ref 80–100)
POTASSIUM, WHOLE BLOOD: 4

## 2020-02-17 PROCEDURE — 93005 ELECTROCARDIOGRAM TRACING: CPT | Performed by: ANESTHESIOLOGY

## 2020-02-17 PROCEDURE — 82803 BLOOD GASES ANY COMBINATION: CPT

## 2020-02-17 PROCEDURE — 84132 ASSAY OF SERUM POTASSIUM: CPT

## 2020-02-17 PROCEDURE — 36600 WITHDRAWAL OF ARTERIAL BLOOD: CPT

## 2020-02-17 RX ORDER — SODIUM CHLORIDE 0.9 % (FLUSH) 0.9 %
10 SYRINGE (ML) INJECTION EVERY 12 HOURS SCHEDULED
Status: CANCELLED | OUTPATIENT
Start: 2020-02-17

## 2020-02-17 RX ORDER — TRAZODONE HYDROCHLORIDE 50 MG/1
50 TABLET ORAL NIGHTLY
COMMUNITY
End: 2021-09-29 | Stop reason: ALTCHOICE

## 2020-02-17 RX ORDER — PREGABALIN 100 MG/1
100 CAPSULE ORAL 3 TIMES DAILY
COMMUNITY

## 2020-02-17 RX ORDER — SODIUM CHLORIDE 0.9 % (FLUSH) 0.9 %
10 SYRINGE (ML) INJECTION PRN
Status: CANCELLED | OUTPATIENT
Start: 2020-02-17

## 2020-02-17 RX ORDER — SODIUM CHLORIDE, SODIUM LACTATE, POTASSIUM CHLORIDE, CALCIUM CHLORIDE 600; 310; 30; 20 MG/100ML; MG/100ML; MG/100ML; MG/100ML
INJECTION, SOLUTION INTRAVENOUS CONTINUOUS
Status: CANCELLED | OUTPATIENT
Start: 2020-02-17

## 2020-02-17 RX ORDER — BUPROPION HYDROCHLORIDE 150 MG/1
150 TABLET ORAL EVERY MORNING
COMMUNITY
End: 2021-09-29

## 2020-02-17 ASSESSMENT — ENCOUNTER SYMPTOMS
WHEEZING: 0
SHORTNESS OF BREATH: 0
BACK PAIN: 1
ABDOMINAL DISTENTION: 0
VOMITING: 0
EYES NEGATIVE: 1
ALLERGIC/IMMUNOLOGIC NEGATIVE: 1
COUGH: 1
DIARRHEA: 0
APNEA: 1
ABDOMINAL PAIN: 0
CONSTIPATION: 0
SINUS PRESSURE: 0
NAUSEA: 0

## 2020-02-17 NOTE — PROGRESS NOTES
Contains abnormal data Blood Gas, Arterial   Status:  Final result    Ref Range & Units 02/17/20 1424   pH, Arterial 7.350 - 7.450 7.340Low     pCO2, Arterial 35.0 - 45.0 mmHg 33. 0Low     pO2, Arterial 80.0 - 100.0 mmHg 111.0High     HCO3, Arterial 22.0 - 26.0 mmol/L 17.8Low     Base Excess, Arterial -2.0 - 2.0 mmol/L -7.1Low     Hemoglobin, Art, Extended 12.0 - 16.0 g/dL 11.8Low     O2 Sat, Arterial >92 % 97.5    Carboxyhgb, Arterial 0.0 - 5.0 % 1.1    Comment:      0.0-1.5   (Smokers 1.5-5.0)    Methemoglobin, Arterial <1.5 % 0.8    O2 Content, Arterial Not Established mL/dL 16.3    O2 Therapy  Unknown    Resulting Agency  [facility]        Specimen Collected: 02/17/20 1424 Last Resulted: 02/17/20 1428 View Other Order Details        Patient is on 2 lpm (home dose) oxygen.   Site of choice right brachial.

## 2020-02-17 NOTE — H&P
SUBJECTIVE:  Ms. Duane Beal is a 44 y.o. female that was referred by Tisha Saldaña due to problems with a chronic anal fissure. Ms. Elizabeth Galan has a history of taking opioids for pain. She was noted to get extremely constipated and resulted in an anal fissure. She was seen by Tisha Saldaña and was placed on Dr. Delaney Every crack cream with sitz bath's. After a month of treatment the patient reports that she has not improved and continues to suffer from the pain. Physical examination the office confirmed a left lateral anal fissure with mild bleeding noted to exam.  Risks, benefits, and options were discussed with the patient. She is agreeable to accept all risk and proceed with a closed lateral sphincterotomy by Dr. Jacinto Romano. Allergies: Tape Joy.Kitten tape]    Current Outpatient Medications   Medication Sig Dispense Refill    oxyCODONE-acetaminophen (PERCOCET) 5-325 MG per tablet Take 1 tablet by mouth every morning (before breakfast).  morphine (MS CONTIN) 15 MG extended release tablet Take 15 mg by mouth every 8 hours.  polyethylene glycol (MIRALAX) powder 1 tsp daily for constipation.  Adjust dose for desired bm 086 g 11    folic acid (FOLVITE) 949 MCG tablet Take 400 mcg by mouth daily      OXYGEN Inhale 2 L/min into the lungs continuous       epoetin cheryl (PROCRIT) 14272 UNIT/ML injection Inject 1 Units into the skin as needed      tiZANidine (ZANAFLEX) 4 MG tablet Take 4 mg by mouth 3 times daily as needed      levothyroxine (SYNTHROID) 88 MCG tablet Take 88 mcg by mouth daily      metoprolol succinate (TOPROL XL) 25 MG extended release tablet Take 50 mg by mouth daily       tolterodine (DETROL LA) 4 MG ER capsule Take 4 mg by mouth daily       topiramate (TOPAMAX) 50 MG tablet Take 100 mg by mouth nightly Indications: Backache       esomeprazole (NEXIUM) 40 MG capsule Take 40 mg by mouth 2 times daily       oxybutynin (DITROPAN-XL) 5 MG CR tablet Take 5 mg by mouth every evening      

## 2020-02-17 NOTE — H&P (VIEW-ONLY)
SUBJECTIVE:  Ms. Genaro Horton is a 44 y.o. female that was referred by Álvaro Johnston due to problems with a chronic anal fissure. Ms. Houston Curran has a history of taking opioids for pain. She was noted to get extremely constipated and resulted in an anal fissure. She was seen by Álvaro Johnston and was placed on Dr. Brandee Gregg crack cream with sitz bath's. After a month of treatment the patient reports that she has not improved and continues to suffer from the pain. Physical examination the office confirmed a left lateral anal fissure with mild bleeding noted to exam.  Risks, benefits, and options were discussed with the patient. She is agreeable to accept all risk and proceed with a closed lateral sphincterotomy by Dr. Tony Blas. Allergies: Tape Jurline Ralphs tape]    Current Outpatient Medications   Medication Sig Dispense Refill    oxyCODONE-acetaminophen (PERCOCET) 5-325 MG per tablet Take 1 tablet by mouth every morning (before breakfast).  morphine (MS CONTIN) 15 MG extended release tablet Take 15 mg by mouth every 8 hours.  polyethylene glycol (MIRALAX) powder 1 tsp daily for constipation.  Adjust dose for desired bm 316 g 11    folic acid (FOLVITE) 671 MCG tablet Take 400 mcg by mouth daily      OXYGEN Inhale 2 L/min into the lungs continuous       epoetin cheryl (PROCRIT) 50021 UNIT/ML injection Inject 1 Units into the skin as needed      tiZANidine (ZANAFLEX) 4 MG tablet Take 4 mg by mouth 3 times daily as needed      levothyroxine (SYNTHROID) 88 MCG tablet Take 88 mcg by mouth daily      metoprolol succinate (TOPROL XL) 25 MG extended release tablet Take 50 mg by mouth daily       tolterodine (DETROL LA) 4 MG ER capsule Take 4 mg by mouth daily       topiramate (TOPAMAX) 50 MG tablet Take 100 mg by mouth nightly Indications: Backache       esomeprazole (NEXIUM) 40 MG capsule Take 40 mg by mouth 2 times daily       oxybutynin (DITROPAN-XL) 5 MG CR tablet Take 5 mg by mouth every evening       DULoxetine (CYMBALTA) 60 MG capsule Take 60 mg by mouth daily Indications: Mood Changes       pregabalin (LYRICA) 100 MG capsule Take 100 mg by mouth 3 times daily.  buPROPion (WELLBUTRIN XL) 150 MG extended release tablet Take 150 mg by mouth every morning      traZODone (DESYREL) 50 MG tablet Take 50 mg by mouth nightly      methotrexate (RHEUMATREX) 2.5 MG chemo tablet Take 10 mg by mouth once a week EVERY MONDAY       No current facility-administered medications for this visit.         Past Medical History:   Diagnosis Date    Asthma     B12 deficiency     Chronic back pain     Chronic pancreatitis (HCC)     Colon polyps     COPD (chronic obstructive pulmonary disease) (HCC)     Sarcoidosis - O2 at 2L at all times    Depression     Fibromyalgia     GERD (gastroesophageal reflux disease)     Headache     Hypertension     Hyperthyroidism     Irritable bowel syndrome     Kidney stones     Neuropathy     On home oxygen therapy     2 liters    Paroxysmal supraventricular tachycardia (HCC)     PONV (postoperative nausea and vomiting)     Restless legs syndrome     Sarcoidosis 11/01/2016    Scoliosis     SVT (supraventricular tachycardia) (HCC)      Past Surgical History:   Procedure Laterality Date    ABLATION OF DYSRHYTHMIC FOCUS  2016    SVT    CHOLECYSTECTOMY      COLONOSCOPY  10/13/2014    Dr. Shanel GARNER-5 yr recall    COLONOSCOPY  05/20/2008    Dr. Dallas Kilpatrick: unremarkable    COLONOSCOPY  08/23/2017    Dr FloresM-5 yr recall    COLONOSCOPY  09/06/2018    Dr. Meek Johnson, normal-5 yr recall    HYSTERECTOMY      JOINT REPLACEMENT  12/30/2013    Patella joint replacement    LITHOTRIPSY      PLANTAR FASCIA SURGERY Left     MD REVISE MEDIAN N/CARPAL TUNNEL SURG Right 5/8/2018    CARPAL TUNNEL RELEASE performed by Donato Bashir MD at [place] [address] Right 5/4/2017    KNEE TOTAL ARTHROPLASTY REVISION performed by Donato Bashir MD at [facility] OR    SHOULDER ARTHROSCOPY      SIGMOIDOSCOPY N/A 3/15/2019    Dr Elkins Race non edematous hemorrhoids, anal fissure-BCM    TOTAL KNEE ARTHROPLASTY Right 11/16/2015    ULNAR TUNNEL RELEASE      UPPER GASTROINTESTINAL ENDOSCOPY  10/16/2014    Dr. Travon Chase  05/19/2008    Dr. Marilee Cruz: jaya neg, barretts neg,  [facility]    UPPER GASTROINTESTINAL ENDOSCOPY N/A 2/10/2016    Dr Nick Esparza-mild esophageal stricture dilated; reactive gastropathy    UPPER GASTROINTESTINAL ENDOSCOPY  08/23/2017    Dr Kecia Lee Bilateral     RADIO ABLATION     Family History   Problem Relation Age of Onset    Lupus Mother     Kidney Disease Mother     High Blood Pressure Mother     Anemia Mother     Cancer Father         skin    Diabetes Father     Parkinsonism Brother     Celiac Disease Maternal Uncle     Celiac Disease Maternal Cousin     Colon Polyps Neg Hx     Colon Cancer Neg Hx     Esophageal Cancer Neg Hx     Liver Cancer Neg Hx     Liver Disease Neg Hx     Stomach Cancer Neg Hx     Rectal Cancer Neg Hx        Social History     Tobacco Use    Smoking status: Never Smoker    Smokeless tobacco: Never Used    Tobacco comment: disabled due to sarcoidosis   Substance Use Topics    Alcohol use: No       Review of Systems   Constitutional: Positive for appetite change, fatigue and unexpected weight change. Negative for chills and fever. HENT: Negative for dental problem, hearing loss, sinus pressure and tinnitus. Eyes: Negative. Respiratory: Positive for apnea and cough. Negative for shortness of breath and wheezing. Cardiovascular: Positive for leg swelling. Negative for chest pain and palpitations. Gastrointestinal: Negative for abdominal distention, abdominal pain, constipation, diarrhea, nausea and vomiting. Endocrine: Negative for polydipsia, polyphagia and polyuria.    Genitourinary: Negative for difficulty urinating, dysuria, flank pain and hematuria. Musculoskeletal: Positive for arthralgias, back pain, joint swelling, myalgias and neck pain. Skin: Positive for rash. Allergic/Immunologic: Negative. Neurological: Negative for dizziness, seizures, syncope and headaches. Hematological: Negative for adenopathy. Bruises/bleeds easily. Psychiatric/Behavioral: Negative for confusion and sleep disturbance. The patient is nervous/anxious. OBJECTIVE:  /78 (Site: Right Upper Arm, Position: Sitting, Cuff Size: Large Adult)   Ht 5' 3\" (1.6 m)   Wt 266 lb (120.7 kg)   BMI 47.12 kg/m²   Physical Exam  Vitals signs reviewed. Constitutional:       Appearance: Normal appearance. She is not diaphoretic. HENT:      Head: Normocephalic and atraumatic. Nose: Nose normal. No congestion or rhinorrhea. Mouth/Throat:      Mouth: Mucous membranes are moist.      Pharynx: Oropharynx is clear. No oropharyngeal exudate or posterior oropharyngeal erythema. Eyes:      General: No scleral icterus. Extraocular Movements: Extraocular movements intact. Conjunctiva/sclera: Conjunctivae normal.      Pupils: Pupils are equal, round, and reactive to light. Neck:      Musculoskeletal: Normal range of motion and neck supple. No neck rigidity or muscular tenderness. Vascular: No carotid bruit. Cardiovascular:      Rate and Rhythm: Normal rate and regular rhythm. Pulses: Normal pulses. Heart sounds: Normal heart sounds. No murmur. No gallop. Pulmonary:      Effort: Pulmonary effort is normal.      Breath sounds: Normal breath sounds. No wheezing, rhonchi or rales. Abdominal:      General: Bowel sounds are normal. There is no distension. Palpations: Abdomen is soft. There is no mass. Tenderness: There is no abdominal tenderness. There is no right CVA tenderness, left CVA tenderness, guarding or rebound. Hernia: No hernia is present.    Genitourinary:     Comments: Please refer to HPI for anal

## 2020-02-18 LAB
EKG P AXIS: 53 DEGREES
EKG P-R INTERVAL: 166 MS
EKG Q-T INTERVAL: 384 MS
EKG QRS DURATION: 100 MS
EKG QTC CALCULATION (BAZETT): 398 MS
EKG T AXIS: 34 DEGREES

## 2020-02-18 PROCEDURE — 93010 ELECTROCARDIOGRAM REPORT: CPT | Performed by: INTERNAL MEDICINE

## 2020-02-19 ENCOUNTER — ANESTHESIA EVENT (OUTPATIENT)
Dept: OPERATING ROOM | Age: 50
End: 2020-02-19
Payer: MEDICARE

## 2020-02-19 ENCOUNTER — HOSPITAL ENCOUNTER (OUTPATIENT)
Age: 50
Setting detail: OBSERVATION
Discharge: STILL ADMITTED OR ADMINISTRATIVE | End: 2020-02-19
Attending: SURGERY | Admitting: SURGERY
Payer: MEDICARE

## 2020-02-19 ENCOUNTER — ANESTHESIA (OUTPATIENT)
Dept: OPERATING ROOM | Age: 50
End: 2020-02-19
Payer: MEDICARE

## 2020-02-19 ENCOUNTER — HOSPITAL ENCOUNTER (OUTPATIENT)
Age: 50
Setting detail: OBSERVATION
End: 2020-02-19
Attending: SURGERY | Admitting: SURGERY
Payer: MEDICARE

## 2020-02-19 VITALS
TEMPERATURE: 98 F | DIASTOLIC BLOOD PRESSURE: 62 MMHG | RESPIRATION RATE: 8 BRPM | SYSTOLIC BLOOD PRESSURE: 109 MMHG | OXYGEN SATURATION: 80 %

## 2020-02-19 VITALS
DIASTOLIC BLOOD PRESSURE: 77 MMHG | OXYGEN SATURATION: 96 % | WEIGHT: 266 LBS | RESPIRATION RATE: 16 BRPM | HEART RATE: 87 BPM | HEIGHT: 63 IN | TEMPERATURE: 99 F | BODY MASS INDEX: 47.13 KG/M2 | SYSTOLIC BLOOD PRESSURE: 125 MMHG

## 2020-02-19 PROBLEM — Z48.89 POSTOPERATIVE OBSERVATION: Status: ACTIVE | Noted: 2020-02-19

## 2020-02-19 PROBLEM — K60.2 ANAL FISSURE: Status: ACTIVE | Noted: 2020-02-19

## 2020-02-19 PROCEDURE — 6360000002 HC RX W HCPCS: Performed by: NURSE ANESTHETIST, CERTIFIED REGISTERED

## 2020-02-19 PROCEDURE — 6360000002 HC RX W HCPCS: Performed by: PHYSICIAN ASSISTANT

## 2020-02-19 PROCEDURE — 2580000003 HC RX 258: Performed by: PHYSICIAN ASSISTANT

## 2020-02-19 PROCEDURE — 3600000003 HC SURGERY LEVEL 3 BASE: Performed by: SURGERY

## 2020-02-19 PROCEDURE — 2709999900 HC NON-CHARGEABLE SUPPLY: Performed by: SURGERY

## 2020-02-19 PROCEDURE — 3700000000 HC ANESTHESIA ATTENDED CARE: Performed by: SURGERY

## 2020-02-19 PROCEDURE — G0378 HOSPITAL OBSERVATION PER HR: HCPCS

## 2020-02-19 PROCEDURE — 7100000010 HC PHASE II RECOVERY - FIRST 15 MIN: Performed by: SURGERY

## 2020-02-19 PROCEDURE — 3600000013 HC SURGERY LEVEL 3 ADDTL 15MIN: Performed by: SURGERY

## 2020-02-19 PROCEDURE — 2500000003 HC RX 250 WO HCPCS: Performed by: NURSE ANESTHETIST, CERTIFIED REGISTERED

## 2020-02-19 PROCEDURE — 6360000002 HC RX W HCPCS: Performed by: ANESTHESIOLOGY

## 2020-02-19 PROCEDURE — 7100000001 HC PACU RECOVERY - ADDTL 15 MIN: Performed by: SURGERY

## 2020-02-19 PROCEDURE — 46080 SPHNCTROTMY ANAL DIV SPHNCTR: CPT | Performed by: SURGERY

## 2020-02-19 PROCEDURE — 2500000003 HC RX 250 WO HCPCS: Performed by: SURGERY

## 2020-02-19 PROCEDURE — 7100000011 HC PHASE II RECOVERY - ADDTL 15 MIN: Performed by: SURGERY

## 2020-02-19 PROCEDURE — 6370000000 HC RX 637 (ALT 250 FOR IP): Performed by: ANESTHESIOLOGY

## 2020-02-19 PROCEDURE — 6370000000 HC RX 637 (ALT 250 FOR IP): Performed by: SURGERY

## 2020-02-19 PROCEDURE — 7100000000 HC PACU RECOVERY - FIRST 15 MIN: Performed by: SURGERY

## 2020-02-19 PROCEDURE — 3700000001 HC ADD 15 MINUTES (ANESTHESIA): Performed by: SURGERY

## 2020-02-19 RX ORDER — MORPHINE SULFATE 4 MG/ML
2 INJECTION, SOLUTION INTRAMUSCULAR; INTRAVENOUS EVERY 5 MIN PRN
Status: DISCONTINUED | OUTPATIENT
Start: 2020-02-19 | End: 2020-02-20 | Stop reason: HOSPADM

## 2020-02-19 RX ORDER — HYDROCODONE BITARTRATE AND ACETAMINOPHEN 5; 325 MG/1; MG/1
1 TABLET ORAL PRN
Status: COMPLETED | OUTPATIENT
Start: 2020-02-19 | End: 2020-02-19

## 2020-02-19 RX ORDER — MEPERIDINE HYDROCHLORIDE 50 MG/ML
12.5 INJECTION INTRAMUSCULAR; INTRAVENOUS; SUBCUTANEOUS EVERY 5 MIN PRN
Status: DISCONTINUED | OUTPATIENT
Start: 2020-02-19 | End: 2020-02-20 | Stop reason: HOSPADM

## 2020-02-19 RX ORDER — SODIUM CHLORIDE, SODIUM LACTATE, POTASSIUM CHLORIDE, CALCIUM CHLORIDE 600; 310; 30; 20 MG/100ML; MG/100ML; MG/100ML; MG/100ML
INJECTION, SOLUTION INTRAVENOUS CONTINUOUS
Status: DISCONTINUED | OUTPATIENT
Start: 2020-02-19 | End: 2020-02-20 | Stop reason: HOSPADM

## 2020-02-19 RX ORDER — MORPHINE SULFATE 4 MG/ML
4 INJECTION, SOLUTION INTRAMUSCULAR; INTRAVENOUS EVERY 5 MIN PRN
Status: DISCONTINUED | OUTPATIENT
Start: 2020-02-19 | End: 2020-02-20 | Stop reason: HOSPADM

## 2020-02-19 RX ORDER — DIPHENHYDRAMINE HYDROCHLORIDE 50 MG/ML
12.5 INJECTION INTRAMUSCULAR; INTRAVENOUS
Status: DISCONTINUED | OUTPATIENT
Start: 2020-02-19 | End: 2020-02-19 | Stop reason: HOSPADM

## 2020-02-19 RX ORDER — ENALAPRILAT 2.5 MG/2ML
1.25 INJECTION INTRAVENOUS
Status: DISCONTINUED | OUTPATIENT
Start: 2020-02-19 | End: 2020-02-19 | Stop reason: HOSPADM

## 2020-02-19 RX ORDER — MORPHINE SULFATE 4 MG/ML
4 INJECTION, SOLUTION INTRAMUSCULAR; INTRAVENOUS
Status: DISCONTINUED | OUTPATIENT
Start: 2020-02-19 | End: 2020-02-19 | Stop reason: HOSPADM

## 2020-02-19 RX ORDER — DEXAMETHASONE SODIUM PHOSPHATE 4 MG/ML
INJECTION, SOLUTION INTRA-ARTICULAR; INTRALESIONAL; INTRAMUSCULAR; INTRAVENOUS; SOFT TISSUE PRN
Status: DISCONTINUED | OUTPATIENT
Start: 2020-02-19 | End: 2020-02-19 | Stop reason: SDUPTHER

## 2020-02-19 RX ORDER — FENTANYL CITRATE 50 UG/ML
50 INJECTION, SOLUTION INTRAMUSCULAR; INTRAVENOUS
Status: DISCONTINUED | OUTPATIENT
Start: 2020-02-19 | End: 2020-02-19 | Stop reason: HOSPADM

## 2020-02-19 RX ORDER — FENTANYL CITRATE 50 UG/ML
INJECTION, SOLUTION INTRAMUSCULAR; INTRAVENOUS PRN
Status: DISCONTINUED | OUTPATIENT
Start: 2020-02-19 | End: 2020-02-19 | Stop reason: SDUPTHER

## 2020-02-19 RX ORDER — SODIUM CHLORIDE 0.9 % (FLUSH) 0.9 %
10 SYRINGE (ML) INJECTION PRN
Status: DISCONTINUED | OUTPATIENT
Start: 2020-02-19 | End: 2020-02-20 | Stop reason: HOSPADM

## 2020-02-19 RX ORDER — PROMETHAZINE HYDROCHLORIDE 25 MG/ML
6.25 INJECTION, SOLUTION INTRAMUSCULAR; INTRAVENOUS
Status: DISCONTINUED | OUTPATIENT
Start: 2020-02-19 | End: 2020-02-19 | Stop reason: HOSPADM

## 2020-02-19 RX ORDER — LABETALOL 20 MG/4 ML (5 MG/ML) INTRAVENOUS SYRINGE
5 EVERY 10 MIN PRN
Status: DISCONTINUED | OUTPATIENT
Start: 2020-02-19 | End: 2020-02-20 | Stop reason: HOSPADM

## 2020-02-19 RX ORDER — MIDAZOLAM HYDROCHLORIDE 1 MG/ML
2 INJECTION INTRAMUSCULAR; INTRAVENOUS
Status: DISCONTINUED | OUTPATIENT
Start: 2020-02-19 | End: 2020-02-19 | Stop reason: HOSPADM

## 2020-02-19 RX ORDER — APREPITANT 40 MG/1
40 CAPSULE ORAL ONCE
Status: COMPLETED | OUTPATIENT
Start: 2020-02-19 | End: 2020-02-19

## 2020-02-19 RX ORDER — ONDANSETRON 2 MG/ML
INJECTION INTRAMUSCULAR; INTRAVENOUS PRN
Status: DISCONTINUED | OUTPATIENT
Start: 2020-02-19 | End: 2020-02-19 | Stop reason: SDUPTHER

## 2020-02-19 RX ORDER — MIDAZOLAM HYDROCHLORIDE 1 MG/ML
INJECTION INTRAMUSCULAR; INTRAVENOUS PRN
Status: DISCONTINUED | OUTPATIENT
Start: 2020-02-19 | End: 2020-02-19 | Stop reason: SDUPTHER

## 2020-02-19 RX ORDER — SODIUM CHLORIDE 0.9 % (FLUSH) 0.9 %
10 SYRINGE (ML) INJECTION EVERY 12 HOURS SCHEDULED
Status: DISCONTINUED | OUTPATIENT
Start: 2020-02-19 | End: 2020-02-20 | Stop reason: HOSPADM

## 2020-02-19 RX ORDER — METOCLOPRAMIDE HYDROCHLORIDE 5 MG/ML
10 INJECTION INTRAMUSCULAR; INTRAVENOUS
Status: COMPLETED | OUTPATIENT
Start: 2020-02-19 | End: 2020-02-19

## 2020-02-19 RX ORDER — LIDOCAINE HYDROCHLORIDE 10 MG/ML
1 INJECTION, SOLUTION EPIDURAL; INFILTRATION; INTRACAUDAL; PERINEURAL
Status: DISCONTINUED | OUTPATIENT
Start: 2020-02-19 | End: 2020-02-19 | Stop reason: HOSPADM

## 2020-02-19 RX ORDER — HYDRALAZINE HYDROCHLORIDE 20 MG/ML
5 INJECTION INTRAMUSCULAR; INTRAVENOUS EVERY 10 MIN PRN
Status: DISCONTINUED | OUTPATIENT
Start: 2020-02-19 | End: 2020-02-20 | Stop reason: HOSPADM

## 2020-02-19 RX ORDER — LIDOCAINE HYDROCHLORIDE 10 MG/ML
INJECTION, SOLUTION INFILTRATION; PERINEURAL PRN
Status: DISCONTINUED | OUTPATIENT
Start: 2020-02-19 | End: 2020-02-19 | Stop reason: SDUPTHER

## 2020-02-19 RX ORDER — PROPOFOL 10 MG/ML
INJECTION, EMULSION INTRAVENOUS PRN
Status: DISCONTINUED | OUTPATIENT
Start: 2020-02-19 | End: 2020-02-19 | Stop reason: SDUPTHER

## 2020-02-19 RX ORDER — BUPIVACAINE HYDROCHLORIDE 2.5 MG/ML
INJECTION, SOLUTION INFILTRATION; PERINEURAL PRN
Status: DISCONTINUED | OUTPATIENT
Start: 2020-02-19 | End: 2020-02-19 | Stop reason: ALTCHOICE

## 2020-02-19 RX ORDER — HYDROCODONE BITARTRATE AND ACETAMINOPHEN 5; 325 MG/1; MG/1
2 TABLET ORAL PRN
Status: COMPLETED | OUTPATIENT
Start: 2020-02-19 | End: 2020-02-19

## 2020-02-19 RX ORDER — SCOLOPAMINE TRANSDERMAL SYSTEM 1 MG/1
1 PATCH, EXTENDED RELEASE TRANSDERMAL ONCE
Status: DISCONTINUED | OUTPATIENT
Start: 2020-02-19 | End: 2020-02-20 | Stop reason: HOSPADM

## 2020-02-19 RX ADMIN — DEXAMETHASONE SODIUM PHOSPHATE 10 MG: 4 INJECTION, SOLUTION INTRAMUSCULAR; INTRAVENOUS at 16:45

## 2020-02-19 RX ADMIN — HYDROCODONE BITARTRATE AND ACETAMINOPHEN 1 TABLET: 5; 325 TABLET ORAL at 18:22

## 2020-02-19 RX ADMIN — ONDANSETRON HYDROCHLORIDE 4 MG: 2 INJECTION, SOLUTION INTRAMUSCULAR; INTRAVENOUS at 16:45

## 2020-02-19 RX ADMIN — SODIUM CHLORIDE, SODIUM LACTATE, POTASSIUM CHLORIDE, AND CALCIUM CHLORIDE: 600; 310; 30; 20 INJECTION, SOLUTION INTRAVENOUS at 15:04

## 2020-02-19 RX ADMIN — MIDAZOLAM 2 MG: 1 INJECTION INTRAMUSCULAR; INTRAVENOUS at 16:33

## 2020-02-19 RX ADMIN — HYDROMORPHONE HYDROCHLORIDE 0.5 MG: 1 INJECTION, SOLUTION INTRAMUSCULAR; INTRAVENOUS; SUBCUTANEOUS at 17:25

## 2020-02-19 RX ADMIN — SODIUM CHLORIDE, SODIUM LACTATE, POTASSIUM CHLORIDE, AND CALCIUM CHLORIDE: 600; 310; 30; 20 INJECTION, SOLUTION INTRAVENOUS at 16:33

## 2020-02-19 RX ADMIN — CEFAZOLIN SODIUM 3 G: 10 INJECTION, POWDER, FOR SOLUTION INTRAVENOUS at 16:44

## 2020-02-19 RX ADMIN — HYDROMORPHONE HYDROCHLORIDE 0.5 MG: 1 INJECTION, SOLUTION INTRAMUSCULAR; INTRAVENOUS; SUBCUTANEOUS at 17:20

## 2020-02-19 RX ADMIN — PROPOFOL 50 MG: 10 INJECTION, EMULSION INTRAVENOUS at 16:39

## 2020-02-19 RX ADMIN — METOCLOPRAMIDE 10 MG: 5 INJECTION, SOLUTION INTRAMUSCULAR; INTRAVENOUS at 18:23

## 2020-02-19 RX ADMIN — FENTANYL CITRATE 100 MCG: 50 INJECTION INTRAMUSCULAR; INTRAVENOUS at 16:50

## 2020-02-19 RX ADMIN — LIDOCAINE HYDROCHLORIDE 50 MG: 10 INJECTION, SOLUTION INFILTRATION; PERINEURAL at 16:37

## 2020-02-19 RX ADMIN — PROPOFOL 150 MG: 10 INJECTION, EMULSION INTRAVENOUS at 16:37

## 2020-02-19 RX ADMIN — APREPITANT 40 MG: 40 CAPSULE ORAL at 15:28

## 2020-02-19 ASSESSMENT — PAIN SCALES - GENERAL
PAINLEVEL_OUTOF10: 6
PAINLEVEL_OUTOF10: 6
PAINLEVEL_OUTOF10: 7
PAINLEVEL_OUTOF10: 10
PAINLEVEL_OUTOF10: 10

## 2020-02-19 ASSESSMENT — PAIN DESCRIPTION - LOCATION
LOCATION: RECTUM

## 2020-02-19 ASSESSMENT — PAIN DESCRIPTION - PAIN TYPE
TYPE: SURGICAL PAIN

## 2020-02-19 ASSESSMENT — PAIN DESCRIPTION - DESCRIPTORS: DESCRIPTORS: ACHING

## 2020-02-19 NOTE — OP NOTE
SURGICAL DEPARTMENT REPORT    SURGEON:    Pipe Mcdonald MD    DATE OF SERVICE:  2/19/2020    PREOPERATIVE DIAGNOSIS  Chronic anal fissure. POSTOPERATIVE DIAGNOSIS  Chronic anal fissure. PROCEDURE  Closed lateral internal sphincterotomy. INDICATION  Ms. Kendrick Estrada is a 44 y.o. female who for the past several months has been  bothered with pain with defecation. On exam in the office, she had an anterior   midline fissure. Following review of the options for her care, she  presents for sphincterotomy to allow the fissure to heal.    DESCRIPTION OF PROCEDURE   Ms. Silver was taken to the main operating room and placed on the operating   table supine. After the induction of adequate general endotracheal anesthesia, she  was placed in the lithotomy position and the perineum was then prepped and  draped to a sterile field. Timeout was undertaken. Inspection confirmed the fissure on the anterior midline. This was deep   and chronic in nature. The anal verge skin was notable for being thin and tightly   stretched. No hemorrhoidal disease noted. Digital rectal exam shows markedly   increased sphincter tone. I inserted my left index finger into the anus. I then palpated for the  intersphincteric groove at the 3 o'clock location. At that location, I  inserted a number 11 blade. I then turned the sharp edge of the blade toward  the lumen and with a gentle sawing motion; I cut the internal sphincter  muscle. I used my left index finger as a guide to completion as I felt the  muscle fibers separate. Once I was satisfied with the sphincterotomy, the  blade was removed. I held pressure for about 5 minutes by the clock with  good hemostasis. 30 mL of 0.25% plain Marcaine was then infiltrated for postop analgesia. Peripad dressing was applied. Sponge, needle, instrument count correct on 2 occasions.     Estimated intraoperative blood loss was minimal.      Ms. Kendrick Estrada tolerated her surgery well and she was taken to [facility] in  satisfactory condition.          ________________________________  Shandra Rodrigues MD

## 2020-02-19 NOTE — ANESTHESIA PRE PROCEDURE
PONV.  Airway: Mallampati: II  TM distance: >3 FB   Neck ROM: full  Mouth opening: > = 3 FB Dental:          Pulmonary:normal exam    (+) COPD:                            ROS comment: Sarcoidosis home O2   Cardiovascular:    (+) hypertension:,       ECG reviewed               Beta Blocker:  Dose within 24 Hrs         Neuro/Psych:   (+) psychiatric history: stable with treatment            GI/Hepatic/Renal:   (+) GERD:, renal disease: kidney stones,           Endo/Other:    (+) hypothyroidism::., .    (-) diabetes mellitus               Abdominal:           Vascular: negative vascular ROS. Anesthesia Plan      general     ASA 3       Induction: intravenous. MIPS: Postoperative opioids intended and Prophylactic antiemetics administered. Anesthetic plan and risks discussed with patient. Plan discussed with CRNA.                   Chris Hull MD   2/19/2020

## 2020-02-20 ENCOUNTER — HOSPITAL ENCOUNTER (OUTPATIENT)
Age: 50
Setting detail: OBSERVATION
Discharge: HOME | End: 2020-02-20
Attending: SURGERY | Admitting: SURGERY
Payer: MEDICARE

## 2020-02-20 VITALS
HEART RATE: 64 BPM | RESPIRATION RATE: 18 BRPM | TEMPERATURE: 97.7 F | DIASTOLIC BLOOD PRESSURE: 80 MMHG | OXYGEN SATURATION: 99 % | SYSTOLIC BLOOD PRESSURE: 116 MMHG

## 2020-02-20 PROBLEM — Z48.89 OBSERVATION AFTER SURGERY: Status: ACTIVE | Noted: 2020-02-20

## 2020-02-20 PROCEDURE — G0378 HOSPITAL OBSERVATION PER HR: HCPCS

## 2020-02-20 PROCEDURE — 6370000000 HC RX 637 (ALT 250 FOR IP): Performed by: SURGERY

## 2020-02-20 PROCEDURE — G0379 DIRECT REFER HOSPITAL OBSERV: HCPCS

## 2020-02-20 RX ORDER — OXYCODONE HYDROCHLORIDE AND ACETAMINOPHEN 5; 325 MG/1; MG/1
1 TABLET ORAL ONCE
Status: COMPLETED | OUTPATIENT
Start: 2020-02-20 | End: 2020-02-20

## 2020-02-20 RX ORDER — LANOLIN ALCOHOL/MO/W.PET/CERES
400 CREAM (GRAM) TOPICAL DAILY
Status: DISCONTINUED | OUTPATIENT
Start: 2020-02-20 | End: 2020-02-20 | Stop reason: HOSPADM

## 2020-02-20 RX ORDER — TIZANIDINE 4 MG/1
4 TABLET ORAL 3 TIMES DAILY PRN
Status: DISCONTINUED | OUTPATIENT
Start: 2020-02-20 | End: 2020-02-20 | Stop reason: HOSPADM

## 2020-02-20 RX ORDER — DULOXETIN HYDROCHLORIDE 60 MG/1
60 CAPSULE, DELAYED RELEASE ORAL DAILY
Status: DISCONTINUED | OUTPATIENT
Start: 2020-02-20 | End: 2020-02-20 | Stop reason: HOSPADM

## 2020-02-20 RX ORDER — OXYCODONE HYDROCHLORIDE AND ACETAMINOPHEN 5; 325 MG/1; MG/1
1 TABLET ORAL EVERY 4 HOURS PRN
Status: DISCONTINUED | OUTPATIENT
Start: 2020-02-20 | End: 2020-02-20

## 2020-02-20 RX ORDER — OXYBUTYNIN CHLORIDE 5 MG/1
5 TABLET, EXTENDED RELEASE ORAL EVERY EVENING
Status: DISCONTINUED | OUTPATIENT
Start: 2020-02-20 | End: 2020-02-20 | Stop reason: HOSPADM

## 2020-02-20 RX ORDER — PREGABALIN 50 MG/1
100 CAPSULE ORAL 3 TIMES DAILY
Status: DISCONTINUED | OUTPATIENT
Start: 2020-02-20 | End: 2020-02-20 | Stop reason: HOSPADM

## 2020-02-20 RX ORDER — TRAZODONE HYDROCHLORIDE 50 MG/1
50 TABLET ORAL NIGHTLY
Status: DISCONTINUED | OUTPATIENT
Start: 2020-02-20 | End: 2020-02-20 | Stop reason: HOSPADM

## 2020-02-20 RX ORDER — TOPIRAMATE 50 MG/1
100 TABLET, FILM COATED ORAL NIGHTLY
Status: DISCONTINUED | OUTPATIENT
Start: 2020-02-20 | End: 2020-02-20 | Stop reason: HOSPADM

## 2020-02-20 RX ORDER — OXYCODONE HYDROCHLORIDE AND ACETAMINOPHEN 5; 325 MG/1; MG/1
1 TABLET ORAL
Status: DISCONTINUED | OUTPATIENT
Start: 2020-02-21 | End: 2020-02-20 | Stop reason: HOSPADM

## 2020-02-20 RX ORDER — LEVOTHYROXINE SODIUM 88 UG/1
88 TABLET ORAL DAILY
Status: DISCONTINUED | OUTPATIENT
Start: 2020-02-20 | End: 2020-02-20 | Stop reason: HOSPADM

## 2020-02-20 RX ORDER — BUPROPION HYDROCHLORIDE 150 MG/1
150 TABLET ORAL EVERY MORNING
Status: DISCONTINUED | OUTPATIENT
Start: 2020-02-20 | End: 2020-02-20 | Stop reason: HOSPADM

## 2020-02-20 RX ORDER — METOPROLOL SUCCINATE 50 MG/1
50 TABLET, EXTENDED RELEASE ORAL DAILY
Status: DISCONTINUED | OUTPATIENT
Start: 2020-02-20 | End: 2020-02-20 | Stop reason: HOSPADM

## 2020-02-20 RX ORDER — MORPHINE SULFATE 15 MG/1
15 TABLET, FILM COATED, EXTENDED RELEASE ORAL EVERY 8 HOURS
Status: DISCONTINUED | OUTPATIENT
Start: 2020-02-20 | End: 2020-02-20 | Stop reason: HOSPADM

## 2020-02-20 RX ORDER — PANTOPRAZOLE SODIUM 40 MG/1
40 TABLET, DELAYED RELEASE ORAL
Status: DISCONTINUED | OUTPATIENT
Start: 2020-02-20 | End: 2020-02-20 | Stop reason: HOSPADM

## 2020-02-20 RX ADMIN — TIZANIDINE 4 MG: 4 TABLET ORAL at 13:37

## 2020-02-20 RX ADMIN — PANTOPRAZOLE SODIUM 40 MG: 40 TABLET, DELAYED RELEASE ORAL at 06:05

## 2020-02-20 RX ADMIN — LEVOTHYROXINE SODIUM 88 MCG: 88 TABLET ORAL at 06:05

## 2020-02-20 RX ADMIN — PREGABALIN 100 MG: 50 CAPSULE ORAL at 08:12

## 2020-02-20 RX ADMIN — BUPROPION HYDROCHLORIDE 150 MG: 150 TABLET, FILM COATED, EXTENDED RELEASE ORAL at 08:13

## 2020-02-20 RX ADMIN — PREGABALIN 100 MG: 50 CAPSULE ORAL at 13:29

## 2020-02-20 RX ADMIN — METOPROLOL SUCCINATE 50 MG: 50 TABLET, EXTENDED RELEASE ORAL at 08:13

## 2020-02-20 RX ADMIN — DULOXETINE HYDROCHLORIDE 60 MG: 60 CAPSULE, DELAYED RELEASE ORAL at 08:13

## 2020-02-20 RX ADMIN — OXYCODONE HYDROCHLORIDE AND ACETAMINOPHEN 1 TABLET: 5; 325 TABLET ORAL at 00:46

## 2020-02-20 RX ADMIN — MORPHINE SULFATE 15 MG: 15 TABLET, EXTENDED RELEASE ORAL at 08:13

## 2020-02-20 RX ADMIN — FOLIC ACID TAB 400 MCG 400 MCG: 400 TAB at 08:13

## 2020-02-20 ASSESSMENT — PAIN DESCRIPTION - PROGRESSION: CLINICAL_PROGRESSION: GRADUALLY IMPROVING

## 2020-02-20 ASSESSMENT — PAIN DESCRIPTION - FREQUENCY: FREQUENCY: INTERMITTENT

## 2020-02-20 ASSESSMENT — PAIN DESCRIPTION - DESCRIPTORS: DESCRIPTORS: ACHING

## 2020-02-20 ASSESSMENT — PAIN - FUNCTIONAL ASSESSMENT: PAIN_FUNCTIONAL_ASSESSMENT: ACTIVITIES ARE NOT PREVENTED

## 2020-02-20 ASSESSMENT — PAIN DESCRIPTION - ONSET: ONSET: GRADUAL

## 2020-02-20 ASSESSMENT — PAIN DESCRIPTION - LOCATION: LOCATION: RECTUM

## 2020-02-20 ASSESSMENT — PAIN SCALES - GENERAL
PAINLEVEL_OUTOF10: 7
PAINLEVEL_OUTOF10: 5

## 2020-02-20 ASSESSMENT — PAIN DESCRIPTION - PAIN TYPE: TYPE: SURGICAL PAIN

## 2020-02-20 NOTE — PROGRESS NOTES
DR Brown  MADE AWARE THAT PATIENT HAS CONTINUED OOZING NOTED FROM RECTUM WITH LARGE BLOOD CLOTS NOTED, ORDERS RECEIVED TO PLACE PATIENT IN OBSERVATION ON MED/SURG FLOOR

## 2020-02-20 NOTE — PROGRESS NOTES
S: Pt. admitted overnight due to bleeding from surgical site per SDS. Nursing on the floor reports of 2 dressing changes since admission. Pt. sleepy and has no complaints. O: /80   Pulse 64   Temp 97.7 °F (36.5 °C) (Temporal)   Resp 18   SpO2 99%    Lungs: CTA, Heart:RRR, Rectal wound stable with active bleeding noted. A: Stable POD # 1 closed lateral sphincterotomy    P: ? d/c home later today. Electronically signed by Bijal Hernández PA-C on 2/20/20 at 10:54 AM      I have seen Ms. Silver and examined her. I concur with Mr Farrell's note as recorded above. Resting comfortably in bed. Reports minimal discomfort at the operative site. No ongoing bleeding per patient or nursing. Okay for discharge home. I reviewed discharge instructions. Follow-up with me in about 2 weeks as previously scheduled.     Electronically signed by Jarrett Winkler MD on 2/20/2020 at 2:38 PM

## 2020-02-20 NOTE — PROGRESS NOTES
PATIENT TRANSFERRED TO ROOM 520, BEDSIDE REPORT TO JOSELIN CONNOLLY - HOME OXYGEN IN USE AND FRESH ROLL GAUZE PLACED AGAINST ANUS WITH FRESH ICE PACK, PATIENT A/OX4, NO ACUTE DISTRESS NOTED, LEFT IN SITTING POSITION, KATHIE AT BEDSIDE.

## 2020-02-20 NOTE — PROGRESS NOTES
Clinical house, Timo Walden advised us to give my Patient percocet 5-325 at this time [phone] for pain, which she also takes as a home med. pt is not able to be admitted at this time, reason being same day surgery did not discharge patient,  Dr. Richard Hardy gave verbal ordered to resume home meds,once patient got to the floor. We where able to open chart and order her percocet and was able to give it per charting in progress notes.

## 2020-02-20 NOTE — PROGRESS NOTES
REPORT CALLED TO Alisha Mcleod RN ON 5 TH FLOOR - PATIENT TO GO TO ROOM 520, HISTORY REVIEWED, MADE AWARE OF POST OP BLEEDING NOTED AND ORDERS FOR ROLLED GAUZE AND ICE PACK AND TO PLACE PATIENT IN SITTING POSITION, VSS, AFEBRILE, A/OX4, HOME OXYGEN AT 2L PER N/C, SALINE LOCK TO LEFT HAND

## 2020-02-25 ENCOUNTER — OFFICE VISIT (OUTPATIENT)
Dept: PULMONOLOGY | Facility: CLINIC | Age: 50
End: 2020-02-25

## 2020-02-25 VITALS
BODY MASS INDEX: 46.07 KG/M2 | HEIGHT: 63 IN | DIASTOLIC BLOOD PRESSURE: 80 MMHG | OXYGEN SATURATION: 100 % | HEART RATE: 75 BPM | SYSTOLIC BLOOD PRESSURE: 130 MMHG | WEIGHT: 260 LBS

## 2020-02-25 DIAGNOSIS — Q21.12 PFO (PATENT FORAMEN OVALE): ICD-10-CM

## 2020-02-25 DIAGNOSIS — D86.9 SARCOIDOSIS: Primary | ICD-10-CM

## 2020-02-25 DIAGNOSIS — D69.6 THROMBOCYTOPENIA (HCC): ICD-10-CM

## 2020-02-25 DIAGNOSIS — J96.11 CHRONIC RESPIRATORY FAILURE WITH HYPOXIA (HCC): ICD-10-CM

## 2020-02-25 DIAGNOSIS — E66.01 MORBIDLY OBESE (HCC): ICD-10-CM

## 2020-02-25 PROCEDURE — 99214 OFFICE O/P EST MOD 30 MIN: CPT | Performed by: INTERNAL MEDICINE

## 2020-02-25 NOTE — PROGRESS NOTES
Subjective   Stacy Lynn is a 49 y.o. female.     Background: A patient with hx sarcoidosis and chronic respiratory failure    Chief Complaint   Patient presents with   • Sarcoidosis        Sarcoidosis   Associated symptoms include arthralgias, coughing (occasional), diaphoresis (feels hot) and fatigue. Pertinent negatives include no fever or rash.      She had a fissure repair by Dr. Fong a couple of weeks ago and had a metabolic acidosis then.  She had a virus around Luis M.    Medical/Family/Social History   has a past medical history of Chest pain, Chronic headaches, Chronic pain, Chronic respiratory failure (CMS/HCC), Colon polyp, Degeneration of intervertebral disc of high cervical region, Depression, GERD (gastroesophageal reflux disease), Hypertension, Hyperthyroidism, Irritable bowel syndrome, Kidney stones, Macular degeneration, Orthopnea, Pancreatitis, chronic (CMS/HCC), Peripheral neuropathy, PFO (patent foramen ovale) (09/2016), PSVT (paroxysmal supraventricular tachycardia) (CMS/HCC), Restless leg syndrome, Sarcoidosis, Scoliosis, Septic joint (CMS/HCC), and Venous insufficiency, peripheral.   has a past surgical history that includes Replacement total knee; Patella surgery; Lateral epicondyle release; Shoulder arthroscopy; Ulnar Nerve Decompression; Knee arthroscopy; Hysterectomy; Cholecystectomy; Kidney stone surgery; Cardiac Ablation (04/2016); Knee surgery (Right); Colonoscopy (10/13/2014); Esophagogastroduodenoscopy (N/A, 8/23/2017); Colonoscopy (N/A, 8/23/2017); Varicose vein surgery (Left, 1/10/2018); Joint replacement (Right); Varicose vein surgery (Right, 3/30/2018); Endovenous ablation saphenous vein w/ laser (Right, 03/30/2018); and Colonoscopy (N/A, 9/6/2018).  family history includes Arrhythmia in her mother; Diabetes in her father; Heart disease in her brother, father, maternal aunt, maternal grandfather, maternal grandmother, maternal uncle, mother, paternal aunt, paternal  grandfather, paternal grandmother, and paternal uncle; Kidney disease in her mother.   reports that she has never smoked. She has never used smokeless tobacco. She reports that she does not drink alcohol or use drugs.  Allergies   Allergen Reactions   • Adhesive Tape Hives   • Tape Hives     Medications    Current Outpatient Medications:   •  buPROPion XL (WELLBUTRIN XL) 150 MG 24 hr tablet, TAKE 1 TABLET BY MOUTH ONCE DAILY FOR DEPRESSION, Disp: , Rfl: 2  •  busPIRone (BUSPAR) 10 MG tablet, Take 10 mg by mouth., Disp: , Rfl:   •  carBAMazepine XR (TEGretol  XR) 200 MG 12 hr tablet, Take 1 tablet by mouth Every 12 (Twelve) Hours., Disp: 60 tablet, Rfl: 3  •  docusate sodium (COLACE) 100 MG capsule, Take 100 mg by mouth., Disp: , Rfl:   •  DULoxetine (CYMBALTA) 60 MG capsule, Take 60 mg by mouth Daily., Disp: , Rfl:   •  esomeprazole (nexIUM) 40 MG capsule, TAKE 1 CAPSULE BY MOUTH TWICE DAILY BEFORE  MEALS, Disp: 60 capsule, Rfl: 0  •  Fluticasone Furoate-Vilanterol (BREO ELLIPTA) 200-25 MCG/INH inhaler, , Disp: , Rfl:   •  folic acid (FOLVITE) 400 MCG tablet, Take 400 mcg by mouth Daily., Disp: , Rfl:   •  levothyroxine (SYNTHROID, LEVOTHROID) 88 MCG tablet, Take 75 mcg by mouth Daily., Disp: , Rfl:   •  LYRICA 100 MG capsule, Take 100 mg by mouth Every 8 (Eight) Hours As Needed., Disp: , Rfl: 1  •  methotrexate 2.5 MG tablet, Take 4 tablets by mouth 1 (One) Time Per Week. Takes 4 tabs weekly on Mondays, Disp: 16 tablet, Rfl: 11  •  metoprolol succinate XL (TOPROL-XL) 25 MG 24 hr tablet, Take 100 mg by mouth Daily., Disp: , Rfl:   •  Mometasone Furoate 200 MCG/ACT aerosol, Inhale 1 puff., Disp: , Rfl:   •  Morphine (MS CONTIN) 15 MG 12 hr tablet, Take 15 mg by mouth 2 (Two) Times a Day., Disp: , Rfl:   •  O2 (OXYGEN), Inhale 2 L/min 1 (One) Time. Continuous, Disp: , Rfl:   •  ondansetron (ZOFRAN) 4 MG tablet, Take 4 mg by mouth Every 8 (Eight) Hours As Needed for Nausea or Vomiting., Disp: , Rfl:   •  oxybutynin  "(DITROPAN) 5 MG tablet, Take 5 mg by mouth every night at bedtime., Disp: , Rfl: 5  •  polyethylene glycol (MIRALAX) powder, Take 17 g by mouth., Disp: , Rfl:   •  PROCRIT 91436 UNIT/ML injection, Inject 1 Units under the skin As Needed (WHEN BLOOD COUNT [hemoglobin] BELOW 11)., Disp: , Rfl:   •  tiZANidine (ZANAFLEX) 4 MG tablet, Take 4 mg by mouth At Night As Needed for Muscle Spasms., Disp: , Rfl:   •  topiramate (TOPAMAX) 100 MG tablet, Take 100 mg by mouth Every Night., Disp: , Rfl:     Review of Systems   Constitutional: Positive for diaphoresis (feels hot) and fatigue. Negative for fever.   Eyes: Positive for pain.   Respiratory: Positive for cough (occasional) and shortness of breath.    Musculoskeletal: Positive for arthralgias.   Skin: Negative for rash.         Objective   /80   Pulse 75   Ht 160 cm (63\")   Wt 118 kg (260 lb)   SpO2 100% Comment: 2l  Breastfeeding No   BMI 46.06 kg/m²   Physical Exam   Constitutional: She appears well-developed. She does not appear ill. No distress. Nasal cannula in place. She appears overweight.   HENT:   Head: Atraumatic.   Nose: Nose normal.   Eyes: Conjunctivae and EOM are normal. No scleral icterus.   Neck: Neck supple.   Cardiovascular: Normal rate, regular rhythm, S1 normal and S2 normal.   Pulmonary/Chest: Effort normal and breath sounds normal.   Abdominal: Soft. She exhibits no distension. There is no tenderness.   Musculoskeletal: She exhibits no tenderness or deformity.   Neurological: She is alert.   Skin: Skin is warm. No rash noted.   Psychiatric: She has a normal mood and affect.       -----------------------------------------------------------------------------------------------  CT ANGIOGRAM CHEST W CONTRAST- 8/9/2019 8:45 PM CDT      HISTORY: chest pain; rule out PE      COMPARISON: CTA chest dated 07/26/2018..      DLP: 469 mGy cm     TECHNIQUE: Helical tomographic images of the chest were obtained after  the administration of intravenous " contrast following angiogram protocol.  Additionally, 3D reformatted images were provided.        FINDINGS:    Pulmonary arteries: There is adequate enhancement of the pulmonary  arteries to evaluate for central and segmental pulmonary emboli. There  are no filling defects within the main, lobar, segmental or visualized  subsegmental pulmonary arteries. The pulmonary arteries are within  normal limits.      Aorta and great vessels: The aorta is well opacified and demonstrates no  dissection or aneurysm. The great vessels are normal in appearance.     Visualized neck base: The imaged portion of the base of the neck appears  unremarkable.      Lungs: The lungs are clear. There is no mass, worrisome nodule, or  consolidation. No pleural effusion is seen. The trachea and bronchial  tree are patent.      Heart: Mild cardiomegaly. Pericardial fluid is seen at the AP window,  all are unchanged from 07/26/2018..      Mediastinum and lymph nodes: Mediastinal lymphadenopathy has decreased  when compared to CTA chest dated 07/26/2018. For instance, the  subcarinal lymph node station showing lymph node measuring 1.7 cm  today's examination, previously 2.3 cm. Right hilar lymph node node  measuring 0.8 cm today's examination, previously 1 cm..      Skeletal and soft tissues: The osseous structures of the thorax and  surrounding soft tissues demonstrate no acute process.     Upper abdomen: The imaged portion of the upper abdomen demonstrates no  acute process.      IMPRESSION:  1. No evidence of pulmonary embolus or other acute cardiopulmonary  process.        This report was finalized on 08/09/2019 21:04 by Dr. Almita Pillai MD     -----------------------------------------------------------------------------------------------         -----------------------------------------------------------------------------------------------  Assessment/Plan   Problem List Items Addressed This Visit        Pulmonary Problems    Chronic  respiratory failure with hypoxia (CMS/HCC)       Other    Thrombocytopenia (CMS/HCC)    Sarcoidosis - Primary    Relevant Orders    CT Chest With Contrast    Morbidly obese (CMS/HCC)    PFO (patent foramen ovale)    Overview     Per 9/2016 Echo              Patient's Body mass index is 46.06 kg/m². BMI is above normal parameters. Recommendations include: referral to primary care.      Will repeat ct chest to reevaluate the nodes.  If resolved then I don't think we can blame current sx on sarcoid and may be adverse effect from mtx  Hold methotrexate for now  Repeat chemistry and cbc with diff  Continue oxygen  Recommend follow up with ophth.       Electronically signed by Celestine Scherer MD, 2/25/2020, 4:37 PM

## 2020-03-03 ENCOUNTER — OFFICE VISIT (OUTPATIENT)
Dept: SURGERY | Age: 50
End: 2020-03-03

## 2020-03-03 VITALS
TEMPERATURE: 97.6 F | BODY MASS INDEX: 46.07 KG/M2 | WEIGHT: 260 LBS | DIASTOLIC BLOOD PRESSURE: 78 MMHG | HEIGHT: 63 IN | SYSTOLIC BLOOD PRESSURE: 108 MMHG

## 2020-03-03 PROCEDURE — 99024 POSTOP FOLLOW-UP VISIT: CPT | Performed by: SURGERY

## 2020-03-10 ENCOUNTER — HOSPITAL ENCOUNTER (OUTPATIENT)
Dept: CT IMAGING | Facility: HOSPITAL | Age: 50
Discharge: HOME | End: 2020-03-10
Admitting: INTERNAL MEDICINE

## 2020-03-10 ENCOUNTER — LAB (OUTPATIENT)
Dept: LAB | Facility: HOSPITAL | Age: 50
End: 2020-03-10

## 2020-03-10 DIAGNOSIS — D86.9 SARCOIDOSIS: ICD-10-CM

## 2020-03-10 LAB
ALBUMIN SERPL-MCNC: 4 G/DL (ref 3.5–5.2)
ALBUMIN/GLOB SERPL: 1.5 G/DL
ALP SERPL-CCNC: 73 U/L (ref 39–117)
ALT SERPL W P-5'-P-CCNC: 9 U/L (ref 1–33)
ANION GAP SERPL CALCULATED.3IONS-SCNC: 10 MMOL/L (ref 5–15)
AST SERPL-CCNC: 13 U/L (ref 1–32)
BASOPHILS # BLD AUTO: 0.02 10*3/MM3 (ref 0–0.2)
BASOPHILS NFR BLD AUTO: 0.7 % (ref 0–1.5)
BILIRUB SERPL-MCNC: 0.2 MG/DL (ref 0.2–1.2)
BUN BLD-MCNC: 25 MG/DL (ref 6–20)
BUN/CREAT SERPL: 19.7 (ref 7–25)
CALCIUM SPEC-SCNC: 8.7 MG/DL (ref 8.6–10.5)
CHLORIDE SERPL-SCNC: 107 MMOL/L (ref 98–107)
CO2 SERPL-SCNC: 26 MMOL/L (ref 22–29)
CREAT BLD-MCNC: 1.27 MG/DL (ref 0.57–1)
DEPRECATED RDW RBC AUTO: 51.7 FL (ref 37–54)
EOSINOPHIL # BLD AUTO: 0.13 10*3/MM3 (ref 0–0.4)
EOSINOPHIL NFR BLD AUTO: 4.8 % (ref 0.3–6.2)
ERYTHROCYTE [DISTWIDTH] IN BLOOD BY AUTOMATED COUNT: 14.7 % (ref 12.3–15.4)
GFR SERPL CREATININE-BSD FRML MDRD: 44 ML/MIN/1.73
GLOBULIN UR ELPH-MCNC: 2.6 GM/DL
GLUCOSE BLD-MCNC: 89 MG/DL (ref 65–99)
HCT VFR BLD AUTO: 30.2 % (ref 34–46.6)
HGB BLD-MCNC: 9.6 G/DL (ref 12–15.9)
LYMPHOCYTES # BLD AUTO: 0.7 10*3/MM3 (ref 0.7–3.1)
LYMPHOCYTES NFR BLD AUTO: 25.7 % (ref 19.6–45.3)
MCH RBC QN AUTO: 30.6 PG (ref 26.6–33)
MCHC RBC AUTO-ENTMCNC: 31.8 G/DL (ref 31.5–35.7)
MCV RBC AUTO: 96.2 FL (ref 79–97)
MONOCYTES # BLD AUTO: 0.32 10*3/MM3 (ref 0.1–0.9)
MONOCYTES NFR BLD AUTO: 11.8 % (ref 5–12)
NEUTROPHILS # BLD AUTO: 1.54 10*3/MM3 (ref 1.7–7)
NEUTROPHILS NFR BLD AUTO: 56.6 % (ref 42.7–76)
PLATELET # BLD AUTO: 143 10*3/MM3 (ref 140–450)
PMV BLD AUTO: 9.8 FL (ref 6–12)
POTASSIUM BLD-SCNC: 4.9 MMOL/L (ref 3.5–5.2)
PROT SERPL-MCNC: 6.6 G/DL (ref 6–8.5)
RBC # BLD AUTO: 3.14 10*6/MM3 (ref 3.77–5.28)
SODIUM BLD-SCNC: 143 MMOL/L (ref 136–145)
WBC NRBC COR # BLD: 2.72 10*3/MM3 (ref 3.4–10.8)

## 2020-03-10 PROCEDURE — 25010000002 IOPAMIDOL 61 % SOLUTION: Performed by: INTERNAL MEDICINE

## 2020-03-10 PROCEDURE — 85025 COMPLETE CBC W/AUTO DIFF WBC: CPT | Performed by: INTERNAL MEDICINE

## 2020-03-10 PROCEDURE — 71260 CT THORAX DX C+: CPT

## 2020-03-10 PROCEDURE — 80053 COMPREHEN METABOLIC PANEL: CPT | Performed by: INTERNAL MEDICINE

## 2020-03-10 PROCEDURE — 36415 COLL VENOUS BLD VENIPUNCTURE: CPT

## 2020-03-10 RX ADMIN — IOPAMIDOL 100 ML: 612 INJECTION, SOLUTION INTRAVENOUS at 14:32

## 2020-03-10 NOTE — PROGRESS NOTES
Please call the patient regarding her abnormal result. Not quite normal but creatinine is a little better than it was and liver enzymes are ok.

## 2020-03-17 ENCOUNTER — TELEPHONE (OUTPATIENT)
Dept: ONCOLOGY | Facility: CLINIC | Age: 50
End: 2020-03-17

## 2020-03-19 ENCOUNTER — TELEPHONE (OUTPATIENT)
Dept: ONCOLOGY | Facility: CLINIC | Age: 50
End: 2020-03-19

## 2020-03-19 ENCOUNTER — TELEPHONE (OUTPATIENT)
Dept: GASTROENTEROLOGY | Age: 50
End: 2020-03-19

## 2020-03-19 NOTE — TELEPHONE ENCOUNTER
Received call from patientStacy she called and was concerned regarding her weekly apt's for labs and possible Procrit Injections. She reports that she has been sick since the beginning of the year with respiratory issues, is on oxygen and was informed per her Pulmonologist that due to her weakened state and breathing issues that she did not need to be out and about at this time due to Health Crisis COVID-19, she does not need to be exposed to general public. Informed her that if Dr Scherer her Pulmonologist has told her to stay in she should do so and we would take it week by week and if things change then we can see where she stands at that time. She reports she is feeling generally well at this time.She was encouraged to call if she has other questions or concerns. She v/u.

## 2020-03-19 NOTE — TELEPHONE ENCOUNTER
Assessment:      1. Chronic anal fissure    2. Constipation due to opioid therapy    3. Irritable bowel syndrome with constipation                        Plan:      Trial of movantik. Encounter Medications         Orders Placed This Encounter   Medications    naloxegol (MOVANTIK) 25 MG TABS tablet       Sig: Take 1 tablet by mouth every morning       Dispense:  30 tablet       Refill:  3         Pt advised to call if any problems r/t medication. Discussed medication including administration and side effects      Refer for surgical opinion for chronic anal fissure      Orders Placed This Encounter   Procedures   Hemant Davis PA-C, General Surgery, [facility]      rto in 4 weeks to check effectiveness of medication          Patient had a FU today with BG manning but had to CA the appointment, said she is just not able to get out right now . Patient wants Ghazal Angel to know she is doing well at present, said she will call at a later date and RS her appointment with one of the other APRN's. Patient asked me to send BG aprn this message.  Darryl cordero

## 2020-03-25 ENCOUNTER — TELEPHONE (OUTPATIENT)
Dept: PULMONOLOGY | Facility: CLINIC | Age: 50
End: 2020-03-25

## 2020-03-26 ENCOUNTER — TELEPHONE (OUTPATIENT)
Dept: PULMONOLOGY | Facility: CLINIC | Age: 50
End: 2020-03-26

## 2020-03-26 ENCOUNTER — OFFICE VISIT (OUTPATIENT)
Dept: PULMONOLOGY | Facility: CLINIC | Age: 50
End: 2020-03-26

## 2020-03-26 DIAGNOSIS — D86.9 SARCOIDOSIS: ICD-10-CM

## 2020-03-26 DIAGNOSIS — J96.11 CHRONIC RESPIRATORY FAILURE WITH HYPOXIA (HCC): ICD-10-CM

## 2020-03-26 DIAGNOSIS — E66.01 MORBIDLY OBESE (HCC): ICD-10-CM

## 2020-03-26 DIAGNOSIS — Q21.12 PFO (PATENT FORAMEN OVALE): Primary | ICD-10-CM

## 2020-03-26 DIAGNOSIS — D69.6 THROMBOCYTOPENIA (HCC): ICD-10-CM

## 2020-03-26 PROCEDURE — 99441 PR PHYS/QHP TELEPHONE EVALUATION 5-10 MIN: CPT | Performed by: NURSE PRACTITIONER

## 2020-03-26 NOTE — TELEPHONE ENCOUNTER
Stacy Lynn is a 49 y.o. female.   CC: No chief complaint on file.       Telephone office note.  Due to the pandemic of Covid-19 and social distancing restrictions, the patient has agreed to perform their visit via telephone conference.      HPI: Ms. Miller is a pleasant 49-year-old female who has a known history of chronic respiratory failure with hypoxemia, thrombocytopenia, sarcoidosis, PFO, morbidly obese leukopenia, fibromyalgia, anemia with CKD.  She was seen by Dr. Scherer last month with complaints of fatigue, occasional cough, shortness of breath, arthralgias, diaphoresis where she was reportedly feeling hot and fatigue.  She was sent for a CT of the chest to reevaluate the nodes.  Her methotrexate was placed on hold and she has remained off of that since that time.  He also repeated a chemistry and a CBC with differential.  CT of the chest shows that the lungs were clear with no suspicious pulmonary nodules.  A single enlarged subcarinal lymph node which is decreased in size compared to July 2018.  Other previously enlarged lymph nodes are now normal size.  Labs showed a creatinine of 1.27 with known chronic kidney disease.  CBC showed a white blood cell of 2.72, hemoglobin 9.6, hematocrit 30.2 and is stable.  She states she continues to have eye pain that comes and goes.  She reports is been a very long time since she has seen an eye doctor.  She continues to have an occasional cough and shortness of breath.    The following portions of the patient's history were reviewed and updated as appropriate: allergies, current medications, past family history, past medical history, past social history, past surgical history and problem list.    Past Medical History:   Diagnosis Date   • Chest pain     NERVE PAIN IN LUNGS   • Chronic headaches    • Chronic pain    • Chronic respiratory failure (CMS/HCC)    • Colon polyp    • Degeneration of intervertebral disc of high cervical region    • Depression    • GERD  (gastroesophageal reflux disease)    • Hypertension    • Hyperthyroidism    • Irritable bowel syndrome    • Kidney stones    • Macular degeneration    • Orthopnea    • Pancreatitis, chronic (CMS/HCC)    • Peripheral neuropathy    • PFO (patent foramen ovale) 09/2016   • PSVT (paroxysmal supraventricular tachycardia) (CMS/HCC)     s/p ablation per Dr. Diallo 4/2016   • Restless leg syndrome    • Sarcoidosis    • Scoliosis    • Septic joint (CMS/HCC)    • Venous insufficiency, peripheral     RIGHT LEG       Family History   Problem Relation Age of Onset   • Arrhythmia Mother    • Heart disease Mother    • Kidney disease Mother    • Heart disease Father    • Diabetes Father    • Heart disease Brother    • Heart disease Maternal Aunt    • Heart disease Maternal Uncle    • Heart disease Paternal Aunt    • Heart disease Paternal Uncle    • Heart disease Maternal Grandmother    • Heart disease Maternal Grandfather    • Heart disease Paternal Grandmother    • Heart disease Paternal Grandfather    • Colon cancer Neg Hx    • Colon polyps Neg Hx        Social History     Socioeconomic History   • Marital status: Single     Spouse name: Not on file   • Number of children: Not on file   • Years of education: Not on file   • Highest education level: Not on file   Tobacco Use   • Smoking status: Never Smoker   • Smokeless tobacco: Never Used   Substance and Sexual Activity   • Alcohol use: No   • Drug use: No   • Sexual activity: Defer     Review of Systems - General ROS: positive for  - fatigue and diaphoresis   Respiratory ROS: positive for - cough (occasional )and shortness of breath  Musculoskeletal ROS: positive for - arthralgias with known fibromyalgia       Pulmonary Functions Testing Results:      No results found for this or any previous visit.    My interpretation: none    CT Chest 3/10/20  Findings:     Central airways are clear. No consolidation or pleural effusion. No  suspicious pulmonary nodules identified. No new or  enlarging thoracic  lymph nodes.      Enlarged subcarinal lymph node is decrease in size and measuring 1.6 cm  short axis dimension (1.9 cm short axis in 2018). Small nonenlarged 9 mm  RIGHT hilar lymph node previously measured 1.4 cm. LEFT paratracheal 9  mm short axis dimension lymph node on image 29 previously measured 1.4  cm in 2018.     Uniform thyroid. No central pulmonary artery filling defect. Main  pulmonary trunk is at the upper limits of normal in caliber. Thoracic  aorta is nonaneurysmal. No pericardial effusion.     Multiple low-density liver lesions are similar to prior exams, too small  to characterize, and likely represent small cysts. Gallbladder is  surgically absent. Mild biliary ductal dilatation is likely reservoir  phenomenon. Multilevel thoracic spine degenerative change. No aggressive  bone lesions.     IMPRESSION:  Impression:     1.  Lungs are clear. No suspicious pulmonary nodules.  2.  Single enlarged subcarinal lymph node which is decrease in size  compared to a study from July 2018. Other previously enlarged lymph  nodes on the July 2018 study are now normal size.  This report was finalized on 03/10/2020 16:09 by Dr. Bruno White MD.        Problem List Items Addressed This Visit        Cardiovascular and Mediastinum    PFO (patent foramen ovale) - Primary    Overview     Per 9/2016 Echo             Respiratory    Chronic respiratory failure with hypoxia (CMS/HCC)       Digestive    Morbidly obese (CMS/HCC)       Hematopoietic and Hemostatic    Thrombocytopenia (CMS/HCC)       Other    Sarcoidosis        Plan:     Reviewed CT results with patient. She will remain off of the Methotrexate. Discussed case with Dr. Scherer. We will plan a repeat CT of the chest and follow up appointment in 3-6 months. She is encouraged to see her eye doctor when restrictions are lifted and she is able to go in. She is encouraged to keep her follow up appointment with hematology/ oncology.     Calista Corona  BETINA Phan  3/26/2020  14:25    No follow-ups on file.    This dictation was generated by voice recognition computer software. Although all attempts are made to edit dictation for accuracy, there may be errors in the transcription that are not intended.

## 2020-03-27 ENCOUNTER — INFUSION (OUTPATIENT)
Dept: ONCOLOGY | Facility: HOSPITAL | Age: 50
End: 2020-03-27

## 2020-03-27 ENCOUNTER — LAB (OUTPATIENT)
Dept: LAB | Facility: HOSPITAL | Age: 50
End: 2020-03-27

## 2020-03-27 VITALS
RESPIRATION RATE: 18 BRPM | DIASTOLIC BLOOD PRESSURE: 75 MMHG | BODY MASS INDEX: 46.07 KG/M2 | SYSTOLIC BLOOD PRESSURE: 120 MMHG | HEART RATE: 74 BPM | HEIGHT: 63 IN | WEIGHT: 260 LBS | OXYGEN SATURATION: 100 %

## 2020-03-27 DIAGNOSIS — D50.9 IRON DEFICIENCY ANEMIA, UNSPECIFIED IRON DEFICIENCY ANEMIA TYPE: ICD-10-CM

## 2020-03-27 DIAGNOSIS — N18.30 ANEMIA IN STAGE 3 CHRONIC KIDNEY DISEASE (HCC): ICD-10-CM

## 2020-03-27 DIAGNOSIS — N18.4 ANEMIA IN STAGE 4 CHRONIC KIDNEY DISEASE (HCC): Primary | ICD-10-CM

## 2020-03-27 DIAGNOSIS — D63.1 ANEMIA IN STAGE 4 CHRONIC KIDNEY DISEASE (HCC): Primary | ICD-10-CM

## 2020-03-27 DIAGNOSIS — D63.1 ANEMIA IN STAGE 3 CHRONIC KIDNEY DISEASE (HCC): ICD-10-CM

## 2020-03-27 LAB
ALBUMIN SERPL-MCNC: 3.8 G/DL (ref 3.5–5.2)
ALBUMIN/GLOB SERPL: 1.5 G/DL
ALP SERPL-CCNC: 71 U/L (ref 39–117)
ALT SERPL W P-5'-P-CCNC: 10 U/L (ref 1–33)
ANION GAP SERPL CALCULATED.3IONS-SCNC: 8 MMOL/L (ref 5–15)
AST SERPL-CCNC: 14 U/L (ref 1–32)
BASOPHILS # BLD AUTO: 0.01 10*3/MM3 (ref 0–0.2)
BASOPHILS NFR BLD AUTO: 0.4 % (ref 0–1.5)
BILIRUB SERPL-MCNC: 0.2 MG/DL (ref 0.2–1.2)
BUN BLD-MCNC: 24 MG/DL (ref 6–20)
BUN/CREAT SERPL: 17.8 (ref 7–25)
CALCIUM SPEC-SCNC: 8.4 MG/DL (ref 8.6–10.5)
CHLORIDE SERPL-SCNC: 109 MMOL/L (ref 98–107)
CO2 SERPL-SCNC: 25 MMOL/L (ref 22–29)
CREAT BLD-MCNC: 1.35 MG/DL (ref 0.57–1)
DEPRECATED RDW RBC AUTO: 51.4 FL (ref 37–54)
EOSINOPHIL # BLD AUTO: 0.1 10*3/MM3 (ref 0–0.4)
EOSINOPHIL NFR BLD AUTO: 3.9 % (ref 0.3–6.2)
ERYTHROCYTE [DISTWIDTH] IN BLOOD BY AUTOMATED COUNT: 14.5 % (ref 12.3–15.4)
FERRITIN SERPL-MCNC: 120.3 NG/ML (ref 13–150)
GFR SERPL CREATININE-BSD FRML MDRD: 41 ML/MIN/1.73
GLOBULIN UR ELPH-MCNC: 2.5 GM/DL
GLUCOSE BLD-MCNC: 84 MG/DL (ref 65–99)
HCT VFR BLD AUTO: 29.2 % (ref 34–46.6)
HGB BLD-MCNC: 9.1 G/DL (ref 12–15.9)
IMM GRANULOCYTES # BLD AUTO: 0 10*3/MM3 (ref 0–0.05)
IMM GRANULOCYTES NFR BLD AUTO: 0 % (ref 0–0.5)
IRON 24H UR-MRATE: 54 MCG/DL (ref 37–145)
IRON SATN MFR SERPL: 20 % (ref 20–50)
LYMPHOCYTES # BLD AUTO: 0.73 10*3/MM3 (ref 0.7–3.1)
LYMPHOCYTES NFR BLD AUTO: 28.7 % (ref 19.6–45.3)
MCH RBC QN AUTO: 30.3 PG (ref 26.6–33)
MCHC RBC AUTO-ENTMCNC: 31.2 G/DL (ref 31.5–35.7)
MCV RBC AUTO: 97.3 FL (ref 79–97)
MONOCYTES # BLD AUTO: 0.25 10*3/MM3 (ref 0.1–0.9)
MONOCYTES NFR BLD AUTO: 9.8 % (ref 5–12)
NEUTROPHILS # BLD AUTO: 1.45 10*3/MM3 (ref 1.7–7)
NEUTROPHILS NFR BLD AUTO: 57.2 % (ref 42.7–76)
NRBC BLD AUTO-RTO: 0 /100 WBC (ref 0–0.2)
PLATELET # BLD AUTO: 105 10*3/MM3 (ref 140–450)
PMV BLD AUTO: 9.9 FL (ref 6–12)
POTASSIUM BLD-SCNC: 4.6 MMOL/L (ref 3.5–5.2)
PROT SERPL-MCNC: 6.3 G/DL (ref 6–8.5)
RBC # BLD AUTO: 3 10*6/MM3 (ref 3.77–5.28)
SODIUM BLD-SCNC: 142 MMOL/L (ref 136–145)
TIBC SERPL-MCNC: 270 MCG/DL (ref 298–536)
TRANSFERRIN SERPL-MCNC: 181 MG/DL (ref 200–360)
WBC NRBC COR # BLD: 2.54 10*3/MM3 (ref 3.4–10.8)

## 2020-03-27 PROCEDURE — 80053 COMPREHEN METABOLIC PANEL: CPT

## 2020-03-27 PROCEDURE — 84466 ASSAY OF TRANSFERRIN: CPT

## 2020-03-27 PROCEDURE — 36415 COLL VENOUS BLD VENIPUNCTURE: CPT

## 2020-03-27 PROCEDURE — 82607 VITAMIN B-12: CPT

## 2020-03-27 PROCEDURE — 82746 ASSAY OF FOLIC ACID SERUM: CPT

## 2020-03-27 PROCEDURE — 96372 THER/PROPH/DIAG INJ SC/IM: CPT

## 2020-03-27 PROCEDURE — 85025 COMPLETE CBC W/AUTO DIFF WBC: CPT

## 2020-03-27 PROCEDURE — 83540 ASSAY OF IRON: CPT

## 2020-03-27 PROCEDURE — 82728 ASSAY OF FERRITIN: CPT

## 2020-03-27 PROCEDURE — 25010000002 EPOETIN ALFA-EPBX 40000 UNIT/ML SOLUTION: Performed by: INTERNAL MEDICINE

## 2020-03-27 RX ADMIN — EPOETIN ALFA-EPBX 40000 UNITS: 40000 INJECTION, SOLUTION INTRAVENOUS; SUBCUTANEOUS at 14:54

## 2020-03-28 LAB
FOLATE SERPL-MCNC: 4.09 NG/ML (ref 4.78–24.2)
VIT B12 BLD-MCNC: 237 PG/ML (ref 211–946)

## 2020-03-30 ENCOUNTER — TELEPHONE (OUTPATIENT)
Dept: ONCOLOGY | Facility: CLINIC | Age: 50
End: 2020-03-30

## 2020-03-30 NOTE — TELEPHONE ENCOUNTER
NOTIFIED \PT WILL SEND SCRIPT TO [facility].    ----- Message from Fermín Boggs MD sent at 3/28/2020 12:48 PM CDT -----  Labs, 03/28/2020- folate 4.09; B12 237 (each depressed).  Please call in Folbic or Folbee po daily # [phone]. tx u

## 2020-04-08 ENCOUNTER — TELEPHONE (OUTPATIENT)
Dept: URGENT CARE | Age: 50
End: 2020-04-08

## 2020-04-11 ENCOUNTER — TELEPHONE (OUTPATIENT)
Dept: SURGERY | Age: 50
End: 2020-04-11

## 2020-04-11 NOTE — TELEPHONE ENCOUNTER
S: I spoke with Ms. Silver phone this morning to make a follow-up visit now roughly 6 weeks post closed lateral internal sphincterotomy for treatment of a chronic anal fissure. Since her most recent office visit a month ago she has continued to do well. She reports that her small incision remains well-healed and that the surrounding ecchymosis has completely resolved. She also notes that the symptoms from her fissure have completely resolved. She has no residual pain. She passes soft bowel movements without discomfort and with normal continence. Overall she is quite pleased. O: Unable to undertake physical exam since this is a phone post op check given the current Covid 19 Pandemic. S: 1. Full recovery post closed lateral internal sphincterotomy with relief of preoperative symptoms. P: 1. She may continue with normal activity, no restriction in place. 2.  I reminded her of the need to keep her bowel movements soft and easy to pass to help avoid problems in the future. 3.  Follow-up with me as needed.

## 2020-07-14 ENCOUNTER — TELEPHONE (OUTPATIENT)
Dept: ONCOLOGY | Facility: CLINIC | Age: 50
End: 2020-07-14

## 2020-07-14 ENCOUNTER — LAB (OUTPATIENT)
Dept: LAB | Facility: HOSPITAL | Age: 50
End: 2020-07-14

## 2020-07-14 DIAGNOSIS — D69.6 THROMBOCYTOPENIA (HCC): ICD-10-CM

## 2020-07-14 DIAGNOSIS — D69.6 THROMBOCYTOPENIA (HCC): Primary | ICD-10-CM

## 2020-07-14 DIAGNOSIS — E53.8 B12 DEFICIENCY: ICD-10-CM

## 2020-07-14 DIAGNOSIS — D50.9 IRON DEFICIENCY ANEMIA, UNSPECIFIED IRON DEFICIENCY ANEMIA TYPE: ICD-10-CM

## 2020-07-14 LAB
ALBUMIN SERPL-MCNC: 4.5 G/DL (ref 3.5–5.2)
ALBUMIN/GLOB SERPL: 1.8 G/DL
ALP SERPL-CCNC: 78 U/L (ref 39–117)
ALT SERPL W P-5'-P-CCNC: 7 U/L (ref 1–33)
ANION GAP SERPL CALCULATED.3IONS-SCNC: 9 MMOL/L (ref 5–15)
AST SERPL-CCNC: 13 U/L (ref 1–32)
BASOPHILS # BLD AUTO: 0.05 10*3/MM3 (ref 0–0.2)
BASOPHILS NFR BLD AUTO: 1.5 % (ref 0–1.5)
BILIRUB SERPL-MCNC: 0.3 MG/DL (ref 0–1.2)
BUN SERPL-MCNC: 22 MG/DL (ref 6–20)
BUN/CREAT SERPL: 10.8 (ref 7–25)
CALCIUM SPEC-SCNC: 8.5 MG/DL (ref 8.6–10.5)
CHLORIDE SERPL-SCNC: 108 MMOL/L (ref 98–107)
CO2 SERPL-SCNC: 23 MMOL/L (ref 22–29)
CREAT SERPL-MCNC: 2.03 MG/DL (ref 0.57–1)
DEPRECATED RDW RBC AUTO: 52.1 FL (ref 37–54)
EOSINOPHIL # BLD AUTO: 0.2 10*3/MM3 (ref 0–0.4)
EOSINOPHIL NFR BLD AUTO: 6.1 % (ref 0.3–6.2)
ERYTHROCYTE [DISTWIDTH] IN BLOOD BY AUTOMATED COUNT: 14.7 % (ref 12.3–15.4)
FERRITIN SERPL-MCNC: 179.6 NG/ML (ref 13–150)
GFR SERPL CREATININE-BSD FRML MDRD: 26 ML/MIN/1.73
GLOBULIN UR ELPH-MCNC: 2.5 GM/DL
GLUCOSE SERPL-MCNC: 82 MG/DL (ref 65–99)
HCT VFR BLD AUTO: 31.8 % (ref 34–46.6)
HGB BLD-MCNC: 10.3 G/DL (ref 12–15.9)
IMM GRANULOCYTES # BLD AUTO: 0.02 10*3/MM3 (ref 0–0.05)
IMM GRANULOCYTES NFR BLD AUTO: 0.6 % (ref 0–0.5)
IRON 24H UR-MRATE: 83 MCG/DL (ref 37–145)
IRON SATN MFR SERPL: 28 % (ref 20–50)
LYMPHOCYTES # BLD AUTO: 1.08 10*3/MM3 (ref 0.7–3.1)
LYMPHOCYTES NFR BLD AUTO: 32.9 % (ref 19.6–45.3)
MCH RBC QN AUTO: 30.8 PG (ref 26.6–33)
MCHC RBC AUTO-ENTMCNC: 32.4 G/DL (ref 31.5–35.7)
MCV RBC AUTO: 95.2 FL (ref 79–97)
MONOCYTES # BLD AUTO: 0.36 10*3/MM3 (ref 0.1–0.9)
MONOCYTES NFR BLD AUTO: 11 % (ref 5–12)
NEUTROPHILS NFR BLD AUTO: 1.57 10*3/MM3 (ref 1.7–7)
NEUTROPHILS NFR BLD AUTO: 47.9 % (ref 42.7–76)
NRBC BLD AUTO-RTO: 0 /100 WBC (ref 0–0.2)
PLATELET # BLD AUTO: 161 10*3/MM3 (ref 140–450)
PMV BLD AUTO: 9.6 FL (ref 6–12)
POTASSIUM SERPL-SCNC: 4.3 MMOL/L (ref 3.5–5.2)
PROT SERPL-MCNC: 7 G/DL (ref 6–8.5)
RBC # BLD AUTO: 3.34 10*6/MM3 (ref 3.77–5.28)
SODIUM SERPL-SCNC: 140 MMOL/L (ref 136–145)
TIBC SERPL-MCNC: 295 MCG/DL (ref 298–536)
TRANSFERRIN SERPL-MCNC: 198 MG/DL (ref 200–360)
WBC # BLD AUTO: 3.28 10*3/MM3 (ref 3.4–10.8)

## 2020-07-14 PROCEDURE — 36415 COLL VENOUS BLD VENIPUNCTURE: CPT

## 2020-07-14 PROCEDURE — 80053 COMPREHEN METABOLIC PANEL: CPT

## 2020-07-14 PROCEDURE — 85025 COMPLETE CBC W/AUTO DIFF WBC: CPT

## 2020-07-14 PROCEDURE — 82607 VITAMIN B-12: CPT

## 2020-07-14 PROCEDURE — 83540 ASSAY OF IRON: CPT

## 2020-07-14 PROCEDURE — 82746 ASSAY OF FOLIC ACID SERUM: CPT

## 2020-07-14 PROCEDURE — 82728 ASSAY OF FERRITIN: CPT

## 2020-07-14 PROCEDURE — 84466 ASSAY OF TRANSFERRIN: CPT

## 2020-07-14 NOTE — TELEPHONE ENCOUNTER
----- Message from Fermín Boggs MD sent at 7/14/2020  3:23 PM CDT -----  Labs 7/14/2020– GFR 26 (previously 41).  Please fax these labs to her PCP (Dr. Weston Rosnethal) and make appointment to be seen there Re: Worsening GFR.  Thank you

## 2020-07-15 LAB
FOLATE SERPL-MCNC: 4.67 NG/ML (ref 4.78–24.2)
VIT B12 BLD-MCNC: 337 PG/ML (ref 211–946)

## 2020-07-21 NOTE — PROGRESS NOTES
MGW ONC [facility] GROUP HEMATOLOGY AND ONCOLOGY  [address]  [place] [address] [phone]    Patient Name: Stacy Lynn  Encounter Date: 07/24/2020  YOB: 1970  Patient Number: [id number]    REASON FOR VISIT: Ms. Stacy Lynn is a 50-year-old female who returns in followup of anemia from chronic kidney disease, with leukopenia and intermittent thrombocytopenia without unifying diagnosis (likely contribution from methotrexate). She is seen 15 months since last receipt of IV Injectafer and 35 months since last receipt of 2 units PRBC transfusions. She is here alone. History is obtained from the patient who is considered to be a reliable historian. The history is muddled by liberal symptom reporting.     DIAGNOSTIC ABNORMALITIES:   1. Labs, 09/16/2014 Dr. Waters's office. TIBC 303 (225 - 420), serum iron 43 (42 - 180), and iron saturation 14.2%.   2. Labs, 10/03/2014. Hemoglobin 9.8, hematocrit 30.2, MCV 94.1, platelets 157,000, WBC 2.9, with 52.3 segs (ANC 1.5), 36.8 lymphocytes, 10.9 mid cells. Glucose 80, BUN 16, creatinine 0.9 (GFR 71.6), sodium 142, potassium 3.9, chloride 109, bicarbonate 19, calcium 8.6, albumin 3.5, total protein 6.3, bilirubin 0.4, alkaline phosphatase 67, , AST 12, ALT 20, phosphorus 4.6 (each normal). Serum iron 49, iron saturation 15.8%, ferritin 32, , folate 13.6. HIV antibodies screen nonreactive. ELENA negative. T4 of 7.1, TSH 0.6, sed rate 8 (each normal). Rheumatoid factor less than 10. ANCA less than 1:20.  3. Colonoscopy, 10/13/2014: - One 7 mm polyp in the ascending colon resected and retrieved. The examination was otherwise normal on direct and retroflexion views. Recommendation: - Repeat colonoscopy in 5 years for surveillance.  4. Comprehensive Report, 10/15/2014: FINAL DIAGNOSIS. Peripheral blood: Leukopenia/neutropenia; normocytic anemia. Comprehensive Comments: The cause of the cytopenias  is not apparent from review of this peripheral blood smear. Differential diagnosis includes chronic disease, autoimmunity, drug effects, infection, etc. Although there are no morphologic features of myeloid dysplasia, please be aware that peripheral blood findings are not always representative of underlying bone marrow abnormalities. FLOW IMPRESSION: Peripheral blood: No increase in myeloid or lymphoid blasts identified. Findings consistent with mild granulocytic left shift. No immunophenotypically abnormal B- or T-cell population identified.  5. EGD performed on 10/16/2014 at [facility]. IMPRESSION: Normal esophagus. Normal stomach. Normal first part of the duodenum and 2nd part of the duodenum Biopsied . Pathology: Small bowel biopsy. Benign small intestinal type mucosa with no significant histopathologic changes.  6. Labs, 05/29/2015. IgG 827, IgA 168, IgM 140. Immunofixation showed no monoclonal immunoglobulins detected. Free kappa light chain 1.26, free lambda light chain 1.17, kappa/lambda light chains 1.08 (each normal).  7. UPIEP, 06/09/2015. 24-hour urine 82.8 mg. Immunofixation not reported.  8. [facility], CT-guided bone marrow, 06/29/2015. Impression: Technically successful CT-guided bone marrow aspirate without immediate complications. No core bone marrow sample could be obtained. PATHOLOGY: Surgical pathology performed on 06/29/2015 at [facility] was revealing for post iliac crest bone marrow biopsy and aspirate: Normocellular bone marrow of 40%. No increase in blasts. Iron staining is 1+. Flow cytometry shows no evidence of an abnormal myeloid maturation, increase in blasts, or lymphoproliferative disorder. Complete blood count and peripheral blood smear review: Normochromic normocytic anemia. Leukopenia. No circulating blasts.  9. Peripheral blood comprehensive report, 06/19/2017: PANCYTOPENIA. COMPREHENSIVE DIAGNOSIS.. Review of peripheral blood smear: Leukopenia with relative  reactive appearing eosinophilia. Normochromic, normocytic anemia. Mild thrombocytopenia. See comment. COMPREHENSIVE COMMENT. When compared to a peripheral blood specimen reviewed on this patient in 10/2014 ([id number]), the white blood cell count has decreased from 2.7 K/mL to 1.8 currently. The hemoglobin level has also decreased from 9.9 g/dL to 8.7 g/dL. The platelet counts are similar. As stated in the prior specimen, the cause of the cytopenias is not apparent from the peripheral blood smear examination. Etiologies to consider include chronic diseases particularly autoimmune disease, infections and drugs/toxins. Myelodysplasia is in the differential diagnosis but that diagnosis can be difficult to establish on a peripheral blood specimen. Correlation recommended. Flow cytometry study after erythrolysis reveals no circulating myeloid or lymphoid blasts. Myeloid and monocytic lineages are represented by mature granulocytes and monocytes. Phenotypically unremarkable eosinophils are mildly elevated at 10.5% of the total WBC. Basophils are not increased. It must be noted that the diagnosis of a myeloid stem cell disorder, if clinically suspected, is best made using bone marrow specimens. Peripheral blood is not always representative of abnormalities involving the bone marrow. The B cells show no evidence of clonality or antigenic aberrancy. The T cells show normal expression of the pan T-cell antigens. The NK cells account for 23.7% of the total lymphocytes.  10. Psychiatric: Endoscopy: 08/23/2017. Impressions: - Normal esophagus. - Non-erosive gastritis. Biopsied. - Normal first part of the duodenum and 2nd part of the duodenum. Biopsied.   11. Psychiatric: Colonoscopy: 08/23/2017. Impressions: - The entire examined colon is normal. Biopsied.     PREVIOUS INTERVENTIONS:   1. IV Venofer, 11/10/2014 - 11/20/2014 (1000 mg)  2. INFeD 500 mg on 07/19/2016 and 05/01/2017  3. 2 units PRBC transfusions,  09/01/2017  4. Injectafer 750 mg IV, 04/23/2019        Problem List Items Addressed This Visit        Hematopoietic and Hemostatic    Thrombocytopenia (CMS/HCC) - Primary    Iron deficiency anemia    Anemia in chronic kidney disease (CKD)         No history exists.       PAST MEDICAL HISTORY:  ALLERGIES:  Allergies   Allergen Reactions   • Adhesive Tape Hives   • Tape Hives     CURRENT MEDICATIONS:  Outpatient Encounter Medications as of 7/24/2020   Medication Sig Dispense Refill   • buPROPion XL (WELLBUTRIN XL) 150 MG 24 hr tablet TAKE 1 TABLET BY MOUTH ONCE DAILY FOR DEPRESSION  2   • busPIRone (BUSPAR) 10 MG tablet Take 10 mg by mouth.     • carBAMazepine XR (TEGretol  XR) 200 MG 12 hr tablet Take 1 tablet by mouth Every 12 (Twelve) Hours. 60 tablet 3   • docusate sodium (COLACE) 100 MG capsule Take 100 mg by mouth.     • DULoxetine (CYMBALTA) 60 MG capsule Take 60 mg by mouth Daily.     • esomeprazole (nexIUM) 40 MG capsule TAKE 1 CAPSULE BY MOUTH TWICE DAILY BEFORE  MEALS 60 capsule 0   • Fluticasone Furoate-Vilanterol (BREO ELLIPTA) 200-25 MCG/INH inhaler      • folic acid-vit B6-vit B12 (Folbee) 2.5-25-1 MG tablet tablet Take 1 tablet by mouth Daily. 30 tablet 0   • levothyroxine (SYNTHROID, LEVOTHROID) 88 MCG tablet Take 75 mcg by mouth Daily.     • LYRICA 100 MG capsule Take 100 mg by mouth Every 8 (Eight) Hours As Needed.  1   • methotrexate 2.5 MG tablet Take 4 tablets by mouth 1 (One) Time Per Week. Takes 4 tabs weekly on Mondays 16 tablet 11   • metoprolol succinate XL (TOPROL-XL) 25 MG 24 hr tablet Take 100 mg by mouth Daily.     • Mometasone Furoate 200 MCG/ACT aerosol Inhale 1 puff.     • Morphine (MS CONTIN) 15 MG 12 hr tablet Take 15 mg by mouth 2 (Two) Times a Day.     • O2 (OXYGEN) Inhale 2 L/min 1 (One) Time. Continuous     • ondansetron (ZOFRAN) 4 MG tablet Take 4 mg by mouth Every 8 (Eight) Hours As Needed for Nausea or Vomiting.     • oxybutynin (DITROPAN) 5 MG tablet Take 5 mg by mouth every  night at bedtime.  5   • polyethylene glycol (MIRALAX) powder Take 17 g by mouth.     • PROCRIT 13255 UNIT/ML injection Inject 1 Units under the skin As Needed (WHEN BLOOD COUNT [hemoglobin] BELOW 11).     • tiZANidine (ZANAFLEX) 4 MG tablet Take 4 mg by mouth At Night As Needed for Muscle Spasms.     • topiramate (TOPAMAX) 100 MG tablet Take 100 mg by mouth Every Night.       No facility-administered encounter medications on file as of 7/24/2020.      ADULT ILLNESSES:   Iron deficiency anemia ( ICD-10:D50.9 ;Iron deficiency anemia, unspecified   Anemia ( ICD-10:D64.9 ;Anemia, unspecified   Anemia in chronic kidney disease ( ICD-10:D63.1 ;Anemia in chronic kidney disease   Chronic kidney disease ( ICD-10:N18.3 ;Chronic kidney disease, stage 3 (moderate)   Chronic pancreatitis ( ICD-10:K86.1 ;Other chronic pancreatitis   Depression ( ICD-10:F32.9 ;Major depressive disorder, single episode, unspecified   Fatigue ( ICD-10:R53.82 ;Chronic fatigue, unspecified   Fibromyalgia syndrome ( ICD-10:M79.7 ;Fibromyalgia   Gastroesophageal reflux disease ( ICD-10:K21.9 ;Gastro-esophageal reflux disease without esophagitis   Hypertension ( ICD-10:I10 ;Essential (primary) hypertension   Hypothyroidism ( ICD-10:E03.9 ;Hypothyroidism, unspecified   Irritable bowel syndrome ( ICD-10:K58.9 ;Irritable bowel syndrome without diarrhea   Kidney stone ( ICD-10:N20.0 ;Calculus of kidney   Knee pain ( meniscal disorder,Dr. Dasilva; ICD-10:M25.569 ;Pain in unspecified knee )   Leukopenia ( ICD-10:D72.819 ;Decreased white blood cell count, unspecified   Macular degeneration ( ICD-10:H35.30 ;Unspecified macular degeneration   Migraines ( ICD-10:G43.909 ;Migraine, unspecified, not intractable, without status migrainosus   Obesity ( ICD-10:E66.9 ;Obesity, unspecified   Peripheral neuropathy ( ICD-10:G62.9 ;Polyneuropathy, unspecified   Sarcoidosis ( ICD-10:D86.0 ;Sarcoidosis of lung   Scoliosis ( ICD-10:M41.9 ;Scoliosis, unspecified   Urge  incontinence of urine ( ICD-10:N39.41 ;Urge incontinence   Vitamin B12 deficiency ( ICD-10:E53.8 ;Deficiency of other specified B group vitamins    SURGERIES:   Total knee replacement, right, 11/06/2015, Dr. Dasilva   Colonoscopy, 2009   Shoulder repair, epicondyle, right, 2002   Shoulder repair, arthroscopy, 2001   Ulnar transposition; ulnar neuropathy, 2002,1999   Kidney stone, 2001   Hysterectomy, total, 2004   Colonoscopy, 09/06/2018. Impression: The examination was otherwise normal on direct and retroflexion views. No specimens collected. Repeat 5 years   Colonoscopy, 08/23/2017. [facility]. Impression> The entire examined colon is normal. Biopsied   Decompression of median nerve, (carpal tunnel release), 05/08/2018, Dr. Kwon   Operative procedure on knee, replacement, 01/2014, Dr. Dasilva   Endoscopy, 08/23/2017, [facility]. Impression: Normal esophagus. Non-erosive gastritis. Biopsied. Normal 1st part of the duodenum and 2nd part of the duodenum. Biopsied   Radiofrequency ablation (RFA) left leg on 01/10/2018 by Dr. Montgomery. Left lower extremity venous ultrasound, 01/19/2018. No deep venous thrombosis (DVT) left lower extremity. Was started on Eliquis. Lymphedema pumps and compression stockings prescribed. Followup 1 month   Revision surgery was done last 05/04/2017. Dr. Kwon   Cholecystectomy, remote      ADULT ILLNESSES:  Patient Active Problem List   Diagnosis Code   • Palpitations R00.2   • Thrombocytopenia (CMS/[facility]) D69.6   • Sarcoidosis D86.9   • Essential hypertension I10   • Hypothyroidism E03.9   • Dyspnea on exertion R06.00   • Morbidly obese (CMS/[facility]) E66.01   • PFO (patent foramen ovale) Q21.1   • Iron deficiency anemia D50.9   • Generalized weakness R53.1   • Volume depletion, gastrointestinal loss E86.9   • Viral enterocolitis A08.4   • Colon polyps K63.5   • Epigastric pain R10.13   • Bloating R14.0   • Early satiety R68.81   • Weight loss R63.4   • Abdominal  cramping R10.9   • Nausea R11.0   • NSAID long-term use Z79.1   • 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, T45.1X5A   • Anemia in chronic kidney disease (CKD) N18.9, D63.1   • Varicose veins of both lower extremities with pain I83.813   • Lymphedema I89.0   • Venous (peripheral) insufficiency I87.2   • Neuropathy due to medical condition (CMS/HCC) G63   • Venous insufficiency I87.2   • Chest pain R07.9   • Bloody stool K92.1   • Constipation K59.00   • Anal or rectal pain K62.89   • Rectal bleeding K62.5   • Hx of colonic polyps Z86.010   • Other hemorrhoids K64.8   • Chronic respiratory failure with hypoxia (CMS/HCC) J96.11   • Diarrhea R19.7   • Irritable bowel syndrome with constipation K58.1   • Overweight E66.3     SURGERIES:  Past Surgical History:   Procedure Laterality Date   • CARDIAC ABLATION  04/2016    SVT; Dr. Diallo    • CHOLECYSTECTOMY     • COLONOSCOPY  10/13/2014   • COLONOSCOPY N/A 8/23/2017    Procedure: COLONOSCOPY WITH ANESTHESIA;  Surgeon: Jeanette Mclaughlin MD;  Location: [facility] ENDOSCOPY;  Service:    • COLONOSCOPY N/A 9/6/2018    Procedure: COLONOSCOPY WITH ANESTHESIA;  Surgeon: Jeanette Mclaughlin MD;  Location: [facility] ENDOSCOPY;  Service: Gastroenterology   • ENDOSCOPY N/A 8/23/2017    Procedure: ESOPHAGOGASTRODUODENOSCOPY WITH ANESTHESIA;  Surgeon: Jeanette Mclaughlin MD;  Location: [facility] ENDOSCOPY;  Service:    • ENDOVENOUS ABLATION SAPHENOUS VEIN W/ LASER Right 03/30/2018   • HYSTERECTOMY     • JOINT REPLACEMENT Right     PATELLA REPLACED   • KIDNEY STONE SURGERY     • KNEE ARTHROSCOPY     • KNEE SURGERY Right    • LATERAL EPICONDYLE RELEASE     • PATELLA SURGERY     • REPLACEMENT TOTAL KNEE     • SHOULDER ARTHROSCOPY     • ULNAR NERVE DECOMPRESSION     • VARICOSE VEIN SURGERY Left 1/10/2018    Procedure: LEFT SAPHENOUS VEIN RADIO FREQUENCY ABLATION;  Surgeon: Kvng Montgomery DO;  Location: [facility] OR;  Service:    • VARICOSE VEIN SURGERY Right 3/30/2018    Procedure: RIGHT LOWER  EXTREMITY VENAGRAM, RIGHT LOWER EXTREMITY SAPHENOUS VEIN RADIO FREQUENCY ABLATION;  Surgeon: Kvng Montgomrey DO;  Location:  [facility] OR;  Service: Vascular     HEALTH MAINTENANCE ITEMS:  Health Maintenance Due   Topic Date Due   • TDAP/TD VACCINES (1 - Tdap) 04/04/1979   • HEPATITIS C SCREENING  05/01/2017   • PAP SMEAR  05/01/2017   • ZOSTER VACCINE (1 of 2) 04/04/2018   • MEDICARE ANNUAL WELLNESS  02/25/2020       <no information>  Last Completed Colonoscopy       Status Date      COLONOSCOPY Done 3/15/2019 Ext Proc: TN COLONOSCOPY W/BIOPSY SINGLE/MULTIPLE     Patient has more history with this topic...        Immunization History   Administered Date(s) Administered   • Pneumococcal Conjugate 13-Valent (PCV13) 04/01/2019   • Pneumococcal Polysaccharide (PPSV23) 10/01/2018     Last Completed Mammogram       Status Date      MAMMOGRAM Done 11/4/2013 Ext Proc: INACTIVE -HC MAMMOGRAM SCREENING BILAT DIGITAL W CAD     Patient has more history with this topic...            FAMILY HISTORY:  Family History   Problem Relation Age of Onset   • Arrhythmia Mother    • Heart disease Mother    • Kidney disease Mother    • Heart disease Father    • Diabetes Father    • Heart disease Brother    • Heart disease Maternal Aunt    • Heart disease Maternal Uncle    • Heart disease Paternal Aunt    • Heart disease Paternal Uncle    • Heart disease Maternal Grandmother    • Heart disease Maternal Grandfather    • Heart disease Paternal Grandmother    • Heart disease Paternal Grandfather    • Colon cancer Neg Hx    • Colon polyps Neg Hx      SOCIAL HISTORY:  Social History     Socioeconomic History   • Marital status: Single     Spouse name: Not on file   • Number of children: Not on file   • Years of education: Not on file   • Highest education level: Not on file   Tobacco Use   • Smoking status: Never Smoker   • Smokeless tobacco: Never Used   Substance and Sexual Activity   • Alcohol use: No   • Drug use: No   • Sexual activity:  "Defer       REVIEW OF SYSTEMS:  Constitutional:   The patient's appetite is fair. \"It's just there.\" Her energy is chronically low. She manages her personal ADLs but still tires easily. She lives with her sister and brother. She manages her ADLs including helping with light indoor chores but not any errands and does not drive. She has lost 30 pounds (in addition to 1 pound at her prior visit) since her last visit. \"Fluid weight I think, but also cause I've not been eating as much.\" She has no fevers or chills but has non-drenching night sweats. Her sleep habits seem appropriate.  Ear/Nose/Throat/Mouth:   She reports no ear pains, sinus symptoms, sore throat, nosebleeds, or sore tongue. She has migraine headaches. She denies any hoarseness, change in voice quality, or hemoptysis.   Ocular:   She reports no eye pain, significant change in visual acuity, double vision, or blurry vision.  Respiratory:   She reports baseline exertional dyspnea and is short of breath with her routine activities. She has chronic cough with thick, white/yellow phlegm production but has no significant shortness of breathing at rest or unexplained chest wall pain. Says she is off prednisone since last 08/2016 by Dr. Scherer (for the sarcoid). Says the methotrexate has been cut down to 10 mg weekly per Dr. Scherer. Has been on round the clock O2 since 04/2018.  Cardiovascular:   She reports no exertional chest pain, chest pressure, or chest heaviness. She reports no claudication. She reports no palpitations or symptomatic orthostasis.  Gastrointestinal:   She reports chronic nausea but no dysphagia, vomiting, or postprandial abdominal pain but admits to early satiety, increased bloating, and cramping. She has no change in bowel habits without dark discoloration of the stool (off oral iron). She reports intermittent rectal bleeding when she is constipated. \"Not lately.\" She has intermittent diarrhea from IBS. Is on fiber supplements. Is intolerant " "of oral iron (cramps, constipation). Last repeat colonoscopy, 09/06/2018 (above). Hemorrhoids?.  Genitourinary:   She reports no urinary burning, frequency, dribbling, or discoloration. She reports difficulty controlling her bladder and has trouble holding in her urine requiring use of protective pads. She has no need to urinate frequently through the night.  Musculoskeletal:   She reports persistent right knee discomfort in spite of total knee replacement (TKR). Says revision surgery was done last 05/04/2017 - Dr. Kwon. She has chronic arthralgias of the hips and lower back with distal radiculitis to the buttocks. She has myalgias of the calves and has nighttime leg cramping. She is now seen by Dr. Schmitt of Pain Management. Says a nerve stimulator couldn't be placed due to her scoliosis. Is now on oral morphine.  \"It's helped.\"  Extremities:   She reports chronic trouble with fluid retention and significant leg swelling. Is using compression leg pumps bid since 02/07/2018.  Endocrine:   She reports no problems with excess thirst, excessive urination, vasomotor instability.  Heme/Lymphatic:   She reports easy bruising but no unexplained bleeding, petechial rashes, or swollen glands.  Skin:   She reports chronic itching and rashes but no lesions which won't heal.  Neuro:   She reports postural dizziness but no loss of consciousness, seizures, or fainting spells. She reports no weakness of her face, arms, or legs. She has no difficulty with speech. She has no tremors. She has relief of paresthesias of the right hand since the carpal tunnel release on 05/08/2018 (Dr. Kwon).  Psych:   She admits to depression and anxiety. She reports occasional mood swings. She reports no history of suicide attempts.      VITAL SIGNS: /84   Pulse 69   Temp 98.2 °F (36.8 °C)   Resp 16   Ht 160 cm (62.99\")   Wt 131 kg (289 lb)   SpO2 97%   Breastfeeding No   BMI 51.21 kg/m² Body surface area is 2.26 meters " squared.  Pain Score    07/24/20 1056   PainSc:   7         PHYSICAL EXAMINATION:   General:   She is a very-pleasant, morbidly obese, and modestly-kept middle-aged female who is comfortable at rest. She arrived in the exam room ambulatory. She appears to be her stated age. Her skin color is still a bit pale.   Head/Neck:   The patient is anicteric and atraumatic. She is wearing a surgical mask today.  She is wearing O2 via cannula, tethered to a portable O2 tank. Her dentition is poor. The trachea is midline. The neck is supple without evidence of jugular venous distention or cervical adenopathy.   Eyes:   The pupils are equal, round, and reactive to light. The extraocular movements are full. There is no scleral jaundice or erythema.   Chest:   The respiratory efforts are normal and unhindered. The breath sounds are diminished bilaterally with vague basilar rales. There are no wheezes, rhonchi, or asymmetry of breath sounds.  Cardiovascular:   The patient has a regular cardiac rate and rhythm without murmurs, rubs, or gallops. The peripheral pulses are equal and full.  Abdomen:   The belly is soft and globose. Her habitus precludes an adequate exam but there is no rebound or guarding. There is no organomegaly, mass-effect, or tenderness. Bowel sounds are active and of normal character.  Extremities:   There is no evidence of cyanosis, clubbing, with 3+ (chronic) leg edema.   Rheumatologic:   There is no overt evidence of rheumatoid deformities of the hands. There is no sausaging of the fingers. There is no sign of active synovitis. The gait is slow and antalgic, still favoring the right leg/knee.  Cutaneous:   There are no overt rashes, disseminated lesions, purpura, or petechiae.   Lymphatics:   There is no evidence of adenopathy in the cervical, supraclavicular, or axillary areas.   Neurologic:   The patient is alert, oriented, cooperative, and pleasant. She is appropriately conversant. She ambulated into the  exam room without assistance but declined transfer from chair to exam table. There is no overt dysfunction of the motor, sensory, cerebellar systems.  Psych:   Mood and affect are appropriate for circumstance. Eye contact is appropriate. Normal judgement and decision making.        LABS    Lab Results - Last 18 Months   Lab Units 07/14/20  1443 03/27/20  1431 03/10/20  1508 02/14/20  1517 01/10/20  1430 12/06/19  1443  09/20/19  1356   HEMOGLOBIN g/dL 10.3* 9.1* 9.6* 12.9 10.1* 9.7*   < > 12.2   HEMATOCRIT % 31.8* 29.2* 30.2* 40.4 32.3* 30.3*   < > 37.8   MCV fL 95.2 97.3* 96.2 95.3 99.4* 99.7*   < > 98.4*   WBC 10*3/mm3 3.28* 2.54* 2.72* 4.28 2.55* 2.82*   < > 3.48   RDW % 14.7 14.5 14.7 14.3 15.1 14.2   < > 12.8   MPV fL 9.6 9.9 9.8 10.5 10.6 10.1   < > 9.7   PLATELETS 10*3/mm3 161 105* 143 159 108* 130*   < > 150   IMM GRAN % % 0.6* 0.0  --  0.2 0.4 0.0  --  0.3   NEUTROS ABS 10*3/mm3 1.57* 1.45* 1.54* 2.68 1.27* 1.53*   < > 2.01   LYMPHS ABS 10*3/mm3 1.08 0.73 0.70 0.98 0.77 0.75   < > 0.95   MONOS ABS 10*3/mm3 0.36 0.25 0.32 0.43 0.31 0.31   < > 0.36   EOS ABS 10*3/mm3 0.20 0.10 0.13 0.13 0.17 0.20   < > 0.12   BASOS ABS 10*3/mm3 0.05 0.01 0.02 0.05 0.02 0.03   < > 0.03   IMMATURE GRANS (ABS) 10*3/mm3 0.02 0.00  --  0.01 0.01 0.00  --  0.01   NRBC /100 WBC 0.0 0.0  --  0.0 0.0 0.0  --  0.0    < > = values in this interval not displayed.       Lab Results - Last 18 Months   Lab Units 07/14/20  1443 03/27/20  1431 03/10/20  1508 02/17/20  1424 02/14/20  1517 01/10/20  1430 10/25/19  1351   GLUCOSE mg/dL 82 84 89  --  112* 107* 93   SODIUM mmol/L 140 142 143  --  141 142 143   POTASSIUM mmol/L 4.3 4.6 4.9 4.0 4.0 4.4 3.7   CO2 mmol/L 23.0 25.0 26.0  --  21.0* 22.0 23.0   CHLORIDE mmol/L 108* 109* 107  --  107 108* 109*   ANION GAP mmol/L 9.0 8.0 10.0  --  13.0 12.0 11.0   CREATININE mg/dL 2.03* 1.35* 1.27*  --  1.38* 1.49* 1.19*   BUN mg/dL 22* 24* 25*  --  22* 36* 13   BUN / CREAT RATIO  10.8 17.8 19.7  --  15.9  24.2 10.9   CALCIUM mg/dL 8.5* 8.4* 8.7  --  9.1 8.4* 8.5*   EGFR IF NONAFRICN AM mL/min/1.73 26* 41* 44*  --  40* 37* 48*   ALK PHOS U/L 78 71 73  --  91 68 146*   TOTAL PROTEIN g/dL 7.0 6.3 6.6  --  7.8 6.7 6.6   ALT (SGPT) U/L 7 10 9  --  11 10 64*   AST (SGOT) U/L 13 14 13  --  15 13 53*   BILIRUBIN mg/dL 0.3 0.2 0.2  --  0.4 0.2 0.3   ALBUMIN g/dL 4.50 3.80 4.00  --  4.80 4.00 4.10   GLOBULIN gm/dL 2.5 2.5 2.6  --  3.0 2.7 2.5       No results for input(s): MSPIKE, KAPPALAMB, IGLFLC, URICACID, FREEKAPPAL, CEA, LDH, REFLABREPO in the last 00006 hours.    Lab Results - Last 18 Months   Lab Units 07/14/20  1443 03/27/20  1431 02/14/20  1517 01/10/20  1430 10/25/19  1351 09/27/19  1410   IRON mcg/dL 83 54 62  62 50  50 72 81  81   TIBC mcg/dL 295* 270* 310 279* 258* 328   IRON SATURATION % 28 20 20 18* 28 25   FERRITIN ng/mL 179.60* 120.30 219.10* 302.50* 375.30* 298.90*   FOLATE ng/mL 4.67* 4.09* 11.70  10.60 5.78  5.82 11.40 9.53       ASSESSMENT:   1. Macrocytic anemia, with contributions from iron deficiency (1+ marrow iron), iron underutilization/anemia of chronic disease and chronic kidney disease and methotrexate. Michael Hgb 7.9, 08/31/2017 requiring 2 units PRBC transfusions on 09/01/2017.   a. Negative EGD/colonoscopy (above), negative SPIEP, negative UPIEP. Stable, Hgb 10.3 on 07/14/2020 (prior range: Hgb 7.9 - 12.3).   b. Endoscopy: 08/23/2017. Impressions: - Normal esophagus. - Non-erosive gastritis. Biopsied. - Normal 1st part of the duodenum and 2nd part of the duodenum. Biopsied.   c. [facility]: Colonoscopy: 08/23/2017. Impressions: - The entire examined colon is normal. Biopsied.   d. Colonoscopy, 09/06/2018. Impressions: - The examination was otherwise normal on direct and retroflexion views. No specimens collected. Repeat 5 years  2. Leukopenia, with adequate ANC. Stable, WBC 3.28; ANC 1.57, 07/14/2020 (prior range: WBC 2.45 - 4.2; ANC 1.05 - 2.6). No unifying diagnosis. Likely medication  (methotrexate) associated.   3. Thrombocytopenia. Stable, 161,000 on 07/14/2020 (prior range: 115,000 - 188,000).  4. Chronic kidney disease, Stage III. Baseline GFR 56.6 mL/min, 05/29/2015. Worse, GFR 26 mL/min on 08/09/2018 (prior range: 42.5 - 56.6 mL/min).   5. Hypothyroidism.   6. Gastroesophageal reflux disease, now with early satiety, bloating, cramps.   7. B12 deficiency. Receives B12 injections per Dr. Waters. Previously received B12, 1000 mcg IM daily x 3 then weekly x 4 beginning 09/07/2018.   8. Fibromyalgia with chronic pain syndrome.   9. Irritable bowel syndrome with chronic diarrhea. Colonoscopy, 10/2014.   10. Depression.   11. Chronic neck and low back pain with scoliosis. She is now seen by Dr. Schmitt of Pain Management. Says a nerve stimulator will be placed on 04/23/2019.   12. Peripheral neuropathy and swelling of the legs.   a. Seen by Dr. Montgomery, 09/07/2017 and 12/08/2017. Impression: Venous insufficiency and lymphedema.   b. Underwent RFA left leg on 01/10/2018 by Dr. Montgomery. Lower extremity venous ultrasound, 01/19/2018. No deep venous thrombosis (DVT) left lower extremity. Was started on anticoagulation (Eliquis). Lymphedema pumps and compression stockings prescribed.   13. Degenerative joint disease (DJD) knees. Underwent right knee surgery on 11/06/2015 and revision on 05/04/2017. Dr. Kwon.   14. Obesity. Has stabilized.   15. Pulmonary sarcoidosis. Followed by Dr. Scherer. Was started on methotrexate sometime 05/2017. Is now on O2 since 04/2018.   16. Chronic fatigue, contributions from all above.   17. Admitted [facility] 09/25/2016 through 09/26/2016 for chest pain. DSE negative. Discharged for non-cardiac chest pain.   18. Leg edema. Underwent RFA left leg on 01/10/2018 by Dr. Montgomery. LE venous US, 01/19/2018. No DVT left LE. Eliquis stopped last 02/2018. Lymphedema pumps and compression stockings prescribed.   19. Right wrist carpal tunnel symptoms. Relieved since surgery last  05/08/2018 (Dr. Kwon).   20. Rectal bleeding. Colonoscopy, 09/06/2018. Impressions: The examination was otherwise normal on direct and retroflexion views. No specimens collected. Repeat 5 years.    PLAN:   1.  Re: Apprised of labs from 07/14/2020 with stable CBC, low (worsening) GFR, repleted serum iron, repleted Fe sat (INFeD, 05/01/2017), depressed folate (Folbee called in) but repleted B12.   2. Fax lab results from 07/14/2020 to Dr. Rosenthal (pcp) and make appointment to be seen there ASAP Re:  Worsening GFR  3.  Rx:  Folbee po daily # 30  4.  Reminded of the peripheral blood comprehensive report, 06/19 (above). Again non-diagnostic (no peripheral blood evidence for dysplasia and negative flow cytometry).   5. Previously discussed the 1+ marrow iron, but otherwise non-diagnostic bone marrow biopsy.   6. Previously discussed the previously normal immunoglobulin levels, negative TAVIA for monoclonal proteins, normal kappa/lambda light chains, negative 24 hour UPIEP, normal ESR, negative rheumatoid factor, normal TSH/T4, normal B12/folate, negative HIV screen, low iron studies, negative ELENA, normal LDH and negative comprehensive blood report (no peripheral blood evidence for dysplasia and negative flow cytometry).   6. Again reviewed her medications. Done. Needs her methotrexate. Diclofenac, Nexium (blood dyscrasias), gabapentin, Lortab (neutropenia, thrombocytopenia), Topamax (anemia, leukopenia). Would discontinue as many of these as possible. Defer to Dr. Waters.   7. Draw serum iron, Fe sat, ferritin, B12, folate every 4 weeks.   8. CBC weekly with Procrit 40,000 units subcutaneously if Hgb less than 11 and less than 33 - [facility] pharmacy or office   9. Continue management per primary care and other specialists.   10. Advance Directive discussed.  11. Return to the [facility] office in 24 weeks with pre-office CMP, B12/folate, serum iron, Fe sat, ferritin, and CBC with differential.     MEDICAL DECISION MAKING:  Moderate Complexity   AMOUNT OF DATA: Moderate    I spent 25 total minutes, face-to-face, caring for Hope today.  Greater than 50% of this time involved counseling and/or coordination of care as documented within this note regarding the patient's illness(es), pros and cons of various treatment options, instructions and/or risk reduction.    cc: MD Weston Ambrose MD

## 2020-07-24 ENCOUNTER — OFFICE VISIT (OUTPATIENT)
Dept: ONCOLOGY | Facility: CLINIC | Age: 50
End: 2020-07-24

## 2020-07-24 VITALS
DIASTOLIC BLOOD PRESSURE: 84 MMHG | TEMPERATURE: 98.2 F | BODY MASS INDEX: 51.21 KG/M2 | SYSTOLIC BLOOD PRESSURE: 128 MMHG | OXYGEN SATURATION: 97 % | RESPIRATION RATE: 16 BRPM | HEART RATE: 69 BPM | WEIGHT: 289 LBS | HEIGHT: 63 IN

## 2020-07-24 DIAGNOSIS — N18.30 ANEMIA IN STAGE 3 CHRONIC KIDNEY DISEASE (HCC): ICD-10-CM

## 2020-07-24 DIAGNOSIS — D69.6 THROMBOCYTOPENIA (HCC): Primary | ICD-10-CM

## 2020-07-24 DIAGNOSIS — D63.1 ANEMIA IN STAGE 3 CHRONIC KIDNEY DISEASE (HCC): ICD-10-CM

## 2020-07-24 DIAGNOSIS — D50.9 IRON DEFICIENCY ANEMIA, UNSPECIFIED IRON DEFICIENCY ANEMIA TYPE: ICD-10-CM

## 2020-07-24 PROBLEM — E66.3 OVERWEIGHT: Status: ACTIVE | Noted: 2020-07-24

## 2020-07-24 PROCEDURE — 99214 OFFICE O/P EST MOD 30 MIN: CPT | Performed by: INTERNAL MEDICINE

## 2020-07-28 ENCOUNTER — LAB (OUTPATIENT)
Dept: LAB | Facility: HOSPITAL | Age: 50
End: 2020-07-28

## 2020-07-28 ENCOUNTER — INFUSION (OUTPATIENT)
Dept: ONCOLOGY | Facility: HOSPITAL | Age: 50
End: 2020-07-28

## 2020-07-28 VITALS
OXYGEN SATURATION: 98 % | DIASTOLIC BLOOD PRESSURE: 76 MMHG | SYSTOLIC BLOOD PRESSURE: 131 MMHG | HEIGHT: 63 IN | TEMPERATURE: 98.4 F | BODY MASS INDEX: 50.85 KG/M2 | WEIGHT: 287 LBS | RESPIRATION RATE: 18 BRPM | HEART RATE: 70 BPM

## 2020-07-28 DIAGNOSIS — D63.1 ANEMIA IN STAGE 3 CHRONIC KIDNEY DISEASE (HCC): ICD-10-CM

## 2020-07-28 DIAGNOSIS — N18.4 ANEMIA IN STAGE 4 CHRONIC KIDNEY DISEASE (HCC): Primary | ICD-10-CM

## 2020-07-28 DIAGNOSIS — D63.1 ANEMIA IN STAGE 4 CHRONIC KIDNEY DISEASE (HCC): Primary | ICD-10-CM

## 2020-07-28 DIAGNOSIS — D69.6 THROMBOCYTOPENIA (HCC): ICD-10-CM

## 2020-07-28 DIAGNOSIS — D50.9 IRON DEFICIENCY ANEMIA, UNSPECIFIED IRON DEFICIENCY ANEMIA TYPE: Primary | ICD-10-CM

## 2020-07-28 DIAGNOSIS — N18.30 ANEMIA IN STAGE 3 CHRONIC KIDNEY DISEASE (HCC): ICD-10-CM

## 2020-07-28 LAB
BASOPHILS # BLD AUTO: 0.03 10*3/MM3 (ref 0–0.2)
BASOPHILS NFR BLD AUTO: 1 % (ref 0–1.5)
DEPRECATED RDW RBC AUTO: 51 FL (ref 37–54)
EOSINOPHIL # BLD AUTO: 0.18 10*3/MM3 (ref 0–0.4)
EOSINOPHIL NFR BLD AUTO: 5.9 % (ref 0.3–6.2)
ERYTHROCYTE [DISTWIDTH] IN BLOOD BY AUTOMATED COUNT: 14.7 % (ref 12.3–15.4)
HCT VFR BLD AUTO: 30 % (ref 34–46.6)
HGB BLD-MCNC: 9.4 G/DL (ref 12–15.9)
HOLD SPECIMEN: NORMAL
IMM GRANULOCYTES # BLD AUTO: 0.01 10*3/MM3 (ref 0–0.05)
IMM GRANULOCYTES NFR BLD AUTO: 0.3 % (ref 0–0.5)
LYMPHOCYTES # BLD AUTO: 1.13 10*3/MM3 (ref 0.7–3.1)
LYMPHOCYTES NFR BLD AUTO: 36.9 % (ref 19.6–45.3)
MCH RBC QN AUTO: 29.6 PG (ref 26.6–33)
MCHC RBC AUTO-ENTMCNC: 31.3 G/DL (ref 31.5–35.7)
MCV RBC AUTO: 94.3 FL (ref 79–97)
MONOCYTES # BLD AUTO: 0.27 10*3/MM3 (ref 0.1–0.9)
MONOCYTES NFR BLD AUTO: 8.8 % (ref 5–12)
NEUTROPHILS NFR BLD AUTO: 1.44 10*3/MM3 (ref 1.7–7)
NEUTROPHILS NFR BLD AUTO: 47.1 % (ref 42.7–76)
NRBC BLD AUTO-RTO: 0 /100 WBC (ref 0–0.2)
PLATELET # BLD AUTO: 134 10*3/MM3 (ref 140–450)
PMV BLD AUTO: 9.6 FL (ref 6–12)
RBC # BLD AUTO: 3.18 10*6/MM3 (ref 3.77–5.28)
WBC # BLD AUTO: 3.06 10*3/MM3 (ref 3.4–10.8)

## 2020-07-28 PROCEDURE — 96372 THER/PROPH/DIAG INJ SC/IM: CPT

## 2020-07-28 PROCEDURE — 25010000002 EPOETIN ALFA-EPBX 40000 UNIT/ML SOLUTION: Performed by: INTERNAL MEDICINE

## 2020-07-28 PROCEDURE — 36415 COLL VENOUS BLD VENIPUNCTURE: CPT

## 2020-07-28 PROCEDURE — 85025 COMPLETE CBC W/AUTO DIFF WBC: CPT

## 2020-07-28 RX ADMIN — EPOETIN ALFA-EPBX 40000 UNITS: 40000 INJECTION, SOLUTION INTRAVENOUS; SUBCUTANEOUS at 11:54

## 2020-08-04 ENCOUNTER — APPOINTMENT (OUTPATIENT)
Dept: ONCOLOGY | Facility: HOSPITAL | Age: 50
End: 2020-08-04

## 2020-08-04 ENCOUNTER — APPOINTMENT (OUTPATIENT)
Dept: LAB | Facility: HOSPITAL | Age: 50
End: 2020-08-04

## 2020-08-11 ENCOUNTER — APPOINTMENT (OUTPATIENT)
Dept: ONCOLOGY | Facility: HOSPITAL | Age: 50
End: 2020-08-11

## 2020-08-11 ENCOUNTER — APPOINTMENT (OUTPATIENT)
Dept: LAB | Facility: HOSPITAL | Age: 50
End: 2020-08-11

## 2020-08-18 ENCOUNTER — INFUSION (OUTPATIENT)
Dept: ONCOLOGY | Facility: HOSPITAL | Age: 50
End: 2020-08-18

## 2020-08-18 ENCOUNTER — LAB (OUTPATIENT)
Dept: LAB | Facility: HOSPITAL | Age: 50
End: 2020-08-18

## 2020-08-18 VITALS
BODY MASS INDEX: 49.79 KG/M2 | SYSTOLIC BLOOD PRESSURE: 104 MMHG | HEART RATE: 84 BPM | OXYGEN SATURATION: 100 % | HEIGHT: 63 IN | RESPIRATION RATE: 20 BRPM | DIASTOLIC BLOOD PRESSURE: 66 MMHG | TEMPERATURE: 98.5 F | WEIGHT: 281 LBS

## 2020-08-18 DIAGNOSIS — D50.9 IRON DEFICIENCY ANEMIA, UNSPECIFIED IRON DEFICIENCY ANEMIA TYPE: ICD-10-CM

## 2020-08-18 DIAGNOSIS — D69.6 THROMBOCYTOPENIA (HCC): ICD-10-CM

## 2020-08-18 DIAGNOSIS — D63.1 ANEMIA IN STAGE 3 CHRONIC KIDNEY DISEASE (HCC): ICD-10-CM

## 2020-08-18 DIAGNOSIS — N18.30 ANEMIA IN STAGE 3 CHRONIC KIDNEY DISEASE (HCC): ICD-10-CM

## 2020-08-18 LAB
BASOPHILS # BLD AUTO: 0.07 10*3/MM3 (ref 0–0.2)
BASOPHILS NFR BLD AUTO: 1.3 % (ref 0–1.5)
DEPRECATED RDW RBC AUTO: 50.1 FL (ref 37–54)
EOSINOPHIL # BLD AUTO: 0.19 10*3/MM3 (ref 0–0.4)
EOSINOPHIL NFR BLD AUTO: 3.5 % (ref 0.3–6.2)
ERYTHROCYTE [DISTWIDTH] IN BLOOD BY AUTOMATED COUNT: 14.2 % (ref 12.3–15.4)
FERRITIN SERPL-MCNC: 180.3 NG/ML (ref 13–150)
HCT VFR BLD AUTO: 35.8 % (ref 34–46.6)
HGB BLD-MCNC: 11.2 G/DL (ref 12–15.9)
IMM GRANULOCYTES # BLD AUTO: 0.03 10*3/MM3 (ref 0–0.05)
IMM GRANULOCYTES NFR BLD AUTO: 0.6 % (ref 0–0.5)
IRON 24H UR-MRATE: 84 MCG/DL (ref 37–145)
IRON SATN MFR SERPL: 25 % (ref 20–50)
LYMPHOCYTES # BLD AUTO: 1.76 10*3/MM3 (ref 0.7–3.1)
LYMPHOCYTES NFR BLD AUTO: 32.7 % (ref 19.6–45.3)
MCH RBC QN AUTO: 30.1 PG (ref 26.6–33)
MCHC RBC AUTO-ENTMCNC: 31.3 G/DL (ref 31.5–35.7)
MCV RBC AUTO: 96.2 FL (ref 79–97)
MONOCYTES # BLD AUTO: 0.55 10*3/MM3 (ref 0.1–0.9)
MONOCYTES NFR BLD AUTO: 10.2 % (ref 5–12)
NEUTROPHILS NFR BLD AUTO: 2.79 10*3/MM3 (ref 1.7–7)
NEUTROPHILS NFR BLD AUTO: 51.7 % (ref 42.7–76)
NRBC BLD AUTO-RTO: 0 /100 WBC (ref 0–0.2)
PLATELET # BLD AUTO: 209 10*3/MM3 (ref 140–450)
PMV BLD AUTO: 9.6 FL (ref 6–12)
RBC # BLD AUTO: 3.72 10*6/MM3 (ref 3.77–5.28)
TIBC SERPL-MCNC: 334 MCG/DL (ref 298–536)
TRANSFERRIN SERPL-MCNC: 224 MG/DL (ref 200–360)
WBC # BLD AUTO: 5.39 10*3/MM3 (ref 3.4–10.8)

## 2020-08-18 PROCEDURE — 83540 ASSAY OF IRON: CPT

## 2020-08-18 PROCEDURE — 85025 COMPLETE CBC W/AUTO DIFF WBC: CPT

## 2020-08-18 PROCEDURE — 36415 COLL VENOUS BLD VENIPUNCTURE: CPT

## 2020-08-18 PROCEDURE — G0463 HOSPITAL OUTPT CLINIC VISIT: HCPCS

## 2020-08-18 PROCEDURE — 82728 ASSAY OF FERRITIN: CPT

## 2020-08-18 PROCEDURE — 84466 ASSAY OF TRANSFERRIN: CPT

## 2020-08-25 ENCOUNTER — APPOINTMENT (OUTPATIENT)
Dept: LAB | Facility: HOSPITAL | Age: 50
End: 2020-08-25

## 2020-08-25 ENCOUNTER — APPOINTMENT (OUTPATIENT)
Dept: ONCOLOGY | Facility: HOSPITAL | Age: 50
End: 2020-08-25

## 2020-09-08 ENCOUNTER — APPOINTMENT (OUTPATIENT)
Dept: LAB | Facility: HOSPITAL | Age: 50
End: 2020-09-08

## 2020-09-08 ENCOUNTER — APPOINTMENT (OUTPATIENT)
Dept: ONCOLOGY | Facility: HOSPITAL | Age: 50
End: 2020-09-08

## 2020-09-15 ENCOUNTER — INFUSION (OUTPATIENT)
Dept: ONCOLOGY | Facility: HOSPITAL | Age: 50
End: 2020-09-15

## 2020-09-15 ENCOUNTER — LAB (OUTPATIENT)
Dept: LAB | Facility: HOSPITAL | Age: 50
End: 2020-09-15

## 2020-09-15 VITALS
HEIGHT: 63 IN | WEIGHT: 284 LBS | TEMPERATURE: 97.5 F | RESPIRATION RATE: 16 BRPM | OXYGEN SATURATION: 98 % | HEART RATE: 76 BPM | BODY MASS INDEX: 50.32 KG/M2 | SYSTOLIC BLOOD PRESSURE: 128 MMHG | DIASTOLIC BLOOD PRESSURE: 80 MMHG

## 2020-09-15 DIAGNOSIS — D63.1 ANEMIA IN STAGE 3 CHRONIC KIDNEY DISEASE (HCC): ICD-10-CM

## 2020-09-15 DIAGNOSIS — D50.9 IRON DEFICIENCY ANEMIA, UNSPECIFIED IRON DEFICIENCY ANEMIA TYPE: ICD-10-CM

## 2020-09-15 DIAGNOSIS — N18.30 ANEMIA IN STAGE 3 CHRONIC KIDNEY DISEASE (HCC): ICD-10-CM

## 2020-09-15 DIAGNOSIS — D69.6 THROMBOCYTOPENIA (HCC): Primary | ICD-10-CM

## 2020-09-15 LAB
BASOPHILS # BLD AUTO: 0.02 10*3/MM3 (ref 0–0.2)
BASOPHILS NFR BLD AUTO: 0.6 % (ref 0–1.5)
DEPRECATED RDW RBC AUTO: 49.5 FL (ref 37–54)
EOSINOPHIL # BLD AUTO: 0.15 10*3/MM3 (ref 0–0.4)
EOSINOPHIL NFR BLD AUTO: 4.9 % (ref 0.3–6.2)
ERYTHROCYTE [DISTWIDTH] IN BLOOD BY AUTOMATED COUNT: 13.9 % (ref 12.3–15.4)
FERRITIN SERPL-MCNC: 179.8 NG/ML (ref 13–150)
HCT VFR BLD AUTO: 32.3 % (ref 34–46.6)
HGB BLD-MCNC: 10.2 G/DL (ref 12–15.9)
HOLD SPECIMEN: NORMAL
IMM GRANULOCYTES # BLD AUTO: 0.01 10*3/MM3 (ref 0–0.05)
IMM GRANULOCYTES NFR BLD AUTO: 0.3 % (ref 0–0.5)
IRON 24H UR-MRATE: 48 MCG/DL (ref 37–145)
IRON SATN MFR SERPL: 18 % (ref 20–50)
LYMPHOCYTES # BLD AUTO: 1.1 10*3/MM3 (ref 0.7–3.1)
LYMPHOCYTES NFR BLD AUTO: 35.6 % (ref 19.6–45.3)
MCH RBC QN AUTO: 30.4 PG (ref 26.6–33)
MCHC RBC AUTO-ENTMCNC: 31.6 G/DL (ref 31.5–35.7)
MCV RBC AUTO: 96.4 FL (ref 79–97)
MONOCYTES # BLD AUTO: 0.35 10*3/MM3 (ref 0.1–0.9)
MONOCYTES NFR BLD AUTO: 11.3 % (ref 5–12)
NEUTROPHILS NFR BLD AUTO: 1.46 10*3/MM3 (ref 1.7–7)
NEUTROPHILS NFR BLD AUTO: 47.3 % (ref 42.7–76)
NRBC BLD AUTO-RTO: 0 /100 WBC (ref 0–0.2)
PLATELET # BLD AUTO: 166 10*3/MM3 (ref 140–450)
PMV BLD AUTO: 10 FL (ref 6–12)
RBC # BLD AUTO: 3.35 10*6/MM3 (ref 3.77–5.28)
TIBC SERPL-MCNC: 262 MCG/DL (ref 298–536)
TRANSFERRIN SERPL-MCNC: 176 MG/DL (ref 200–360)
WBC # BLD AUTO: 3.09 10*3/MM3 (ref 3.4–10.8)

## 2020-09-15 PROCEDURE — 85025 COMPLETE CBC W/AUTO DIFF WBC: CPT

## 2020-09-15 PROCEDURE — 82728 ASSAY OF FERRITIN: CPT

## 2020-09-15 PROCEDURE — 83540 ASSAY OF IRON: CPT

## 2020-09-15 PROCEDURE — G0463 HOSPITAL OUTPT CLINIC VISIT: HCPCS

## 2020-09-15 PROCEDURE — 84466 ASSAY OF TRANSFERRIN: CPT

## 2020-09-15 PROCEDURE — 36415 COLL VENOUS BLD VENIPUNCTURE: CPT

## 2020-09-15 NOTE — PROGRESS NOTES
Retacrit held due to fe sat of 18%.  Will call Dr. Boggs's office to inform of low iron. GO Hooper Rn

## 2020-09-16 ENCOUNTER — TELEPHONE (OUTPATIENT)
Dept: ONCOLOGY | Facility: CLINIC | Age: 50
End: 2020-09-16

## 2020-09-16 NOTE — TELEPHONE ENCOUNTER
Call from Maty ALBERTO at the cancer center. She is calling to report that the patient was there yesterday. Stacy had a iron sat of 18% so the Procrit was not administered.

## 2020-09-18 ENCOUNTER — TELEPHONE (OUTPATIENT)
Dept: ONCOLOGY | Facility: CLINIC | Age: 50
End: 2020-09-18

## 2020-09-18 DIAGNOSIS — D50.9 IRON DEFICIENCY ANEMIA, UNSPECIFIED IRON DEFICIENCY ANEMIA TYPE: ICD-10-CM

## 2020-09-18 DIAGNOSIS — K90.9 IRON MALABSORPTION: Primary | ICD-10-CM

## 2020-09-18 RX ORDER — FAMOTIDINE 10 MG/ML
20 INJECTION, SOLUTION INTRAVENOUS AS NEEDED
Status: CANCELLED | OUTPATIENT
Start: 2020-09-30

## 2020-09-18 RX ORDER — DIPHENHYDRAMINE HYDROCHLORIDE 50 MG/ML
50 INJECTION INTRAMUSCULAR; INTRAVENOUS AS NEEDED
Status: CANCELLED | OUTPATIENT
Start: 2020-09-30

## 2020-09-18 RX ORDER — SODIUM CHLORIDE 9 MG/ML
250 INJECTION, SOLUTION INTRAVENOUS ONCE
Status: CANCELLED | OUTPATIENT
Start: 2020-09-30

## 2020-09-18 NOTE — TELEPHONE ENCOUNTER
----- Message from Soraida Haile MA sent at 9/18/2020  8:50 AM CDT -----  Scheduled for 09/21/2020     Injectafer 750 x 1

## 2020-09-28 ENCOUNTER — APPOINTMENT (OUTPATIENT)
Dept: CT IMAGING | Facility: HOSPITAL | Age: 50
End: 2020-09-28

## 2020-09-29 ENCOUNTER — HOSPITAL ENCOUNTER (OUTPATIENT)
Dept: CT IMAGING | Facility: HOSPITAL | Age: 50
Discharge: HOME | End: 2020-09-29
Admitting: NURSE PRACTITIONER

## 2020-09-29 DIAGNOSIS — D86.9 SARCOIDOSIS: ICD-10-CM

## 2020-09-29 LAB — CREAT BLDA-MCNC: 1.6 MG/DL (ref 0.6–1.3)

## 2020-09-29 PROCEDURE — 82565 ASSAY OF CREATININE: CPT

## 2020-09-29 PROCEDURE — 71260 CT THORAX DX C+: CPT

## 2020-09-29 PROCEDURE — 25010000002 IOPAMIDOL 61 % SOLUTION: Performed by: NURSE PRACTITIONER

## 2020-09-29 RX ADMIN — IOPAMIDOL 100 ML: 612 INJECTION, SOLUTION INTRAVENOUS at 14:08

## 2020-09-30 ENCOUNTER — INFUSION (OUTPATIENT)
Dept: ONCOLOGY | Facility: HOSPITAL | Age: 50
End: 2020-09-30

## 2020-09-30 VITALS
HEIGHT: 63 IN | HEART RATE: 71 BPM | TEMPERATURE: 98.5 F | BODY MASS INDEX: 51.03 KG/M2 | SYSTOLIC BLOOD PRESSURE: 116 MMHG | WEIGHT: 288 LBS | OXYGEN SATURATION: 100 % | RESPIRATION RATE: 20 BRPM | DIASTOLIC BLOOD PRESSURE: 50 MMHG

## 2020-09-30 DIAGNOSIS — K90.9 IRON MALABSORPTION: Primary | ICD-10-CM

## 2020-09-30 DIAGNOSIS — D50.9 IRON DEFICIENCY ANEMIA, UNSPECIFIED IRON DEFICIENCY ANEMIA TYPE: ICD-10-CM

## 2020-09-30 PROCEDURE — 25010000002 FERRIC CARBOXYMALTOSE 750 MG/15ML SOLUTION 15 ML VIAL: Performed by: INTERNAL MEDICINE

## 2020-09-30 PROCEDURE — 96365 THER/PROPH/DIAG IV INF INIT: CPT

## 2020-09-30 RX ORDER — SODIUM CHLORIDE 9 MG/ML
250 INJECTION, SOLUTION INTRAVENOUS ONCE
Status: COMPLETED | OUTPATIENT
Start: 2020-09-30 | End: 2020-09-30

## 2020-09-30 RX ORDER — FAMOTIDINE 10 MG/ML
20 INJECTION, SOLUTION INTRAVENOUS AS NEEDED
Status: DISCONTINUED | OUTPATIENT
Start: 2020-09-30 | End: 2020-09-30 | Stop reason: HOSPADM

## 2020-09-30 RX ORDER — DIPHENHYDRAMINE HYDROCHLORIDE 50 MG/ML
50 INJECTION INTRAMUSCULAR; INTRAVENOUS AS NEEDED
Status: DISCONTINUED | OUTPATIENT
Start: 2020-09-30 | End: 2020-09-30 | Stop reason: HOSPADM

## 2020-09-30 RX ADMIN — SODIUM CHLORIDE 250 ML: 9 INJECTION, SOLUTION INTRAVENOUS at 13:53

## 2020-09-30 RX ADMIN — FERRIC CARBOXYMALTOSE INJECTION 750 MG: 50 INJECTION, SOLUTION INTRAVENOUS at 14:04

## 2020-09-30 NOTE — PROGRESS NOTES
Let pt know this looked ok. Lymph nodes are a little smaller.   Nothing else to do for now.  Keep follow up as planned

## 2020-10-01 ENCOUNTER — OFFICE VISIT (OUTPATIENT)
Dept: PULMONOLOGY | Facility: CLINIC | Age: 50
End: 2020-10-01

## 2020-10-01 VITALS
HEIGHT: 63 IN | BODY MASS INDEX: 50.68 KG/M2 | OXYGEN SATURATION: 98 % | SYSTOLIC BLOOD PRESSURE: 126 MMHG | TEMPERATURE: 97.9 F | DIASTOLIC BLOOD PRESSURE: 78 MMHG | HEART RATE: 96 BPM | WEIGHT: 286 LBS

## 2020-10-01 DIAGNOSIS — I89.0 LYMPHEDEMA: ICD-10-CM

## 2020-10-01 DIAGNOSIS — R06.09 DYSPNEA ON EXERTION: ICD-10-CM

## 2020-10-01 DIAGNOSIS — D86.9 SARCOIDOSIS: Primary | ICD-10-CM

## 2020-10-01 DIAGNOSIS — J96.11 CHRONIC RESPIRATORY FAILURE WITH HYPOXIA (HCC): ICD-10-CM

## 2020-10-01 PROCEDURE — 99214 OFFICE O/P EST MOD 30 MIN: CPT | Performed by: INTERNAL MEDICINE

## 2020-10-01 RX ORDER — DEXAMETHASONE 4 MG/1
2 TABLET ORAL 2 TIMES DAILY
Qty: 1 INHALER | Refills: 11 | Status: SHIPPED | OUTPATIENT
Start: 2020-10-01 | End: 2021-09-13 | Stop reason: ALTCHOICE

## 2020-10-01 NOTE — PATIENT INSTRUCTIONS
Fluticasone inhalation aerosol  What is this medicine?  FLUTICASONE (floo TIK a sone) inhalation is a corticosteroid. It helps decrease inflammation in your lungs. This medicine is used to treat the symptoms of asthma. Never use this medicine for an acute asthma attack.  This medicine may be used for other purposes; ask your health care provider or pharmacist if you have questions.  COMMON BRAND NAME(S): Flovent, Flovent HFA  What should I tell my health care provider before I take this medicine?  They need to know if you have any of these conditions:  · bone problems  · eye disease, vision problems  · immune system problems  · infection, like chickenpox, tuberculosis, herpes, or fungal infection  · recent surgery or injury of mouth or throat  · taking corticosteroids by mouth  · an unusual or allergic reaction to fluticasone, steroids, other medicines, foods, dyes, or preservatives  · pregnant or trying to get pregnant  · breast-feeding  How should I use this medicine?  This medicine is for inhalation through the mouth. Rinse your mouth with water after use. Make sure not to swallow the water. Follow the directions on your prescription label. Do not use more often than directed. Do not stop taking your medicine unless your doctor tells you to. Make sure that you are using your inhaler correctly. Ask you doctor or health care provider if you have any questions.  Talk to your pediatrician regarding the use of this medicine in children. Special care may be needed. While this drug may be prescribed for children as young as 4 years of age for selected conditions, precautions do apply.  Overdosage: If you think you have taken too much of this medicine contact a poison control center or emergency room at once.  NOTE: This medicine is only for you. Do not share this medicine with others.  What if I miss a dose?  If you miss a dose, use it as soon as you remember. If it is almost time for your next dose, use only that dose  and continue with your regular schedule, spacing doses evenly. Do not use double or extra doses.  What may interact with this medicine?  · antiviral medicines for HIV or AIDS  · certain antibiotics like clarithromycin and telithromycin  · certain medicines for fungal infections like ketoconazole, itraconazole, posaconazole, voriconazole  · conivaptan  · ketoconazole  · nefazodone  This list may not describe all possible interactions. Give your health care provider a list of all the medicines, herbs, non-prescription drugs, or dietary supplements you use. Also tell them if you smoke, drink alcohol, or use illegal drugs. Some items may interact with your medicine.  What should I watch for while using this medicine?  Visit your doctor or health care professional for regular checks on your progress. Check with your health care professional if your symptoms do not improve. If your symptoms get worse or if you need your short-acting inhalers more often, call your doctor right away.  Try not to come in contact with people who have chickenpox or the measles while you are taking this medicine. If you do, call your doctor right away.  What side effects may I notice from receiving this medicine?  Side effects that you should report to your doctor or health care professional as soon as possible:  · allergic reactions like skin rash, itching or hives  · breathing problems  · changes in vision  · chest pain  · fever, chills  · flu-like symptoms  · nausea, vomiting  · unusually weak or tired  · white patches or sores in the mouth or throat  Side effects that usually do not require medical attention (report to your doctor or health care professional if they continue or are bothersome):  · cough  · dry mouth  · headache  · sore throat  This list may not describe all possible side effects. Call your doctor for medical advice about side effects. You may report side effects to FDA at 5-649-FDA-6560.  Where should I keep my  medicine?  Keep out of the reach of children.  Store at room temperature between 15 and 30 degrees C (59 and 86 degrees F) with the mouthpiece down. Do not puncture the canister. Do not store it or use it near heat or an open flame. Exposure to temperatures above 120 degrees F may cause it to burst. Never throw it into a fire or incinerator. Throw away after the expiration date.  NOTE: This sheet is a summary. It may not cover all possible information. If you have questions about this medicine, talk to your doctor, pharmacist, or health care provider.  © 2020 [employer] Standard (2019-09-18 11:42:44)

## 2020-10-02 NOTE — PROGRESS NOTES
Subjective   Stacy Lynn is a 50 y.o. female.     Background: A patient with hx sarcoidosis and chronic respiratory failure    Chief Complaint   Patient presents with   • Sarcoidosis        History of Present Illness   She complains of fatigue and just had iron infusion.  She reports frequent intermittent cough in chest for several months unchanged alleviated by nothing associated with nothing.  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   has a past surgical history that includes Replacement total knee; Patella surgery; Lateral epicondyle release; Shoulder arthroscopy; Ulnar Nerve Decompression; Knee arthroscopy; Hysterectomy; Cholecystectomy; Kidney stone surgery; Cardiac Ablation (04/2016); Knee surgery (Right); Colonoscopy (10/13/2014); Esophagogastroduodenoscopy (N/A, 8/23/2017); Colonoscopy (N/A, 8/23/2017); Varicose vein surgery (Left, 1/10/2018); Joint replacement (Right); Varicose vein surgery (Right, 3/30/2018); Endovenous ablation saphenous vein w/ laser (Right, 03/30/2018); and Colonoscopy (N/A, 9/6/2018).  family history includes Arrhythmia in her mother; Diabetes in her father; Heart disease in her brother, father, maternal aunt, maternal grandfather, maternal grandmother, maternal uncle, mother, paternal aunt, paternal grandfather,  paternal grandmother, and paternal uncle; Kidney disease in her mother.   reports that she has never smoked. She has never used smokeless tobacco. She reports that she does not drink alcohol or use drugs.  Allergies   Allergen Reactions   • Adhesive Tape Hives   • Tape Hives     Medications    Current Outpatient Medications:   •  buPROPion XL (WELLBUTRIN XL) 150 MG 24 hr tablet, TAKE 1 TABLET BY MOUTH ONCE DAILY FOR DEPRESSION, Disp: , Rfl: 2  •  busPIRone (BUSPAR) 10 MG tablet, Take 10 mg by mouth., Disp: , Rfl:   •  carBAMazepine XR (TEGretol  XR) 200 MG 12 hr tablet, Take 1 tablet by mouth Every 12 (Twelve) Hours., Disp: 60 tablet, Rfl: 3  •  docusate sodium (COLACE) 100 MG capsule, Take 100 mg by mouth., Disp: , Rfl:   •  DULoxetine (CYMBALTA) 60 MG capsule, Take 60 mg by mouth Daily., Disp: , Rfl:   •  esomeprazole (nexIUM) 40 MG capsule, TAKE 1 CAPSULE BY MOUTH TWICE DAILY BEFORE  MEALS, Disp: 60 capsule, Rfl: 0  •  folic acid-vit B6-vit B12 (Folbee) 2.5-25-1 MG tablet tablet, Take 1 tablet by mouth Daily., Disp: 30 tablet, Rfl: 0  •  folic acid-vit B6-vit B12 (Folbee) 2.5-25-1 MG tablet tablet, Take 1 tablet by mouth Daily., Disp: 30 tablet, Rfl: 0  •  levothyroxine (SYNTHROID, LEVOTHROID) 88 MCG tablet, Take 75 mcg by mouth Daily., Disp: , Rfl:   •  LYRICA 100 MG capsule, Take 100 mg by mouth Every 8 (Eight) Hours As Needed., Disp: , Rfl: 1  •  methotrexate 2.5 MG tablet, Take 4 tablets by mouth 1 (One) Time Per Week. Takes 4 tabs weekly on Mondays, Disp: 16 tablet, Rfl: 11  •  metoprolol succinate XL (TOPROL-XL) 25 MG 24 hr tablet, Take 100 mg by mouth Daily., Disp: , Rfl:   •  Morphine (MS CONTIN) 15 MG 12 hr tablet, Take 15 mg by mouth 2 (Two) Times a Day., Disp: , Rfl:   •  O2 (OXYGEN), Inhale 2 L/min 1 (One) Time. Continuous, Disp: , Rfl:   •  ondansetron (ZOFRAN) 4 MG tablet, Take 4 mg by mouth Every 8 (Eight) Hours As Needed for Nausea or Vomiting., Disp: , Rfl:   •  oxybutynin (DITROPAN) 5 MG tablet,  "Take 5 mg by mouth every night at bedtime., Disp: , Rfl: 5  •  polyethylene glycol (MIRALAX) powder, Take 17 g by mouth., Disp: , Rfl:   •  PROCRIT 65574 UNIT/ML injection, Inject 1 Units under the skin As Needed (WHEN BLOOD COUNT [hemoglobin] BELOW 11)., Disp: , Rfl:   •  tiZANidine (ZANAFLEX) 4 MG tablet, Take 4 mg by mouth At Night As Needed for Muscle Spasms., Disp: , Rfl:   •  topiramate (TOPAMAX) 100 MG tablet, Take 100 mg by mouth Every Night., Disp: , Rfl:   •  fluticasone (Flovent HFA) 110 MCG/ACT inhaler, Inhale 2 puffs 2 (Two) Times a Day for 30 days., Disp: 1 inhaler, Rfl: 11    Review of Systems   Constitutional: Negative for chills and fever.   Cardiovascular: Negative for chest pain.   Gastrointestinal: Negative for nausea and vomiting.       Objective   /78   Pulse 96   Temp 97.9 °F (36.6 °C)   Ht 160 cm (63\")   Wt 130 kg (286 lb)   SpO2 98% Comment: 2L  Breastfeeding No   BMI 50.66 kg/m²   Physical Exam  Constitutional:       General: She is not in acute distress.     Appearance: She is well-developed. She is ill-appearing. She is not toxic-appearing.   HENT:      Head: Atraumatic.   Eyes:      General: No scleral icterus.     Conjunctiva/sclera: Conjunctivae normal.   Neck:      Musculoskeletal: Neck supple.   Cardiovascular:      Rate and Rhythm: Normal rate and regular rhythm.      Heart sounds: S1 normal and S2 normal.   Pulmonary:      Effort: Pulmonary effort is normal. No accessory muscle usage or respiratory distress.      Breath sounds: Decreased breath sounds present. No wheezing.   Abdominal:      General: There is no distension.      Palpations: Abdomen is soft.      Tenderness: There is no abdominal tenderness.   Musculoskeletal:         General: No deformity.   Lymphadenopathy:      Cervical: No cervical adenopathy.   Skin:     General: Skin is warm.      Findings: No rash.   Neurological:      Mental Status: She is alert. "         -----------------------------------------------------------------------------------------------    Ct Chest With Contrast    Result Date: 9/29/2020  Impression: Impression:  No new or enlarging thoracic lymph nodes. Slight decrease in size of enlarged subcarinal lymph node. This report was finalized on 09/29/2020 15:12 by Dr. Bruno White MD.    -----------------------------------------------------------------------------------------------         -----------------------------------------------------------------------------------------------  Assessment/Plan   Problem List Items Addressed This Visit        Pulmonary Problems    Dyspnea on exertion    Chronic respiratory failure with hypoxia (CMS/HCC)       Other    Sarcoidosis - Primary    Lymphedema        Patient's Body mass index is 50.66 kg/m². BMI is above normal parameters. Recommendations include: referral to primary care.      We discussed improved ct scan.  Encouraging that this is continuing to improve even after stopping mtx.  She does not do well with oral steroids.  Cough may be related to sarcoid, or perhaps allergens.  Fatigue might be related to the sarcoid in part, but also to fe def anemia.  Plan trial of ics, flovent, which has been used with sarcoidosis off label.  [facility] EMR will not indicate my new rx in the plan above for reasons that I can't explain or correct.  Continue mucinex. Follow up early 2021 with PFT.       Electronically signed by Celestine Scherer MD, 10/2/2020, 06:31 CDT

## 2020-10-15 ENCOUNTER — TELEPHONE (OUTPATIENT)
Dept: ONCOLOGY | Facility: CLINIC | Age: 50
End: 2020-10-15

## 2020-10-15 NOTE — TELEPHONE ENCOUNTER
NEEDS AN ORDER TO START GETTING HER PROCRIT AGAIN, PT WOULD LIKE TO START GETTING IT DONE ON A FRI AND IN THE AFTERNOON IF POSSIBLE, PLEASE ADVISE?    PT CALL BACK #[phone]

## 2020-10-16 DIAGNOSIS — D50.9 IRON DEFICIENCY ANEMIA, UNSPECIFIED IRON DEFICIENCY ANEMIA TYPE: Primary | ICD-10-CM

## 2020-10-30 ENCOUNTER — APPOINTMENT (OUTPATIENT)
Dept: LAB | Facility: HOSPITAL | Age: 50
End: 2020-10-30

## 2020-11-06 ENCOUNTER — LAB (OUTPATIENT)
Dept: LAB | Facility: HOSPITAL | Age: 50
End: 2020-11-06

## 2020-11-06 DIAGNOSIS — D50.9 IRON DEFICIENCY ANEMIA, UNSPECIFIED IRON DEFICIENCY ANEMIA TYPE: ICD-10-CM

## 2020-11-06 LAB
ALBUMIN SERPL-MCNC: 4.4 G/DL (ref 3.5–5.2)
ALBUMIN/GLOB SERPL: 1.8 G/DL
ALP SERPL-CCNC: 94 U/L (ref 39–117)
ALT SERPL W P-5'-P-CCNC: 25 U/L (ref 1–33)
ANION GAP SERPL CALCULATED.3IONS-SCNC: 10 MMOL/L (ref 5–15)
AST SERPL-CCNC: 19 U/L (ref 1–32)
BASOPHILS # BLD AUTO: 0.03 10*3/MM3 (ref 0–0.2)
BASOPHILS NFR BLD AUTO: 1 % (ref 0–1.5)
BILIRUB SERPL-MCNC: 0.4 MG/DL (ref 0–1.2)
BUN SERPL-MCNC: 21 MG/DL (ref 6–20)
BUN/CREAT SERPL: 16.5 (ref 7–25)
CALCIUM SPEC-SCNC: 9.1 MG/DL (ref 8.6–10.5)
CHLORIDE SERPL-SCNC: 109 MMOL/L (ref 98–107)
CO2 SERPL-SCNC: 23 MMOL/L (ref 22–29)
CREAT SERPL-MCNC: 1.27 MG/DL (ref 0.57–1)
DEPRECATED RDW RBC AUTO: 45.9 FL (ref 37–54)
EOSINOPHIL # BLD AUTO: 0.15 10*3/MM3 (ref 0–0.4)
EOSINOPHIL NFR BLD AUTO: 4.8 % (ref 0.3–6.2)
ERYTHROCYTE [DISTWIDTH] IN BLOOD BY AUTOMATED COUNT: 13.5 % (ref 12.3–15.4)
FERRITIN SERPL-MCNC: 407.4 NG/ML (ref 13–150)
GFR SERPL CREATININE-BSD FRML MDRD: 44 ML/MIN/1.73
GLOBULIN UR ELPH-MCNC: 2.5 GM/DL
GLUCOSE SERPL-MCNC: 131 MG/DL (ref 65–99)
HCT VFR BLD AUTO: 33.4 % (ref 34–46.6)
HGB BLD-MCNC: 11 G/DL (ref 12–15.9)
IMM GRANULOCYTES # BLD AUTO: 0 10*3/MM3 (ref 0–0.05)
IMM GRANULOCYTES NFR BLD AUTO: 0 % (ref 0–0.5)
IRON 24H UR-MRATE: 66 MCG/DL (ref 37–145)
IRON SATN MFR SERPL: 27 % (ref 20–50)
LYMPHOCYTES # BLD AUTO: 0.88 10*3/MM3 (ref 0.7–3.1)
LYMPHOCYTES NFR BLD AUTO: 28.4 % (ref 19.6–45.3)
MCH RBC QN AUTO: 30.7 PG (ref 26.6–33)
MCHC RBC AUTO-ENTMCNC: 32.9 G/DL (ref 31.5–35.7)
MCV RBC AUTO: 93.3 FL (ref 79–97)
MONOCYTES # BLD AUTO: 0.32 10*3/MM3 (ref 0.1–0.9)
MONOCYTES NFR BLD AUTO: 10.3 % (ref 5–12)
NEUTROPHILS NFR BLD AUTO: 1.72 10*3/MM3 (ref 1.7–7)
NEUTROPHILS NFR BLD AUTO: 55.5 % (ref 42.7–76)
NRBC BLD AUTO-RTO: 0 /100 WBC (ref 0–0.2)
PLATELET # BLD AUTO: 169 10*3/MM3 (ref 140–450)
PMV BLD AUTO: 9.7 FL (ref 6–12)
POTASSIUM SERPL-SCNC: 3.8 MMOL/L (ref 3.5–5.2)
PROT SERPL-MCNC: 6.9 G/DL (ref 6–8.5)
RBC # BLD AUTO: 3.58 10*6/MM3 (ref 3.77–5.28)
SODIUM SERPL-SCNC: 142 MMOL/L (ref 136–145)
TIBC SERPL-MCNC: 247 MCG/DL (ref 298–536)
TRANSFERRIN SERPL-MCNC: 166 MG/DL (ref 200–360)
WBC # BLD AUTO: 3.1 10*3/MM3 (ref 3.4–10.8)

## 2020-11-06 PROCEDURE — 84466 ASSAY OF TRANSFERRIN: CPT

## 2020-11-06 PROCEDURE — 83540 ASSAY OF IRON: CPT

## 2020-11-06 PROCEDURE — 80053 COMPREHEN METABOLIC PANEL: CPT

## 2020-11-06 PROCEDURE — 36415 COLL VENOUS BLD VENIPUNCTURE: CPT

## 2020-11-06 PROCEDURE — 82728 ASSAY OF FERRITIN: CPT

## 2020-11-06 PROCEDURE — 85025 COMPLETE CBC W/AUTO DIFF WBC: CPT

## 2020-12-08 ENCOUNTER — TELEPHONE (OUTPATIENT)
Dept: ONCOLOGY | Facility: CLINIC | Age: 50
End: 2020-12-08

## 2020-12-08 NOTE — TELEPHONE ENCOUNTER
Caller: svetlana guerra    Relationship to patient: self    Best call back number: [phone]     Pt is calling in regards to her blood work done on 10/30/20 and would like a call back from rahat.

## 2020-12-08 NOTE — TELEPHONE ENCOUNTER
Spoke with patient and she is asking about her iron profile. She asks if she need to be  Set up for her weekly labs with Sapna. I let her know that she should and she is scheduled for 12/11/2020 at 2:45.

## 2020-12-11 ENCOUNTER — LAB (OUTPATIENT)
Dept: LAB | Facility: HOSPITAL | Age: 50
End: 2020-12-11

## 2020-12-11 ENCOUNTER — INFUSION (OUTPATIENT)
Dept: ONCOLOGY | Facility: HOSPITAL | Age: 50
End: 2020-12-11

## 2020-12-11 DIAGNOSIS — D69.6 THROMBOCYTOPENIA (HCC): ICD-10-CM

## 2020-12-11 DIAGNOSIS — N18.30 ANEMIA IN STAGE 3 CHRONIC KIDNEY DISEASE (HCC): ICD-10-CM

## 2020-12-11 DIAGNOSIS — D63.1 ANEMIA IN STAGE 4 CHRONIC KIDNEY DISEASE (HCC): Primary | ICD-10-CM

## 2020-12-11 DIAGNOSIS — D63.1 ANEMIA IN STAGE 3 CHRONIC KIDNEY DISEASE (HCC): ICD-10-CM

## 2020-12-11 DIAGNOSIS — D50.9 IRON DEFICIENCY ANEMIA, UNSPECIFIED IRON DEFICIENCY ANEMIA TYPE: ICD-10-CM

## 2020-12-11 DIAGNOSIS — N18.4 ANEMIA IN STAGE 4 CHRONIC KIDNEY DISEASE (HCC): Primary | ICD-10-CM

## 2020-12-11 LAB
BASOPHILS # BLD AUTO: 0.04 10*3/MM3 (ref 0–0.2)
BASOPHILS NFR BLD AUTO: 1.3 % (ref 0–1.5)
DEPRECATED RDW RBC AUTO: 47.5 FL (ref 37–54)
EOSINOPHIL # BLD AUTO: 0.09 10*3/MM3 (ref 0–0.4)
EOSINOPHIL NFR BLD AUTO: 2.8 % (ref 0.3–6.2)
ERYTHROCYTE [DISTWIDTH] IN BLOOD BY AUTOMATED COUNT: 13.7 % (ref 12.3–15.4)
FERRITIN SERPL-MCNC: 313.7 NG/ML (ref 13–150)
HCT VFR BLD AUTO: 31.3 % (ref 34–46.6)
HGB BLD-MCNC: 10.5 G/DL (ref 12–15.9)
IMM GRANULOCYTES # BLD AUTO: 0.01 10*3/MM3 (ref 0–0.05)
IMM GRANULOCYTES NFR BLD AUTO: 0.3 % (ref 0–0.5)
IRON 24H UR-MRATE: 69 MCG/DL (ref 37–145)
IRON SATN MFR SERPL: 25 % (ref 20–50)
LYMPHOCYTES # BLD AUTO: 0.83 10*3/MM3 (ref 0.7–3.1)
LYMPHOCYTES NFR BLD AUTO: 26.3 % (ref 19.6–45.3)
MCH RBC QN AUTO: 31.7 PG (ref 26.6–33)
MCHC RBC AUTO-ENTMCNC: 33.5 G/DL (ref 31.5–35.7)
MCV RBC AUTO: 94.6 FL (ref 79–97)
MONOCYTES # BLD AUTO: 0.26 10*3/MM3 (ref 0.1–0.9)
MONOCYTES NFR BLD AUTO: 8.2 % (ref 5–12)
NEUTROPHILS NFR BLD AUTO: 1.93 10*3/MM3 (ref 1.7–7)
NEUTROPHILS NFR BLD AUTO: 61.1 % (ref 42.7–76)
NRBC BLD AUTO-RTO: 0 /100 WBC (ref 0–0.2)
PLATELET # BLD AUTO: 161 10*3/MM3 (ref 140–450)
PMV BLD AUTO: 9.1 FL (ref 6–12)
RBC # BLD AUTO: 3.31 10*6/MM3 (ref 3.77–5.28)
TIBC SERPL-MCNC: 276 MCG/DL (ref 298–536)
TRANSFERRIN SERPL-MCNC: 185 MG/DL (ref 200–360)
WBC # BLD AUTO: 3.16 10*3/MM3 (ref 3.4–10.8)

## 2020-12-11 PROCEDURE — 84466 ASSAY OF TRANSFERRIN: CPT

## 2020-12-11 PROCEDURE — 25010000002 EPOETIN ALFA-EPBX 40000 UNIT/ML SOLUTION: Performed by: NURSE PRACTITIONER

## 2020-12-11 PROCEDURE — 36415 COLL VENOUS BLD VENIPUNCTURE: CPT

## 2020-12-11 PROCEDURE — 85025 COMPLETE CBC W/AUTO DIFF WBC: CPT

## 2020-12-11 PROCEDURE — 82728 ASSAY OF FERRITIN: CPT

## 2020-12-11 PROCEDURE — 96372 THER/PROPH/DIAG INJ SC/IM: CPT

## 2020-12-11 PROCEDURE — 83540 ASSAY OF IRON: CPT

## 2020-12-11 RX ADMIN — EPOETIN ALFA-EPBX 40000 UNITS: 40000 INJECTION, SOLUTION INTRAVENOUS; SUBCUTANEOUS at 15:28

## 2020-12-18 ENCOUNTER — APPOINTMENT (OUTPATIENT)
Dept: ONCOLOGY | Facility: HOSPITAL | Age: 50
End: 2020-12-18

## 2020-12-18 ENCOUNTER — APPOINTMENT (OUTPATIENT)
Dept: LAB | Facility: HOSPITAL | Age: 50
End: 2020-12-18

## 2020-12-24 ENCOUNTER — APPOINTMENT (OUTPATIENT)
Dept: ONCOLOGY | Facility: HOSPITAL | Age: 50
End: 2020-12-24

## 2020-12-24 ENCOUNTER — APPOINTMENT (OUTPATIENT)
Dept: LAB | Facility: HOSPITAL | Age: 50
End: 2020-12-24

## 2020-12-31 ENCOUNTER — LAB (OUTPATIENT)
Dept: LAB | Facility: HOSPITAL | Age: 50
End: 2020-12-31

## 2020-12-31 ENCOUNTER — INFUSION (OUTPATIENT)
Dept: ONCOLOGY | Facility: HOSPITAL | Age: 50
End: 2020-12-31

## 2020-12-31 DIAGNOSIS — N18.30 ANEMIA IN STAGE 3 CHRONIC KIDNEY DISEASE (HCC): ICD-10-CM

## 2020-12-31 DIAGNOSIS — D69.6 THROMBOCYTOPENIA (HCC): ICD-10-CM

## 2020-12-31 DIAGNOSIS — D63.1 ANEMIA IN STAGE 3 CHRONIC KIDNEY DISEASE (HCC): ICD-10-CM

## 2020-12-31 DIAGNOSIS — D50.9 IRON DEFICIENCY ANEMIA, UNSPECIFIED IRON DEFICIENCY ANEMIA TYPE: ICD-10-CM

## 2020-12-31 LAB
ALBUMIN SERPL-MCNC: 4.3 G/DL (ref 3.5–5.2)
ALBUMIN/GLOB SERPL: 1.7 G/DL
ALP SERPL-CCNC: 67 U/L (ref 39–117)
ALT SERPL W P-5'-P-CCNC: 11 U/L (ref 1–33)
ANION GAP SERPL CALCULATED.3IONS-SCNC: 5 MMOL/L (ref 5–15)
AST SERPL-CCNC: 17 U/L (ref 1–32)
BASOPHILS # BLD AUTO: 0.03 10*3/MM3 (ref 0–0.2)
BASOPHILS NFR BLD AUTO: 1 % (ref 0–1.5)
BILIRUB SERPL-MCNC: 0.2 MG/DL (ref 0–1.2)
BUN SERPL-MCNC: 41 MG/DL (ref 6–20)
BUN/CREAT SERPL: 28.9 (ref 7–25)
CALCIUM SPEC-SCNC: 8.8 MG/DL (ref 8.6–10.5)
CHLORIDE SERPL-SCNC: 110 MMOL/L (ref 98–107)
CO2 SERPL-SCNC: 26 MMOL/L (ref 22–29)
CREAT SERPL-MCNC: 1.42 MG/DL (ref 0.57–1)
DEPRECATED RDW RBC AUTO: 51.1 FL (ref 37–54)
EOSINOPHIL # BLD AUTO: 0.15 10*3/MM3 (ref 0–0.4)
EOSINOPHIL NFR BLD AUTO: 4.8 % (ref 0.3–6.2)
ERYTHROCYTE [DISTWIDTH] IN BLOOD BY AUTOMATED COUNT: 13.9 % (ref 12.3–15.4)
FERRITIN SERPL-MCNC: 250.1 NG/ML (ref 13–150)
FOLATE SERPL-MCNC: 15.4 NG/ML (ref 4.78–24.2)
GFR SERPL CREATININE-BSD FRML MDRD: 39 ML/MIN/1.73
GLOBULIN UR ELPH-MCNC: 2.5 GM/DL
GLUCOSE SERPL-MCNC: 95 MG/DL (ref 65–99)
HCT VFR BLD AUTO: 33.9 % (ref 34–46.6)
HGB BLD-MCNC: 11 G/DL (ref 12–15.9)
IMM GRANULOCYTES # BLD AUTO: 0 10*3/MM3 (ref 0–0.05)
IMM GRANULOCYTES NFR BLD AUTO: 0 % (ref 0–0.5)
IRON 24H UR-MRATE: 56 MCG/DL (ref 37–145)
IRON SATN MFR SERPL: 20 % (ref 20–50)
LYMPHOCYTES # BLD AUTO: 0.86 10*3/MM3 (ref 0.7–3.1)
LYMPHOCYTES NFR BLD AUTO: 27.3 % (ref 19.6–45.3)
MCH RBC QN AUTO: 32.5 PG (ref 26.6–33)
MCHC RBC AUTO-ENTMCNC: 32.4 G/DL (ref 31.5–35.7)
MCV RBC AUTO: 100.3 FL (ref 79–97)
MONOCYTES # BLD AUTO: 0.35 10*3/MM3 (ref 0.1–0.9)
MONOCYTES NFR BLD AUTO: 11.1 % (ref 5–12)
NEUTROPHILS NFR BLD AUTO: 1.76 10*3/MM3 (ref 1.7–7)
NEUTROPHILS NFR BLD AUTO: 55.8 % (ref 42.7–76)
NRBC BLD AUTO-RTO: 0 /100 WBC (ref 0–0.2)
PLATELET # BLD AUTO: 174 10*3/MM3 (ref 140–450)
PMV BLD AUTO: 9.7 FL (ref 6–12)
POTASSIUM SERPL-SCNC: 4.8 MMOL/L (ref 3.5–5.2)
PROT SERPL-MCNC: 6.8 G/DL (ref 6–8.5)
RBC # BLD AUTO: 3.38 10*6/MM3 (ref 3.77–5.28)
SODIUM SERPL-SCNC: 141 MMOL/L (ref 136–145)
TIBC SERPL-MCNC: 277 MCG/DL (ref 298–536)
TRANSFERRIN SERPL-MCNC: 186 MG/DL (ref 200–360)
VIT B12 BLD-MCNC: 354 PG/ML (ref 211–946)
WBC # BLD AUTO: 3.15 10*3/MM3 (ref 3.4–10.8)

## 2020-12-31 PROCEDURE — 83540 ASSAY OF IRON: CPT

## 2020-12-31 PROCEDURE — 85025 COMPLETE CBC W/AUTO DIFF WBC: CPT

## 2020-12-31 PROCEDURE — 82607 VITAMIN B-12: CPT

## 2020-12-31 PROCEDURE — 84466 ASSAY OF TRANSFERRIN: CPT

## 2020-12-31 PROCEDURE — 82728 ASSAY OF FERRITIN: CPT

## 2020-12-31 PROCEDURE — 80053 COMPREHEN METABOLIC PANEL: CPT

## 2020-12-31 PROCEDURE — 82746 ASSAY OF FOLIC ACID SERUM: CPT

## 2020-12-31 PROCEDURE — 36415 COLL VENOUS BLD VENIPUNCTURE: CPT

## 2021-01-08 ENCOUNTER — APPOINTMENT (OUTPATIENT)
Dept: LAB | Facility: HOSPITAL | Age: 51
End: 2021-01-08

## 2021-01-08 ENCOUNTER — APPOINTMENT (OUTPATIENT)
Dept: ONCOLOGY | Facility: HOSPITAL | Age: 51
End: 2021-01-08

## 2021-01-22 ENCOUNTER — APPOINTMENT (OUTPATIENT)
Dept: ONCOLOGY | Facility: HOSPITAL | Age: 51
End: 2021-01-22

## 2021-01-22 ENCOUNTER — APPOINTMENT (OUTPATIENT)
Dept: LAB | Facility: HOSPITAL | Age: 51
End: 2021-01-22

## 2021-01-28 DIAGNOSIS — E53.8 B12 DEFICIENCY: Primary | ICD-10-CM

## 2021-01-29 ENCOUNTER — INFUSION (OUTPATIENT)
Dept: ONCOLOGY | Facility: HOSPITAL | Age: 51
End: 2021-01-29

## 2021-01-29 ENCOUNTER — LAB (OUTPATIENT)
Dept: LAB | Facility: HOSPITAL | Age: 51
End: 2021-01-29

## 2021-01-29 VITALS
BODY MASS INDEX: 49.6 KG/M2 | WEIGHT: 280 LBS | DIASTOLIC BLOOD PRESSURE: 84 MMHG | RESPIRATION RATE: 18 BRPM | HEART RATE: 81 BPM | TEMPERATURE: 97 F | OXYGEN SATURATION: 100 % | SYSTOLIC BLOOD PRESSURE: 119 MMHG

## 2021-01-29 DIAGNOSIS — N18.4 ANEMIA IN STAGE 4 CHRONIC KIDNEY DISEASE (HCC): Primary | ICD-10-CM

## 2021-01-29 DIAGNOSIS — D69.6 THROMBOCYTOPENIA (HCC): ICD-10-CM

## 2021-01-29 DIAGNOSIS — D63.1 ANEMIA IN STAGE 4 CHRONIC KIDNEY DISEASE (HCC): Primary | ICD-10-CM

## 2021-01-29 DIAGNOSIS — E53.8 B12 DEFICIENCY: ICD-10-CM

## 2021-01-29 DIAGNOSIS — D63.1 ANEMIA IN STAGE 3 CHRONIC KIDNEY DISEASE (HCC): ICD-10-CM

## 2021-01-29 DIAGNOSIS — D50.9 IRON DEFICIENCY ANEMIA, UNSPECIFIED IRON DEFICIENCY ANEMIA TYPE: ICD-10-CM

## 2021-01-29 DIAGNOSIS — N18.30 ANEMIA IN STAGE 3 CHRONIC KIDNEY DISEASE (HCC): ICD-10-CM

## 2021-01-29 LAB
BASOPHILS # BLD AUTO: 0.04 10*3/MM3 (ref 0–0.2)
BASOPHILS NFR BLD AUTO: 1.2 % (ref 0–1.5)
DEPRECATED RDW RBC AUTO: 47.1 FL (ref 37–54)
EOSINOPHIL # BLD AUTO: 0.15 10*3/MM3 (ref 0–0.4)
EOSINOPHIL NFR BLD AUTO: 4.6 % (ref 0.3–6.2)
ERYTHROCYTE [DISTWIDTH] IN BLOOD BY AUTOMATED COUNT: 13.3 % (ref 12.3–15.4)
FERRITIN SERPL-MCNC: 272.7 NG/ML (ref 13–150)
HCT VFR BLD AUTO: 31.4 % (ref 34–46.6)
HGB BLD-MCNC: 10.6 G/DL (ref 12–15.9)
IMM GRANULOCYTES # BLD AUTO: 0.01 10*3/MM3 (ref 0–0.05)
IMM GRANULOCYTES NFR BLD AUTO: 0.3 % (ref 0–0.5)
IRON 24H UR-MRATE: 71 MCG/DL (ref 37–145)
IRON SATN MFR SERPL: 27 % (ref 20–50)
LYMPHOCYTES # BLD AUTO: 1.08 10*3/MM3 (ref 0.7–3.1)
LYMPHOCYTES NFR BLD AUTO: 33.1 % (ref 19.6–45.3)
MCH RBC QN AUTO: 32.4 PG (ref 26.6–33)
MCHC RBC AUTO-ENTMCNC: 33.8 G/DL (ref 31.5–35.7)
MCV RBC AUTO: 96 FL (ref 79–97)
MONOCYTES # BLD AUTO: 0.41 10*3/MM3 (ref 0.1–0.9)
MONOCYTES NFR BLD AUTO: 12.6 % (ref 5–12)
NEUTROPHILS NFR BLD AUTO: 1.57 10*3/MM3 (ref 1.7–7)
NEUTROPHILS NFR BLD AUTO: 48.2 % (ref 42.7–76)
NRBC BLD AUTO-RTO: 0 /100 WBC (ref 0–0.2)
PLATELET # BLD AUTO: 164 10*3/MM3 (ref 140–450)
PMV BLD AUTO: 9.7 FL (ref 6–12)
RBC # BLD AUTO: 3.27 10*6/MM3 (ref 3.77–5.28)
TIBC SERPL-MCNC: 259 MCG/DL (ref 298–536)
TRANSFERRIN SERPL-MCNC: 174 MG/DL (ref 200–360)
WBC # BLD AUTO: 3.26 10*3/MM3 (ref 3.4–10.8)

## 2021-01-29 PROCEDURE — 85025 COMPLETE CBC W/AUTO DIFF WBC: CPT

## 2021-01-29 PROCEDURE — 36415 COLL VENOUS BLD VENIPUNCTURE: CPT

## 2021-01-29 PROCEDURE — 84466 ASSAY OF TRANSFERRIN: CPT

## 2021-01-29 PROCEDURE — 83540 ASSAY OF IRON: CPT

## 2021-01-29 PROCEDURE — 25010000002 EPOETIN ALFA-EPBX 40000 UNIT/ML SOLUTION: Performed by: NURSE PRACTITIONER

## 2021-01-29 PROCEDURE — 82728 ASSAY OF FERRITIN: CPT

## 2021-01-29 PROCEDURE — 82746 ASSAY OF FOLIC ACID SERUM: CPT

## 2021-01-29 PROCEDURE — 96372 THER/PROPH/DIAG INJ SC/IM: CPT

## 2021-01-29 PROCEDURE — 82607 VITAMIN B-12: CPT

## 2021-01-29 RX ADMIN — EPOETIN ALFA-EPBX 40000 UNITS: 40000 INJECTION, SOLUTION INTRAVENOUS; SUBCUTANEOUS at 14:40

## 2021-01-30 LAB
FOLATE SERPL-MCNC: 18.7 NG/ML (ref 4.78–24.2)
VIT B12 BLD-MCNC: 403 PG/ML (ref 211–946)

## 2021-02-05 ENCOUNTER — LAB (OUTPATIENT)
Dept: LAB | Facility: HOSPITAL | Age: 51
End: 2021-02-05

## 2021-02-05 ENCOUNTER — INFUSION (OUTPATIENT)
Dept: ONCOLOGY | Facility: HOSPITAL | Age: 51
End: 2021-02-05

## 2021-02-05 VITALS
SYSTOLIC BLOOD PRESSURE: 161 MMHG | DIASTOLIC BLOOD PRESSURE: 75 MMHG | HEART RATE: 86 BPM | RESPIRATION RATE: 20 BRPM | TEMPERATURE: 98 F | OXYGEN SATURATION: 100 %

## 2021-02-05 DIAGNOSIS — D63.1 ANEMIA IN STAGE 4 CHRONIC KIDNEY DISEASE (HCC): Primary | ICD-10-CM

## 2021-02-05 DIAGNOSIS — N18.4 ANEMIA IN STAGE 4 CHRONIC KIDNEY DISEASE (HCC): Primary | ICD-10-CM

## 2021-02-05 DIAGNOSIS — D63.1 ANEMIA IN STAGE 3 CHRONIC KIDNEY DISEASE (HCC): ICD-10-CM

## 2021-02-05 DIAGNOSIS — D50.9 IRON DEFICIENCY ANEMIA, UNSPECIFIED IRON DEFICIENCY ANEMIA TYPE: ICD-10-CM

## 2021-02-05 DIAGNOSIS — N18.30 ANEMIA IN STAGE 3 CHRONIC KIDNEY DISEASE (HCC): ICD-10-CM

## 2021-02-05 DIAGNOSIS — D69.6 THROMBOCYTOPENIA (HCC): ICD-10-CM

## 2021-02-05 LAB
BASOPHILS # BLD AUTO: 0.02 10*3/MM3 (ref 0–0.2)
BASOPHILS NFR BLD AUTO: 0.7 % (ref 0–1.5)
DEPRECATED RDW RBC AUTO: 47.6 FL (ref 37–54)
EOSINOPHIL # BLD AUTO: 0.14 10*3/MM3 (ref 0–0.4)
EOSINOPHIL NFR BLD AUTO: 4.8 % (ref 0.3–6.2)
ERYTHROCYTE [DISTWIDTH] IN BLOOD BY AUTOMATED COUNT: 13.9 % (ref 12.3–15.4)
FERRITIN SERPL-MCNC: 171.6 NG/ML (ref 13–150)
HCT VFR BLD AUTO: 32.2 % (ref 34–46.6)
HGB BLD-MCNC: 10.8 G/DL (ref 12–15.9)
IMM GRANULOCYTES # BLD AUTO: 0.01 10*3/MM3 (ref 0–0.05)
IMM GRANULOCYTES NFR BLD AUTO: 0.3 % (ref 0–0.5)
IRON 24H UR-MRATE: 51 MCG/DL (ref 37–145)
IRON SATN MFR SERPL: 20 % (ref 20–50)
LYMPHOCYTES # BLD AUTO: 0.89 10*3/MM3 (ref 0.7–3.1)
LYMPHOCYTES NFR BLD AUTO: 30.5 % (ref 19.6–45.3)
MCH RBC QN AUTO: 32.3 PG (ref 26.6–33)
MCHC RBC AUTO-ENTMCNC: 33.5 G/DL (ref 31.5–35.7)
MCV RBC AUTO: 96.4 FL (ref 79–97)
MONOCYTES # BLD AUTO: 0.3 10*3/MM3 (ref 0.1–0.9)
MONOCYTES NFR BLD AUTO: 10.3 % (ref 5–12)
NEUTROPHILS NFR BLD AUTO: 1.56 10*3/MM3 (ref 1.7–7)
NEUTROPHILS NFR BLD AUTO: 53.4 % (ref 42.7–76)
NRBC BLD AUTO-RTO: 0 /100 WBC (ref 0–0.2)
PLATELET # BLD AUTO: 157 10*3/MM3 (ref 140–450)
PMV BLD AUTO: 9.3 FL (ref 6–12)
RBC # BLD AUTO: 3.34 10*6/MM3 (ref 3.77–5.28)
TIBC SERPL-MCNC: 256 MCG/DL (ref 298–536)
TRANSFERRIN SERPL-MCNC: 172 MG/DL (ref 200–360)
WBC # BLD AUTO: 2.92 10*3/MM3 (ref 3.4–10.8)

## 2021-02-05 PROCEDURE — 96372 THER/PROPH/DIAG INJ SC/IM: CPT

## 2021-02-05 PROCEDURE — 85025 COMPLETE CBC W/AUTO DIFF WBC: CPT

## 2021-02-05 PROCEDURE — 83540 ASSAY OF IRON: CPT

## 2021-02-05 PROCEDURE — 84466 ASSAY OF TRANSFERRIN: CPT

## 2021-02-05 PROCEDURE — 82728 ASSAY OF FERRITIN: CPT

## 2021-02-05 PROCEDURE — 36415 COLL VENOUS BLD VENIPUNCTURE: CPT

## 2021-02-05 PROCEDURE — 25010000002 EPOETIN ALFA-EPBX 40000 UNIT/ML SOLUTION: Performed by: NURSE PRACTITIONER

## 2021-02-05 RX ADMIN — EPOETIN ALFA-EPBX 40000 UNITS: 40000 INJECTION, SOLUTION INTRAVENOUS; SUBCUTANEOUS at 14:48

## 2021-02-06 NOTE — PROGRESS NOTES
[facility] ONC [facility] GROUP HEMATOLOGY AND ONCOLOGY  [address]  [place] [address] [phone]    Patient Name: Stacy Lynn  Encounter Date: 02/09/2021  YOB: 1970  Patient Number: [id number]      REASON FOR VISIT: Ms. Stacy Lynn is a 50-year-old female who returns in followup of anemia from chronic kidney disease, with leukopenia and intermittent thrombocytopenia without unifying diagnosis (likely contribution from methotrexate). She is seen 21.5 months since last receipt of IV Injectafer and 41.5 months since last receipt of 2 units PRBC transfusions. She is here alone. History is obtained from the patient who is considered to be a reliable historian. The history is again muddled by liberal symptom reporting.     DIAGNOSTIC ABNORMALITIES:   1. Labs, 09/16/2014 Dr. Waters's office. TIBC 303 (225 - 420), serum iron 43 (42 - 180), and iron saturation 14.2%.   2. Labs, 10/03/2014. Hemoglobin 9.8, hematocrit 30.2, MCV 94.1, platelets 157,000, WBC 2.9, with 52.3 segs (ANC 1.5), 36.8 lymphocytes, 10.9 mid cells. Glucose 80, BUN 16, creatinine 0.9 (GFR 71.6), sodium 142, potassium 3.9, chloride 109, bicarbonate 19, calcium 8.6, albumin 3.5, total protein 6.3, bilirubin 0.4, alkaline phosphatase 67, , AST 12, ALT 20, phosphorus 4.6 (each normal). Serum iron 49, iron saturation 15.8%, ferritin 32, , folate 13.6. HIV antibodies screen nonreactive. ELENA negative. T4 of 7.1, TSH 0.6, sed rate 8 (each normal). Rheumatoid factor less than 10. ANCA less than 1:20.  3. Colonoscopy, 10/13/2014: - One 7 mm polyp in the ascending colon resected and retrieved. The examination was otherwise normal on direct and retroflexion views. Recommendation: - Repeat colonoscopy in 5 years for surveillance.  4. Comprehensive Report, 10/15/2014: FINAL DIAGNOSIS. Peripheral blood: Leukopenia/neutropenia; normocytic anemia. Comprehensive Comments: The cause of  the cytopenias is not apparent from review of this peripheral blood smear. Differential diagnosis includes chronic disease, autoimmunity, drug effects, infection, etc. Although there are no morphologic features of myeloid dysplasia, please be aware that peripheral blood findings are not always representative of underlying bone marrow abnormalities. FLOW IMPRESSION: Peripheral blood: No increase in myeloid or lymphoid blasts identified. Findings consistent with mild granulocytic left shift. No immunophenotypically abnormal B- or T-cell population identified.  5. EGD performed on 10/16/2014 at [facility]. IMPRESSION: Normal esophagus. Normal stomach. Normal first part of the duodenum and 2nd part of the duodenum Biopsied . Pathology: Small bowel biopsy. Benign small intestinal type mucosa with no significant histopathologic changes.  6. Labs, 05/29/2015. IgG 827, IgA 168, IgM 140. Immunofixation showed no monoclonal immunoglobulins detected. Free kappa light chain 1.26, free lambda light chain 1.17, kappa/lambda light chains 1.08 (each normal).  7. UPIEP, 06/09/2015. 24-hour urine 82.8 mg. Immunofixation not reported.  8. [facility], CT-guided bone marrow, 06/29/2015. Impression: Technically successful CT-guided bone marrow aspirate without immediate complications. No core bone marrow sample could be obtained. PATHOLOGY: Surgical pathology performed on 06/29/2015 at [facility] was revealing for post iliac crest bone marrow biopsy and aspirate: Normocellular bone marrow of 40%. No increase in blasts. Iron staining is 1+. Flow cytometry shows no evidence of an abnormal myeloid maturation, increase in blasts, or lymphoproliferative disorder. Complete blood count and peripheral blood smear review: Normochromic normocytic anemia. Leukopenia. No circulating blasts.  9. Peripheral blood comprehensive report, 06/19/2017: PANCYTOPENIA. COMPREHENSIVE DIAGNOSIS.. Review of peripheral blood smear: Leukopenia  with relative reactive appearing eosinophilia. Normochromic, normocytic anemia. Mild thrombocytopenia. See comment. COMPREHENSIVE COMMENT. When compared to a peripheral blood specimen reviewed on this patient in 10/2014 ([phone] [id number]), the white blood cell count has decreased from 2.7 K/mL to 1.8 currently. The hemoglobin level has also decreased from 9.9 g/dL to 8.7 g/dL. The platelet counts are similar. As stated in the prior specimen, the cause of the cytopenias is not apparent from the peripheral blood smear examination. Etiologies to consider include chronic diseases particularly autoimmune disease, infections and drugs/toxins. Myelodysplasia is in the differential diagnosis but that diagnosis can be difficult to establish on a peripheral blood specimen. Correlation recommended. Flow cytometry study after erythrolysis reveals no circulating myeloid or lymphoid blasts. Myeloid and monocytic lineages are represented by mature granulocytes and monocytes. Phenotypically unremarkable eosinophils are mildly elevated at 10.5% of the total WBC. Basophils are not increased. It must be noted that the diagnosis of a myeloid stem cell disorder, if clinically suspected, is best made using bone marrow specimens. Peripheral blood is not always representative of abnormalities involving the bone marrow. The B cells show no evidence of clonality or antigenic aberrancy. The T cells show normal expression of the pan T-cell antigens. The NK cells account for 23.7% of the total lymphocytes.  10. [facility]: Endoscopy: 08/23/2017. Impressions: - Normal esophagus. - Non-erosive gastritis. Biopsied. - Normal first part of the duodenum and 2nd part of the duodenum. Biopsied.   11. [facility]: Colonoscopy: 08/23/2017. Impressions: - The entire examined colon is normal. Biopsied.     PREVIOUS INTERVENTIONS:   1. IV Venofer, 11/10/2014 - 11/20/2014 (1000 mg)  2. INFeD 500 mg on 07/19/2016 and 05/01/2017  3. 2 units PRBC  transfusions, 09/01/2017  4. Injectafer 750 mg IV, 04/23/2019        Problem List Items Addressed This Visit        Other    Anemia in chronic kidney disease (CKD) - Primary    Relevant Medications    folic acid-vit B6-vit B12 (Folbee) 2.5-25-1 MG tablet tablet    Other Relevant Orders    Vitamin B12 & Folate    Ferritin    Iron Profile    CBC & Differential    CBC & Differential    Comprehensive Metabolic Panel    Vitamin B12 & Folate    Ferritin    Iron Profile        Oncology/Hematology History    No history exists.       PAST MEDICAL HISTORY:  ALLERGIES:  Allergies   Allergen Reactions   • Adhesive Tape Hives   • Tape Hives     CURRENT MEDICATIONS:  Outpatient Encounter Medications as of 2/9/2021   Medication Sig Dispense Refill   • buPROPion XL (WELLBUTRIN XL) 150 MG 24 hr tablet TAKE 1 TABLET BY MOUTH ONCE DAILY FOR DEPRESSION  2   • busPIRone (BUSPAR) 10 MG tablet Take 10 mg by mouth.     • carBAMazepine XR (TEGretol  XR) 200 MG 12 hr tablet Take 1 tablet by mouth Every 12 (Twelve) Hours. 60 tablet 3   • docusate sodium (COLACE) 100 MG capsule Take 100 mg by mouth.     • DULoxetine (CYMBALTA) 60 MG capsule Take 60 mg by mouth Daily.     • esomeprazole (nexIUM) 40 MG capsule TAKE 1 CAPSULE BY MOUTH TWICE DAILY BEFORE  MEALS 60 capsule 0   • levothyroxine (SYNTHROID, LEVOTHROID) 88 MCG tablet Take 75 mcg by mouth Daily.     • LYRICA 100 MG capsule Take 100 mg by mouth Every 8 (Eight) Hours As Needed.  1   • methotrexate 2.5 MG tablet Take 4 tablets by mouth 1 (One) Time Per Week. Takes 4 tabs weekly on Mondays 16 tablet 11   • metoprolol succinate XL (TOPROL-XL) 25 MG 24 hr tablet Take 100 mg by mouth Daily.     • Morphine (MS CONTIN) 15 MG 12 hr tablet Take 15 mg by mouth 2 (Two) Times a Day.     • O2 (OXYGEN) Inhale 2 L/min 1 (One) Time. Continuous     • ondansetron (ZOFRAN) 4 MG tablet Take 4 mg by mouth Every 8 (Eight) Hours As Needed for Nausea or Vomiting.     • oxybutynin (DITROPAN) 5 MG tablet Take 5 mg  by mouth every night at bedtime.  5   • polyethylene glycol (MIRALAX) powder Take 17 g by mouth.     • PROCRIT 13625 UNIT/ML injection Inject 1 Units under the skin As Needed (WHEN BLOOD COUNT [hemoglobin] BELOW 11).     • tiZANidine (ZANAFLEX) 4 MG tablet Take 4 mg by mouth At Night As Needed for Muscle Spasms.     • topiramate (TOPAMAX) 100 MG tablet Take 100 mg by mouth Every Night.     • [DISCONTINUED] folic acid-vit B6-vit B12 (Folbee) 2.5-25-1 MG tablet tablet Take 1 tablet by mouth Daily. 30 tablet 0   • [DISCONTINUED] folic acid-vit B6-vit B12 (Folbee) 2.5-25-1 MG tablet tablet Take 1 tablet by mouth Daily. 30 tablet 0   • fluticasone (Flovent HFA) 110 MCG/ACT inhaler Inhale 2 puffs 2 (Two) Times a Day for 30 days. 1 inhaler 11   • folic acid-vit B6-vit B12 (Folbee) 2.5-25-1 MG tablet tablet Take 1 tablet by mouth Daily. 30 tablet 0     No facility-administered encounter medications on file as of 2/9/2021.      ADULT ILLNESSES:   Iron deficiency anemia ( ICD-10:D50.9 ;Iron deficiency anemia, unspecified   Anemia ( ICD-10:D64.9 ;Anemia, unspecified   Anemia in chronic kidney disease ( ICD-10:D63.1 ;Anemia in chronic kidney disease   Chronic kidney disease ( ICD-10:N18.3 ;Chronic kidney disease, stage 3 (moderate)   Chronic pancreatitis ( ICD-10:K86.1 ;Other chronic pancreatitis   Depression ( ICD-10:F32.9 ;Major depressive disorder, single episode, unspecified   Fatigue ( ICD-10:R53.82 ;Chronic fatigue, unspecified   Fibromyalgia syndrome ( ICD-10:M79.7 ;Fibromyalgia   Gastroesophageal reflux disease ( ICD-10:K21.9 ;Gastro-esophageal reflux disease without esophagitis   Hypertension ( ICD-10:I10 ;Essential (primary) hypertension   Hypothyroidism ( ICD-10:E03.9 ;Hypothyroidism, unspecified   Irritable bowel syndrome ( ICD-10:K58.9 ;Irritable bowel syndrome without diarrhea   Kidney stone ( ICD-10:N20.0 ;Calculus of kidney   Knee pain ( meniscal disorder,Dr. Dasilva; ICD-10:M25.569 ;Pain in unspecified knee )    Leukopenia ( ICD-10:D72.819 ;Decreased white blood cell count, unspecified   Macular degeneration ( ICD-10:H35.30 ;Unspecified macular degeneration   Migraines ( ICD-10:G43.909 ;Migraine, unspecified, not intractable, without status migrainosus   Obesity ( ICD-10:E66.9 ;Obesity, unspecified   Peripheral neuropathy ( ICD-10:G62.9 ;Polyneuropathy, unspecified   Sarcoidosis ( ICD-10:D86.0 ;Sarcoidosis of lung   Scoliosis ( ICD-10:M41.9 ;Scoliosis, unspecified   Urge incontinence of urine ( ICD-10:N39.41 ;Urge incontinence   Vitamin B12 deficiency ( ICD-10:E53.8 ;Deficiency of other specified B group vitamins    SURGERIES:   Total knee replacement, right, 11/06/2015, Dr. Dasilva   Colonoscopy, 2009   Shoulder repair, epicondyle, right, 2002   Shoulder repair, arthroscopy, 2001   Ulnar transposition; ulnar neuropathy, 2002,1999   Kidney stone, 2001   Hysterectomy, total, 2004   Colonoscopy, 09/06/2018. Impression: The examination was otherwise normal on direct and retroflexion views. No specimens collected. Repeat 5 years   Colonoscopy, 08/23/2017. [facility]. Impression> The entire examined colon is normal. Biopsied   Decompression of median nerve, (carpal tunnel release), 05/08/2018, Dr. Kwon   Operative procedure on knee, replacement, 01/2014, Dr. Dasilva   Endoscopy, 08/23/2017, [facility]. Impression: Normal esophagus. Non-erosive gastritis. Biopsied. Normal 1st part of the duodenum and 2nd part of the duodenum. Biopsied   Radiofrequency ablation (RFA) left leg on 01/10/2018 by Dr. Montgomery. Left lower extremity venous ultrasound, 01/19/2018. No deep venous thrombosis (DVT) left lower extremity. Was started on Eliquis. Lymphedema pumps and compression stockings prescribed. Followup 1 month   Revision surgery was done last 05/04/2017. Dr. Kwon   Cholecystectomy, remote      ADULT ILLNESSES:  Patient Active Problem List   Diagnosis Code   • Palpitations R00.2   • Thrombocytopenia  (CMS/[facility]) D69.6   • Sarcoidosis D86.9   • Essential hypertension I10   • Hypothyroidism E03.9   • Dyspnea on exertion R06.00   • Morbidly obese (CMS/[facility]) E66.01   • PFO (patent foramen ovale) Q21.1   • Iron deficiency anemia D50.9   • Generalized weakness R53.1   • Volume depletion, gastrointestinal loss E86.9   • Viral enterocolitis A08.4   • Colon polyps K63.5   • Epigastric pain R10.13   • Bloating R14.0   • Early satiety R68.81   • Weight loss R63.4   • Abdominal cramping R10.9   • Nausea R11.0   • NSAID long-term use Z79.1   • 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, T45.1X5A   • Anemia in chronic kidney disease (CKD) N18.9, D63.1   • Varicose veins of both lower extremities with pain I83.813   • Lymphedema I89.0   • Venous (peripheral) insufficiency I87.2   • Neuropathy due to medical condition (CMS/[facility]) G63   • Venous insufficiency I87.2   • Chest pain R07.9   • Bloody stool K92.1   • Constipation K59.00   • Anal or rectal pain K62.89   • Rectal bleeding K62.5   • Hx of colonic polyps Z86.010   • Other hemorrhoids K64.8   • Chronic respiratory failure with hypoxia (CMS/[facility]) J96.11   • Diarrhea R19.7   • Irritable bowel syndrome with constipation K58.1   • Overweight E66.3   • 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 Diallo    • CHOLECYSTECTOMY     • COLONOSCOPY  10/13/2014   • COLONOSCOPY N/A 8/23/2017    Procedure: COLONOSCOPY WITH ANESTHESIA;  Surgeon: Jeanette Mclaughlin MD;  Location: [facility] ENDOSCOPY;  Service:    • COLONOSCOPY N/A 9/6/2018    Procedure: COLONOSCOPY WITH ANESTHESIA;  Surgeon: Jeanette Mclaughlin MD;  Location: [facility] ENDOSCOPY;  Service: Gastroenterology   • ENDOSCOPY N/A 8/23/2017    Procedure: ESOPHAGOGASTRODUODENOSCOPY WITH ANESTHESIA;  Surgeon: Jeanette Mclaughlin MD;  Location: [facility] ENDOSCOPY;  Service:    • ENDOVENOUS ABLATION SAPHENOUS VEIN W/ LASER Right 03/30/2018   • HYSTERECTOMY     •  JOINT REPLACEMENT Right     PATELLA REPLACED   • KIDNEY STONE SURGERY     • KNEE ARTHROSCOPY     • KNEE SURGERY Right    • LATERAL EPICONDYLE RELEASE     • PATELLA SURGERY     • REPLACEMENT TOTAL KNEE     • SHOULDER ARTHROSCOPY     • ULNAR NERVE DECOMPRESSION     • VARICOSE VEIN SURGERY Left 1/10/2018    Procedure: LEFT SAPHENOUS VEIN RADIO FREQUENCY ABLATION;  Surgeon: Kvng Montgomery DO;  Location:  [facility] OR;  Service:    • VARICOSE VEIN SURGERY Right 3/30/2018    Procedure: RIGHT LOWER EXTREMITY VENAGRAM, RIGHT LOWER EXTREMITY SAPHENOUS VEIN RADIO FREQUENCY ABLATION;  Surgeon: Kvng Montgomery DO;  Location:  [facility] OR;  Service: Vascular     HEALTH MAINTENANCE ITEMS:  Health Maintenance Due   Topic Date Due   • TDAP/TD VACCINES (1 - Tdap) 04/04/1987   • HEPATITIS C SCREENING  05/01/2017   • PAP SMEAR  05/01/2017   • ZOSTER VACCINE (1 of 2) 04/04/2018   • ANNUAL WELLNESS VISIT  02/25/2020   • INFLUENZA VACCINE  08/01/2020       <no information>  Last Completed Colonoscopy       Status Date      COLONOSCOPY Done 3/15/2019 Ext Proc: [place] COLONOSCOPY W/BIOPSY SINGLE/MULTIPLE     Patient has more history with this topic...        Immunization History   Administered Date(s) Administered   • Pneumococcal Conjugate 13-Valent (PCV13) 04/01/2019   • Pneumococcal Polysaccharide (PPSV23) 09/26/2017, 10/01/2018     Last Completed Mammogram       Status Date      MAMMOGRAM Done 11/4/2013 Ext Proc: INACTIVE -[facility] MAMMOGRAM SCREENING BILAT DIGITAL W CAD     Patient has more history with this topic...            FAMILY HISTORY:  Family History   Problem Relation Age of Onset   • Arrhythmia Mother    • Heart disease Mother    • Kidney disease Mother    • Heart disease Father    • Diabetes Father    • Heart disease Brother    • Heart disease Maternal Aunt    • Heart disease Maternal Uncle    • Heart disease Paternal Aunt    • Heart disease Paternal Uncle    • Heart disease Maternal Grandmother    • Heart disease Maternal  "Grandfather    • Heart disease Paternal Grandmother    • Heart disease Paternal Grandfather    • Colon cancer Neg Hx    • Colon polyps Neg Hx      SOCIAL HISTORY:  Social History     Socioeconomic History   • Marital status: Single     Spouse name: Not on file   • Number of children: Not on file   • Years of education: Not on file   • Highest education level: Not on file   Tobacco Use   • Smoking status: Never Smoker   • Smokeless tobacco: Never Used   Substance and Sexual Activity   • Alcohol use: No   • Drug use: No   • Sexual activity: Defer       REVIEW OF SYSTEMS:  Constitutional:   The patient's appetite is fair. \"It's fair.\" Her energy is chronically low. She manages her personal ADLs but still tires easily. She lives with her sister and brother. She manages her ADLs including helping with light indoor chores but not any errands and does not drive. She has lost 2 pounds (in addition to 30 pounds at her prior visit) since her last visit. \"Fluid weight and I've not been eating as much.\" She has no fevers or chills but has non-drenching night sweats. Her sleep habits seem appropriate.  Ear/Nose/Throat/Mouth:   She reports no ear pains, sinus symptoms, sore throat, nosebleeds, or sore tongue. She has migraine headaches. She denies any hoarseness, change in voice quality, or hemoptysis.   Ocular:   She reports no eye pain, significant change in visual acuity, double vision, or blurry vision.  Respiratory:   She reports baseline exertional dyspnea and is short of breath with her routine activities. She has chronic cough with thick, white/yellow phlegm production but has no significant shortness of breathing at rest or unexplained chest wall pain. Says she is off prednisone since last 08/2016 by Dr. Scherer (for the sarcoid). Says the methotrexate has been stopped by Dr. Scherer. Has been on round the clock O2 since 04/2018.  Cardiovascular:   She reports no exertional chest pain, chest pressure, or chest heaviness. She " "reports no claudication. She reports no palpitations or symptomatic orthostasis.  Gastrointestinal:   She reports chronic nausea but no dysphagia, vomiting, or postprandial abdominal pain but admits to early satiety, increased bloating, and cramping. She has no change in bowel habits without dark discoloration of the stool (off oral iron). She reports intermittent rectal bleeding when she is constipated. \"No.\" She has intermittent diarrhea from IBS. Is on fiber supplements. Is intolerant of oral iron (cramps, constipation). Last repeat colonoscopy, 09/06/2018 (above). Hemorrhoids?.  Genitourinary:   She reports no urinary burning, frequency, dribbling, or discoloration. She reports difficulty controlling her bladder and has trouble holding in her urine requiring use of protective pads. She has no need to urinate frequently through the night.  Musculoskeletal:   She reports persistent right knee discomfort in spite of total knee replacement (TKR). Says revision surgery was done last 05/04/2017 - Dr. Kwon. She has chronic arthralgias of the hips and lower back with distal radiculitis to the buttocks. She has myalgias of the calves and has nighttime leg cramping. She is now seen by Dr. Schmitt of Pain Management. Says a nerve stimulator couldn't be placed due to her scoliosis. Is now on oral morphine.  \"It helps a lot.\"  Extremities:   She reports chronic trouble with fluid retention and significant leg swelling. Is using compression leg pumps bid since 02/07/2018.  Endocrine:   She reports no problems with excess thirst, excessive urination, vasomotor instability.  Heme/Lymphatic:   She reports easy bruising but no unexplained bleeding, petechial rashes, or swollen glands.  Skin:   She reports chronic itching and rashes but no lesions which won't heal.  Neuro:   She reports postural dizziness but no loss of consciousness, seizures, or fainting spells. She reports no weakness of her face, arms, or legs. She has no " "difficulty with speech. She has no tremors. She has relief of paresthesias of the right hand since the carpal tunnel release on 05/08/2018 (Dr. Kwon).  Psych:   She admits to depression and anxiety. She reports occasional mood swings. She reports no history of suicide attempts.      VITAL SIGNS: /78   Pulse 70   Temp 96.5 °F (35.8 °C)   Resp 16   Ht 160 cm (63\")   Wt 130 kg (287 lb 1.6 oz)   SpO2 99%   Breastfeeding No   BMI 50.86 kg/m² Body surface area is 2.25 meters squared.  Pain Score    02/09/21 1143   PainSc:   8         PHYSICAL EXAMINATION:   General:   She is a very-pleasant, morbidly obese, and modestly-kept middle-aged female who is comfortable at rest. She arrived in the exam room ambulatory. She appears to be her stated age. Her skin color is still a bit pale.   Head/Neck:   The patient is anicteric and atraumatic. She is wearing a surgical mask today.  She is wearing O2 via cannula, tethered to a portable O2 tank. The trachea is midline. The neck is supple without evidence of jugular venous distention or cervical adenopathy.   Eyes:   The pupils are equal, round, and reactive to light. The extraocular movements are full. There is no scleral jaundice or erythema.   Chest:   The respiratory efforts are normal and unhindered. The breath sounds are diminished bilaterally with vague basilar rales. There are no wheezes, rhonchi, or asymmetry of breath sounds.  Cardiovascular:   The patient has a regular cardiac rate and rhythm without murmurs, rubs, or gallops. The peripheral pulses are equal and full.  Abdomen:   The belly is soft and globose. Her habitus precludes an adequate exam but there is no rebound or guarding. There is no organomegaly, mass-effect, or tenderness. Bowel sounds are active and of normal character.  Extremities:   There is no evidence of cyanosis, clubbing, with 3+ (chronic) leg edema.   Rheumatologic:   There is no overt evidence of rheumatoid deformities of the " hands. There is no sausaging of the fingers. There is no sign of active synovitis. The gait is slow and antalgic, still favoring the right leg/knee.  Cutaneous:   There are no overt rashes, disseminated lesions, purpura, or petechiae.   Lymphatics:   There is no evidence of adenopathy in the cervical, supraclavicular, or axillary areas.   Neurologic:   The patient is alert, oriented, cooperative, and pleasant. She is appropriately conversant. She ambulated into the exam room without assistance but declined transfer from chair to exam table. There is no overt dysfunction of the motor, sensory, cerebellar systems.  Psych:   Mood and affect are appropriate for circumstance. Eye contact is appropriate. Normal judgement and decision making.        LABS    Lab Results - Last 18 Months   Lab Units 02/05/21  1400 01/29/21  1402 12/31/20  0949 12/11/20  1452 11/06/20  1505 09/15/20  1437   HEMOGLOBIN g/dL 10.8* 10.6* 11.0* 10.5* 11.0* 10.2*   HEMATOCRIT % 32.2* 31.4* 33.9* 31.3* 33.4* 32.3*   MCV fL 96.4 96.0 100.3* 94.6 93.3 96.4   WBC 10*3/mm3 2.92* 3.26* 3.15* 3.16* 3.10* 3.09*   RDW % 13.9 13.3 13.9 13.7 13.5 13.9   MPV fL 9.3 9.7 9.7 9.1 9.7 10.0   PLATELETS 10*3/mm3 157 164 174 161 169 166   IMM GRAN % % 0.3 0.3 0.0 0.3 0.0 0.3   NEUTROS ABS 10*3/mm3 1.56* 1.57* 1.76 1.93 1.72 1.46*   LYMPHS ABS 10*3/mm3 0.89 1.08 0.86 0.83 0.88 1.10   MONOS ABS 10*3/mm3 0.30 0.41 0.35 0.26 0.32 0.35   EOS ABS 10*3/mm3 0.14 0.15 0.15 0.09 0.15 0.15   BASOS ABS 10*3/mm3 0.02 0.04 0.03 0.04 0.03 0.02   IMMATURE GRANS (ABS) 10*3/mm3 0.01 0.01 0.00 0.01 0.00 0.01   NRBC /100 WBC 0.0 0.0 0.0 0.0 0.0 0.0       Lab Results - Last 18 Months   Lab Units 12/31/20  0949 11/06/20  1505 09/29/20  1358 07/14/20  1443 03/27/20  1431 03/10/20  1508 02/17/20  1424 02/14/20  1517   GLUCOSE mg/dL 95 131*  --  82 84 89  --  112*   SODIUM mmol/L 141 142  --  140 142 143  --  141   POTASSIUM mmol/L 4.8 3.8  --  4.3 4.6 4.9 4.0 4.0   CO2 mmol/L 26.0 23.0  --   23.0 25.0 26.0  --  21.0*   CHLORIDE mmol/L 110* 109*  --  108* 109* 107  --  107   ANION GAP mmol/L 5.0 10.0  --  9.0 8.0 10.0  --  13.0   CREATININE mg/dL 1.42* 1.27* 1.60* 2.03* 1.35* 1.27*  --  1.38*   BUN mg/dL 41* 21*  --  22* 24* 25*  --  22*   BUN / CREAT RATIO  28.9* 16.5  --  10.8 17.8 19.7  --  15.9   CALCIUM mg/dL 8.8 9.1  --  8.5* 8.4* 8.7  --  9.1   EGFR IF NONAFRICN AM mL/min/1.73 39* 44*  --  26* 41* 44*  --  40*   ALK PHOS U/L 67 94  --  78 71 73  --  91   TOTAL PROTEIN g/dL 6.8 6.9  --  7.0 6.3 6.6  --  7.8   ALT (SGPT) U/L 11 25  --  7 10 9  --  11   AST (SGOT) U/L 17 19  --  13 14 13  --  15   BILIRUBIN mg/dL 0.2 0.4  --  0.3 0.2 0.2  --  0.4   ALBUMIN g/dL 4.30 4.40  --  4.50 3.80 4.00  --  4.80   GLOBULIN gm/dL 2.5 2.5  --  2.5 2.5 2.6  --  3.0       No results for input(s): MSPIKE, KAPPALAMB, IGLFLC, URICACID, FREEKAPPAL, CEA, LDH, REFLABREPO in the last 04626 hours.    Lab Results - Last 18 Months   Lab Units 02/05/21  1400 01/29/21  1402 12/31/20  0949 12/11/20  1452 11/06/20  1505 09/15/20  1437  07/14/20  1443 03/27/20  1431 02/14/20  1517 01/10/20  1430   IRON mcg/dL 51 71 56 69 66 48   < > 83 54 62  62 50  50   TIBC mcg/dL 256* 259* 277* 276* 247* 262*   < > 295* 270* 310 279*   IRON SATURATION % 20 27 20 25 27 18*   < > 28 20 20 18*   FERRITIN ng/mL 171.60* 272.70* 250.10* 313.70* 407.40* 179.80*   < > 179.60* 120.30 219.10* 302.50*   FOLATE ng/mL  --  18.70 15.40  --   --   --   --  4.67* 4.09* 11.70  10.60 5.78  5.82    < > = values in this interval not displayed.       ASSESSMENT:   1. Macrocytic anemia, with contributions from iron deficiency (1+ marrow iron), iron underutilization/anemia of chronic disease and chronic kidney disease and methotrexate. Michael Hgb 7.9, 08/31/2017 requiring 2 units PRBC transfusions on 09/01/2017.   a. Negative EGD/colonoscopy (above), negative SPIEP, negative UPIEP. Stable, Hgb 10.3 on 07/14/2020 (prior range: Hgb 7.9 - 12.3).   b. Endoscopy:  08/23/2017. Impressions: - Normal esophagus. - Non-erosive gastritis. Biopsied. - Normal 1st part of the duodenum and 2nd part of the duodenum. Biopsied.   c. [facility]: Colonoscopy: 08/23/2017. Impressions: - The entire examined colon is normal. Biopsied.   d. Colonoscopy, 09/06/2018. Impressions: - The examination was otherwise normal on direct and retroflexion views. No specimens collected. Repeat 5 years              e.  06/2015-bone marrow biopsy with 1+ marrow iron, but otherwise nondiagnostic.  2. Leukopenia, with adequate ANC. Stable, WBC 2.92; ANC 1.56, 02/05/2021 (prior range: WBC 2.45 - 4.2; ANC 1.05 - 2.6). No unifying diagnosis. Likely medication (methotrexate) associated.   3. Thrombocytopenia. Stable, 157,000 on 02/05/2021 (prior range: 115,000 - 188,000).  4. Chronic kidney disease, Stage III. Stable, GFR 39 mL/min on 12/31/2020 (prior range: 26 - 56.6 mL/min).   5. Hypothyroidism.   6. Gastroesophageal reflux disease, now with early satiety, bloating, cramps.   7. B12 deficiency. Receives B12 injections per Dr. Waters. Previously received B12, 1000 mcg IM daily x 3 then weekly x 4 beginning 09/07/2018.   8. Fibromyalgia with chronic pain syndrome.   9. Irritable bowel syndrome with chronic diarrhea. Colonoscopy, 10/2014.   10. Depression.   11. Chronic neck and low back pain with scoliosis. She is now seen by Dr. Schmitt of Pain Management. Says a nerve stimulator will be placed on 04/23/2019.   12. Peripheral neuropathy and swelling of the legs.   a. Seen by Dr. Montgomery, 09/07/2017 and 12/08/2017. Impression: Venous insufficiency and lymphedema.   b. Underwent RFA left leg on 01/10/2018 by Dr. Montgomery. Lower extremity venous ultrasound, 01/19/2018. No deep venous thrombosis (DVT) left lower extremity. Was started on anticoagulation (Eliquis). Lymphedema pumps and compression stockings prescribed.   13. Degenerative joint disease (DJD) knees. Underwent right knee surgery on 11/06/2015 and  revision on 05/04/2017. Dr. Kwon.   14. Obesity. Has stabilized.   15. Pulmonary sarcoidosis. Followed by Dr. Scherer. Was started on methotrexate sometime 05/2017. Is now on O2 since 04/2018.   16. Chronic fatigue, contributions from all above.   17. Admitted [facility] 09/25/2016 through 09/26/2016 for chest pain. DSE negative. Discharged for non-cardiac chest pain.   18. Leg edema. Underwent RFA left leg on 01/10/2018 by Dr. Montgomery. LE venous US, 01/19/2018. No DVT left LE. Eliquis stopped last 02/2018. Lymphedema pumps and compression stockings prescribed.   19. Right wrist carpal tunnel symptoms. Relieved since surgery last 05/08/2018 (Dr. Kwon).   20. Rectal bleeding. Colonoscopy, 09/06/2018. Impressions: The examination was otherwise normal on direct and retroflexion views. No specimens collected. Repeat 5 years.    PLAN:   1. Apprised of labs from 12/31/2020, 01/29/2021 and 02/05/2021 with stable CBC, low (stable) GFR, repleted serum iron, repleted Fe sat (INFeD, 05/01/2017), repleted B12/folate.   2. Rx:  [place] po daily # 30  3.  Previously discussed prior SPIEP with normal immunoglobulin levels, negative TAVIA for monoclonal proteins, normal kappa/lambda light chains, negative 24 hour UPIEP, normal ESR, negative rheumatoid factor, normal TSH/T4, normal B12/folate, negative HIV screen, low iron studies, negative ELENA, normal LDH and negative comprehensive blood report (no peripheral blood evidence for dysplasia and negative flow cytometry).  Also noted previous bone marrow biopsy showing 1+ but otherwise nondiagnostic.  4. Again reviewed her medications. Stopped methotrexate, diclofenac, gabapentin.  Remains on Nexium (blood dyscrasias), gabapentin, Lortab (neutropenia, thrombocytopenia), Topamax (anemia, leukopenia). Would discontinue as many of these as possible. Defer to Dr. Waters.   5. Draw serum iron, Fe sat, ferritin, B12, folate every 4 weeks.   6. CBC weekly with Procrit 40,000 units subcutaneously if  Hgb less than 11 and less than 33 - [facility] pharmacy or office   7. Continue management per primary care and other specialists.   8. Return to the [place] office in 24 weeks with pre-office CMP, B12/folate, serum iron, Fe sat, ferritin, and CBC with differential.     MEDICAL DECISION MAKING: Moderate Complexity   AMOUNT OF DATA: Moderate    I spent 25 total minutes, face-to-face, caring for Hope today.  Greater than 50% of this time involved counseling and/or coordination of care as documented within this note regarding the patient's illness(es), pros and cons of various treatment options, instructions and/or risk reduction.    cc: MD Weston Ambrose MD

## 2021-02-09 ENCOUNTER — OFFICE VISIT (OUTPATIENT)
Dept: ONCOLOGY | Facility: CLINIC | Age: 51
End: 2021-02-09

## 2021-02-09 VITALS
OXYGEN SATURATION: 99 % | DIASTOLIC BLOOD PRESSURE: 78 MMHG | TEMPERATURE: 96.5 F | BODY MASS INDEX: 50.87 KG/M2 | WEIGHT: 287.1 LBS | SYSTOLIC BLOOD PRESSURE: 122 MMHG | RESPIRATION RATE: 16 BRPM | HEIGHT: 63 IN | HEART RATE: 70 BPM

## 2021-02-09 DIAGNOSIS — N18.9 ANEMIA IN CHRONIC KIDNEY DISEASE, UNSPECIFIED CKD STAGE: Primary | ICD-10-CM

## 2021-02-09 DIAGNOSIS — D63.1 ANEMIA IN CHRONIC KIDNEY DISEASE, UNSPECIFIED CKD STAGE: Primary | ICD-10-CM

## 2021-02-09 PROCEDURE — 99213 OFFICE O/P EST LOW 20 MIN: CPT | Performed by: INTERNAL MEDICINE

## 2021-02-09 RX ORDER — CYANOCOBALAMIN/FOLIC AC/VIT B6 1-2.5-25MG
1 TABLET ORAL DAILY
Qty: 30 TABLET | Refills: 0 | Status: SHIPPED | OUTPATIENT
Start: 2021-02-09 | End: 2021-09-13 | Stop reason: SDUPTHER

## 2021-02-26 ENCOUNTER — APPOINTMENT (OUTPATIENT)
Dept: LAB | Facility: HOSPITAL | Age: 51
End: 2021-02-26

## 2021-02-26 ENCOUNTER — APPOINTMENT (OUTPATIENT)
Dept: ONCOLOGY | Facility: HOSPITAL | Age: 51
End: 2021-02-26

## 2021-03-05 ENCOUNTER — INFUSION (OUTPATIENT)
Dept: ONCOLOGY | Facility: HOSPITAL | Age: 51
End: 2021-03-05

## 2021-03-05 ENCOUNTER — LAB (OUTPATIENT)
Dept: LAB | Facility: HOSPITAL | Age: 51
End: 2021-03-05

## 2021-03-05 VITALS
DIASTOLIC BLOOD PRESSURE: 93 MMHG | OXYGEN SATURATION: 100 % | BODY MASS INDEX: 49.08 KG/M2 | RESPIRATION RATE: 20 BRPM | TEMPERATURE: 98 F | HEART RATE: 70 BPM | HEIGHT: 63 IN | WEIGHT: 277 LBS | SYSTOLIC BLOOD PRESSURE: 131 MMHG

## 2021-03-05 DIAGNOSIS — D63.1 ANEMIA IN CHRONIC KIDNEY DISEASE, UNSPECIFIED CKD STAGE: ICD-10-CM

## 2021-03-05 DIAGNOSIS — N18.9 ANEMIA IN CHRONIC KIDNEY DISEASE, UNSPECIFIED CKD STAGE: ICD-10-CM

## 2021-03-05 LAB
BASOPHILS # BLD AUTO: 0.03 10*3/MM3 (ref 0–0.2)
BASOPHILS NFR BLD AUTO: 0.8 % (ref 0–1.5)
DEPRECATED RDW RBC AUTO: 46.8 FL (ref 37–54)
EOSINOPHIL # BLD AUTO: 0.1 10*3/MM3 (ref 0–0.4)
EOSINOPHIL NFR BLD AUTO: 2.6 % (ref 0.3–6.2)
ERYTHROCYTE [DISTWIDTH] IN BLOOD BY AUTOMATED COUNT: 13.2 % (ref 12.3–15.4)
FERRITIN SERPL-MCNC: 255.9 NG/ML (ref 13–150)
FOLATE SERPL-MCNC: 17.4 NG/ML (ref 4.78–24.2)
HCT VFR BLD AUTO: 36.5 % (ref 34–46.6)
HGB BLD-MCNC: 11.7 G/DL (ref 12–15.9)
IMM GRANULOCYTES # BLD AUTO: 0 10*3/MM3 (ref 0–0.05)
IMM GRANULOCYTES NFR BLD AUTO: 0 % (ref 0–0.5)
IRON 24H UR-MRATE: 91 MCG/DL (ref 37–145)
IRON SATN MFR SERPL: 31 % (ref 20–50)
LYMPHOCYTES # BLD AUTO: 1.16 10*3/MM3 (ref 0.7–3.1)
LYMPHOCYTES NFR BLD AUTO: 30.4 % (ref 19.6–45.3)
MCH RBC QN AUTO: 31 PG (ref 26.6–33)
MCHC RBC AUTO-ENTMCNC: 32.1 G/DL (ref 31.5–35.7)
MCV RBC AUTO: 96.6 FL (ref 79–97)
MONOCYTES # BLD AUTO: 0.32 10*3/MM3 (ref 0.1–0.9)
MONOCYTES NFR BLD AUTO: 8.4 % (ref 5–12)
NEUTROPHILS NFR BLD AUTO: 2.2 10*3/MM3 (ref 1.7–7)
NEUTROPHILS NFR BLD AUTO: 57.8 % (ref 42.7–76)
NRBC BLD AUTO-RTO: 0 /100 WBC (ref 0–0.2)
PLATELET # BLD AUTO: 132 10*3/MM3 (ref 140–450)
PMV BLD AUTO: 9.8 FL (ref 6–12)
RBC # BLD AUTO: 3.78 10*6/MM3 (ref 3.77–5.28)
TIBC SERPL-MCNC: 294 MCG/DL (ref 298–536)
TRANSFERRIN SERPL-MCNC: 197 MG/DL (ref 200–360)
VIT B12 BLD-MCNC: 437 PG/ML (ref 211–946)
WBC # BLD AUTO: 3.81 10*3/MM3 (ref 3.4–10.8)

## 2021-03-05 PROCEDURE — 36415 COLL VENOUS BLD VENIPUNCTURE: CPT

## 2021-03-05 PROCEDURE — 83540 ASSAY OF IRON: CPT

## 2021-03-05 PROCEDURE — 85025 COMPLETE CBC W/AUTO DIFF WBC: CPT

## 2021-03-05 PROCEDURE — 84466 ASSAY OF TRANSFERRIN: CPT

## 2021-03-05 PROCEDURE — 82607 VITAMIN B-12: CPT

## 2021-03-05 PROCEDURE — 82746 ASSAY OF FOLIC ACID SERUM: CPT

## 2021-03-05 PROCEDURE — 82728 ASSAY OF FERRITIN: CPT

## 2021-03-05 PROCEDURE — G0463 HOSPITAL OUTPT CLINIC VISIT: HCPCS

## 2021-03-12 ENCOUNTER — APPOINTMENT (OUTPATIENT)
Dept: LAB | Facility: HOSPITAL | Age: 51
End: 2021-03-12

## 2021-03-12 ENCOUNTER — APPOINTMENT (OUTPATIENT)
Dept: ONCOLOGY | Facility: HOSPITAL | Age: 51
End: 2021-03-12

## 2021-03-19 ENCOUNTER — APPOINTMENT (OUTPATIENT)
Dept: ONCOLOGY | Facility: HOSPITAL | Age: 51
End: 2021-03-19

## 2021-03-19 ENCOUNTER — APPOINTMENT (OUTPATIENT)
Dept: LAB | Facility: HOSPITAL | Age: 51
End: 2021-03-19

## 2021-04-02 ENCOUNTER — INFUSION (OUTPATIENT)
Dept: ONCOLOGY | Facility: HOSPITAL | Age: 51
End: 2021-04-02

## 2021-04-02 ENCOUNTER — LAB (OUTPATIENT)
Dept: LAB | Facility: HOSPITAL | Age: 51
End: 2021-04-02

## 2021-04-02 DIAGNOSIS — N18.9 ANEMIA IN CHRONIC KIDNEY DISEASE, UNSPECIFIED CKD STAGE: ICD-10-CM

## 2021-04-02 DIAGNOSIS — D63.1 ANEMIA IN CHRONIC KIDNEY DISEASE, UNSPECIFIED CKD STAGE: ICD-10-CM

## 2021-04-02 LAB
BASOPHILS # BLD AUTO: 0.04 10*3/MM3 (ref 0–0.2)
BASOPHILS NFR BLD AUTO: 0.9 % (ref 0–1.5)
DEPRECATED RDW RBC AUTO: 46.2 FL (ref 37–54)
EOSINOPHIL # BLD AUTO: 0.1 10*3/MM3 (ref 0–0.4)
EOSINOPHIL NFR BLD AUTO: 2.2 % (ref 0.3–6.2)
ERYTHROCYTE [DISTWIDTH] IN BLOOD BY AUTOMATED COUNT: 13.2 % (ref 12.3–15.4)
FERRITIN SERPL-MCNC: 260.5 NG/ML (ref 13–150)
FOLATE SERPL-MCNC: 8.21 NG/ML (ref 4.78–24.2)
HCT VFR BLD AUTO: 35.2 % (ref 34–46.6)
HGB BLD-MCNC: 11.3 G/DL (ref 12–15.9)
IMM GRANULOCYTES # BLD AUTO: 0.01 10*3/MM3 (ref 0–0.05)
IMM GRANULOCYTES NFR BLD AUTO: 0.2 % (ref 0–0.5)
IRON 24H UR-MRATE: 70 MCG/DL (ref 37–145)
IRON SATN MFR SERPL: 24 % (ref 20–50)
LYMPHOCYTES # BLD AUTO: 1.02 10*3/MM3 (ref 0.7–3.1)
LYMPHOCYTES NFR BLD AUTO: 22.3 % (ref 19.6–45.3)
MCH RBC QN AUTO: 30.5 PG (ref 26.6–33)
MCHC RBC AUTO-ENTMCNC: 32.1 G/DL (ref 31.5–35.7)
MCV RBC AUTO: 95.1 FL (ref 79–97)
MONOCYTES # BLD AUTO: 0.29 10*3/MM3 (ref 0.1–0.9)
MONOCYTES NFR BLD AUTO: 6.3 % (ref 5–12)
NEUTROPHILS NFR BLD AUTO: 3.11 10*3/MM3 (ref 1.7–7)
NEUTROPHILS NFR BLD AUTO: 68.1 % (ref 42.7–76)
NRBC BLD AUTO-RTO: 0 /100 WBC (ref 0–0.2)
PLATELET # BLD AUTO: 160 10*3/MM3 (ref 140–450)
PMV BLD AUTO: 9.7 FL (ref 6–12)
RBC # BLD AUTO: 3.7 10*6/MM3 (ref 3.77–5.28)
TIBC SERPL-MCNC: 294 MCG/DL (ref 298–536)
TRANSFERRIN SERPL-MCNC: 197 MG/DL (ref 200–360)
VIT B12 BLD-MCNC: 442 PG/ML (ref 211–946)
WBC # BLD AUTO: 4.57 10*3/MM3 (ref 3.4–10.8)

## 2021-04-02 PROCEDURE — 82746 ASSAY OF FOLIC ACID SERUM: CPT

## 2021-04-02 PROCEDURE — 82607 VITAMIN B-12: CPT

## 2021-04-02 PROCEDURE — 84466 ASSAY OF TRANSFERRIN: CPT

## 2021-04-02 PROCEDURE — 85025 COMPLETE CBC W/AUTO DIFF WBC: CPT

## 2021-04-02 PROCEDURE — 36415 COLL VENOUS BLD VENIPUNCTURE: CPT

## 2021-04-02 PROCEDURE — 82728 ASSAY OF FERRITIN: CPT

## 2021-04-02 PROCEDURE — 83540 ASSAY OF IRON: CPT

## 2021-04-02 NOTE — PROGRESS NOTES
1318 Patients lab results conclude no need for injection. Patient given AVS in lobby and reports no questions or concerns. Rosanne Alvarez RN

## 2021-04-09 ENCOUNTER — APPOINTMENT (OUTPATIENT)
Dept: ONCOLOGY | Facility: HOSPITAL | Age: 51
End: 2021-04-09

## 2021-04-09 ENCOUNTER — APPOINTMENT (OUTPATIENT)
Dept: LAB | Facility: HOSPITAL | Age: 51
End: 2021-04-09

## 2021-04-16 ENCOUNTER — APPOINTMENT (OUTPATIENT)
Dept: LAB | Facility: HOSPITAL | Age: 51
End: 2021-04-16

## 2021-04-16 ENCOUNTER — APPOINTMENT (OUTPATIENT)
Dept: ONCOLOGY | Facility: HOSPITAL | Age: 51
End: 2021-04-16

## 2021-04-23 ENCOUNTER — APPOINTMENT (OUTPATIENT)
Dept: LAB | Facility: HOSPITAL | Age: 51
End: 2021-04-23

## 2021-04-23 ENCOUNTER — APPOINTMENT (OUTPATIENT)
Dept: ONCOLOGY | Facility: HOSPITAL | Age: 51
End: 2021-04-23

## 2021-04-30 ENCOUNTER — LAB (OUTPATIENT)
Dept: LAB | Facility: HOSPITAL | Age: 51
End: 2021-04-30

## 2021-04-30 ENCOUNTER — INFUSION (OUTPATIENT)
Dept: ONCOLOGY | Facility: HOSPITAL | Age: 51
End: 2021-04-30

## 2021-04-30 DIAGNOSIS — D63.1 ANEMIA IN STAGE 3B CHRONIC KIDNEY DISEASE (HCC): ICD-10-CM

## 2021-04-30 DIAGNOSIS — N18.32 ANEMIA IN STAGE 3B CHRONIC KIDNEY DISEASE (HCC): ICD-10-CM

## 2021-04-30 DIAGNOSIS — N18.9 ANEMIA IN CHRONIC KIDNEY DISEASE, UNSPECIFIED CKD STAGE: ICD-10-CM

## 2021-04-30 DIAGNOSIS — N18.32 STAGE 3B CHRONIC KIDNEY DISEASE (HCC): Primary | ICD-10-CM

## 2021-04-30 DIAGNOSIS — D63.1 ANEMIA IN CHRONIC KIDNEY DISEASE, UNSPECIFIED CKD STAGE: ICD-10-CM

## 2021-04-30 LAB
BASOPHILS # BLD AUTO: 0.03 10*3/MM3 (ref 0–0.2)
BASOPHILS NFR BLD AUTO: 0.8 % (ref 0–1.5)
DEPRECATED RDW RBC AUTO: 50.3 FL (ref 37–54)
EOSINOPHIL # BLD AUTO: 0.12 10*3/MM3 (ref 0–0.4)
EOSINOPHIL NFR BLD AUTO: 3.2 % (ref 0.3–6.2)
ERYTHROCYTE [DISTWIDTH] IN BLOOD BY AUTOMATED COUNT: 14.2 % (ref 12.3–15.4)
FERRITIN SERPL-MCNC: 262.7 NG/ML (ref 13–150)
HCT VFR BLD AUTO: 31.4 % (ref 34–46.6)
HGB BLD-MCNC: 10 G/DL (ref 12–15.9)
IMM GRANULOCYTES # BLD AUTO: 0.01 10*3/MM3 (ref 0–0.05)
IMM GRANULOCYTES NFR BLD AUTO: 0.3 % (ref 0–0.5)
IRON 24H UR-MRATE: 65 MCG/DL (ref 37–145)
IRON SATN MFR SERPL: 23 % (ref 20–50)
LYMPHOCYTES # BLD AUTO: 0.97 10*3/MM3 (ref 0.7–3.1)
LYMPHOCYTES NFR BLD AUTO: 25.5 % (ref 19.6–45.3)
MCH RBC QN AUTO: 30.9 PG (ref 26.6–33)
MCHC RBC AUTO-ENTMCNC: 31.8 G/DL (ref 31.5–35.7)
MCV RBC AUTO: 96.9 FL (ref 79–97)
MONOCYTES # BLD AUTO: 0.33 10*3/MM3 (ref 0.1–0.9)
MONOCYTES NFR BLD AUTO: 8.7 % (ref 5–12)
NEUTROPHILS NFR BLD AUTO: 2.34 10*3/MM3 (ref 1.7–7)
NEUTROPHILS NFR BLD AUTO: 61.5 % (ref 42.7–76)
NRBC BLD AUTO-RTO: 0 /100 WBC (ref 0–0.2)
PLATELET # BLD AUTO: 150 10*3/MM3 (ref 140–450)
PMV BLD AUTO: 9.5 FL (ref 6–12)
RBC # BLD AUTO: 3.24 10*6/MM3 (ref 3.77–5.28)
TIBC SERPL-MCNC: 282 MCG/DL (ref 298–536)
TRANSFERRIN SERPL-MCNC: 189 MG/DL (ref 200–360)
WBC # BLD AUTO: 3.8 10*3/MM3 (ref 3.4–10.8)

## 2021-04-30 PROCEDURE — 82607 VITAMIN B-12: CPT

## 2021-04-30 PROCEDURE — 83540 ASSAY OF IRON: CPT

## 2021-04-30 PROCEDURE — 96372 THER/PROPH/DIAG INJ SC/IM: CPT

## 2021-04-30 PROCEDURE — 84466 ASSAY OF TRANSFERRIN: CPT

## 2021-04-30 PROCEDURE — 82728 ASSAY OF FERRITIN: CPT

## 2021-04-30 PROCEDURE — 25010000002 EPOETIN ALFA-EPBX 40000 UNIT/ML SOLUTION: Performed by: INTERNAL MEDICINE

## 2021-04-30 PROCEDURE — 36415 COLL VENOUS BLD VENIPUNCTURE: CPT

## 2021-04-30 PROCEDURE — 82746 ASSAY OF FOLIC ACID SERUM: CPT

## 2021-04-30 PROCEDURE — 85025 COMPLETE CBC W/AUTO DIFF WBC: CPT

## 2021-04-30 RX ADMIN — EPOETIN ALFA-EPBX 40000 UNITS: 40000 INJECTION, SOLUTION INTRAVENOUS; SUBCUTANEOUS at 14:27

## 2021-05-01 LAB
FOLATE SERPL-MCNC: 9.04 NG/ML (ref 4.78–24.2)
VIT B12 BLD-MCNC: 379 PG/ML (ref 211–946)

## 2021-05-07 ENCOUNTER — APPOINTMENT (OUTPATIENT)
Dept: LAB | Facility: HOSPITAL | Age: 51
End: 2021-05-07

## 2021-05-07 ENCOUNTER — APPOINTMENT (OUTPATIENT)
Dept: ONCOLOGY | Facility: HOSPITAL | Age: 51
End: 2021-05-07

## 2021-06-03 ENCOUNTER — OFFICE VISIT (OUTPATIENT)
Dept: PULMONOLOGY | Facility: CLINIC | Age: 51
End: 2021-06-03

## 2021-06-03 VITALS
SYSTOLIC BLOOD PRESSURE: 140 MMHG | HEART RATE: 76 BPM | OXYGEN SATURATION: 100 % | HEIGHT: 63 IN | BODY MASS INDEX: 48.8 KG/M2 | DIASTOLIC BLOOD PRESSURE: 88 MMHG | WEIGHT: 275.4 LBS

## 2021-06-03 DIAGNOSIS — R06.02 SHORTNESS OF BREATH: Primary | ICD-10-CM

## 2021-06-03 DIAGNOSIS — J96.11 CHRONIC RESPIRATORY FAILURE WITH HYPOXIA (HCC): ICD-10-CM

## 2021-06-03 DIAGNOSIS — D86.9 SARCOIDOSIS: ICD-10-CM

## 2021-06-03 DIAGNOSIS — Q21.12 PFO (PATENT FORAMEN OVALE): ICD-10-CM

## 2021-06-03 DIAGNOSIS — R53.82 CHRONIC FATIGUE: ICD-10-CM

## 2021-06-03 DIAGNOSIS — I89.0 LYMPHEDEMA: ICD-10-CM

## 2021-06-03 DIAGNOSIS — E66.01 MORBIDLY OBESE (HCC): ICD-10-CM

## 2021-06-03 PROCEDURE — 36415 COLL VENOUS BLD VENIPUNCTURE: CPT | Performed by: INTERNAL MEDICINE

## 2021-06-03 PROCEDURE — 99214 OFFICE O/P EST MOD 30 MIN: CPT | Performed by: INTERNAL MEDICINE

## 2021-06-03 RX ORDER — LEVOTHYROXINE SODIUM 0.1 MG/1
TABLET ORAL
COMMUNITY
Start: 2021-05-25 | End: 2021-06-07 | Stop reason: ALTCHOICE

## 2021-06-03 RX ORDER — PANTOPRAZOLE SODIUM 40 MG/1
40 TABLET, DELAYED RELEASE ORAL 2 TIMES DAILY
COMMUNITY
Start: 2021-03-27 | End: 2022-03-14

## 2021-06-03 RX ORDER — OXYCODONE AND ACETAMINOPHEN 7.5; 325 MG/1; MG/1
1 TABLET ORAL EVERY 8 HOURS PRN
COMMUNITY
Start: 2021-05-26

## 2021-06-04 ENCOUNTER — APPOINTMENT (OUTPATIENT)
Dept: LAB | Facility: HOSPITAL | Age: 51
End: 2021-06-04

## 2021-06-04 ENCOUNTER — APPOINTMENT (OUTPATIENT)
Dept: ONCOLOGY | Facility: HOSPITAL | Age: 51
End: 2021-06-04

## 2021-06-04 LAB
ALBUMIN SERPL-MCNC: 4.4 G/DL (ref 3.8–4.9)
ALBUMIN/GLOB SERPL: 1.9 {RATIO} (ref 1.2–2.2)
ALP SERPL-CCNC: 72 IU/L (ref 48–121)
ALT SERPL-CCNC: 9 IU/L (ref 0–32)
AST SERPL-CCNC: 13 IU/L (ref 0–40)
BASOPHILS # BLD AUTO: 0 X10E3/UL (ref 0–0.2)
BASOPHILS NFR BLD AUTO: 1 %
BILIRUB SERPL-MCNC: 0.3 MG/DL (ref 0–1.2)
BUN SERPL-MCNC: 16 MG/DL (ref 6–24)
BUN/CREAT SERPL: 12 (ref 9–23)
CALCIUM SERPL-MCNC: 8.5 MG/DL (ref 8.7–10.2)
CHLORIDE SERPL-SCNC: 109 MMOL/L (ref 96–106)
CO2 SERPL-SCNC: 22 MMOL/L (ref 20–29)
CREAT SERPL-MCNC: 1.37 MG/DL (ref 0.57–1)
CRP SERPL-MCNC: 3 MG/L (ref 0–10)
EOSINOPHIL # BLD AUTO: 0.1 X10E3/UL (ref 0–0.4)
EOSINOPHIL NFR BLD AUTO: 3 %
ERYTHROCYTE [DISTWIDTH] IN BLOOD BY AUTOMATED COUNT: 13.2 % (ref 11.7–15.4)
GLOBULIN SER CALC-MCNC: 2.3 G/DL (ref 1.5–4.5)
GLUCOSE SERPL-MCNC: 96 MG/DL (ref 65–99)
HCT VFR BLD AUTO: 32.7 % (ref 34–46.6)
HGB BLD-MCNC: 10.7 G/DL (ref 11.1–15.9)
IMM GRANULOCYTES # BLD AUTO: 0 X10E3/UL (ref 0–0.1)
IMM GRANULOCYTES NFR BLD AUTO: 0 %
LYMPHOCYTES # BLD AUTO: 0.8 X10E3/UL (ref 0.7–3.1)
LYMPHOCYTES NFR BLD AUTO: 27 %
MCH RBC QN AUTO: 31.3 PG (ref 26.6–33)
MCHC RBC AUTO-ENTMCNC: 32.7 G/DL (ref 31.5–35.7)
MCV RBC AUTO: 96 FL (ref 79–97)
MONOCYTES # BLD AUTO: 0.3 X10E3/UL (ref 0.1–0.9)
MONOCYTES NFR BLD AUTO: 9 %
NEUTROPHILS # BLD AUTO: 1.9 X10E3/UL (ref 1.4–7)
NEUTROPHILS NFR BLD AUTO: 60 %
PLATELET # BLD AUTO: 118 X10E3/UL (ref 150–450)
POTASSIUM SERPL-SCNC: 3.6 MMOL/L (ref 3.5–5.2)
PROT SERPL-MCNC: 6.7 G/DL (ref 6–8.5)
RBC # BLD AUTO: 3.42 X10E6/UL (ref 3.77–5.28)
SODIUM SERPL-SCNC: 143 MMOL/L (ref 134–144)
TSH SERPL DL<=0.005 MIU/L-ACNC: 9.31 UIU/ML (ref 0.45–4.5)
WBC # BLD AUTO: 3.1 X10E3/UL (ref 3.4–10.8)

## 2021-06-07 DIAGNOSIS — E03.9 HYPOTHYROIDISM (ACQUIRED): Primary | ICD-10-CM

## 2021-06-07 RX ORDER — LEVOTHYROXINE SODIUM 0.12 MG/1
125 TABLET ORAL DAILY
Qty: 30 TABLET | Refills: 5 | Status: CANCELLED | OUTPATIENT
Start: 2021-06-07

## 2021-06-07 RX ORDER — LEVOTHYROXINE SODIUM 137 UG/1
137 TABLET ORAL DAILY
Qty: 30 TABLET | Refills: 3 | Status: SHIPPED | OUTPATIENT
Start: 2021-06-07 | End: 2022-09-12 | Stop reason: ALTCHOICE

## 2021-06-07 NOTE — TELEPHONE ENCOUNTER
----- Message from Celestine Scherer MD sent at 6/4/2021  3:40 PM CDT -----  Let her know her labs say she is hypothyroid.  This is worse than before and may explain her symptoms.  Would be best addressed by pcp, but she is between pcps for now.  It looks like she just recently had levothyroxine dose increased.  If so, she should get a repeat in about 3 months.  If no recent changes, then we should have her increase the dose by at least 25 mcg per day. Confirm dose with her.  We can do temporary rx until she gets new pcp.

## 2021-06-07 NOTE — TELEPHONE ENCOUNTER
Patient states she has been on the Levothyroxine 100mg daily for a while. She is still trying to find a new PCP.  What dose would you like to put her on and when does she need to repeat the labs?

## 2021-06-08 NOTE — TELEPHONE ENCOUNTER
Message relayed to the patient to start on the increased levothyroxine and repeat labs in 3 months. Encouraged patient to find a PCP.    She will need an order placed for the thyroid labs, thank you.

## 2021-06-16 ENCOUNTER — APPOINTMENT (OUTPATIENT)
Dept: CT IMAGING | Facility: HOSPITAL | Age: 51
End: 2021-06-16

## 2021-06-17 ENCOUNTER — TRANSCRIBE ORDERS (OUTPATIENT)
Dept: LAB | Facility: HOSPITAL | Age: 51
End: 2021-06-17

## 2021-06-17 DIAGNOSIS — Z01.818 PREOPERATIVE TESTING: Primary | ICD-10-CM

## 2021-06-21 ENCOUNTER — LAB (OUTPATIENT)
Dept: LAB | Facility: HOSPITAL | Age: 51
End: 2021-06-21

## 2021-06-21 DIAGNOSIS — Z01.818 PREOPERATIVE TESTING: ICD-10-CM

## 2021-06-21 PROCEDURE — U0005 INFEC AGEN DETEC AMPLI PROBE: HCPCS

## 2021-06-21 PROCEDURE — C9803 HOPD COVID-19 SPEC COLLECT: HCPCS

## 2021-06-21 PROCEDURE — U0004 COV-19 TEST NON-CDC HGH THRU: HCPCS

## 2021-06-22 LAB — SARS-COV-2 ORF1AB RESP QL NAA+PROBE: NOT DETECTED

## 2021-06-23 ENCOUNTER — OFFICE VISIT (OUTPATIENT)
Dept: PULMONOLOGY | Facility: CLINIC | Age: 51
End: 2021-06-23

## 2021-06-23 DIAGNOSIS — D86.9 SARCOIDOSIS: Primary | ICD-10-CM

## 2021-06-23 PROCEDURE — 94727 GAS DIL/WSHOT DETER LNG VOL: CPT | Performed by: INTERNAL MEDICINE

## 2021-06-23 PROCEDURE — 94729 DIFFUSING CAPACITY: CPT | Performed by: INTERNAL MEDICINE

## 2021-06-23 PROCEDURE — 94010 BREATHING CAPACITY TEST: CPT | Performed by: INTERNAL MEDICINE

## 2021-06-23 NOTE — PROCEDURES
Pulmonary Function Test  Performed by: Windy Snowden, RRT  Authorized by: Celestine Scherer MD      Pre Drug % Predicted    FVC: 59%   FEV1: 50%   FEF 25-75%: 27%   FEV1/FVC: 69.3%   T%   RV: 117%   DLCO: 96%   D/VAsb: 145%    Interpretation   Spirometry   Spirometry shows moderate obstruction. There is reduced midflow suggesting small airway/airflow obstruction.   Review of FVL curve   Patient's effort is normal.   Lung Volume Measurements  Measurements show normal results.   Diffusion Capacity  The patient's diffusion capacity is normal.  Diffusion capacity is normal when corrected for alveolar volume.

## 2021-06-24 ENCOUNTER — HOSPITAL ENCOUNTER (OUTPATIENT)
Dept: CT IMAGING | Facility: HOSPITAL | Age: 51
Discharge: HOME | End: 2021-06-24
Admitting: INTERNAL MEDICINE

## 2021-06-24 DIAGNOSIS — J96.11 CHRONIC RESPIRATORY FAILURE WITH HYPOXIA (HCC): ICD-10-CM

## 2021-06-24 DIAGNOSIS — D86.9 SARCOIDOSIS: ICD-10-CM

## 2021-06-24 PROCEDURE — 25010000002 IOPAMIDOL 61 % SOLUTION: Performed by: INTERNAL MEDICINE

## 2021-06-24 PROCEDURE — 71260 CT THORAX DX C+: CPT

## 2021-06-24 RX ADMIN — IOPAMIDOL 100 ML: 612 INJECTION, SOLUTION INTRAVENOUS at 14:38

## 2021-06-24 NOTE — PROGRESS NOTES
Please call the patient regarding her abnormal result.  Lymph nodes and lungs looked ok.  May be some enlargement in the heart.  Echo before was ok.  We have a new echo ordered.  That will give us a lot of helpful information

## 2021-06-25 ENCOUNTER — INFUSION (OUTPATIENT)
Dept: ONCOLOGY | Facility: HOSPITAL | Age: 51
End: 2021-06-25

## 2021-06-25 ENCOUNTER — LAB (OUTPATIENT)
Dept: LAB | Facility: HOSPITAL | Age: 51
End: 2021-06-25

## 2021-06-25 DIAGNOSIS — D50.9 IRON DEFICIENCY ANEMIA, UNSPECIFIED IRON DEFICIENCY ANEMIA TYPE: ICD-10-CM

## 2021-06-25 DIAGNOSIS — D63.1 ANEMIA IN STAGE 3 CHRONIC KIDNEY DISEASE (HCC): ICD-10-CM

## 2021-06-25 DIAGNOSIS — N18.30 ANEMIA IN STAGE 3 CHRONIC KIDNEY DISEASE (HCC): ICD-10-CM

## 2021-06-25 DIAGNOSIS — D69.6 THROMBOCYTOPENIA (HCC): ICD-10-CM

## 2021-06-25 DIAGNOSIS — N18.9 ANEMIA IN CHRONIC KIDNEY DISEASE, UNSPECIFIED CKD STAGE: ICD-10-CM

## 2021-06-25 DIAGNOSIS — D63.1 ANEMIA IN CHRONIC KIDNEY DISEASE, UNSPECIFIED CKD STAGE: ICD-10-CM

## 2021-06-25 LAB
BASOPHILS # BLD AUTO: 0.04 10*3/MM3 (ref 0–0.2)
BASOPHILS NFR BLD AUTO: 1.3 % (ref 0–1.5)
DEPRECATED RDW RBC AUTO: 46.5 FL (ref 37–54)
EOSINOPHIL # BLD AUTO: 0.13 10*3/MM3 (ref 0–0.4)
EOSINOPHIL NFR BLD AUTO: 4.1 % (ref 0.3–6.2)
ERYTHROCYTE [DISTWIDTH] IN BLOOD BY AUTOMATED COUNT: 13.2 % (ref 12.3–15.4)
FERRITIN SERPL-MCNC: 212.5 NG/ML (ref 13–150)
FOLATE SERPL-MCNC: 7.41 NG/ML (ref 4.78–24.2)
HCT VFR BLD AUTO: 34.1 % (ref 34–46.6)
HGB BLD-MCNC: 11 G/DL (ref 12–15.9)
IMM GRANULOCYTES # BLD AUTO: 0.02 10*3/MM3 (ref 0–0.05)
IMM GRANULOCYTES NFR BLD AUTO: 0.6 % (ref 0–0.5)
IRON 24H UR-MRATE: 65 MCG/DL (ref 37–145)
IRON SATN MFR SERPL: 24 % (ref 20–50)
LYMPHOCYTES # BLD AUTO: 0.96 10*3/MM3 (ref 0.7–3.1)
LYMPHOCYTES NFR BLD AUTO: 30.2 % (ref 19.6–45.3)
MCH RBC QN AUTO: 31.4 PG (ref 26.6–33)
MCHC RBC AUTO-ENTMCNC: 32.3 G/DL (ref 31.5–35.7)
MCV RBC AUTO: 97.4 FL (ref 79–97)
MONOCYTES # BLD AUTO: 0.35 10*3/MM3 (ref 0.1–0.9)
MONOCYTES NFR BLD AUTO: 11 % (ref 5–12)
NEUTROPHILS NFR BLD AUTO: 1.68 10*3/MM3 (ref 1.7–7)
NEUTROPHILS NFR BLD AUTO: 52.8 % (ref 42.7–76)
NRBC BLD AUTO-RTO: 0 /100 WBC (ref 0–0.2)
PLATELET # BLD AUTO: 153 10*3/MM3 (ref 140–450)
PMV BLD AUTO: 10 FL (ref 6–12)
RBC # BLD AUTO: 3.5 10*6/MM3 (ref 3.77–5.28)
TIBC SERPL-MCNC: 273 MCG/DL (ref 298–536)
TRANSFERRIN SERPL-MCNC: 183 MG/DL (ref 200–360)
VIT B12 BLD-MCNC: 377 PG/ML (ref 211–946)
WBC # BLD AUTO: 3.18 10*3/MM3 (ref 3.4–10.8)

## 2021-06-25 PROCEDURE — 36415 COLL VENOUS BLD VENIPUNCTURE: CPT

## 2021-06-25 PROCEDURE — 82607 VITAMIN B-12: CPT

## 2021-06-25 PROCEDURE — 85025 COMPLETE CBC W/AUTO DIFF WBC: CPT

## 2021-06-25 PROCEDURE — 82728 ASSAY OF FERRITIN: CPT

## 2021-06-25 PROCEDURE — 84466 ASSAY OF TRANSFERRIN: CPT

## 2021-06-25 PROCEDURE — 83540 ASSAY OF IRON: CPT

## 2021-06-25 PROCEDURE — 82746 ASSAY OF FOLIC ACID SERUM: CPT

## 2021-06-25 NOTE — PROGRESS NOTES
1257 Patients labwork does not meet parameter for injection. Patient discharged from lobby with no questions or concerns. Rosanne Alvarez RN

## 2021-07-09 ENCOUNTER — APPOINTMENT (OUTPATIENT)
Dept: ONCOLOGY | Facility: HOSPITAL | Age: 51
End: 2021-07-09

## 2021-07-09 ENCOUNTER — APPOINTMENT (OUTPATIENT)
Dept: LAB | Facility: HOSPITAL | Age: 51
End: 2021-07-09

## 2021-07-21 ENCOUNTER — HOSPITAL ENCOUNTER (OUTPATIENT)
Dept: CARDIOLOGY | Facility: HOSPITAL | Age: 51
Discharge: HOME | End: 2021-07-21
Admitting: INTERNAL MEDICINE

## 2021-07-21 VITALS
WEIGHT: 275 LBS | HEIGHT: 63 IN | BODY MASS INDEX: 48.73 KG/M2 | SYSTOLIC BLOOD PRESSURE: 137 MMHG | DIASTOLIC BLOOD PRESSURE: 93 MMHG

## 2021-07-21 DIAGNOSIS — D86.9 SARCOIDOSIS: ICD-10-CM

## 2021-07-21 DIAGNOSIS — R06.02 SHORTNESS OF BREATH: ICD-10-CM

## 2021-07-21 DIAGNOSIS — J96.11 CHRONIC RESPIRATORY FAILURE WITH HYPOXIA (HCC): ICD-10-CM

## 2021-07-21 PROCEDURE — 93306 TTE W/DOPPLER COMPLETE: CPT

## 2021-07-21 PROCEDURE — 93306 TTE W/DOPPLER COMPLETE: CPT | Performed by: INTERNAL MEDICINE

## 2021-07-22 LAB
BH CV ECHO MEAS - AO MAX PG (FULL): 3.9 MMHG
BH CV ECHO MEAS - AO MAX PG: 6.7 MMHG
BH CV ECHO MEAS - AO MEAN PG (FULL): 2 MMHG
BH CV ECHO MEAS - AO MEAN PG: 4 MMHG
BH CV ECHO MEAS - AO ROOT AREA (BSA CORRECTED): 1.6
BH CV ECHO MEAS - AO ROOT AREA: 10.2 CM^2
BH CV ECHO MEAS - AO ROOT DIAM: 3.6 CM
BH CV ECHO MEAS - AO V2 MAX: 129 CM/SEC
BH CV ECHO MEAS - AO V2 MEAN: 91 CM/SEC
BH CV ECHO MEAS - AO V2 VTI: 34.8 CM
BH CV ECHO MEAS - AVA(I,A): 2.2 CM^2
BH CV ECHO MEAS - AVA(I,D): 2.2 CM^2
BH CV ECHO MEAS - AVA(V,A): 2.4 CM^2
BH CV ECHO MEAS - AVA(V,D): 2.4 CM^2
BH CV ECHO MEAS - BSA(HAYCOCK): 2.4 M^2
BH CV ECHO MEAS - BSA: 2.2 M^2
BH CV ECHO MEAS - BZI_BMI: 48.7 KILOGRAMS/M^2
BH CV ECHO MEAS - BZI_METRIC_HEIGHT: 160 CM
BH CV ECHO MEAS - BZI_METRIC_WEIGHT: 124.7 KG
BH CV ECHO MEAS - EDV(CUBED): 123.5 ML
BH CV ECHO MEAS - EDV(MOD-SP2): 98.4 ML
BH CV ECHO MEAS - EDV(MOD-SP4): 96.9 ML
BH CV ECHO MEAS - EDV(TEICH): 117.1 ML
BH CV ECHO MEAS - EF(CUBED): 64.4 %
BH CV ECHO MEAS - EF(MOD-SP2): 52 %
BH CV ECHO MEAS - EF(MOD-SP4): 55.2 %
BH CV ECHO MEAS - EF(TEICH): 55.7 %
BH CV ECHO MEAS - ESV(CUBED): 44 ML
BH CV ECHO MEAS - ESV(MOD-SP2): 47.2 ML
BH CV ECHO MEAS - ESV(MOD-SP4): 43.4 ML
BH CV ECHO MEAS - ESV(TEICH): 51.9 ML
BH CV ECHO MEAS - FS: 29.1 %
BH CV ECHO MEAS - IVS/LVPW: 0.88
BH CV ECHO MEAS - IVSD: 0.98 CM
BH CV ECHO MEAS - LA DIMENSION: 4.2 CM
BH CV ECHO MEAS - LA/AO: 1.2
BH CV ECHO MEAS - LAT PEAK E' VEL: 12.4 CM/SEC
BH CV ECHO MEAS - LV DIASTOLIC VOL/BSA (35-75): 43.8 ML/M^2
BH CV ECHO MEAS - LV MASS(C)D: 192 GRAMS
BH CV ECHO MEAS - LV MASS(C)DI: 86.7 GRAMS/M^2
BH CV ECHO MEAS - LV MAX PG: 2.7 MMHG
BH CV ECHO MEAS - LV MEAN PG: 2 MMHG
BH CV ECHO MEAS - LV SYSTOLIC VOL/BSA (12-30): 19.6 ML/M^2
BH CV ECHO MEAS - LV V1 MAX: 82.8 CM/SEC
BH CV ECHO MEAS - LV V1 MEAN: 59.7 CM/SEC
BH CV ECHO MEAS - LV V1 VTI: 19.9 CM
BH CV ECHO MEAS - LVIDD: 5 CM
BH CV ECHO MEAS - LVIDS: 3.5 CM
BH CV ECHO MEAS - LVLD AP2: 7.9 CM
BH CV ECHO MEAS - LVLD AP4: 7.8 CM
BH CV ECHO MEAS - LVLS AP2: 6.7 CM
BH CV ECHO MEAS - LVLS AP4: 6.9 CM
BH CV ECHO MEAS - LVOT AREA (M): 3.8 CM^2
BH CV ECHO MEAS - LVOT AREA: 3.8 CM^2
BH CV ECHO MEAS - LVOT DIAM: 2.2 CM
BH CV ECHO MEAS - LVPWD: 1.1 CM
BH CV ECHO MEAS - MED PEAK E' VEL: 6.53 CM/SEC
BH CV ECHO MEAS - MV A MAX VEL: 74.7 CM/SEC
BH CV ECHO MEAS - MV DEC TIME: 0.22 SEC
BH CV ECHO MEAS - MV E MAX VEL: 66.5 CM/SEC
BH CV ECHO MEAS - MV E/A: 0.89
BH CV ECHO MEAS - PA MAX PG: 2.6 MMHG
BH CV ECHO MEAS - PA MEAN PG: 2 MMHG
BH CV ECHO MEAS - PA V2 MAX: 80.3 CM/SEC
BH CV ECHO MEAS - PA V2 MEAN: 59.2 CM/SEC
BH CV ECHO MEAS - PA V2 VTI: 21.9 CM
BH CV ECHO MEAS - RAP SYSTOLE: 5 MMHG
BH CV ECHO MEAS - RVSP: 24 MMHG
BH CV ECHO MEAS - SI(AO): 160 ML/M^2
BH CV ECHO MEAS - SI(CUBED): 35.9 ML/M^2
BH CV ECHO MEAS - SI(LVOT): 34.2 ML/M^2
BH CV ECHO MEAS - SI(MOD-SP2): 23.1 ML/M^2
BH CV ECHO MEAS - SI(MOD-SP4): 24.2 ML/M^2
BH CV ECHO MEAS - SI(TEICH): 29.5 ML/M^2
BH CV ECHO MEAS - SV(AO): 354.2 ML
BH CV ECHO MEAS - SV(CUBED): 79.5 ML
BH CV ECHO MEAS - SV(LVOT): 75.6 ML
BH CV ECHO MEAS - SV(MOD-SP2): 51.2 ML
BH CV ECHO MEAS - SV(MOD-SP4): 53.5 ML
BH CV ECHO MEAS - SV(TEICH): 65.2 ML
BH CV ECHO MEAS - TR MAX VEL: 218 CM/SEC
BH CV ECHO MEASUREMENTS AVERAGE E/E' RATIO: 7.03
LEFT ATRIUM VOLUME INDEX: 38.9 ML/M2
LEFT ATRIUM VOLUME: 85.9 CM3
MAXIMAL PREDICTED HEART RATE: 167 BPM
STRESS TARGET HR: 142 BPM

## 2021-07-23 ENCOUNTER — APPOINTMENT (OUTPATIENT)
Dept: ONCOLOGY | Facility: HOSPITAL | Age: 51
End: 2021-07-23

## 2021-07-23 ENCOUNTER — APPOINTMENT (OUTPATIENT)
Dept: LAB | Facility: HOSPITAL | Age: 51
End: 2021-07-23

## 2021-07-30 ENCOUNTER — APPOINTMENT (OUTPATIENT)
Dept: LAB | Facility: HOSPITAL | Age: 51
End: 2021-07-30

## 2021-07-30 ENCOUNTER — APPOINTMENT (OUTPATIENT)
Dept: ONCOLOGY | Facility: HOSPITAL | Age: 51
End: 2021-07-30

## 2021-08-06 ENCOUNTER — APPOINTMENT (OUTPATIENT)
Dept: ONCOLOGY | Facility: HOSPITAL | Age: 51
End: 2021-08-06

## 2021-08-06 ENCOUNTER — APPOINTMENT (OUTPATIENT)
Dept: LAB | Facility: HOSPITAL | Age: 51
End: 2021-08-06

## 2021-09-06 ENCOUNTER — APPOINTMENT (OUTPATIENT)
Dept: LAB | Facility: HOSPITAL | Age: 51
End: 2021-09-06

## 2021-09-06 NOTE — PROGRESS NOTES
MGW ONC [facility] GROUP HEMATOLOGY AND ONCOLOGY  [address]  [facility] [address] [phone]    Patient Name: Stacy Lynn  Encounter Date: 09/13/2021  YOB: 1970  Patient Number: [id number]    REASON FOR VISIT: Ms. Stacy Lynn is a 51-year-old female who returns in followup of anemia from chronic kidney disease, with leukopenia and intermittent thrombocytopenia without unifying diagnosis (likely contribution from methotrexate). She is seen 28.5 months since last receipt of IV Injectafer and 48.5 months since last receipt of 2 units PRBC transfusions. She is here alone. The history is again muddled by liberal symptom reporting.     I have reviewed the HPI and verified with the patient the accuracy of it. No changes to interval history since the information was documented. Fermín Boggs MD 09/13/21     DIAGNOSTIC ABNORMALITIES:   1. Labs, 09/16/2014 Dr. Waters's office. TIBC 303 (225 - 420), serum iron 43 (42 - 180), and iron saturation 14.2%.   2. Labs, 10/03/2014. Hemoglobin 9.8, hematocrit 30.2, MCV 94.1, platelets 157,000, WBC 2.9, with 52.3 segs (ANC 1.5), 36.8 lymphocytes, 10.9 mid cells. Glucose 80, BUN 16, creatinine 0.9 (GFR 71.6), sodium 142, potassium 3.9, chloride 109, bicarbonate 19, calcium 8.6, albumin 3.5, total protein 6.3, bilirubin 0.4, alkaline phosphatase 67, , AST 12, ALT 20, phosphorus 4.6 (each normal). Serum iron 49, iron saturation 15.8%, ferritin 32, , folate 13.6. HIV antibodies screen nonreactive. ELENA negative. T4 of 7.1, TSH 0.6, sed rate 8 (each normal). Rheumatoid factor less than 10. ANCA less than 1:20.  3. Colonoscopy, 10/13/2014: - One 7 mm polyp in the ascending colon resected and retrieved. The examination was otherwise normal on direct and retroflexion views. Recommendation: - Repeat colonoscopy in 5 years for surveillance.  4. Comprehensive Report, 10/15/2014: FINAL DIAGNOSIS.  Peripheral blood: Leukopenia/neutropenia; normocytic anemia. Comprehensive Comments: The cause of the cytopenias is not apparent from review of this peripheral blood smear. Differential diagnosis includes chronic disease, autoimmunity, drug effects, infection, etc. Although there are no morphologic features of myeloid dysplasia, please be aware that peripheral blood findings are not always representative of underlying bone marrow abnormalities. FLOW IMPRESSION: Peripheral blood: No increase in myeloid or lymphoid blasts identified. Findings consistent with mild granulocytic left shift. No immunophenotypically abnormal B- or T-cell population identified.  5. EGD performed on 10/16/2014 at [facility]. IMPRESSION: Normal esophagus. Normal stomach. Normal first part of the duodenum and 2nd part of the duodenum Biopsied . Pathology: Small bowel biopsy. Benign small intestinal type mucosa with no significant histopathologic changes.  6. Labs, 05/29/2015. IgG 827, IgA 168, IgM 140. Immunofixation showed no monoclonal immunoglobulins detected. Free kappa light chain 1.26, free lambda light chain 1.17, kappa/lambda light chains 1.08 (each normal).  7. UPIEP, 06/09/2015. 24-hour urine 82.8 mg. Immunofixation not reported.  8. [place], CT-guided bone marrow, 06/29/2015. Impression: Technically successful CT-guided bone marrow aspirate without immediate complications. No core bone marrow sample could be obtained. PATHOLOGY: Surgical pathology performed on 06/29/2015 at [facility] was revealing for post iliac crest bone marrow biopsy and aspirate: Normocellular bone marrow of 40%. No increase in blasts. Iron staining is 1+. Flow cytometry shows no evidence of an abnormal myeloid maturation, increase in blasts, or lymphoproliferative disorder. Complete blood count and peripheral blood smear review: Normochromic normocytic anemia. Leukopenia. No circulating blasts.  9. Peripheral blood comprehensive report,  06/19/2017: PANCYTOPENIA. COMPREHENSIVE DIAGNOSIS.. Review of peripheral blood smear: Leukopenia with relative reactive appearing eosinophilia. Normochromic, normocytic anemia. Mild thrombocytopenia. See comment. COMPREHENSIVE COMMENT. When compared to a peripheral blood specimen reviewed on this patient in 10/2014 ([id number]), the white blood cell count has decreased from 2.7 K/mL to 1.8 currently. The hemoglobin level has also decreased from 9.9 g/dL to 8.7 g/dL. The platelet counts are similar. As stated in the prior specimen, the cause of the cytopenias is not apparent from the peripheral blood smear examination. Etiologies to consider include chronic diseases particularly autoimmune disease, infections and drugs/toxins. Myelodysplasia is in the differential diagnosis but that diagnosis can be difficult to establish on a peripheral blood specimen. Correlation recommended. Flow cytometry study after erythrolysis reveals no circulating myeloid or lymphoid blasts. Myeloid and monocytic lineages are represented by mature granulocytes and monocytes. Phenotypically unremarkable eosinophils are mildly elevated at 10.5% of the total WBC. Basophils are not increased. It must be noted that the diagnosis of a myeloid stem cell disorder, if clinically suspected, is best made using bone marrow specimens. Peripheral blood is not always representative of abnormalities involving the bone marrow. The B cells show no evidence of clonality or antigenic aberrancy. The T cells show normal expression of the pan T-cell antigens. The NK cells account for 23.7% of the total lymphocytes.  10. [facility]: Endoscopy: 08/23/2017. Impressions: - Normal esophagus. - Non-erosive gastritis. Biopsied. - Normal first part of the duodenum and 2nd part of the duodenum. Biopsied.   11. [facility]: Colonoscopy: 08/23/2017. Impressions: - The entire examined colon is normal. Biopsied.     PREVIOUS INTERVENTIONS:   1. IV Venofer, 11/10/2014  - 11/20/2014 (1000 mg)  2. INFeD 500 mg on 07/19/2016 and 05/01/2017  3. 2 units PRBC transfusions, 09/01/2017  4. Injectafer 750 mg IV, 04/23/2019        Problem List Items Addressed This Visit     None        Oncology/Hematology History    No history exists.       PAST MEDICAL HISTORY:  ALLERGIES:  Allergies   Allergen Reactions   • Adhesive Tape Hives   • Tape Hives     CURRENT MEDICATIONS:  Outpatient Encounter Medications as of 9/13/2021   Medication Sig Dispense Refill   • docusate sodium (COLACE) 100 MG capsule Take 100 mg by mouth.     • DULoxetine (CYMBALTA) 60 MG capsule Take 60 mg by mouth Daily.     • esomeprazole (nexIUM) 40 MG capsule TAKE 1 CAPSULE BY MOUTH TWICE DAILY BEFORE  MEALS 60 capsule 0   • folic acid-vit B6-vit B12 (Folbee) 2.5-25-1 MG tablet tablet Take 1 tablet by mouth Daily. 30 tablet 0   • LYRICA 100 MG capsule Take 100 mg by mouth Every 8 (Eight) Hours As Needed.  1   • metoprolol succinate XL (TOPROL-XL) 25 MG 24 hr tablet Take 100 mg by mouth Daily.     • Morphine (MS CONTIN) 15 MG 12 hr tablet Take 15 mg by mouth 2 (Two) Times a Day.     • O2 (OXYGEN) Inhale 2 L/min 1 (One) Time. Continuous     • ondansetron (ZOFRAN) 4 MG tablet Take 4 mg by mouth Every 8 (Eight) Hours As Needed for Nausea or Vomiting.     • oxybutynin (DITROPAN) 5 MG tablet Take 5 mg by mouth every night at bedtime.  5   • oxyCODONE-acetaminophen (PERCOCET) 7.5-325 MG per tablet Take 1 tablet by mouth Every 8 (Eight) Hours As Needed.     • pantoprazole (PROTONIX) 40 MG EC tablet Take 40 mg by mouth 2 (Two) Times a Day.     • polyethylene glycol (MIRALAX) powder Take 17 g by mouth.     • PROCRIT 17768 UNIT/ML injection Inject 1 Units under the skin As Needed (WHEN BLOOD COUNT [hemoglobin] BELOW 11).     • tiZANidine (ZANAFLEX) 4 MG tablet Take 4 mg by mouth At Night As Needed for Muscle Spasms.     • topiramate (TOPAMAX) 100 MG tablet Take 100 mg by mouth Every Night.     • levothyroxine (SYNTHROID, LEVOTHROID) 137  MCG tablet Take 1 tablet by mouth Daily for 30 days. 30 tablet 3   • [DISCONTINUED] buPROPion XL (WELLBUTRIN XL) 150 MG 24 hr tablet TAKE 1 TABLET BY MOUTH ONCE DAILY FOR DEPRESSION  2   • [DISCONTINUED] busPIRone (BUSPAR) 10 MG tablet Take 10 mg by mouth.     • [DISCONTINUED] carBAMazepine XR (TEGretol  XR) 200 MG 12 hr tablet Take 1 tablet by mouth Every 12 (Twelve) Hours. 60 tablet 3   • [DISCONTINUED] fluticasone (Flovent HFA) 110 MCG/ACT inhaler Inhale 2 puffs 2 (Two) Times a Day for 30 days. 1 inhaler 11   • [DISCONTINUED] methotrexate 2.5 MG tablet Take 4 tablets by mouth 1 (One) Time Per Week. Takes 4 tabs weekly on Mondays 16 tablet 11     No facility-administered encounter medications on file as of 9/13/2021.     ADULT ILLNESSES:   Iron deficiency anemia ( ICD-10:D50.9 ;Iron deficiency anemia, unspecified   Anemia ( ICD-10:D64.9 ;Anemia, unspecified   Anemia in chronic kidney disease ( ICD-10:D63.1 ;Anemia in chronic kidney disease   Chronic kidney disease ( ICD-10:N18.3 ;Chronic kidney disease, stage 3 (moderate)   Chronic pancreatitis ( ICD-10:K86.1 ;Other chronic pancreatitis   Depression ( ICD-10:F32.9 ;Major depressive disorder, single episode, unspecified   Fatigue ( ICD-10:R53.82 ;Chronic fatigue, unspecified   Fibromyalgia syndrome ( ICD-10:M79.7 ;Fibromyalgia   Gastroesophageal reflux disease ( ICD-10:K21.9 ;Gastro-esophageal reflux disease without esophagitis   Hypertension ( ICD-10:I10 ;Essential (primary) hypertension   Hypothyroidism ( ICD-10:E03.9 ;Hypothyroidism, unspecified   Irritable bowel syndrome ( ICD-10:K58.9 ;Irritable bowel syndrome without diarrhea   Kidney stone ( ICD-10:N20.0 ;Calculus of kidney   Knee pain ( meniscal disorder,Dr. Dasilva; ICD-10:M25.569 ;Pain in unspecified knee )   Leukopenia ( ICD-10:D72.819 ;Decreased white blood cell count, unspecified   Macular degeneration ( ICD-10:H35.30 ;Unspecified macular degeneration   Migraines ( ICD-10:G43.909 ;Migraine,  unspecified, not intractable, without status migrainosus   Obesity ( ICD-10:E66.9 ;Obesity, unspecified   Peripheral neuropathy ( ICD-10:G62.9 ;Polyneuropathy, unspecified   Sarcoidosis ( ICD-10:D86.0 ;Sarcoidosis of lung   Scoliosis ( ICD-10:M41.9 ;Scoliosis, unspecified   Urge incontinence of urine ( ICD-10:N39.41 ;Urge incontinence   Vitamin B12 deficiency ( ICD-10:E53.8 ;Deficiency of other specified B group vitamins    SURGERIES:   Total knee replacement, right, 11/06/2015, Dr. Dasilva   Colonoscopy, 2009   Shoulder repair, epicondyle, right, 2002   Shoulder repair, arthroscopy, 2001   Ulnar transposition; ulnar neuropathy, 2002,1999   Kidney stone, 2001   Hysterectomy, total, 2004   Colonoscopy, 09/06/2018. Impression: The examination was otherwise normal on direct and retroflexion views. No specimens collected. Repeat 5 years   Colonoscopy, 08/23/2017. [facility]. Impression> The entire examined colon is normal. Biopsied   Decompression of median nerve, (carpal tunnel release), 05/08/2018, Dr. Kwon   Operative procedure on knee, replacement, 01/2014, Dr. Dasilva   Endoscopy, 08/23/2017, [facility]. Impression: Normal esophagus. Non-erosive gastritis. Biopsied. Normal 1st part of the duodenum and 2nd part of the duodenum. Biopsied   Radiofrequency ablation (RFA) left leg on 01/10/2018 by Dr. Montgomery. Left lower extremity venous ultrasound, 01/19/2018. No deep venous thrombosis (DVT) left lower extremity. Was started on Eliquis. Lymphedema pumps and compression stockings prescribed. Followup 1 month   Revision surgery was done last 05/04/2017. Dr. Kwon   Cholecystectomy, remote      ADULT ILLNESSES:  Patient Active Problem List   Diagnosis Code   • Palpitations R00.2   • Thrombocytopenia (CMS/[facility]) D69.6   • Sarcoidosis D86.9   • Essential hypertension I10   • Hypothyroidism E03.9   • Dyspnea on exertion R06.00   • Morbidly obese (CMS/[facility]) E66.01   • PFO (patent foramen ovale)  Q21.1   • Iron deficiency anemia D50.9   • Generalized weakness R53.1   • Volume depletion, gastrointestinal loss E86.9   • Viral enterocolitis A08.4   • Colon polyps K63.5   • Epigastric pain R10.13   • Bloating R14.0   • Early satiety R68.81   • Weight loss R63.4   • Abdominal cramping R10.9   • Nausea R11.0   • NSAID long-term use Z79.1   • 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, T45.1X5A   • Anemia in chronic kidney disease (CKD) N18.9, D63.1   • Varicose veins of both lower extremities with pain I83.813   • Lymphedema I89.0   • Venous (peripheral) insufficiency I87.2   • Neuropathy due to medical condition (CMS/HCC) G63   • Venous insufficiency I87.2   • Chest pain R07.9   • Bloody stool K92.1   • Constipation K59.00   • Anal or rectal pain K62.89   • Rectal bleeding K62.5   • Hx of colonic polyps Z86.010   • Other hemorrhoids K64.8   • Chronic respiratory failure with hypoxia (CMS/HCC) J96.11   • Diarrhea R19.7   • Irritable bowel syndrome with constipation K58.1   • Overweight E66.3   • 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 Diallo    • CHOLECYSTECTOMY     • COLONOSCOPY  10/13/2014   • COLONOSCOPY N/A 8/23/2017    Procedure: COLONOSCOPY WITH ANESTHESIA;  Surgeon: Jeanette Mclaughlin MD;  Location: [facility] ENDOSCOPY;  Service:    • COLONOSCOPY N/A 9/6/2018    Procedure: COLONOSCOPY WITH ANESTHESIA;  Surgeon: Jeanette Mclaughlin MD;  Location: [facility] ENDOSCOPY;  Service: Gastroenterology   • ENDOSCOPY N/A 8/23/2017    Procedure: ESOPHAGOGASTRODUODENOSCOPY WITH ANESTHESIA;  Surgeon: Jeanette Mclaughlin MD;  Location: [facility] ENDOSCOPY;  Service:    • ENDOVENOUS ABLATION SAPHENOUS VEIN W/ LASER Right 03/30/2018   • HYSTERECTOMY     • JOINT REPLACEMENT Right     PATELLA REPLACED   • KIDNEY STONE SURGERY     • KNEE ARTHROSCOPY     • KNEE SURGERY Right    • LATERAL EPICONDYLE RELEASE     •  PATELLA SURGERY     • REPLACEMENT TOTAL KNEE     • SHOULDER ARTHROSCOPY     • ULNAR NERVE DECOMPRESSION     • VARICOSE VEIN SURGERY Left 1/10/2018    Procedure: LEFT SAPHENOUS VEIN RADIO FREQUENCY ABLATION;  Surgeon: Kvng Montgomery DO;  Location:  [facility] OR;  Service:    • VARICOSE VEIN SURGERY Right 3/30/2018    Procedure: RIGHT LOWER EXTREMITY VENAGRAM, RIGHT LOWER EXTREMITY SAPHENOUS VEIN RADIO FREQUENCY ABLATION;  Surgeon: Kvng Montgomery DO;  Location:  [facility] OR;  Service: Vascular     HEALTH MAINTENANCE ITEMS:  Health Maintenance Due   Topic Date Due   • COVID-19 Vaccine (1) Never done   • TDAP/TD VACCINES (1 - Tdap) Never done   • HEPATITIS C SCREENING  Never done   • PAP SMEAR  Never done   • ZOSTER VACCINE (1 of 2) Never done   • ANNUAL WELLNESS VISIT  02/25/2020   • MAMMOGRAM  03/08/2021       <no information>  Last Completed Colonoscopy     This patient has no relevant Health Maintenance data.        Immunization History   Administered Date(s) Administered   • Pneumococcal Conjugate 13-Valent (PCV13) 04/01/2019   • Pneumococcal Polysaccharide (PPSV23) 09/26/2017, 10/01/2018     Last Completed Mammogram     This patient has no relevant Health Maintenance data.            FAMILY HISTORY:  Family History   Problem Relation Age of Onset   • Arrhythmia Mother    • Heart disease Mother    • Kidney disease Mother    • Heart disease Father    • Diabetes Father    • Heart disease Brother    • Heart disease Maternal Aunt    • Heart disease Maternal Uncle    • Heart disease Paternal Aunt    • Heart disease Paternal Uncle    • Heart disease Maternal Grandmother    • Heart disease Maternal Grandfather    • Heart disease Paternal Grandmother    • Heart disease Paternal Grandfather    • Colon cancer Neg Hx    • Colon polyps Neg Hx      SOCIAL HISTORY:  Social History     Socioeconomic History   • Marital status: Single     Spouse name: Not on file   • Number of children: Not on file   • Years of education: Not on  "file   • Highest education level: Not on file   Tobacco Use   • Smoking status: Never Smoker   • Smokeless tobacco: Never Used   Substance and Sexual Activity   • Alcohol use: No   • Drug use: No   • Sexual activity: Defer       REVIEW OF SYSTEMS:  Constitutional:   The patient's appetite is good. \"It's fairly good.\" Her energy is chronically low but \"a little better.\". She manages her personal ADLs. She lives with her sister and brother. She manages her ADLs including helping with light indoor chores but not any errands and does not drive. She has lost 16 pounds (in addition to 32 pounds at her 2 prior visits) since her last visit. \"Cutting portions and watching what we eat.\" She has no fevers or chills but has non-drenching night sweats. Her sleep habits seem appropriate.  Ear/Nose/Throat/Mouth:   She reports no ear pains, sinus symptoms, sore throat, nosebleeds, or sore tongue. She has migraine headaches. She denies any hoarseness, change in voice quality.   Ocular:   She reports no eye pain, significant change in visual acuity, double vision, or blurry vision.  Respiratory:   She reports baseline exertional dyspnea and is short of breath with her routine activities. She has chronic cough with thick, white/yellow phlegm production but has no significant shortness of breathing at rest or unexplained chest wall pain. Says she is off prednisone since last 08/2016 by Dr. Scherer (for the sarcoid). Says the methotrexate has been stopped by Dr. Scherer. Has been on round the clock O2 since 04/2018.  Cardiovascular:   She reports no exertional chest pain, chest pressure, or chest heaviness. She reports no claudication. She reports no palpitations or symptomatic orthostasis.  Gastrointestinal:   She reports chronic nausea but no dysphagia, vomiting, or postprandial abdominal pain but admits to early satiety, increased bloating, and cramping. She has no change in bowel habits without dark discoloration of the stool (off oral " "iron). She reports intermittent rectal bleeding when she is constipated. \"Nope.\" She has intermittent diarrhea from IBS. Is on fiber supplements. Is intolerant of oral iron (cramps, constipation). Last repeat colonoscopy, 09/06/2018 (above). Hemorrhoids?.  Genitourinary:   She reports no urinary burning, frequency, dribbling, or discoloration. She reports difficulty controlling her bladder and has trouble holding in her urine requiring use of protective pads. She has no need to urinate frequently through the night.  Musculoskeletal:   She reports persistent right knee discomfort in spite of total knee replacement (TKR). Says revision surgery was done last 05/04/2017 - Dr. Kwon. She has chronic arthralgias of the hips and lower back with distal radiculitis to the buttocks. She has myalgias of the calves and has nighttime leg cramping. She is now seen by Dr. Schmitt of Pain Management. Says a nerve stimulator couldn't be placed due to her scoliosis. Is off morphine.  Is now on Percocet 7 every 8 hours  Extremities:   She reports chronic trouble with fluid retention and significant leg swelling. Is no longer using compression leg pumps.  Endocrine:   She reports no problems with excess thirst, excessive urination, vasomotor instability.  Heme/Lymphatic:   She reports easy bruising but no unexplained bleeding, petechial rashes, or swollen glands.  Skin:   She reports chronic itching and rashes but no lesions which won't heal.  Neuro:   She reports postural dizziness but no loss of consciousness, seizures, or fainting spells. She reports no weakness of her face, arms, or legs. She has no difficulty with speech. She has no tremors. She has relief of paresthesias of the right hand since the carpal tunnel release on 05/08/2018 (Dr. Kwon).  Psych:   She admits to intermittent depression and anxiety. She reports occasional mood swings. She reports no history of suicide attempts.      VITAL SIGNS: /84   Pulse 95  " " Temp 97.3 °F (36.3 °C)   Resp 16   Ht 157.5 cm (62\")   Wt 123 kg (271 lb 6.4 oz)   SpO2 98%   Breastfeeding No   BMI 49.64 kg/m² Body surface area is 2.18 meters squared.  Pain Score    09/13/21 1423   PainSc:   8   PainLoc: Back         PHYSICAL EXAMINATION:   General:   She is a very-pleasant, morbidly obese, visibly more slender, and modestly-kept middle-aged female who is comfortable at rest. She arrived in the exam room ambulatory. She appears to be her stated age. Her skin color is still a bit pale.   Head/Neck:   The patient is anicteric and atraumatic. She is wearing a surgical mask today.  She is wearing O2 via cannula, tethered to a portable O2 tank. The trachea is midline. The neck is supple without evidence of jugular venous distention or cervical adenopathy.   Eyes:   The pupils are equal, round, and reactive to light. The extraocular movements are full. There is no scleral jaundice or erythema.   Chest:   The respiratory efforts are normal and unhindered. The breath sounds are diminished bilaterally with vague basilar rales. There are no wheezes, rhonchi, or asymmetry of breath sounds.  Cardiovascular:   The patient has a regular cardiac rate and rhythm without murmurs, rubs, or gallops. The peripheral pulses are equal and full.  Abdomen:   The belly is soft and (less) globose. Her habitus precludes an adequate exam but there is no rebound or guarding. There is no organomegaly, mass-effect, or tenderness. Bowel sounds are active and of normal character.  Extremities:   There is no evidence of cyanosis, clubbing, with 3+ (chronic) leg edema.   Rheumatologic:   There is no overt evidence of rheumatoid deformities of the hands. There is no sausaging of the fingers. There is no sign of active synovitis. The gait is slow and antalgic, still favoring the right leg/knee.  She does not use a cane  Cutaneous:   There are no overt rashes, disseminated lesions, purpura, or petechiae.   Lymphatics:   There " is no evidence of adenopathy in the cervical, supraclavicular, or axillary areas.   Neurologic:   The patient is alert, oriented, cooperative, and pleasant. She is appropriately conversant. She ambulated into the exam room without assistance but declined transfer from chair to exam table. There is no overt dysfunction of the motor, sensory, cerebellar systems.  Psych:   Mood and affect are appropriate for circumstance. Eye contact is appropriate. Normal judgement and decision making.        LABS    Lab Results - Last 18 Months   Lab Units 09/10/21  1347 06/25/21  1242 06/03/21  1335 04/30/21  1336 04/02/21  1303 03/05/21  1301 02/05/21  1400 02/05/21  1400   HEMOGLOBIN g/dL 9.2* 11.0* 10.7* 10.0* 11.3* 11.7*   < > 10.8*   HEMATOCRIT % 29.0* 34.1 32.7* 31.4* 35.2 36.5   < > 32.2*   MCV fL 98.3* 97.4* 96 96.9 95.1 96.6   < > 96.4   WBC 10*3/mm3 3.31* 3.18* 3.1* 3.80 4.57 3.81   < > 2.92*   RDW % 13.6 13.2 13.2 14.2 13.2 13.2   < > 13.9   MPV fL 10.0 10.0  --  9.5 9.7 9.8  --  9.3   PLATELETS 10*3/mm3 132* 153 118* 150 160 132*   < > 157   IMM GRAN % % 0.3 0.6*  --  0.3 0.2 0.0  --  0.3   NEUTROS ABS 10*3/mm3 1.70 1.68* 1.9 2.34 3.11 2.20   < > 1.56*   LYMPHS ABS 10*3/mm3 1.02 0.96 0.8 0.97 1.02 1.16   < > 0.89   MONOS ABS 10*3/mm3 0.43 0.35 0.3 0.33 0.29 0.32   < > 0.30   EOS ABS 10*3/mm3 0.12 0.13 0.1 0.12 0.10 0.10   < > 0.14   BASOS ABS 10*3/mm3 0.03 0.04 0.0 0.03 0.04 0.03   < > 0.02   IMMATURE GRANS (ABS) 10*3/mm3 0.01 0.02  --  0.01 0.01 0.00  --  0.01   NRBC /100 WBC 0.0 0.0  --  0.0 0.0 0.0  --  0.0    < > = values in this interval not displayed.       Lab Results - Last 18 Months   Lab Units 09/10/21  1347 06/03/21  1335 12/31/20  0949 11/06/20  1505 09/29/20  1358 07/14/20  1443 03/27/20  1431 03/27/20  1431   GLUCOSE mg/dL 109*  --  95 131*  --  82  --  84   SODIUM mmol/L 142 143 141 142  --  140  --  142   POTASSIUM mmol/L 4.2 3.6 4.8 3.8  --  4.3  --  4.6   TOTAL CO2 mmol/L  --  22  --   --   --   --   --    --    CO2 mmol/L 22.0  --  26.0 23.0  --  23.0  --  25.0   CHLORIDE mmol/L 112* 109* 110* 109*  --  108*  --  109*   ANION GAP mmol/L 8.0  --  5.0 10.0  --  9.0  --  8.0   CREATININE mg/dL 1.35* 1.37* 1.42* 1.27* 1.60* 2.03*   < > 1.35*   BUN mg/dL 31* 16 41* 21*  --  22*  --  24*   BUN / CREAT RATIO  23.0 12 28.9* 16.5  --  10.8  --  17.8   CALCIUM mg/dL 8.5* 8.5* 8.8 9.1  --  8.5*  --  8.4*   EGFR IF NONAFRICN AM mL/min/1.73 41* 44* 39* 44*  --  26*  --  41*   ALK PHOS U/L 64 72 67 94  --  78  --  71   TOTAL PROTEIN g/dL 6.4  --  6.8 6.9  --  7.0  --  6.3   ALT (SGPT) U/L 14 9 11 25  --  7  --  10   AST (SGOT) U/L 17 13 17 19  --  13  --  14   BILIRUBIN mg/dL 0.2 0.3 0.2 0.4  --  0.3  --  0.2   ALBUMIN g/dL 4.30 4.4 4.30 4.40  --  4.50  --  3.80   GLOBULIN gm/dL 2.1  --  2.5 2.5  --  2.5  --  2.5    < > = values in this interval not displayed.         Lab Results - Last 18 Months   Lab Units 09/10/21  1347 06/25/21  1242 06/03/21  1335 04/30/21  1336 04/02/21  1303 03/05/21  1301 02/05/21  1400 01/29/21  1402 01/29/21  1402   IRON mcg/dL 64 65  --  65 70 91 51   < > 71   TIBC mcg/dL 259* 273*  --  282* 294* 294* 256*   < > 259*   IRON SATURATION % 25 24  --  23 24 31 20   < > 27   FERRITIN ng/mL 229.30* 212.50*  --  262.70* 260.50* 255.90* 171.60*   < > 272.70*   TSH uIU/mL  --   --  9.310*  --   --   --   --   --   --    FOLATE ng/mL 5.73 7.41  --  9.04 8.21 17.40  --   --  18.70    < > = values in this interval not displayed.       ASSESSMENT:   1. Macrocytic anemia, with contributions from iron deficiency (1+ marrow iron), iron underutilization/anemia of chronic disease and chronic kidney disease and methotrexate. Michael Hgb 7.9, 08/31/2017 requiring 2 units PRBC transfusions on 09/01/2017.   a. Negative EGD/colonoscopy (above), negative SPIEP, negative UPIEP.   --Stable, Hgb 9.2 on 09/10/2021 (prior range: Hgb 7.9 - 12.3).   b. Endoscopy: 08/23/2017. Impressions: - Normal esophagus. - Non-erosive gastritis.  Biopsied. - Normal 1st part of the duodenum and 2nd part of the duodenum. Biopsied.   c. [facility]: Colonoscopy: 08/23/2017. Impressions: - The entire examined colon is normal. Biopsied.   d. Colonoscopy, 09/06/2018. Impressions: - The examination was otherwise normal on direct and retroflexion views. No specimens collected. Repeat 5 years              e.  06/2015-bone marrow biopsy with 1+ marrow iron, but otherwise nondiagnostic.  2. Leukopenia, with adequate ANC.   --Stable, WBC 3.31; ANC 1.70, 09/10/2021 (prior range: WBC 2.45 - 4.2; ANC 1.05 - 2.6). No unifying diagnosis. Likely medication (methotrexate) associated.   3. Thrombocytopenia.   --Stable, 132,000 on 09/10/2021 (prior range: 115,000 - 188,000).  4. Chronic kidney disease, Stage III.   --Stable, GFR 41 mL/min on 09/10/2021 (prior range: 26 - 56.6 mL/min).   5. Hypothyroidism.   6. Gastroesophageal reflux disease, now with early satiety, bloating, cramps.   7. B12 deficiency. Receives B12 injections per Dr. Waters. Previously received B12, 1000 mcg IM daily x 3 then weekly x 4 beginning 09/07/2018.   8. Fibromyalgia with chronic pain syndrome.   9. Irritable bowel syndrome with chronic diarrhea. Colonoscopy, 10/2014.   10. Depression.   11. Chronic neck and low back pain with scoliosis. She is now seen by Dr. Schmitt of Pain Management. Says a nerve stimulator will be placed on 04/23/2019.   12. Peripheral neuropathy and swelling of the legs.   a. Seen by Dr. Montgomery, 09/07/2017 and 12/08/2017. Impression: Venous insufficiency and lymphedema.   b. Underwent RFA left leg on 01/10/2018 by Dr. Montgomery. Lower extremity venous ultrasound, 01/19/2018. No deep venous thrombosis (DVT) left lower extremity. Was started on anticoagulation (Eliquis). Lymphedema pumps and compression stockings prescribed.   13. Degenerative joint disease (DJD) knees. Underwent right knee surgery on 11/06/2015 and revision on 05/04/2017. Dr. Kwon.   14. Obesity. Has  stabilized.   15. Pulmonary sarcoidosis. Followed by Dr. Scherer. Was started on methotrexate sometime 05/2017. Is now on O2 since 04/2018.   16. Chronic fatigue, contributions from all above.   17. Admitted [facility] 09/25/2016 through 09/26/2016 for chest pain. DSE negative. Discharged for non-cardiac chest pain.   18. Leg edema. Underwent RFA left leg on 01/10/2018 by Dr. Montgomery. LE venous US, 01/19/2018. No DVT left LE. Eliquis stopped last 02/2018. Lymphedema pumps and compression stockings prescribed.   19. Right wrist carpal tunnel symptoms. Relieved since surgery last 05/08/2018 (Dr. Kwon).   20. Rectal bleeding. Colonoscopy, 09/06/2018. Impressions: The examination was otherwise normal on direct and retroflexion views. No specimens collected. Repeat 5 years.    PLAN:   1. Apprised of labs from 09/10/2021 with stable leukopenia/normal ANC, stable anemia, borderline low (stable) platelets, low (stable) GFR, repleted serum iron, repleted Fe sat, repleted ferritin (INFeD, 05/01/2017), repleted B12/folate.   2. Rx:  Folbee po daily # 30  3.  Previously discussed prior SPIEP with normal immunoglobulin levels, negative TAVIA for monoclonal proteins, normal kappa/lambda light chains, negative 24 hour UPIEP, normal ESR, negative rheumatoid factor, normal TSH/T4, normal B12/folate, negative HIV screen, low iron studies, negative ELENA, normal LDH and negative comprehensive blood report (no peripheral blood evidence for dysplasia and negative flow cytometry).  Also noted previous bone marrow biopsy showing 1+ but otherwise nondiagnostic.  4. Again reviewed her medications. Stopped methotrexate, diclofenac, gabapentin.  Remains on Nexium (blood dyscrasias), gabapentin, Lortab (neutropenia, thrombocytopenia), Topamax (anemia, leukopenia). Would discontinue as many of these as possible. Defer to Dr. Waters.   5. Draw serum iron, Fe sat, ferritin, B12, folate every 4 weeks.   6. CBC weekly with Procrit 40,000 units subcutaneously  if Hgb less than 11 and less than 33 - BHP   7. Continue management per primary care and other specialists.   8. Return to the [place] office in 24 weeks with pre-office CMP, B12/folate, serum iron, Fe sat, ferritin, and CBC with differential.     MEDICAL DECISION MAKING: Moderate Complexity   AMOUNT OF DATA: Moderate    I spent 35 total minutes, face-to-face, caring for Hope today.  Greater than 50% of this time involved counseling and/or coordination of care as documented within this note regarding the patient's illness(es), pros and cons of various treatment options, instructions and/or risk reduction.    cc: MD Weston Ambrose MD

## 2021-09-10 ENCOUNTER — INFUSION (OUTPATIENT)
Dept: ONCOLOGY | Facility: HOSPITAL | Age: 51
End: 2021-09-10

## 2021-09-10 ENCOUNTER — LAB (OUTPATIENT)
Dept: LAB | Facility: HOSPITAL | Age: 51
End: 2021-09-10

## 2021-09-10 VITALS
SYSTOLIC BLOOD PRESSURE: 138 MMHG | RESPIRATION RATE: 19 BRPM | WEIGHT: 274 LBS | TEMPERATURE: 97.7 F | HEART RATE: 66 BPM | HEIGHT: 62 IN | BODY MASS INDEX: 50.42 KG/M2 | OXYGEN SATURATION: 100 % | DIASTOLIC BLOOD PRESSURE: 82 MMHG

## 2021-09-10 DIAGNOSIS — N18.9 ANEMIA IN CHRONIC KIDNEY DISEASE, UNSPECIFIED CKD STAGE: ICD-10-CM

## 2021-09-10 DIAGNOSIS — D63.1 ANEMIA IN STAGE 3B CHRONIC KIDNEY DISEASE (HCC): ICD-10-CM

## 2021-09-10 DIAGNOSIS — N18.32 STAGE 3B CHRONIC KIDNEY DISEASE (HCC): Primary | ICD-10-CM

## 2021-09-10 DIAGNOSIS — N18.32 ANEMIA IN STAGE 3B CHRONIC KIDNEY DISEASE (HCC): ICD-10-CM

## 2021-09-10 DIAGNOSIS — D63.1 ANEMIA IN CHRONIC KIDNEY DISEASE, UNSPECIFIED CKD STAGE: ICD-10-CM

## 2021-09-10 LAB
ALBUMIN SERPL-MCNC: 4.3 G/DL (ref 3.5–5.2)
ALBUMIN/GLOB SERPL: 2 G/DL
ALP SERPL-CCNC: 64 U/L (ref 39–117)
ALT SERPL W P-5'-P-CCNC: 14 U/L (ref 1–33)
ANION GAP SERPL CALCULATED.3IONS-SCNC: 8 MMOL/L (ref 5–15)
AST SERPL-CCNC: 17 U/L (ref 1–32)
BASOPHILS # BLD AUTO: 0.03 10*3/MM3 (ref 0–0.2)
BASOPHILS NFR BLD AUTO: 0.9 % (ref 0–1.5)
BILIRUB SERPL-MCNC: 0.2 MG/DL (ref 0–1.2)
BUN SERPL-MCNC: 31 MG/DL (ref 6–20)
BUN/CREAT SERPL: 23 (ref 7–25)
CALCIUM SPEC-SCNC: 8.5 MG/DL (ref 8.6–10.5)
CHLORIDE SERPL-SCNC: 112 MMOL/L (ref 98–107)
CO2 SERPL-SCNC: 22 MMOL/L (ref 22–29)
CREAT SERPL-MCNC: 1.35 MG/DL (ref 0.57–1)
DEPRECATED RDW RBC AUTO: 49.1 FL (ref 37–54)
EOSINOPHIL # BLD AUTO: 0.12 10*3/MM3 (ref 0–0.4)
EOSINOPHIL NFR BLD AUTO: 3.6 % (ref 0.3–6.2)
ERYTHROCYTE [DISTWIDTH] IN BLOOD BY AUTOMATED COUNT: 13.6 % (ref 12.3–15.4)
FERRITIN SERPL-MCNC: 229.3 NG/ML (ref 13–150)
GFR SERPL CREATININE-BSD FRML MDRD: 41 ML/MIN/1.73
GLOBULIN UR ELPH-MCNC: 2.1 GM/DL
GLUCOSE SERPL-MCNC: 109 MG/DL (ref 65–99)
HCT VFR BLD AUTO: 29 % (ref 34–46.6)
HGB BLD-MCNC: 9.2 G/DL (ref 12–15.9)
IMM GRANULOCYTES # BLD AUTO: 0.01 10*3/MM3 (ref 0–0.05)
IMM GRANULOCYTES NFR BLD AUTO: 0.3 % (ref 0–0.5)
IRON 24H UR-MRATE: 64 MCG/DL (ref 37–145)
IRON SATN MFR SERPL: 25 % (ref 20–50)
LYMPHOCYTES # BLD AUTO: 1.02 10*3/MM3 (ref 0.7–3.1)
LYMPHOCYTES NFR BLD AUTO: 30.8 % (ref 19.6–45.3)
MCH RBC QN AUTO: 31.2 PG (ref 26.6–33)
MCHC RBC AUTO-ENTMCNC: 31.7 G/DL (ref 31.5–35.7)
MCV RBC AUTO: 98.3 FL (ref 79–97)
MONOCYTES # BLD AUTO: 0.43 10*3/MM3 (ref 0.1–0.9)
MONOCYTES NFR BLD AUTO: 13 % (ref 5–12)
NEUTROPHILS NFR BLD AUTO: 1.7 10*3/MM3 (ref 1.7–7)
NEUTROPHILS NFR BLD AUTO: 51.4 % (ref 42.7–76)
NRBC BLD AUTO-RTO: 0 /100 WBC (ref 0–0.2)
PLATELET # BLD AUTO: 132 10*3/MM3 (ref 140–450)
PMV BLD AUTO: 10 FL (ref 6–12)
POTASSIUM SERPL-SCNC: 4.2 MMOL/L (ref 3.5–5.2)
PROT SERPL-MCNC: 6.4 G/DL (ref 6–8.5)
RBC # BLD AUTO: 2.95 10*6/MM3 (ref 3.77–5.28)
SODIUM SERPL-SCNC: 142 MMOL/L (ref 136–145)
TIBC SERPL-MCNC: 259 MCG/DL (ref 298–536)
TRANSFERRIN SERPL-MCNC: 174 MG/DL (ref 200–360)
WBC # BLD AUTO: 3.31 10*3/MM3 (ref 3.4–10.8)

## 2021-09-10 PROCEDURE — 96372 THER/PROPH/DIAG INJ SC/IM: CPT

## 2021-09-10 PROCEDURE — 82607 VITAMIN B-12: CPT

## 2021-09-10 PROCEDURE — 80053 COMPREHEN METABOLIC PANEL: CPT

## 2021-09-10 PROCEDURE — 36415 COLL VENOUS BLD VENIPUNCTURE: CPT

## 2021-09-10 PROCEDURE — 85025 COMPLETE CBC W/AUTO DIFF WBC: CPT

## 2021-09-10 PROCEDURE — 25010000002 EPOETIN ALFA-EPBX 40000 UNIT/ML SOLUTION: Performed by: INTERNAL MEDICINE

## 2021-09-10 PROCEDURE — 82746 ASSAY OF FOLIC ACID SERUM: CPT

## 2021-09-10 PROCEDURE — 82728 ASSAY OF FERRITIN: CPT

## 2021-09-10 PROCEDURE — 84466 ASSAY OF TRANSFERRIN: CPT

## 2021-09-10 PROCEDURE — 83540 ASSAY OF IRON: CPT

## 2021-09-10 RX ADMIN — EPOETIN ALFA-EPBX 40000 UNITS: 40000 INJECTION, SOLUTION INTRAVENOUS; SUBCUTANEOUS at 14:34

## 2021-09-11 LAB
FOLATE SERPL-MCNC: 5.73 NG/ML (ref 4.78–24.2)
VIT B12 BLD-MCNC: 356 PG/ML (ref 211–946)

## 2021-09-13 ENCOUNTER — OFFICE VISIT (OUTPATIENT)
Dept: ONCOLOGY | Facility: CLINIC | Age: 51
End: 2021-09-13

## 2021-09-13 VITALS
HEART RATE: 95 BPM | RESPIRATION RATE: 16 BRPM | TEMPERATURE: 97.3 F | BODY MASS INDEX: 49.94 KG/M2 | WEIGHT: 271.4 LBS | HEIGHT: 62 IN | OXYGEN SATURATION: 98 % | SYSTOLIC BLOOD PRESSURE: 132 MMHG | DIASTOLIC BLOOD PRESSURE: 84 MMHG

## 2021-09-13 DIAGNOSIS — D69.6 THROMBOCYTOPENIA (HCC): ICD-10-CM

## 2021-09-13 DIAGNOSIS — N18.32 ANEMIA IN STAGE 3B CHRONIC KIDNEY DISEASE (HCC): Primary | ICD-10-CM

## 2021-09-13 DIAGNOSIS — D63.1 ANEMIA IN STAGE 3B CHRONIC KIDNEY DISEASE (HCC): Primary | ICD-10-CM

## 2021-09-13 PROCEDURE — 99214 OFFICE O/P EST MOD 30 MIN: CPT | Performed by: INTERNAL MEDICINE

## 2021-09-13 RX ORDER — CYANOCOBALAMIN/FOLIC AC/VIT B6 1-2.5-25MG
1 TABLET ORAL DAILY
Qty: 30 TABLET | Refills: 0 | Status: SHIPPED | OUTPATIENT
Start: 2021-09-13 | End: 2022-02-02 | Stop reason: SDUPTHER

## 2021-09-24 ENCOUNTER — APPOINTMENT (OUTPATIENT)
Dept: ONCOLOGY | Facility: HOSPITAL | Age: 51
End: 2021-09-24

## 2021-09-24 ENCOUNTER — APPOINTMENT (OUTPATIENT)
Dept: LAB | Facility: HOSPITAL | Age: 51
End: 2021-09-24

## 2021-09-29 ENCOUNTER — OFFICE VISIT (OUTPATIENT)
Dept: FAMILY MEDICINE CLINIC | Age: 51
End: 2021-09-29
Payer: MEDICARE

## 2021-09-29 VITALS
HEIGHT: 63 IN | WEIGHT: 273.2 LBS | RESPIRATION RATE: 18 BRPM | OXYGEN SATURATION: 98 % | DIASTOLIC BLOOD PRESSURE: 88 MMHG | BODY MASS INDEX: 48.41 KG/M2 | SYSTOLIC BLOOD PRESSURE: 138 MMHG | TEMPERATURE: 97.6 F | HEART RATE: 81 BPM

## 2021-09-29 DIAGNOSIS — F33.41 RECURRENT MAJOR DEPRESSIVE DISORDER, IN PARTIAL REMISSION (HCC): ICD-10-CM

## 2021-09-29 DIAGNOSIS — R41.3 SHORT-TERM MEMORY LOSS: ICD-10-CM

## 2021-09-29 DIAGNOSIS — E03.9 ACQUIRED HYPOTHYROIDISM: Primary | ICD-10-CM

## 2021-09-29 DIAGNOSIS — I10 ESSENTIAL HYPERTENSION: ICD-10-CM

## 2021-09-29 DIAGNOSIS — Z12.31 ENCOUNTER FOR SCREENING MAMMOGRAM FOR BREAST CANCER: ICD-10-CM

## 2021-09-29 DIAGNOSIS — G25.0 ESSENTIAL TREMOR: ICD-10-CM

## 2021-09-29 DIAGNOSIS — K58.1 IRRITABLE BOWEL SYNDROME WITH CONSTIPATION: ICD-10-CM

## 2021-09-29 DIAGNOSIS — K21.9 GASTROESOPHAGEAL REFLUX DISEASE, UNSPECIFIED WHETHER ESOPHAGITIS PRESENT: ICD-10-CM

## 2021-09-29 DIAGNOSIS — N18.31 STAGE 3A CHRONIC KIDNEY DISEASE (HCC): ICD-10-CM

## 2021-09-29 DIAGNOSIS — E03.9 ACQUIRED HYPOTHYROIDISM: ICD-10-CM

## 2021-09-29 DIAGNOSIS — E55.9 VITAMIN D DEFICIENCY: Primary | ICD-10-CM

## 2021-09-29 PROBLEM — K62.5 RECTAL BLEEDING: Status: RESOLVED | Noted: 2019-01-17 | Resolved: 2021-09-29

## 2021-09-29 PROBLEM — K62.89 RECTAL PAIN: Status: RESOLVED | Noted: 2019-01-17 | Resolved: 2021-09-29

## 2021-09-29 PROBLEM — Z48.89 POSTOPERATIVE OBSERVATION: Status: RESOLVED | Noted: 2020-02-19 | Resolved: 2021-09-29

## 2021-09-29 PROBLEM — Z48.89 OBSERVATION AFTER SURGERY: Status: RESOLVED | Noted: 2020-02-20 | Resolved: 2021-09-29

## 2021-09-29 PROBLEM — K60.2 ANAL FISSURE: Status: RESOLVED | Noted: 2020-02-19 | Resolved: 2021-09-29

## 2021-09-29 PROCEDURE — 1036F TOBACCO NON-USER: CPT | Performed by: CLINICAL NURSE SPECIALIST

## 2021-09-29 PROCEDURE — 3017F COLORECTAL CA SCREEN DOC REV: CPT | Performed by: CLINICAL NURSE SPECIALIST

## 2021-09-29 PROCEDURE — 99214 OFFICE O/P EST MOD 30 MIN: CPT | Performed by: CLINICAL NURSE SPECIALIST

## 2021-09-29 PROCEDURE — G8427 DOCREV CUR MEDS BY ELIG CLIN: HCPCS | Performed by: CLINICAL NURSE SPECIALIST

## 2021-09-29 PROCEDURE — G8417 CALC BMI ABV UP PARAM F/U: HCPCS | Performed by: CLINICAL NURSE SPECIALIST

## 2021-09-29 RX ORDER — LEVOTHYROXINE SODIUM 137 UG/1
TABLET ORAL
COMMUNITY
Start: 2021-09-03 | End: 2021-09-29

## 2021-09-29 RX ORDER — ESCITALOPRAM OXALATE 10 MG/1
10 TABLET ORAL DAILY
Qty: 90 TABLET | Refills: 1 | Status: SHIPPED | OUTPATIENT
Start: 2021-09-29 | End: 2021-11-30 | Stop reason: SDUPTHER

## 2021-09-29 RX ORDER — CYANOCOBALAMIN/FOLIC AC/VIT B6 1-2.5-25MG
1 TABLET ORAL DAILY
COMMUNITY
Start: 2021-09-13 | End: 2022-10-04

## 2021-09-29 RX ORDER — OXYBUTYNIN CHLORIDE 5 MG/1
TABLET ORAL
COMMUNITY
Start: 2021-09-03 | End: 2021-12-15 | Stop reason: SDUPTHER

## 2021-09-29 RX ORDER — ONDANSETRON 4 MG/1
4 TABLET, ORALLY DISINTEGRATING ORAL EVERY 8 HOURS PRN
COMMUNITY
End: 2022-08-22 | Stop reason: SDUPTHER

## 2021-09-29 RX ORDER — ERGOCALCIFEROL 1.25 MG/1
50000 CAPSULE ORAL WEEKLY
Qty: 12 CAPSULE | Refills: 1 | Status: SHIPPED | OUTPATIENT
Start: 2021-09-29 | End: 2021-12-22 | Stop reason: SDUPTHER

## 2021-09-29 RX ORDER — DOCUSATE SODIUM 100 MG/1
100 CAPSULE, LIQUID FILLED ORAL 2 TIMES DAILY PRN
COMMUNITY

## 2021-09-29 RX ORDER — LEVOTHYROXINE SODIUM 0.15 MG/1
150 TABLET ORAL DAILY
Qty: 30 TABLET | Refills: 1 | Status: SHIPPED | OUTPATIENT
Start: 2021-09-29 | End: 2021-12-15 | Stop reason: SDUPTHER

## 2021-09-29 RX ORDER — PANTOPRAZOLE SODIUM 40 MG/1
40 TABLET, DELAYED RELEASE ORAL 2 TIMES DAILY
COMMUNITY
Start: 2021-03-27 | End: 2021-12-15 | Stop reason: SDUPTHER

## 2021-09-29 ASSESSMENT — PATIENT HEALTH QUESTIONNAIRE - PHQ9
SUM OF ALL RESPONSES TO PHQ QUESTIONS 1-9: 2
2. FEELING DOWN, DEPRESSED OR HOPELESS: 1
SUM OF ALL RESPONSES TO PHQ QUESTIONS 1-9: 2
1. LITTLE INTEREST OR PLEASURE IN DOING THINGS: 1
SUM OF ALL RESPONSES TO PHQ QUESTIONS 1-9: 2
SUM OF ALL RESPONSES TO PHQ9 QUESTIONS 1 & 2: 2

## 2021-09-29 ASSESSMENT — ENCOUNTER SYMPTOMS
EYE REDNESS: 0
WHEEZING: 0
CONSTIPATION: 1
SORE THROAT: 0
ABDOMINAL PAIN: 0
COUGH: 0
EYE DISCHARGE: 0
EYE PAIN: 0
BACK PAIN: 1
SINUS PRESSURE: 0
FACIAL SWELLING: 0
DIARRHEA: 0
CHEST TIGHTNESS: 0
TROUBLE SWALLOWING: 0
COLOR CHANGE: 0
SHORTNESS OF BREATH: 1

## 2021-09-29 NOTE — PROGRESS NOTES
SUBJECTIVE:  Shawanda Tavares is a 46 y.o. who presents today for Establish Care, Fatigue, and Spasms      HPI    Hope presents today to establish care. Her last PCP was Dr Kristopher Bran but has been 2 years since care from him. He does continue to refill some of her medications. She is followed closely by pulmonology, Dr Rigo Miller, every 3 months for sarcoidosis. She is on chronic oxygen therapy. She sees Dr Andre Akbar for chronic anemia. She is on folbee daily and takes procrit prn hemoglobin. Followed by pain management at [facility] for injections in back as well as lyrica, oxycodone and tizanidine for neck and back. Hypothyroidism, newly noted in June with TSH 8-9. Started on levothyroxine but has not been rechecked. Essential hypertension, typically well controlled, but elevated today. She is taking toprol 50mg once daily. Depression, uncontrolled. Has been on cymbalta for years. She still has more sad days than good days. Some grief, her mother passed. Little motivation some days. Interested in other options. GERD, stable on PPI. Scope UTD. IBS with constipation, controlled on miralax and colace. Scopes UTD. She mentions some memory loss and feeling like shaking or tremors with muscle weakness to BUE.      Past Medical History:   Diagnosis Date    Acquired hypothyroidism     B12 deficiency     Chronic back pain     Chronic pancreatitis (HCC)     Colon polyps     Depression     Fibromyalgia     GERD (gastroesophageal reflux disease)     Headache     Hypertension     Irritable bowel syndrome     Kidney stones     Neuropathy     On home oxygen therapy     2 liters    PONV (postoperative nausea and vomiting)     Restless legs syndrome     Sarcoidosis 11/01/2016    Scoliosis     SVT (supraventricular tachycardia) ([facility] [address]      Past Surgical History:   Procedure Laterality Date    ABLATION OF DYSRHYTHMIC FOCUS  2016    SVT    ANAL SPHINCTEROTOMY N/A 2/19/2020    LATERAL SPHINCTEROTOMY performed by Sarai Aleman MD at [facility]  10/13/2014    Dr. Florencia Lopes AP-5 yr recall    COLONOSCOPY  05/20/2008    Dr. Mik Romero: unremarkable    COLONOSCOPY  08/23/2017    Dr Moses-BCM-5 yr recall    COLONOSCOPY  09/06/2018    Dr. Helen Kitchen, normal-5 yr recall    HYSTERECTOMY      JOINT REPLACEMENT  12/30/2013    Patella joint replacement    LITHOTRIPSY      PLANTAR FASCIA SURGERY Left     MA REVISE MEDIAN N/CARPAL TUNNEL SURG Right 5/8/2018    CARPAL TUNNEL RELEASE performed by Ivana Garcia MD at [facility] Right 5/4/2017    KNEE TOTAL ARTHROPLASTY REVISION performed by Ivana Garcia MD at [facility] ARTHROSCOPY      SIGMOIDOSCOPY N/A 3/15/2019    Dr Ranulfo Bloom non edematous hemorrhoids, anal fissure-[facility]    TOTAL KNEE ARTHROPLASTY Right 11/16/2015    ULNAR TUNNEL RELEASE      UPPER GASTROINTESTINAL ENDOSCOPY  10/16/2014    Dr. Radha James  05/19/2008    Dr. Mik Romero[facility] neg[facility]    UPPER GASTROINTESTINAL ENDOSCOPY N/A 2/10/2016    Dr Jass Esparza-mild esophageal stricture dilated; reactive gastropathy    UPPER GASTROINTESTINAL ENDOSCOPY  08/23/2017    Dr Eric Ziegler Bilateral     RADIO ABLATION     Family History   Problem Relation Age of Onset    Lupus Mother     Kidney Disease Mother     High Blood Pressure Mother     Anemia Mother     Cancer Father         skin    Diabetes Father     Parkinsonism Brother     Celiac Disease Maternal Uncle     Celiac Disease Maternal Cousin     Heart Disease Other     Colon Polyps Neg Hx     Colon Cancer Neg Hx     Esophageal Cancer Neg Hx     Liver Cancer Neg Hx     Liver Disease Neg Hx     Stomach Cancer Neg Hx     Rectal Cancer Neg Hx      Social History     Tobacco Use    Smoking status: Never Smoker    Smokeless tobacco: Never Used    Tobacco comment: disabled due to sarcoidosis   Substance Use Topics    Alcohol use: No     Current Outpatient Medications   Medication Sig Dispense Refill    pantoprazole (PROTONIX) 40 MG tablet Take 40 mg by mouth 2 times daily      EUTHYROX 137 MCG tablet TAKE 1 TABLET BY MOUTH ONCE DAILY FOR 30 DAYS      folic acid-pyridoxine-cyanocobalamine (FOLBEE) 2.5-25-1 MG TABS tablet Take 1 tablet by mouth daily      docusate sodium (COLACE) 100 MG capsule Take 100 mg by mouth 2 times daily      ondansetron (ZOFRAN ODT) 4 MG disintegrating tablet Take 4 mg by mouth every 8 hours as needed for Nausea or Vomiting      oxybutynin (DITROPAN) 5 MG tablet TAKE 1 TABLET BY MOUTH ONCE DAILY AT BEDTIME      escitalopram (LEXAPRO) 10 MG tablet Take 1 tablet by mouth daily 90 tablet 1    pregabalin (LYRICA) 100 MG capsule Take 100 mg by mouth 3 times daily.  oxyCODONE-acetaminophen (LYNOX) 7.5-300 MG per tablet Take 1 tablet by mouth every morning (before breakfast).  polyethylene glycol (MIRALAX) powder 1 tsp daily for constipation. Adjust dose for desired bm 510 g 11    OXYGEN Inhale 2 L/min into the lungs continuous       epoetin cheryl (PROCRIT) 03200 UNIT/ML injection Inject 1 Units into the skin as needed      tiZANidine (ZANAFLEX) 4 MG tablet Take 4 mg by mouth 3 times daily as needed (muscle spasms)       metoprolol succinate (TOPROL XL) 50 MG extended release tablet Take 50 mg by mouth daily Indications: 2 tabs PO QD       No current facility-administered medications for this visit. Allergies   Allergen Reactions    Tape [Adhesive Niki De La Cruz      can use paper tape       Review of Systems   Constitutional: Positive for fatigue. Negative for appetite change, chills, diaphoresis and fever. HENT: Negative for congestion, ear pain, facial swelling, hearing loss, postnasal drip, sinus pressure, sore throat and trouble swallowing. Eyes: Negative for pain, discharge, redness and visual disturbance.    Respiratory: Positive for shortness of breath. Negative for cough, chest tightness and wheezing. Cardiovascular: Negative for chest pain and palpitations. Gastrointestinal: Positive for constipation. Negative for abdominal pain and diarrhea. Genitourinary: Positive for frequency. Negative for difficulty urinating, dysuria, flank pain, hematuria and urgency. Musculoskeletal: Positive for back pain and neck pain. Negative for arthralgias, joint swelling and neck stiffness. Skin: Negative for color change and rash. Neurological: Positive for tremors. Negative for dizziness, syncope, weakness, light-headedness and headaches. Hematological: Negative for adenopathy. Does not bruise/bleed easily. Psychiatric/Behavioral: Positive for dysphoric mood. Negative for confusion, sleep disturbance and suicidal ideas. The patient is not nervous/anxious. OBJECTIVE:  /88   Pulse 81   Temp 97.6 °F (36.4 °C) (Temporal)   Resp 18   Ht 5' 3\" (1.6 m)   Wt 273 lb 3.2 oz (123.9 kg)   SpO2 98%   BMI 48.40 kg/m²    Physical Exam  Vitals reviewed. Constitutional:       General: She is not in acute distress. Appearance: She is well-developed. She is obese. She is not ill-appearing or toxic-appearing. HENT:      Head: Normocephalic and atraumatic. Eyes:      General:         Right eye: No discharge. Left eye: No discharge. Conjunctiva/sclera: Conjunctivae normal.      Pupils: Pupils are equal, round, and reactive to light. Neck:      Thyroid: No thyromegaly. Trachea: No tracheal deviation. Cardiovascular:      Rate and Rhythm: Normal rate and regular rhythm. Heart sounds: No murmur heard. Pulmonary:      Effort: Pulmonary effort is normal. No respiratory distress. Breath sounds: Normal breath sounds. No wheezing or rales. Genitourinary:     Vagina: Normal.   Musculoskeletal:         General: No deformity. Normal range of motion.       Cervical back: Normal range of motion and neck supple. Lymphadenopathy:      Cervical: No cervical adenopathy. Skin:     General: Skin is warm and dry. Findings: No erythema or rash. Neurological:      Mental Status: She is alert and oriented to person, place, and time. Mental status is at baseline. Psychiatric:         Mood and Affect: Mood normal.         Behavior: Behavior normal.         Thought Content: Thought content normal.         Judgment: Judgment normal.         ASSESSMENT/PLAN:  1. Acquired hypothyroidism  - TSH without Reflex; Future    2. Essential hypertension  - Comprehensive Metabolic Panel; Future    3. Recurrent major depressive disorder, in partial remission (HCC)  - escitalopram (LEXAPRO) 10 MG tablet; Take 1 tablet by mouth daily  Dispense: 90 tablet; Refill: 1    4. Gastroesophageal reflux disease, unspecified whether esophagitis present    5. Irritable bowel syndrome with constipation    6. Short-term memory loss  - Vitamin D 25 Hydroxy; Future    7. Essential tremor  - Magnesium; Future    8. Encounter for screening mammogram for breast cancer  - [employer] DIGITAL SCREEN W OR WO CAD BILATERAL; Future    9. Stage 3a chronic kidney disease (HCC)  - Vitamin D 25 Hydroxy; Future    New patient. Reviewed history. Update labs, check TFT for dosing. Check renal function, elytes and vitamin D for memory loss and \"tremors\"  May need neuro referral or NCV  Taper off cymbalta, start lexapro for MDD          Return in about 5 weeks (around 11/3/2021) for Annual Wellness Visit.

## 2021-09-30 ENCOUNTER — TRANSCRIBE ORDERS (OUTPATIENT)
Dept: ADMINISTRATIVE | Facility: HOSPITAL | Age: 51
End: 2021-09-30

## 2021-09-30 DIAGNOSIS — Z12.31 ENCOUNTER FOR SCREENING MAMMOGRAM FOR BREAST CANCER: Primary | ICD-10-CM

## 2021-10-08 ENCOUNTER — APPOINTMENT (OUTPATIENT)
Dept: ONCOLOGY | Facility: HOSPITAL | Age: 51
End: 2021-10-08

## 2021-10-08 ENCOUNTER — APPOINTMENT (OUTPATIENT)
Dept: LAB | Facility: HOSPITAL | Age: 51
End: 2021-10-08

## 2021-10-12 ENCOUNTER — TELEPHONE (OUTPATIENT)
Dept: FAMILY MEDICINE CLINIC | Age: 51
End: 2021-10-12

## 2021-10-12 DIAGNOSIS — B35.1 TOENAIL FUNGUS: Primary | ICD-10-CM

## 2021-10-12 DIAGNOSIS — L85.3 DRY SKIN: ICD-10-CM

## 2021-10-12 NOTE — TELEPHONE ENCOUNTER
----- Message from Sample6 Gambian sent at 10/11/2021  9:03 AM CDT -----  Subject: Referral Request    QUESTIONS   Reason for referral request? [facility] pod[facility] for left/ foot dry skin fax   number is [phone]   Has the physician seen you for this condition before? No   Preferred Specialist (if applicable)? Do you already have an appointment scheduled? Additional Information for Provider?   ---------------------------------------------------------------------------  --------------  CALL BACK INFO  What is the best way for the office to contact you? OK to leave message on   voicemail  Preferred Call Back Phone Number?  [phone]

## 2021-10-12 NOTE — TELEPHONE ENCOUNTER
Patient states she has severe dry skin and possible a nail fungus on her left foot. Patient requests a referral to Dr. Jayshree Sunshine. Referral placed and sent to Dr. Verner Hench office.

## 2021-10-22 ENCOUNTER — LAB (OUTPATIENT)
Dept: LAB | Facility: HOSPITAL | Age: 51
End: 2021-10-22

## 2021-10-22 ENCOUNTER — INFUSION (OUTPATIENT)
Dept: ONCOLOGY | Facility: HOSPITAL | Age: 51
End: 2021-10-22

## 2021-10-22 VITALS
BODY MASS INDEX: 52.19 KG/M2 | OXYGEN SATURATION: 100 % | SYSTOLIC BLOOD PRESSURE: 144 MMHG | TEMPERATURE: 97.2 F | WEIGHT: 283.6 LBS | DIASTOLIC BLOOD PRESSURE: 81 MMHG | HEART RATE: 57 BPM | HEIGHT: 62 IN | RESPIRATION RATE: 20 BRPM

## 2021-10-22 DIAGNOSIS — N18.9 ANEMIA IN CHRONIC KIDNEY DISEASE, UNSPECIFIED CKD STAGE: ICD-10-CM

## 2021-10-22 DIAGNOSIS — D50.9 IRON DEFICIENCY ANEMIA, UNSPECIFIED IRON DEFICIENCY ANEMIA TYPE: Primary | ICD-10-CM

## 2021-10-22 DIAGNOSIS — D63.1 ANEMIA IN CHRONIC KIDNEY DISEASE, UNSPECIFIED CKD STAGE: ICD-10-CM

## 2021-10-22 LAB
ALBUMIN SERPL-MCNC: 4.2 G/DL (ref 3.5–5.2)
ALBUMIN/GLOB SERPL: 1.8 G/DL
ALP SERPL-CCNC: 66 U/L (ref 39–117)
ALT SERPL W P-5'-P-CCNC: 22 U/L (ref 1–33)
ANION GAP SERPL CALCULATED.3IONS-SCNC: 9 MMOL/L (ref 5–15)
AST SERPL-CCNC: 26 U/L (ref 1–32)
BASOPHILS # BLD AUTO: 0.04 10*3/MM3 (ref 0–0.2)
BASOPHILS NFR BLD AUTO: 1.2 % (ref 0–1.5)
BILIRUB SERPL-MCNC: 0.2 MG/DL (ref 0–1.2)
BUN SERPL-MCNC: 28 MG/DL (ref 6–20)
BUN/CREAT SERPL: 21.5 (ref 7–25)
CALCIUM SPEC-SCNC: 8.7 MG/DL (ref 8.6–10.5)
CHLORIDE SERPL-SCNC: 108 MMOL/L (ref 98–107)
CO2 SERPL-SCNC: 24 MMOL/L (ref 22–29)
CREAT SERPL-MCNC: 1.3 MG/DL (ref 0.57–1)
DEPRECATED RDW RBC AUTO: 50.8 FL (ref 37–54)
EOSINOPHIL # BLD AUTO: 0.17 10*3/MM3 (ref 0–0.4)
EOSINOPHIL NFR BLD AUTO: 5.3 % (ref 0.3–6.2)
ERYTHROCYTE [DISTWIDTH] IN BLOOD BY AUTOMATED COUNT: 13.9 % (ref 12.3–15.4)
FERRITIN SERPL-MCNC: 147.1 NG/ML (ref 13–150)
FOLATE SERPL-MCNC: >20 NG/ML (ref 4.78–24.2)
GFR SERPL CREATININE-BSD FRML MDRD: 43 ML/MIN/1.73
GLOBULIN UR ELPH-MCNC: 2.3 GM/DL
GLUCOSE SERPL-MCNC: 97 MG/DL (ref 65–99)
HCT VFR BLD AUTO: 31.2 % (ref 34–46.6)
HGB BLD-MCNC: 9.7 G/DL (ref 12–15.9)
IMM GRANULOCYTES # BLD AUTO: 0.01 10*3/MM3 (ref 0–0.05)
IMM GRANULOCYTES NFR BLD AUTO: 0.3 % (ref 0–0.5)
IRON 24H UR-MRATE: 44 MCG/DL (ref 37–145)
IRON SATN MFR SERPL: 14 % (ref 20–50)
LYMPHOCYTES # BLD AUTO: 1.11 10*3/MM3 (ref 0.7–3.1)
LYMPHOCYTES NFR BLD AUTO: 34.4 % (ref 19.6–45.3)
MCH RBC QN AUTO: 31.1 PG (ref 26.6–33)
MCHC RBC AUTO-ENTMCNC: 31.1 G/DL (ref 31.5–35.7)
MCV RBC AUTO: 100 FL (ref 79–97)
MONOCYTES # BLD AUTO: 0.34 10*3/MM3 (ref 0.1–0.9)
MONOCYTES NFR BLD AUTO: 10.5 % (ref 5–12)
NEUTROPHILS NFR BLD AUTO: 1.56 10*3/MM3 (ref 1.7–7)
NEUTROPHILS NFR BLD AUTO: 48.3 % (ref 42.7–76)
NRBC BLD AUTO-RTO: 0 /100 WBC (ref 0–0.2)
PLATELET # BLD AUTO: 145 10*3/MM3 (ref 140–450)
PMV BLD AUTO: 9.8 FL (ref 6–12)
POTASSIUM SERPL-SCNC: 4.3 MMOL/L (ref 3.5–5.2)
PROT SERPL-MCNC: 6.5 G/DL (ref 6–8.5)
RBC # BLD AUTO: 3.12 10*6/MM3 (ref 3.77–5.28)
SODIUM SERPL-SCNC: 141 MMOL/L (ref 136–145)
TIBC SERPL-MCNC: 308 MCG/DL (ref 298–536)
TRANSFERRIN SERPL-MCNC: 207 MG/DL (ref 200–360)
VIT B12 BLD-MCNC: 539 PG/ML (ref 211–946)
WBC # BLD AUTO: 3.23 10*3/MM3 (ref 3.4–10.8)

## 2021-10-22 PROCEDURE — G0463 HOSPITAL OUTPT CLINIC VISIT: HCPCS

## 2021-10-22 PROCEDURE — 82728 ASSAY OF FERRITIN: CPT

## 2021-10-22 PROCEDURE — 85025 COMPLETE CBC W/AUTO DIFF WBC: CPT

## 2021-10-22 PROCEDURE — 80053 COMPREHEN METABOLIC PANEL: CPT

## 2021-10-22 PROCEDURE — 36415 COLL VENOUS BLD VENIPUNCTURE: CPT

## 2021-10-22 PROCEDURE — 82746 ASSAY OF FOLIC ACID SERUM: CPT

## 2021-10-22 PROCEDURE — 82607 VITAMIN B-12: CPT

## 2021-10-22 PROCEDURE — 83540 ASSAY OF IRON: CPT

## 2021-10-22 PROCEDURE — 84466 ASSAY OF TRANSFERRIN: CPT

## 2021-10-22 NOTE — PROGRESS NOTES
1210 call placed to the office and spoke with REMY Aguirre regarding pt's labs today including iron sat of 14. Cant get retacrit today. Carla states they will call the pt next week and probably set up an iron infusion soon. Pt voices understanding.

## 2021-11-03 ENCOUNTER — TELEPHONE (OUTPATIENT)
Dept: PODIATRY | Facility: CLINIC | Age: 51
End: 2021-11-03

## 2021-11-03 NOTE — TELEPHONE ENCOUNTER
Left message regarding appt on 11/04/2021. informed pt she would be seeing juan luis instead of dr sepulveda

## 2021-11-10 ENCOUNTER — TELEPHONE (OUTPATIENT)
Dept: ONCOLOGY | Facility: CLINIC | Age: 51
End: 2021-11-10

## 2021-11-10 NOTE — TELEPHONE ENCOUNTER
Caller: Stacy Lynn    Relationship to patient: Self    Best call back number: [phone]    Chief complaint: PT TO RESCHED 11/5 APPT    Type of visit: LAB AND INJECTION    Requested date: 11/19 IN THE AFTERNOON     If rescheduling, when is the original appointment: 11/5    Additional notes:

## 2021-11-10 NOTE — TELEPHONE ENCOUNTER
Caller: Stacy Lynn     Relationship: SELF    Best call back number: [phone]    What is your medical concern? PT STATES HER HEMOGLOBIN AND IRON WERE VERY LOW AT LAST LAB. PATIENT ASKING FOR NEXT STEPS    How long has this issue been going on? 10/22    Is your provider already aware of this issue? YES    Have you been treated for this issue? NO

## 2021-11-10 NOTE — TELEPHONE ENCOUNTER
PT CALLED UPSET BECAUSE SHE FEELS THAT SHE HAD GOTTEN THE RUN AROUND TODAY AND SHE NEEDED TO SPEAK WITH ANIKA REGARDING HER LABS. W/T PT TO OFFICE TO FURTHER ASSIST.    Respiratory distress

## 2021-11-10 NOTE — TELEPHONE ENCOUNTER
Patient calls and is upset that she has not been called with her recent lab results from 10/22/21  HGB: 9.7  Iron Sat: 14%  Ferritin: 147.0  She can not tolerate oral iron tablets  She is not able to receive Procrit Injections due to uncorrected Iron Sats  Her last Injectafer txt was late 2020    Please advise

## 2021-11-11 NOTE — TELEPHONE ENCOUNTER
11/11/21: return call to patient, unable to contact, message was left for return call to discuss her best possible times to frank her iron txt Venofer x 3 days/waiting on call back.

## 2021-11-12 NOTE — TELEPHONE ENCOUNTER
11/12/21 @ 1:30 PM: return call to patient Stacy Lynn, unable to contact, patient requested to call to discuss scheduling her treatments of IV iron infusions. Waiting on call back

## 2021-11-16 ENCOUNTER — OFFICE VISIT (OUTPATIENT)
Dept: FAMILY MEDICINE CLINIC | Age: 51
End: 2021-11-16
Payer: MEDICARE

## 2021-11-16 VITALS
HEIGHT: 63 IN | TEMPERATURE: 97.2 F | BODY MASS INDEX: 49.54 KG/M2 | HEART RATE: 73 BPM | WEIGHT: 279.6 LBS | OXYGEN SATURATION: 98 % | RESPIRATION RATE: 18 BRPM | DIASTOLIC BLOOD PRESSURE: 84 MMHG | SYSTOLIC BLOOD PRESSURE: 126 MMHG

## 2021-11-16 DIAGNOSIS — N18.31 STAGE 3A CHRONIC KIDNEY DISEASE (HCC): ICD-10-CM

## 2021-11-16 DIAGNOSIS — G89.29 CHRONIC NONINTRACTABLE HEADACHE, UNSPECIFIED HEADACHE TYPE: ICD-10-CM

## 2021-11-16 DIAGNOSIS — Z13.220 LIPID SCREENING: ICD-10-CM

## 2021-11-16 DIAGNOSIS — G47.14 HYPERSOMNIA DUE TO MEDICAL CONDITION: ICD-10-CM

## 2021-11-16 DIAGNOSIS — K90.9 IRON MALABSORPTION: Primary | ICD-10-CM

## 2021-11-16 DIAGNOSIS — D50.9 IRON DEFICIENCY ANEMIA, UNSPECIFIED IRON DEFICIENCY ANEMIA TYPE: ICD-10-CM

## 2021-11-16 DIAGNOSIS — E03.9 ACQUIRED HYPOTHYROIDISM: Primary | ICD-10-CM

## 2021-11-16 DIAGNOSIS — R51.9 CHRONIC NONINTRACTABLE HEADACHE, UNSPECIFIED HEADACHE TYPE: ICD-10-CM

## 2021-11-16 DIAGNOSIS — R41.3 SHORT-TERM MEMORY LOSS: ICD-10-CM

## 2021-11-16 DIAGNOSIS — E03.9 ACQUIRED HYPOTHYROIDISM: ICD-10-CM

## 2021-11-16 DIAGNOSIS — L03.115 CELLULITIS OF RIGHT LOWER EXTREMITY: ICD-10-CM

## 2021-11-16 DIAGNOSIS — E55.9 VITAMIN D DEFICIENCY: ICD-10-CM

## 2021-11-16 DIAGNOSIS — R09.02 HYPOXIA: ICD-10-CM

## 2021-11-16 LAB
ALBUMIN SERPL-MCNC: 4.2 G/DL (ref 3.5–5.2)
ALP BLD-CCNC: 70 U/L (ref 35–104)
ALT SERPL-CCNC: 18 U/L (ref 5–33)
ANION GAP SERPL CALCULATED.3IONS-SCNC: 11 MMOL/L (ref 7–19)
AST SERPL-CCNC: 22 U/L (ref 5–32)
BILIRUB SERPL-MCNC: 0.3 MG/DL (ref 0.2–1.2)
BUN BLDV-MCNC: 24 MG/DL (ref 6–20)
CALCIUM SERPL-MCNC: 8.7 MG/DL (ref 8.6–10)
CHLORIDE BLD-SCNC: 111 MMOL/L (ref 98–111)
CHOLESTEROL, TOTAL: 181 MG/DL (ref 160–199)
CO2: 22 MMOL/L (ref 22–29)
CREAT SERPL-MCNC: 1.3 MG/DL (ref 0.5–0.9)
GFR AFRICAN AMERICAN: 52
GFR NON-AFRICAN AMERICAN: 43
GLUCOSE BLD-MCNC: 95 MG/DL (ref 74–109)
HDLC SERPL-MCNC: 59 MG/DL (ref 65–121)
LDL CHOLESTEROL CALCULATED: 102 MG/DL
POTASSIUM SERPL-SCNC: 4.2 MMOL/L (ref 3.5–5)
SODIUM BLD-SCNC: 144 MMOL/L (ref 136–145)
TOTAL PROTEIN: 6.6 G/DL (ref 6.6–8.7)
TRIGL SERPL-MCNC: 100 MG/DL (ref 0–149)
TSH SERPL DL<=0.05 MIU/L-ACNC: 5.96 UIU/ML (ref 0.27–4.2)
VITAMIN D 25-HYDROXY: 25.5 NG/ML

## 2021-11-16 PROCEDURE — 99214 OFFICE O/P EST MOD 30 MIN: CPT | Performed by: CLINICAL NURSE SPECIALIST

## 2021-11-16 PROCEDURE — G8484 FLU IMMUNIZE NO ADMIN: HCPCS | Performed by: CLINICAL NURSE SPECIALIST

## 2021-11-16 PROCEDURE — 3017F COLORECTAL CA SCREEN DOC REV: CPT | Performed by: CLINICAL NURSE SPECIALIST

## 2021-11-16 PROCEDURE — 1036F TOBACCO NON-USER: CPT | Performed by: CLINICAL NURSE SPECIALIST

## 2021-11-16 PROCEDURE — G8427 DOCREV CUR MEDS BY ELIG CLIN: HCPCS | Performed by: CLINICAL NURSE SPECIALIST

## 2021-11-16 PROCEDURE — G8417 CALC BMI ABV UP PARAM F/U: HCPCS | Performed by: CLINICAL NURSE SPECIALIST

## 2021-11-16 RX ORDER — DIPHENHYDRAMINE HYDROCHLORIDE 50 MG/ML
50 INJECTION INTRAMUSCULAR; INTRAVENOUS AS NEEDED
Status: CANCELLED | OUTPATIENT
Start: 2021-11-19

## 2021-11-16 RX ORDER — SULFAMETHOXAZOLE AND TRIMETHOPRIM 800; 160 MG/1; MG/1
1 TABLET ORAL 2 TIMES DAILY
Qty: 14 TABLET | Refills: 0 | Status: SHIPPED | OUTPATIENT
Start: 2021-11-16 | End: 2021-11-23

## 2021-11-16 RX ORDER — FAMOTIDINE 10 MG/ML
20 INJECTION, SOLUTION INTRAVENOUS AS NEEDED
Status: CANCELLED | OUTPATIENT
Start: 2021-11-19

## 2021-11-16 RX ORDER — SODIUM CHLORIDE 9 MG/ML
250 INJECTION, SOLUTION INTRAVENOUS ONCE
Status: CANCELLED | OUTPATIENT
Start: 2021-11-19

## 2021-11-16 NOTE — TELEPHONE ENCOUNTER
11/16/21: patient Stacy Lynn is frank for 1 txt of Injectafer on Friday 11/19/21 @ 1:30 per Dusty HAN Patient is aware of time and date of apt.   Lab apt was cancelled for 11/19/21  Procrit Injection cancelled for 11/19/21, unable to receive until Iron stores are corrected  Contacted Lisbeth Costa, patient ok for Injectafer or Venofer, patient prefers one txt Injectafer due to transportation issues.   [facility] notified

## 2021-11-17 ASSESSMENT — ENCOUNTER SYMPTOMS
SORE THROAT: 0
WHEEZING: 0
CHEST TIGHTNESS: 0
EYE PAIN: 0
FACIAL SWELLING: 0
TROUBLE SWALLOWING: 0
COLOR CHANGE: 0
ABDOMINAL PAIN: 0
SINUS PRESSURE: 0
EYE REDNESS: 0
BACK PAIN: 0
SHORTNESS OF BREATH: 1
CONSTIPATION: 0
DIARRHEA: 0
EYE DISCHARGE: 0
COUGH: 0

## 2021-11-18 ENCOUNTER — TELEPHONE (OUTPATIENT)
Dept: NEUROSURGERY | Age: 51
End: 2021-11-18

## 2021-11-19 ENCOUNTER — TELEPHONE (OUTPATIENT)
Dept: FAMILY MEDICINE CLINIC | Age: 51
End: 2021-11-19

## 2021-11-19 ENCOUNTER — APPOINTMENT (OUTPATIENT)
Dept: LAB | Facility: HOSPITAL | Age: 51
End: 2021-11-19

## 2021-11-19 ENCOUNTER — INFUSION (OUTPATIENT)
Dept: ONCOLOGY | Facility: HOSPITAL | Age: 51
End: 2021-11-19

## 2021-11-19 VITALS
BODY MASS INDEX: 48.14 KG/M2 | SYSTOLIC BLOOD PRESSURE: 126 MMHG | HEART RATE: 56 BPM | OXYGEN SATURATION: 100 % | TEMPERATURE: 97 F | HEIGHT: 64 IN | WEIGHT: 282 LBS | RESPIRATION RATE: 18 BRPM | DIASTOLIC BLOOD PRESSURE: 83 MMHG

## 2021-11-19 DIAGNOSIS — K90.9 IRON MALABSORPTION: Primary | ICD-10-CM

## 2021-11-19 DIAGNOSIS — D50.9 IRON DEFICIENCY ANEMIA, UNSPECIFIED IRON DEFICIENCY ANEMIA TYPE: ICD-10-CM

## 2021-11-19 PROCEDURE — 96365 THER/PROPH/DIAG IV INF INIT: CPT

## 2021-11-19 PROCEDURE — 25010000002 FERRIC CARBOXYMALTOSE 750 MG/15ML SOLUTION 15 ML VIAL: Performed by: INTERNAL MEDICINE

## 2021-11-19 RX ORDER — FAMOTIDINE 10 MG/ML
20 INJECTION, SOLUTION INTRAVENOUS AS NEEDED
Status: DISCONTINUED | OUTPATIENT
Start: 2021-11-19 | End: 2021-11-19 | Stop reason: HOSPADM

## 2021-11-19 RX ORDER — DIPHENHYDRAMINE HYDROCHLORIDE 50 MG/ML
50 INJECTION INTRAMUSCULAR; INTRAVENOUS AS NEEDED
Status: DISCONTINUED | OUTPATIENT
Start: 2021-11-19 | End: 2021-11-19 | Stop reason: HOSPADM

## 2021-11-19 RX ORDER — SODIUM CHLORIDE 9 MG/ML
250 INJECTION, SOLUTION INTRAVENOUS ONCE
Status: COMPLETED | OUTPATIENT
Start: 2021-11-19 | End: 2021-11-19

## 2021-11-19 RX ADMIN — FERRIC CARBOXYMALTOSE INJECTION 750 MG: 50 INJECTION, SOLUTION INTRAVENOUS at 13:28

## 2021-11-19 RX ADMIN — SODIUM CHLORIDE 250 ML: 9 INJECTION, SOLUTION INTRAVENOUS at 13:28

## 2021-11-19 NOTE — TELEPHONE ENCOUNTER
Left message with results on secure voicemail and to call back to let us know if she has any questions or concerns.

## 2021-11-30 DIAGNOSIS — F33.41 RECURRENT MAJOR DEPRESSIVE DISORDER, IN PARTIAL REMISSION (HCC): ICD-10-CM

## 2021-11-30 RX ORDER — METOPROLOL SUCCINATE 50 MG/1
50 TABLET, EXTENDED RELEASE ORAL DAILY
Qty: 90 TABLET | Refills: 0 | Status: SHIPPED | OUTPATIENT
Start: 2021-11-30 | End: 2021-12-06

## 2021-11-30 RX ORDER — ESCITALOPRAM OXALATE 10 MG/1
10 TABLET ORAL DAILY
Qty: 90 TABLET | Refills: 0 | Status: SHIPPED | OUTPATIENT
Start: 2021-11-30 | End: 2021-12-22 | Stop reason: SDUPTHER

## 2021-11-30 NOTE — TELEPHONE ENCOUNTER
Mindi Silver called to request a refill on her medication.       Last office visit : 11/16/2021   Next office visit : 12/7/2021     Requested Prescriptions     Signed Prescriptions Disp Refills    metoprolol succinate (TOPROL XL) 50 MG extended release tablet 90 tablet 0     Sig: Take 1 tablet by mouth daily     Authorizing Provider: Theodore Saucedo     Ordering User: Rosanne Doyle    escitalopram (LEXAPRO) 10 MG tablet 90 tablet 0     Sig: Take 1 tablet by mouth daily     Authorizing Provider: Theodore Saucedo     Ordering User: Prabhjot Ventura MA

## 2021-12-06 ENCOUNTER — TELEPHONE (OUTPATIENT)
Dept: ONCOLOGY | Facility: CLINIC | Age: 51
End: 2021-12-06

## 2021-12-06 RX ORDER — METOPROLOL SUCCINATE 50 MG/1
TABLET, EXTENDED RELEASE ORAL
Qty: 90 TABLET | Refills: 0 | Status: SHIPPED | OUTPATIENT
Start: 2021-12-06 | End: 2021-12-15 | Stop reason: SDUPTHER

## 2021-12-06 NOTE — TELEPHONE ENCOUNTER
Spoke with Hope and let her know that we need to get her to come back in to recheck the iron profile to make sure the iron infusion helped. She reports that she has been extremely fatigued. I sent her to the front to schedule.

## 2021-12-06 NOTE — TELEPHONE ENCOUNTER
Caller: Stacy Lynn    Relationship: Self    Best call back number: [phone]    What is the best time to reach you: ANYTIME    Who are you requesting to speak with (clinical staff, provider,  specific staff member): TABATHA DONALDSON      What was the call regarding: PATIENT HAD INFUSION ON 11-19-21 AND SHE STATED SHE HADN'T HEARD FROM ANYONE ON WHAT TO DO NEXT, PLEASE CALL TO ADVISE

## 2021-12-06 NOTE — TELEPHONE ENCOUNTER
Hope called requesting a refill of the below medication which has been pended for you:     Requested Prescriptions     Pending Prescriptions Disp Refills    metoprolol succinate (TOPROL XL) 50 MG extended release tablet [Pharmacy Med Name: METOPROLOL SUCC ER 50 MG TAB] 90 tablet 0     Sig: TAKE 1 TABLET BY MOUTH EVERY DAY       Last Appointment Date: 11/16/2021  Next Appointment Date: 12/7/2021    Allergies   Allergen Reactions    Tape Mary Sanchez      can use paper tape

## 2021-12-10 ENCOUNTER — APPOINTMENT (OUTPATIENT)
Dept: LAB | Facility: HOSPITAL | Age: 51
End: 2021-12-10

## 2021-12-15 DIAGNOSIS — K21.9 GASTROESOPHAGEAL REFLUX DISEASE, UNSPECIFIED WHETHER ESOPHAGITIS PRESENT: ICD-10-CM

## 2021-12-15 DIAGNOSIS — E03.9 ACQUIRED HYPOTHYROIDISM: ICD-10-CM

## 2021-12-15 DIAGNOSIS — I10 ESSENTIAL HYPERTENSION: Primary | ICD-10-CM

## 2021-12-15 RX ORDER — METOPROLOL SUCCINATE 50 MG/1
TABLET, EXTENDED RELEASE ORAL
Qty: 30 TABLET | Refills: 3 | Status: SHIPPED | OUTPATIENT
Start: 2021-12-15 | End: 2022-08-09

## 2021-12-15 RX ORDER — LEVOTHYROXINE SODIUM 0.15 MG/1
150 TABLET ORAL DAILY
Qty: 30 TABLET | Refills: 3 | Status: SHIPPED | OUTPATIENT
Start: 2021-12-15 | End: 2022-02-10

## 2021-12-15 RX ORDER — OXYBUTYNIN CHLORIDE 5 MG/1
TABLET ORAL
Qty: 30 TABLET | Refills: 3 | Status: SHIPPED | OUTPATIENT
Start: 2021-12-15 | End: 2022-04-07 | Stop reason: SDUPTHER

## 2021-12-15 RX ORDER — TOPIRAMATE 100 MG/1
100 TABLET, FILM COATED ORAL NIGHTLY
Qty: 30 TABLET | Refills: 3 | Status: SHIPPED | OUTPATIENT
Start: 2021-12-15 | End: 2022-04-07 | Stop reason: SDUPTHER

## 2021-12-15 RX ORDER — PANTOPRAZOLE SODIUM 40 MG/1
40 TABLET, DELAYED RELEASE ORAL 2 TIMES DAILY
Qty: 30 TABLET | Refills: 3 | Status: SHIPPED | OUTPATIENT
Start: 2021-12-15 | End: 2022-01-05 | Stop reason: SDUPTHER

## 2021-12-15 NOTE — TELEPHONE ENCOUNTER
Mindi MAGAÑA Nevillecassie called to request a refill on her medication.       Last office visit : 11/16/2021   Next office visit : 12/22/2021     Requested Prescriptions     Signed Prescriptions Disp Refills    pantoprazole (PROTONIX) 40 MG tablet 30 tablet 3     Sig: Take 1 tablet by mouth 2 times daily     Authorizing Provider: Lieutenant Kumar Hanson User: Kamilah Borja    oxybutynin (DITROPAN) 5 MG tablet 30 tablet 3     Sig: TAKE 1 TABLET BY MOUTH ONCE DAILY AT BEDTIME     Authorizing Provider: Lieutenant Kumar Hanson User: Kamilah Borja    metoprolol succinate (TOPROL XL) 50 MG extended release tablet 30 tablet 3     Sig: TAKE 1 TABLET BY MOUTH EVERY DAY     Authorizing Provider: Lieutenant Kumar Hanson User: Wilbert iVllanueva levothyroxine (EUTHYROX) 150 MCG tablet 30 tablet 3     Sig: Take 1 tablet by mouth Daily     Authorizing Provider: Lieutenant Kumar Hanson User: Kamilah Borja    topiramate (TOPAMAX) 100 MG tablet 30 tablet 3     Sig: Take 1 tablet by mouth nightly Indications: Backache     Authorizing Provider: Lieutenant Kumar Hanson User: Irina Randhawa MA

## 2021-12-17 ENCOUNTER — LAB (OUTPATIENT)
Dept: LAB | Facility: HOSPITAL | Age: 51
End: 2021-12-17

## 2021-12-17 DIAGNOSIS — D69.6 THROMBOCYTOPENIA (HCC): Primary | ICD-10-CM

## 2021-12-17 DIAGNOSIS — N18.9 ANEMIA IN CHRONIC KIDNEY DISEASE, UNSPECIFIED CKD STAGE: ICD-10-CM

## 2021-12-17 DIAGNOSIS — D63.1 ANEMIA IN CHRONIC KIDNEY DISEASE, UNSPECIFIED CKD STAGE: ICD-10-CM

## 2021-12-17 LAB
ALBUMIN SERPL-MCNC: 4.5 G/DL (ref 3.5–5.2)
ALBUMIN/GLOB SERPL: 1.8 G/DL
ALP SERPL-CCNC: 71 U/L (ref 39–117)
ALT SERPL W P-5'-P-CCNC: 16 U/L (ref 1–33)
ANION GAP SERPL CALCULATED.3IONS-SCNC: 7 MMOL/L (ref 5–15)
AST SERPL-CCNC: 22 U/L (ref 1–32)
BASOPHILS # BLD AUTO: 0.03 10*3/MM3 (ref 0–0.2)
BASOPHILS NFR BLD AUTO: 1.1 % (ref 0–1.5)
BILIRUB SERPL-MCNC: 0.4 MG/DL (ref 0–1.2)
BUN SERPL-MCNC: 21 MG/DL (ref 6–20)
BUN/CREAT SERPL: 15 (ref 7–25)
CALCIUM SPEC-SCNC: 8.7 MG/DL (ref 8.6–10.5)
CHLORIDE SERPL-SCNC: 108 MMOL/L (ref 98–107)
CO2 SERPL-SCNC: 25 MMOL/L (ref 22–29)
CREAT SERPL-MCNC: 1.4 MG/DL (ref 0.57–1)
DEPRECATED RDW RBC AUTO: 50.4 FL (ref 37–54)
EOSINOPHIL # BLD AUTO: 0.16 10*3/MM3 (ref 0–0.4)
EOSINOPHIL NFR BLD AUTO: 5.9 % (ref 0.3–6.2)
ERYTHROCYTE [DISTWIDTH] IN BLOOD BY AUTOMATED COUNT: 13.7 % (ref 12.3–15.4)
FERRITIN SERPL-MCNC: 286.6 NG/ML (ref 13–150)
GFR SERPL CREATININE-BSD FRML MDRD: 39 ML/MIN/1.73
GLOBULIN UR ELPH-MCNC: 2.5 GM/DL
GLUCOSE SERPL-MCNC: 95 MG/DL (ref 65–99)
HCT VFR BLD AUTO: 35.2 % (ref 34–46.6)
HGB BLD-MCNC: 10.7 G/DL (ref 12–15.9)
IMM GRANULOCYTES # BLD AUTO: 0.01 10*3/MM3 (ref 0–0.05)
IMM GRANULOCYTES NFR BLD AUTO: 0.4 % (ref 0–0.5)
IRON 24H UR-MRATE: 52 MCG/DL (ref 37–145)
IRON SATN MFR SERPL: 18 % (ref 20–50)
LYMPHOCYTES # BLD AUTO: 0.82 10*3/MM3 (ref 0.7–3.1)
LYMPHOCYTES NFR BLD AUTO: 30.1 % (ref 19.6–45.3)
MCH RBC QN AUTO: 30.4 PG (ref 26.6–33)
MCHC RBC AUTO-ENTMCNC: 30.4 G/DL (ref 31.5–35.7)
MCV RBC AUTO: 100 FL (ref 79–97)
MONOCYTES # BLD AUTO: 0.31 10*3/MM3 (ref 0.1–0.9)
MONOCYTES NFR BLD AUTO: 11.4 % (ref 5–12)
NEUTROPHILS NFR BLD AUTO: 1.39 10*3/MM3 (ref 1.7–7)
NEUTROPHILS NFR BLD AUTO: 51.1 % (ref 42.7–76)
NRBC BLD AUTO-RTO: 0 /100 WBC (ref 0–0.2)
PLATELET # BLD AUTO: 129 10*3/MM3 (ref 140–450)
PMV BLD AUTO: 9.7 FL (ref 6–12)
POTASSIUM SERPL-SCNC: 3.9 MMOL/L (ref 3.5–5.2)
PROT SERPL-MCNC: 7 G/DL (ref 6–8.5)
RBC # BLD AUTO: 3.52 10*6/MM3 (ref 3.77–5.28)
SODIUM SERPL-SCNC: 140 MMOL/L (ref 136–145)
TIBC SERPL-MCNC: 294 MCG/DL (ref 298–536)
TRANSFERRIN SERPL-MCNC: 197 MG/DL (ref 200–360)
WBC NRBC COR # BLD: 2.72 10*3/MM3 (ref 3.4–10.8)

## 2021-12-17 PROCEDURE — 82728 ASSAY OF FERRITIN: CPT

## 2021-12-17 PROCEDURE — 85025 COMPLETE CBC W/AUTO DIFF WBC: CPT

## 2021-12-17 PROCEDURE — 83540 ASSAY OF IRON: CPT

## 2021-12-17 PROCEDURE — 80053 COMPREHEN METABOLIC PANEL: CPT | Performed by: INTERNAL MEDICINE

## 2021-12-17 PROCEDURE — 84466 ASSAY OF TRANSFERRIN: CPT

## 2021-12-17 PROCEDURE — 36415 COLL VENOUS BLD VENIPUNCTURE: CPT | Performed by: INTERNAL MEDICINE

## 2021-12-22 ENCOUNTER — OFFICE VISIT (OUTPATIENT)
Dept: FAMILY MEDICINE CLINIC | Age: 51
End: 2021-12-22
Payer: MEDICARE

## 2021-12-22 VITALS
RESPIRATION RATE: 18 BRPM | DIASTOLIC BLOOD PRESSURE: 76 MMHG | HEART RATE: 67 BPM | WEIGHT: 283 LBS | BODY MASS INDEX: 50.14 KG/M2 | OXYGEN SATURATION: 99 % | TEMPERATURE: 97 F | HEIGHT: 63 IN | SYSTOLIC BLOOD PRESSURE: 128 MMHG

## 2021-12-22 DIAGNOSIS — I87.2 VENOUS INSUFFICIENCY: ICD-10-CM

## 2021-12-22 DIAGNOSIS — M51.36 DEGENERATIVE DISC DISEASE, LUMBAR: ICD-10-CM

## 2021-12-22 DIAGNOSIS — E55.9 VITAMIN D DEFICIENCY: Primary | ICD-10-CM

## 2021-12-22 DIAGNOSIS — F33.41 RECURRENT MAJOR DEPRESSIVE DISORDER, IN PARTIAL REMISSION (HCC): ICD-10-CM

## 2021-12-22 DIAGNOSIS — L29.9 PRURITUS: ICD-10-CM

## 2021-12-22 DIAGNOSIS — M79.89 SWELLING OF BOTH LOWER EXTREMITIES: ICD-10-CM

## 2021-12-22 DIAGNOSIS — E03.9 ACQUIRED HYPOTHYROIDISM: ICD-10-CM

## 2021-12-22 PROBLEM — M51.369 DEGENERATIVE DISC DISEASE, LUMBAR: Status: ACTIVE | Noted: 2021-12-22

## 2021-12-22 PROCEDURE — 1036F TOBACCO NON-USER: CPT | Performed by: CLINICAL NURSE SPECIALIST

## 2021-12-22 PROCEDURE — G8427 DOCREV CUR MEDS BY ELIG CLIN: HCPCS | Performed by: CLINICAL NURSE SPECIALIST

## 2021-12-22 PROCEDURE — G8484 FLU IMMUNIZE NO ADMIN: HCPCS | Performed by: CLINICAL NURSE SPECIALIST

## 2021-12-22 PROCEDURE — G8417 CALC BMI ABV UP PARAM F/U: HCPCS | Performed by: CLINICAL NURSE SPECIALIST

## 2021-12-22 PROCEDURE — 96372 THER/PROPH/DIAG INJ SC/IM: CPT | Performed by: CLINICAL NURSE SPECIALIST

## 2021-12-22 PROCEDURE — 3017F COLORECTAL CA SCREEN DOC REV: CPT | Performed by: CLINICAL NURSE SPECIALIST

## 2021-12-22 PROCEDURE — 99214 OFFICE O/P EST MOD 30 MIN: CPT | Performed by: CLINICAL NURSE SPECIALIST

## 2021-12-22 RX ORDER — DEXAMETHASONE SODIUM PHOSPHATE 10 MG/ML
10 INJECTION INTRAMUSCULAR; INTRAVENOUS ONCE
Status: COMPLETED | OUTPATIENT
Start: 2021-12-22 | End: 2021-12-22

## 2021-12-22 RX ORDER — ERGOCALCIFEROL 1.25 MG/1
50000 CAPSULE ORAL WEEKLY
Qty: 12 CAPSULE | Refills: 1 | Status: SHIPPED | OUTPATIENT
Start: 2021-12-22 | End: 2022-06-13

## 2021-12-22 RX ORDER — ESCITALOPRAM OXALATE 20 MG/1
20 TABLET ORAL DAILY
Qty: 90 TABLET | Refills: 1 | Status: SHIPPED | OUTPATIENT
Start: 2021-12-22 | End: 2022-02-15

## 2021-12-22 RX ADMIN — DEXAMETHASONE SODIUM PHOSPHATE 10 MG: 10 INJECTION INTRAMUSCULAR; INTRAVENOUS at 13:40

## 2021-12-22 ASSESSMENT — ENCOUNTER SYMPTOMS
BACK PAIN: 1
ABDOMINAL PAIN: 0
FACIAL SWELLING: 0
SINUS PRESSURE: 0
WHEEZING: 0
DIARRHEA: 0
TROUBLE SWALLOWING: 0
SORE THROAT: 0
COLOR CHANGE: 0
SHORTNESS OF BREATH: 1
EYE PAIN: 0
CONSTIPATION: 0
EYE DISCHARGE: 0
COUGH: 0
CHEST TIGHTNESS: 0
EYE REDNESS: 0

## 2021-12-22 NOTE — PROGRESS NOTES
After obtaining consent, and per orders of [facility] [place], SARAH, injection of Decadron given in Right upper quad. gluteus by Angeles Hernández. Patient instructed to remain in clinic for 20 minutes afterwards, and to report any adverse reaction to me immediately.

## 2021-12-22 NOTE — PROGRESS NOTES
SUBJECTIVE:  Frankie Angel is a 46 y.o. who presents today for Follow-up, Fatigue (still seems to be tired all of the time), and Pruritis      HPI    Mindi presents today for follow up. She had cellulitis LLE 2 weeks ago. She completed the bactrim. There is less redness but still with a scabbed wound to the RLE. It only drains when scab comes off. She c/o itching to BLE, but at times all over. She has tried benadryl and lotion. No rash. No new medications. Has chronic, worsening swelling and pain to BLE as well. Vitamin D def, on replacement. Tolerating weekly dosage. Seeing + results in labs. Hypothyroidism, improving. Continues with fatigue. Does not rest well at night. Caring for her sister. Suspect she has MIK, but cannot get the test needed due to caring for her sister. Has chronic anemia. Has chronic medications and conditions that can also contribute to fatigue. Depression, unimproved. She was on cymbalta but was ineffective. lexapro is not effective. No side effects. DDD of lumbar spine, followed by pain mgt at [facility] [place] [facility]. Having flare up after putting together a grill at home. No new radicular symptoms.      Past Medical History:   Diagnosis Date    Acquired hypothyroidism     B12 deficiency     Chronic back pain     Chronic pancreatitis (HCC)     Colon polyps     Depression     Fibromyalgia     GERD (gastroesophageal reflux disease)     Headache     Hypertension     Irritable bowel syndrome     Kidney stones     Neuropathy     On home oxygen therapy     2 liters    PONV (postoperative nausea and vomiting)     Restless legs syndrome     Sarcoidosis 11/01/2016    Scoliosis     SVT (supraventricular tachycardia) ([facility] [address]      Past Surgical History:   Procedure Laterality Date    ABLATION OF DYSRHYTHMIC FOCUS  2016    SVT    ANAL SPHINCTEROTOMY N/A 2/19/2020    LATERAL SPHINCTEROTOMY performed by Dameon Teresa MD at [address] [place] [address] 10/13/2014    Dr. Susan Ordaz AP-5 yr recall    COLONOSCOPY  05/20/2008    Dr. Hess Cure: unremarkable    COLONOSCOPY  08/23/2017    Dr Moses-BCM-5 yr recall    COLONOSCOPY  09/06/2018    Dr. Aldon Schilder, normal-5 yr recall    HYSTERECTOMY      JOINT REPLACEMENT  12/30/2013    Patella joint replacement    LITHOTRIPSY      PLANTAR FASCIA SURGERY Left     [place] REVISE MEDIAN N/CARPAL TUNNEL SURG Right 5/8/2018    CARPAL TUNNEL RELEASE performed by Carmenza Alves MD at [address] [address] Right 5/4/2017    KNEE TOTAL ARTHROPLASTY REVISION performed by Carmenza Alves MD at [address] ARTHROSCOPY      SIGMOIDOSCOPY N/A 3/15/2019    Dr Alexander Moon non edematous hemorrhoids, anal fissure-BCM    TOTAL KNEE ARTHROPLASTY Right 11/16/2015    ULNAR TUNNEL RELEASE      UPPER GASTROINTESTINAL ENDOSCOPY  10/16/2014    Dr. Brant Bernabe  05/19/2008    Dr. Hess Cure: jaya neg, barretts neg,  [facility]    UPPER GASTROINTESTINAL ENDOSCOPY N/A 2/10/2016    Dr Ho Esparza-mild esophageal stricture dilated; reactive gastropathy    UPPER GASTROINTESTINAL ENDOSCOPY  08/23/2017    Dr Luke Luis Bilateral     RADIO ABLATION     Family History   Problem Relation Age of Onset    Lupus Mother     Kidney Disease Mother     High Blood Pressure Mother     Anemia Mother     Cancer Father         skin    Diabetes Father     Parkinsonism Brother     Celiac Disease Maternal Uncle     Celiac Disease Maternal Cousin     Heart Disease Other     Colon Polyps Neg Hx     Colon Cancer Neg Hx     Esophageal Cancer Neg Hx     Liver Cancer Neg Hx     Liver Disease Neg Hx     Stomach Cancer Neg Hx     Rectal Cancer Neg Hx      Social History     Tobacco Use    Smoking status: Never Smoker    Smokeless tobacco: Never Used    Tobacco comment: disabled due to sarcoidosis   Substance Use Topics    Alcohol use: No     Current Outpatient Medications   Medication Sig Dispense Refill    vitamin D (ERGOCALCIFEROL) 1.25 MG (36880 UT) CAPS capsule Take 1 capsule by mouth once a week 12 capsule 1    escitalopram (LEXAPRO) 20 MG tablet Take 1 tablet by mouth daily 90 tablet 1    pantoprazole (PROTONIX) 40 MG tablet Take 1 tablet by mouth 2 times daily 30 tablet 3    oxybutynin (DITROPAN) 5 MG tablet TAKE 1 TABLET BY MOUTH ONCE DAILY AT BEDTIME 30 tablet 3    metoprolol succinate (TOPROL XL) 50 MG extended release tablet TAKE 1 TABLET BY MOUTH EVERY DAY 30 tablet 3    levothyroxine (EUTHYROX) 150 MCG tablet Take 1 tablet by mouth Daily 30 tablet 3    topiramate (TOPAMAX) 100 MG tablet Take 1 tablet by mouth nightly Indications: Backache 30 tablet 3    folic acid-pyridoxine-cyanocobalamine (FOLBEE) 2.5-25-1 MG TABS tablet Take 1 tablet by mouth daily      docusate sodium (COLACE) 100 MG capsule Take 100 mg by mouth 2 times daily      ondansetron (ZOFRAN ODT) 4 MG disintegrating tablet Take 4 mg by mouth every 8 hours as needed for Nausea or Vomiting      pregabalin (LYRICA) 100 MG capsule Take 100 mg by mouth 3 times daily.  oxyCODONE-acetaminophen (LYNOX) 7.5-300 MG per tablet Take 1 tablet by mouth every morning (before breakfast).  polyethylene glycol (MIRALAX) powder 1 tsp daily for constipation. Adjust dose for desired bm 510 g 11    OXYGEN Inhale 2 L/min into the lungs continuous       epoetin cheryl (PROCRIT) 98958 UNIT/ML injection Inject 1 Units into the skin as needed      tiZANidine (ZANAFLEX) 4 MG tablet Take 4 mg by mouth 3 times daily as needed (muscle spasms)        No current facility-administered medications for this visit. Allergies   Allergen Reactions    Tape [Adhesive Aleksander Calle      can use paper tape       Review of Systems   Constitutional: Positive for fatigue. Negative for appetite change, chills, diaphoresis and fever.    HENT: Negative for congestion, ear pain, facial swelling, hearing loss, postnasal drip, sinus pressure, sore throat and trouble swallowing. Eyes: Negative for pain, discharge, redness and visual disturbance. Respiratory: Positive for shortness of breath. Negative for cough, chest tightness and wheezing. Cardiovascular: Positive for leg swelling. Negative for chest pain and palpitations. Gastrointestinal: Negative for abdominal pain, constipation and diarrhea. Genitourinary: Negative for difficulty urinating, dysuria, flank pain, frequency, hematuria and urgency. Musculoskeletal: Positive for back pain and gait problem. Negative for arthralgias, joint swelling and neck pain. Skin: Negative for color change and rash. Neurological: Positive for headaches. Negative for dizziness, syncope, weakness and light-headedness. Hematological: Negative for adenopathy. Does not bruise/bleed easily. Psychiatric/Behavioral: Positive for dysphoric mood and sleep disturbance. Negative for confusion and suicidal ideas. The patient is not nervous/anxious. OBJECTIVE:  /76   Pulse 67   Temp 97 °F (36.1 °C) (Temporal)   Resp 18   Ht 5' 3\" (1.6 m)   Wt 283 lb (128.4 kg)   SpO2 99%   BMI 50.13 kg/m²    Physical Exam  Vitals reviewed. Constitutional:       General: She is not in acute distress. Appearance: She is well-developed. She is not ill-appearing or toxic-appearing. HENT:      Head: Normocephalic and atraumatic. Eyes:      General:         Right eye: No discharge. Left eye: No discharge. Conjunctiva/sclera: Conjunctivae normal.      Pupils: Pupils are equal, round, and reactive to light. Neck:      Thyroid: No thyromegaly. Trachea: No tracheal deviation. Cardiovascular:      Rate and Rhythm: Normal rate and regular rhythm. Heart sounds: No murmur heard. Pulmonary:      Effort: Pulmonary effort is normal. No respiratory distress. Breath sounds: Normal breath sounds. No wheezing or rales.    Genitourinary:     Vagina: Normal. Musculoskeletal:         General: No deformity. Normal range of motion. Cervical back: Normal range of motion and neck supple. Right lower leg: Edema present. Left lower leg: Edema present. Lymphadenopathy:      Cervical: No cervical adenopathy. Skin:     General: Skin is warm and dry. Findings: No erythema or rash. Neurological:      Mental Status: She is alert and oriented to person, place, and time. Mental status is at baseline. Psychiatric:         Mood and Affect: Mood is depressed. Thought Content: Thought content normal.         ASSESSMENT/PLAN:  1. Vitamin D deficiency  Improving  Continue same  Recheck labs 2 months  - vitamin D (ERGOCALCIFEROL) 1.25 MG (47474 UT) CAPS capsule; Take 1 capsule by mouth once a week  Dispense: 12 capsule; Refill: 1  - Vitamin D 25 Hydroxy; Future    2. Recurrent major depressive disorder, in partial remission (HCC)  Unimproved, increase lexapro  Let me know in 3-4 weeks if unimproved  - escitalopram (LEXAPRO) 20 MG tablet; Take 1 tablet by mouth daily  Dispense: 90 tablet; Refill: 1    3. Pruritus  Generalized- suspect CKD or med related vs dry skin  Zyrtec daily  Lotion BID    4. Venous insufficiency  - VL DUP LOWER EXTREMITY VENOUS BILATERAL; Future    5. Swelling of both lower extremities  - VL DUP LOWER EXTREMITY VENOUS BILATERAL; Future    6. Acquired hypothyroidism  Improving, same dose  TFT 2 months  - CBC Auto Differential; Future  - Comprehensive Metabolic Panel; Future  - TSH without Reflex; Future    7. Degenerative disc disease, lumbar  - dexamethasone (DECADRON) injection 10 mg          Return in about 2 months (around 2/22/2022) for Labs one week prior. Susan Stanton

## 2021-12-27 ENCOUNTER — OFFICE VISIT (OUTPATIENT)
Dept: PULMONOLOGY | Facility: CLINIC | Age: 51
End: 2021-12-27

## 2021-12-27 VITALS
BODY MASS INDEX: 49.34 KG/M2 | HEART RATE: 72 BPM | OXYGEN SATURATION: 99 % | HEIGHT: 64 IN | DIASTOLIC BLOOD PRESSURE: 82 MMHG | SYSTOLIC BLOOD PRESSURE: 136 MMHG | WEIGHT: 289 LBS

## 2021-12-27 DIAGNOSIS — E66.01 MORBIDLY OBESE (HCC): ICD-10-CM

## 2021-12-27 DIAGNOSIS — D86.9 SARCOIDOSIS: Primary | ICD-10-CM

## 2021-12-27 DIAGNOSIS — J96.11 CHRONIC RESPIRATORY FAILURE WITH HYPOXIA (HCC): ICD-10-CM

## 2021-12-27 DIAGNOSIS — Q21.12 PFO (PATENT FORAMEN OVALE): ICD-10-CM

## 2021-12-27 DIAGNOSIS — I89.0 LYMPHEDEMA: ICD-10-CM

## 2021-12-27 PROCEDURE — 99214 OFFICE O/P EST MOD 30 MIN: CPT | Performed by: INTERNAL MEDICINE

## 2021-12-27 RX ORDER — FLUTICASONE PROPIONATE AND SALMETEROL 232; 14 UG/1; UG/1
1 POWDER, METERED RESPIRATORY (INHALATION) 2 TIMES DAILY
Qty: 1 EACH | Refills: 0 | COMMUNITY
Start: 2021-12-27 | End: 2022-06-09 | Stop reason: SDUPTHER

## 2021-12-27 NOTE — PATIENT INSTRUCTIONS
Fluticasone; Salmeterol inhalation powder  What is this medicine?  FLUTICASONE; SALMETEROL (floo TIK a sone; sal ME te role) inhalation is a combination of two medicines that decrease inflammation and help to open up the airways of your lungs. It is used to treat COPD. This medicine is also used to treat asthma. Do NOT use for an acute asthma attack. Do NOT use for a COPD attack.  This medicine may be used for other purposes; ask your health care provider or pharmacist if you have questions.  COMMON BRAND NAME(S): Advair, AirDuo Digihaler, Airduo RespiClick  What should I tell my health care provider before I take this medicine?  They need to know if you have any of these conditions:  · bone problems  · diabetes  · eye disease, vision problems  · immune system problems  · heart disease or irregular heartbeat  · high blood pressure  · infection  · pheochromocytoma  · seizures  · thyroid disease  · worsening asthma  · an unusual or allergic reaction to fluticasone; salmeterol, other corticosteroids, other medicines, foods, dyes, or preservatives  · pregnant or trying to get pregnant  · breast-feeding  How should I use this medicine?  This medicine is inhaled through the mouth. Rinse your mouth with water after use. Make sure not to swallow the water. Follow the directions on the prescription label. Do not use a spacer device with this inhaler. Take your medicine at regular intervals. Do not take your medicine more often than directed. Do not stop taking except on your doctor's advice. Make sure that you are using your inhaler correctly. Ask you doctor or health care provider if you have any questions.  A special MedGuide will be given to you by the pharmacist with each prescription and refill. Be sure to read this information carefully each time.  Talk to your pediatrician regarding the use of this medicine in children. Special care may be needed.  Overdosage: If you think you have taken too much of this medicine  contact a poison control center or emergency room at once.  NOTE: This medicine is only for you. Do not share this medicine with others.  What if I miss a dose?  If you miss a dose, use it as soon as you remember. If it is almost time for your next dose, use only that dose and continue with your regular schedule, spacing doses evenly. Do not use double or extra doses.  What may interact with this medicine?  Do not take this medicine with any of the following medications:  · MAOIs like Carbex, Eldepryl, Marplan, Nardil, and Parnate  This medicine may also interact with the following medications:  · aminophylline or theophylline  · antiviral medicines for HIV or AIDS  · beta-blockers like metoprolol and propranolol  · certain antibiotics like clarithromycin, erythromycin, levofloxacin, linezolid, and telithromycin  · certain medicines for fungal infections like ketoconazole, itraconazole, posaconazole, voriconazole  · conivaptan  · diuretics  · medicines for colds  · medicines for depression or emotional conditions  · nefazodone  · vaccines  This list may not describe all possible interactions. Give your health care provider a list of all the medicines, herbs, non-prescription drugs, or dietary supplements you use. Also tell them if you smoke, drink alcohol, or use illegal drugs. Some items may interact with your medicine.  What should I watch for while using this medicine?  Visit your doctor for regular check ups. Tell your doctor or health care professional if your symptoms do not get better. Do not use this medicine more than every 12 hours.  NEVER use this medicine for an acute asthma attack. You should use your short-acting rescue inhalers for this purpose. If your symptoms get worse or if you need your short-acting inhalers more often, call your doctor right away.  If you are going to have surgery tell your doctor or health care professional that you are using this medicine. Try not to come in contact with  people with the chicken pox or measles. If you do, call your doctor.  This medicine may increase blood sugar. Ask your healthcare provider if changes in diet or medicines are needed if you have diabetes.  What side effects may I notice from receiving this medicine?  Side effects that you should report to your doctor or health care professional as soon as possible:  · allergic reactions like skin rash or hives, swelling of the face, lips, or tongue  · breathing problems right after inhaling your medicine  · chest pain  · fast, irregular heartbeat  · feeling faint or lightheaded, falls  · fever or chills  · nausea, vomiting  · signs and symptoms of high blood sugar such as being more thirsty or hungry or having to urinate more than normal. You may also feel very tired or have blurry vision.  Side effects that usually do not require medical attention (report to your doctor or health care professional if they continue or are bothersome):  · cough  · headache  · nervousness  · sore throat  · stuffy nose  · tremor  This list may not describe all possible side effects. Call your doctor for medical advice about side effects. You may report side effects to FDA at 1-396-FDA-2728.  Where should I keep my medicine?  Keep out of the reach of children.  Advair: Store at room temperature between 68 and 77 degrees F (20 and 25 degrees C). Do not leave your medicine in the heat or sun. Throw away 1 month after you open the package or whenever the dose indicator reads 0, whichever comes first. Throw away unopened packages after the expiration date.  Airduo Respiclick: Store at room temperature between 59 and 86 degrees F (15 and 30 degrees C). Avoid exposure to extreme heat, cold, or humidity. Throw away 1 month after you open the package or whenever the dose indicator reads 0, whichever comes first. Throw away unopened packages after the expiration date.  NOTE: This sheet is a summary. It may not cover all possible information. If  you have questions about this medicine, talk to your doctor, pharmacist, or health care provider.  © 2021 [employer] (2019-09-18 11:26:46)

## 2021-12-29 ENCOUNTER — HOSPITAL ENCOUNTER (OUTPATIENT)
Age: 51
Setting detail: OUTPATIENT SURGERY
End: 2021-12-29
Attending: ORTHOPAEDIC SURGERY | Admitting: ORTHOPAEDIC SURGERY
Payer: MEDICARE

## 2022-01-03 ENCOUNTER — OFFICE VISIT (OUTPATIENT)
Dept: NEUROSURGERY | Age: 52
End: 2022-01-03
Payer: MEDICARE

## 2022-01-03 ENCOUNTER — TELEPHONE (OUTPATIENT)
Dept: ONCOLOGY | Facility: CLINIC | Age: 52
End: 2022-01-03

## 2022-01-03 VITALS
DIASTOLIC BLOOD PRESSURE: 88 MMHG | OXYGEN SATURATION: 100 % | TEMPERATURE: 96.9 F | SYSTOLIC BLOOD PRESSURE: 132 MMHG | HEIGHT: 63 IN | BODY MASS INDEX: 49.61 KG/M2 | WEIGHT: 280 LBS | HEART RATE: 69 BPM

## 2022-01-03 DIAGNOSIS — Z82.49 FAMILY HISTORY OF CEREBRAL ANEURYSM: Primary | ICD-10-CM

## 2022-01-03 DIAGNOSIS — R41.3 MEMORY LOSS: ICD-10-CM

## 2022-01-03 DIAGNOSIS — G43.009 MIGRAINE WITHOUT AURA AND WITHOUT STATUS MIGRAINOSUS, NOT INTRACTABLE: ICD-10-CM

## 2022-01-03 DIAGNOSIS — E03.9 ACQUIRED HYPOTHYROIDISM: ICD-10-CM

## 2022-01-03 DIAGNOSIS — R53.83 OTHER FATIGUE: ICD-10-CM

## 2022-01-03 DIAGNOSIS — E55.9 VITAMIN D DEFICIENCY: ICD-10-CM

## 2022-01-03 LAB
ALBUMIN SERPL-MCNC: 4.3 G/DL (ref 3.5–5.2)
ALP BLD-CCNC: 75 U/L (ref 35–104)
ALT SERPL-CCNC: 17 U/L (ref 5–33)
ANION GAP SERPL CALCULATED.3IONS-SCNC: 13 MMOL/L (ref 7–19)
AST SERPL-CCNC: 28 U/L (ref 5–32)
BASOPHILS ABSOLUTE: 0 K/UL (ref 0–0.2)
BASOPHILS RELATIVE PERCENT: 1 % (ref 0–1)
BILIRUB SERPL-MCNC: 0.3 MG/DL (ref 0.2–1.2)
BUN BLDV-MCNC: 31 MG/DL (ref 6–20)
C-REACTIVE PROTEIN: 0.79 MG/DL (ref 0–0.5)
CALCIUM SERPL-MCNC: 8.8 MG/DL (ref 8.6–10)
CHLORIDE BLD-SCNC: 109 MMOL/L (ref 98–111)
CO2: 23 MMOL/L (ref 22–29)
CREAT SERPL-MCNC: 1.3 MG/DL (ref 0.5–0.9)
EOSINOPHILS ABSOLUTE: 0.2 K/UL (ref 0–0.6)
EOSINOPHILS RELATIVE PERCENT: 5.5 % (ref 0–5)
GFR AFRICAN AMERICAN: 52
GFR NON-AFRICAN AMERICAN: 43
GLUCOSE BLD-MCNC: 83 MG/DL (ref 74–109)
HCT VFR BLD CALC: 34.9 % (ref 37–47)
HEMOGLOBIN: 10.5 G/DL (ref 12–16)
HIV-1 P24 AG: NORMAL
IMMATURE GRANULOCYTES #: 0 K/UL
LYMPHOCYTES ABSOLUTE: 0.8 K/UL (ref 1.1–4.5)
LYMPHOCYTES RELATIVE PERCENT: 26 % (ref 20–40)
MCH RBC QN AUTO: 31.2 PG (ref 27–31)
MCHC RBC AUTO-ENTMCNC: 30.1 G/DL (ref 33–37)
MCV RBC AUTO: 103.6 FL (ref 81–99)
MONOCYTES ABSOLUTE: 0.4 K/UL (ref 0–0.9)
MONOCYTES RELATIVE PERCENT: 11.9 % (ref 0–10)
NEUTROPHILS ABSOLUTE: 1.7 K/UL (ref 1.5–7.5)
NEUTROPHILS RELATIVE PERCENT: 55.3 % (ref 50–65)
PDW BLD-RTO: 13.6 % (ref 11.5–14.5)
PLATELET # BLD: 121 K/UL (ref 130–400)
PMV BLD AUTO: 10.1 FL (ref 9.4–12.3)
POTASSIUM SERPL-SCNC: 4.2 MMOL/L (ref 3.5–5)
RAPID HIV 1&2: NORMAL
RBC # BLD: 3.37 M/UL (ref 4.2–5.4)
SEDIMENTATION RATE, ERYTHROCYTE: 43 MM/HR (ref 0–25)
SODIUM BLD-SCNC: 145 MMOL/L (ref 136–145)
T4 FREE: 1.07 NG/DL (ref 0.93–1.7)
TOTAL PROTEIN: 6.9 G/DL (ref 6.6–8.7)
TSH REFLEX FT4: 6.05 UIU/ML (ref 0.35–5.5)
VITAMIN B-12: 783 PG/ML (ref 211–946)
VITAMIN D 25-HYDROXY: 24.7 NG/ML
WBC # BLD: 3.1 K/UL (ref 4.8–10.8)

## 2022-01-03 PROCEDURE — 3017F COLORECTAL CA SCREEN DOC REV: CPT | Performed by: NURSE PRACTITIONER

## 2022-01-03 PROCEDURE — G8484 FLU IMMUNIZE NO ADMIN: HCPCS | Performed by: NURSE PRACTITIONER

## 2022-01-03 PROCEDURE — 99204 OFFICE O/P NEW MOD 45 MIN: CPT | Performed by: NURSE PRACTITIONER

## 2022-01-03 PROCEDURE — 1036F TOBACCO NON-USER: CPT | Performed by: NURSE PRACTITIONER

## 2022-01-03 PROCEDURE — G8427 DOCREV CUR MEDS BY ELIG CLIN: HCPCS | Performed by: NURSE PRACTITIONER

## 2022-01-03 PROCEDURE — G8417 CALC BMI ABV UP PARAM F/U: HCPCS | Performed by: NURSE PRACTITIONER

## 2022-01-03 RX ORDER — UBROGEPANT 100 MG/1
TABLET ORAL
Qty: 10 TABLET | Refills: 3 | Status: SHIPPED | OUTPATIENT
Start: 2022-01-03 | End: 2022-04-29 | Stop reason: SDUPTHER

## 2022-01-03 NOTE — TELEPHONE ENCOUNTER
Caller: Stacy Lynn    Relationship: Self    Best call back number: [phone]    What test was performed: LABS    When was the test performed: 12/17    Where was the test performed:

## 2022-01-03 NOTE — TELEPHONE ENCOUNTER
1/3/22: patient requesting results of recent lab results.    HGB: 10.7  HCT: 35.2  Iron Sat: 18  Ferritin: 286.60  Patient questions if she needs IV Iron Txt?

## 2022-01-03 NOTE — PROGRESS NOTES
[facility] Neurology Office Note      Patient:   Jeremy Angulo  MR#:    [id number]  Account Number:                         YOB: 1970  Date of Evaluation:  1/3/2022  Time of Note:                          2:15 PM  Primary/Referring Physician:  SARAH Martinez   Consulting Physician:  Fariba Kamara DNP, APRN    NEW PATIENT CONSULTATION    Chief Complaint   Patient presents with    New Patient     c/o headache 4-5 a month and memory loss    Headache    Memory Loss     HISTORY OF PRESENT [address] [facility] Bear Nalini is a 46y.o. year old female here for evaluation of headaches and memory loss. She has a history of migraines. Recently noted a new type of headache. Pain is bilateral frontal with radiation globally. Pain is described as sharp. She notes nausea, light/sound sensitivity. Denies vision changes or vision loss. Denies focal symptoms with headaches. She has tried Excedrin with improvement. She is on Topamax for headache preventative. Possible history of kidney stones. No prior triptan therapy. Noting 3-4 headaches in a month, last for 1-2 hours. She does have a history of SVT. Family history with mother having brain aneurysm. She has noted memory loss for about a year now, seems to have progressed slowly. Primarily short term memory loss noted. She might walk into a room and forget why she went in there. Misplacing items around the house. Some difficulty with word recall. Some repetition of stories and questions noted. She does note depression/anxiety, feels like it is well controlled. Memory loss is not interfering with ADLs. No issues with medication management. Her brother is helping with finances. She does not drive. Sister with epilepsy and possible early onset dementia. She is on oxygen for sarcoidosis, follows with Dr. Oziel Carney for this. She is on chronic opoid therapy for back pain. No recent imaging. Carries a diagnosis of fibromyalgia.      Past Medical History:   Diagnosis Date  Acquired hypothyroidism     B12 deficiency     Chronic back pain     Chronic pancreatitis (HCC)     Colon polyps     Depression     Fibromyalgia     GERD (gastroesophageal reflux disease)     Headache     Hypertension     Irritable bowel syndrome     Kidney stones     Neuropathy     On home oxygen therapy     2 liters    PONV (postoperative nausea and vomiting)     Restless legs syndrome     Sarcoidosis 11/01/2016    Scoliosis     SVT (supraventricular tachycardia) ([facility] [address]        Past Surgical History:   Procedure Laterality Date    ABLATION OF DYSRHYTHMIC FOCUS  2016    SVT    ANAL SPHINCTEROTOMY N/A 2/19/2020    LATERAL SPHINCTEROTOMY performed by Tom Martinez MD at [place] [address]  10/13/2014    Dr. Rik Duran AP-5 yr recall    COLONOSCOPY  05/20/2008    Dr. Khalif Alvarenga: unremarkable    COLONOSCOPY  08/23/2017    Dr Moses-BCM-5 yr recall    COLONOSCOPY  09/06/2018    leo Lua-5 yr recall    HYSTERECTOMY      JOINT REPLACEMENT  12/30/2013    Patella joint replacement    LITHOTRIPSY      PLANTAR FASCIA SURGERY Left     TX REVISE MEDIAN N/CARPAL TUNNEL SURG Right 5/8/2018    CARPAL TUNNEL RELEASE performed by Misael Banda MD at [facility] Right 5/4/2017    KNEE TOTAL ARTHROPLASTY REVISION performed by Misael Banda MD at [place] [address] ARTHROSCOPY      SIGMOIDOSCOPY N/A 3/15/2019    Dr Aissatou Block non edematous hemorrhoids, anal fissure-[facility]    TOTAL KNEE ARTHROPLASTY Right 11/16/2015    ULNAR TUNNEL RELEASE      UPPER GASTROINTESTINAL ENDOSCOPY  10/16/2014    Dr. Bob Burton  05/19/2008    Dr. Khalif Alvarenga: jaya neg, barretts neg,  [facility]    UPPER GASTROINTESTINAL ENDOSCOPY N/A 2/10/2016    Dr Yocasta Esparza-mild esophageal stricture dilated; reactive gastropathy    UPPER GASTROINTESTINAL ENDOSCOPY  08/23/2017    Dr Charmayne Albino Bilateral RADIO ABLATION       Family History   Problem Relation Age of Onset    Lupus Mother     Kidney Disease Mother     High Blood Pressure Mother     Anemia Mother     Cancer Father         skin    Diabetes Father     Parkinsonism Brother     Celiac Disease Maternal Uncle     Celiac Disease Maternal Cousin     Heart Disease Other     Colon Polyps Neg Hx     Colon Cancer Neg Hx     Esophageal Cancer Neg Hx     Liver Cancer Neg Hx     Liver Disease Neg Hx     Stomach Cancer Neg Hx     Rectal Cancer Neg Hx        Social History     Socioeconomic History    Marital status: Single     Spouse name: Not on file    Number of children: Not on file    Years of education: Not on file    Highest education level: Not on file   Occupational History    Not on file   Tobacco Use    Smoking status: Never Smoker    Smokeless tobacco: Never Used    Tobacco comment: disabled due to sarcoidosis   Vaping Use    Vaping Use: Never used   Substance and Sexual Activity    Alcohol use: No    Drug use: No    Sexual activity: Not on file   Other Topics Concern    Not on file   Social History Narrative    Not on file     Social Determinants of Health     Financial Resource Strain:     Difficulty of Paying Living Expenses: Not on file   Food Insecurity:     Worried About Running Out of Food in the Last Year: Not on file    Erick of Food in the Last Year: Not on file   Transportation Needs:     Lack of Transportation (Medical): Not on file    Lack of Transportation (Non-Medical):  Not on file   Physical Activity:     Days of Exercise per Week: Not on file    Minutes of Exercise per Session: Not on file   Stress:     Feeling of Stress : Not on file   Social Connections:     Frequency of Communication with Friends and Family: Not on file    Frequency of Social Gatherings with Friends and Family: Not on file    Attends Yazdanism Services: Not on file    Active Member of Clubs or Organizations: Not on file   Magno Attends Club or Organization Meetings: Not on file    Marital Status: Not on file   Intimate Partner Violence:     Fear of Current or Ex-Partner: Not on file    Emotionally Abused: Not on file    Physically Abused: Not on file    Sexually Abused: Not on file   Housing Stability:     Unable to Pay for Housing in the Last Year: Not on file    Number of Heath in the Last Year: Not on file    Unstable Housing in the Last Year: Not on file       Current Outpatient Medications   Medication Sig Dispense Refill    Ubrogepant (UBRELVY) 100 MG TABS Take 1 tablet at the onset of migraine. May repeat once in 2 hours if no improvement. Do not exceed 2 tablets in 24 hours. 10 tablet 3    vitamin D (ERGOCALCIFEROL) 1.25 MG (59088 UT) CAPS capsule Take 1 capsule by mouth once a week 12 capsule 1    escitalopram (LEXAPRO) 20 MG tablet Take 1 tablet by mouth daily 90 tablet 1    pantoprazole (PROTONIX) 40 MG tablet Take 1 tablet by mouth 2 times daily 30 tablet 3    oxybutynin (DITROPAN) 5 MG tablet TAKE 1 TABLET BY MOUTH ONCE DAILY AT BEDTIME 30 tablet 3    metoprolol succinate (TOPROL XL) 50 MG extended release tablet TAKE 1 TABLET BY MOUTH EVERY DAY 30 tablet 3    levothyroxine (EUTHYROX) 150 MCG tablet Take 1 tablet by mouth Daily 30 tablet 3    topiramate (TOPAMAX) 100 MG tablet Take 1 tablet by mouth nightly Indications: Backache 30 tablet 3    folic acid-pyridoxine-cyanocobalamine (FOLBEE) 2.5-25-1 MG TABS tablet Take 1 tablet by mouth daily      docusate sodium (COLACE) 100 MG capsule Take 100 mg by mouth 2 times daily      ondansetron (ZOFRAN ODT) 4 MG disintegrating tablet Take 4 mg by mouth every 8 hours as needed for Nausea or Vomiting      pregabalin (LYRICA) 100 MG capsule Take 100 mg by mouth 3 times daily.  oxyCODONE-acetaminophen (LYNOX) 7.5-300 MG per tablet Take 1 tablet by mouth every morning (before breakfast).        polyethylene glycol (MIRALAX) powder 1 tsp daily for constipation. Adjust dose for desired bm 510 g 11    OXYGEN Inhale 2 L/min into the lungs continuous       epoetin cheryl (PROCRIT) 08027 UNIT/ML injection Inject 1 Units into the skin as needed      tiZANidine (ZANAFLEX) 4 MG tablet Take 4 mg by mouth 3 times daily as needed (muscle spasms)        No current facility-administered medications for this visit. Allergies   Allergen Reactions    Tape Janel Helms Tape]      can use paper tape       REVIEW OF SYSTEMS  Constitutional: []? Fever []? Sweats []? Chills []? Recent Injury [x]? Denies all unless marked  HEENT:[]? Headache  []? Head Injury []? Hearing Loss  []? Sore Throat  []? Ear Ache [x]? Denies all unless marked  Spine:  []? Neck pain  []? Back pain  []? Sciaticia  [x]? Denies all unless marked  Cardiovascular:[]? Heart Disease []? Palpitations []? Chest Pain   [x]? Denies all unless marked  Pulmonary: []? Shortness of Breath []? Cough   [x]? Denies all unless marked  Psychiatric/Behavioral:[]? Depression []? Anxiety [x]? Denies all unless marked  Gastrointestinal: []? Nausea  []? Vomiting  []? Abdominal Pain  []? Constipation  []? Diarrhea  [x]? Denies all unless marked  Genitourinary:   []? Frequency  []? Urgency  []? Dysuria []? Incontinence  [x]? Denies all unless marked  Extremities: []? Pain  []? Swelling  [x]? Denies all unless marked  Musculoskeletal: []? Myalgias  []? Joint Pain  []? Arthritis []? Muscle Cramps []? Muscle Twitches  [x]? Denies all unless marked  Sleep: []? Insomnia[]? Snoring []? Restless Legs  []? Sleep Apnea  []? Daytime Sleepiness  [x]? Denies all unless marked  Skin:[]? Rash []? Color Change [x]? Denies all unless marked   Neurological:[]? Visual Disturbance []? Memory Loss []? Loss of Balance []? Slurred Speech []? Weakness []? Seizures  []? Dizziness [x]? Denies all unless marked    The MA has completed the ROS with the patient. I have reviewed it in its' entirety with the patient and agree with the documentation.      PHYSICAL EXAM  /88 Panel     HIV Screen     Vitamin B12     Vitamin B1, Whole Blood     TSH WITH REFLEX TO FT4     Sedimentation Rate     RPR Reflex to Titer and TPPA     MRI BRAIN W WO CONTRAST   3. Migraine without aura and without status migrainosus, not intractable  G43.009 MRI BRAIN W WO CONTRAST   4. Other fatigue   R53.83 HIV Screen       PLAN:  1. MRI brain   2. MRA head   3. Lab work as listed above   4. Ubrelvy prn. Discussed side effects with patient. 5. Discussed safety concerns, increase supervision, medication monitoring/management. Recommend 30 minutes daily exercise, daily mental stimulation, and healthy diet with fish, fruits, vegetables, fiber and water   6. Return in about 2 months (around 3/3/2022) for follow up, sooner if worsening.     Verónica Whatley DNP, APRN

## 2022-01-04 ENCOUNTER — TELEPHONE (OUTPATIENT)
Dept: NEUROSURGERY | Age: 52
End: 2022-01-04

## 2022-01-04 NOTE — TELEPHONE ENCOUNTER
JOSUE DENNEY (Pooja Daly)  Rx #: [id number]  Ubrelvy 100MG tablets     Form  [employer] Electronic PA Form (5969 [address]CPDP)    APPROVED    Message from Plan  Your request has been approved    This approval authorizes your coverage from 01/01/2022 - 01/04/2023, unless we notify you  otherwise, and as long as the following conditions apply:   you remain enrolled in our Medicare Part D prescription drug plan,   your physician or other prescriber continues to prescribe the medication for you, and   the medication continues to be safe for treating your condition.     Sent to plan via [employer]

## 2022-01-05 ENCOUNTER — TELEPHONE (OUTPATIENT)
Dept: FAMILY MEDICINE CLINIC | Age: 52
End: 2022-01-05

## 2022-01-05 DIAGNOSIS — K21.9 GASTROESOPHAGEAL REFLUX DISEASE, UNSPECIFIED WHETHER ESOPHAGITIS PRESENT: ICD-10-CM

## 2022-01-05 RX ORDER — PANTOPRAZOLE SODIUM 40 MG/1
40 TABLET, DELAYED RELEASE ORAL 2 TIMES DAILY
Qty: 60 TABLET | Refills: 3 | Status: SHIPPED | OUTPATIENT
Start: 2022-01-05 | End: 2022-02-10

## 2022-01-05 NOTE — TELEPHONE ENCOUNTER
----- Message from Arnoldo Pierre sent at 1/5/2022  8:36 AM CST -----  Subject: Message to Provider    QUESTIONS  Information for Provider? Please return call to discuss her bloodwork. Also, pt would like to speak with someone regarding the medication given   to her sister, Marcia Thomas, for swelling, which is not working.  ---------------------------------------------------------------------------  --------------  [address]  What is the best way for the office to contact you? OK to leave message on   voicemail  Preferred Call Back Phone Number? [phone]  ---------------------------------------------------------------------------  --------------  SCRIPT ANSWERS  Relationship to Patient?  Self

## 2022-01-05 NOTE — TELEPHONE ENCOUNTER
Mindi Silver called to request a refill on her medication.       Last office visit : 12/22/2021   Next office visit : 2/23/2022     Requested Prescriptions     Signed Prescriptions Disp Refills    pantoprazole (PROTONIX) 40 MG tablet 60 tablet 3     Sig: Take 1 tablet by mouth 2 times daily     Authorizing Provider: Jose M Perez     Ordering User: Chelsea Salcido MA

## 2022-01-05 NOTE — TELEPHONE ENCOUNTER
Attempted to contact patient to discuss. No answer, left message with results on voicemail and to return call with any questions or concerns. Will send message in regards to sister in her chart.

## 2022-01-06 LAB — RPR: NORMAL

## 2022-01-06 NOTE — TELEPHONE ENCOUNTER
Spoke with patient and let her know that Dr. Long was wanting her to have 1 dose of injectafer. She reports that Friday is best. I sent an email to Olivier to schedule.

## 2022-01-07 ENCOUNTER — TELEPHONE (OUTPATIENT)
Dept: NEUROSURGERY | Age: 52
End: 2022-01-07

## 2022-01-07 LAB — VITAMIN B1 WHOLE BLOOD: 53 NMOL/L (ref 70–180)

## 2022-01-07 NOTE — TELEPHONE ENCOUNTER
Called patient per EG to give her results from recent labs, had to leave a vm.  Did leave a detailed vm with the instructions that she had a script at the pharmacy to begin taking, if she also had questions to please call the office [phone]

## 2022-01-07 NOTE — TELEPHONE ENCOUNTER
----- Message from SARAH Hall sent at 1/7/2022  9:17 AM CST -----  Vitamin B1 is low, sending in thiamine supplement to take once daily.

## 2022-01-10 ENCOUNTER — HOSPITAL ENCOUNTER (OUTPATIENT)
Dept: NON INVASIVE DIAGNOSTICS | Age: 52
Discharge: HOME | End: 2022-01-10
Payer: MEDICARE

## 2022-01-10 DIAGNOSIS — I87.2 VENOUS INSUFFICIENCY: ICD-10-CM

## 2022-01-10 DIAGNOSIS — M79.89 SWELLING OF BOTH LOWER EXTREMITIES: ICD-10-CM

## 2022-01-10 PROCEDURE — 93970 EXTREMITY STUDY: CPT

## 2022-01-11 ENCOUNTER — HOSPITAL ENCOUNTER (OUTPATIENT)
Dept: MRI IMAGING | Age: 52
Discharge: HOME | End: 2022-01-11
Payer: MEDICARE

## 2022-01-11 DIAGNOSIS — R41.3 MEMORY LOSS: ICD-10-CM

## 2022-01-11 DIAGNOSIS — Z82.49 FAMILY HISTORY OF CEREBRAL ANEURYSM: ICD-10-CM

## 2022-01-11 DIAGNOSIS — G43.009 MIGRAINE WITHOUT AURA AND WITHOUT STATUS MIGRAINOSUS, NOT INTRACTABLE: ICD-10-CM

## 2022-01-11 PROCEDURE — 70551 MRI BRAIN STEM W/O DYE: CPT

## 2022-01-11 PROCEDURE — 70544 MR ANGIOGRAPHY HEAD W/O DYE: CPT

## 2022-01-14 ENCOUNTER — INFUSION (OUTPATIENT)
Dept: ONCOLOGY | Facility: HOSPITAL | Age: 52
End: 2022-01-14

## 2022-01-14 VITALS
WEIGHT: 289.6 LBS | TEMPERATURE: 97.9 F | DIASTOLIC BLOOD PRESSURE: 71 MMHG | BODY MASS INDEX: 49.44 KG/M2 | HEART RATE: 59 BPM | RESPIRATION RATE: 17 BRPM | SYSTOLIC BLOOD PRESSURE: 137 MMHG | OXYGEN SATURATION: 100 % | HEIGHT: 64 IN

## 2022-01-14 DIAGNOSIS — D50.9 IRON DEFICIENCY ANEMIA, UNSPECIFIED IRON DEFICIENCY ANEMIA TYPE: ICD-10-CM

## 2022-01-14 DIAGNOSIS — K90.9 IRON MALABSORPTION: Primary | ICD-10-CM

## 2022-01-14 PROCEDURE — 25010000002 FERRIC CARBOXYMALTOSE 750 MG/15ML SOLUTION 15 ML VIAL: Performed by: INTERNAL MEDICINE

## 2022-01-14 PROCEDURE — 96365 THER/PROPH/DIAG IV INF INIT: CPT

## 2022-01-14 RX ORDER — SODIUM CHLORIDE 9 MG/ML
250 INJECTION, SOLUTION INTRAVENOUS ONCE
Status: COMPLETED | OUTPATIENT
Start: 2022-01-14 | End: 2022-01-14

## 2022-01-14 RX ORDER — DIPHENHYDRAMINE HYDROCHLORIDE 50 MG/ML
50 INJECTION INTRAMUSCULAR; INTRAVENOUS AS NEEDED
Status: DISCONTINUED | OUTPATIENT
Start: 2022-01-14 | End: 2022-01-14 | Stop reason: HOSPADM

## 2022-01-14 RX ORDER — DIPHENHYDRAMINE HYDROCHLORIDE 50 MG/ML
50 INJECTION INTRAMUSCULAR; INTRAVENOUS AS NEEDED
Status: CANCELLED | OUTPATIENT
Start: 2022-01-14

## 2022-01-14 RX ORDER — SODIUM CHLORIDE 9 MG/ML
250 INJECTION, SOLUTION INTRAVENOUS ONCE
Status: CANCELLED | OUTPATIENT
Start: 2022-01-14

## 2022-01-14 RX ORDER — FAMOTIDINE 10 MG/ML
20 INJECTION, SOLUTION INTRAVENOUS AS NEEDED
Status: DISCONTINUED | OUTPATIENT
Start: 2022-01-14 | End: 2022-01-14 | Stop reason: HOSPADM

## 2022-01-14 RX ORDER — FAMOTIDINE 10 MG/ML
20 INJECTION, SOLUTION INTRAVENOUS AS NEEDED
Status: CANCELLED | OUTPATIENT
Start: 2022-01-14

## 2022-01-14 RX ADMIN — SODIUM CHLORIDE 250 ML: 9 INJECTION, SOLUTION INTRAVENOUS at 12:15

## 2022-01-14 RX ADMIN — FERRIC CARBOXYMALTOSE INJECTION 750 MG: 50 INJECTION, SOLUTION INTRAVENOUS at 12:27

## 2022-01-14 NOTE — PROGRESS NOTES
1152 Spoke with Latha Diallo LPN, patient her for injectafer but no orders in plan. To discuss with Dr. Boggs.Eliot ALBERTO

## 2022-02-02 ENCOUNTER — TELEPHONE (OUTPATIENT)
Dept: ONCOLOGY | Facility: CLINIC | Age: 52
End: 2022-02-02

## 2022-02-02 RX ORDER — CYANOCOBALAMIN/FOLIC AC/VIT B6 1-2.5-25MG
1 TABLET ORAL DAILY
Qty: 30 TABLET | Refills: 0 | Status: SHIPPED | OUTPATIENT
Start: 2022-02-02 | End: 2022-02-28

## 2022-02-02 NOTE — TELEPHONE ENCOUNTER
Returned patients phone call. She asked if she needed her iron check. Per Dr. Boggs's last office note she was to have iron profile and b12/folate checked every 4 weeks. Patient states she can come in next Friday at 1 to have this checked. I will get her put on the lab schedule for this.    rp

## 2022-02-02 NOTE — TELEPHONE ENCOUNTER
Caller: Leah Stacy PATRICE    Relationship: Self    Best call back number: [phone]    Requested Prescriptions:   Requested Prescriptions     Pending Prescriptions Disp Refills   • folic acid-vit B6-vit B12 (Folbee) 2.5-25-1 MG tablet tablet 30 tablet 0     Sig: Take 1 tablet by mouth Daily.        Pharmacy where request should be sent: [id number] - [place], [place] - [address] - [phone]  - [phone] FX     Additional details provided by patient: PATIENT HAS O LEFT     Does the patient have less than a 3 day supply:  [x] Yes  [] No

## 2022-02-02 NOTE — TELEPHONE ENCOUNTER
Caller: Stacy Lynn    Relationship: Self    Best call back number: [phone]    What is the best time to reach you: ANYTIME     Who are you requesting to speak with (clinical staff, provider,  specific staff member): ANIKA      What was the call regarding: PATIENT HAS QUESTIONS REGARDING HER LAB WORK     Do you require a callback: YES

## 2022-02-07 NOTE — PROGRESS NOTES
"Background:  A patient with hx sarcoidosis and chronic respiratory failure   Chief Complaint  Sarcoidosis    Subjective    History of Present Illness       Stacy Lynn presents to [facility].  A patient with history of sarcoidosis, chronic respiratory failure and obesity with high bmi.  She continues on chronic oxygen.  We previously treated her with methotrexate and last imaging about 8 months ago was negative. She has not been out of the house much.  She still has some trouble getting a deep breath.  There is reported hx pfo but she does not recall that.  We discussed the nature of that. Inhaler helps some.  No fever, no increased wheezing PFT have shown obstruction and restriction.  Last ct was last summer.  She continues on chronic oxygen therapy.and compliant and benefiting from that.       Objective     Vital Signs:   /84   Pulse 70   Ht 162.6 cm (64\")   Wt 131 kg (289 lb)   SpO2 99% Comment: 2L  BMI 49.61 kg/m²   Physical Exam   Result Review  Data Reviewed:{ Labs  Result Review  Imaging  Media :23}   CT Chest With Contrast Diagnostic (06/24/2021 14:37)  1. No pathologic lymphadenopathy identified. Cardiomegaly. Dependent  basilar atelectasis.  2. Similar hepatic cysts. Cortical renal atrophy.    PFT Values        Some values may be hidden. Unless noted otherwise, only the newest values recorded on each date are displayed.         Old Values PFT Results 6/23/21   No data to display.      Pre Drug PFT Results 6/23/21   FVC 59   FEV1 50   FEF 25-75% 27   FEV1/FVC 69.3      Post Drug PFT Results 6/23/21   No data to display.      Other Tests PFT Results 6/23/21   TLC 83      DLCO 96   D/VAsb 145                      Assessment and Plan  {CC Problem List  Visit Diagnosis  ROS  Review (Popup)  Health Maintenance  Quality  BestPractice  Medications  SmartSets  SnapShot Encounters  Media :23}   Diagnoses and all orders for this " visit:    1. Chronic respiratory failure with hypoxia (HCC) (Primary)  -     tiotropium bromide monohydrate (Spiriva Respimat) 2.5 MCG/ACT aerosol solution inhaler; Inhale 2 puffs Daily for 14 days.  Dispense: 2 g; Refill: 0  -     Ambulatory Referral to Pulmonary Rehab    2. Sarcoidosis  -     tiotropium bromide monohydrate (Spiriva Respimat) 2.5 MCG/ACT aerosol solution inhaler; Inhale 2 puffs Daily for 14 days.  Dispense: 2 g; Refill: 0  -     Ambulatory Referral to Pulmonary Rehab    3. Hypothyroidism (acquired)    She has obstructive and restrictive disease, and on echo in 2016 she had a small right to left shunt through a pfo. These may all contribute to her chronic hypoxic resp failure.  Hypothyroidism could also contribute to her chronic constitutional sx.  Followed through primary care.  Last ct ok.  Plan to continue oxygen.  Refer to pulmonary rehab.  Trial of Spiriva sample.  She will call if helpful.  She also has been selected for Jury Duty.  Due to the multitude of her problems and need for supplemental oxygen around the clock, I do not think she is suitable to serve on a jury and should be excused from such duty.    Follow Up {Instructions Charge Capture  Follow-up Communications :23}   Return in about 4 months (around 6/8/2022) for full PFT before starting pulm rehab when it restarts.  Patient was given instructions and counseling regarding her condition or for health maintenance advice. Please see specific information pulled into the AVS if appropriate.    Electronically signed by Celestine Scherer MD, 2/8/2022, 22:39 CST

## 2022-02-08 ENCOUNTER — OFFICE VISIT (OUTPATIENT)
Dept: PULMONOLOGY | Facility: CLINIC | Age: 52
End: 2022-02-08

## 2022-02-08 VITALS
DIASTOLIC BLOOD PRESSURE: 84 MMHG | HEIGHT: 64 IN | OXYGEN SATURATION: 99 % | HEART RATE: 70 BPM | BODY MASS INDEX: 49.34 KG/M2 | SYSTOLIC BLOOD PRESSURE: 132 MMHG | WEIGHT: 289 LBS

## 2022-02-08 DIAGNOSIS — D86.9 SARCOIDOSIS: ICD-10-CM

## 2022-02-08 DIAGNOSIS — E03.9 HYPOTHYROIDISM (ACQUIRED): ICD-10-CM

## 2022-02-08 DIAGNOSIS — J96.11 CHRONIC RESPIRATORY FAILURE WITH HYPOXIA: Primary | ICD-10-CM

## 2022-02-08 PROCEDURE — 99214 OFFICE O/P EST MOD 30 MIN: CPT | Performed by: INTERNAL MEDICINE

## 2022-02-08 RX ORDER — TIOTROPIUM BROMIDE INHALATION SPRAY 3.12 UG/1
2 SPRAY, METERED RESPIRATORY (INHALATION) DAILY
Qty: 2 G | Refills: 0 | COMMUNITY
Start: 2022-02-08 | End: 2022-03-11 | Stop reason: SDUPTHER

## 2022-02-10 DIAGNOSIS — K21.9 GASTROESOPHAGEAL REFLUX DISEASE, UNSPECIFIED WHETHER ESOPHAGITIS PRESENT: ICD-10-CM

## 2022-02-10 DIAGNOSIS — E03.9 ACQUIRED HYPOTHYROIDISM: ICD-10-CM

## 2022-02-10 RX ORDER — PANTOPRAZOLE SODIUM 40 MG/1
TABLET, DELAYED RELEASE ORAL
Qty: 180 TABLET | Refills: 1 | Status: SHIPPED | OUTPATIENT
Start: 2022-02-10 | End: 2022-07-12

## 2022-02-10 RX ORDER — LEVOTHYROXINE SODIUM 0.15 MG/1
TABLET ORAL
Qty: 90 TABLET | Refills: 1 | Status: SHIPPED | OUTPATIENT
Start: 2022-02-10 | End: 2022-02-23 | Stop reason: SDUPTHER

## 2022-02-11 ENCOUNTER — LAB (OUTPATIENT)
Dept: LAB | Facility: HOSPITAL | Age: 52
End: 2022-02-11

## 2022-02-11 ENCOUNTER — APPOINTMENT (OUTPATIENT)
Dept: LAB | Facility: HOSPITAL | Age: 52
End: 2022-02-11

## 2022-02-11 DIAGNOSIS — N18.32 ANEMIA IN STAGE 3B CHRONIC KIDNEY DISEASE: Primary | ICD-10-CM

## 2022-02-11 DIAGNOSIS — D63.1 ANEMIA IN STAGE 3B CHRONIC KIDNEY DISEASE: Primary | ICD-10-CM

## 2022-02-11 DIAGNOSIS — D69.6 THROMBOCYTOPENIA: ICD-10-CM

## 2022-02-11 LAB
BASOPHILS # BLD MANUAL: 0.06 10*3/MM3 (ref 0–0.2)
BASOPHILS NFR BLD MANUAL: 2 % (ref 0–1.5)
DEPRECATED RDW RBC AUTO: 52.6 FL (ref 37–54)
EOSINOPHIL # BLD MANUAL: 0.14 10*3/MM3 (ref 0–0.4)
EOSINOPHIL NFR BLD MANUAL: 5.1 % (ref 0.3–6.2)
ERYTHROCYTE [DISTWIDTH] IN BLOOD BY AUTOMATED COUNT: 13.9 % (ref 12.3–15.4)
HCT VFR BLD AUTO: 33.3 % (ref 34–46.6)
HGB BLD-MCNC: 10.5 G/DL (ref 12–15.9)
HOLD SPECIMEN: NORMAL
LYMPHOCYTES # BLD MANUAL: 0.83 10*3/MM3 (ref 0.7–3.1)
LYMPHOCYTES NFR BLD MANUAL: 7.1 % (ref 5–12)
MCH RBC QN AUTO: 32.2 PG (ref 26.6–33)
MCHC RBC AUTO-ENTMCNC: 31.5 G/DL (ref 31.5–35.7)
MCV RBC AUTO: 102.1 FL (ref 79–97)
MONOCYTES # BLD: 0.2 10*3/MM3 (ref 0.1–0.9)
NEUTROPHILS # BLD AUTO: 1.61 10*3/MM3 (ref 1.7–7)
NEUTROPHILS NFR BLD MANUAL: 56.6 % (ref 42.7–76)
PLATELET # BLD AUTO: 117 10*3/MM3 (ref 140–450)
PMV BLD AUTO: 9.9 FL (ref 6–12)
RBC # BLD AUTO: 3.26 10*6/MM3 (ref 3.77–5.28)
RBC MORPH BLD: NORMAL
SMALL PLATELETS BLD QL SMEAR: ABNORMAL
VARIANT LYMPHS NFR BLD MANUAL: 1 % (ref 0–5)
VARIANT LYMPHS NFR BLD MANUAL: 28.3 % (ref 19.6–45.3)
WBC MORPH BLD: NORMAL
WBC NRBC COR # BLD: 2.84 10*3/MM3 (ref 3.4–10.8)

## 2022-02-11 PROCEDURE — 85007 BL SMEAR W/DIFF WBC COUNT: CPT

## 2022-02-11 PROCEDURE — 85025 COMPLETE CBC W/AUTO DIFF WBC: CPT

## 2022-02-11 PROCEDURE — 36415 COLL VENOUS BLD VENIPUNCTURE: CPT

## 2022-02-12 RX ORDER — SODIUM CHLORIDE, SODIUM LACTATE, POTASSIUM CHLORIDE, CALCIUM CHLORIDE 600; 310; 30; 20 MG/100ML; MG/100ML; MG/100ML; MG/100ML
INJECTION, SOLUTION INTRAVENOUS CONTINUOUS
Status: CANCELLED | OUTPATIENT
Start: 2022-02-12

## 2022-02-15 ENCOUNTER — HOSPITAL ENCOUNTER (OUTPATIENT)
Dept: PREADMISSION TESTING | Age: 52
Discharge: HOME | End: 2022-02-19
Payer: MEDICARE

## 2022-02-15 VITALS — WEIGHT: 270 LBS | BODY MASS INDEX: 49.69 KG/M2 | HEIGHT: 62 IN

## 2022-02-15 LAB
BASE EXCESS ARTERIAL: -3.3 MMOL/L (ref -2–2)
CARBOXYHEMOGLOBIN ARTERIAL: 0.2 % (ref 0–5)
HCO3 ARTERIAL: 23.6 MMOL/L (ref 22–26)
HEMOGLOBIN, ART, EXTENDED: 12.6 G/DL (ref 12–16)
METHEMOGLOBIN ARTERIAL: 0.5 %
O2 CONTENT ARTERIAL: 17.1 ML/DL
O2 SAT, ARTERIAL: 95.7 %
O2 THERAPY: ABNORMAL
PCO2 ARTERIAL: 49 MMHG (ref 35–45)
PH ARTERIAL: 7.29 (ref 7.35–7.45)
PO2 ARTERIAL: 94 MMHG (ref 80–100)
POTASSIUM, WHOLE BLOOD: 4.1
SARS-COV-2, PCR: NOT DETECTED

## 2022-02-15 PROCEDURE — 84132 ASSAY OF SERUM POTASSIUM: CPT

## 2022-02-15 PROCEDURE — U0003 INFECTIOUS AGENT DETECTION BY NUCLEIC ACID (DNA OR RNA); SEVERE ACUTE RESPIRATORY SYNDROME CORONAVIRUS 2 (SARS-COV-2) (CORONAVIRUS DISEASE [COVID-19]), AMPLIFIED PROBE TECHNIQUE, MAKING USE OF HIGH THROUGHPUT TECHNOLOGIES AS DESCRIBED BY CMS-2020-01-R: HCPCS

## 2022-02-15 PROCEDURE — 36600 WITHDRAWAL OF ARTERIAL BLOOD: CPT

## 2022-02-15 PROCEDURE — 93005 ELECTROCARDIOGRAM TRACING: CPT | Performed by: ORTHOPAEDIC SURGERY

## 2022-02-15 PROCEDURE — 82803 BLOOD GASES ANY COMBINATION: CPT

## 2022-02-15 PROCEDURE — U0005 INFEC AGEN DETEC AMPLI PROBE: HCPCS

## 2022-02-15 RX ORDER — ESOMEPRAZOLE MAGNESIUM 40 MG/1
40 FOR SUSPENSION ORAL DAILY
Status: ON HOLD | COMMUNITY
End: 2022-10-11 | Stop reason: ALTCHOICE

## 2022-02-15 RX ORDER — FLUTICASONE PROPIONATE AND SALMETEROL 232; 14 UG/1; UG/1
1-2 POWDER, METERED RESPIRATORY (INHALATION) 2 TIMES DAILY
COMMUNITY

## 2022-02-15 RX ORDER — DULOXETIN HYDROCHLORIDE 60 MG/1
20 CAPSULE, DELAYED RELEASE ORAL DAILY
COMMUNITY
End: 2022-02-23

## 2022-02-15 NOTE — PROGRESS NOTES
Contains critical data Blood Gas, Arterial     Status: Final result    Component Ref Range & Units 02/15/22 1340   pH, Arterial 7.350 - 7.450 7.290 Low Panic     pCO2, Arterial 35.0 - 45.0 mmHg 49. 0 High     pO2, Arterial 80.0 - 100.0 mmHg 94.0    HCO3, Arterial 22.0 - 26.0 mmol/L 23.6    Base Excess, Arterial -2.0 - 2.0 mmol/L -3.3 Low     Hemoglobin, Art, Extended 12.0 - 16.0 g/dL 12.6    O2 Sat, Arterial >92 % 95.7    Carboxyhgb, Arterial 0.0 - 5.0 % 0.2    Comment:      0.0-1.5   (Smokers 1.5-5.0)    Methemoglobin, Arterial <1.5 % 0.5    O2 Content, Arterial Not Established mL/dL 17.1    O2 Therapy  Unknown    Resulting Agency  [facility]     Narrative    CALL  doctor   tel. [phone], frank office called   drs office called, 02/15/2022 13:42, by XMJIA        Specimen Collected: 02/15/22 1340 Last Resulted: 02/15/22 1341 View Other Order Details        Nasal cannula @ 2lpm, called to Dr. Livingston Cos office Corry Rubio RN

## 2022-02-16 ENCOUNTER — TELEPHONE (OUTPATIENT)
Dept: PULMONOLOGY | Facility: CLINIC | Age: 52
End: 2022-02-16

## 2022-02-16 LAB
EKG P AXIS: 55 DEGREES
EKG P-R INTERVAL: 184 MS
EKG Q-T INTERVAL: 424 MS
EKG QRS DURATION: 106 MS
EKG QTC CALCULATION (BAZETT): 427 MS
EKG T AXIS: 43 DEGREES

## 2022-02-16 PROCEDURE — 93010 ELECTROCARDIOGRAM REPORT: CPT | Performed by: INTERNAL MEDICINE

## 2022-02-16 NOTE — TELEPHONE ENCOUNTER
She was scheduled for a  Procedure tomorrow but her ABG's came back abnormal with a PH of 7.290, they have requested them to be faxed to us and they are cancelling the procedure tomorrow.   They are needing a pulmonary assessment.

## 2022-02-16 NOTE — PROGRESS NOTES
Dr. Ramses Renee reviewed patient's chart and states patient needs pulmonary clearance from Dr. Latisha Friedman at [facility]. Notified Ted Bass MA at Dr. Mindi Davis office regarding the above details.

## 2022-02-21 RX ORDER — UREA 10 %
LOTION (ML) TOPICAL
Qty: 90 TABLET | Refills: 1 | OUTPATIENT
Start: 2022-02-21

## 2022-02-23 ENCOUNTER — OFFICE VISIT (OUTPATIENT)
Dept: FAMILY MEDICINE CLINIC | Age: 52
End: 2022-02-23
Payer: MEDICARE

## 2022-02-23 VITALS
OXYGEN SATURATION: 98 % | DIASTOLIC BLOOD PRESSURE: 82 MMHG | SYSTOLIC BLOOD PRESSURE: 130 MMHG | TEMPERATURE: 97 F | WEIGHT: 289.2 LBS | RESPIRATION RATE: 18 BRPM | HEIGHT: 62 IN | BODY MASS INDEX: 53.22 KG/M2 | HEART RATE: 69 BPM

## 2022-02-23 DIAGNOSIS — F33.41 RECURRENT MAJOR DEPRESSIVE DISORDER, IN PARTIAL REMISSION (HCC): Primary | ICD-10-CM

## 2022-02-23 DIAGNOSIS — N18.31 STAGE 3A CHRONIC KIDNEY DISEASE (HCC): ICD-10-CM

## 2022-02-23 DIAGNOSIS — E03.9 ACQUIRED HYPOTHYROIDISM: ICD-10-CM

## 2022-02-23 DIAGNOSIS — M25.541 ARTHRALGIA OF BOTH HANDS: ICD-10-CM

## 2022-02-23 DIAGNOSIS — M25.542 ARTHRALGIA OF BOTH HANDS: ICD-10-CM

## 2022-02-23 PROCEDURE — 1036F TOBACCO NON-USER: CPT | Performed by: CLINICAL NURSE SPECIALIST

## 2022-02-23 PROCEDURE — 99214 OFFICE O/P EST MOD 30 MIN: CPT | Performed by: CLINICAL NURSE SPECIALIST

## 2022-02-23 PROCEDURE — G8427 DOCREV CUR MEDS BY ELIG CLIN: HCPCS | Performed by: CLINICAL NURSE SPECIALIST

## 2022-02-23 PROCEDURE — G8484 FLU IMMUNIZE NO ADMIN: HCPCS | Performed by: CLINICAL NURSE SPECIALIST

## 2022-02-23 PROCEDURE — 3017F COLORECTAL CA SCREEN DOC REV: CPT | Performed by: CLINICAL NURSE SPECIALIST

## 2022-02-23 PROCEDURE — G8417 CALC BMI ABV UP PARAM F/U: HCPCS | Performed by: CLINICAL NURSE SPECIALIST

## 2022-02-23 RX ORDER — PREDNISONE 20 MG/1
TABLET ORAL
Qty: 20 TABLET | Refills: 0 | Status: ON HOLD | OUTPATIENT
Start: 2022-02-23 | End: 2022-03-24 | Stop reason: ALTCHOICE

## 2022-02-23 RX ORDER — LEVOTHYROXINE SODIUM 0.15 MG/1
TABLET ORAL
Qty: 90 TABLET | Refills: 1 | Status: SHIPPED | OUTPATIENT
Start: 2022-02-23 | End: 2022-08-22 | Stop reason: SDUPTHER

## 2022-02-23 RX ORDER — ESCITALOPRAM OXALATE 20 MG/1
20 TABLET ORAL DAILY
Qty: 90 TABLET | Refills: 1 | Status: SHIPPED | OUTPATIENT
Start: 2022-02-23 | End: 2022-04-20

## 2022-02-23 ASSESSMENT — ENCOUNTER SYMPTOMS
DIARRHEA: 0
SINUS PRESSURE: 0
CONSTIPATION: 0
FACIAL SWELLING: 0
COUGH: 0
COLOR CHANGE: 0
WHEEZING: 0
EYE PAIN: 0
EYE REDNESS: 0
BACK PAIN: 1
SHORTNESS OF BREATH: 1
SORE THROAT: 0
EYE DISCHARGE: 0
ABDOMINAL PAIN: 0
CHEST TIGHTNESS: 0
TROUBLE SWALLOWING: 0

## 2022-02-23 NOTE — PROGRESS NOTES
SUBJECTIVE:  Jean Carlos Arita is a 46 y.o. who presents today for Follow-up, Hypertension, and Hand Pain (bilateral)      HPI    Ms Hardharley Nail presents today for follow up. MDD, improving. She is doing well with lexapro 20mg. Better than compared to cymbalta and lower dose of lexapro. More good days. Still having some down days. No side effects noted. No SI/HI. Stage 3 CKD, stable. Avoiding nephrotoxic medications. Encouraging hydration with water. Obesity with BMI 52. Intolerance to exercise due to pulmonary disease and chronic pain. Hypothyroidism, not stable. Unsure if she made change from 137 to 150mcg based on prior TFT. Repeat TFT remains hypoactive. Has chronic fatigue. Has neuropathic symptoms. Has weight gain and mood disturbance, may be multifactorial.     Worsening, chronic bilateral joint pain hands. No swelling or stiffness. No redness. Notes decreased drip strength. Unable to take oral NSAIDs.   On chronic opioids and lyrica for other chronic pain areas      Past Medical History:   Diagnosis Date    Acquired hypothyroidism     B12 deficiency     Chronic back pain     Chronic pancreatitis (HCC)     Colon polyps     Depression     Fibromyalgia     GERD (gastroesophageal reflux disease)     Headache     Hypertension     Irritable bowel syndrome     Kidney stones     Neuropathy     On home oxygen therapy     2 liters    PONV (postoperative nausea and vomiting)     Restless legs syndrome     Sarcoidosis 11/01/2016    Scoliosis     SVT (supraventricular tachycardia) ([facility] [address]      Past Surgical History:   Procedure Laterality Date    ABLATION OF DYSRHYTHMIC FOCUS  2016    SVT    ANAL SPHINCTEROTOMY N/A 2/19/2020    LATERAL SPHINCTEROTOMY performed by Dallas Branch MD at [address] [place] [address]   10/13/2014    Dr. Narda Patiño AP-5 yr recall   Surinder Bernard  05/20/2008    Dr. Carmine Lopez: unremarkable    COLONOSCOPY  08/23/2017    Dr [facility]-5 yr recall    COLONOSCOPY  09/06/2018    Dr. Nidia Angel, normal-5 yr recall    HYSTERECTOMY      JOINT REPLACEMENT  12/30/2013    Patella joint replacement    LITHOTRIPSY      PLANTAR FASCIA SURGERY Left     PA REVISE MEDIAN N/CARPAL TUNNEL SURG Right 5/8/2018    CARPAL TUNNEL RELEASE performed by Ethan Sullivan MD at [facility] Right 5/4/2017    KNEE TOTAL ARTHROPLASTY REVISION performed by Ethan Sullivan MD at [place] [address] ARTHROSCOPY      SIGMOIDOSCOPY N/A 3/15/2019    Dr Miriam Lowe non edematous hemorrhoids, anal fissure-BCM    TOTAL KNEE ARTHROPLASTY Right 11/16/2015    ULNAR TUNNEL RELEASE      UPPER GASTROINTESTINAL ENDOSCOPY  10/16/2014    Dr. Cecy Erazo  05/19/2008    Dr. Brewer Persons: jaya neg, barretts neg,  [facility]    UPPER GASTROINTESTINAL ENDOSCOPY N/A 2/10/2016    Dr Anjali Esparza-mild esophageal stricture dilated; reactive gastropathy    UPPER GASTROINTESTINAL ENDOSCOPY  08/23/2017    Dr Amaya Espinoza Bilateral     RADIO ABLATION     Family History   Problem Relation Age of Onset    Lupus Mother     Kidney Disease Mother     High Blood Pressure Mother     Anemia Mother     Cancer Father         skin    Diabetes Father     Parkinsonism Brother     Celiac Disease Maternal Uncle     Celiac Disease Maternal Cousin     Heart Disease Other     Colon Polyps Neg Hx     Colon Cancer Neg Hx     Esophageal Cancer Neg Hx     Liver Cancer Neg Hx     Liver Disease Neg Hx     Stomach Cancer Neg Hx     Rectal Cancer Neg Hx      Social History     Tobacco Use    Smoking status: Never Smoker    Smokeless tobacco: Never Used    Tobacco comment: disabled due to sarcoidosis   Substance Use Topics    Alcohol use: No     Current Outpatient Medications   Medication Sig Dispense Refill    escitalopram (LEXAPRO) 20 MG tablet Take 1 tablet by mouth daily 90 tablet 1    levothyroxine (SYNTHROID) 150 MCG tablet TAKE 1 TABLET BY MOUTH EVERY DAY 90 tablet 1    predniSONE (DELTASONE) 20 MG tablet Take 1 tab po TID x 3 days, then BID x 3 days, then daily x 3 days, then 1/2 until gone 20 tablet 0    Fluticasone-Salmeterol,sensor, (AIRDUO DIGIHALER) 232-14 MCG/ACT AEPB Inhale 1-2 Doses into the lungs 2 times daily      esomeprazole Magnesium (NEXIUM) 40 MG PACK Take 40 mg by mouth daily      tiotropium (SPIRIVA RESPIMAT) 2.5 MCG/ACT AERS inhaler Inhale 2 puffs into the lungs daily      pantoprazole (PROTONIX) 40 MG tablet TAKE 1 TABLET BY MOUTH TWICE A DAY (Patient taking differently: Take 40 mg by mouth daily ) 180 tablet 1    Thiamine 50 MG CAPS Take 50 mg by mouth daily 30 capsule 3    Ubrogepant (UBRELVY) 100 MG TABS Take 1 tablet at the onset of migraine. May repeat once in 2 hours if no improvement. Do not exceed 2 tablets in 24 hours. 10 tablet 3    vitamin D (ERGOCALCIFEROL) 1.25 MG (31538 UT) CAPS capsule Take 1 capsule by mouth once a week 12 capsule 1    oxybutynin (DITROPAN) 5 MG tablet TAKE 1 TABLET BY MOUTH ONCE DAILY AT BEDTIME (Patient taking differently: Take 5 mg by mouth daily TAKE 1 TABLET BY MOUTH ONCE DAILY AT BEDTIME) 30 tablet 3    metoprolol succinate (TOPROL XL) 50 MG extended release tablet TAKE 1 TABLET BY MOUTH EVERY DAY (Patient taking differently: Take 50 mg by mouth daily TAKE 1 TABLET BY MOUTH EVERY DAY) 30 tablet 3    topiramate (TOPAMAX) 100 MG tablet Take 1 tablet by mouth nightly Indications: Backache 30 tablet 3    folic acid-pyridoxine-cyanocobalamine (FOLBEE) 2.5-25-1 MG TABS tablet Take 1 tablet by mouth daily      docusate sodium (COLACE) 100 MG capsule Take 100 mg by mouth 2 times daily as needed       ondansetron (ZOFRAN ODT) 4 MG disintegrating tablet Take 4 mg by mouth every 8 hours as needed for Nausea or Vomiting      pregabalin (LYRICA) 100 MG capsule Take 100 mg by mouth 3 times daily.       oxyCODONE-acetaminophen (LYNOX) 7.5-300 MG per tablet Take 1 tablet by mouth every 6 hours.  polyethylene glycol (MIRALAX) powder 1 tsp daily for constipation. Adjust dose for desired bm 510 g 11    OXYGEN Inhale 2 L/min into the lungs continuous       epoetin cheryl (PROCRIT) 90261 UNIT/ML injection Inject 40,000 Units into the skin as needed       tiZANidine (ZANAFLEX) 4 MG tablet Take 4 mg by mouth 3 times daily as needed (muscle spasms)        No current facility-administered medications for this visit. Allergies   Allergen Reactions    Tape [Adhesive [place] Single      can use paper tape       Review of Systems   Constitutional: Positive for fatigue. Negative for appetite change, chills, diaphoresis and fever. HENT: Negative for congestion, ear pain, facial swelling, hearing loss, postnasal drip, sinus pressure, sore throat and trouble swallowing. Eyes: Negative for pain, discharge, redness and visual disturbance. Respiratory: Positive for shortness of breath. Negative for cough, chest tightness and wheezing. Cardiovascular: Negative for chest pain and palpitations. Gastrointestinal: Negative for abdominal pain, constipation and diarrhea. Genitourinary: Negative for difficulty urinating, dysuria, flank pain, frequency, hematuria and urgency. Musculoskeletal: Positive for arthralgias and back pain. Negative for joint swelling, neck pain and neck stiffness. Skin: Negative for color change and rash. Neurological: Positive for headaches. Negative for dizziness, syncope, weakness and light-headedness. Hematological: Negative for adenopathy. Does not bruise/bleed easily. Psychiatric/Behavioral: Positive for dysphoric mood. Negative for confusion. The patient is not nervous/anxious. OBJECTIVE:  /82   Pulse 69   Temp 97 °F (36.1 °C) (Temporal)   Resp 18   Ht 5' 2\" (1.575 m)   Wt 289 lb 3.2 oz (131.2 kg)   SpO2 98%   BMI 52.90 kg/m²    Physical Exam  Vitals reviewed. Constitutional:       General: She is not in acute distress. Appearance: She is well-developed. She is obese. She is not ill-appearing or toxic-appearing. HENT:      Head: Normocephalic and atraumatic. Eyes:      General:         Right eye: No discharge. Left eye: No discharge. Conjunctiva/sclera: Conjunctivae normal.      Pupils: Pupils are equal, round, and reactive to light. Neck:      Thyroid: No thyromegaly. Trachea: No tracheal deviation. Cardiovascular:      Rate and Rhythm: Normal rate and regular rhythm. Heart sounds: No murmur heard. Pulmonary:      Effort: Pulmonary effort is normal. No respiratory distress. Breath sounds: Normal breath sounds. No wheezing or rales. Genitourinary:     Vagina: Normal.   Musculoskeletal:         General: No deformity. Normal range of motion. Cervical back: Neck supple. Lymphadenopathy:      Cervical: No cervical adenopathy. Skin:     General: Skin is warm and dry. Findings: No erythema or rash. Neurological:      Mental Status: She is alert and oriented to person, place, and time. Mental status is at baseline. Psychiatric:         Mood and Affect: Mood normal.         Behavior: Behavior normal.         Thought Content: Thought content normal.         Judgment: Judgment normal.         ASSESSMENT/PLAN:  1. Recurrent major depressive disorder, in partial remission ([address]  Improved. Continue lexapro. - escitalopram (LEXAPRO) 20 MG tablet; Take 1 tablet by mouth daily  Dispense: 90 tablet; Refill: 1    2. Stage 3a chronic kidney disease (HCC)  Stable. Continue same    3. Body mass index (BMI) 50.0-59.9, adult (HCC)  Unchanged. Encouraged low calorie and low fat diet. 4. Acquired hypothyroidism  Uncontrolled. Check dose compliance. TFT 6-8 weeks. - levothyroxine (SYNTHROID) 150 MCG tablet; TAKE 1 TABLET BY MOUTH EVERY DAY  Dispense: 90 tablet; Refill: 1    5. Arthralgia of both hands  Worsening.   Acute taper prednisone  Consider voltaren or similar topical.  - predniSONE (DELTASONE) 20 MG tablet; Take 1 tab po TID x 3 days, then BID x 3 days, then daily x 3 days, then 1/2 until gone  Dispense: 20 tablet; Refill: 0          Return in about 8 weeks (around 4/20/2022).

## 2022-02-23 NOTE — PROGRESS NOTES
[facility] - PODIATRY    Today's Date: 02/25/22    Patient Name: Stacy Lynn  MRN: [id number]  CSN: [id number]  PCP: Marquez Portillo APRN  Referring Provider: No ref. provider found    SUBJECTIVE     Chief Complaint   Patient presents with   • Follow-up     marquez portillo pcp12/22/2021  HUB READY TO SCHEDULE -NEW PATIENT -R/S FROM 11/4- INCOMING REF- TONAIL FUNGUS- TRAVEL SCREEN COMPLETED 11022 - pt states left 3-4th toes hurt badly, 4th toe is hurting around the edges of toenail, - pt pain 5/10 some nights the pain is extreme - pt presents possible hammer toes on left foot, and dryness aoound 4th toenails      HPI: Stacy Lynn, a 51 y.o.female, comes to clinic as a(n) new patient complaining of foot pain and complaining of painful toenails. Patient has h/o Anemia in CKD, headaches, chronic pain, chronic respiratory failure with oxygen dependence, GERD, depression, hypertension, hypothyroidism, iron malabsorption, irritable bowel, macular degeneration, pancreatitis, peripheral neuropathy, sarcoidosis, scoliosis, venous insufficiency, lymphedema. Patient presents with complaints of bilateral foot pain, particularly the second through fourth toes of the left foot and second through fourth toes of the right foot.  She states that the left fourth toe also hurts around the edges of the toenail.  She does state that she has some neuropathy in feet due to back issues; she is also had history of chemotherapy which could also contribute to neuropathy.  She is O2 dependent.  Has never had x-rays of her feet.  Denies any known injury or trauma to the feet.  Also has history of chronic lymphedema and has previously followed with vascular surgery.  She states that she is unable to apply compression stockings and also trialed lymphedema pumps but did not have improvement with her use. Admits pain at 5/10 level and described as stabbing, aching and sharp. Denies previous treatment. Denies any  constitutional symptoms. No other pedal complaints at this time.    Past Medical History:   Diagnosis Date   • Anemia in chronic kidney disease (CKD)    • Antineoplastic chemotherapy induced anemia    • Chest pain     NERVE PAIN IN LUNGS   • Chronic headaches    • Chronic kidney disease 4/30/2021   • Chronic pain    • Chronic respiratory failure (HCC)    • Colon polyp    • Degeneration of intervertebral disc of high cervical region    • Depression    • GERD (gastroesophageal reflux disease)    • Hypertension    • Hyperthyroidism    • Iron deficiency anemia    • Iron malabsorption 9/18/2020   • Irritable bowel syndrome    • Kidney stones    • Lung nodule    • Macular degeneration    • Orthopnea    • Pancreatitis, chronic (HCC)    • Peripheral neuropathy    • PFO (patent foramen ovale) 09/2016   • PSVT (paroxysmal supraventricular tachycardia) (HCC)     s/p ablation per Dr. Diallo 4/2016   • Restless leg syndrome    • Sarcoidosis    • Scoliosis    • Septic joint (HCC)    • Venous insufficiency, peripheral     RIGHT LEG     Past Surgical History:   Procedure Laterality Date   • CARDIAC ABLATION  04/2016    SVT; Dr. Diallo    • CHOLECYSTECTOMY     • COLONOSCOPY  10/13/2014   • COLONOSCOPY N/A 8/23/2017    Procedure: COLONOSCOPY WITH ANESTHESIA;  Surgeon: Jeanette Mclaughlin MD;  Location: [facility] ENDOSCOPY;  Service:    • COLONOSCOPY N/A 9/6/2018    Procedure: COLONOSCOPY WITH ANESTHESIA;  Surgeon: Jeanette Mclaughlin MD;  Location: [facility] ENDOSCOPY;  Service: Gastroenterology   • ENDOSCOPY N/A 8/23/2017    Procedure: ESOPHAGOGASTRODUODENOSCOPY WITH ANESTHESIA;  Surgeon: Jeanette Mclaughlin MD;  Location: [facility] ENDOSCOPY;  Service:    • ENDOVENOUS ABLATION SAPHENOUS VEIN W/ LASER Right 03/30/2018   • HYSTERECTOMY     • JOINT REPLACEMENT Right     PATELLA REPLACED   • KIDNEY STONE SURGERY     • KNEE ARTHROSCOPY     • KNEE SURGERY Right    • LATERAL EPICONDYLE RELEASE     • PATELLA SURGERY     • REPLACEMENT TOTAL KNEE     • SHOULDER  ARTHROSCOPY     • ULNAR NERVE DECOMPRESSION     • VARICOSE VEIN SURGERY Left 1/10/2018    Procedure: LEFT SAPHENOUS VEIN RADIO FREQUENCY ABLATION;  Surgeon: Kvng Montgomery DO;  Location:  [facility] OR;  Service:    • VARICOSE VEIN SURGERY Right 3/30/2018    Procedure: RIGHT LOWER EXTREMITY VENAGRAM, RIGHT LOWER EXTREMITY SAPHENOUS VEIN RADIO FREQUENCY ABLATION;  Surgeon: Kvng Montgomery DO;  Location:  [facility] OR;  Service: Vascular     Family History   Problem Relation Age of Onset   • Arrhythmia Mother    • Heart disease Mother    • Kidney disease Mother    • Hypertension Mother    • Heart disease Father    • Diabetes Father    • Cancer Father    • Heart disease Brother    • Heart disease Maternal Aunt    • Heart disease Maternal Uncle    • Heart disease Paternal Aunt    • Heart disease Paternal Uncle    • Diabetes Paternal Uncle    • Hypertension Paternal Uncle    • Heart disease Maternal Grandmother    • Heart disease Maternal Grandfather    • Heart disease Paternal Grandmother    • Cancer Paternal Grandmother    • Heart disease Paternal Grandfather    • Cancer Paternal Uncle    • Hypertension Paternal Uncle    • Colon cancer Neg Hx    • Colon polyps Neg Hx      Social History     Socioeconomic History   • Marital status: Single   Tobacco Use   • Smoking status: Never Smoker   • Smokeless tobacco: Never Used   Vaping Use   • Vaping Use: Never used   Substance and Sexual Activity   • Alcohol use: Never   • Drug use: Never   • Sexual activity: Never     Allergies   Allergen Reactions   • Adhesive Tape Hives   • Tape Hives     Current Outpatient Medications   Medication Sig Dispense Refill   • DULoxetine (CYMBALTA) 60 MG capsule Take 60 mg by mouth Daily.     • Fluticasone-Salmeterol,sensor, (AirDuo Digihaler) 232-14 MCG/ACT aerosol powder  Inhale 1 puff 2 (Two) Times a Day. 1 each 0   • levothyroxine (SYNTHROID, LEVOTHROID) 50 MCG tablet Take 50 mcg by mouth Daily.     • LYRICA 100 MG capsule Take 100 mg by mouth  Every 8 (Eight) Hours As Needed.  1   • metoprolol succinate XL (TOPROL-XL) 25 MG 24 hr tablet Take 100 mg by mouth Daily.     • O2 (OXYGEN) Inhale 2 L/min 1 (One) Time. Continuous     • ondansetron (ZOFRAN) 4 MG tablet Take 4 mg by mouth Every 8 (Eight) Hours As Needed for Nausea or Vomiting.     • oxybutynin (DITROPAN) 5 MG tablet Take 5 mg by mouth every night at bedtime.  5   • oxyCODONE-acetaminophen (PERCOCET) 7.5-325 MG per tablet Take 1 tablet by mouth Every 8 (Eight) Hours As Needed.     • polyethylene glycol (MIRALAX) powder Take 17 g by mouth.     • PROCRIT 34685 UNIT/ML injection Inject 1 Units under the skin As Needed (WHEN BLOOD COUNT [hemoglobin] BELOW 11).     • tiotropium (SPIRIVA) 18 MCG per inhalation capsule Place 1 capsule into inhaler and inhale Daily.     • tiZANidine (ZANAFLEX) 4 MG tablet Take 4 mg by mouth At Night As Needed for Muscle Spasms.     • topiramate (TOPAMAX) 100 MG tablet Take 100 mg by mouth Every Night.     • vitamin B-6 (PYRIDOXINE) 50 MG tablet Take 50 mg by mouth Daily.     • vitamin D (ERGOCALCIFEROL) 1.25 MG (31069 UT) capsule capsule Take 50,000 Units by mouth 1 (One) Time Per Week.     • docusate sodium (COLACE) 100 MG capsule Take 100 mg by mouth.     • esomeprazole (nexIUM) 40 MG capsule TAKE 1 CAPSULE BY MOUTH TWICE DAILY BEFORE  MEALS 60 capsule 0   • folic acid-vit B6-vit B12 (Folbee) 2.5-25-1 MG tablet tablet Take 1 tablet by mouth Daily. 30 tablet 0   • levothyroxine (SYNTHROID, LEVOTHROID) 137 MCG tablet Take 1 tablet by mouth Daily for 30 days. (Patient taking differently: Take 150 mcg by mouth Daily.) 30 tablet 3   • pantoprazole (PROTONIX) 40 MG EC tablet Take 40 mg by mouth 2 (Two) Times a Day.     • tiotropium bromide monohydrate (Spiriva Respimat) 2.5 MCG/ACT aerosol solution inhaler Inhale 2 puffs Daily for 14 days. 2 g 0     No current facility-administered medications for this visit.     Review of Systems   Constitutional: Negative for chills and fever.    HENT: Negative for congestion.    Respiratory: Positive for shortness of breath.    Cardiovascular: Positive for leg swelling. Negative for chest pain.   Gastrointestinal: Negative for constipation, diarrhea, nausea and vomiting.   Musculoskeletal: Positive for arthralgias and back pain. Negative for myalgias.   Skin: Negative for wound.   Neurological: Positive for numbness.       OBJECTIVE     Vitals:    02/25/22 1511   BP: 137/83   Pulse: 61   SpO2: 99%       PHYSICAL EXAM  GEN:   Accompanied by none.     Foot/Ankle Exam:       General:   Appearance: appears stated age and healthy    Orientation: AAOx3    Affect: appropriate    Gait: unimpaired    Assistance: independent    Shoe Gear:  Casual shoes    VASCULAR      Right Foot Vascularity   Dorsalis pedis:  1+  Posterior tibial:  1+  Skin Temperature: warm    Edema Grading:  3+ and pitting  CFT:  3  Pedal Hair Growth:  Absent  Varicosities: moderate varicosities       Left Foot Vascularity   Dorsalis pedis:  1+  Posterior tibial:  1+  Skin Temperature: warm    Edema Grading:  3+ and pitting  CFT:  3  Pedal Hair Growth:  Absent  Varicosities: moderate varicosities        NEUROLOGIC     Right Foot Neurologic   Light touch sensation:  Diminished  Vibratory sensation:  Diminished  Hot/Cold sensation: diminished    Protective Sensation using [place]ryl Monofilament:  3     Left Foot Neurologic   Light touch sensation:  Diminished  Vibratory sensation:  Diminished  Hot/cold sensation: diminished    Protective Sensation using [place]-Darryl Monofilament:  4     MUSCULOSKELETAL      Right Foot Musculoskeletal   Ecchymosis:  None  Tenderness: toenails, toe 2 and toe 3    Arch:  Pes planus  Hammertoe:  Second toe, third toe and fourth toe  Hallux valgus: No    Hallux limitus: No       Left Foot Musculoskeletal   Ecchymosis:  None  Tenderness: left foot callus, toenails, toe 2, toe 3 and toe 4    Arch:  Pes planus  Hammertoe:  Second toe, third toe and fourth  toe  Hallux valgus: No    Hallux limitus: No       MUSCLE STRENGTH     Right Foot Muscle Strength   Foot dorsiflexion:  4-  Foot plantar flexion:  4-  Foot inversion:  4-  Foot eversion:  3     Left Foot Muscle Strength   Foot dorsiflexion:  5  Foot plantar flexion:  5  Foot inversion:  5  Foot eversion:  3     RANGE OF MOTION      Right Foot Range of Motion   Foot and ankle ROM within normal limits       Left Foot Range of Motion   Foot and ankle ROM within normal limits       DERMATOLOGIC     Right Foot Dermatologic   Skin: skin intact    Nails: onychomycosis, abnormally thick and dystrophic nails    Nails comment:  1-5     Left Foot Dermatologic   Skin: corn    Nails: onychomycosis, abnormally thick and dystrophic nails    Nails comment:  1-5     Image:       RADIOLOGY/NUCLEAR:  No results found.    LABORATORY/CULTURE RESULTS:      PATHOLOGY RESULTS:       ASSESSMENT/PLAN     Diagnoses and all orders for this visit:    1. Onychomycosis (Primary)    2. Hammer toes of both feet  -     XR Foot 3+ View Bilateral; Future    3. Foot pain, bilateral  -     XR Foot 3+ View Bilateral; Future    4. Foot callus    5. Neuropathy    6. Lymphedema    7. Venous (peripheral) insufficiency      Comprehensive lower extremity examination and evaluation was performed.  Discussed findings and treatment plan including risks, benefits, and treatment options with patient in detail. Patient agreed with treatment plan.  After verbal consent obtained, nail(s) x10 debrided of length and thickness with nail nipper without incidence  After verbal consent obtained, calluses x3 pared utilizing dermal curette and/or scalpel without incidence  Patient may maintain nails and calluses at home utilizing emery board or pumice stone between visits as needed   Given patient's history of neuropathy, mycotic nails, lymphedema/PVD, and inability to reach feet she would benefit and qualify for 3-month nail care for continued monitoring.  Pain at the tips  of the toes consistent with hammertoe deformities of both feet.  Discussed conservative measures utilizing crest pads and paring of calluses as well as discussion of potential procedural intervention of either flexor tenotomy or surgical hammertoe repair.  Crest pads dispensed x2.  X-rays of both feet to establish baseline.  Continue to recommend lower extremity elevation at rest.  An After Visit Summary was printed and given to the patient at discharge, including (if requested) any available informative/educational handouts regarding diagnosis, treatment, or medications. All questions were answered to patient/family satisfaction. Should symptoms fail to improve or worsen they agree to call or return to clinic or to go to the Emergency Department. Discussed the importance of following up with any needed screening tests/labs/specialist appointments and any requested follow-up recommended by me today. Importance of maintaining follow-up discussed and patient accepts that missed appointments can delay diagnosis and potentially lead to worsening of conditions.  Return in about 3 months (around 5/25/2022)., or sooner if acute issues arise.    Lab Frequency Next Occurrence   Follow Anesthesia Guidelines / Standing Orders Once 12/08/2017   Follow Anesthesia Guidelines / Standing Orders Once 02/20/2018   Follow Anesthesia Guidelines / Standing Orders Once 08/29/2018   Comprehensive Metabolic Panel Once 02/21/2022   Vitamin B12 & Folate Once 02/21/2022   Ferritin Once 02/21/2022   Iron Profile Once 02/21/2022   Mammo screening digital tomosynthesis bilateral w CAD Once 10/05/2021   ONCBCN INFUSION APPOINTMENT REQUEST 01 Once 11/19/2021   ONCBCN INFUSION APPOINTMENT REQUEST 01 Once 01/14/2022   CBC & Differential Once a week    CBC & Differential Once a week 2/25/2022       This document has been electronically signed by BETINA Ernst on February 25, 2022 16:11 CST

## 2022-02-25 ENCOUNTER — OFFICE VISIT (OUTPATIENT)
Dept: PODIATRY | Facility: CLINIC | Age: 52
End: 2022-02-25

## 2022-02-25 VITALS
HEART RATE: 61 BPM | WEIGHT: 284 LBS | DIASTOLIC BLOOD PRESSURE: 83 MMHG | HEIGHT: 64 IN | BODY MASS INDEX: 48.49 KG/M2 | SYSTOLIC BLOOD PRESSURE: 137 MMHG | OXYGEN SATURATION: 99 %

## 2022-02-25 DIAGNOSIS — M79.671 FOOT PAIN, BILATERAL: ICD-10-CM

## 2022-02-25 DIAGNOSIS — L84 FOOT CALLUS: ICD-10-CM

## 2022-02-25 DIAGNOSIS — M20.42 HAMMER TOES OF BOTH FEET: ICD-10-CM

## 2022-02-25 DIAGNOSIS — I87.2 VENOUS (PERIPHERAL) INSUFFICIENCY: ICD-10-CM

## 2022-02-25 DIAGNOSIS — M20.41 HAMMER TOES OF BOTH FEET: ICD-10-CM

## 2022-02-25 DIAGNOSIS — B35.1 ONYCHOMYCOSIS: Primary | ICD-10-CM

## 2022-02-25 DIAGNOSIS — I89.0 LYMPHEDEMA: ICD-10-CM

## 2022-02-25 DIAGNOSIS — M79.672 FOOT PAIN, BILATERAL: ICD-10-CM

## 2022-02-25 DIAGNOSIS — G62.9 NEUROPATHY: ICD-10-CM

## 2022-02-25 PROCEDURE — 99213 OFFICE O/P EST LOW 20 MIN: CPT | Performed by: NURSE PRACTITIONER

## 2022-02-25 PROCEDURE — 11056 PARNG/CUTG B9 HYPRKR LES 2-4: CPT | Performed by: NURSE PRACTITIONER

## 2022-02-25 PROCEDURE — 11721 DEBRIDE NAIL 6 OR MORE: CPT | Performed by: NURSE PRACTITIONER

## 2022-02-25 RX ORDER — LANOLIN ALCOHOL/MO/W.PET/CERES
50 CREAM (GRAM) TOPICAL DAILY
COMMUNITY

## 2022-02-25 RX ORDER — ERGOCALCIFEROL 1.25 MG/1
50000 CAPSULE ORAL WEEKLY
COMMUNITY

## 2022-02-25 RX ORDER — LEVOTHYROXINE SODIUM 0.05 MG/1
50 TABLET ORAL DAILY
COMMUNITY
End: 2022-03-14

## 2022-02-25 NOTE — PATIENT INSTRUCTIONS
"https://orthoinfo.aaos.org/en/diseases--conditions/hammer-toe\">   Hammer Toe  Hammer toe is a change in the shape, or a deformity, of the toe. The deformity causes the middle joint of the toe to stay bent. Hammer toe starts gradually. At first, the toe can be straightened. Then over time, the toe deformity becomes stiff, inflexible, and permanently bent. Hammer toe usually affects the second, third, or fourth toe.  A hammer toe causes pain, especially when wearing shoes. Corns and calluses can result from the toe rubbing against the inside of the shoe.  Early treatments to keep the toe straight may relieve pain. As the deformity of the toe becomes stiff and permanent, surgery may be needed to straighten the toe.  What are the causes?  This condition is caused by abnormal bending of the toe joint that is closest to your foot. Over time, the toe bending downward pulls on the muscles and connections (tendons) of the toe joint, making them weak and stiff. Wearing shoes that are too narrow in the toe box and do not allow toes to fully straighten can cause this condition.  What increases the risk?  You are more likely to develop this condition if you:  · Are an older female.  · Wear shoes that are too small, or wear high-heeled shoes that pinch your toes.  · Have a second toe that is longer than your big toe (first toe).  · Injure your foot or toe.  · Have arthritis, or have a nerve or muscle disorder.  · Have diabetes or a condition known as Charcot joint, which may cause you to walk abnormally.  · Have a family history of hammer toe.  · Are a ballet dancer.  What are the signs or symptoms?  Pain and deformity of the toe are the main symptoms of this condition. The pain is worse when wearing shoes, walking, or running. Other symptoms may include:  · A thickened patch of skin, called a corn or callus, that forms over the top of the bent part of the toe or between the toes.  · Redness and a burning feeling on the bent " toe.  · An open sore that forms on the top of the bent toe.  · Not being able to straighten the affected toe.  How is this diagnosed?  This condition is diagnosed based on your symptoms and a physical exam. During the exam, your health care provider will try to straighten your toe to see how stiff the deformity is. You may also have tests, such as:  · A blood test to check for rheumatoid arthritis or diabetes.  · An X-ray to show how severe the toe deformity is.  How is this treated?  Treatment for this condition depends on whether the toe is flexible or deformed and no longer moveable. In less severe cases, a hammer toe can be straightened without surgery. These treatments include:  · Taping the toe into a straightened position.  · Using pads and cushions to protect the bent toe.  · Wearing shoes that provide enough room for the toes.  · Doing toe-stretching exercises at home.  · Taking an NSAID, such as ibuprofen, to reduce pain and swelling.  · Using special orthotics or insoles for pain relief and to improve walking.  If these treatments do not help or the toe has a severe deformity and cannot be straightened, surgery is the next option. The most common surgeries used to straighten a hammer toe include:  · Arthroplasty or osteotomy. Part of the toe joint is reconstructed or removed, which allows the toe to straighten.  · Fusion. Cartilage between the two bones of the joint is taken out, and the bones are fused together into one longer bone.  · Implantation. Part of the bone is removed and replaced with an implant to allow the toe to move again.  · Flexor tendon transfer. The tendons that curl the toes down (flexor tendons) are repositioned.  Follow these instructions at home:  · Take over-the-counter and prescription medicines only as told by your health care provider.  · Do toe-straightening and stretching exercises as told by your health care provider.  · Keep all follow-up visits. This is important.  How is  this prevented?  · Wear shoes that fit properly and give your toes enough room. Shoes should not cause pain.  · Buy shoes at the end of the day to make sure they fit well, since your foot may swell during the day. Make sure they are comfortable before you buy them.  · As you age, your shoe size might change, including the width. Measure both feet and buy shoes for the larger foot.  · A shoe repair store might be able to stretch shoes that feel tight in spots.  · Do not wear high-heeled shoes or shoes with pointed toes.  Contact a health care provider if:  · Your pain gets worse.  · Your toe becomes red or swollen.  · You develop an open sore on your toe.  Summary  · Hammer toe is a condition that gradually causes your toe to become bent and stiff.  · Hammer toe can be treated by taping the toe into a straightened position and doing toe-stretching exercises. If these treatments do not help, surgery may be needed.  · To prevent this condition, wear shoes that fit properly, give your toes enough room, and do not cause pain.  This information is not intended to replace advice given to you by your health care provider. Make sure you discuss any questions you have with your health care provider.  Document Revised: 03/25/2021 Document Reviewed: 03/25/2021  [employer] Patient Education © 2021 [employer]

## 2022-02-28 RX ORDER — CYANOCOBALAMIN/FOLIC AC/VIT B6 1-2.5-25MG
TABLET ORAL
Qty: 30 TABLET | Refills: 0 | Status: SHIPPED | OUTPATIENT
Start: 2022-02-28 | End: 2022-03-10 | Stop reason: SDUPTHER

## 2022-03-07 NOTE — PROGRESS NOTES
[facility] GROUP HEMATOLOGY AND ONCOLOGY  [address]  [place] [address] [phone]    Patient Name: Stacy Lynn  Encounter Date: 03/14/2021  YOB: 1970  Patient Number: [id number]       REASON FOR VISIT: Ms. Stacy Lynn is a 51-year-old female who returns in followup of anemia from chronic kidney disease, with leukopenia and intermittent thrombocytopenia without unifying diagnosis (likely contribution from methotrexate). She is seen 34.5 months since last receipt of IV Injectafer and 54.5 months since last receipt of 2 units PRBC transfusions. She is here alone. The history is again muddled by liberal symptom reporting.     I have reviewed the HPI and verified with the patient the accuracy of it. No changes to interval history since the information was documented. Fermín Boggs MD 03/14/22     DIAGNOSTIC ABNORMALITIES:   1. Labs, 09/16/2014 Dr. Waters's office. TIBC 303 (225 - 420), serum iron 43 (42 - 180), and iron saturation 14.2%.   2. Labs, 10/03/2014. Hemoglobin 9.8, hematocrit 30.2, MCV 94.1, platelets 157,000, WBC 2.9, with 52.3 segs (ANC 1.5), 36.8 lymphocytes, 10.9 mid cells. Glucose 80, BUN 16, creatinine 0.9 (GFR 71.6), sodium 142, potassium 3.9, chloride 109, bicarbonate 19, calcium 8.6, albumin 3.5, total protein 6.3, bilirubin 0.4, alkaline phosphatase 67, , AST 12, ALT 20, phosphorus 4.6 (each normal). Serum iron 49, iron saturation 15.8%, ferritin 32, , folate 13.6. HIV antibodies screen nonreactive. ELENA negative. T4 of 7.1, TSH 0.6, sed rate 8 (each normal). Rheumatoid factor less than 10. ANCA less than 1:20.  3. Colonoscopy, 10/13/2014: - One 7 mm polyp in the ascending colon resected and retrieved. The examination was otherwise normal on direct and retroflexion views. Recommendation: - Repeat colonoscopy in 5 years for surveillance.  4. Comprehensive Report, 10/15/2014: FINAL DIAGNOSIS.  Peripheral blood: Leukopenia/neutropenia; normocytic anemia. Comprehensive Comments: The cause of the cytopenias is not apparent from review of this peripheral blood smear. Differential diagnosis includes chronic disease, autoimmunity, drug effects, infection, etc. Although there are no morphologic features of myeloid dysplasia, please be aware that peripheral blood findings are not always representative of underlying bone marrow abnormalities. FLOW IMPRESSION: Peripheral blood: No increase in myeloid or lymphoid blasts identified. Findings consistent with mild granulocytic left shift. No immunophenotypically abnormal B- or T-cell population identified.  5. EGD performed on 10/16/2014 at [facility]. IMPRESSION: Normal esophagus. Normal stomach. Normal first part of the duodenum and 2nd part of the duodenum Biopsied . Pathology: Small bowel biopsy. Benign small intestinal type mucosa with no significant histopathologic changes.  6. Labs, 05/29/2015. IgG 827, IgA 168, IgM 140. Immunofixation showed no monoclonal immunoglobulins detected. Free kappa light chain 1.26, free lambda light chain 1.17, kappa/lambda light chains 1.08 (each normal).  7. UPIEP, 06/09/2015. 24-hour urine 82.8 mg. Immunofixation not reported.  8. [facility], CT-guided bone marrow, 06/29/2015. Impression: Technically successful CT-guided bone marrow aspirate without immediate complications. No core bone marrow sample could be obtained. PATHOLOGY: Surgical pathology performed on 06/29/2015 at [facility] was revealing for post iliac crest bone marrow biopsy and aspirate: Normocellular bone marrow of 40%. No increase in blasts. Iron staining is 1+. Flow cytometry shows no evidence of an abnormal myeloid maturation, increase in blasts, or lymphoproliferative disorder. Complete blood count and peripheral blood smear review: Normochromic normocytic anemia. Leukopenia. No circulating blasts.  9. Peripheral blood comprehensive report,  06/19/2017: PANCYTOPENIA. COMPREHENSIVE DIAGNOSIS.. Review of peripheral blood smear: Leukopenia with relative reactive appearing eosinophilia. Normochromic, normocytic anemia. Mild thrombocytopenia. See comment. COMPREHENSIVE COMMENT. When compared to a peripheral blood specimen reviewed on this patient in 10/2014 ([id number]), the white blood cell count has decreased from 2.7 K/mL to 1.8 currently. The hemoglobin level has also decreased from 9.9 g/dL to 8.7 g/dL. The platelet counts are similar. As stated in the prior specimen, the cause of the cytopenias is not apparent from the peripheral blood smear examination. Etiologies to consider include chronic diseases particularly autoimmune disease, infections and drugs/toxins. Myelodysplasia is in the differential diagnosis but that diagnosis can be difficult to establish on a peripheral blood specimen. Correlation recommended. Flow cytometry study after erythrolysis reveals no circulating myeloid or lymphoid blasts. Myeloid and monocytic lineages are represented by mature granulocytes and monocytes. Phenotypically unremarkable eosinophils are mildly elevated at 10.5% of the total WBC. Basophils are not increased. It must be noted that the diagnosis of a myeloid stem cell disorder, if clinically suspected, is best made using bone marrow specimens. Peripheral blood is not always representative of abnormalities involving the bone marrow. The B cells show no evidence of clonality or antigenic aberrancy. The T cells show normal expression of the pan T-cell antigens. The NK cells account for 23.7% of the total lymphocytes.  10. [facility]: Endoscopy: 08/23/2017. Impressions: - Normal esophagus. - Non-erosive gastritis. Biopsied. - Normal first part of the duodenum and 2nd part of the duodenum. Biopsied.   11. [facility]: Colonoscopy: 08/23/2017. Impressions: - The entire examined colon is normal. Biopsied.     PREVIOUS INTERVENTIONS:   1. IV Venofer, 11/10/2014  - 11/20/2014 (1000 mg)  2. INFeD 500 mg on 07/19/2016 and 05/01/2017  3. 2 units PRBC transfusions, 09/01/2017  4. Injectafer 750 mg IV, 04/23/2019        Problem List Items Addressed This Visit        Other    Thrombocytopenia (HCC) - Primary    Anemia in chronic kidney disease (CKD)        Oncology/Hematology History    No history exists.       PAST MEDICAL HISTORY:  ALLERGIES:  Allergies   Allergen Reactions   • Adhesive Tape Hives   • Tape Hives     CURRENT MEDICATIONS:  Outpatient Encounter Medications as of 3/14/2022   Medication Sig Dispense Refill   • docusate sodium (COLACE) 100 MG capsule Take 100 mg by mouth.     • esomeprazole (nexIUM) 40 MG capsule TAKE 1 CAPSULE BY MOUTH TWICE DAILY BEFORE  MEALS 60 capsule 0   • Fluticasone-Salmeterol,sensor, (AirDuo Digihaler) 232-14 MCG/ACT aerosol powder  Inhale 1 puff 2 (Two) Times a Day. 1 each 0   • Folbee 2.5-25-1 MG tablet tablet TAKE 1 TABLET BY MOUTH EVERY DAY 30 tablet 0   • LYRICA 100 MG capsule Take 100 mg by mouth Every 8 (Eight) Hours As Needed.  1   • metoprolol succinate XL (TOPROL-XL) 25 MG 24 hr tablet Take 100 mg by mouth Daily.     • O2 (OXYGEN) Inhale 2 L/min 1 (One) Time. Continuous     • ondansetron (ZOFRAN) 4 MG tablet Take 4 mg by mouth Every 8 (Eight) Hours As Needed for Nausea or Vomiting.     • oxybutynin (DITROPAN) 5 MG tablet Take 5 mg by mouth every night at bedtime.  5   • oxyCODONE-acetaminophen (PERCOCET) 7.5-325 MG per tablet Take 1 tablet by mouth Every 8 (Eight) Hours As Needed.     • polyethylene glycol (MIRALAX) powder Take 17 g by mouth.     • PROCRIT 98881 UNIT/ML injection Inject 1 Units under the skin As Needed (WHEN BLOOD COUNT [hemoglobin] BELOW 11).     • tiotropium bromide monohydrate (Spiriva Respimat) 2.5 MCG/ACT aerosol solution inhaler Inhale 2 puffs Daily. 4 g 11   • tiZANidine (ZANAFLEX) 4 MG tablet Take 4 mg by mouth At Night As Needed for Muscle Spasms.     • topiramate (TOPAMAX) 100 MG tablet Take 100 mg by  mouth Every Night.     • Umeclidinium Bromide (Incruse Ellipta) 62.5 MCG/INH aerosol powder  Inhale 1 puff Daily for 30 days. 1 each 11   • vitamin B-6 (PYRIDOXINE) 50 MG tablet Take 50 mg by mouth Daily.     • vitamin D (ERGOCALCIFEROL) 1.25 MG (85190 UT) capsule capsule Take 50,000 Units by mouth 1 (One) Time Per Week.     • levothyroxine (SYNTHROID, LEVOTHROID) 137 MCG tablet Take 1 tablet by mouth Daily for 30 days. (Patient taking differently: Take 150 mcg by mouth Daily.) 30 tablet 3   • [DISCONTINUED] DULoxetine (CYMBALTA) 60 MG capsule Take 60 mg by mouth Daily.     • [DISCONTINUED] levothyroxine (SYNTHROID, LEVOTHROID) 50 MCG tablet Take 50 mcg by mouth Daily.     • [DISCONTINUED] pantoprazole (PROTONIX) 40 MG EC tablet Take 40 mg by mouth 2 (Two) Times a Day.     • [DISCONTINUED] tiotropium (SPIRIVA) 18 MCG per inhalation capsule Place 1 capsule into inhaler and inhale Daily.     • [DISCONTINUED] tiotropium bromide monohydrate (Spiriva Respimat) 2.5 MCG/ACT aerosol solution inhaler Inhale 2 puffs Daily for 14 days. 2 g 0     No facility-administered encounter medications on file as of 3/14/2022.     ADULT ILLNESSES:   Iron deficiency anemia ( ICD-10:D50.9 ;Iron deficiency anemia, unspecified   Anemia ( ICD-10:D64.9 ;Anemia, unspecified   Anemia in chronic kidney disease ( ICD-10:D63.1 ;Anemia in chronic kidney disease   Chronic kidney disease ( ICD-10:N18.3 ;Chronic kidney disease, stage 3 (moderate)   Chronic pancreatitis ( ICD-10:K86.1 ;Other chronic pancreatitis   Depression ( ICD-10:F32.9 ;Major depressive disorder, single episode, unspecified   Fatigue ( ICD-10:R53.82 ;Chronic fatigue, unspecified   Fibromyalgia syndrome ( ICD-10:M79.7 ;Fibromyalgia   Gastroesophageal reflux disease ( ICD-10:K21.9 ;Gastro-esophageal reflux disease without esophagitis   Hypertension ( ICD-10:I10 ;Essential (primary) hypertension   Hypothyroidism ( ICD-10:E03.9 ;Hypothyroidism, unspecified   Irritable bowel syndrome (  ICD-10:K58.9 ;Irritable bowel syndrome without diarrhea   Kidney stone ( ICD-10:N20.0 ;Calculus of kidney   Knee pain ( meniscal disorder,Dr. Dasilva; ICD-10:M25.569 ;Pain in unspecified knee )   Leukopenia ( ICD-10:D72.819 ;Decreased white blood cell count, unspecified   Macular degeneration ( ICD-10:H35.30 ;Unspecified macular degeneration   Migraines ( ICD-10:G43.909 ;Migraine, unspecified, not intractable, without status migrainosus   Obesity ( ICD-10:E66.9 ;Obesity, unspecified   Peripheral neuropathy ( ICD-10:G62.9 ;Polyneuropathy, unspecified   Sarcoidosis ( ICD-10:D86.0 ;Sarcoidosis of lung   Scoliosis ( ICD-10:M41.9 ;Scoliosis, unspecified   Urge incontinence of urine ( ICD-10:N39.41 ;Urge incontinence   Vitamin B12 deficiency ( ICD-10:E53.8 ;Deficiency of other specified B group vitamins    SURGERIES:   Total knee replacement, right, 11/06/2015, Dr. Dasilva   Colonoscopy, 2009   Shoulder repair, epicondyle, right, 2002   Shoulder repair, arthroscopy, 2001   Ulnar transposition; ulnar neuropathy, 2002,1999   Kidney stone, 2001   Hysterectomy, total, 2004   Colonoscopy, 09/06/2018. Impression: The examination was otherwise normal on direct and retroflexion views. No specimens collected. Repeat 5 years   Colonoscopy, 08/23/2017. [facility]. Impression> The entire examined colon is normal. Biopsied   Decompression of median nerve, (carpal tunnel release), 05/08/2018, Dr. Kwon   Operative procedure on knee, replacement, 01/2014, Dr. Dasilva   Endoscopy, 08/23/2017, [facility]. Impression: Normal esophagus. Non-erosive gastritis. Biopsied. Normal 1st part of the duodenum and 2nd part of the duodenum. Biopsied   Radiofrequency ablation (RFA) left leg on 01/10/2018 by Dr. Montgomery. Left lower extremity venous ultrasound, 01/19/2018. No deep venous thrombosis (DVT) left lower extremity. Was started on Eliquis. Lymphedema pumps and compression stockings prescribed. Followup 1 month    Revision surgery was done last 05/04/2017. Dr. Kwon   Cholecystectomy, remote      ADULT ILLNESSES:  Patient Active Problem List   Diagnosis Code   • Palpitations R00.2   • Thrombocytopenia (HCC) D69.6   • Sarcoidosis D86.9   • Essential hypertension I10   • Hypothyroidism E03.9   • Dyspnea on exertion R06.00   • Morbidly obese (HCC) E66.01   • PFO (patent foramen ovale) Q21.1   • Iron deficiency anemia D50.9   • Generalized weakness R53.1   • Volume depletion, gastrointestinal loss E86.9   • Viral enterocolitis A08.4   • Colon polyps K63.5   • Epigastric pain R10.13   • Bloating R14.0   • Early satiety R68.81   • Weight loss R63.4   • Abdominal cramping R10.9   • Nausea R11.0   • NSAID long-term use Z79.1   • 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, T45.1X5A   • Anemia in chronic kidney disease (CKD) N18.9, D63.1   • Varicose veins of both lower extremities with pain I83.813   • Lymphedema I89.0   • Venous (peripheral) insufficiency I87.2   • Neuropathy due to medical condition (HCC) G63   • Venous insufficiency I87.2   • Chest pain R07.9   • Bloody stool K92.1   • Constipation K59.00   • Anal or rectal pain K62.89   • Rectal bleeding K62.5   • Hx of colonic polyps Z86.010   • Other hemorrhoids K64.8   • Chronic respiratory failure with hypoxia (HCC) J96.11   • Diarrhea R19.7   • Irritable bowel syndrome with constipation K58.1   • Overweight E66.3   • 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 Diallo    • CHOLECYSTECTOMY     • COLONOSCOPY  10/13/2014   • COLONOSCOPY N/A 8/23/2017    Procedure: COLONOSCOPY WITH ANESTHESIA;  Surgeon: Jeanette Mclaughlin MD;  Location: [facility] ENDOSCOPY;  Service:    • COLONOSCOPY N/A 9/6/2018    Procedure: COLONOSCOPY WITH ANESTHESIA;  Surgeon: Jeanette Mclaughlin MD;  Location: [facility] ENDOSCOPY;  Service: Gastroenterology   • ENDOSCOPY N/A 8/23/2017     Procedure: ESOPHAGOGASTRODUODENOSCOPY WITH ANESTHESIA;  Surgeon: Jeanette Mclaughlin MD;  Location: [facility] ENDOSCOPY;  Service:    • ENDOVENOUS ABLATION SAPHENOUS VEIN W/ LASER Right 03/30/2018   • HYSTERECTOMY     • JOINT REPLACEMENT Right     PATELLA REPLACED   • KIDNEY STONE SURGERY     • KNEE ARTHROSCOPY     • KNEE SURGERY Right    • LATERAL EPICONDYLE RELEASE     • PATELLA SURGERY     • REPLACEMENT TOTAL KNEE     • SHOULDER ARTHROSCOPY     • ULNAR NERVE DECOMPRESSION     • VARICOSE VEIN SURGERY Left 1/10/2018    Procedure: LEFT SAPHENOUS VEIN RADIO FREQUENCY ABLATION;  Surgeon: Kvng Montgomery DO;  Location: [facility] OR;  Service:    • VARICOSE VEIN SURGERY Right 3/30/2018    Procedure: RIGHT LOWER EXTREMITY VENAGRAM, RIGHT LOWER EXTREMITY SAPHENOUS VEIN RADIO FREQUENCY ABLATION;  Surgeon: Kvng Montgomery DO;  Location:  [facility] OR;  Service: Vascular     HEALTH MAINTENANCE ITEMS:  Health Maintenance Due   Topic Date Due   • COVID-19 Vaccine (1) Never done   • TDAP/TD VACCINES (1 - Tdap) Never done   • HEPATITIS C SCREENING  Never done   • PAP SMEAR  Never done   • ANNUAL WELLNESS VISIT  07/28/2017   • ZOSTER VACCINE (1 of 2) Never done   • MAMMOGRAM  03/08/2021   • INFLUENZA VACCINE  08/01/2021       <no information>  Last Completed Colonoscopy          COLORECTAL CANCER SCREENING (COLONOSCOPY - Every 5 Years) Next due on 3/15/2024    03/15/2019  Outside Procedure: SD SIGMOIDOSCOPY,BIOPSY,SD COLONOSCOPY W/BIOPSY SINGLE/MULTIPLE    09/06/2018  COLONOSCOPY    09/06/2018  Surgical Procedure: COLONOSCOPY    08/23/2017  COLONOSCOPY    08/23/2017  Surgical Procedure: COLONOSCOPY    Only the first 5 history entries have been loaded, but more history exists.              Immunization History   Administered Date(s) Administered   • Pneumococcal Conjugate 13-Valent (PCV13) 11/09/2018, 04/01/2019   • Pneumococcal Polysaccharide (PPSV23) 09/26/2017, 10/01/2018     Last Completed Mammogram     This patient has no relevant  "Health Maintenance data.            FAMILY HISTORY:  Family History   Problem Relation Age of Onset   • Arrhythmia Mother    • Heart disease Mother    • Kidney disease Mother    • Hypertension Mother    • Heart disease Father    • Diabetes Father    • Cancer Father    • Heart disease Brother    • Heart disease Maternal Aunt    • Heart disease Maternal Uncle    • Heart disease Paternal Aunt    • Heart disease Paternal Uncle    • Diabetes Paternal Uncle    • Hypertension Paternal Uncle    • Heart disease Maternal Grandmother    • Heart disease Maternal Grandfather    • Heart disease Paternal Grandmother    • Cancer Paternal Grandmother    • Heart disease Paternal Grandfather    • Cancer Paternal Uncle    • Hypertension Paternal Uncle    • Colon cancer Neg Hx    • Colon polyps Neg Hx      SOCIAL HISTORY:  Social History     Socioeconomic History   • Marital status: Single   Tobacco Use   • Smoking status: Never Smoker   • Smokeless tobacco: Never Used   Vaping Use   • Vaping Use: Never used   Substance and Sexual Activity   • Alcohol use: Never   • Drug use: Never   • Sexual activity: Never       REVIEW OF SYSTEMS:  Constitutional:   The patient's appetite is good. \"ok.\" Her energy is chronically low. She manages her personal ADLs. She lives with her sister and brother. She manages her ADLs including helping with light indoor chores but not any errands and does not drive. She has regained 26 pounds (had lost lost 48 pounds at her 3 prior visits) since her last visit. \"Some of that is fluid.\" She has no fevers or chills but has non-drenching night sweats. Her sleep habits seem appropriate.  Ear/Nose/Throat/Mouth:   She reports no ear pains, sinus symptoms, sore throat, nosebleeds, or sore tongue. She has migraine headaches. She denies any hoarseness, change in voice quality.   Ocular:   She reports no eye pain, significant change in visual acuity, double vision, or blurry vision.  Respiratory:   She reports baseline " "exertional dyspnea and is short of breath with her routine activities. She has chronic cough with thick, white/yellow phlegm production but has no significant shortness of breathing at rest or unexplained chest wall pain. Says she is off prednisone since last 08/2016 by Dr. Scherer (for the sarcoid). Says the methotrexate has been stopped by Dr. Scherer. Has been on round the clock O2 since 04/2018.  Cardiovascular:   She reports no exertional chest pain, chest pressure, or chest heaviness. She reports no claudication. She reports no palpitations or symptomatic orthostasis.  Gastrointestinal:   She reports chronic nausea but no dysphagia, vomiting, or postprandial abdominal pain but admits to early satiety, increased bloating, and cramping. She has no change in bowel habits without dark discoloration of the stool (off oral iron). She reports intermittent rectal bleeding when she is constipated. \"No.\" She has intermittent diarrhea from IBS. Is intolerant of oral iron (cramps, constipation). Last repeat colonoscopy, 09/06/2018 (above). Hemorrhoids?.  Genitourinary:   She reports no urinary burning, frequency, dribbling, or discoloration. She reports difficulty controlling her bladder and has trouble holding in her urine but has not needed protective pads. She has no need to urinate frequently through the night.  Musculoskeletal:   She reports that she will be having right rotator cuff and bicep tendon repair on 03/24/2022.  She has persistent right knee discomfort in spite of total knee replacement (TKR). Says revision surgery was done last 05/04/2017 - Dr. Kwon. She has chronic arthralgias of the hips and lower back with distal radiculitis to the buttocks. She has myalgias of the calves and has nighttime leg cramping. She is now seen by Dr. Schmitt of Pain Management. Says a nerve stimulator couldn't be placed due to her scoliosis. Is off morphine.  Is now on Percocet 7.5 every 8 hours  Extremities:   She reports " "chronic trouble with fluid retention and significant leg swelling. Is no longer using compression leg pumps.  Endocrine:   She reports no problems with excess thirst, excessive urination, vasomotor instability.  Heme/Lymphatic:   She reports easy bruising but no unexplained bleeding, petechial rashes, or swollen glands.  Skin:   She reports chronic itching and rashes but no lesions which won't heal.  Neuro:   She reports postural dizziness but no loss of consciousness, seizures, or fainting spells. She reports no weakness of her face, arms, or legs. She has no difficulty with speech. She has no tremors. She has relief of paresthesias of the right hand since the carpal tunnel release on 05/08/2018 (Dr. Kwon).  Psych:   She admits to intermittent depression and anxiety. She reports occasional mood swings. She reports no history of suicide attempts.      VITAL SIGNS: /70   Pulse 66   Temp 97.8 °F (36.6 °C)   Resp 16   Ht 162.6 cm (64\")   Wt 135 kg (297 lb 4.8 oz)   SpO2 99%   Breastfeeding No   BMI 51.03 kg/m² Body surface area is 2.32 meters squared.  Pain Score    03/14/22 1420   PainSc:   6         PHYSICAL EXAMINATION:   General:   She is a very-pleasant, morbidly obese, visibly stouter, and modestly-kept middle-aged female who is comfortable at rest. She arrived in the exam room ambulatory. She appears to be her stated age. Her skin color is still a bit pale.   Head/Neck:   The patient is anicteric and atraumatic. She is wearing a surgical mask today.  She is wearing O2 via cannula, tethered to a portable O2 tank. The trachea is midline. The neck is supple without evidence of jugular venous distention or cervical adenopathy.   Eyes:   The pupils are equal, round, and reactive to light. The extraocular movements are full. There is no scleral jaundice or erythema.   Chest:   The respiratory efforts are normal and unhindered. The breath sounds are diminished bilaterally with vague basilar rales. " There are no wheezes, rhonchi, or asymmetry of breath sounds.  Cardiovascular:   The patient has a regular cardiac rate and rhythm without murmurs, rubs, or gallops. The peripheral pulses are equal and full.  Abdomen:   The belly is soft and (more) globose. Her habitus precludes an adequate exam but there is no rebound or guarding. There is no organomegaly, mass-effect, or tenderness. Bowel sounds are active and of normal character.  Extremities:   There is no evidence of cyanosis, clubbing, with 3+ (chronic) leg/ankle edema.   Limited abduction right shoulder  Rheumatologic:   There is no overt evidence of rheumatoid deformities of the hands. There is no sausaging of the fingers. There is no sign of active synovitis. The gait is slow and antalgic, still favoring the right leg/knee.  She does not use a cane nor walker  Cutaneous:   There are no overt rashes, disseminated lesions, purpura, or petechiae.   Lymphatics:   There is no evidence of adenopathy in the cervical, supraclavicular, or axillary areas.   Neurologic:   The patient is alert, oriented, cooperative, and pleasant. She is appropriately conversant. She ambulated into the exam room without assistance but declined transfer from chair to exam table. There is no overt dysfunction of the motor, sensory, cerebellar systems.  Psych:   Mood and affect are appropriate for circumstance. Eye contact is appropriate. Normal judgement and decision making.        LABS    Lab Results - Last 18 Months   Lab Units 03/10/22  1351 02/11/22  1124 01/03/22  1359 12/17/21  1306 10/22/21  1125 09/10/21  1347 06/25/21  1242 06/03/21  1335 04/30/21  1336 04/02/21  1303   HEMOGLOBIN g/dL 11.2* 10.5* 10.5* 10.7* 9.7* 9.2* 11.0* 10.7* 10.0* 11.3*   HEMATOCRIT % 35.6 33.3* 34.9* 35.2 31.2* 29.0* 34.1 32.7* 31.4* 35.2   MCV fL 101.1* 102.1* 103.6* 100.0* 100.0* 98.3* 97.4* 96 96.9 95.1   WBC 10*3/mm3 3.20* 2.84* 3.1* 2.72* 3.23* 3.31* 3.18* 3.1* 3.80 4.57   RDW % 14.0 13.9 13.6 13.7  13.9 13.6 13.2 13.2 14.2 13.2   MPV fL 9.9 9.9 10.1 9.7 9.8 10.0 10.0  --  9.5 9.7   PLATELETS 10*3/mm3 111* 117* 121* 129* 145 132* 153 118* 150 160   IMM GRAN % %  --   --   --  0.4 0.3 0.3 0.6*  --  0.3 0.2   NEUTROS ABS 10*3/mm3 1.63* 1.61* 1.7 1.39* 1.56* 1.70 1.68* 1.9 2.34 3.11   LYMPHS ABS K/uL  --   --  0.8* 0.82 1.11 1.02 0.96 0.8 0.97 1.02   MONOS ABS K/uL  --   --  0.40 0.31 0.34 0.43 0.35 0.3 0.33 0.29   EOS ABS 10*3/mm3 0.17 0.14 0.20 0.16 0.17 0.12 0.13 0.1 0.12 0.10   BASOS ABS 10*3/mm3 0.03 0.06 0.00 0.03 0.04 0.03 0.04 0.0 0.03 0.04   IMMATURE GRANS (ABS) K/uL  --   --  0.0 0.01 0.01 0.01 0.02  --  0.01 0.01   NRBC /100 WBC 1.0*  --   --  0.0 0.0 0.0 0.0  --  0.0 0.0   NEUTROPHIL % % 50.0 56.6  --   --   --   --   --   --   --   --    MONOCYTES % % 4.2* 7.1  --   --   --   --   --   --   --   --    BASOPHIL % % 1.0 2.0*  --   --   --   --   --   --   --   --    ATYP LYMPH % % 10.4* 1.0  --   --   --   --   --   --   --   --    ANISOCYTOSIS  Slight/1+  --   --   --   --   --   --   --   --   --        Lab Results - Last 18 Months   Lab Units 03/10/22  1351 02/15/22  1340 01/03/22  1359 12/17/21  1306 11/16/21  1410 10/22/21  1125 09/29/21  1518 09/10/21  1347 06/03/21  1335 12/31/20  0949 11/06/20  1505   GLUCOSE mg/dL 93  --  83 95 95 97 94 109* 96 95 131*   SODIUM mmol/L 140  --  145 140 144 141 141 142 143 141 142   POTASSIUM mmol/L 4.4 4.1 4.2 3.9 4.2 4.3 4.6 4.2 3.6 4.8 3.8   TOTAL CO2 mmol/L  --   --  23  --  22  --  24  --  22  --   --    CO2 mmol/L 23.0  --   --  25.0  --  24.0  --  22.0  --  26.0 23.0   CHLORIDE mmol/L 109*  --  109 108* 111 108* 108 112* 109* 110* 109*   ANION GAP mmol/L 8.0  --  13 7.0 11 9.0 9 8.0  --  5.0 10.0   CREATININE mg/dL 1.25*  --  1.3* 1.40* 1.3* 1.30* 1.2* 1.35* 1.37* 1.42* 1.27*   BUN mg/dL 27*  --  31* 21* 24* 28* 23* 31* 16 41* 21*   BUN / CREAT RATIO  21.6  --   --  15.0  --  21.5  --  23.0 12 28.9* 16.5   CALCIUM mg/dL 8.8  --  8.8 8.7 8.7 8.7 8.8 8.5*  8.5* 8.8 9.1   EGFR IF NONAFRICN AM   --   --  43* 39* 43* 43* 47* 41* 44* 39* 44*   ALK PHOS U/L 75  --  75 71 70 66 71 64 72 67 94   TOTAL PROTEIN g/dL 6.9  --  6.9 7.0 6.6 6.5 6.7 6.4  --  6.8 6.9   ALT (SGPT) U/L 17  --  17 16 18 22 15 14 9 11 25   AST (SGOT) U/L 23  --  28 22 22 26 17 17 13 17 19   BILIRUBIN mg/dL 0.4  --  0.3 0.4 0.3 0.2 0.3 0.2 0.3 0.2 0.4   ALBUMIN g/dL 4.40  --  4.3 4.50 4.2 4.20 4.5 4.30 4.4 4.30 4.40   GLOBULIN gm/dL 2.5  --   --  2.5  --  2.3  --  2.1  --  2.5 2.5         Lab Results - Last 18 Months   Lab Units 03/10/22  1351 12/17/21  1306 11/16/21  1410 10/22/21  1125 09/29/21  1518 09/10/21  1347 06/25/21  1242 06/03/21  1335 04/30/21  1336 04/02/21  1303   IRON mcg/dL 79 52  --  44  --  64 65  --  65 70   TIBC mcg/dL 319 294*  --  308  --  259* 273*  --  282* 294*   IRON SATURATION % 25 18*  --  14*  --  25 24  --  23 24   FERRITIN ng/mL 322.40* 286.60*  --  147.10  --  229.30* 212.50*  --  262.70* 260.50*   TSH uIU/mL  --   --  5.960*  --  9.460*  --   --  9.310*  --   --    FOLATE ng/mL >20.00  --   --  >20.00  --  5.73 7.41  --  9.04 8.21       ASSESSMENT:   1. Macrocytic anemia, with contributions from iron deficiency (1+ marrow iron), iron underutilization/anemia of chronic disease and chronic kidney disease and methotrexate. Michael Hgb 7.9, 08/31/2017 requiring 2 units PRBC transfusions on 09/01/2017.   a. Negative EGD/colonoscopy (above), negative SPIEP, negative UPIEP.   --Stable, Hgb 11.2 on 03/10/2022 (prior range: Hgb 7.9 - 12.3).   b. Endoscopy: 08/23/2017. Impressions: - Normal esophagus. - Non-erosive gastritis. Biopsied. - Normal 1st part of the duodenum and 2nd part of the duodenum. Biopsied.   c. [facility]: Colonoscopy: 08/23/2017. Impressions: - The entire examined colon is normal. Biopsied.   d. Colonoscopy, 09/06/2018. Impressions: - The examination was otherwise normal on direct and retroflexion views. No specimens collected. Repeat 5 years              e.   06/2015-bone marrow biopsy with 1+ marrow iron, but otherwise nondiagnostic.  2. Leukopenia, with adequate ANC.   --Stable, WBC 3.2; ANC 1.63, 03/10/2022 (prior range: WBC 2.45 - 4.2; ANC 1.05 - 2.6). No unifying diagnosis. Likely medication (methotrexate) associated.   3. Thrombocytopenia.   --Stable, 111,000 on 03/10/2022 (prior range: 115,000 - 188,000).  4. Chronic kidney disease, Stage III.   --Stable, GFR 51 mL/min on 03/10/2022 (prior range: 26 - 56.6 mL/min).   5. Hypothyroidism.   6. Gastroesophageal reflux disease, now with early satiety, bloating, cramps.   7. B12 deficiency. Receives B12 injections per Dr. Waters. Previously received B12, 1000 mcg IM daily x 3 then weekly x 4 beginning 09/07/2018.   8. Fibromyalgia with chronic pain syndrome.   9. Irritable bowel syndrome with chronic diarrhea. Colonoscopy, 10/2014.   10. Depression.   11. Chronic neck and low back pain with scoliosis. She is now seen by Dr. Schmitt of Pain Management. Says a nerve stimulator will be placed on 04/23/2019.   12. Peripheral neuropathy and swelling of the legs.   a. Seen by Dr. Montgomery, 09/07/2017 and 12/08/2017. Impression: Venous insufficiency and lymphedema.   b. Underwent RFA left leg on 01/10/2018 by Dr. Montgomery. Lower extremity venous ultrasound, 01/19/2018. No deep venous thrombosis (DVT) left lower extremity. Was started on anticoagulation (Eliquis). Lymphedema pumps and compression stockings prescribed.   13. Degenerative joint disease (DJD) knees. Underwent right knee surgery on 11/06/2015 and revision on 05/04/2017. Dr. Kwon.   14. Obesity. Has stabilized.   15. Pulmonary sarcoidosis. Followed by Dr. Scherer. Was started on methotrexate sometime 05/2017. Is now on O2 since 04/2018.   16. Chronic fatigue, contributions from all above.   17. Admitted [facility] 09/25/2016 through 09/26/2016 for chest pain. DSE negative. Discharged for non-cardiac chest pain.   18. Leg edema. Underwent RFA left leg on 01/10/2018 by   [place]. LE venous US, 01/19/2018. No DVT left LE. Eliquis stopped last 02/2018. Lymphedema pumps and compression stockings prescribed.   19. Rectal bleeding. Colonoscopy, 09/06/2018. Impressions: The examination was otherwise normal on direct and retroflexion views. No specimens collected. Repeat 5 years.  20.  Right rotator cuff and right bicep tendon injuries.  Due for surgery 03/24/2022 Dr. Pathak    PLAN:   1. Apprised of labs from 03/10/2022 with stable leukopenia/normal ANC, stable anemia, borderline low (stable) platelets, low (stable) GFR, repleted (25%) serum iron, repleted Fe sat, repleted (322) ferritin (INFeD, 05/01/2017), repleted B12/folate.   2. Rx:  [place]olbee po daily # 30  3.  Previously discussed prior SPIEP with normal immunoglobulin levels, negative TAVIA for monoclonal proteins, normal kappa/lambda light chains, negative 24 hour UPIEP, normal ESR, negative rheumatoid factor, normal TSH/T4, normal B12/folate, negative HIV screen, low iron studies, negative ELENA, normal LDH and negative comprehensive blood report (no peripheral blood evidence for dysplasia and negative flow cytometry).  Also noted previous bone marrow biopsy showing 1+ but otherwise nondiagnostic.  4. Again reviewed her medications. Stopped methotrexate, diclofenac, gabapentin.  Remains on Nexium (blood dyscrasias), gabapentin, Lortab (neutropenia, thrombocytopenia), Topamax (anemia, leukopenia). Would discontinue as many of these as possible. Defer to MOHIT John.   5. Draw serum iron, Fe sat, ferritin, B12, folate every 4 weeks.   6. CBC weekly with Procrit 40,000 units subcutaneously if Hgb less than 11 and less than 33 - BHP   7. Continue management per primary care and other specialists.   8. Return to the [place] office in 24 weeks with pre-office CMP, B12/folate, serum iron, Fe sat, ferritin, and CBC with differential.     MEDICAL DECISION MAKING: Moderate Complexity   AMOUNT OF DATA: Moderate    I spent ~30 minutes  caring for Hope on this date of service. This time includes time spent by me in the following activities: preparing for the visit, reviewing tests, performing a medically appropriate examination and/or evaluation, counseling and educating the patient/family/caregiver, ordering medications, tests, or procedures and documenting information in the medical record    cc: MD Weston Ambrose MD

## 2022-03-09 RX ORDER — UREA 10 %
LOTION (ML) TOPICAL
Qty: 90 TABLET | Refills: 1 | OUTPATIENT
Start: 2022-03-09

## 2022-03-10 ENCOUNTER — LAB (OUTPATIENT)
Dept: LAB | Facility: HOSPITAL | Age: 52
End: 2022-03-10

## 2022-03-10 DIAGNOSIS — N18.32 ANEMIA IN STAGE 3B CHRONIC KIDNEY DISEASE: ICD-10-CM

## 2022-03-10 DIAGNOSIS — D63.1 ANEMIA IN STAGE 3B CHRONIC KIDNEY DISEASE: ICD-10-CM

## 2022-03-10 DIAGNOSIS — D69.6 THROMBOCYTOPENIA: ICD-10-CM

## 2022-03-10 LAB
ALBUMIN SERPL-MCNC: 4.4 G/DL (ref 3.5–5.2)
ALBUMIN/GLOB SERPL: 1.8 G/DL
ALP SERPL-CCNC: 75 U/L (ref 39–117)
ALT SERPL W P-5'-P-CCNC: 17 U/L (ref 1–33)
ANION GAP SERPL CALCULATED.3IONS-SCNC: 8 MMOL/L (ref 5–15)
ANISOCYTOSIS BLD QL: ABNORMAL
AST SERPL-CCNC: 23 U/L (ref 1–32)
BASOPHILS # BLD MANUAL: 0.03 10*3/MM3 (ref 0–0.2)
BASOPHILS NFR BLD MANUAL: 1 % (ref 0–1.5)
BILIRUB SERPL-MCNC: 0.4 MG/DL (ref 0–1.2)
BUN SERPL-MCNC: 27 MG/DL (ref 6–20)
BUN/CREAT SERPL: 21.6 (ref 7–25)
CALCIUM SPEC-SCNC: 8.8 MG/DL (ref 8.6–10.5)
CHLORIDE SERPL-SCNC: 109 MMOL/L (ref 98–107)
CO2 SERPL-SCNC: 23 MMOL/L (ref 22–29)
CREAT SERPL-MCNC: 1.25 MG/DL (ref 0.57–1)
DEPRECATED RDW RBC AUTO: 50.7 FL (ref 37–54)
EGFRCR SERPLBLD CKD-EPI 2021: 51.6 ML/MIN/1.73
EOSINOPHIL # BLD MANUAL: 0.17 10*3/MM3 (ref 0–0.4)
EOSINOPHIL NFR BLD MANUAL: 5.2 % (ref 0.3–6.2)
ERYTHROCYTE [DISTWIDTH] IN BLOOD BY AUTOMATED COUNT: 14 % (ref 12.3–15.4)
FERRITIN SERPL-MCNC: 322.4 NG/ML (ref 13–150)
GLOBULIN UR ELPH-MCNC: 2.5 GM/DL
GLUCOSE SERPL-MCNC: 93 MG/DL (ref 65–99)
HCT VFR BLD AUTO: 35.6 % (ref 34–46.6)
HGB BLD-MCNC: 11.2 G/DL (ref 12–15.9)
IRON 24H UR-MRATE: 79 MCG/DL (ref 37–145)
IRON SATN MFR SERPL: 25 % (ref 20–50)
LYMPHOCYTES # BLD MANUAL: 1.23 10*3/MM3 (ref 0.7–3.1)
LYMPHOCYTES NFR BLD MANUAL: 4.2 % (ref 5–12)
MACROCYTES BLD QL SMEAR: ABNORMAL
MCH RBC QN AUTO: 31.8 PG (ref 26.6–33)
MCHC RBC AUTO-ENTMCNC: 31.5 G/DL (ref 31.5–35.7)
MCV RBC AUTO: 101.1 FL (ref 79–97)
MONOCYTES # BLD: 0.13 10*3/MM3 (ref 0.1–0.9)
NEUTROPHILS # BLD AUTO: 1.63 10*3/MM3 (ref 1.7–7)
NEUTROPHILS NFR BLD MANUAL: 50 % (ref 42.7–76)
NEUTS BAND NFR BLD MANUAL: 1 % (ref 0–5)
NRBC SPEC MANUAL: 1 /100 WBC (ref 0–0.2)
PLATELET # BLD AUTO: 111 10*3/MM3 (ref 140–450)
PMV BLD AUTO: 9.9 FL (ref 6–12)
POTASSIUM SERPL-SCNC: 4.4 MMOL/L (ref 3.5–5.2)
PROT SERPL-MCNC: 6.9 G/DL (ref 6–8.5)
RBC # BLD AUTO: 3.52 10*6/MM3 (ref 3.77–5.28)
SMALL PLATELETS BLD QL SMEAR: ABNORMAL
SODIUM SERPL-SCNC: 140 MMOL/L (ref 136–145)
TIBC SERPL-MCNC: 319 MCG/DL (ref 298–536)
TRANSFERRIN SERPL-MCNC: 214 MG/DL (ref 200–360)
VARIANT LYMPHS NFR BLD MANUAL: 10.4 % (ref 0–5)
VARIANT LYMPHS NFR BLD MANUAL: 28.1 % (ref 19.6–45.3)
WBC MORPH BLD: NORMAL
WBC NRBC COR # BLD: 3.2 10*3/MM3 (ref 3.4–10.8)

## 2022-03-10 PROCEDURE — 84466 ASSAY OF TRANSFERRIN: CPT

## 2022-03-10 PROCEDURE — 82607 VITAMIN B-12: CPT

## 2022-03-10 PROCEDURE — 82746 ASSAY OF FOLIC ACID SERUM: CPT

## 2022-03-10 PROCEDURE — 83540 ASSAY OF IRON: CPT

## 2022-03-10 PROCEDURE — 82728 ASSAY OF FERRITIN: CPT

## 2022-03-10 PROCEDURE — 85007 BL SMEAR W/DIFF WBC COUNT: CPT

## 2022-03-10 PROCEDURE — 85025 COMPLETE CBC W/AUTO DIFF WBC: CPT

## 2022-03-10 PROCEDURE — 36415 COLL VENOUS BLD VENIPUNCTURE: CPT

## 2022-03-10 PROCEDURE — 80053 COMPREHEN METABOLIC PANEL: CPT

## 2022-03-11 ENCOUNTER — TELEPHONE (OUTPATIENT)
Dept: ONCOLOGY | Facility: CLINIC | Age: 52
End: 2022-03-11

## 2022-03-11 DIAGNOSIS — J96.11 CHRONIC RESPIRATORY FAILURE WITH HYPOXIA: ICD-10-CM

## 2022-03-11 DIAGNOSIS — D86.9 SARCOIDOSIS: ICD-10-CM

## 2022-03-11 DIAGNOSIS — D69.6 THROMBOCYTOPENIA: Primary | ICD-10-CM

## 2022-03-11 LAB
FOLATE SERPL-MCNC: >20 NG/ML (ref 4.78–24.2)
VIT B12 BLD-MCNC: 592 PG/ML (ref 211–946)

## 2022-03-11 RX ORDER — TIOTROPIUM BROMIDE INHALATION SPRAY 3.12 UG/1
2 SPRAY, METERED RESPIRATORY (INHALATION) DAILY
Qty: 4 G | Refills: 11 | Status: SHIPPED | OUTPATIENT
Start: 2022-03-11 | End: 2022-06-09

## 2022-03-11 NOTE — TELEPHONE ENCOUNTER
----- Message from Fermín Boggs MD sent at 3/10/2022  9:02 PM CST -----  Repeat cbc w diff in 1 week

## 2022-03-11 NOTE — TELEPHONE ENCOUNTER
Patient is wanting a script for spiriva sent into the pharmacy.         Rx Refill Note  Requested Prescriptions     Pending Prescriptions Disp Refills   • tiotropium bromide monohydrate (Spiriva Respimat) 2.5 MCG/ACT aerosol solution inhaler 4 g 11     Sig: Inhale 2 puffs Daily.      Last office visit with prescribing clinician: 2/8/2022      Next office visit with prescribing clinician: 6/9/2022          {TIP  Is Refill Pharmacy correct?:23}  Windy Hancock CMA  03/11/22, 09:35 CST

## 2022-03-11 NOTE — TELEPHONE ENCOUNTER
Patient returned call, she is just going to wait to talk to Dr. Boggs when she sees him for follow up on Monday regarding lab results.

## 2022-03-11 NOTE — TELEPHONE ENCOUNTER
Fax received that the spiriva is not covered and the alternative is incruse.       Do you want to switch her to this or do you want me to Prior authorization it?

## 2022-03-11 NOTE — TELEPHONE ENCOUNTER
----- Message from Shaorn Nunez CMA sent at 3/11/2022  7:52 AM CST -----  Can you get her scheduled vadim a CBC in 1 week. A few of her numbers are elevated and he wants to follow up on them.    ----- Message -----  From: Fermín Boggs MD  Sent: 3/10/2022   9:02 PM CST  To: [facility]    Repeat cbc w diff in 1 week

## 2022-03-11 NOTE — TELEPHONE ENCOUNTER
Notified patient that DR Boggs wanted to repeat her CBC in one week, patient states her labs were drawn yesterday 3/10/22 and she does have f/u apt frank on Monday 3/14/22 and will discuss with him at that time her lab results.

## 2022-03-14 ENCOUNTER — OFFICE VISIT (OUTPATIENT)
Dept: ONCOLOGY | Facility: CLINIC | Age: 52
End: 2022-03-14

## 2022-03-14 VITALS
OXYGEN SATURATION: 99 % | BODY MASS INDEX: 50.02 KG/M2 | DIASTOLIC BLOOD PRESSURE: 70 MMHG | RESPIRATION RATE: 16 BRPM | SYSTOLIC BLOOD PRESSURE: 130 MMHG | HEART RATE: 66 BPM | TEMPERATURE: 97.8 F | HEIGHT: 64 IN | WEIGHT: 293 LBS

## 2022-03-14 DIAGNOSIS — N18.32 ANEMIA IN STAGE 3B CHRONIC KIDNEY DISEASE: ICD-10-CM

## 2022-03-14 DIAGNOSIS — D69.6 THROMBOCYTOPENIA: Primary | ICD-10-CM

## 2022-03-14 DIAGNOSIS — D63.1 ANEMIA IN STAGE 3B CHRONIC KIDNEY DISEASE: ICD-10-CM

## 2022-03-14 PROCEDURE — 99214 OFFICE O/P EST MOD 30 MIN: CPT | Performed by: INTERNAL MEDICINE

## 2022-03-14 RX ORDER — CYANOCOBALAMIN/FOLIC AC/VIT B6 1-2.5-25MG
1 TABLET ORAL DAILY
Qty: 30 TABLET | Refills: 0 | Status: SHIPPED | OUTPATIENT
Start: 2022-03-14 | End: 2022-04-21

## 2022-03-18 ENCOUNTER — HOSPITAL ENCOUNTER (OUTPATIENT)
Dept: PREADMISSION TESTING | Age: 52
Discharge: HOME | End: 2022-03-22

## 2022-03-22 ENCOUNTER — HOSPITAL ENCOUNTER (OUTPATIENT)
Dept: PREADMISSION TESTING | Age: 52
Discharge: HOME | End: 2022-03-26
Payer: MEDICARE

## 2022-03-22 LAB — SARS-COV-2, PCR: NOT DETECTED

## 2022-03-22 PROCEDURE — U0005 INFEC AGEN DETEC AMPLI PROBE: HCPCS

## 2022-03-22 PROCEDURE — U0003 INFECTIOUS AGENT DETECTION BY NUCLEIC ACID (DNA OR RNA); SEVERE ACUTE RESPIRATORY SYNDROME CORONAVIRUS 2 (SARS-COV-2) (CORONAVIRUS DISEASE [COVID-19]), AMPLIFIED PROBE TECHNIQUE, MAKING USE OF HIGH THROUGHPUT TECHNOLOGIES AS DESCRIBED BY CMS-2020-01-R: HCPCS

## 2022-03-24 ENCOUNTER — ANESTHESIA (OUTPATIENT)
Dept: OPERATING ROOM | Age: 52
End: 2022-03-24
Payer: MEDICARE

## 2022-03-24 ENCOUNTER — HOSPITAL ENCOUNTER (OUTPATIENT)
Age: 52
Setting detail: OUTPATIENT SURGERY
Discharge: HOME | End: 2022-03-24
Attending: ORTHOPAEDIC SURGERY | Admitting: ORTHOPAEDIC SURGERY
Payer: MEDICARE

## 2022-03-24 ENCOUNTER — ANESTHESIA EVENT (OUTPATIENT)
Dept: OPERATING ROOM | Age: 52
End: 2022-03-24
Payer: MEDICARE

## 2022-03-24 VITALS
TEMPERATURE: 97.1 F | OXYGEN SATURATION: 100 % | HEART RATE: 82 BPM | WEIGHT: 270 LBS | RESPIRATION RATE: 18 BRPM | SYSTOLIC BLOOD PRESSURE: 135 MMHG | HEIGHT: 62 IN | DIASTOLIC BLOOD PRESSURE: 51 MMHG | BODY MASS INDEX: 49.69 KG/M2

## 2022-03-24 VITALS
RESPIRATION RATE: 8 BRPM | OXYGEN SATURATION: 99 % | SYSTOLIC BLOOD PRESSURE: 119 MMHG | DIASTOLIC BLOOD PRESSURE: 58 MMHG | TEMPERATURE: 97.2 F

## 2022-03-24 DIAGNOSIS — M75.121 NONTRAUMATIC COMPLETE TEAR OF RIGHT ROTATOR CUFF: Primary | ICD-10-CM

## 2022-03-24 PROCEDURE — 3600000014 HC SURGERY LEVEL 4 ADDTL 15MIN: Performed by: ORTHOPAEDIC SURGERY

## 2022-03-24 PROCEDURE — C1713 ANCHOR/SCREW BN/BN,TIS/BN: HCPCS | Performed by: ORTHOPAEDIC SURGERY

## 2022-03-24 PROCEDURE — 3700000000 HC ANESTHESIA ATTENDED CARE: Performed by: ORTHOPAEDIC SURGERY

## 2022-03-24 PROCEDURE — 7100000010 HC PHASE II RECOVERY - FIRST 15 MIN: Performed by: ORTHOPAEDIC SURGERY

## 2022-03-24 PROCEDURE — 2580000003 HC RX 258: Performed by: ORTHOPAEDIC SURGERY

## 2022-03-24 PROCEDURE — 6370000000 HC RX 637 (ALT 250 FOR IP): Performed by: ANESTHESIOLOGY

## 2022-03-24 PROCEDURE — 6360000002 HC RX W HCPCS: Performed by: ANESTHESIOLOGY

## 2022-03-24 PROCEDURE — 7100000011 HC PHASE II RECOVERY - ADDTL 15 MIN: Performed by: ORTHOPAEDIC SURGERY

## 2022-03-24 PROCEDURE — 6360000002 HC RX W HCPCS: Performed by: ORTHOPAEDIC SURGERY

## 2022-03-24 PROCEDURE — 6370000000 HC RX 637 (ALT 250 FOR IP): Performed by: ORTHOPAEDIC SURGERY

## 2022-03-24 PROCEDURE — 6360000002 HC RX W HCPCS: Performed by: NURSE ANESTHETIST, CERTIFIED REGISTERED

## 2022-03-24 PROCEDURE — 2720000010 HC SURG SUPPLY STERILE: Performed by: ORTHOPAEDIC SURGERY

## 2022-03-24 PROCEDURE — 3600000004 HC SURGERY LEVEL 4 BASE: Performed by: ORTHOPAEDIC SURGERY

## 2022-03-24 PROCEDURE — 7100000000 HC PACU RECOVERY - FIRST 15 MIN: Performed by: ORTHOPAEDIC SURGERY

## 2022-03-24 PROCEDURE — A4217 STERILE WATER/SALINE, 500 ML: HCPCS | Performed by: ORTHOPAEDIC SURGERY

## 2022-03-24 PROCEDURE — 7100000001 HC PACU RECOVERY - ADDTL 15 MIN: Performed by: ORTHOPAEDIC SURGERY

## 2022-03-24 PROCEDURE — 3700000001 HC ADD 15 MINUTES (ANESTHESIA): Performed by: ORTHOPAEDIC SURGERY

## 2022-03-24 PROCEDURE — 2500000003 HC RX 250 WO HCPCS: Performed by: NURSE ANESTHETIST, CERTIFIED REGISTERED

## 2022-03-24 PROCEDURE — 2709999900 HC NON-CHARGEABLE SUPPLY: Performed by: ORTHOPAEDIC SURGERY

## 2022-03-24 PROCEDURE — C1776 JOINT DEVICE (IMPLANTABLE): HCPCS | Performed by: ORTHOPAEDIC SURGERY

## 2022-03-24 DEVICE — PROXIMAL TENODESIS IMPLANT SYSTEM REV: 0
Type: IMPLANTABLE DEVICE | Site: HUMERUS | Status: FUNCTIONAL
Brand: ARTHREX®

## 2022-03-24 DEVICE — SYSTEM IMPL INCL 2 4.75MM BIOCOMPOSITE SWIVELOCK C ANCHR: Type: IMPLANTABLE DEVICE | Site: SHOULDER | Status: FUNCTIONAL

## 2022-03-24 RX ORDER — FAMOTIDINE 20 MG/1
20 TABLET, FILM COATED ORAL ONCE
Status: COMPLETED | OUTPATIENT
Start: 2022-03-24 | End: 2022-03-24

## 2022-03-24 RX ORDER — SUCCINYLCHOLINE/SOD CL,ISO/PF 100 MG/5ML
SYRINGE (ML) INTRAVENOUS PRN
Status: DISCONTINUED | OUTPATIENT
Start: 2022-03-24 | End: 2022-03-24 | Stop reason: SDUPTHER

## 2022-03-24 RX ORDER — SCOLOPAMINE TRANSDERMAL SYSTEM 1 MG/1
1 PATCH, EXTENDED RELEASE TRANSDERMAL
Status: DISCONTINUED | OUTPATIENT
Start: 2022-03-24 | End: 2022-03-24 | Stop reason: HOSPADM

## 2022-03-24 RX ORDER — SODIUM CHLORIDE 0.9 % (FLUSH) 0.9 %
5-40 SYRINGE (ML) INJECTION EVERY 12 HOURS SCHEDULED
Status: DISCONTINUED | OUTPATIENT
Start: 2022-03-24 | End: 2022-03-24 | Stop reason: HOSPADM

## 2022-03-24 RX ORDER — FENTANYL CITRATE 50 UG/ML
INJECTION, SOLUTION INTRAMUSCULAR; INTRAVENOUS PRN
Status: DISCONTINUED | OUTPATIENT
Start: 2022-03-24 | End: 2022-03-24 | Stop reason: SDUPTHER

## 2022-03-24 RX ORDER — FENTANYL CITRATE 50 UG/ML
50 INJECTION, SOLUTION INTRAMUSCULAR; INTRAVENOUS EVERY 5 MIN PRN
Status: DISCONTINUED | OUTPATIENT
Start: 2022-03-24 | End: 2022-03-24 | Stop reason: HOSPADM

## 2022-03-24 RX ORDER — MEPERIDINE HYDROCHLORIDE 25 MG/ML
12.5 INJECTION INTRAMUSCULAR; INTRAVENOUS; SUBCUTANEOUS EVERY 5 MIN PRN
Status: DISCONTINUED | OUTPATIENT
Start: 2022-03-24 | End: 2022-03-24 | Stop reason: HOSPADM

## 2022-03-24 RX ORDER — EPHEDRINE SULFATE 50 MG/ML
INJECTION, SOLUTION INTRAVENOUS PRN
Status: DISCONTINUED | OUTPATIENT
Start: 2022-03-24 | End: 2022-03-24 | Stop reason: SDUPTHER

## 2022-03-24 RX ORDER — SODIUM CHLORIDE 9 MG/ML
INJECTION, SOLUTION INTRAVENOUS CONTINUOUS
Status: DISCONTINUED | OUTPATIENT
Start: 2022-03-24 | End: 2022-03-24 | Stop reason: HOSPADM

## 2022-03-24 RX ORDER — LIDOCAINE HYDROCHLORIDE 10 MG/ML
1 INJECTION, SOLUTION EPIDURAL; INFILTRATION; INTRACAUDAL; PERINEURAL
Status: DISCONTINUED | OUTPATIENT
Start: 2022-03-24 | End: 2022-03-24 | Stop reason: HOSPADM

## 2022-03-24 RX ORDER — ROCURONIUM BROMIDE 10 MG/ML
INJECTION, SOLUTION INTRAVENOUS PRN
Status: DISCONTINUED | OUTPATIENT
Start: 2022-03-24 | End: 2022-03-24 | Stop reason: SDUPTHER

## 2022-03-24 RX ORDER — APREPITANT 40 MG/1
40 CAPSULE ORAL ONCE
Status: COMPLETED | OUTPATIENT
Start: 2022-03-24 | End: 2022-03-24

## 2022-03-24 RX ORDER — SODIUM CHLORIDE, SODIUM LACTATE, POTASSIUM CHLORIDE, CALCIUM CHLORIDE 600; 310; 30; 20 MG/100ML; MG/100ML; MG/100ML; MG/100ML
INJECTION, SOLUTION INTRAVENOUS CONTINUOUS
Status: DISCONTINUED | OUTPATIENT
Start: 2022-03-24 | End: 2022-03-24 | Stop reason: HOSPADM

## 2022-03-24 RX ORDER — ONDANSETRON 2 MG/ML
INJECTION INTRAMUSCULAR; INTRAVENOUS PRN
Status: DISCONTINUED | OUTPATIENT
Start: 2022-03-24 | End: 2022-03-24 | Stop reason: SDUPTHER

## 2022-03-24 RX ORDER — MIDAZOLAM HYDROCHLORIDE 1 MG/ML
2 INJECTION INTRAMUSCULAR; INTRAVENOUS
Status: DISCONTINUED | OUTPATIENT
Start: 2022-03-24 | End: 2022-03-24 | Stop reason: HOSPADM

## 2022-03-24 RX ORDER — FENTANYL CITRATE 50 UG/ML
50 INJECTION, SOLUTION INTRAMUSCULAR; INTRAVENOUS
Status: DISCONTINUED | OUTPATIENT
Start: 2022-03-24 | End: 2022-03-24 | Stop reason: HOSPADM

## 2022-03-24 RX ORDER — OXYCODONE HYDROCHLORIDE 10 MG/1
10 TABLET ORAL EVERY 4 HOURS PRN
Qty: 30 TABLET | Refills: 0 | Status: SHIPPED | OUTPATIENT
Start: 2022-03-24 | End: 2022-03-27

## 2022-03-24 RX ORDER — SODIUM CHLORIDE 9 MG/ML
25 INJECTION, SOLUTION INTRAVENOUS PRN
Status: DISCONTINUED | OUTPATIENT
Start: 2022-03-24 | End: 2022-03-24 | Stop reason: HOSPADM

## 2022-03-24 RX ORDER — ONDANSETRON 2 MG/ML
4 INJECTION INTRAMUSCULAR; INTRAVENOUS
Status: DISCONTINUED | OUTPATIENT
Start: 2022-03-24 | End: 2022-03-24 | Stop reason: HOSPADM

## 2022-03-24 RX ORDER — LIDOCAINE HYDROCHLORIDE 10 MG/ML
INJECTION, SOLUTION EPIDURAL; INFILTRATION; INTRACAUDAL; PERINEURAL PRN
Status: DISCONTINUED | OUTPATIENT
Start: 2022-03-24 | End: 2022-03-24 | Stop reason: SDUPTHER

## 2022-03-24 RX ORDER — MIDAZOLAM HYDROCHLORIDE 1 MG/ML
INJECTION INTRAMUSCULAR; INTRAVENOUS
Status: DISCONTINUED
Start: 2022-03-24 | End: 2022-03-24 | Stop reason: WASHOUT

## 2022-03-24 RX ORDER — FENTANYL CITRATE 50 UG/ML
25 INJECTION, SOLUTION INTRAMUSCULAR; INTRAVENOUS
Status: DISCONTINUED | OUTPATIENT
Start: 2022-03-24 | End: 2022-03-24 | Stop reason: HOSPADM

## 2022-03-24 RX ORDER — PROPOFOL 10 MG/ML
INJECTION, EMULSION INTRAVENOUS PRN
Status: DISCONTINUED | OUTPATIENT
Start: 2022-03-24 | End: 2022-03-24 | Stop reason: SDUPTHER

## 2022-03-24 RX ORDER — SODIUM CHLORIDE 0.9 % (FLUSH) 0.9 %
5-40 SYRINGE (ML) INJECTION PRN
Status: DISCONTINUED | OUTPATIENT
Start: 2022-03-24 | End: 2022-03-24 | Stop reason: HOSPADM

## 2022-03-24 RX ORDER — HYDROMORPHONE HYDROCHLORIDE 1 MG/ML
0.25 INJECTION, SOLUTION INTRAMUSCULAR; INTRAVENOUS; SUBCUTANEOUS EVERY 5 MIN PRN
Status: DISCONTINUED | OUTPATIENT
Start: 2022-03-24 | End: 2022-03-24 | Stop reason: HOSPADM

## 2022-03-24 RX ORDER — ROPIVACAINE HYDROCHLORIDE 5 MG/ML
INJECTION, SOLUTION EPIDURAL; INFILTRATION; PERINEURAL
Status: DISCONTINUED
Start: 2022-03-24 | End: 2022-03-24 | Stop reason: WASHOUT

## 2022-03-24 RX ORDER — DIPHENHYDRAMINE HYDROCHLORIDE 50 MG/ML
12.5 INJECTION INTRAMUSCULAR; INTRAVENOUS
Status: DISCONTINUED | OUTPATIENT
Start: 2022-03-24 | End: 2022-03-24 | Stop reason: HOSPADM

## 2022-03-24 RX ORDER — FENTANYL CITRATE 50 UG/ML
25 INJECTION, SOLUTION INTRAMUSCULAR; INTRAVENOUS EVERY 5 MIN PRN
Status: DISCONTINUED | OUTPATIENT
Start: 2022-03-24 | End: 2022-03-24 | Stop reason: HOSPADM

## 2022-03-24 RX ORDER — DEXAMETHASONE SODIUM PHOSPHATE 10 MG/ML
INJECTION, SOLUTION INTRAMUSCULAR; INTRAVENOUS PRN
Status: DISCONTINUED | OUTPATIENT
Start: 2022-03-24 | End: 2022-03-24 | Stop reason: SDUPTHER

## 2022-03-24 RX ORDER — HYDROMORPHONE HYDROCHLORIDE 1 MG/ML
0.5 INJECTION, SOLUTION INTRAMUSCULAR; INTRAVENOUS; SUBCUTANEOUS EVERY 5 MIN PRN
Status: DISCONTINUED | OUTPATIENT
Start: 2022-03-24 | End: 2022-03-24 | Stop reason: HOSPADM

## 2022-03-24 RX ORDER — OXYCODONE HCL 10 MG/1
10 TABLET, FILM COATED, EXTENDED RELEASE ORAL
Status: COMPLETED | OUTPATIENT
Start: 2022-03-24 | End: 2022-03-24

## 2022-03-24 RX ADMIN — EPHEDRINE SULFATE 5 MG: 50 INJECTION INTRAMUSCULAR; INTRAVENOUS; SUBCUTANEOUS at 07:39

## 2022-03-24 RX ADMIN — FENTANYL CITRATE 50 MCG: 50 INJECTION, SOLUTION INTRAMUSCULAR; INTRAVENOUS at 09:34

## 2022-03-24 RX ADMIN — SUGAMMADEX 250 MG: 100 INJECTION, SOLUTION INTRAVENOUS at 09:00

## 2022-03-24 RX ADMIN — FENTANYL CITRATE 50 MCG: 50 INJECTION, SOLUTION INTRAMUSCULAR; INTRAVENOUS at 09:14

## 2022-03-24 RX ADMIN — APREPITANT 40 MG: 40 CAPSULE ORAL at 06:40

## 2022-03-24 RX ADMIN — EPHEDRINE SULFATE 5 MG: 50 INJECTION INTRAMUSCULAR; INTRAVENOUS; SUBCUTANEOUS at 07:42

## 2022-03-24 RX ADMIN — Medication 200 MG: at 07:12

## 2022-03-24 RX ADMIN — ONDANSETRON 4 MG: 2 INJECTION INTRAMUSCULAR; INTRAVENOUS at 09:00

## 2022-03-24 RX ADMIN — DEXAMETHASONE SODIUM PHOSPHATE 10 MG: 10 INJECTION, SOLUTION INTRAMUSCULAR; INTRAVENOUS at 07:28

## 2022-03-24 RX ADMIN — ROCURONIUM BROMIDE 50 MG: 10 INJECTION, SOLUTION INTRAVENOUS at 07:23

## 2022-03-24 RX ADMIN — Medication 2000 MG: at 07:22

## 2022-03-24 RX ADMIN — SODIUM CHLORIDE, SODIUM LACTATE, POTASSIUM CHLORIDE, AND CALCIUM CHLORIDE: 600; 310; 30; 20 INJECTION, SOLUTION INTRAVENOUS at 08:50

## 2022-03-24 RX ADMIN — FAMOTIDINE 20 MG: 20 TABLET ORAL at 06:40

## 2022-03-24 RX ADMIN — HYDROMORPHONE HYDROCHLORIDE 0.5 MG: 1 INJECTION, SOLUTION INTRAMUSCULAR; INTRAVENOUS; SUBCUTANEOUS at 10:05

## 2022-03-24 RX ADMIN — HYDROMORPHONE HYDROCHLORIDE 0.5 MG: 1 INJECTION, SOLUTION INTRAMUSCULAR; INTRAVENOUS; SUBCUTANEOUS at 09:51

## 2022-03-24 RX ADMIN — FENTANYL CITRATE 50 MCG: 50 INJECTION, SOLUTION INTRAMUSCULAR; INTRAVENOUS at 07:31

## 2022-03-24 RX ADMIN — SODIUM CHLORIDE, SODIUM LACTATE, POTASSIUM CHLORIDE, AND CALCIUM CHLORIDE: 600; 310; 30; 20 INJECTION, SOLUTION INTRAVENOUS at 06:03

## 2022-03-24 RX ADMIN — LIDOCAINE HYDROCHLORIDE 50 MG: 10 INJECTION, SOLUTION EPIDURAL; INFILTRATION; INTRACAUDAL; PERINEURAL at 07:12

## 2022-03-24 RX ADMIN — FENTANYL CITRATE 50 MCG: 50 INJECTION, SOLUTION INTRAMUSCULAR; INTRAVENOUS at 09:27

## 2022-03-24 RX ADMIN — FENTANYL CITRATE 100 MCG: 50 INJECTION, SOLUTION INTRAMUSCULAR; INTRAVENOUS at 07:12

## 2022-03-24 RX ADMIN — ROCURONIUM BROMIDE 20 MG: 10 INJECTION, SOLUTION INTRAVENOUS at 08:03

## 2022-03-24 RX ADMIN — FENTANYL CITRATE 50 MCG: 50 INJECTION, SOLUTION INTRAMUSCULAR; INTRAVENOUS at 09:16

## 2022-03-24 RX ADMIN — PROPOFOL 150 MG: 10 INJECTION, EMULSION INTRAVENOUS at 07:12

## 2022-03-24 RX ADMIN — OXYCODONE HYDROCHLORIDE 10 MG: 10 TABLET, FILM COATED, EXTENDED RELEASE ORAL at 10:47

## 2022-03-24 ASSESSMENT — PAIN SCALES - GENERAL
PAINLEVEL_OUTOF10: 10

## 2022-03-24 ASSESSMENT — PAIN DESCRIPTION - FREQUENCY
FREQUENCY: CONTINUOUS

## 2022-03-24 ASSESSMENT — PAIN DESCRIPTION - PROGRESSION
CLINICAL_PROGRESSION: NOT CHANGED

## 2022-03-24 ASSESSMENT — PAIN DESCRIPTION - PAIN TYPE
TYPE: SURGICAL PAIN

## 2022-03-24 ASSESSMENT — PAIN DESCRIPTION - ONSET
ONSET: ON-GOING

## 2022-03-24 ASSESSMENT — PAIN DESCRIPTION - ORIENTATION
ORIENTATION: RIGHT

## 2022-03-24 ASSESSMENT — PAIN DESCRIPTION - DESCRIPTORS
DESCRIPTORS: THROBBING
DESCRIPTORS: THROBBING;ACHING
DESCRIPTORS: ACHING;THROBBING
DESCRIPTORS: ACHING;THROBBING

## 2022-03-24 ASSESSMENT — PAIN DESCRIPTION - LOCATION
LOCATION: SHOULDER

## 2022-03-24 ASSESSMENT — LIFESTYLE VARIABLES: SMOKING_STATUS: 0

## 2022-03-24 ASSESSMENT — ENCOUNTER SYMPTOMS: SHORTNESS OF BREATH: 1

## 2022-03-24 NOTE — H&P
[facility] ([facility]) Pre-Operative History and Physical    Patient Name: Nan  : 1970        Chief Complaint: right shoulder pain  History of Present Illness: This patient has had ongoing pain for several weeks/months that has been unresponsive to conservative care which has included injection, therapy, activity modification and presents now for surgery. Pain is deep in the shoulder radiating to the arm at times. Pain is a severe ache that is worse with reaching overhead or behind. Associated weakness and stiffness. Pain is somewhat improved with over the counter medication.       Past Medical History:       Diagnosis Date    Acquired hypothyroidism     B12 deficiency     Chronic back pain     Chronic pancreatitis (HCC)     Colon polyps     Depression     Fibromyalgia     GERD (gastroesophageal reflux disease)     Headache     Hypertension     Irritable bowel syndrome     Kidney stones     Neuropathy     On home oxygen therapy     2 liters    PONV (postoperative nausea and vomiting)     Restless legs syndrome     Sarcoidosis 2016    Scoliosis     SVT (supraventricular tachycardia) ([facility] [address]      Past Surgical History:       Procedure Laterality Date    ABLATION OF DYSRHYTHMIC FOCUS      SVT    ANAL SPHINCTEROTOMY N/A 2020    LATERAL SPHINCTEROTOMY performed by Gil Davies MD at [place] [address]  10/13/2014    Dr. Walt Amado AP-5 yr recall    COLONOSCOPY  2008    Dr. Anastasiia Browning: unremarkable    COLONOSCOPY  2017    Dr Flores-5 yr recall    COLONOSCOPY  2018    Dr. Pacheco Hence yr recall    HYSTERECTOMY      JOINT REPLACEMENT  2013    Patella joint replacement    LITHOTRIPSY      PLANTAR FASCIA SURGERY Left     NV REVISE MEDIAN N/CARPAL TUNNEL SURG Right 2018    CARPAL TUNNEL RELEASE performed by Pearl Vila MD at [place] [id number] Right 2017    KNEE TOTAL ARTHROPLASTY REVISION performed by Tomi Hernández MD at [facility] ARTHROSCOPY Left     SIGMOIDOSCOPY N/A 3/15/2019    Dr Rebecca Valentin non edematous hemorrhoids, anal fissure-BCM    TOTAL KNEE ARTHROPLASTY Right 11/16/2015    ULNAR TUNNEL RELEASE      UPPER GASTROINTESTINAL ENDOSCOPY  10/16/2014    Dr. Dena Jama  05/19/2008    Dr. Victor Hugo Morales: jaya neg, barretts neg,  [facility]    UPPER GASTROINTESTINAL ENDOSCOPY N/A 2/10/2016    Dr Jackeline Esparza-mild esophageal stricture dilated; reactive gastropathy    UPPER GASTROINTESTINAL ENDOSCOPY  08/23/2017    Dr Naz Ferguson Bilateral     RADIO ABLATION       Medications:   Prior to Admission medications    Medication Sig Start Date End Date Taking? Authorizing Provider   escitalopram (LEXAPRO) 20 MG tablet Take 1 tablet by mouth daily 2/23/22   SARAH Josue   levothyroxine (SYNTHROID) 150 MCG tablet TAKE 1 TABLET BY MOUTH EVERY DAY 2/23/22   SARAH Josue   Fluticasone-Salmeterol,sensor, ([facility]) 223-21 MCG/ACT AEPB Inhale 1-2 Doses into the lungs 2 times daily    Historical Provider, MD   esomeprazole Magnesium (NEXIUM) 40 MG PACK Take 40 mg by mouth daily    Historical Provider, MD   tiotropium (SPIRIVA RESPIMAT) 2.5 MCG/ACT AERS inhaler Inhale 2 puffs into the lungs daily    Historical Provider, MD   pantoprazole (PROTONIX) 40 MG tablet TAKE 1 TABLET BY MOUTH TWICE A DAY  Patient taking differently: Take 40 mg by mouth daily  2/10/22   SARAH Josue   Thiamine 50 MG CAPS Take 50 mg by mouth daily 1/7/22   Lenoard Joan APREVELIA   Ubrogepant (UBRELVY) 100 MG TABS Take 1 tablet at the onset of migraine. May repeat once in 2 hours if no improvement. Do not exceed 2 tablets in 24 hours.  1/3/22   Lenoard No APREVELIA   vitamin D (ERGOCALCIFEROL) 1.25 MG (84290 UT) CAPS capsule Take 1 capsule by mouth once a week  Patient taking differently: Take 50,000 Units by mouth once a week Tuesday 12/22/21   SARAH Islas   oxybutynin (DITROPAN) 5 MG tablet TAKE 1 TABLET BY MOUTH ONCE DAILY AT BEDTIME  Patient taking differently: Take 5 mg by mouth daily TAKE 1 TABLET BY MOUTH ONCE DAILY AT BEDTIME 12/15/21   SARAH Islas   metoprolol succinate (TOPROL XL) 50 MG extended release tablet TAKE 1 TABLET BY MOUTH EVERY DAY  Patient taking differently: Take 50 mg by mouth daily TAKE 1 TABLET BY MOUTH EVERY DAY 12/15/21   SARAH Islas   topiramate (TOPAMAX) 100 MG tablet Take 1 tablet by mouth nightly Indications: Backache 12/15/21   SARAH Islas   folic acid-pyridoxine-cyanocobalamine (FOLBEE) 2.5-25-1 MG TABS tablet Take 1 tablet by mouth daily 9/13/21   Historical Provider, MD   docusate sodium (COLACE) 100 MG capsule Take 100 mg by mouth 2 times daily as needed     Historical Provider, MD   ondansetron (ZOFRAN ODT) 4 MG disintegrating tablet Take 4 mg by mouth every 8 hours as needed for Nausea or Vomiting    Historical Provider, MD   pregabalin (LYRICA) 100 MG capsule Take 100 mg by mouth 3 times daily. Historical Provider, MD   oxyCODONE-acetaminophen (LYNOX) 7.5-300 MG per tablet Take 1 tablet by mouth every 6 hours. 10/15/19   Historical Provider, MD   polyethylene glycol (MIRALAX) powder 1 tsp daily for constipation. Adjust dose for desired bm 12/12/19   SARAH Loomis   OXYGEN Inhale 2 L/min into the lungs continuous     Historical Provider, MD   epoetin cheryl (PROCRIT) 92624 UNIT/ML injection Inject 40,000 Units into the skin as needed  9/6/17   Historical Provider, MD   tiZANidine (ZANAFLEX) 4 MG tablet Take 4 mg by mouth 3 times daily as needed (muscle spasms)     Historical Provider, MD       Allergies:  Tape Jung Piles tape]    Social History:   Tobacco:  reports that she has never smoked. She has never used smokeless tobacco.   Alcohol:  reports no history of alcohol use.     Review of Systems:  General: Denies any fever or chills  EYES: Denies any diplopia  ENT: Tinnitus or vertigo  Resp: Denies any shortness of breath, cough or wheezing  Cardiac: Denies any chest pain, palpitations, claudication or edema  GI: Denies any melena, hematochezia, hematemesis or pyrosis  : Denies any frequency, urgency, hesitancy or incontinence  Musculoskeletal: Denies back pain, joint pain, myalgias  Heme: Denies bruising or bleeding easily  Endocrine: Denies any history of diabetes or thyroid disease  Psych: Denies anxiety or depression  Neuro: Denies any focal motor or sensory deficits      Physical Exam:  Vitals: BP (!) 136/92   Pulse 73   Temp 97.1 °F (36.2 °C) (Temporal)   Resp 18   Ht 5' 2\" (1.575 m)   Wt 270 lb (122.5 kg)   SpO2 97%   BMI 49.38 kg/m²   CONSTITUTIONAL: Alert, appropriate, no acute distress. PSYCH: mood and affect are normal with a normal rate and tone of speech  EYES: Non icteric, EOM intact, pupils equal round and reactive to light  ENT: Mucus membranes moist, no oral pharyngeal lesions, nares patent   NECK: Supple, no masses, no JVD, trachea mid line   CHEST/LUNGS: CTA bilaterally, normal respiratory effort   CARDIOVASCULAR: RRR, no murmurs,  2+ DP and radial pulses bilaterally  ABDOMEN: soft, nontender  UPPER EXTREMITY: warm, well perfused, no edema. Joint with mildly reduced range of motion and generalized tenderness. Neurovascular exam normal.  Tender over the right biceps tendon greater tuberosity. Positive impingement positive speeds test  SKIN: warm, dry with no rashes or lesions  LYMPH: No cervical or inguinal lymphadenopathy    RADIOLOGY: xrays of extremity show normal right shoulder.   MRI right shoulder shows supraspinatus tear and biceps tendinosis  LABORATORY:    CBC :    Lab Results   Component Value Date    WBC 3.1 01/03/2022    HGB 10.5 01/03/2022    HCT 34.9 01/03/2022     01/03/2022     BMP:   Lab Results   Component Value Date     01/03/2022    K 4.1 02/15/2022    K 4.2 01/03/2022    CL 109 01/03/2022    CO2 23 01/03/2022    BUN 31 01/03/2022    LABALBU 4.3 01/03/2022    CREATININE 1.3 01/03/2022    CALCIUM 8.8 01/03/2022    GFRAA 52 01/03/2022    LABGLOM 43 01/03/2022    GLUCOSE 83 01/03/2022     PT/INR:    Lab Results   Component Value Date    PROTIME 14.2 05/04/2017    INR 1.11 05/04/2017     U/A: No results found for: NITRITE, WBCUA, RBCUA, BACTERIA  HgBA1c:  No components found for: HGBA1C    Assessment:   right shoulder rotator cuff tear and biceps tendinitis. Most recent office note reviewed and there has been no change in health status. PLAN: right shoulder arthroscopic examination and subacromial decompression, rotator cuff repair, biceps tenodesis. I explained to the patient/family the patient's diagnosis and operative procedure in detail. They said they understood basically what was wrong and how I planned to fix it. They understand the expected recovery and the risks which include excessive bleeding, infection, reaction to anesthesia, nerve injury, stiffness, fracture, deformity and dislocation. They then signed an operative consent form. Provider:  Marianne Bui MD  Date: 3/24/2022

## 2022-03-24 NOTE — ANESTHESIA POSTPROCEDURE EVALUATION
Department of Anesthesiology  Postprocedure Note    Patient: Alberto Burkett  MRN: [id number]  YOB: 1970  Date of evaluation: 3/24/2022  Time:  9:20 AM     Procedure Summary     Date: 03/24/22 Room / Location: [address]    Anesthesia Start: 0700 Anesthesia Stop: 6013    Procedure: RIGHT SHOULDER ARTHROSCOPIC, ROTATOR CUFF REPAIR, BICEPS TENODESIS (Right Shoulder) Diagnosis: (S46.011A, M75.21)    Surgeons: Alonzo Goins MD Responsible Provider: SARAH Solomon CRNA    Anesthesia Type: general ASA Status: 4          Anesthesia Type: general    Cristobal Phase I: Cristobal Score: 5    Cristobal Phase II:      Last vitals: Reviewed and per EMR flowsheets.        Anesthesia Post Evaluation    Patient location during evaluation: PACU  Patient participation: waiting for patient participation  Level of consciousness: responsive to noxious stimuli  Pain score: 7  Airway patency: patent  Nausea & Vomiting: no vomiting and no nausea  Complications: no  Cardiovascular status: hemodynamically stable  Respiratory status: acceptable and nasal cannula  Hydration status: stable  Comments: T 97.1  Multimodal analgesia pain management approach

## 2022-03-24 NOTE — PROGRESS NOTES
CLINICAL PHARMACY NOTE: MEDS TO BEDS    Total # of Prescriptions Filled: 1   The following medications were delivered to the patient:  · Oxycodone 10mg    Additional Documentation: [facility]

## 2022-03-24 NOTE — ANESTHESIA PRE PROCEDURE
Department of Anesthesiology  Preprocedure Note       Name:  Mavis Jacobo   Age:  46 y.o.  :  1970                                          MRN:  [id number]         Date:  3/24/2022      Surgeon: Calixto Rodríguez):  Evelyn Jiang MD    Procedure: Procedure(s):  RIGHT SHOULDER ARTHROSCOPIC, ROTATOR CUFF REPAIR, BICEPS TENODESIS    Medications prior to admission:   Prior to Admission medications    Medication Sig Start Date End Date Taking? Authorizing Provider   escitalopram (LEXAPRO) 20 MG tablet Take 1 tablet by mouth daily 22   Monalisa Homans, APRN   levothyroxine (SYNTHROID) 150 MCG tablet TAKE 1 TABLET BY MOUTH EVERY DAY 22   Monalisa Homans, APRN   Fluticasone-Salmeterol,sensor, ([facility]) 982-92 MCG/ACT AEPB Inhale 1-2 Doses into the lungs 2 times daily    Historical Provider, MD   esomeprazole Magnesium (NEXIUM) 40 MG PACK Take 40 mg by mouth daily    Historical Provider, MD   tiotropium (SPIRIVA RESPIMAT) 2.5 MCG/ACT AERS inhaler Inhale 2 puffs into the lungs daily    Historical Provider, MD   pantoprazole (PROTONIX) 40 MG tablet TAKE 1 TABLET BY MOUTH TWICE A DAY  Patient taking differently: Take 40 mg by mouth daily  2/10/22   Monalisa Homans, APRN   Thiamine 50 MG CAPS Take 50 mg by mouth daily 22   SARAH Billings   Ubrogepant (UBRELVY) 100 MG TABS Take 1 tablet at the onset of migraine. May repeat once in 2 hours if no improvement. Do not exceed 2 tablets in 24 hours.  1/3/22   SARAH Billings   vitamin D (ERGOCALCIFEROL) 1.25 MG (93473 UT) CAPS capsule Take 1 capsule by mouth once a week  Patient taking differently: Take 50,000 Units by mouth once a week 21   Monalisa Homans, APRN   oxybutynin (DITROPAN) 5 MG tablet TAKE 1 TABLET BY MOUTH ONCE DAILY AT BEDTIME  Patient taking differently: Take 5 mg by mouth daily TAKE 1 TABLET BY MOUTH ONCE DAILY AT BEDTIME 12/15/21   Monalisa Homans, APRN   metoprolol succinate (TOPROL XL) 50 MG extended release tablet TAKE 1 TABLET BY MOUTH EVERY DAY  Patient taking differently: Take 50 mg by mouth daily TAKE 1 TABLET BY MOUTH EVERY DAY 12/15/21   SARAH Valenzuela   topiramate (TOPAMAX) 100 MG tablet Take 1 tablet by mouth nightly Indications: Backache 12/15/21   SARAH Valenzuela   folic acid-pyridoxine-cyanocobalamine (FOLBEE) 2.5-25-1 MG TABS tablet Take 1 tablet by mouth daily 9/13/21   Historical Provider, MD   docusate sodium (COLACE) 100 MG capsule Take 100 mg by mouth 2 times daily as needed     Historical Provider, MD   ondansetron (ZOFRAN ODT) 4 MG disintegrating tablet Take 4 mg by mouth every 8 hours as needed for Nausea or Vomiting    Historical Provider, MD   pregabalin (LYRICA) 100 MG capsule Take 100 mg by mouth 3 times daily. Historical Provider, MD   oxyCODONE-acetaminophen (LYNOX) 7.5-300 MG per tablet Take 1 tablet by mouth every 6 hours. 10/15/19   Historical Provider, MD   polyethylene glycol (MIRALAX) powder 1 tsp daily for constipation. Adjust dose for desired bm 12/12/19   SARAH Ortiz   OXYGEN Inhale 2 L/min into the lungs continuous     Historical Provider, MD   epoetin cheryl (PROCRIT) 22450 UNIT/ML injection Inject 40,000 Units into the skin as needed  9/6/17   Historical Provider, MD   tiZANidine (ZANAFLEX) 4 MG tablet Take 4 mg by mouth 3 times daily as needed (muscle spasms)     Historical Provider, MD       Current medications:    Current Facility-Administered Medications   Medication Dose Route Frequency Provider Last Rate Last Admin    ceFAZolin (ANCEF) 2000 mg in 0.9% sodium chloride 50 mL IVPB  2,000 mg IntraVENous Once Yasmine Cisse MD        lactated ringers infusion   IntraVENous Continuous Yasmine Cisse  mL/hr at 03/24/22 0603 New Bag at 03/24/22 0603       Allergies:     Allergies   Allergen Reactions    Tape Eden Olguin      can use paper tape       Problem List:    Patient Active Problem List   Diagnosis Code    Irritable bowel syndrome with constipation K58.1    Acquired hypothyroidism E03.9    Hypertension I10    Depression F32. A    GERD (gastroesophageal reflux disease) K21.9    Short-term memory loss R41.3    Stage 3a chronic kidney disease (HCC) N18.31    Vitamin D deficiency E55.9    Degenerative disc disease, lumbar M51.36       Past Medical History:        Diagnosis Date    Acquired hypothyroidism     B12 deficiency     Chronic back pain     Chronic pancreatitis (HCC)     Colon polyps     Depression     Fibromyalgia     GERD (gastroesophageal reflux disease)     Headache     Hypertension     Irritable bowel syndrome     Kidney stones     Neuropathy     On home oxygen therapy     2 liters    PONV (postoperative nausea and vomiting)     Restless legs syndrome     Sarcoidosis 11/01/2016    Scoliosis     SVT (supraventricular tachycardia) ([facility] [address]        Past Surgical History:        Procedure Laterality Date    ABLATION OF DYSRHYTHMIC FOCUS  2016    SVT    ANAL SPHINCTEROTOMY N/A 2/19/2020    LATERAL SPHINCTEROTOMY performed by Rajendra Sinclair MD at [address] [place] [address]  10/13/2014    Dr. Nanda Alfred AP-5 yr recall    COLONOSCOPY  05/20/2008    Dr. Victor Hugo Morales: unremarkable    COLONOSCOPY  08/23/2017    Dr Mckeon-5 yr recall    COLONOSCOPY  09/06/2018    Dr. Janina Hernandez normal-5 yr recall    HYSTERECTOMY      JOINT REPLACEMENT  12/30/2013    Patella joint replacement    LITHOTRIPSY      PLANTAR FASCIA SURGERY Left     GA REVISE MEDIAN N/CARPAL TUNNEL SURG Right 5/8/2018    CARPAL TUNNEL RELEASE performed by Tomi Hernández MD at [address] Right 5/4/2017    KNEE TOTAL ARTHROPLASTY REVISION performed by Tomi Hernández MD at [address] ARTHROSCOPY Left     SIGMOIDOSCOPY N/A 3/15/2019    Dr Rebecca Valentin non edematous hemorrhoids, anal fissure-BCM    TOTAL KNEE ARTHROPLASTY Right 11/16/2015    01/03/2022    GLUCOSE 83 01/03/2022    PROT 6.9 01/03/2022    CALCIUM 8.8 01/03/2022    BILITOT 0.3 01/03/2022    ALKPHOS 75 01/03/2022    AST 28 01/03/2022    ALT 17 01/03/2022       POC Tests: No results for input(s): POCGLU, POCNA, POCK, POCCL, POCBUN, POCHEMO, POCHCT in the last 72 hours. Coags:   Lab Results   Component Value Date    PROTIME 14.2 05/04/2017    INR 1.11 05/04/2017    APTT 36.6 05/04/2017       HCG (If Applicable): No results found for: PREGTESTUR, PREGSERUM, HCG, HCGQUANT     ABGs:   Lab Results   Component Value Date    PHART 7.290 02/15/2022    PO2ART 94.0 02/15/2022    LXO5LKQ 49.0 02/15/2022    DDK5KYD 23.6 02/15/2022    BEART -3.3 02/15/2022    A7ZZJYCC 95.7 02/15/2022        Type & Screen (If Applicable):  No results found for: LABABO, LABRH    Drug/Infectious Status (If Applicable):  No results found for: HIV, HEPCAB    COVID-19 Screening (If Applicable):   Lab Results   Component Value Date    COVID19 Not Detected 03/22/2022           Anesthesia Evaluation  Patient summary reviewed and Nursing notes reviewed   history of anesthetic complications: PONV. Airway: Mallampati: III       Mouth opening: < 3 FB Dental:    (+) upper dentures      Pulmonary:   (+) shortness of breath: no interval change,      (-) asthma, sleep apnea and not a current smoker                          ROS comment: + sarcoidosis    Oxygen use 24hr/ day   Cardiovascular:  Exercise tolerance: no interval change,   (+) hypertension:, dysrhythmias: SVT,     (-) pacemaker, past MI, CAD, CABG/stent,  angina and no hyperlipidemia    ECG reviewed               Beta Blocker:  Dose within 24 Hrs         Neuro/Psych:   (+) neuromuscular disease (fibromyalgia):, headaches:, psychiatric history:   (-) seizures and CVA           GI/Hepatic/Renal:   (+) GERD: well controlled, renal disease: CRI, morbid obesity     (-) liver disease       Endo/Other:    (+) hypothyroidism, blood dyscrasia (anemia)::., no malignancy/cancer.     (-) diabetes mellitus, no malignancy/cancer               Abdominal:   (+) obese,           Vascular:     - DVT and PE. Other Findings:             Anesthesia Plan      general     ASA 4     (In the setting of sarcoid lung involvement and use of oxygen pt is not a candidate for an insterscalene block. Michel Arce and pepcid in preop.)  Induction: intravenous. MIPS: Postoperative opioids intended and Prophylactic antiemetics administered. Anesthetic plan and risks discussed with patient.                       Toby Higgins, DO   3/24/2022

## 2022-03-24 NOTE — OP NOTE
OPERATIVE NOTE    Patient Name: Nan  : 1970  MRN: [id number]    [address] of SURGERY: 3/24/2022    SURGEON: Gabrielle Rodgers MD    ASSISTANT: David Acosta CST    PREOPERATIVE DIAGNOSIS: Right shoulder supraspinatus tear retracted. Right biceps tendinosis. POSTOPERATIVE DIAGNOSIS: Same    PROCEDURE PERFORMED:  1) Right Shoulder arthroscopic rotator cuff repair with the Arthrex speed bridge technique. 2) Right Shoulder arthroscopic biceps tenotomy, labral debridement, mini-open subpectoral biceps tenodesis  3) Right Shoulder arthroscopic subacromial decompression      IMPLANTS: Arthrex 4.75 mm bioswivelock anchor, Arthrex 8x12 mm biotenodesis screw    ANESTHESIA USED: General endotrachial anesthesia, interscalene block    ESTIMATED BLOOD LOSS: minimal    DRAINS: none     COMPLICATIONS: none    SPECIMENS: none    OPERATIVE INDICATIONS: This patient is a 46 y.o. female presented to my clinic with complaints of progressive shoulder pain  An MRI was obtained which showed the above named diagnoses. Pain was not relieved with conservative treatment consisting of anti-inflammatories, corticosteroid injections, physical therapy. Due to progressive pain and loss of function, surgical evaluation was discussed and the patient wished to proceed understanding risks, benefits, and alternatives. The surgical indication were to relieve pain, improve function, and prevent future disability in regards to the shoulder pathology dictated in the aboved diagnoses. Risks included, but were not limited to, that of anesthesia, bleeding, infection, pain, damage to local structures, need for further surgery, failure of repair, stiffness, failure of implants, and loss of function.         OPERATIVE FINDINGS:  1) Exam under anesthesia:  Full passive ROM, joint stable without laxity, skin intact  2) Glenohumeral Joint:       A) Chondral surfaces: Intact   Grade 0 change       B) Labrum: Intact without tear           C) Biceps: Partial tear         D) Rotator Cuff:    Complete full thickness tear  3) Subacromial space:         A) Large amount of dense vascular bursa         B) Type 2 acromium, hypertrophic coracoacromial ligament         D) AC joint:  Intact without arthritic change      Procedure:  Patient was brought into the operating room and general endotracheal anesthetic was placed. Patient was placed in a beachchair position. The extremity was prepped with chlorhexidine alcohol and sterilely draped. Shoulder went through a full range of motion was stable. Posterior lateral portal was established with an 11 blade through the skin and blunt trocar and cannula used the rest of the way. Glenohumeral joint showed no arthritic change. There was fraying of the biceps. There was a articular sided supraspinatus tear with minimal retraction. .    Labrum was normal.  An anterior portal was established with an outside in technique using 18-gauge spinal needle for localization. The biceps was pulled into the joint with a blunt trocar and showed significant fraying. .  The superior edge of the subscapularis had some fraying. The biceps tendon was released was released at its origin with a biter. The posterior lateral portal was then established in the subacromial space. Lateral portal was established. And anterior portal was established. Subacromial bursal tissue was resected with the shaver and ArthroCare wand. The undersurface the acromion and AC joint was cleaned up with the ArthroCare wand     Minimal subacromial decompression was performed with a bur from the posterior portal such that the anterior half of the acromion was made flush with the posterior inferior half of the acromion. Supraspinatus was probed it was abnormal at its insertion. The supraspinatus remaining fibers were released with the ArthroCare wand off bone.   Footprint was cleaned up with the shaver and lightly decorticated with a bur..  2 swivel lock suture anchors were placed at the medial border of the footprint,  1 anterior and 1 posterior. Each pair of suture limbs was passed through the musculotendinous junction over the anterior portion of the supraspinatus and posterior surface of the supraspinatus,  respectively. 1 limb of each suture was then passed through the lateral portal and placed through a swivel lock suture anchor. 2 lateral swivel lock suture anchors were placed over the lateral edge of the greater tuberosity one anterior and one posterior. The sutures were appropriately tensioned and t locked in place. This gave a good speed bridge repair of the supraspinatus. Suture limbs were cut. The posterior sutures were slightly more slack than the anterior sutures. A 1 inch incision was made over the anterior shoulder. Finger dissection was carried down between the deltoid and pectoralis identifying the bicipital groove. This was unroofed with the Bovie. The biceps was captured with a fiber loop suture. 2 locking stitches were placed. About three quarters of an inch of the remaining stump was resected. The suture limbs were then passed through an Endobutton and a single hole drilled in the cortex just inferior to the bicipital groove. The Endobutton with the sutures were passed through the drill hole and appropriately tensioned and the Endobutton flipped. A single limb of the suture was then passed through the biceps tendon stump and then this was sewn down with 4 1/2 hitches. This gave a very good biceps tenodesis. The fascia was closed 0 Vicryl. Subcu closed with 2-0 Vicryl. Skin closed with 3-0 Vicryl and Prineo. The arthroscopic portals were closed with nylon sutures. Sterile dressings applied. Patient is placed in abduction pillow sling. Patient was awakened extubated and transferred to the recovery room in stable condition. Plan  to start therapy and follow-up in 2 weeks in the office.

## 2022-03-28 DIAGNOSIS — F33.41 RECURRENT MAJOR DEPRESSIVE DISORDER, IN PARTIAL REMISSION (HCC): ICD-10-CM

## 2022-03-28 RX ORDER — ESCITALOPRAM OXALATE 10 MG/1
TABLET ORAL
Qty: 90 TABLET | Refills: 0 | OUTPATIENT
Start: 2022-03-28

## 2022-03-28 RX ORDER — UREA 10 %
LOTION (ML) TOPICAL
Qty: 90 TABLET | Refills: 1 | OUTPATIENT
Start: 2022-03-28

## 2022-04-01 ENCOUNTER — APPOINTMENT (OUTPATIENT)
Dept: ONCOLOGY | Facility: HOSPITAL | Age: 52
End: 2022-04-01

## 2022-04-01 ENCOUNTER — APPOINTMENT (OUTPATIENT)
Dept: LAB | Facility: HOSPITAL | Age: 52
End: 2022-04-01

## 2022-04-07 ENCOUNTER — TELEPHONE (OUTPATIENT)
Dept: FAMILY MEDICINE CLINIC | Age: 52
End: 2022-04-07

## 2022-04-07 RX ORDER — TOPIRAMATE 100 MG/1
100 TABLET, FILM COATED ORAL NIGHTLY
Qty: 30 TABLET | Refills: 3 | Status: SHIPPED | OUTPATIENT
Start: 2022-04-07 | End: 2022-08-15

## 2022-04-07 RX ORDER — OXYBUTYNIN CHLORIDE 5 MG/1
TABLET ORAL
Qty: 30 TABLET | Refills: 3 | Status: SHIPPED | OUTPATIENT
Start: 2022-04-07 | End: 2022-07-05

## 2022-04-07 NOTE — TELEPHONE ENCOUNTER
Mindi Silver called to request a refill on her medication.       Last office visit : 2/23/2022   Next office visit : 4/20/2022     Requested Prescriptions     Signed Prescriptions Disp Refills    oxybutynin (DITROPAN) 5 MG tablet 30 tablet 3     Sig: TAKE 1 TABLET BY MOUTH ONCE DAILY AT BEDTIME     Authorizing Provider: [facility]     Ordering User: oRland Tapia    topiramate (TOPAMAX) 100 MG tablet 30 tablet 3     Sig: Take 1 tablet by mouth nightly Indications: Backache     Authorizing Provider: [facility]     Ordering User: Marcello Mac

## 2022-04-07 NOTE — TELEPHONE ENCOUNTER
Done Melolabial Transposition Flap Text: The defect edges were debeveled with a #15 scalpel blade.  Given the location of the defect and the proximity to free margins a melolabial flap was deemed most appropriate.  Using a sterile surgical marker, an appropriate melolabial transposition flap was drawn incorporating the defect.    The area thus outlined was incised deep to adipose tissue with a #15 scalpel blade.  The skin margins were undermined to an appropriate distance in all directions utilizing iris scissors.

## 2022-04-08 ENCOUNTER — APPOINTMENT (OUTPATIENT)
Dept: LAB | Facility: HOSPITAL | Age: 52
End: 2022-04-08

## 2022-04-08 ENCOUNTER — APPOINTMENT (OUTPATIENT)
Dept: ONCOLOGY | Facility: HOSPITAL | Age: 52
End: 2022-04-08

## 2022-04-15 ENCOUNTER — APPOINTMENT (OUTPATIENT)
Dept: ONCOLOGY | Facility: HOSPITAL | Age: 52
End: 2022-04-15

## 2022-04-15 ENCOUNTER — APPOINTMENT (OUTPATIENT)
Dept: LAB | Facility: HOSPITAL | Age: 52
End: 2022-04-15

## 2022-04-20 ENCOUNTER — OFFICE VISIT (OUTPATIENT)
Dept: FAMILY MEDICINE CLINIC | Age: 52
End: 2022-04-20
Payer: MEDICARE

## 2022-04-20 VITALS
HEART RATE: 68 BPM | BODY MASS INDEX: 53.92 KG/M2 | TEMPERATURE: 97.8 F | SYSTOLIC BLOOD PRESSURE: 134 MMHG | RESPIRATION RATE: 18 BRPM | HEIGHT: 62 IN | OXYGEN SATURATION: 98 % | WEIGHT: 293 LBS | DIASTOLIC BLOOD PRESSURE: 76 MMHG

## 2022-04-20 DIAGNOSIS — F33.41 RECURRENT MAJOR DEPRESSIVE DISORDER, IN PARTIAL REMISSION (HCC): Primary | ICD-10-CM

## 2022-04-20 DIAGNOSIS — E03.9 ACQUIRED HYPOTHYROIDISM: ICD-10-CM

## 2022-04-20 DIAGNOSIS — M51.36 DEGENERATIVE DISC DISEASE, LUMBAR: ICD-10-CM

## 2022-04-20 DIAGNOSIS — I10 PRIMARY HYPERTENSION: ICD-10-CM

## 2022-04-20 PROCEDURE — 3017F COLORECTAL CA SCREEN DOC REV: CPT | Performed by: CLINICAL NURSE SPECIALIST

## 2022-04-20 PROCEDURE — 96372 THER/PROPH/DIAG INJ SC/IM: CPT | Performed by: CLINICAL NURSE SPECIALIST

## 2022-04-20 PROCEDURE — 99214 OFFICE O/P EST MOD 30 MIN: CPT | Performed by: CLINICAL NURSE SPECIALIST

## 2022-04-20 PROCEDURE — 1036F TOBACCO NON-USER: CPT | Performed by: CLINICAL NURSE SPECIALIST

## 2022-04-20 PROCEDURE — G8427 DOCREV CUR MEDS BY ELIG CLIN: HCPCS | Performed by: CLINICAL NURSE SPECIALIST

## 2022-04-20 PROCEDURE — G8417 CALC BMI ABV UP PARAM F/U: HCPCS | Performed by: CLINICAL NURSE SPECIALIST

## 2022-04-20 RX ORDER — DESVENLAFAXINE 50 MG/1
50 TABLET, EXTENDED RELEASE ORAL DAILY
Qty: 90 TABLET | Refills: 1 | Status: SHIPPED | OUTPATIENT
Start: 2022-04-20 | End: 2022-08-15

## 2022-04-20 RX ORDER — DEXAMETHASONE SODIUM PHOSPHATE 10 MG/ML
10 INJECTION INTRAMUSCULAR; INTRAVENOUS ONCE
Status: COMPLETED | OUTPATIENT
Start: 2022-04-20 | End: 2022-04-20

## 2022-04-20 RX ADMIN — DEXAMETHASONE SODIUM PHOSPHATE 10 MG: 10 INJECTION INTRAMUSCULAR; INTRAVENOUS at 13:47

## 2022-04-20 NOTE — PROGRESS NOTES
SUBJECTIVE:  Chari Abraham is a 45 y. o. who presents today for Follow-up, Depression, Fatigue, and Generalized Body Aches      HPI    Hope presents today for follow up. MDD, uncontrolled. Was on cymbalta but ineffective, then changed to lexapro also ineffective. Took prozac in the past- caused side effects. Has persistent sadness, loss of interest.  No SI/HI. Hypothyroidism, no overt symptoms. Remains on thyroid replacement. Recent dose change. Doing well. Primary hypertension, controlled. bp at goal today. Compliant with med regimen. Chronic pain 2/2 DDD of lumbar spine. Seeing pain mgt. On opioids postop right shoulder currently. IM decadron helps prn. No side effects. Seeing pulmonology and hematology routinely.      Past Medical History:   Diagnosis Date    Acquired hypothyroidism     B12 deficiency     Chronic back pain     Chronic pancreatitis (HCC)     Colon polyps     Depression     Fibromyalgia     GERD (gastroesophageal reflux disease)     Headache     Hypertension     Irritable bowel syndrome     Kidney stones     Neuropathy     On home oxygen therapy     2 liters    PONV (postoperative nausea and vomiting)     Restless legs syndrome     Sarcoidosis 11/01/2016    Scoliosis     SVT (supraventricular tachycardia) ([facility] [address]      Past Surgical History:   Procedure Laterality Date    ABLATION OF DYSRHYTHMIC FOCUS  2016    SVT    ANAL SPHINCTEROTOMY N/A 2/19/2020    LATERAL SPHINCTEROTOMY performed by Tonja Lui MD at [place] [id number]  10/13/2014    Dr. Butts Needs AP-5 yr recall    COLONOSCOPY  05/20/2008    Dr. Billie Hernandez: unremarkable    COLONOSCOPY  08/23/2017    Dr [facility]-5 yr recall    COLONOSCOPY  09/06/2018    Dr. Tonie Amaya yr recall    HYSTERECTOMY      JOINT REPLACEMENT  12/30/2013    Patella joint replacement    LITHOTRIPSY      PLANTAR FASCIA SURGERY Left     OR REVISE MEDIAN N/CARPAL TUNNEL SURG Right 5/8/2018    CARPAL TUNNEL RELEASE performed by Marco A Zhang MD at [address] Right 5/4/2017    KNEE TOTAL ARTHROPLASTY REVISION performed by Marco A Zhang MD at [place] [address] ARTHROSCOPY Left     SHOULDER ARTHROSCOPY Right 3/24/2022    RIGHT SHOULDER ARTHROSCOPIC, ROTATOR CUFF REPAIR, BICEPS TENODESIS performed by [facility] A [facility] at [facility] 3/15/2019    Dr Lori Rhoades non edematous hemorrhoids, anal fissure-BCM    TOTAL KNEE ARTHROPLASTY Right 11/16/2015    ULNAR TUNNEL RELEASE      UPPER GASTROINTESTINAL ENDOSCOPY  10/16/2014    Dr. Omkar Haynes  05/19/2008    Dr. Jareth Duncan: jaya neg, barretts neg,  [place]    UPPER GASTROINTESTINAL ENDOSCOPY N/A 2/10/2016    Dr Toby Esparza-mild esophageal stricture dilated; reactive gastropathy    UPPER GASTROINTESTINAL ENDOSCOPY  08/23/2017    Dr Laura Celeste Bilateral     RADIO ABLATION     Family History   Problem Relation Age of Onset    Lupus Mother     Kidney Disease Mother     High Blood Pressure Mother     Anemia Mother     Cancer Father         skin    Diabetes Father     Parkinsonism Brother     Celiac Disease Maternal Uncle     Celiac Disease Maternal Cousin     Heart Disease Other     Colon Polyps Neg Hx     Colon Cancer Neg Hx     Esophageal Cancer Neg Hx     Liver Cancer Neg Hx     Liver Disease Neg Hx     Stomach Cancer Neg Hx     Rectal Cancer Neg Hx      Social History     Tobacco Use    Smoking status: Never Smoker    Smokeless tobacco: Never Used    Tobacco comment: disabled due to sarcoidosis   Substance Use Topics    Alcohol use: No     Current Outpatient Medications   Medication Sig Dispense Refill    desvenlafaxine succinate (PRISTIQ) 50 MG TB24 extended release tablet Take 1 tablet by mouth daily 90 tablet 1    oxybutynin (DITROPAN) 5 MG tablet TAKE 1 TABLET BY MOUTH ONCE DAILY AT BEDTIME 30 tablet 3    topiramate (TOPAMAX) 100 MG tablet Take 1 tablet by mouth nightly Indications: Backache 30 tablet 3    levothyroxine (SYNTHROID) 150 MCG tablet TAKE 1 TABLET BY MOUTH EVERY DAY 90 tablet 1    Fluticasone-Salmeterol,sensor, (AIRDUO DIGIHALER) 232-14 MCG/ACT AEPB Inhale 1-2 Doses into the lungs 2 times daily      esomeprazole Magnesium (NEXIUM) 40 MG PACK Take 40 mg by mouth daily      tiotropium (SPIRIVA RESPIMAT) 2.5 MCG/ACT AERS inhaler Inhale 2 puffs into the lungs daily      pantoprazole (PROTONIX) 40 MG tablet TAKE 1 TABLET BY MOUTH TWICE A DAY (Patient taking differently: Take 40 mg by mouth daily ) 180 tablet 1    Thiamine 50 MG CAPS Take 50 mg by mouth daily 30 capsule 3    Ubrogepant (UBRELVY) 100 MG TABS Take 1 tablet at the onset of migraine. May repeat once in 2 hours if no improvement. Do not exceed 2 tablets in 24 hours. 10 tablet 3    vitamin D (ERGOCALCIFEROL) 1.25 MG (26157 UT) CAPS capsule Take 1 capsule by mouth once a week (Patient taking differently: Take 50,000 Units by mouth once a week Tuesday) 12 capsule 1    metoprolol succinate (TOPROL XL) 50 MG extended release tablet TAKE 1 TABLET BY MOUTH EVERY DAY (Patient taking differently: Take 50 mg by mouth daily TAKE 1 TABLET BY MOUTH EVERY DAY) 30 tablet 3    folic acid-pyridoxine-cyanocobalamine (FOLBEE) 2.5-25-1 MG TABS tablet Take 1 tablet by mouth daily      docusate sodium (COLACE) 100 MG capsule Take 100 mg by mouth 2 times daily as needed       ondansetron (ZOFRAN ODT) 4 MG disintegrating tablet Take 4 mg by mouth every 8 hours as needed for Nausea or Vomiting      pregabalin (LYRICA) 100 MG capsule Take 100 mg by mouth 3 times daily.  polyethylene glycol (MIRALAX) powder 1 tsp daily for constipation.  Adjust dose for desired bm 510 g 11    OXYGEN Inhale 2 L/min into the lungs continuous       epoetin cheryl (PROCRIT) 07358 UNIT/ML injection Inject 40,000 Units into the skin as needed       tiZANidine (ZANAFLEX) 4 MG tablet Take 4 mg by mouth 3 times daily as needed (muscle spasms)        No current facility-administered medications for this visit. Allergies   Allergen Reactions    Tape Jason Morfin      can use paper tape       Review of Systems   Constitutional: Positive for activity change and fatigue. Negative for appetite change, chills, diaphoresis and fever. HENT: Negative for congestion, ear pain, facial swelling, hearing loss, postnasal drip, sinus pressure, sore throat and trouble swallowing. Eyes: Negative for pain, discharge, redness and visual disturbance. Respiratory: Positive for shortness of breath. Negative for cough, chest tightness and wheezing. Cardiovascular: Positive for leg swelling. Negative for chest pain and palpitations. Gastrointestinal: Negative for abdominal pain, constipation and diarrhea. Genitourinary: Negative for difficulty urinating, dysuria, flank pain, frequency, hematuria and urgency. Musculoskeletal: Positive for arthralgias and back pain. Negative for joint swelling, neck pain and neck stiffness. Skin: Negative for color change and rash. Neurological: Negative for dizziness, syncope, weakness, light-headedness and headaches. Hematological: Negative for adenopathy. Does not bruise/bleed easily. Psychiatric/Behavioral: Positive for dysphoric mood and sleep disturbance. Negative for confusion and suicidal ideas. The patient is not nervous/anxious. OBJECTIVE:  /76   Pulse 68   Temp 97.8 °F (36.6 °C) (Temporal)   Resp 18   Ht 5' 2\" (1.575 m)   Wt 293 lb (132.9 kg)   SpO2 98%   BMI 53.59 kg/m²    Physical Exam  Vitals reviewed. Constitutional:       General: She is not in acute distress. Appearance: She is well-developed. She is obese. She is not ill-appearing or toxic-appearing. HENT:      Head: Normocephalic and atraumatic. Eyes:      General:         Right eye: No discharge. Left eye: No discharge. Conjunctiva/sclera: Conjunctivae normal.      Pupils: Pupils are equal, round, and reactive to light. Neck:      Thyroid: No thyromegaly. Trachea: No tracheal deviation. Cardiovascular:      Rate and Rhythm: Normal rate and regular rhythm. Heart sounds: No murmur heard. Pulmonary:      Effort: Pulmonary effort is normal. No respiratory distress. Breath sounds: Rales present. No wheezing. Genitourinary:     Vagina: Normal.   Musculoskeletal:         General: No deformity. Right shoulder: Decreased range of motion (in sling). Cervical back: Neck supple. Lymphadenopathy:      Cervical: No cervical adenopathy. Skin:     General: Skin is warm and dry. Findings: No erythema or rash. Neurological:      General: No focal deficit present. Mental Status: She is alert and oriented to person, place, and time. Psychiatric:         Mood and Affect: Mood is depressed. Affect is tearful. Thought Content: Thought content normal.         ASSESSMENT/PLAN:  1. Recurrent major depressive disorder, in partial remission (HCC)  Uncontrolled  Taper off lexapro over 2 weeks  Report back in 4-5 weeks on effect of pristiq  - desvenlafaxine succinate (PRISTIQ) 50 MG TB24 extended release tablet; Take 1 tablet by mouth daily  Dispense: 90 tablet; Refill: 1    2. Acquired hypothyroidism  Stable. Continue same   TFT next visit    3. Primary hypertension  Controlled, continue same  CBC, cmp next visit    4. Degenerative disc disease, lumbar  - dexamethasone (DECADRON) injection 10 mg          Return in about 3 months (around 7/20/2022) for Labs one week prior. Tena Arias

## 2022-04-21 RX ORDER — CYANOCOBALAMIN/FOLIC AC/VIT B6 1-2.5-25MG
TABLET ORAL
Qty: 30 TABLET | Refills: 0 | Status: SHIPPED | OUTPATIENT
Start: 2022-04-21 | End: 2022-05-31

## 2022-04-21 ASSESSMENT — ENCOUNTER SYMPTOMS
WHEEZING: 0
ABDOMINAL PAIN: 0
TROUBLE SWALLOWING: 0
EYE REDNESS: 0
CONSTIPATION: 0
COUGH: 0
COLOR CHANGE: 0
SHORTNESS OF BREATH: 1
SINUS PRESSURE: 0
EYE PAIN: 0
EYE DISCHARGE: 0
CHEST TIGHTNESS: 0
BACK PAIN: 1
SORE THROAT: 0
DIARRHEA: 0
FACIAL SWELLING: 0

## 2022-04-29 ENCOUNTER — OFFICE VISIT (OUTPATIENT)
Dept: NEUROSURGERY | Age: 52
End: 2022-04-29
Payer: MEDICARE

## 2022-04-29 VITALS
DIASTOLIC BLOOD PRESSURE: 86 MMHG | SYSTOLIC BLOOD PRESSURE: 132 MMHG | HEIGHT: 62 IN | WEIGHT: 293 LBS | BODY MASS INDEX: 53.92 KG/M2 | HEART RATE: 72 BPM | OXYGEN SATURATION: 100 %

## 2022-04-29 DIAGNOSIS — R41.3 MEMORY LOSS: Primary | ICD-10-CM

## 2022-04-29 DIAGNOSIS — G43.009 MIGRAINE WITHOUT AURA AND WITHOUT STATUS MIGRAINOSUS, NOT INTRACTABLE: ICD-10-CM

## 2022-04-29 PROCEDURE — 99213 OFFICE O/P EST LOW 20 MIN: CPT | Performed by: NURSE PRACTITIONER

## 2022-04-29 PROCEDURE — G8417 CALC BMI ABV UP PARAM F/U: HCPCS | Performed by: NURSE PRACTITIONER

## 2022-04-29 PROCEDURE — 1036F TOBACCO NON-USER: CPT | Performed by: NURSE PRACTITIONER

## 2022-04-29 PROCEDURE — 3017F COLORECTAL CA SCREEN DOC REV: CPT | Performed by: NURSE PRACTITIONER

## 2022-04-29 PROCEDURE — G8427 DOCREV CUR MEDS BY ELIG CLIN: HCPCS | Performed by: NURSE PRACTITIONER

## 2022-04-29 RX ORDER — UBROGEPANT 100 MG/1
TABLET ORAL
Qty: 10 TABLET | Refills: 3 | Status: SHIPPED | OUTPATIENT
Start: 2022-04-29 | End: 2022-08-22 | Stop reason: SDUPTHER

## 2022-04-29 NOTE — PROGRESS NOTES
[facility] Neurology Office Note      Patient:   Lorrayne Dakin  MR#:    [id number]  Account Number:                         YOB: 1970  Date of Evaluation:  4/29/2022  Time of Note:                          12:28 PM  Primary/Referring Physician:  SARAH Ramachandran   Consulting Physician:  Henrry Bertrand DNP, APRN    FOLLOW UP    Chief Complaint   Patient presents with    Follow-up     c/o trouble with getting her words to come out like she wants     [address] is a 45y.o. year old female here for follow up of memory loss and headaches. She is alone at today's appointment. Feels like memory has worsened since last visit. Noting more word finding difficulty. Primarily short term memory loss noted. She might walk into a room and forget why she went in there. Misplacing items around the house. Some repetition of stories and questions noted. She does note depression/anxiety, feels like it is well controlled. Memory loss is not interfering with ADLs. No issues with medication management. Her brother is helping with finances. She does not drive. Sister with epilepsy and possible early onset dementia. She is on oxygen for sarcoidosis, follows with Dr. Radha Carrington for this. She is on chronic opoid therapy for back pain. Headaches are improved since last visit. No change in characteristics of headaches. Pain is bilateral frontal with radiation globally. Pain is described as sharp. She notes nausea, light/sound sensitivity. Denies vision changes or vision loss. Denies focal symptoms with headaches. She is on Topamax, states she is taking this for her back pain, filled through primary. She is taking Ubrelvy prn and this is beneficial. No prior triptan therapy. She does have a history of SVT. No longer taking OTC medications. She notes 4 migraines in a month now. Family history with mother having brain aneurysm. Carries a diagnosis of fibromyalgia.      Past Medical History: Diagnosis Date    Acquired hypothyroidism     B12 deficiency     Chronic back pain     Chronic pancreatitis (HCC)     Colon polyps     Depression     Fibromyalgia     GERD (gastroesophageal reflux disease)     Headache     Hypertension     Irritable bowel syndrome     Kidney stones     Neuropathy     On home oxygen therapy     2 liters    PONV (postoperative nausea and vomiting)     Restless legs syndrome     Sarcoidosis 11/01/2016    Scoliosis     SVT (supraventricular tachycardia) ([facility] [address]        Past Surgical History:   Procedure Laterality Date    ABLATION OF DYSRHYTHMIC FOCUS  2016    SVT    ANAL SPHINCTEROTOMY N/A 2/19/2020    LATERAL SPHINCTEROTOMY performed by Claudene Blander, MD at [place] [address]  10/13/2014    Dr. Shawanda Ramos AP-5 yr recall    COLONOSCOPY  05/20/2008    Dr. Johanna Dunaway: unremarkable    COLONOSCOPY  08/23/2017    Dr Moses-BCM-5 yr recall    COLONOSCOPY  09/06/2018    Dr. Ramón Kim, leo-5 yr recall    HYSTERECTOMY      JOINT REPLACEMENT  12/30/2013    Patella joint replacement    LITHOTRIPSY      PLANTAR FASCIA SURGERY Left     ME REVISE MEDIAN N/CARPAL TUNNEL SURG Right 5/8/2018    CARPAL TUNNEL RELEASE performed by Dahiana Soto MD at [facility] [address] Right 5/4/2017    KNEE TOTAL ARTHROPLASTY REVISION performed by Dahiana Soto MD at [place] [address] ARTHROSCOPY Left     SHOULDER ARTHROSCOPY Right 3/24/2022    RIGHT SHOULDER ARTHROSCOPIC, ROTATOR CUFF REPAIR, BICEPS TENODESIS performed by Dahiana Soto MD at [facility] 3/15/2019    Dr Dipti Maguire non edematous hemorrhoids, anal fissure-[facility]    TOTAL KNEE ARTHROPLASTY Right 11/16/2015    ULNAR TUNNEL RELEASE      UPPER GASTROINTESTINAL ENDOSCOPY  10/16/2014    Dr. Marizol Abreu  05/19/2008    Dr. Johanna Dunaway: jaya neg, barretts neg,      UPPER GASTROINTESTINAL ENDOSCOPY N/A 2/10/2016    Dr Marien Homans esophageal stricture dilated; reactive gastropathy    UPPER GASTROINTESTINAL ENDOSCOPY  08/23/2017    Dr Allan Bermudez Bilateral     RADIO ABLATION       Family History   Problem Relation Age of Onset    Lupus Mother     Kidney Disease Mother     High Blood Pressure Mother     Anemia Mother     Cancer Father         skin    Diabetes Father     Parkinsonism Brother     Celiac Disease Maternal Uncle     Celiac Disease Maternal Cousin     Heart Disease Other     Colon Polyps Neg Hx     Colon Cancer Neg Hx     Esophageal Cancer Neg Hx     Liver Cancer Neg Hx     Liver Disease Neg Hx     Stomach Cancer Neg Hx     Rectal Cancer Neg Hx        Social History     Socioeconomic History    Marital status: Single     Spouse name: Not on file    Number of children: Not on file    Years of education: Not on file    Highest education level: Not on file   Occupational History    Not on file   Tobacco Use    Smoking status: Never Smoker    Smokeless tobacco: Never Used    Tobacco comment: disabled due to sarcoidosis   Vaping Use    Vaping Use: Never used   Substance and Sexual Activity    Alcohol use: No    Drug use: No    Sexual activity: Not on file   Other Topics Concern    Not on file   Social History Narrative    Not on file     Social Determinants of Health     Financial Resource Strain:     Difficulty of Paying Living Expenses: Not on file   Food Insecurity:     Worried About Running Out of Food in the Last Year: Not on file    Erick of Food in the Last Year: Not on file   Transportation Needs:     Lack of Transportation (Medical): Not on file    Lack of Transportation (Non-Medical):  Not on file   Physical Activity:     Days of Exercise per Week: Not on file    Minutes of Exercise per Session: Not on file   Stress:     Feeling of Stress : Not on file   Social Connections:     Frequency of Communication with Friends and Family: Not on file    Frequency of Social Gatherings with Friends and Family: Not on file    Attends Taoist Services: Not on file    Active Member of Clubs or Organizations: Not on file    Attends Club or Organization Meetings: Not on file    Marital Status: Not on file   Intimate Partner Violence:     Fear of Current or Ex-Partner: Not on file    Emotionally Abused: Not on file    Physically Abused: Not on file    Sexually Abused: Not on file   Housing Stability:     Unable to Pay for Housing in the Last Year: Not on file    Number of [place] in the Last Year: Not on file    Unstable Housing in the Last Year: Not on file       Current Outpatient Medications   Medication Sig Dispense Refill    Ubrogepant (UBRELVY) 100 MG TABS Take 1 tablet at the onset of migraine. May repeat once in 2 hours if no improvement. Do not exceed 2 tablets in 24 hours.  10 tablet 3    desvenlafaxine succinate (PRISTIQ) 50 MG TB24 extended release tablet Take 1 tablet by mouth daily 90 tablet 1    oxybutynin (DITROPAN) 5 MG tablet TAKE 1 TABLET BY MOUTH ONCE DAILY AT BEDTIME 30 tablet 3    topiramate (TOPAMAX) 100 MG tablet Take 1 tablet by mouth nightly Indications: Backache 30 tablet 3    levothyroxine (SYNTHROID) 150 MCG tablet TAKE 1 TABLET BY MOUTH EVERY DAY 90 tablet 1    Fluticasone-Salmeterol,sensor, (AIRDUO DIGIHALER) 232-14 MCG/ACT AEPB Inhale 1-2 Doses into the lungs 2 times daily      esomeprazole Magnesium (NEXIUM) 40 MG PACK Take 40 mg by mouth daily      tiotropium (SPIRIVA RESPIMAT) 2.5 MCG/ACT AERS inhaler Inhale 2 puffs into the lungs daily      pantoprazole (PROTONIX) 40 MG tablet TAKE 1 TABLET BY MOUTH TWICE A DAY (Patient taking differently: Take 40 mg by mouth daily ) 180 tablet 1    Thiamine 50 MG CAPS Take 50 mg by mouth daily 30 capsule 3    vitamin D (ERGOCALCIFEROL) 1.25 MG (70344 UT) CAPS capsule Take 1 capsule by mouth once a week (Patient taking differently: Take 50,000 Units by mouth once a week Tuesday) 12 capsule 1    metoprolol succinate (TOPROL XL) 50 MG extended release tablet TAKE 1 TABLET BY MOUTH EVERY DAY (Patient taking differently: Take 50 mg by mouth daily TAKE 1 TABLET BY MOUTH EVERY DAY) 30 tablet 3    folic acid-pyridoxine-cyanocobalamine (FOLBEE) 2.5-25-1 MG TABS tablet Take 1 tablet by mouth daily      docusate sodium (COLACE) 100 MG capsule Take 100 mg by mouth 2 times daily as needed       ondansetron (ZOFRAN ODT) 4 MG disintegrating tablet Take 4 mg by mouth every 8 hours as needed for Nausea or Vomiting      pregabalin (LYRICA) 100 MG capsule Take 100 mg by mouth 3 times daily.  polyethylene glycol (MIRALAX) powder 1 tsp daily for constipation. Adjust dose for desired bm 510 g 11    OXYGEN Inhale 3 L/min into the lungs continuous       epoetin cheryl (PROCRIT) 92538 UNIT/ML injection Inject 40,000 Units into the skin as needed       tiZANidine (ZANAFLEX) 4 MG tablet Take 4 mg by mouth 3 times daily as needed (muscle spasms)        No current facility-administered medications for this visit. Allergies   Allergen Reactions    Tape [place] Tape]      can use paper tape       REVIEW OF SYSTEMS  Constitutional: []? Fever []? Sweats []? Chills []? Recent Injury [x]? Denies all unless marked  HEENT:[]? Headache  []? Head Injury []? Hearing Loss  []? Sore Throat  []? Ear Ache [x]? Denies all unless marked  Spine:  []? Neck pain  []? Back pain  []? Sciaticia  [x]? Denies all unless marked  Cardiovascular:[]? Heart Disease []? Palpitations []? Chest Pain   [x]? Denies all unless marked  Pulmonary: []? Shortness of Breath []? Cough   [x]? Denies all unless marked  Psychiatric/Behavioral:[]? Depression []? Anxiety [x]? Denies all unless marked  Gastrointestinal: []? Nausea  []? Vomiting  []? Abdominal Pain  []? Constipation  []? Diarrhea  [x]? Denies all unless marked  Genitourinary:   []? Frequency  []? Urgency  []? Dysuria []? Incontinence  [x]?  Denies all unless marked  Extremities: []? Pain  []? Swelling  [x]? Denies all unless marked  Musculoskeletal: []? Myalgias  []? Joint Pain  []? Arthritis []? Muscle Cramps []? Muscle Twitches  [x]? Denies all unless marked  Sleep: []? Insomnia[]? Snoring []? Restless Legs  []? Sleep Apnea  []? Daytime Sleepiness  [x]? Denies all unless marked  Skin:[]? Rash []? Color Change [x]? Denies all unless marked   Neurological:[]? Visual Disturbance []? Memory Loss []? Loss of Balance []? Slurred Speech []? Weakness []? Seizures  []? Dizziness [x]? Denies all unless marked    The MA has completed the ROS with the patient. I have reviewed it in its' entirety with the patient and agree with the documentation. PHYSICAL EXAM  /86   Pulse 72   Ht 5' 2\" (1.575 m)   Wt 293 lb (132.9 kg)   SpO2 100% Comment: 3L nasal cannula  BMI 53.59 kg/m²       Constitutional - No acute distress    HEENT- Conjunctiva normal.  No scars, masses, or lesions over external nose or ears, no neck masses noted, no jugular vein distension, no bruit  Cardiac- Regular rate and rhythm  Pulmonary- Good expansion, normal effort without use of accessory muscles  Musculoskeletal - No significant wasting of muscles noted, no bony deformities  Extremities - No clubbing, cyanosis or edema  Skin - Warm, dry, and intact. No rash, erythema, or pallor  Psychiatric - Mood, affect, and behavior appear normal      NEUROLOGICAL EXAM     Mental status   [x] Awake, alert, oriented   []Affect attention and concentration appear appropriate  []Recent and remote memory appears unremarkable  []Speech normal without dysarthria or aphasia, comprehension and repetition intact.    COMMENTS:MoCA 16/30     Cranial Nerves [x]No VF deficit to confrontation,  no papilledema on fundoscopic exam.  [x]PERRLA, EOMI, no nystagmus, conjugate eye movements, no ptosis  [x]Face symmetric  [x]Facial sensation intact  [x]Tongue midline no atrophy or fasciculations present  [x]Palate midline, hearing to finger rub normal bilaterally  [x]Shoulder shrug and SCM testing normal bilaterally  COMMENTS:    Motor   []5/5 strength x 4 extremities  [x]Normal bulk and tone  [x]No tremor present  [x]No rigidity or bradykinesia noted  COMMENTS: 0/5 right arm, in sling, recent rotator cuff surgery    Sensory  [x]Sensation intact to light touch, pin prick, vibration, and proprioception BLE  [x]Sensation intact to light touch, pin prick, vibration, and proprioception BUE  COMMENTS:   Coordination [x]FTN normal bilaterally   [x]HTS normal bilaterally  [x]HARVEY normal bilaterally. COMMENTS:   Reflexes  [x]Symmetric and non-pathological  [x]Toes down going bilaterally  [x]No clonus present  COMMENTS:    Gait                  [x]Normal steady gait    []Ataxic    []Spastic     []Magnetic     []Shuffling  COMMENTS:        LABS RECORD AND IMAGING REVIEW (As below and per HPI)    Lab Results   Component Value Date    GLSWIUIS71 783 01/03/2022     Lab Results   Component Value Date    WBC 3.1 (L) 01/03/2022    HGB 10.5 (L) 01/03/2022    HCT 34.9 (L) 01/03/2022    .6 (H) 01/03/2022     (L) 01/03/2022     Lab Results   Component Value Date     01/03/2022    K 4.1 02/15/2022     01/03/2022    CO2 23 01/03/2022    BUN 31 (H) 01/03/2022    CREATININE 1.3 (H) 01/03/2022    GLUCOSE 83 01/03/2022    CALCIUM 8.8 01/03/2022    PROT 6.9 01/03/2022    LABALBU 4.3 01/03/2022    BILITOT 0.3 01/03/2022    ALKPHOS 75 01/03/2022    AST 28 01/03/2022    ALT 17 01/03/2022    LABGLOM 43 (A) 01/03/2022    GFRAA 52 (L) 01/03/2022    GLOB 2.7 03/02/2016     Lab Results   Component Value Date    CHOL 181 11/16/2021    TRIG 100 11/16/2021    HDL 59 (L) 11/16/2021    LDLCALC 102 11/16/2021     Lab Results   Component Value Date    TSH 5.960 (H) 11/16/2021    T4FREE 1.07 01/03/2022     Lab Results   Component Value Date    CRP 0.79 (H) 01/03/2022    SEDRATE 43 (H) 01/03/2022     MRI brain (1/2022)-   Impression   1.  A few foci of abnormal T2 signal in the superficial subcortical   white matter in the frontal lobes bilaterally may represent foci of   ischemia due to small vessel disease. The second and less likely   possibility is foci of demyelination. Clinical correlation and further   follow-up may be obtained. 2. Chronic sinusitis. Signed by Dr Cody Freitas     MRA head (1/2022)-   Impression   1. No evidence of intracranial aneurysm is identified. 2. 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 Signed by Dr Matteo Barker     Reviewed referral records     ASSESSMENT:    Irais Pulliam is a 45y.o. year old female here for follow up of headaches and memory loss. Exam today is non focal. Prior MoCA 16/30, question full participation in test. MRI brain with migraine related changes. MRA head negative. Lab work reviewed and unremarkable outside of low thiamine, now on replacement. Suspect migraine headaches, will add Ubrelvy prn. Triptans contraindicated related to cardiac history. She is on Topamax for preventative but today reports she is taking it for back pain, somewhat unclear. Given history of kidney stones would recommend against use of Topamax but patient is hesitant to switch. There are other more migraine specific medications that could be beneficial. Consider re-addressing this down the line. Suspect memory loss is multi factorial with polypharmacy, depression/anxiety, hypoxia (on oxygen) all playing a role. Dementia felt less likely. Adding memory agent felt low yield. ICD-10-CM    1. Memory loss  R41.3    2. Migraine without aura and without status migrainosus, not intractable  G43.009      PLAN:  1. Continue Ubrelvy prn.   2. Discussed Topamax and risk of renal stones. Typically taken for migraine preventative so unclear if she is taking this for back pain or migraine. Could consider alternative medication for migraine preventative, patient hesitant to stop Topamax however.    3. Continue thiamine supplement. Discussed safety concerns, increase supervision, limited to no driving, medication monitoring/management. Recommend 30 minutes daily exercise, daily mental stimulation, and healthy diet with fish, fruits, vegetables, fiber and water   4. Consider neuropsych testing with any progression   5. Return in about 4 months (around 8/29/2022) for follow up, sooner if worsening.     Daron Lucia DNP, APRN

## 2022-05-10 ENCOUNTER — TELEPHONE (OUTPATIENT)
Dept: FAMILY MEDICINE CLINIC | Age: 52
End: 2022-05-10

## 2022-05-31 RX ORDER — CYANOCOBALAMIN/FOLIC AC/VIT B6 1-2.5-25MG
TABLET ORAL
Qty: 30 TABLET | Refills: 0 | Status: SHIPPED | OUTPATIENT
Start: 2022-05-31 | End: 2022-06-29

## 2022-06-01 ENCOUNTER — TELEPHONE (OUTPATIENT)
Dept: ONCOLOGY | Facility: CLINIC | Age: 52
End: 2022-06-01

## 2022-06-01 RX ORDER — UREA 10 %
LOTION (ML) TOPICAL
Qty: 90 TABLET | Refills: 1 | OUTPATIENT
Start: 2022-06-01

## 2022-06-01 NOTE — TELEPHONE ENCOUNTER
Caller: Stacy Lynn    Relationship: Self    Best call back number: [phone]    What is the best time to reach you: ANYTIME    Who are you requesting to speak with (clinical staff, provider,  specific staff member): DR CERVANTES OR NURSE    What was the call regarding: PT CALLING TO R/S HER LAB AND INJECTION APPTS. SHE STATED SHE HAD SURGERY AND MISSED HER April AND MAY APPTS. SHE STATED SHE HAD SPOKE WITH ANIKA AND ALL OF HER April AND MAY APPTS WERE SUPPOSED TO BE CANC UNTIL SHE CALLED BACK TO RESUME AND THE MAY APPTS WERE LEFT ON THE SCHED AND ARE LISTED AS NO SHOWS NOW. PT STATED SHE DID NOT INTENTIONALLY MISS THOSE APPTS, SHE THOUGHT THEY WERE CANC.    PLEASE CALL ASAP TO GET PTS APPTS SCHED.     Do you require a callback: YES

## 2022-06-03 NOTE — PROGRESS NOTES
"12/08/2017       Elliott Waters DO  [address] [place] [address]    Stacy Lynn  1970    Chief Complaint   Patient presents with   • Follow-up     Follow up for 2 leg VVI results.Complaint of bilateral leg pain and cramping.       Dear Elliott Waters DO:      HPI  I had the pleasure of seeing your patient Stacy Lynn in the office today.  As you recall, Stacy Lynn is a 47 y.o.  female who you are currently following for tachycardia and hypertension.  She has a history of chronic leg swelling with varicosities noted to bilateral lower extremities for the past 20 years.  She does report heaviness to her legs.  She has been prescribed compression stockings but does not use all the time, but has seen no results.  She has had noninvasive testing performed today, which I did review in office.          Review of Systems   Constitutional: Negative.    HENT: Negative.    Eyes: Negative.    Respiratory: Positive for shortness of breath (unchanged).    Cardiovascular: Positive for leg swelling.   Gastrointestinal: Negative.    Endocrine: Negative.    Genitourinary: Negative.    Musculoskeletal: Negative.    Skin: Negative.    Allergic/Immunologic: Negative.    Neurological: Negative.    Hematological: Negative.    Psychiatric/Behavioral: Negative.        /68  Pulse 80  Ht 160 cm (63\")  Wt 101 kg (222 lb)  BMI 39.33 kg/m2  Physical Exam   Constitutional: She is oriented to person, place, and time. She appears well-developed and well-nourished. No distress.   obese   HENT:   Head: Normocephalic and atraumatic.   Mouth/Throat: Oropharynx is clear and moist.   Eyes: Pupils are equal, round, and reactive to light. No scleral icterus.   Neck: Normal range of motion. Neck supple. No JVD present. Carotid bruit is not present. No thyromegaly present.   Cardiovascular: Normal rate, regular rhythm, S2 normal, normal heart sounds, intact distal pulses and normal pulses.  Exam reveals no gallop and no friction " rub.    No murmur heard.  Varicose veins to bilateral lower extremities  Chronic swelling lymphedema appearance   Pulmonary/Chest: Effort normal and breath sounds normal.   Abdominal: Soft. Normal aorta and bowel sounds are normal. There is no hepatosplenomegaly.   Obese   Musculoskeletal: Normal range of motion.   Neurological: She is alert and oriented to person, place, and time. No cranial nerve deficit.   Skin: Skin is warm and dry. She is not diaphoretic.   Psychiatric: She has a normal mood and affect. Her behavior is normal. Judgment and thought content normal.   Nursing note and vitals reviewed.      Us Venous Doppler Lower Extremity Bilateral (duplex)    Result Date: 12/6/2017  Narrative: History: Swelling   Comments: Venous valvular insufficiency testing was performed in both legs using duplex ultrasound with rapid cuff deflation technique.  The common femoral veins, popliteal veins, posterior tibial veins, greater saphenous veins, and lesser saphenous veins were interrogated bilaterally.  On the right, the greater saphenous vein at the junction measured 6 mm. In the mid thigh measured 4.6 mm. Above the knee measured 3.6 mm. Below the knee was too small to measure. There is greater than 0.5 seconds of reflux at the junction only. In zones 5 and 6 there are branches of varicose veins that trace back to the anterior accessory saphenous vein that has greater than 0.5 seconds of reflux.  The lesser saphenous vein is within normal limits and no evidence of reflux. There is no evidence of DVT.  On the left, the greater saphenous vein at the junction measured 8.4 mm. In the mid thigh measured 2.9 mm. Above the knee measured 2.4 mm. In the mid calf measured 2.3 mm. At the ankle measured 2 mm. There is greater than 0.5 seconds of reflux from the junction to the ankle. There is also evidence of varicose veins and zones 5 and 6 with reflux. The lesser saphenous vein is within normal limits and no evidence of reflux.  As no evidence of DVT.      Impression: Impression: There is evidence of significant insufficiency in the left lower extremity greater saphenous vein. There is mild insufficiency in the right lower extremity greater saphenous vein but a moderate size anterior accessory saphenous vein branch is identified.    This report was finalized on 12/06/2017 14:44 by Dr. Kvng Montgomery MD.      Patient Active Problem List   Diagnosis   • Palpitations   • Thrombocytopenia   • Sarcoidosis   • Essential hypertension   • Hypothyroidism   • Dyspnea on exertion   • Obesity   • PFO (patent foramen ovale)   • Iron deficiency anemia   • Generalized weakness   • Volume depletion, gastrointestinal loss   • Viral enterocolitis   • Colon polyps   • Epigastric pain   • Bloating   • Early satiety   • Weight loss   • Abdominal cramping   • Nausea   • NSAID long-term use   • Antineoplastic chemotherapy induced anemia   • Anemia in chronic kidney disease (CKD)   • Varicose veins of both lower extremities with pain   • Lymphedema   • Venous (peripheral) insufficiency   • Preop testing   • Neuropathy due to medical condition         ICD-10-CM ICD-9-CM   1. Venous (peripheral) insufficiency I87.2 459.81   2. Preop testing Z01.818 V72.84   3. Encounter for monitoring antiplatelet therapy Z51.81 V58.83    Z79.02 V58.63   4. Lymphedema I89.0 457.1       Lab Frequency Next Occurrence   Holter monitor - 48 hour Once 6/20/2017   EMG & Nerve Conduction Test Once 8/12/2017       Plan: After thoroughly evaluating Stacy Lynn, I believe the best course of action is to proceed with a left lower extremity radiofrequency ablation of the greater saphenous vein.  Risks of radiofrequency ablation include, but are not limited to, bleeding, infection, vessel damage, nerve damage, DVT, phlebitis, and pulmonary embolus.  The patient understands these risks and wishes to proceed with procedure.  She does have compression stockings however reports that she is  unable to apply these.  I believe lymphedema pumps would be beneficial for her and will have the representative see her.   She does have significant varicose veins to bilateral lower extremities and discomfort to her lower extremities with swelling.  Believe most of her swelling is related to lymphedema but she also has evidence of venous insufficiency as well. She does have a family history of lymphedema Q82.0, and her mother experienced swelling for years.  She has hyperpigmentation, pitting edema along with limited mobility.  The patient's consistent use of gradient compression stockings of 20-30 mmhg along with exercise and elevation, has had limited success in managing her swelling for years.  Prognosis is fair; however, the edema will worsen without further treatment. The patient can continue taking her current medication regimen as previously planned.  This was all discussed in full with complete understanding.    Thank you for allowing me to participate in the care of your patient.  Please do not hesitate with any questions or concerns.  I will keep you aware of any further encounters with Stacy Lynn.        Sincerely yours,         BETINA Karimi, DO                Breath sounds clear and equal bilaterally.

## 2022-06-08 NOTE — TELEPHONE ENCOUNTER
Caller: Bladimir Lynn    Relationship: Self    Best call back number: [phone]    What is the best time to reach you: TOMORROW AS SHE HAS THERAPY.    Who are you requesting to speak with (clinical staff, provider,  specific staff member): NATTY    Do you know the name of the person who called: BLADIMIR    What was the call regarding: TO [facility]. LAB APPTS.    Do you require a callback: YES, TOMORROW, PLEASE

## 2022-06-09 ENCOUNTER — OFFICE VISIT (OUTPATIENT)
Dept: PULMONOLOGY | Facility: CLINIC | Age: 52
End: 2022-06-09

## 2022-06-09 ENCOUNTER — HOSPITAL ENCOUNTER (OUTPATIENT)
Dept: GENERAL RADIOLOGY | Facility: HOSPITAL | Age: 52
Discharge: HOME | End: 2022-06-09
Admitting: INTERNAL MEDICINE

## 2022-06-09 VITALS
OXYGEN SATURATION: 99 % | SYSTOLIC BLOOD PRESSURE: 126 MMHG | BODY MASS INDEX: 49.68 KG/M2 | WEIGHT: 291 LBS | DIASTOLIC BLOOD PRESSURE: 80 MMHG | HEART RATE: 76 BPM | HEIGHT: 64 IN

## 2022-06-09 DIAGNOSIS — R06.02 SHORTNESS OF BREATH: ICD-10-CM

## 2022-06-09 DIAGNOSIS — J96.11 CHRONIC RESPIRATORY FAILURE WITH HYPOXIA: ICD-10-CM

## 2022-06-09 DIAGNOSIS — Z20.822 ENCOUNTER FOR PREOPERATIVE SCREENING LABORATORY TESTING FOR COVID-19 VIRUS: Primary | ICD-10-CM

## 2022-06-09 DIAGNOSIS — D86.9 SARCOIDOSIS: ICD-10-CM

## 2022-06-09 DIAGNOSIS — Q21.12 PFO (PATENT FORAMEN OVALE): ICD-10-CM

## 2022-06-09 DIAGNOSIS — Z01.812 ENCOUNTER FOR PREOPERATIVE SCREENING LABORATORY TESTING FOR COVID-19 VIRUS: Primary | ICD-10-CM

## 2022-06-09 PROCEDURE — 71046 X-RAY EXAM CHEST 2 VIEWS: CPT

## 2022-06-09 PROCEDURE — 99214 OFFICE O/P EST MOD 30 MIN: CPT | Performed by: INTERNAL MEDICINE

## 2022-06-09 RX ORDER — PREDNISONE 10 MG/1
TABLET ORAL
Qty: 31 TABLET | Refills: 0 | Status: SHIPPED | OUTPATIENT
Start: 2022-06-09 | End: 2022-09-12 | Stop reason: ALTCHOICE

## 2022-06-09 RX ORDER — FLUTICASONE PROPIONATE AND SALMETEROL 232; 14 UG/1; UG/1
1 POWDER, METERED RESPIRATORY (INHALATION) 2 TIMES DAILY
Qty: 1 EACH | Refills: 11 | Status: SHIPPED | OUTPATIENT
Start: 2022-06-09 | End: 2022-06-14 | Stop reason: CLARIF

## 2022-06-09 NOTE — PROGRESS NOTES
"Background:  A patient with hx sarcoidosis and chronic respiratory failure   Chief Complaint  Sarcoidosis    Subjective    History of Present Illness       Stacy Lynn presents to [facility].  She has been getting more increasingly dyspneic for a few weeks associated with a developing bad cough. No fevers. Joints have been hurting more.  She as some nightsweats.       Tobacco Use: Low Risk    • Smoking Tobacco Use: Never Smoker   • Smokeless Tobacco Use: Never Used      Objective     Vital Signs:   /80   Pulse 76   Ht 162.6 cm (64\")   Wt 132 kg (291 lb)   SpO2 99% Comment: 2L  BMI 49.95 kg/m²   Physical Exam  Constitutional:       General: She is not in acute distress.     Appearance: She is well-developed. She is not ill-appearing or toxic-appearing.      Interventions: Nasal cannula in place.   HENT:      Head: Atraumatic.   Eyes:      General: No scleral icterus.     Conjunctiva/sclera: Conjunctivae normal.   Cardiovascular:      Rate and Rhythm: Normal rate and regular rhythm.      Heart sounds: S1 normal and S2 normal.   Pulmonary:      Effort: Pulmonary effort is normal.      Breath sounds: Wheezing present.   Abdominal:      General: There is no distension.   Musculoskeletal:         General: No deformity.      Cervical back: Neck supple.   Skin:     Coloration: Skin is not pale.      Findings: No rash.   Neurological:      Mental Status: She is alert.        Result Review  Data Reviewed:{ Labs  Result Review  Imaging  Media :23}       PFT Values        Some values may be hidden. Unless noted otherwise, only the newest values recorded on each date are displayed.         Old Values PFT Results 6/23/21   No data to display.      Pre Drug PFT Results 6/23/21   FVC 59   FEV1 50   FEF 25-75% 27   FEV1/FVC 69.3      Post Drug PFT Results 6/23/21   No data to display.      Other Tests PFT Results 6/23/21   TLC 83      DLCO 96   D/VAsb 145    "                   Assessment and Plan  {CC Problem List  Visit Diagnosis  ROS  Review (Popup)  Health Maintenance  Quality  BestPractice  Medications  SmartSets  SnapShot Encounters  Media :23}   Diagnoses and all orders for this visit:    1. Encounter for preoperative screening laboratory testing for COVID-19 virus (Primary)    2. Shortness of breath  -     Pulmonary Function Test  -     XR Chest 2 View; Future    3. Chronic respiratory failure with hypoxia (HCC)  -     Fluticasone-Salmeterol,sensor, (AirDuo Digihaler) 232-14 MCG/ACT aerosol powder ; Inhale 1 puff 2 (Two) Times a Day.  Dispense: 1 each; Refill: 11    4. Sarcoidosis  -     Fluticasone-Salmeterol,sensor, (AirDuo Digihaler) 232-14 MCG/ACT aerosol powder ; Inhale 1 puff 2 (Two) Times a Day.  Dispense: 1 each; Refill: 11  -     XR Chest 2 View; Future    5. PFO (patent foramen ovale)    Other orders  -     Umeclidinium Bromide (INCRUSE ELLIPTA) 62.5 MCG/INH aerosol powder ; Inhale 1 puff Daily.  -     predniSONE (DELTASONE) 10 MG tablet; Take 4 tabs daily x 3 days, then take 3 tabs daily x 3 days, then take 2 tabs daily x 3 days, then take 1 tab daily x 3 days  Dispense: 31 tablet; Refill: 0    continue oxygen, compliant, wearing around the clock  Continue inhalers  add prednisone taper for suspected sarcoid flare causing dyspnea  Check cxr for shortness of breath  Hold off PFT today    Follow Up {Instructions Charge Capture  Follow-up Communications :23}   Return in about 3 months (around 9/9/2022).  Patient was given instructions and counseling regarding her condition or for health maintenance advice. Please see specific information pulled into the AVS if appropriate.    Electronically signed by Celestine Scherer MD, 6/9/2022, 15:47 CDT

## 2022-06-12 DIAGNOSIS — E55.9 VITAMIN D DEFICIENCY: ICD-10-CM

## 2022-06-13 ENCOUNTER — TELEPHONE (OUTPATIENT)
Dept: PULMONOLOGY | Facility: CLINIC | Age: 52
End: 2022-06-13

## 2022-06-13 DIAGNOSIS — D86.9 SARCOIDOSIS: Primary | ICD-10-CM

## 2022-06-13 RX ORDER — ERGOCALCIFEROL 1.25 MG/1
50000 CAPSULE ORAL WEEKLY
Qty: 12 CAPSULE | Refills: 1 | Status: SHIPPED | OUTPATIENT
Start: 2022-06-13 | End: 2022-08-22 | Stop reason: SDUPTHER

## 2022-06-13 NOTE — TELEPHONE ENCOUNTER
Airduo is not covered on insurance.   Do you want me to do the PA?    The alternatives are Breo 200, Advair 500/50, or advair hfa 230/21

## 2022-06-13 NOTE — TELEPHONE ENCOUNTER
Hope called requesting a refill of the below medication which has been pended for you:     Requested Prescriptions     Pending Prescriptions Disp Refills    vitamin D (ERGOCALCIFEROL) 1.25 MG (12163 UT) CAPS capsule [Pharmacy Med Name: VITAMIN D2 1.25MG(50,000 UNIT)] 12 capsule 1     Sig: TAKE 1 CAPSULE BY MOUTH ONE TIME PER WEEK       Last Appointment Date: 4/20/2022  Next Appointment Date: 7/25/2022    Allergies   Allergen Reactions    Tape Beth Schilder Ramey Lose      can use paper tape

## 2022-06-16 ENCOUNTER — APPOINTMENT (OUTPATIENT)
Dept: LAB | Facility: HOSPITAL | Age: 52
End: 2022-06-16

## 2022-06-21 NOTE — TELEPHONE ENCOUNTER
Caller: Bladimir Lynn    Relationship: Self    Best call back number: [phone]    What was the call regarding: BLADIMIR CALLED TO SPEAK WITH THE NURSE REGARDING SETTING UP HER PROCRIT INJECTION FOR THIS Thursday 06/23.    Do you require a callback: YES

## 2022-06-22 NOTE — TELEPHONE ENCOUNTER
Pt notified that she has a lab appt on Thursday. She v/u Per daysi downstairs they will not reserve a chair since she has not needed an injection in the past. Pt was concerned about the need of if she ever needed one since she lives so far away. Will keep an eye out for results on Thursday.

## 2022-06-23 ENCOUNTER — LAB (OUTPATIENT)
Dept: LAB | Facility: HOSPITAL | Age: 52
End: 2022-06-23

## 2022-06-23 ENCOUNTER — TELEPHONE (OUTPATIENT)
Dept: ONCOLOGY | Facility: CLINIC | Age: 52
End: 2022-06-23

## 2022-06-23 DIAGNOSIS — N18.32 ANEMIA IN STAGE 3B CHRONIC KIDNEY DISEASE: ICD-10-CM

## 2022-06-23 DIAGNOSIS — D69.6 THROMBOCYTOPENIA: ICD-10-CM

## 2022-06-23 DIAGNOSIS — D63.1 ANEMIA IN STAGE 3B CHRONIC KIDNEY DISEASE: ICD-10-CM

## 2022-06-23 LAB
ALBUMIN SERPL-MCNC: 4.4 G/DL (ref 3.5–5.2)
ALBUMIN/GLOB SERPL: 1.8 G/DL
ALP SERPL-CCNC: 83 U/L (ref 39–117)
ALT SERPL W P-5'-P-CCNC: 16 U/L (ref 1–33)
ANION GAP SERPL CALCULATED.3IONS-SCNC: 9 MMOL/L (ref 5–15)
AST SERPL-CCNC: 22 U/L (ref 1–32)
BILIRUB SERPL-MCNC: 0.2 MG/DL (ref 0–1.2)
BUN SERPL-MCNC: 21 MG/DL (ref 6–20)
BUN/CREAT SERPL: 13.6 (ref 7–25)
CALCIUM SPEC-SCNC: 8.8 MG/DL (ref 8.6–10.5)
CHLORIDE SERPL-SCNC: 110 MMOL/L (ref 98–107)
CO2 SERPL-SCNC: 24 MMOL/L (ref 22–29)
CREAT SERPL-MCNC: 1.54 MG/DL (ref 0.57–1)
DEPRECATED RDW RBC AUTO: 48.4 FL (ref 37–54)
EGFRCR SERPLBLD CKD-EPI 2021: 40 ML/MIN/1.73
EOSINOPHIL # BLD MANUAL: 0.19 10*3/MM3 (ref 0–0.4)
EOSINOPHIL NFR BLD MANUAL: 6 % (ref 0.3–6.2)
ERYTHROCYTE [DISTWIDTH] IN BLOOD BY AUTOMATED COUNT: 13.4 % (ref 12.3–15.4)
FERRITIN SERPL-MCNC: 367.9 NG/ML (ref 13–150)
GLOBULIN UR ELPH-MCNC: 2.5 GM/DL
GLUCOSE SERPL-MCNC: 96 MG/DL (ref 65–99)
HCT VFR BLD AUTO: 36 % (ref 34–46.6)
HGB BLD-MCNC: 11.5 G/DL (ref 12–15.9)
IRON 24H UR-MRATE: 77 MCG/DL (ref 37–145)
IRON SATN MFR SERPL: 26 % (ref 20–50)
LYMPHOCYTES # BLD MANUAL: 0.93 10*3/MM3 (ref 0.7–3.1)
LYMPHOCYTES NFR BLD MANUAL: 14 % (ref 5–12)
MCH RBC QN AUTO: 31.5 PG (ref 26.6–33)
MCHC RBC AUTO-ENTMCNC: 31.9 G/DL (ref 31.5–35.7)
MCV RBC AUTO: 98.6 FL (ref 79–97)
MONOCYTES # BLD: 0.45 10*3/MM3 (ref 0.1–0.9)
NEUTROPHILS # BLD AUTO: 1.64 10*3/MM3 (ref 1.7–7)
NEUTROPHILS NFR BLD MANUAL: 51 % (ref 42.7–76)
PLATELET # BLD AUTO: 123 10*3/MM3 (ref 140–450)
PMV BLD AUTO: 10.2 FL (ref 6–12)
POTASSIUM SERPL-SCNC: 4 MMOL/L (ref 3.5–5.2)
PROT SERPL-MCNC: 6.9 G/DL (ref 6–8.5)
RBC # BLD AUTO: 3.65 10*6/MM3 (ref 3.77–5.28)
RBC MORPH BLD: NORMAL
SMALL PLATELETS BLD QL SMEAR: ABNORMAL
SODIUM SERPL-SCNC: 143 MMOL/L (ref 136–145)
TIBC SERPL-MCNC: 292 MCG/DL (ref 298–536)
TRANSFERRIN SERPL-MCNC: 196 MG/DL (ref 200–360)
VARIANT LYMPHS NFR BLD MANUAL: 29 % (ref 19.6–45.3)
WBC MORPH BLD: NORMAL
WBC NRBC COR # BLD: 3.22 10*3/MM3 (ref 3.4–10.8)

## 2022-06-23 PROCEDURE — 82746 ASSAY OF FOLIC ACID SERUM: CPT

## 2022-06-23 PROCEDURE — 83540 ASSAY OF IRON: CPT

## 2022-06-23 PROCEDURE — 82607 VITAMIN B-12: CPT

## 2022-06-23 PROCEDURE — 85007 BL SMEAR W/DIFF WBC COUNT: CPT

## 2022-06-23 PROCEDURE — 84466 ASSAY OF TRANSFERRIN: CPT

## 2022-06-23 PROCEDURE — 85025 COMPLETE CBC W/AUTO DIFF WBC: CPT

## 2022-06-23 PROCEDURE — 80053 COMPREHEN METABOLIC PANEL: CPT

## 2022-06-23 PROCEDURE — 36415 COLL VENOUS BLD VENIPUNCTURE: CPT

## 2022-06-23 PROCEDURE — 82728 ASSAY OF FERRITIN: CPT

## 2022-06-23 NOTE — TELEPHONE ENCOUNTER
Reviewed lab results with Hope and that she does not meet parameters for Procrit to start yet and that no longer holding chairs in the infusion center until patients meet parameters to start procrit.  Reviewed with her that the hemoglobin has to be less than 10 and the hematocrit less than 30 before she can start the procrit.

## 2022-06-24 LAB
FOLATE SERPL-MCNC: >20 NG/ML (ref 4.78–24.2)
VIT B12 BLD-MCNC: 1080 PG/ML (ref 211–946)

## 2022-06-29 DIAGNOSIS — E53.8 LOW FOLATE: Primary | ICD-10-CM

## 2022-06-29 RX ORDER — CYANOCOBALAMIN/FOLIC AC/VIT B6 1-2.5-25MG
TABLET ORAL
Qty: 30 TABLET | Refills: 0 | Status: SHIPPED | OUTPATIENT
Start: 2022-06-29 | End: 2022-08-01

## 2022-06-30 ENCOUNTER — APPOINTMENT (OUTPATIENT)
Dept: LAB | Facility: HOSPITAL | Age: 52
End: 2022-06-30

## 2022-07-05 RX ORDER — OXYBUTYNIN CHLORIDE 5 MG/1
TABLET ORAL
Qty: 90 TABLET | Refills: 1 | Status: SHIPPED | OUTPATIENT
Start: 2022-07-05 | End: 2022-10-04

## 2022-07-07 ENCOUNTER — APPOINTMENT (OUTPATIENT)
Dept: LAB | Facility: HOSPITAL | Age: 52
End: 2022-07-07

## 2022-07-12 DIAGNOSIS — K21.9 GASTROESOPHAGEAL REFLUX DISEASE, UNSPECIFIED WHETHER ESOPHAGITIS PRESENT: ICD-10-CM

## 2022-07-12 RX ORDER — PANTOPRAZOLE SODIUM 40 MG/1
TABLET, DELAYED RELEASE ORAL
Qty: 180 TABLET | Refills: 1 | Status: SHIPPED | OUTPATIENT
Start: 2022-07-12 | End: 2022-08-22 | Stop reason: SDUPTHER

## 2022-07-12 NOTE — TELEPHONE ENCOUNTER
Mindi Silver called to request a refill on her medication.       Last office visit : 4/20/2022   Next office visit : 7/25/2022     Requested Prescriptions     Signed Prescriptions Disp Refills    pantoprazole (PROTONIX) 40 MG tablet 180 tablet 1     Sig: TAKE 1 TABLET BY MOUTH TWICE A DAY     Authorizing Provider: Betty Norman     Ordering User: Rafal Gonzalez MA

## 2022-07-14 ENCOUNTER — APPOINTMENT (OUTPATIENT)
Dept: LAB | Facility: HOSPITAL | Age: 52
End: 2022-07-14

## 2022-07-22 NOTE — PROGRESS NOTES
[facility] - PODIATRY    Today's Date: 07/25/22    Patient Name: Stacy Lynn  MRN: [id number]  CSN: [id number]  PCP: Ann Hill APRN  Referring Provider: No ref. provider found    SUBJECTIVE     Chief Complaint   Patient presents with   • Follow-up     Ann Hill APRN PCP12/22/2021 3 month f/u - BILATERAL TOES - PT STATES right foot doing ok, left foot calluses on tips of toes are bad and hurting and the toes in general hurt- pt pain 10/10 at worst     HPI: Stacy Lynn, a 52 y.o.female, comes to clinic as a(n) established patient complaining of foot pain and complaining of painful toenails. Patient has h/o Anemia in CKD, headaches, chronic pain, chronic respiratory failure with oxygen dependence, GERD, depression, hypertension, hypothyroidism, iron malabsorption, irritable bowel, macular degeneration, pancreatitis, peripheral neuropathy, sarcoidosis, scoliosis, venous insufficiency, lymphedema. Patient presents with complaints of pain in toes 2-4 of the left foot secondary to hammer toe deformities. Notes that she has pain in the tips of the toes as well as in the joints. Pain is worse at the end of the day. Tried wearing crest pads but didnt have any improvement of symptoms. Has not tried anything topically on feet for pain relief.  Admits pain at 10/10 level and described as stabbing, aching and sharp. Relates previous treatment(s) including care by podiatry, crest pads. Denies any constitutional symptoms. No other pedal complaints at this time.    Past Medical History:   Diagnosis Date   • Anemia in chronic kidney disease (CKD)    • Antineoplastic chemotherapy induced anemia    • Chest pain     NERVE PAIN IN LUNGS   • Chronic headaches    • Chronic kidney disease 4/30/2021   • Chronic pain    • Chronic respiratory failure (HCC)    • Colon polyp    • Degeneration of intervertebral disc of high cervical region    • Depression    • GERD (gastroesophageal reflux disease)    •  Hypertension    • Hyperthyroidism    • Iron deficiency anemia    • Iron malabsorption 9/18/2020   • Irritable bowel syndrome    • Kidney stones    • Lung nodule    • Macular degeneration    • Orthopnea    • Pancreatitis, chronic (HCC)    • Peripheral neuropathy    • PFO (patent foramen ovale) 09/2016   • PSVT (paroxysmal supraventricular tachycardia) (HCC)     s/p ablation per Dr. Diallo 4/2016   • Restless leg syndrome    • Sarcoidosis    • Scoliosis    • Septic joint (HCC)    • Venous insufficiency, peripheral     RIGHT LEG     Past Surgical History:   Procedure Laterality Date   • CARDIAC ABLATION  04/2016    SVT; Dr. Diallo    • CHOLECYSTECTOMY     • COLONOSCOPY  10/13/2014   • COLONOSCOPY N/A 8/23/2017    Procedure: COLONOSCOPY WITH ANESTHESIA;  Surgeon: Jeanette Mclaughlin MD;  Location: [facility] ENDOSCOPY;  Service:    • COLONOSCOPY N/A 9/6/2018    Procedure: COLONOSCOPY WITH ANESTHESIA;  Surgeon: Jeanette Mclaughlin MD;  Location: [facility] ENDOSCOPY;  Service: Gastroenterology   • ENDOSCOPY N/A 8/23/2017    Procedure: ESOPHAGOGASTRODUODENOSCOPY WITH ANESTHESIA;  Surgeon: Jeanette Mclaughlin MD;  Location: [facility] ENDOSCOPY;  Service:    • ENDOVENOUS ABLATION SAPHENOUS VEIN W/ LASER Right 03/30/2018   • HYSTERECTOMY     • JOINT REPLACEMENT Right     PATELLA REPLACED   • KIDNEY STONE SURGERY     • KNEE ARTHROSCOPY     • KNEE SURGERY Right    • LATERAL EPICONDYLE RELEASE     • PATELLA SURGERY     • REPLACEMENT TOTAL KNEE     • SHOULDER ARTHROSCOPY     • ULNAR NERVE DECOMPRESSION     • VARICOSE VEIN SURGERY Left 1/10/2018    Procedure: LEFT SAPHENOUS VEIN RADIO FREQUENCY ABLATION;  Surgeon: Kvng Montgomery DO;  Location:  [facility] OR;  Service:    • VARICOSE VEIN SURGERY Right 3/30/2018    Procedure: RIGHT LOWER EXTREMITY VENAGRAM, RIGHT LOWER EXTREMITY SAPHENOUS VEIN RADIO FREQUENCY ABLATION;  Surgeon: Kvng Montgomery DO;  Location:  [facility] OR;  Service: Vascular     Family History   Problem Relation Age of Onset   • Arrhythmia  Mother    • Heart disease Mother    • Kidney disease Mother    • Hypertension Mother    • Heart disease Father    • Diabetes Father    • Cancer Father    • Heart disease Brother    • Heart disease Maternal Aunt    • Heart disease Maternal Uncle    • Heart disease Paternal Aunt    • Heart disease Paternal Uncle    • Diabetes Paternal Uncle    • Hypertension Paternal Uncle    • Heart disease Maternal Grandmother    • Heart disease Maternal Grandfather    • Heart disease Paternal Grandmother    • Cancer Paternal Grandmother    • Heart disease Paternal Grandfather    • Cancer Paternal Uncle    • Hypertension Paternal Uncle    • Colon cancer Neg Hx    • Colon polyps Neg Hx      Social History     Socioeconomic History   • Marital status: Single   Tobacco Use   • Smoking status: Never Smoker   • Smokeless tobacco: Never Used   Vaping Use   • Vaping Use: Never used   Substance and Sexual Activity   • Alcohol use: Never   • Drug use: Never   • Sexual activity: Never     Allergies   Allergen Reactions   • Adhesive Tape Hives   • Tape Hives     Current Outpatient Medications   Medication Sig Dispense Refill   • docusate sodium (COLACE) 100 MG capsule Take 100 mg by mouth.     • esomeprazole (nexIUM) 40 MG capsule TAKE 1 CAPSULE BY MOUTH TWICE DAILY BEFORE  MEALS 60 capsule 0   • Folbee 2.5-25-1 MG tablet tablet TAKE 1 TABLET BY MOUTH EVERY DAY 30 tablet 0   • LYRICA 100 MG capsule Take 100 mg by mouth Every 8 (Eight) Hours As Needed.  1   • metoprolol succinate XL (TOPROL-XL) 25 MG 24 hr tablet Take 100 mg by mouth Daily.     • O2 (OXYGEN) Inhale 2 L/min 1 (One) Time. Continuous     • ondansetron (ZOFRAN) 4 MG tablet Take 4 mg by mouth Every 8 (Eight) Hours As Needed for Nausea or Vomiting.     • oxybutynin (DITROPAN) 5 MG tablet Take 5 mg by mouth every night at bedtime.  5   • oxyCODONE-acetaminophen (PERCOCET) 7.5-325 MG per tablet Take 1 tablet by mouth Every 8 (Eight) Hours As Needed.     • polyethylene glycol (MIRALAX)  powder Take 17 g by mouth.     • predniSONE (DELTASONE) 10 MG tablet Take 4 tabs daily x 3 days, then take 3 tabs daily x 3 days, then take 2 tabs daily x 3 days, then take 1 tab daily x 3 days 31 tablet 0   • PROCRIT 45076 UNIT/ML injection Inject 1 Units under the skin As Needed (WHEN BLOOD COUNT [hemoglobin] BELOW 11).     • tiZANidine (ZANAFLEX) 4 MG tablet Take 4 mg by mouth At Night As Needed for Muscle Spasms.     • topiramate (TOPAMAX) 100 MG tablet Take 100 mg by mouth Every Night.     • Umeclidinium Bromide (INCRUSE ELLIPTA) 62.5 MCG/INH aerosol powder  Inhale 1 puff Daily.     • vitamin B-6 (PYRIDOXINE) 50 MG tablet Take 50 mg by mouth Daily.     • vitamin D (ERGOCALCIFEROL) 1.25 MG (87896 UT) capsule capsule Take 50,000 Units by mouth 1 (One) Time Per Week.     • Fluticasone Furoate-Vilanterol (Breo Ellipta) 200-25 MCG/INH inhaler Inhale 1 puff Daily for 30 days. 1 each 11   • levothyroxine (SYNTHROID, LEVOTHROID) 137 MCG tablet Take 1 tablet by mouth Daily for 30 days. (Patient taking differently: Take 150 mcg by mouth Daily.) 30 tablet 3     No current facility-administered medications for this visit.     Review of Systems   Constitutional: Negative for chills and fever.   HENT: Negative for congestion.    Respiratory: Positive for shortness of breath.    Cardiovascular: Positive for leg swelling. Negative for chest pain.   Gastrointestinal: Negative for constipation, diarrhea, nausea and vomiting.   Musculoskeletal: Positive for arthralgias and back pain. Negative for myalgias.   Skin: Negative for wound.   Neurological: Positive for numbness.       OBJECTIVE     Vitals:    07/25/22 1508   BP: 132/78   Pulse: 78   SpO2: 100%       PHYSICAL EXAM  GEN:   Accompanied by none. Chronic O2 in use.      Foot/Ankle Exam:       General:   Appearance: appears stated age and healthy    Orientation: AAOx3    Affect: appropriate    Gait: unimpaired    Assistance: independent    Shoe Gear:  Casual shoes    VASCULAR       Right Foot Vascularity   Dorsalis pedis:  1+  Posterior tibial:  1+  Skin Temperature: warm    Edema Grading:  3+ and pitting  CFT:  3  Pedal Hair Growth:  Absent  Varicosities: moderate varicosities       Left Foot Vascularity   Dorsalis pedis:  1+  Posterior tibial:  1+  Skin Temperature: warm    Edema Grading:  3+ and pitting  CFT:  3  Pedal Hair Growth:  Absent  Varicosities: moderate varicosities        NEUROLOGIC     Right Foot Neurologic   Light touch sensation:  Diminished  Vibratory sensation:  Diminished  Hot/Cold sensation: diminished    Protective Sensation using [place] Monofilament:  3     Left Foot Neurologic   Light touch sensation:  Diminished  Vibratory sensation:  Diminished  Hot/cold sensation: diminished    Protective Sensation using [place] Monofilament:  4     MUSCULOSKELETAL      Right Foot Musculoskeletal   Ecchymosis:  None  Tenderness: toenails    Arch:  Pes planus  Hammertoe:  Second toe, third toe and fourth toe  Hallux valgus: No    Hallux limitus: No       Left Foot Musculoskeletal   Ecchymosis:  None  Tenderness: toenails, toe 2, toe 3 and toe 4    Arch:  Pes planus  Hammertoe:  Second toe, third toe and fourth toe  Hallux valgus: No    Hallux limitus: No       MUSCLE STRENGTH     Right Foot Muscle Strength   Foot dorsiflexion:  4-  Foot plantar flexion:  4-  Foot inversion:  4-  Foot eversion:  3     Left Foot Muscle Strength   Foot dorsiflexion:  5  Foot plantar flexion:  5  Foot inversion:  5  Foot eversion:  3     RANGE OF MOTION      Right Foot Range of Motion   Foot and ankle ROM within normal limits       Left Foot Range of Motion   Foot and ankle ROM within normal limits       DERMATOLOGIC     Right Foot Dermatologic   Skin: skin intact    Nails: onychomycosis, abnormally thick and dystrophic nails    Nails comment:  1-5     Left Foot Dermatologic   Skin: corn    Nails: onychomycosis, abnormally thick and dystrophic nails    Nails comment:   1-5      RADIOLOGY/NUCLEAR:  No results found.    LABORATORY/CULTURE RESULTS:      PATHOLOGY RESULTS:       ASSESSMENT/PLAN     Diagnoses and all orders for this visit:    1. Onychomycosis (Primary)    2. Hammer toes of both feet    3. Neuropathy    4. Lymphedema    5. Venous (peripheral) insufficiency    6. Foot pain, bilateral      Comprehensive lower extremity examination and evaluation was performed.  Discussed findings and treatment plan including risks, benefits, and treatment options with patient in detail. Patient agreed with treatment plan.  After verbal consent obtained, nail(s) x10 debrided of length and thickness with nail nipper without incidence  Patient may maintain nails and calluses at home utilizing emery board or pumice stone between visits as needed   Continue to recommend lower extremity elevation at rest.  An After Visit Summary was printed and given to the patient at discharge, including (if requested) any available informative/educational handouts regarding diagnosis, treatment, or medications. All questions were answered to patient/family satisfaction. Should symptoms fail to improve or worsen they agree to call or return to clinic or to go to the Emergency Department. Discussed the importance of following up with any needed screening tests/labs/specialist appointments and any requested follow-up recommended by me today. Importance of maintaining follow-up discussed and patient accepts that missed appointments can delay diagnosis and potentially lead to worsening of conditions.  Return in about 3 months (around 10/25/2022)., or sooner if acute issues arise.    Lab Frequency Next Occurrence   Follow Anesthesia Guidelines / Standing Orders Once 12/08/2017   Follow Anesthesia Guidelines / Standing Orders Once 02/20/2018   Follow Anesthesia Guidelines / Standing Orders Once 08/29/2018   Comprehensive Metabolic Panel Once 02/21/2022   Vitamin B12 & Folate Once 02/21/2022   Ferritin Once  02/21/2022   Iron Profile Once 02/21/2022   Mammo screening digital tomosynthesis bilateral w CAD Once 10/05/2021   ONCBCN INFUSION APPOINTMENT REQUEST 01 Once 11/19/2021   ONCBCN INFUSION APPOINTMENT REQUEST 01 Once 01/14/2022   CBC & Differential Once a week    CBC & Differential Once a week 2/25/2022       This document has been electronically signed by BETINA Ernst on July 25, 2022 15:32 CDT

## 2022-07-25 ENCOUNTER — OFFICE VISIT (OUTPATIENT)
Dept: PODIATRY | Facility: CLINIC | Age: 52
End: 2022-07-25

## 2022-07-25 VITALS
DIASTOLIC BLOOD PRESSURE: 78 MMHG | HEIGHT: 64 IN | HEART RATE: 78 BPM | OXYGEN SATURATION: 100 % | WEIGHT: 293 LBS | SYSTOLIC BLOOD PRESSURE: 132 MMHG | BODY MASS INDEX: 50.02 KG/M2

## 2022-07-25 DIAGNOSIS — I87.2 VENOUS (PERIPHERAL) INSUFFICIENCY: ICD-10-CM

## 2022-07-25 DIAGNOSIS — M20.41 HAMMER TOES OF BOTH FEET: ICD-10-CM

## 2022-07-25 DIAGNOSIS — M20.42 HAMMER TOES OF BOTH FEET: ICD-10-CM

## 2022-07-25 DIAGNOSIS — I89.0 LYMPHEDEMA: ICD-10-CM

## 2022-07-25 DIAGNOSIS — M79.671 FOOT PAIN, BILATERAL: ICD-10-CM

## 2022-07-25 DIAGNOSIS — B35.1 ONYCHOMYCOSIS: Primary | ICD-10-CM

## 2022-07-25 DIAGNOSIS — M79.672 FOOT PAIN, BILATERAL: ICD-10-CM

## 2022-07-25 DIAGNOSIS — G62.9 NEUROPATHY: ICD-10-CM

## 2022-07-25 PROCEDURE — 11721 DEBRIDE NAIL 6 OR MORE: CPT | Performed by: NURSE PRACTITIONER

## 2022-07-30 DIAGNOSIS — E53.8 LOW FOLATE: ICD-10-CM

## 2022-08-01 RX ORDER — CYANOCOBALAMIN/FOLIC AC/VIT B6 1-2.5-25MG
TABLET ORAL
Qty: 30 TABLET | Refills: 0 | Status: SHIPPED | OUTPATIENT
Start: 2022-08-01 | End: 2022-08-15

## 2022-08-09 DIAGNOSIS — I10 ESSENTIAL HYPERTENSION: ICD-10-CM

## 2022-08-09 RX ORDER — METOPROLOL SUCCINATE 50 MG/1
TABLET, EXTENDED RELEASE ORAL
Qty: 30 TABLET | Refills: 1 | Status: SHIPPED | OUTPATIENT
Start: 2022-08-09 | End: 2022-08-22 | Stop reason: SDUPTHER

## 2022-08-09 NOTE — TELEPHONE ENCOUNTER
Hope called requesting a refill of the below medication which has been pended for you:     Requested Prescriptions     Pending Prescriptions Disp Refills    metoprolol succinate (TOPROL XL) 50 MG extended release tablet [Pharmacy Med Name: METOPROLOL SUCC ER 50 MG TAB] 90 tablet      Sig: TAKE 1 TABLET BY MOUTH EVERY DAY       Last Appointment Date: 4/20/2022  Next Appointment Date: 8/24/2022    Allergies   Allergen Reactions    Tape Terry Mares      can use paper tape

## 2022-08-15 ENCOUNTER — TELEPHONE (OUTPATIENT)
Dept: FAMILY MEDICINE CLINIC | Age: 52
End: 2022-08-15

## 2022-08-15 DIAGNOSIS — F33.41 RECURRENT MAJOR DEPRESSIVE DISORDER, IN PARTIAL REMISSION (HCC): ICD-10-CM

## 2022-08-15 DIAGNOSIS — E53.8 LOW FOLATE: ICD-10-CM

## 2022-08-15 RX ORDER — DESVENLAFAXINE 50 MG/1
TABLET, EXTENDED RELEASE ORAL
Qty: 90 TABLET | Refills: 1 | Status: SHIPPED | OUTPATIENT
Start: 2022-08-15 | End: 2022-08-22 | Stop reason: DRUGHIGH

## 2022-08-15 RX ORDER — ESCITALOPRAM OXALATE 10 MG/1
TABLET ORAL
Qty: 90 TABLET | Refills: 0 | OUTPATIENT
Start: 2022-08-15

## 2022-08-15 RX ORDER — CYANOCOBALAMIN/FOLIC AC/VIT B6 1-2.5-25MG
TABLET ORAL
Qty: 30 TABLET | Refills: 0 | Status: SHIPPED | OUTPATIENT
Start: 2022-08-15 | End: 2023-02-27 | Stop reason: ALTCHOICE

## 2022-08-15 RX ORDER — TOPIRAMATE 100 MG/1
100 TABLET, FILM COATED ORAL NIGHTLY
Qty: 90 TABLET | Refills: 1 | Status: SHIPPED | OUTPATIENT
Start: 2022-08-15 | End: 2022-08-22 | Stop reason: SDUPTHER

## 2022-08-15 RX ORDER — UREA 10 %
LOTION (ML) TOPICAL
Qty: 90 TABLET | Refills: 1 | OUTPATIENT
Start: 2022-08-15

## 2022-08-15 NOTE — TELEPHONE ENCOUNTER
Mindi Silver called to request a refill on her medication.       Last office visit : 4/20/2022   Next office visit : 8/24/2022     Requested Prescriptions     Pending Prescriptions Disp Refills    desvenlafaxine succinate (PRISTIQ) 50 MG TB24 extended release tablet [Pharmacy Med Name: DESVENLAFAXINE SUCCNT ER 50 MG] 90 tablet 1     Sig: TAKE 1 TABLET BY MOUTH EVERY DAY    escitalopram (LEXAPRO) 10 MG tablet [Pharmacy Med Name: ESCITALOPRAM 10 MG TABLET] 90 tablet 0     Sig: TAKE 1 TABLET BY MOUTH EVERY DAY    topiramate (TOPAMAX) 100 MG tablet [Pharmacy Med Name: TOPIRAMATE 100 MG TABLET] 90 tablet 1     Sig: TAKE 1 TABLET BY MOUTH NIGHTLY INDICATIONS: BACKACHE            Ann Green LPN

## 2022-08-16 NOTE — TELEPHONE ENCOUNTER
Her appt with me on 8/24 needs to be canceled and rescheduled to a \"new to provider\" appt in a 30 minute spot.

## 2022-08-22 ENCOUNTER — OFFICE VISIT (OUTPATIENT)
Dept: FAMILY MEDICINE CLINIC | Age: 52
End: 2022-08-22
Payer: MEDICARE

## 2022-08-22 VITALS
BODY MASS INDEX: 53.92 KG/M2 | OXYGEN SATURATION: 100 % | HEART RATE: 98 BPM | WEIGHT: 293 LBS | DIASTOLIC BLOOD PRESSURE: 82 MMHG | SYSTOLIC BLOOD PRESSURE: 128 MMHG | HEIGHT: 62 IN

## 2022-08-22 DIAGNOSIS — I10 ESSENTIAL HYPERTENSION: ICD-10-CM

## 2022-08-22 DIAGNOSIS — D86.9 SARCOIDOSIS: ICD-10-CM

## 2022-08-22 DIAGNOSIS — N32.81 OAB (OVERACTIVE BLADDER): ICD-10-CM

## 2022-08-22 DIAGNOSIS — D50.9 IRON DEFICIENCY ANEMIA, UNSPECIFIED IRON DEFICIENCY ANEMIA TYPE: ICD-10-CM

## 2022-08-22 DIAGNOSIS — K21.9 GASTROESOPHAGEAL REFLUX DISEASE, UNSPECIFIED WHETHER ESOPHAGITIS PRESENT: ICD-10-CM

## 2022-08-22 DIAGNOSIS — J96.11 CHRONIC RESPIRATORY FAILURE WITH HYPOXIA (HCC): ICD-10-CM

## 2022-08-22 DIAGNOSIS — E55.9 VITAMIN D DEFICIENCY: ICD-10-CM

## 2022-08-22 DIAGNOSIS — F33.0 MILD EPISODE OF RECURRENT MAJOR DEPRESSIVE DISORDER (HCC): ICD-10-CM

## 2022-08-22 DIAGNOSIS — N18.31 STAGE 3A CHRONIC KIDNEY DISEASE (HCC): ICD-10-CM

## 2022-08-22 DIAGNOSIS — E03.9 ACQUIRED HYPOTHYROIDISM: ICD-10-CM

## 2022-08-22 DIAGNOSIS — M51.36 DEGENERATIVE DISC DISEASE, LUMBAR: Primary | ICD-10-CM

## 2022-08-22 LAB
ALBUMIN SERPL-MCNC: 4.6 G/DL (ref 3.5–5.2)
ALP BLD-CCNC: 80 U/L (ref 35–104)
ALT SERPL-CCNC: 14 U/L (ref 5–33)
ANION GAP SERPL CALCULATED.3IONS-SCNC: 10 MMOL/L (ref 7–19)
AST SERPL-CCNC: 20 U/L (ref 5–32)
BASOPHILS ABSOLUTE: 0 K/UL (ref 0–0.2)
BASOPHILS RELATIVE PERCENT: 0.7 % (ref 0–1)
BILIRUB SERPL-MCNC: 0.4 MG/DL (ref 0.2–1.2)
BUN BLDV-MCNC: 19 MG/DL (ref 6–20)
CALCIUM SERPL-MCNC: 9 MG/DL (ref 8.6–10)
CHLORIDE BLD-SCNC: 102 MMOL/L (ref 98–111)
CO2: 29 MMOL/L (ref 22–29)
CREAT SERPL-MCNC: 1.4 MG/DL (ref 0.5–0.9)
EOSINOPHILS ABSOLUTE: 0.1 K/UL (ref 0–0.6)
EOSINOPHILS RELATIVE PERCENT: 2.9 % (ref 0–5)
GFR AFRICAN AMERICAN: 47
GFR NON-AFRICAN AMERICAN: 39
GLUCOSE BLD-MCNC: 93 MG/DL (ref 74–109)
HCT VFR BLD CALC: 37.5 % (ref 37–47)
HEMOGLOBIN: 11.7 G/DL (ref 12–16)
IMMATURE GRANULOCYTES #: 0 K/UL
LYMPHOCYTES ABSOLUTE: 0.8 K/UL (ref 1.1–4.5)
LYMPHOCYTES RELATIVE PERCENT: 18.8 % (ref 20–40)
MCH RBC QN AUTO: 31.7 PG (ref 27–31)
MCHC RBC AUTO-ENTMCNC: 31.2 G/DL (ref 33–37)
MCV RBC AUTO: 101.6 FL (ref 81–99)
MONOCYTES ABSOLUTE: 0.3 K/UL (ref 0–0.9)
MONOCYTES RELATIVE PERCENT: 8.2 % (ref 0–10)
NEUTROPHILS ABSOLUTE: 2.9 K/UL (ref 1.5–7.5)
NEUTROPHILS RELATIVE PERCENT: 69.2 % (ref 50–65)
PDW BLD-RTO: 13.2 % (ref 11.5–14.5)
PLATELET # BLD: 134 K/UL (ref 130–400)
PMV BLD AUTO: 10.5 FL (ref 9.4–12.3)
POTASSIUM SERPL-SCNC: 4.1 MMOL/L (ref 3.5–5)
RBC # BLD: 3.69 M/UL (ref 4.2–5.4)
SODIUM BLD-SCNC: 141 MMOL/L (ref 136–145)
TOTAL PROTEIN: 7.2 G/DL (ref 6.6–8.7)
TSH SERPL DL<=0.05 MIU/L-ACNC: 10.03 UIU/ML (ref 0.27–4.2)
VITAMIN D 25-HYDROXY: 35.4 NG/ML
WBC # BLD: 4.2 K/UL (ref 4.8–10.8)

## 2022-08-22 PROCEDURE — 1036F TOBACCO NON-USER: CPT | Performed by: NURSE PRACTITIONER

## 2022-08-22 PROCEDURE — G8417 CALC BMI ABV UP PARAM F/U: HCPCS | Performed by: NURSE PRACTITIONER

## 2022-08-22 PROCEDURE — 99214 OFFICE O/P EST MOD 30 MIN: CPT | Performed by: NURSE PRACTITIONER

## 2022-08-22 PROCEDURE — 3017F COLORECTAL CA SCREEN DOC REV: CPT | Performed by: NURSE PRACTITIONER

## 2022-08-22 PROCEDURE — G8427 DOCREV CUR MEDS BY ELIG CLIN: HCPCS | Performed by: NURSE PRACTITIONER

## 2022-08-22 RX ORDER — UBROGEPANT 100 MG/1
TABLET ORAL
Qty: 10 TABLET | Refills: 3 | Status: SHIPPED | OUTPATIENT
Start: 2022-08-22

## 2022-08-22 RX ORDER — LEVOTHYROXINE SODIUM 0.15 MG/1
TABLET ORAL
Qty: 90 TABLET | Refills: 1 | Status: SHIPPED | OUTPATIENT
Start: 2022-08-22 | End: 2022-08-23 | Stop reason: SDUPTHER

## 2022-08-22 RX ORDER — METHYLPREDNISOLONE 4 MG/1
TABLET ORAL
Qty: 1 KIT | Refills: 0 | Status: SHIPPED | OUTPATIENT
Start: 2022-08-22 | End: 2022-08-28

## 2022-08-22 RX ORDER — FLUTICASONE PROPIONATE AND SALMETEROL 232; 14 UG/1; UG/1
1-2 POWDER, METERED RESPIRATORY (INHALATION) 2 TIMES DAILY
Qty: 3 EACH | Refills: 1 | Status: CANCELLED | OUTPATIENT
Start: 2022-08-22

## 2022-08-22 RX ORDER — METOPROLOL SUCCINATE 50 MG/1
TABLET, EXTENDED RELEASE ORAL
Qty: 30 TABLET | Refills: 1 | Status: SHIPPED | OUTPATIENT
Start: 2022-08-22 | End: 2022-09-02 | Stop reason: SDUPTHER

## 2022-08-22 RX ORDER — DESVENLAFAXINE 50 MG/1
TABLET, EXTENDED RELEASE ORAL
Qty: 90 TABLET | Refills: 1 | Status: CANCELLED | OUTPATIENT
Start: 2022-08-22

## 2022-08-22 RX ORDER — DESVENLAFAXINE 100 MG/1
100 TABLET, EXTENDED RELEASE ORAL DAILY
Qty: 90 TABLET | Refills: 1 | Status: SHIPPED | OUTPATIENT
Start: 2022-08-22

## 2022-08-22 RX ORDER — ERGOCALCIFEROL 1.25 MG/1
50000 CAPSULE ORAL WEEKLY
Qty: 12 CAPSULE | Refills: 1 | Status: SHIPPED | OUTPATIENT
Start: 2022-08-22

## 2022-08-22 RX ORDER — OXYBUTYNIN CHLORIDE 5 MG/1
TABLET ORAL
Qty: 90 TABLET | Refills: 1 | Status: CANCELLED | OUTPATIENT
Start: 2022-08-22

## 2022-08-22 RX ORDER — ONDANSETRON 4 MG/1
4 TABLET, ORALLY DISINTEGRATING ORAL EVERY 8 HOURS PRN
Qty: 30 TABLET | Refills: 1 | Status: SHIPPED | OUTPATIENT
Start: 2022-08-22

## 2022-08-22 RX ORDER — PANTOPRAZOLE SODIUM 40 MG/1
TABLET, DELAYED RELEASE ORAL
Qty: 180 TABLET | Refills: 1 | Status: SHIPPED | OUTPATIENT
Start: 2022-08-22

## 2022-08-22 RX ORDER — ESOMEPRAZOLE MAGNESIUM 40 MG/1
40 FOR SUSPENSION ORAL DAILY
Qty: 90 PACKET | Refills: 1 | Status: CANCELLED | OUTPATIENT
Start: 2022-08-22

## 2022-08-22 RX ORDER — TOPIRAMATE 100 MG/1
100 TABLET, FILM COATED ORAL NIGHTLY
Qty: 90 TABLET | Refills: 1 | Status: SHIPPED | OUTPATIENT
Start: 2022-08-22

## 2022-08-22 ASSESSMENT — ENCOUNTER SYMPTOMS
ABDOMINAL PAIN: 0
BACK PAIN: 1
CHEST TIGHTNESS: 0
SORE THROAT: 0
SHORTNESS OF BREATH: 1
DIARRHEA: 0
WHEEZING: 0
COUGH: 0
NAUSEA: 0

## 2022-08-22 NOTE — PROGRESS NOTES
Ms.Hope Mian Mcclelland is a 45 y.o. female who presents today for  Chief Complaint   Patient presents with    New Patient     Edema in feet, ongoing one week, painful; fibro pain - sees PM        HPI:  Here to transfer care from [facility]. She has sarcoidosis and chronic respiratory failure followed by [facility] pulmonology, Dr. Angelic Childers. Currently stable on Spiriva, Advair. She is on chronic O2 3L per nasal cannula. She is followed by  pain management for chronic back pain, scoliosis, fibromyalgia. She takes oxycodone, Lyrica, tizanidine which they manage. She also states topiramate is for her chronic back pain which she has taken for years. She has had some increased back pain and fibroid pain recently. She avoids NSAIDs due to CKD. Chronic depression, taking Pristiq 50 mg daily. Current dose is ineffective. She has had some increased depression. No SI or HI. Chronic OAB, taking oxybutynin 5 mg daily but has side effects at mouth and eye dryness. HTN, stable on metoprolol. She takes this for palpitations as well. Hypothyroidism, stable on levothyroxine 150 mcg. GERD is stable on Protonix 40 mg daily. She is followed by [facility] hematology, Dr. Stephanie Hutchins for chronic anemia. She takes [place] [facility] as needed for chronic headaches. Currently stable. Review of Systems   Constitutional:  Negative for chills and fever. HENT:  Negative for congestion, ear pain and sore throat. Respiratory:  Positive for shortness of breath (chronic, stable). Negative for cough, chest tightness and wheezing. Cardiovascular:  Negative for chest pain. Gastrointestinal:  Negative for abdominal pain, diarrhea and nausea. Musculoskeletal:  Positive for arthralgias, back pain and myalgias. Skin:  Negative for rash. Neurological:  Positive for headaches (chronic, stable). Psychiatric/Behavioral:  Positive for dysphoric mood. Negative for suicidal ideas.       Past Medical History:   Diagnosis Date    Acquired hypothyroidism     B12 deficiency     Chronic back pain     Chronic pancreatitis (HCC)     Colon polyps     Depression     Fibromyalgia     GERD (gastroesophageal reflux disease)     Headache     Hypertension     Irritable bowel syndrome     Kidney stones     Neuropathy     On home oxygen therapy     2 liters    PONV (postoperative nausea and vomiting)     Restless legs syndrome     Sarcoidosis 11/01/2016    Scoliosis     SVT (supraventricular tachycardia) ([facility])        Current Outpatient Medications   Medication Sig Dispense Refill    vitamin D (ERGOCALCIFEROL) 1.25 MG (07594 UT) CAPS capsule Take 1 capsule by mouth once a week Tuesday 12 capsule 1    Ubrogepant (UBRELVY) 100 MG TABS Take 1 tablet at the onset of migraine. May repeat once in 2 hours if no improvement. Do not exceed 2 tablets in 24 hours. 10 tablet 3    topiramate (TOPAMAX) 100 MG tablet Take 1 tablet by mouth nightly Indications: Backache 90 tablet 1    pantoprazole (PROTONIX) 40 MG tablet TAKE 1 TABLET BY MOUTH TWICE A  tablet 1    metoprolol succinate (TOPROL XL) 50 MG extended release tablet Take 1 tablet by mouth everyday 30 tablet 1    levothyroxine (SYNTHROID) 150 MCG tablet TAKE 1 TABLET BY MOUTH EVERY DAY 90 tablet 1    ondansetron (ZOFRAN-ODT) 4 MG disintegrating tablet Take 1 tablet by mouth every 8 hours as needed for Nausea or Vomiting 30 tablet 1    desvenlafaxine succinate (PRISTIQ) 100 MG TB24 extended release tablet Take 1 tablet by mouth daily 90 tablet 1    mirabegron (MYRBETRIQ) 25 MG TB24 Take 1 tablet by mouth daily 90 tablet 1    methylPREDNISolone (MEDROL DOSEPACK) 4 MG tablet Take by mouth.  1 kit 0    Fluticasone-Salmeterol,sensor, (AIRDUO DIGIHALER) 232-14 MCG/ACT AEPB Inhale 1-2 Doses into the lungs 2 times daily      esomeprazole Magnesium (NEXIUM) 40 MG PACK Take 40 mg by mouth daily      tiotropium (SPIRIVA RESPIMAT) 2.5 MCG/ACT AERS inhaler Inhale 2 puffs into the lungs daily      Thiamine 50 MG CAPS Take 50 mg by mouth daily 30 capsule 3    folic acid-pyridoxine-cyanocobalamine (FOLBEE) 2.5-25-1 MG TABS tablet Take 1 tablet by mouth daily      docusate sodium (COLACE) 100 MG capsule Take 100 mg by mouth 2 times daily as needed       pregabalin (LYRICA) 100 MG capsule Take 100 mg by mouth 3 times daily. polyethylene glycol (MIRALAX) powder 1 tsp daily for constipation. Adjust dose for desired bm 510 g 11    OXYGEN Inhale 3 L/min into the lungs continuous       epoetin cheryl (EPOGEN;PROCRIT) 82366 UNIT/ML injection Inject 40,000 Units into the skin as needed       tiZANidine (ZANAFLEX) 4 MG tablet Take 4 mg by mouth 3 times daily as needed (muscle spasms)       oxybutynin (DITROPAN) 5 MG tablet TAKE 1 TABLET BY MOUTH EVERYDAY AT BEDTIME 90 tablet 1     No current facility-administered medications for this visit.        Allergies   Allergen Reactions    Tape Beto Kyle Saenz      can use paper tape       Past Surgical History:   Procedure Laterality Date    ABLATION OF DYSRHYTHMIC FOCUS  2016    SVT    ANAL SPHINCTEROTOMY N/A 2/19/2020    LATERAL SPHINCTEROTOMY performed by Reggie Damon MD at [address]  10/13/2014    Dr. Adelita GARNER-5 yr recall    COLONOSCOPY  05/20/2008    Dr. Corrina Domingo: unremarkable    COLONOSCOPY  08/23/2017    Dr Moses-[facility]-5 yr recall    COLONOSCOPY  09/06/2018    Dr. Martha Jane yr recall    HYSTERECTOMY (CERVIX STATUS UNKNOWN)      JOINT REPLACEMENT  12/30/2013    Patella joint replacement    LITHOTRIPSY      PLANTAR FASCIA SURGERY Left     GA REVISE MEDIAN N/CARPAL TUNNEL SURG Right 5/8/2018    CARPAL TUNNEL RELEASE performed by Fartun Carmona MD at [address] Right 5/4/2017    KNEE TOTAL ARTHROPLASTY REVISION performed by Fartun Carmona MD at [address] ARTHROSCOPY Left     SHOULDER ARTHROSCOPY Right 3/24/2022    RIGHT SHOULDER ARTHROSCOPIC, ROTATOR CUFF REPAIR, BICEPS TENODESIS performed by Oralia Winn MD at [id number] N/A 3/15/2019    Dr Meraz [facility] non edematous hemorrhoids, anal fissure-BCM    TOTAL KNEE ARTHROPLASTY Right 11/16/2015    ULNAR TUNNEL RELEASE      UPPER GASTROINTESTINAL ENDOSCOPY  10/16/2014    Dr. Edwar Pierre  05/19/2008    Dr. Teresa Sims: jaya neg, barretts neg,  [facility]    UPPER GASTROINTESTINAL ENDOSCOPY N/A 2/10/2016    Dr Malena Esparza-mild esophageal stricture dilated; reactive gastropathy    UPPER GASTROINTESTINAL ENDOSCOPY  08/23/2017    Dr Moses-normal    VEIN SURGERY Bilateral     RADIO ABLATION       Social History     Tobacco Use    Smoking status: Never    Smokeless tobacco: Never    Tobacco comments:     disabled due to sarcoidosis   Vaping Use    Vaping Use: Never used   Substance Use Topics    Alcohol use: No    Drug use: No       Family History   Problem Relation Age of Onset    Lupus Mother     Kidney Disease Mother     High Blood Pressure Mother     Anemia Mother     Cancer Father         skin    Diabetes Father     Parkinsonism Brother     Celiac Disease Maternal Uncle     Celiac Disease Maternal Cousin     Heart Disease Other     Colon Polyps Neg Hx     Colon Cancer Neg Hx     Esophageal Cancer Neg Hx     Liver Cancer Neg Hx     Liver Disease Neg Hx     Stomach Cancer Neg Hx     Rectal Cancer Neg Hx        /82 (Site: Left Upper Arm, Position: Sitting, Cuff Size: Large Adult)   Pulse 98   Ht 5' 2\" (1.575 m)   Wt 294 lb (133.4 kg)   SpO2 100%   BMI 53.77 kg/m²     Physical Exam  Vitals reviewed. Constitutional:       General: She is not in acute distress. Appearance: Normal appearance. She is well-developed. HENT:      Head: Normocephalic. Eyes:      Conjunctiva/sclera: Conjunctivae normal.      Pupils: Pupils are equal, round, and reactive to light. Neck:      Thyroid: No thyromegaly. Vascular: No carotid bruit or JVD. Trachea: No tracheal deviation.    Cardiovascular: Rate and Rhythm: Normal rate and regular rhythm. Heart sounds: Normal heart sounds. No murmur heard. Pulmonary:      Effort: Pulmonary effort is normal. No respiratory distress. Breath sounds: Normal breath sounds. No wheezing or rhonchi. Comments: Diminished slightly bilaterally, no wheeze or rhonchi. Respirations are even and unlabored. O2 per NC in place  Musculoskeletal:         General: Normal range of motion. Cervical back: Normal range of motion and neck supple. Lymphadenopathy:      Cervical: No cervical adenopathy. Skin:     General: Skin is warm and dry. Findings: No rash. Neurological:      Mental Status: She is alert. Psychiatric:         Mood and Affect: Mood normal.         Behavior: Behavior normal.         Thought Content: Thought content normal.       ASSESSMENT/PLAN:  1. Sarcoidosis  -Stable, continue management per pulmonology, Dr. Augusta Dan. 2. Chronic respiratory failure with hypoxia (HCC)  -Stable on chronic O2. Continue management per pulmonology. 3. Iron deficiency anemia, unspecified iron deficiency anemia type  -Stable, continue management per [facility] hematology.  -Check CBC with lab. - CBC with Auto Differential; Future    4. Mild episode of recurrent major depressive disorder (HCC)  -Uncontrolled, increase Pristiq to 100 mg daily. 5. Essential hypertension  -Stable, controlled. Continue metoprolol at current dose. - metoprolol succinate (TOPROL XL) 50 MG extended release tablet; Take 1 tablet by mouth everyday  Dispense: 30 tablet; Refill: 1    6. Acquired hypothyroidism  -Stable, continue levothyroxine at current dose. -Check TSH with lab  - levothyroxine (SYNTHROID) 150 MCG tablet; TAKE 1 TABLET BY MOUTH EVERY DAY  Dispense: 90 tablet; Refill: 1  - Comprehensive Metabolic Panel; Future  - TSH; Future    7. Stage 3a chronic kidney disease (HCC)  -Stable, not followed by nephrology. Check CMP with lab.   Continue adequate daily hydration, avoid NSAIDs. 8. Degenerative disc disease, lumbar  -MDP due to worsening pain, flareup.  -Continue management per pain management at [facility]. 9. Vitamin D deficiency  -Check vitamin D with lab  -Continue weekly ergocalciferol pending lab  - vitamin D (ERGOCALCIFEROL) 1.25 MG (45486 UT) CAPS capsule; Take 1 capsule by mouth once a week Tuesday  Dispense: 12 capsule; Refill: 1  - Vitamin D 25 Hydroxy; Future    10. Gastroesophageal reflux disease, unspecified whether esophagitis present  -Stable, controlled. Continue Protonix at current dose. - pantoprazole (PROTONIX) 40 MG tablet; TAKE 1 TABLET BY MOUTH TWICE A DAY  Dispense: 180 tablet; Refill: 1    11. OAB  -Trial of Myrbetriq 25 mg if insurance covers. We will see if better tolerated but discussed that dry mouth could still be a side effect. Return in about 3 months (around 11/22/2022) for follow up, 30 minute visit. Hope was seen today for new patient. Diagnoses and all orders for this visit:    Degenerative disc disease, lumbar    Sarcoidosis    Chronic respiratory failure with hypoxia (HCC)    Iron deficiency anemia, unspecified iron deficiency anemia type  -     CBC with Auto Differential; Future    Mild episode of recurrent major depressive disorder (HCC)    Essential hypertension  -     metoprolol succinate (TOPROL XL) 50 MG extended release tablet; Take 1 tablet by mouth everyday    Acquired hypothyroidism  -     levothyroxine (SYNTHROID) 150 MCG tablet; TAKE 1 TABLET BY MOUTH EVERY DAY  -     Comprehensive Metabolic Panel; Future  -     TSH; Future    Stage 3a chronic kidney disease (HCC)    Vitamin D deficiency  -     vitamin D (ERGOCALCIFEROL) 1.25 MG (22582 UT) CAPS capsule; Take 1 capsule by mouth once a week Tuesday  -     Vitamin D 25 Hydroxy;  Future    Gastroesophageal reflux disease, unspecified whether esophagitis present  -     pantoprazole (PROTONIX) 40 MG tablet; TAKE 1 TABLET BY MOUTH TWICE A DAY    OAB (overactive bladder)    Other orders  -     Ubrogepant (UBRELVY) 100 MG TABS; Take 1 tablet at the onset of migraine. May repeat once in 2 hours if no improvement. Do not exceed 2 tablets in 24 hours. -     topiramate (TOPAMAX) 100 MG tablet; Take 1 tablet by mouth nightly Indications: Backache  -     ondansetron (ZOFRAN-ODT) 4 MG disintegrating tablet; Take 1 tablet by mouth every 8 hours as needed for Nausea or Vomiting  -     desvenlafaxine succinate (PRISTIQ) 100 MG TB24 extended release tablet; Take 1 tablet by mouth daily  -     mirabegron (MYRBETRIQ) 25 MG TB24; Take 1 tablet by mouth daily  -     methylPREDNISolone (MEDROL DOSEPACK) 4 MG tablet; Take by mouth. Medications Discontinued During This Encounter   Medication Reason    desvenlafaxine succinate (PRISTIQ) 50 MG TB24 extended release tablet DOSE ADJUSTMENT    ondansetron (ZOFRAN-ODT) 4 MG disintegrating tablet REORDER    levothyroxine (SYNTHROID) 150 MCG tablet REORDER    Ubrogepant (UBRELVY) 100 MG TABS REORDER    vitamin D (ERGOCALCIFEROL) 1.25 MG (52756 UT) CAPS capsule REORDER    pantoprazole (PROTONIX) 40 MG tablet REORDER    metoprolol succinate (TOPROL XL) 50 MG extended release tablet REORDER    topiramate (TOPAMAX) 100 MG tablet REORDER     There are no Patient Instructions on file for this visit. Patient voicesunderstanding and agrees to plans along with risks and benefits of plan. Counseling:  Hope ARCHANA Adrianne's case, medications and options were discussed in detail. Patient was instructed to call the office if she questionsregarding her treatment. Should her conditions worsen, she should return to office to be reassessed by SARAH Patino. she Should to go the closest Emergency Department for any emergency. They verbalizedunderstanding the above instructions. Return in about 3 months (around 11/22/2022) for follow up, 30 minute visit.

## 2022-08-23 DIAGNOSIS — E03.9 ACQUIRED HYPOTHYROIDISM: ICD-10-CM

## 2022-08-23 DIAGNOSIS — N18.32 STAGE 3B CHRONIC KIDNEY DISEASE (HCC): Primary | ICD-10-CM

## 2022-08-23 RX ORDER — LEVOTHYROXINE SODIUM 175 UG/1
TABLET ORAL
Qty: 90 TABLET | Refills: 1 | Status: SHIPPED | OUTPATIENT
Start: 2022-08-23

## 2022-08-23 NOTE — RESULT ENCOUNTER NOTE
Patient agrees to referral. Facarolinag today. Also advised understanding about dosage change on levothyroxine.

## 2022-09-01 ENCOUNTER — APPOINTMENT (OUTPATIENT)
Dept: LAB | Facility: HOSPITAL | Age: 52
End: 2022-09-01

## 2022-09-02 DIAGNOSIS — I10 ESSENTIAL HYPERTENSION: ICD-10-CM

## 2022-09-02 RX ORDER — METOPROLOL SUCCINATE 50 MG/1
TABLET, EXTENDED RELEASE ORAL
Qty: 30 TABLET | Refills: 1 | OUTPATIENT
Start: 2022-09-02

## 2022-09-02 RX ORDER — METOPROLOL SUCCINATE 50 MG/1
TABLET, EXTENDED RELEASE ORAL
Qty: 30 TABLET | Refills: 3 | Status: SHIPPED | OUTPATIENT
Start: 2022-09-02

## 2022-09-09 ENCOUNTER — LAB (OUTPATIENT)
Dept: LAB | Facility: HOSPITAL | Age: 52
End: 2022-09-09

## 2022-09-09 DIAGNOSIS — D63.1 ANEMIA IN STAGE 3B CHRONIC KIDNEY DISEASE: ICD-10-CM

## 2022-09-09 DIAGNOSIS — N18.32 ANEMIA IN STAGE 3B CHRONIC KIDNEY DISEASE: ICD-10-CM

## 2022-09-09 DIAGNOSIS — D69.6 THROMBOCYTOPENIA: ICD-10-CM

## 2022-09-09 LAB
ALBUMIN SERPL-MCNC: 4.7 G/DL (ref 3.5–5.2)
ALBUMIN/GLOB SERPL: 2 G/DL
ALP SERPL-CCNC: 81 U/L (ref 39–117)
ALT SERPL W P-5'-P-CCNC: 12 U/L (ref 1–33)
ANION GAP SERPL CALCULATED.3IONS-SCNC: 8 MMOL/L (ref 5–15)
AST SERPL-CCNC: 19 U/L (ref 1–32)
BASOPHILS # BLD MANUAL: 0.04 10*3/MM3 (ref 0–0.2)
BASOPHILS NFR BLD MANUAL: 1 % (ref 0–1.5)
BILIRUB SERPL-MCNC: 0.3 MG/DL (ref 0–1.2)
BUN SERPL-MCNC: 25 MG/DL (ref 6–20)
BUN/CREAT SERPL: 15.8 (ref 7–25)
CALCIUM SPEC-SCNC: 9.1 MG/DL (ref 8.6–10.5)
CHLORIDE SERPL-SCNC: 109 MMOL/L (ref 98–107)
CO2 SERPL-SCNC: 25 MMOL/L (ref 22–29)
CREAT SERPL-MCNC: 1.58 MG/DL (ref 0.57–1)
DEPRECATED RDW RBC AUTO: 48.6 FL (ref 37–54)
EGFRCR SERPLBLD CKD-EPI 2021: 38.7 ML/MIN/1.73
EOSINOPHIL # BLD MANUAL: 0.23 10*3/MM3 (ref 0–0.4)
EOSINOPHIL NFR BLD MANUAL: 6 % (ref 0.3–6.2)
ERYTHROCYTE [DISTWIDTH] IN BLOOD BY AUTOMATED COUNT: 13.2 % (ref 12.3–15.4)
FERRITIN SERPL-MCNC: 421.8 NG/ML (ref 13–150)
GLOBULIN UR ELPH-MCNC: 2.4 GM/DL
GLUCOSE SERPL-MCNC: 86 MG/DL (ref 65–99)
HCT VFR BLD AUTO: 34.6 % (ref 34–46.6)
HGB BLD-MCNC: 10.9 G/DL (ref 12–15.9)
IRON 24H UR-MRATE: 95 MCG/DL (ref 37–145)
IRON SATN MFR SERPL: 31 % (ref 20–50)
LYMPHOCYTES # BLD MANUAL: 0.9 10*3/MM3 (ref 0.7–3.1)
LYMPHOCYTES NFR BLD MANUAL: 14 % (ref 5–12)
MCH RBC QN AUTO: 31.6 PG (ref 26.6–33)
MCHC RBC AUTO-ENTMCNC: 31.5 G/DL (ref 31.5–35.7)
MCV RBC AUTO: 100.3 FL (ref 79–97)
MONOCYTES # BLD: 0.53 10*3/MM3 (ref 0.1–0.9)
NEUTROPHILS # BLD AUTO: 2.06 10*3/MM3 (ref 1.7–7)
NEUTROPHILS NFR BLD MANUAL: 55 % (ref 42.7–76)
PLAT MORPH BLD: NORMAL
PLATELET # BLD AUTO: 139 10*3/MM3 (ref 140–450)
PMV BLD AUTO: 10.1 FL (ref 6–12)
POTASSIUM SERPL-SCNC: 4 MMOL/L (ref 3.5–5.2)
PROT SERPL-MCNC: 7.1 G/DL (ref 6–8.5)
RBC # BLD AUTO: 3.45 10*6/MM3 (ref 3.77–5.28)
RBC MORPH BLD: NORMAL
SODIUM SERPL-SCNC: 142 MMOL/L (ref 136–145)
TIBC SERPL-MCNC: 305 MCG/DL (ref 298–536)
TRANSFERRIN SERPL-MCNC: 205 MG/DL (ref 200–360)
VARIANT LYMPHS NFR BLD MANUAL: 24 % (ref 19.6–45.3)
WBC MORPH BLD: NORMAL
WBC NRBC COR # BLD: 3.75 10*3/MM3 (ref 3.4–10.8)

## 2022-09-09 PROCEDURE — 84466 ASSAY OF TRANSFERRIN: CPT

## 2022-09-09 PROCEDURE — 82746 ASSAY OF FOLIC ACID SERUM: CPT

## 2022-09-09 PROCEDURE — 85007 BL SMEAR W/DIFF WBC COUNT: CPT

## 2022-09-09 PROCEDURE — 82607 VITAMIN B-12: CPT

## 2022-09-09 PROCEDURE — 36415 COLL VENOUS BLD VENIPUNCTURE: CPT

## 2022-09-09 PROCEDURE — 80053 COMPREHEN METABOLIC PANEL: CPT

## 2022-09-09 PROCEDURE — 83540 ASSAY OF IRON: CPT

## 2022-09-09 PROCEDURE — 85025 COMPLETE CBC W/AUTO DIFF WBC: CPT

## 2022-09-09 PROCEDURE — 82728 ASSAY OF FERRITIN: CPT

## 2022-09-10 LAB
FOLATE SERPL-MCNC: >20 NG/ML (ref 4.78–24.2)
VIT B12 BLD-MCNC: 1022 PG/ML (ref 211–946)

## 2022-09-12 ENCOUNTER — OFFICE VISIT (OUTPATIENT)
Dept: ONCOLOGY | Facility: CLINIC | Age: 52
End: 2022-09-12

## 2022-09-12 VITALS
HEART RATE: 86 BPM | BODY MASS INDEX: 50.02 KG/M2 | TEMPERATURE: 97 F | HEIGHT: 64 IN | WEIGHT: 293 LBS | DIASTOLIC BLOOD PRESSURE: 84 MMHG | OXYGEN SATURATION: 97 % | RESPIRATION RATE: 18 BRPM | SYSTOLIC BLOOD PRESSURE: 128 MMHG

## 2022-09-12 DIAGNOSIS — D63.1 ANEMIA IN STAGE 3B CHRONIC KIDNEY DISEASE: Primary | ICD-10-CM

## 2022-09-12 DIAGNOSIS — N18.32 ANEMIA IN STAGE 3B CHRONIC KIDNEY DISEASE: Primary | ICD-10-CM

## 2022-09-12 PROCEDURE — 99214 OFFICE O/P EST MOD 30 MIN: CPT | Performed by: INTERNAL MEDICINE

## 2022-09-12 RX ORDER — LEVOTHYROXINE SODIUM 175 UG/1
175 TABLET ORAL DAILY
COMMUNITY
Start: 2022-08-23

## 2022-09-12 NOTE — PROGRESS NOTES
[facility] ONC [facility] GROUP HEMATOLOGY AND ONCOLOGY  [address]  [place] [address] [phone]    Patient Name: Stacy Lynn  Encounter Date: 09/12/2021  YOB: 1970  Patient Number: [id number]       REASON FOR VISIT: Ms. Stacy Lynn is a 52-year-old female who returns in followup of anemia from chronic kidney disease, with leukopenia and intermittent thrombocytopenia without unifying diagnosis (likely contribution from methotrexate). She is seen 40.5 months since last receipt of IV Injectafer and 6-.5 months since last receipt of 2 units PRBC transfusions. She is here alone. The history is again muddled by liberal symptom reporting.     I have reviewed the HPI and verified with the patient the accuracy of it. No changes to interval history since the information was documented. Fermín Boggs MD 09/12/22     DIAGNOSTIC ABNORMALITIES:   1. Labs, 09/16/2014 Dr. Waters's office. TIBC 303 (225 - 420), serum iron 43 (42 - 180), and iron saturation 14.2%.   2. Labs, 10/03/2014. Hemoglobin 9.8, hematocrit 30.2, MCV 94.1, platelets 157,000, WBC 2.9, with 52.3 segs (ANC 1.5), 36.8 lymphocytes, 10.9 mid cells. Glucose 80, BUN 16, creatinine 0.9 (GFR 71.6), sodium 142, potassium 3.9, chloride 109, bicarbonate 19, calcium 8.6, albumin 3.5, total protein 6.3, bilirubin 0.4, alkaline phosphatase 67, , AST 12, ALT 20, phosphorus 4.6 (each normal). Serum iron 49, iron saturation 15.8%, ferritin 32, , folate 13.6. HIV antibodies screen nonreactive. ELENA negative. T4 of 7.1, TSH 0.6, sed rate 8 (each normal). Rheumatoid factor less than 10. ANCA less than 1:20.  3. Colonoscopy, 10/13/2014: - One 7 mm polyp in the ascending colon resected and retrieved. The examination was otherwise normal on direct and retroflexion views. Recommendation: - Repeat colonoscopy in 5 years for surveillance.  4. Comprehensive Report, 10/15/2014: FINAL DIAGNOSIS.  Peripheral blood: Leukopenia/neutropenia; normocytic anemia. Comprehensive Comments: The cause of the cytopenias is not apparent from review of this peripheral blood smear. Differential diagnosis includes chronic disease, autoimmunity, drug effects, infection, etc. Although there are no morphologic features of myeloid dysplasia, please be aware that peripheral blood findings are not always representative of underlying bone marrow abnormalities. FLOW IMPRESSION: Peripheral blood: No increase in myeloid or lymphoid blasts identified. Findings consistent with mild granulocytic left shift. No immunophenotypically abnormal B- or T-cell population identified.  5. EGD performed on 10/16/2014 at [facility]. IMPRESSION: Normal esophagus. Normal stomach. Normal first part of the duodenum and 2nd part of the duodenum Biopsied . Pathology: Small bowel biopsy. Benign small intestinal type mucosa with no significant histopathologic changes.  6. Labs, 05/29/2015. IgG 827, IgA 168, IgM 140. Immunofixation showed no monoclonal immunoglobulins detected. Free kappa light chain 1.26, free lambda light chain 1.17, kappa/lambda light chains 1.08 (each normal).  7. UPIEP, 06/09/2015. 24-hour urine 82.8 mg. Immunofixation not reported.  8. [facility], CT-guided bone marrow, 06/29/2015. Impression: Technically successful CT-guided bone marrow aspirate without immediate complications. No core bone marrow sample could be obtained. PATHOLOGY: Surgical pathology performed on 06/29/2015 at [facility] was revealing for post iliac crest bone marrow biopsy and aspirate: Normocellular bone marrow of 40%. No increase in blasts. Iron staining is 1+. Flow cytometry shows no evidence of an abnormal myeloid maturation, increase in blasts, or lymphoproliferative disorder. Complete blood count and peripheral blood smear review: Normochromic normocytic anemia. Leukopenia. No circulating blasts.  9. Peripheral blood comprehensive report,  06/19/2017: PANCYTOPENIA. COMPREHENSIVE DIAGNOSIS.. Review of peripheral blood smear: Leukopenia with relative reactive appearing eosinophilia. Normochromic, normocytic anemia. Mild thrombocytopenia. See comment. COMPREHENSIVE COMMENT. When compared to a peripheral blood specimen reviewed on this patient in 10/2014 ([id number]), the white blood cell count has decreased from 2.7 K/mL to 1.8 currently. The hemoglobin level has also decreased from 9.9 g/dL to 8.7 g/dL. The platelet counts are similar. As stated in the prior specimen, the cause of the cytopenias is not apparent from the peripheral blood smear examination. Etiologies to consider include chronic diseases particularly autoimmune disease, infections and drugs/toxins. Myelodysplasia is in the differential diagnosis but that diagnosis can be difficult to establish on a peripheral blood specimen. Correlation recommended. Flow cytometry study after erythrolysis reveals no circulating myeloid or lymphoid blasts. Myeloid and monocytic lineages are represented by mature granulocytes and monocytes. Phenotypically unremarkable eosinophils are mildly elevated at 10.5% of the total WBC. Basophils are not increased. It must be noted that the diagnosis of a myeloid stem cell disorder, if clinically suspected, is best made using bone marrow specimens. Peripheral blood is not always representative of abnormalities involving the bone marrow. The B cells show no evidence of clonality or antigenic aberrancy. The T cells show normal expression of the pan T-cell antigens. The NK cells account for 23.7% of the total lymphocytes.  10. [facility]: Endoscopy: 08/23/2017. Impressions: - Normal esophagus. - Non-erosive gastritis. Biopsied. - Normal first part of the duodenum and 2nd part of the duodenum. Biopsied.   11. [facility]: Colonoscopy: 08/23/2017. Impressions: - The entire examined colon is normal. Biopsied.     PREVIOUS INTERVENTIONS:   1. IV Venofer, 11/10/2014  - 11/20/2014 (1000 mg)  2. INFeD 500 mg on 07/19/2016 and 05/01/2017  3. 2 units PRBC transfusions, 09/01/2017  4. Injectafer 750 mg IV, 04/23/2019        Problem List Items Addressed This Visit    None       Oncology/Hematology History    No history exists.       PAST MEDICAL HISTORY:  ALLERGIES:  Allergies   Allergen Reactions   • Adhesive Tape Hives   • Tape Hives     CURRENT MEDICATIONS:  Outpatient Encounter Medications as of 9/12/2022   Medication Sig Dispense Refill   • docusate sodium (COLACE) 100 MG capsule Take 100 mg by mouth.     • esomeprazole (nexIUM) 40 MG capsule TAKE 1 CAPSULE BY MOUTH TWICE DAILY BEFORE  MEALS 60 capsule 0   • Folbee 2.5-25-1 MG tablet tablet TAKE 1 TABLET BY MOUTH EVERY DAY 30 tablet 0   • levothyroxine (SYNTHROID, LEVOTHROID) 175 MCG tablet Take 175 mcg by mouth Daily.     • LYRICA 100 MG capsule Take 100 mg by mouth Every 8 (Eight) Hours As Needed.  1   • metoprolol succinate XL (TOPROL-XL) 25 MG 24 hr tablet Take 100 mg by mouth Daily.     • O2 (OXYGEN) Inhale 2 L/min 1 (One) Time. Continuous     • ondansetron (ZOFRAN) 4 MG tablet Take 4 mg by mouth Every 8 (Eight) Hours As Needed for Nausea or Vomiting.     • oxybutynin (DITROPAN) 5 MG tablet Take 5 mg by mouth every night at bedtime.  5   • oxyCODONE-acetaminophen (PERCOCET) 7.5-325 MG per tablet Take 1 tablet by mouth Every 8 (Eight) Hours As Needed.     • polyethylene glycol (MIRALAX) powder Take 17 g by mouth.     • PROCRIT 28841 UNIT/ML injection Inject 1 Units under the skin As Needed (WHEN BLOOD COUNT [hemoglobin] BELOW 11).     • tiZANidine (ZANAFLEX) 4 MG tablet Take 4 mg by mouth At Night As Needed for Muscle Spasms.     • topiramate (TOPAMAX) 100 MG tablet Take 100 mg by mouth Every Night.     • Umeclidinium Bromide (INCRUSE ELLIPTA) 62.5 MCG/INH aerosol powder  Inhale 1 puff Daily.     • vitamin B-6 (PYRIDOXINE) 50 MG tablet Take 50 mg by mouth Daily.     • vitamin D (ERGOCALCIFEROL) 1.25 MG (84577 UT) capsule  capsule Take 50,000 Units by mouth 1 (One) Time Per Week.     • Fluticasone Furoate-Vilanterol (Breo Ellipta) 200-25 MCG/INH inhaler Inhale 1 puff Daily for 30 days. 1 each 11   • [DISCONTINUED] levothyroxine (SYNTHROID, LEVOTHROID) 137 MCG tablet Take 1 tablet by mouth Daily for 30 days. (Patient taking differently: Take 150 mcg by mouth Daily.) 30 tablet 3   • [DISCONTINUED] predniSONE (DELTASONE) 10 MG tablet Take 4 tabs daily x 3 days, then take 3 tabs daily x 3 days, then take 2 tabs daily x 3 days, then take 1 tab daily x 3 days 31 tablet 0     No facility-administered encounter medications on file as of 9/12/2022.     ADULT ILLNESSES:   Iron deficiency anemia ( ICD-10:D50.9 ;Iron deficiency anemia, unspecified   Anemia ( ICD-10:D64.9 ;Anemia, unspecified   Anemia in chronic kidney disease ( ICD-10:D63.1 ;Anemia in chronic kidney disease   Chronic kidney disease ( ICD-10:N18.3 ;Chronic kidney disease, stage 3 (moderate)   Chronic pancreatitis ( ICD-10:K86.1 ;Other chronic pancreatitis   Depression ( ICD-10:F32.9 ;Major depressive disorder, single episode, unspecified   Fatigue ( ICD-10:R53.82 ;Chronic fatigue, unspecified   Fibromyalgia syndrome ( ICD-10:M79.7 ;Fibromyalgia   Gastroesophageal reflux disease ( ICD-10:K21.9 ;Gastro-esophageal reflux disease without esophagitis   Hypertension ( ICD-10:I10 ;Essential (primary) hypertension   Hypothyroidism ( ICD-10:E03.9 ;Hypothyroidism, unspecified   Irritable bowel syndrome ( ICD-10:K58.9 ;Irritable bowel syndrome without diarrhea   Kidney stone ( ICD-10:N20.0 ;Calculus of kidney   Knee pain ( meniscal disorder,Dr. Dasilva; ICD-10:M25.569 ;Pain in unspecified knee )   Leukopenia ( ICD-10:D72.819 ;Decreased white blood cell count, unspecified   Macular degeneration ( ICD-10:H35.30 ;Unspecified macular degeneration   Migraines ( ICD-10:G43.909 ;Migraine, unspecified, not intractable, without status migrainosus   Obesity ( ICD-10:E66.9 ;Obesity, unspecified    Peripheral neuropathy ( ICD-10:G62.9 ;Polyneuropathy, unspecified   Sarcoidosis ( ICD-10:D86.0 ;Sarcoidosis of lung   Scoliosis ( ICD-10:M41.9 ;Scoliosis, unspecified   Urge incontinence of urine ( ICD-10:N39.41 ;Urge incontinence   Vitamin B12 deficiency ( ICD-10:E53.8 ;Deficiency of other specified B group vitamins    SURGERIES:   Total knee replacement, right, 11/06/2015, Dr. Dasilva   Colonoscopy, 2009   Shoulder repair, epicondyle, right, 2002   Shoulder repair, arthroscopy, 2001   Ulnar transposition; ulnar neuropathy, 2002,1999   Kidney stone, 2001   Hysterectomy, total, 2004   Colonoscopy, 09/06/2018. Impression: The examination was otherwise normal on direct and retroflexion views. No specimens collected. Repeat 5 years   Colonoscopy, 08/23/2017. [facility]. Impression> The entire examined colon is normal. Biopsied   Decompression of median nerve, (carpal tunnel release), 05/08/2018, Dr. Kwon   Operative procedure on knee, replacement, 01/2014, Dr. Dasilva   Endoscopy, 08/23/2017, [facility]. Impression: Normal esophagus. Non-erosive gastritis. Biopsied. Normal 1st part of the duodenum and 2nd part of the duodenum. Biopsied   Radiofrequency ablation (RFA) left leg on 01/10/2018 by Dr. Montgomery. Left lower extremity venous ultrasound, 01/19/2018. No deep venous thrombosis (DVT) left lower extremity. Was started on Eliquis. Lymphedema pumps and compression stockings prescribed. Followup 1 month   Revision surgery was done last 05/04/2017. Dr. Kwon   Cholecystectomy, remote  Right rotator cuff and bicep tendon repair last 03/24/2022      ADULT ILLNESSES:  Patient Active Problem List   Diagnosis Code   • Palpitations R00.2   • Thrombocytopenia (HCC) D69.6   • Sarcoidosis D86.9   • Essential hypertension I10   • Hypothyroidism E03.9   • Dyspnea on exertion R06.00   • Morbidly obese (HCC) E66.01   • PFO (patent foramen ovale) Q21.1   • Iron deficiency anemia D50.9   •  Generalized weakness R53.1   • Volume depletion, gastrointestinal loss E86.9   • Viral enterocolitis A08.4   • Colon polyps K63.5   • Epigastric pain R10.13   • Bloating R14.0   • Early satiety R68.81   • Weight loss R63.4   • Abdominal cramping R10.9   • Nausea R11.0   • NSAID long-term use Z79.1   • 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, T45.1X5A   • Anemia in chronic kidney disease (CKD) N18.9, D63.1   • Varicose veins of both lower extremities with pain I83.813   • Lymphedema I89.0   • Venous (peripheral) insufficiency I87.2   • Neuropathy due to medical condition (HCC) G63   • Venous insufficiency I87.2   • Chest pain R07.9   • Bloody stool K92.1   • Constipation K59.00   • Anal or rectal pain K62.89   • Rectal bleeding K62.5   • Hx of colonic polyps Z86.010   • Other hemorrhoids K64.8   • Chronic respiratory failure with hypoxia (HCC) J96.11   • Diarrhea R19.7   • Irritable bowel syndrome with constipation K58.1   • Overweight E66.3   • 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 Diallo    • CHOLECYSTECTOMY     • COLONOSCOPY  10/13/2014   • COLONOSCOPY N/A 8/23/2017    Procedure: COLONOSCOPY WITH ANESTHESIA;  Surgeon: Jeanette Mclaughlin MD;  Location: [facility] ENDOSCOPY;  Service:    • COLONOSCOPY N/A 9/6/2018    Procedure: COLONOSCOPY WITH ANESTHESIA;  Surgeon: Jeanette Mclaughlin MD;  Location: [facility] ENDOSCOPY;  Service: Gastroenterology   • ENDOSCOPY N/A 8/23/2017    Procedure: ESOPHAGOGASTRODUODENOSCOPY WITH ANESTHESIA;  Surgeon: Jeanette Mclaughlin MD;  Location: [facility] ENDOSCOPY;  Service:    • ENDOVENOUS ABLATION SAPHENOUS VEIN W/ LASER Right 03/30/2018   • HYSTERECTOMY     • JOINT REPLACEMENT Right     PATELLA REPLACED   • KIDNEY STONE SURGERY     • KNEE ARTHROSCOPY     • KNEE SURGERY Right    • LATERAL EPICONDYLE RELEASE     • PATELLA  SURGERY     • REPLACEMENT TOTAL KNEE     • SHOULDER ARTHROSCOPY     • ULNAR NERVE DECOMPRESSION     • VARICOSE VEIN SURGERY Left 1/10/2018    Procedure: LEFT SAPHENOUS VEIN RADIO FREQUENCY ABLATION;  Surgeon: Kvng Montgomery DO;  Location:  [facility] OR;  Service:    • VARICOSE VEIN SURGERY Right 3/30/2018    Procedure: RIGHT LOWER EXTREMITY VENAGRAM, RIGHT LOWER EXTREMITY SAPHENOUS VEIN RADIO FREQUENCY ABLATION;  Surgeon: Kvng Montgomery DO;  Location:  [facility] OR;  Service: Vascular     HEALTH MAINTENANCE ITEMS:  Health Maintenance Due   Topic Date Due   • COVID-19 Vaccine (1) Never done   • TDAP/TD VACCINES (1 - Tdap) Never done   • HEPATITIS C SCREENING  Never done   • PAP SMEAR  Never done   • ANNUAL WELLNESS VISIT  07/28/2017   • ZOSTER VACCINE (1 of 2) Never done   • MAMMOGRAM  03/08/2021       <no information>  Last Completed Colonoscopy          COLORECTAL CANCER SCREENING (COLONOSCOPY - Every 5 Years) Next due on 3/15/2024    03/15/2019  Outside Procedure: ME SIGMOIDOSCOPY,BIOPSY,ME COLONOSCOPY W/BIOPSY SINGLE/MULTIPLE    09/06/2018  Surgical Procedure: COLONOSCOPY    09/06/2018  COLONOSCOPY    08/23/2017  Surgical Procedure: COLONOSCOPY    08/23/2017  COLONOSCOPY    Only the first 5 history entries have been loaded, but more history exists.              Immunization History   Administered Date(s) Administered   • Pneumococcal Conjugate 13-Valent (PCV13) 11/09/2018, 04/01/2019   • Pneumococcal Polysaccharide (PPSV23) 09/26/2017, 10/01/2018     Last Completed Mammogram     This patient has no relevant Health Maintenance data.            FAMILY HISTORY:  Family History   Problem Relation Age of Onset   • Arrhythmia Mother    • Heart disease Mother    • Kidney disease Mother    • Hypertension Mother    • Heart disease Father    • Diabetes Father    • Cancer Father    • Heart disease Brother    • Heart disease Maternal Aunt    • Heart disease Maternal Uncle    • Heart disease Paternal Aunt    • Heart disease  "Paternal Uncle    • Diabetes Paternal Uncle    • Hypertension Paternal Uncle    • Heart disease Maternal Grandmother    • Heart disease Maternal Grandfather    • Heart disease Paternal Grandmother    • Cancer Paternal Grandmother    • Heart disease Paternal Grandfather    • Cancer Paternal Uncle    • Hypertension Paternal Uncle    • Colon cancer Neg Hx    • Colon polyps Neg Hx      SOCIAL HISTORY:  Social History     Socioeconomic History   • Marital status: Single   Tobacco Use   • Smoking status: Never Smoker   • Smokeless tobacco: Never Used   Vaping Use   • Vaping Use: Never used   Substance and Sexual Activity   • Alcohol use: Never   • Drug use: Never   • Sexual activity: Never       REVIEW OF SYSTEMS:  Constitutional:   The patient's appetite is good. \"ok for me.\" Her energy is chronically low. She manages her personal ADLs. She lives with her sister and brother. She manages her ADLs including helping with light indoor chores but not any errands and does not drive. She has lost 3 pounds (had gained 26 pounds at her prior visit - had lost 48 pounds at her 3 visits prior to that) since her last visit. \"Shifting fluid.\" She has no fevers or chills but has non-drenching night sweats. Her sleep habits seem appropriate.  Ear/Nose/Throat/Mouth:   She reports no ear pains, sinus symptoms, sore throat, nosebleeds, or sore tongue. She has migraine headaches. She denies any hoarseness, change in voice quality.   Ocular:   She reports no eye pain, significant change in visual acuity, double vision, or blurry vision.  Respiratory:   She reports baseline exertional dyspnea and is short of breath with her routine activities. She has chronic cough, sometimes with thick, white/yellow phlegm production but has no significant shortness of breathing at rest or unexplained chest wall pain. Says she is off prednisone since last 08/2016 by Dr. Scherer (for the sarcoid). Says the methotrexate has been stopped by Dr. Scherer. Has been on " "round the clock O2 since 04/2018.  Cardiovascular:   She reports no exertional chest pain, chest pressure, or chest heaviness. She reports no claudication. She reports no palpitations or symptomatic orthostasis.  Gastrointestinal:   She reports chronic nausea but no dysphagia, vomiting, or postprandial abdominal pain but admits to early satiety, increased bloating, and cramping. She has no change in bowel habits without dark discoloration of the stool (off oral iron). She reports intermittent rectal bleeding when she is constipated. \"Not of late.\" She has intermittent diarrhea from IBS. Is intolerant of oral iron (cramps, constipation). Last repeat colonoscopy, 09/06/2018 (above). Hemorrhoids?.  Genitourinary:   She reports no urinary burning, frequency, dribbling, or discoloration. She reports difficulty controlling her bladder and has trouble holding in her urine but has not needed protective pads. She has no need to urinate frequently through the night.  Musculoskeletal:   She reports that she had right rotator cuff and bicep tendon repair last 03/24/2022.  She has persistent right knee discomfort in spite of total knee replacement (TKR). Says revision surgery was done last 05/04/2017 - Dr. Kwon. She has chronic arthralgias of the hips and lower back with distal radiculitis to the buttocks. She has myalgias of the calves and has nighttime leg cramping. She is now seen by Dr. Schmitt of Pain Management. Says a nerve stimulator couldn't be placed due to her scoliosis. Is off morphine.  Is now on Percocet 7.5 every 8 hours as needed  Extremities:   She reports chronic trouble with fluid retention and significant leg swelling. Is no longer using compression leg pumps.  Endocrine:   She reports no problems with excess thirst, excessive urination, vasomotor instability.  Heme/Lymphatic:   She reports easy bruising but no unexplained bleeding, petechial rashes, or swollen glands.  Skin:   She reports chronic itching " "and rashes but no lesions which won't heal.  Neuro:   She reports postural dizziness but no loss of consciousness, seizures, or fainting spells. She reports no weakness of her face, arms, or legs. She has no difficulty with speech. She has no tremors. She has relief of paresthesias of the right hand since the carpal tunnel release on 05/08/2018 (Dr. Kwon).  Psych:   She admits to intermittent depression and anxiety. She reports occasional mood swings. She reports no history of suicide attempts.      VITAL SIGNS: /84   Pulse 86   Temp 97 °F (36.1 °C)   Resp 18   Ht 162.6 cm (64\")   Wt 134 kg (294 lb 12.8 oz)   SpO2 97%   Breastfeeding No   BMI 50.60 kg/m² Body surface area is 2.31 meters squared.  Pain Score    09/12/22 1412   PainSc:   6         PHYSICAL EXAMINATION:   General:   She is a very-pleasant, morbidly obese, visibly stouter, and modestly-kept middle-aged female who is comfortable at rest. She arrived in the exam room ambulatory. She appears to be her stated age. Her skin color is still a bit pale.   Head/Neck:   The patient is anicteric and atraumatic. She is wearing a surgical mask today.  She is wearing O2 via cannula, tethered to a portable O2 tank. The trachea is midline. The neck is supple without evidence of jugular venous distention or cervical adenopathy.   Eyes:   The pupils are equal, round, and reactive to light. The extraocular movements are full. There is no scleral jaundice or erythema.   Chest:   The respiratory efforts are normal and unhindered. The breath sounds are diminished bilaterally with vague basilar rales. There are no wheezes, rhonchi, or asymmetry of breath sounds.  Cardiovascular:   The patient has a regular cardiac rate and rhythm without murmurs, rubs, or gallops. The peripheral pulses are equal and full.  Abdomen:   The belly is soft and (more) globose. Her habitus precludes an adequate exam but there is no rebound or guarding. There is no organomegaly, " mass-effect, or tenderness. Bowel sounds are active and of normal character.  Extremities:   There is no evidence of cyanosis, clubbing, with 3+ (chronic) leg/ankle edema.   Improved abduction right shoulder  Rheumatologic:   There is no overt evidence of rheumatoid deformities of the hands. There is no sausaging of the fingers. There is no sign of active synovitis. The gait is slow and antalgic, still favoring the right leg/knee.  She does not use a cane nor walker  Cutaneous:   There are no overt rashes, disseminated lesions, purpura, or petechiae.   Lymphatics:   There is no evidence of adenopathy in the cervical, supraclavicular, or axillary areas.   Neurologic:   The patient is alert, oriented, cooperative, and pleasant. She is appropriately conversant. She ambulated into the exam room without assistance but declined transfer from chair to exam table. There is no overt dysfunction of the motor, sensory, cerebellar systems.  Psych:   Mood and affect are appropriate for circumstance. Eye contact is appropriate. Normal judgement and decision making.        LABS    Lab Results - Last 18 Months   Lab Units 09/09/22  1358 08/22/22  1450 06/23/22  1334 03/10/22  1351 02/11/22  1124 01/03/22  1359 12/17/21  1306 10/22/21  1125 09/10/21  1347 06/25/21  1242 06/03/21  1335 04/30/21  1336 04/02/21  1303   HEMOGLOBIN g/dL 10.9* 11.7* 11.5* 11.2* 10.5* 10.5* 10.7* 9.7* 9.2* 11.0*   < > 10.0* 11.3*   HEMATOCRIT % 34.6 37.5 36.0 35.6 33.3* 34.9* 35.2 31.2* 29.0* 34.1   < > 31.4* 35.2   MCV fL 100.3* 101.6* 98.6* 101.1* 102.1* 103.6* 100.0* 100.0* 98.3* 97.4*   < > 96.9 95.1   WBC 10*3/mm3 3.75 4.2* 3.22* 3.20* 2.84* 3.1* 2.72* 3.23* 3.31* 3.18*   < > 3.80 4.57   RDW % 13.2 13.2 13.4 14.0 13.9 13.6 13.7 13.9 13.6 13.2   < > 14.2 13.2   MPV fL 10.1 10.5 10.2 9.9 9.9 10.1 9.7 9.8 10.0 10.0  --  9.5 9.7   PLATELETS 10*3/mm3 139* 134 123* 111* 117* 121* 129* 145 132* 153   < > 150 160   IMM GRAN % %  --   --   --   --   --   --   0.4 0.3 0.3 0.6*  --  0.3 0.2   NEUTROS ABS 10*3/mm3 2.06 2.9 1.64* 1.63* 1.61* 1.7 1.39* 1.56* 1.70 1.68*   < > 2.34 3.11   LYMPHS ABS K/uL  --  0.8*  --   --   --  0.8* 0.82 1.11 1.02 0.96   < > 0.97 1.02   MONOS ABS K/uL  --  0.30  --   --   --  0.40 0.31 0.34 0.43 0.35   < > 0.33 0.29   EOS ABS 10*3/mm3 0.23 0.10 0.19 0.17 0.14 0.20 0.16 0.17 0.12 0.13   < > 0.12 0.10   BASOS ABS 10*3/mm3 0.04 0.00  --  0.03 0.06 0.00 0.03 0.04 0.03 0.04   < > 0.03 0.04   IMMATURE GRANS (ABS) K/uL  --  0.0  --   --   --  0.0 0.01 0.01 0.01 0.02  --  0.01 0.01   NRBC /100 WBC  --   --   --  1.0*  --   --  0.0 0.0 0.0 0.0  --  0.0 0.0   NEUTROPHIL % % 55.0  --  51.0 50.0 56.6  --   --   --   --   --   --   --   --    MONOCYTES % % 14.0*  --  14.0* 4.2* 7.1  --   --   --   --   --   --   --   --    BASOPHIL % % 1.0  --   --  1.0 2.0*  --   --   --   --   --   --   --   --    ATYP LYMPH % %  --   --   --  10.4* 1.0  --   --   --   --   --   --   --   --    ANISOCYTOSIS   --   --   --  Slight/1+  --   --   --   --   --   --   --   --   --     < > = values in this interval not displayed.       Lab Results - Last 18 Months   Lab Units 09/09/22  1358 08/22/22  1450 06/23/22  1334 03/10/22  1351 02/15/22  1340 01/03/22  1359 12/17/21  1306 11/16/21  1410 10/22/21  1125 09/29/21  1518 09/10/21  1347 09/10/21  1347   GLUCOSE mg/dL 86 93 96 93  --  83 95 95 97 94   < > 109*   SODIUM mmol/L 142 141 143 140  --  145 140 144 141 141   < > 142   POTASSIUM mmol/L 4.0 4.1 4.0 4.4 4.1 4.2 3.9 4.2 4.3 4.6   < > 4.2   TOTAL CO2 mmol/L  --  29  --   --   --  23  --  22  --  24   < >  --    CO2 mmol/L 25.0  --  24.0 23.0  --   --  25.0  --  24.0  --   --  22.0   CHLORIDE mmol/L 109* 102 110* 109*  --  109 108* 111 108* 108   < > 112*   ANION GAP mmol/L 8.0 10 9.0 8.0  --  13 7.0 11 9.0 9   < > 8.0   CREATININE mg/dL 1.58* 1.4* 1.54* 1.25*  --  1.3* 1.40* 1.3* 1.30* 1.2*   < > 1.35*   BUN mg/dL 25* 19 21* 27*  --  31* 21* 24* 28* 23*   < > 31*   BUN /  CREAT RATIO  15.8  --  13.6 21.6  --   --  15.0  --  21.5  --   --  23.0   CALCIUM mg/dL 9.1 9.0 8.8 8.8  --  8.8 8.7 8.7 8.7 8.8   < > 8.5*   EGFR IF NONAFRICN AM   --  39*  --   --   --  43* 39* 43* 43* 47*  --  41*   ALK PHOS U/L 81 80 83 75  --  75 71 70 66 71   < > 64   TOTAL PROTEIN g/dL 7.1 7.2 6.9 6.9  --  6.9 7.0 6.6 6.5 6.7   < > 6.4   ALT (SGPT) U/L 12 14 16 17  --  17 16 18 22 15   < > 14   AST (SGOT) U/L 19 20 22 23  --  28 22 22 26 17   < > 17   BILIRUBIN mg/dL 0.3 0.4 0.2 0.4  --  0.3 0.4 0.3 0.2 0.3   < > 0.2   ALBUMIN g/dL 4.70 4.6 4.40 4.40  --  4.3 4.50 4.2 4.20 4.5   < > 4.30   GLOBULIN gm/dL 2.4  --  2.5 2.5  --   --  2.5  --  2.3  --   --  2.1    < > = values in this interval not displayed.         Lab Results - Last 18 Months   Lab Units 09/09/22  1358 08/22/22  1450 06/23/22  1334 03/10/22  1351 12/17/21  1306 11/16/21  1410 10/22/21  1125 09/29/21  1518 09/10/21  1347 06/25/21  1242 06/03/21  1335   IRON mcg/dL 95  --  77 79 52  --  44  --  64 65  --    TIBC mcg/dL 305  --  292* 319 294*  --  308  --  259* 273*  --    IRON SATURATION % 31  --  26 25 18*  --  14*  --  25 24  --    FERRITIN ng/mL 421.80*  --  367.90* 322.40* 286.60*  --  147.10  --  229.30* 212.50*  --    TSH uIU/mL  --  10.030*  --   --   --  5.960*  --  9.460*  --   --  9.310*   FOLATE ng/mL >20.00  --  >20.00 >20.00  --   --  >20.00  --  5.73 7.41  --        ASSESSMENT:   1. Macrocytic anemia, with contributions from iron deficiency (1+ marrow iron), iron underutilization/anemia of chronic disease and chronic kidney disease and methotrexate. Michael Hgb 7.9, 08/31/2017 requiring 2 units PRBC transfusions on 09/01/2017.   a. Negative EGD/colonoscopy (above), negative SPIEP, negative UPIEP.   --Stable, Hgb 10.9 on 09/09/2022 (prior range: Hgb 7.9 - 12.3).   b. Endoscopy: 08/23/2017. Impressions: - Normal esophagus. - Non-erosive gastritis. Biopsied. - Normal 1st part of the duodenum and 2nd part of the duodenum. Biopsied.   c.  [facility]: Colonoscopy: 08/23/2017. Impressions: - The entire examined colon is normal. Biopsied.   d. Colonoscopy, 09/06/2018. Impressions: - The examination was otherwise normal on direct and retroflexion views. No specimens collected. Repeat 5 years              e.  06/2015-bone marrow biopsy with 1+ marrow iron, but otherwise nondiagnostic.  2. Leukopenia, with adequate ANC.   --Stable, WBC 3.75; ANC 2.06, 09/09/2022 (prior range: WBC 2.45 - 4.2; ANC 1.05 - 2.6). No unifying diagnosis. Likely medication (methotrexate) associated.   3. Thrombocytopenia.   --Stable, 139,000 on 09/09/2022 (prior range: 115,000 - 188,000).  4. Chronic kidney disease, Stage III.   --Stable, GFR 38 mL/min on 09/09/2022 (prior range: 26 - 56.6 mL/min).   5. Hypothyroidism.   6. Gastroesophageal reflux disease, now with early satiety, bloating, cramps.   7. B12 deficiency. Receives B12 injections per Dr. Waters. Previously received B12, 1000 mcg IM daily x 3 then weekly x 4 beginning 09/07/2018.   8. Fibromyalgia with chronic pain syndrome.   9. Irritable bowel syndrome with chronic diarrhea. Colonoscopy, 10/2014.   10. Depression.   11. Chronic neck and low back pain with scoliosis. She is now seen by Dr. Schmitt of Pain Management. Says a nerve stimulator will be placed on 04/23/2019.   12. Peripheral neuropathy and swelling of the legs.   a. Seen by Dr. Montgomery, 09/07/2017 and 12/08/2017. Impression: Venous insufficiency and lymphedema.   b. Underwent RFA left leg on 01/10/2018 by Dr. Montgomery. Lower extremity venous ultrasound, 01/19/2018. No deep venous thrombosis (DVT) left lower extremity. Was started on anticoagulation (Eliquis). Lymphedema pumps and compression stockings prescribed.   13. Degenerative joint disease (DJD) knees. Underwent right knee surgery on 11/06/2015 and revision on 05/04/2017. Dr. Kwon.   14. Obesity. Has stabilized.   15. Pulmonary sarcoidosis. Followed by Dr. Scherer. Was started on methotrexate  sometime 05/2017. Is now on O2 since 04/2018.   16. Chronic fatigue, contributions from all above.   17. Admitted [facility] 09/25/2016 through 09/26/2016 for chest pain. DSE negative. Discharged for non-cardiac chest pain.   18. Leg edema. Underwent RFA left leg on 01/10/2018 by Dr. Montgomery. LE venous US, 01/19/2018. No DVT left LE. Eliquis stopped last 02/2018. Lymphedema pumps and compression stockings prescribed.   19. Rectal bleeding. Colonoscopy, 09/06/2018. Impressions: The examination was otherwise normal on direct and retroflexion views. No specimens collected. Repeat 5 years.  20.  Right rotator cuff and right bicep tendon injuries.    --Had surgery 03/24/2022, Dr. Pathak    PLAN:   1. Apprised of labs from 09/09/2022 with stable leukopenia/normal ANC, stable anemia, borderline low (stable) platelets, low (stable) GFR, stable CMP, repleted serum iron, repleted Fe sat, repleted (421) ferritin (INFeD, 05/01/2017), repleted (>1000) B12/folate.   2.  STOP Folbee   3.  Previously discussed prior SPIEP with normal immunoglobulin levels, negative TAVIA for monoclonal proteins, normal kappa/lambda light chains, negative 24 hour UPIEP, normal ESR, negative rheumatoid factor, normal TSH/T4, normal B12/folate, negative HIV screen, low iron studies, negative ELENA, normal LDH and negative comprehensive blood report (no peripheral blood evidence for dysplasia and negative flow cytometry).  Also noted previous bone marrow biopsy showing 1+ but otherwise nondiagnostic.  4. Again reviewed her medications. Stopped methotrexate, diclofenac, gabapentin.  Remains on Nexium (blood dyscrasias), gabapentin, Lortab (neutropenia, thrombocytopenia), Topamax (anemia, leukopenia). Would discontinue as many of these as possible. Defer to [facility].   5. Draw serum iron, Fe sat, ferritin, B12, folate every 4 weeks.   6. CBC every 4 weeks with Procrit 40,000 units subcutaneously if Hgb less than 10 and less than 30 - BHP   7. Continue  management per primary care and other specialists.   8. Return to the [place] office in 24 weeks with pre-office CMP, B12/folate, serum iron, Fe sat, ferritin, and CBC with differential.     MEDICAL DECISION MAKING: Moderate Complexity   AMOUNT OF DATA: Moderate    I spent ~37 minutes caring for Hope on this date of service. This time includes time spent by me in the following activities: preparing for the visit, reviewing tests, performing a medically appropriate examination and/or evaluation, counseling and educating the patient/family/caregiver, ordering medications, tests, or procedures and documenting information in the medical record    cc: MD Weston Ambrose MD

## 2022-09-20 RX ORDER — UREA 10 %
LOTION (ML) TOPICAL
Qty: 90 TABLET | Refills: 1 | OUTPATIENT
Start: 2022-09-20

## 2022-09-22 ENCOUNTER — OFFICE VISIT (OUTPATIENT)
Dept: PULMONOLOGY | Facility: CLINIC | Age: 52
End: 2022-09-22

## 2022-09-22 VITALS
DIASTOLIC BLOOD PRESSURE: 70 MMHG | HEIGHT: 64 IN | SYSTOLIC BLOOD PRESSURE: 116 MMHG | WEIGHT: 280 LBS | BODY MASS INDEX: 47.8 KG/M2 | OXYGEN SATURATION: 98 % | HEART RATE: 78 BPM

## 2022-09-22 DIAGNOSIS — D86.9 SARCOIDOSIS: ICD-10-CM

## 2022-09-22 DIAGNOSIS — J96.11 CHRONIC RESPIRATORY FAILURE WITH HYPOXIA: Primary | ICD-10-CM

## 2022-09-22 PROCEDURE — 99213 OFFICE O/P EST LOW 20 MIN: CPT | Performed by: INTERNAL MEDICINE

## 2022-09-22 NOTE — PROGRESS NOTES
"Background:  A patient with hx sarcoidosis and chronic respiratory failure   Chief Complaint  Sarcoidosis    Subjective    History of Present Illness       Stacy Lnyn presents to [facility].  History of Present Illness  She has been tired and has had shortness of breath that comes and goes.  She feels this is around baseline.     Tobacco Use: Low Risk    • Smoking Tobacco Use: Never Smoker   • Smokeless Tobacco Use: Never Used      Current Outpatient Medications   Medication Instructions   • docusate sodium (COLACE) 100 mg, Oral   • esomeprazole (nexIUM) 40 MG capsule TAKE 1 CAPSULE BY MOUTH TWICE DAILY BEFORE  MEALS   • Fluticasone Furoate-Vilanterol (Breo Ellipta) 200-25 MCG/INH inhaler 1 puff, Inhalation, Daily   • Fluticasone-Umeclidin-Vilant (Trelegy Ellipta) 200-62.5-25 MCG/INH inhaler 1 puff, Inhalation, Daily   • Folbee 2.5-25-1 MG tablet tablet TAKE 1 TABLET BY MOUTH EVERY DAY   • levothyroxine (SYNTHROID, LEVOTHROID) 175 mcg, Oral, Daily   • Lyrica 100 mg, Oral, Every 8 Hours PRN   • metoprolol succinate XL (TOPROL-XL) 100 mg, Oral, Daily   • O2 (OXYGEN) 2 L/min, Inhalation, Once, Continuous    • ondansetron (ZOFRAN) 4 mg, Oral, Every 8 Hours PRN   • oxybutynin (DITROPAN) 5 mg, Oral, Every Night at Bedtime   • oxyCODONE-acetaminophen (PERCOCET) 7.5-325 MG per tablet 1 tablet, Oral, Every 8 Hours PRN   • polyethylene glycol (MIRALAX) 17 g, Oral   • Procrit 1 Units, Subcutaneous, As Needed   • tiZANidine (ZANAFLEX) 4 mg, Oral, Nightly PRN   • topiramate (TOPAMAX) 100 mg, Oral, Nightly   • Umeclidinium Bromide (INCRUSE ELLIPTA) 62.5 MCG/INH aerosol powder  1 puff, Inhalation, Daily   • vitamin B-6 (PYRIDOXINE) 50 mg, Oral, Daily   • vitamin D (ERGOCALCIFEROL) 50,000 Units, Oral, Weekly      Objective     Vital Signs:   /70   Pulse 78   Ht 162.6 cm (64\")   Wt 127 kg (280 lb)   SpO2 98% Comment: 2L  BMI 48.06 kg/m²   Physical " Exam  Constitutional:       General: She is not in acute distress.     Appearance: She is well-developed. She is not ill-appearing or toxic-appearing.   HENT:      Head: Atraumatic.   Eyes:      General: No scleral icterus.     Conjunctiva/sclera: Conjunctivae normal.   Cardiovascular:      Rate and Rhythm: Normal rate and regular rhythm.      Heart sounds: S1 normal and S2 normal.   Pulmonary:      Effort: Pulmonary effort is normal. No respiratory distress.      Breath sounds: Normal breath sounds. No wheezing.   Abdominal:      General: There is no distension.   Musculoskeletal:         General: No deformity.      Cervical back: Neck supple.   Skin:     Coloration: Skin is not pale.      Findings: No rash.   Neurological:      Mental Status: She is alert.        Result Review  Data Reviewed:{ Labs  Result Review  Imaging  Media :23}       XR Chest 2 View (06/09/2022 16:17) No radiographic evidence of acute cardiopulmonary process.      PFT Values        Some values may be hidden. Unless noted otherwise, only the newest values recorded on each date are displayed.         Old Values PFT Results 6/23/21   No data to display.      Pre Drug PFT Results 6/23/21   FVC 59   FEV1 50   FEF 25-75% 27   FEV1/FVC 69.3      Post Drug PFT Results 6/23/21   No data to display.      Other Tests PFT Results 6/23/21   TLC 83      DLCO 96   D/VAsb 145                      Assessment and Plan  {CC Problem List  Visit Diagnosis  ROS  Review (Popup)  [facility] Maintenance  Quality  BestPractice  Medications  SmartSets  SnapShot Encounters  Media :23}   Diagnoses and all orders for this visit:    1. Chronic respiratory failure with hypoxia (HCC) (Primary)  -     Fluticasone-Umeclidin-Vilant (Trelegy Ellipta) 200-62.5-25 MCG/INH inhaler; Inhale 1 puff Daily for 30 days.  Dispense: 28 each; Refill: 11  -     XR Chest 2 View; Future    2. Sarcoidosis  -     Fluticasone-Umeclidin-Vilant (Trelegy Ellipta) 200-62.5-25  MCG/INH inhaler; Inhale 1 puff Daily for 30 days.  Dispense: 28 each; Refill: 11  -     XR Chest 2 View; Future    She appears around baseline.  We reviewed her cxr from earlier this year which looked favorable.  Will try to consolidate her inhaler therapy to a single triple inhaler.  Call for acute problems.    Follow Up {Instructions Charge Capture  Follow-up Communications :23}   Return in about 3 months (around 12/22/2022).  Patient was given instructions and counseling regarding her condition or for health maintenance advice. Please see specific information pulled into the AVS if appropriate.    Electronically signed by Celestine Scherer MD, 9/22/2022, 16:57 CDT

## 2022-10-04 ENCOUNTER — HOSPITAL ENCOUNTER (OUTPATIENT)
Dept: GENERAL RADIOLOGY | Age: 52
Discharge: HOME | End: 2022-10-04
Payer: MEDICARE

## 2022-10-04 ENCOUNTER — HOSPITAL ENCOUNTER (OUTPATIENT)
Dept: PREADMISSION TESTING | Age: 52
Discharge: HOME | End: 2022-10-08
Payer: MEDICARE

## 2022-10-04 VITALS — HEIGHT: 62 IN | BODY MASS INDEX: 51.53 KG/M2 | WEIGHT: 280 LBS

## 2022-10-04 LAB
ANION GAP SERPL CALCULATED.3IONS-SCNC: 12 MMOL/L (ref 7–19)
APTT: 30.7 SEC (ref 26–36.2)
BASOPHILS ABSOLUTE: 0 K/UL (ref 0–0.2)
BASOPHILS RELATIVE PERCENT: 0.7 % (ref 0–1)
BUN BLDV-MCNC: 27 MG/DL (ref 6–20)
CALCIUM SERPL-MCNC: 9.1 MG/DL (ref 8.6–10)
CHLORIDE BLD-SCNC: 105 MMOL/L (ref 98–111)
CO2: 25 MMOL/L (ref 22–29)
CREAT SERPL-MCNC: 1.5 MG/DL (ref 0.5–0.9)
EKG P AXIS: 19 DEGREES
EKG P-R INTERVAL: 177 MS
EKG Q-T INTERVAL: 401 MS
EKG QRS DURATION: 107 MS
EKG QTC CALCULATION (BAZETT): 411 MS
EKG T AXIS: 19 DEGREES
EOSINOPHILS ABSOLUTE: 0.2 K/UL (ref 0–0.6)
EOSINOPHILS RELATIVE PERCENT: 3.7 % (ref 0–5)
GFR AFRICAN AMERICAN: 44
GFR NON-AFRICAN AMERICAN: 36
GLUCOSE BLD-MCNC: 95 MG/DL (ref 74–109)
HCT VFR BLD CALC: 36.5 % (ref 37–47)
HEMOGLOBIN: 11.3 G/DL (ref 12–16)
IMMATURE GRANULOCYTES #: 0 K/UL
INR BLD: 1.03 (ref 0.88–1.18)
LYMPHOCYTES ABSOLUTE: 1.1 K/UL (ref 1.1–4.5)
LYMPHOCYTES RELATIVE PERCENT: 26.6 % (ref 20–40)
MCH RBC QN AUTO: 31.7 PG (ref 27–31)
MCHC RBC AUTO-ENTMCNC: 31 G/DL (ref 33–37)
MCV RBC AUTO: 102.5 FL (ref 81–99)
MONOCYTES ABSOLUTE: 0.4 K/UL (ref 0–0.9)
MONOCYTES RELATIVE PERCENT: 10 % (ref 0–10)
MRSA SCREEN RT-PCR: NOT DETECTED
NEUTROPHILS ABSOLUTE: 2.4 K/UL (ref 1.5–7.5)
NEUTROPHILS RELATIVE PERCENT: 58.8 % (ref 50–65)
PDW BLD-RTO: 13.2 % (ref 11.5–14.5)
PLATELET # BLD: 137 K/UL (ref 130–400)
PMV BLD AUTO: 10.4 FL (ref 9.4–12.3)
POTASSIUM SERPL-SCNC: 4.2 MMOL/L (ref 3.5–5)
PROTHROMBIN TIME: 13.4 SEC (ref 12–14.6)
RBC # BLD: 3.56 M/UL (ref 4.2–5.4)
SODIUM BLD-SCNC: 142 MMOL/L (ref 136–145)
WBC # BLD: 4 K/UL (ref 4.8–10.8)

## 2022-10-04 PROCEDURE — 87641 MR-STAPH DNA AMP PROBE: CPT

## 2022-10-04 PROCEDURE — 85025 COMPLETE CBC W/AUTO DIFF WBC: CPT

## 2022-10-04 PROCEDURE — 93005 ELECTROCARDIOGRAM TRACING: CPT | Performed by: ORTHOPAEDIC SURGERY

## 2022-10-04 PROCEDURE — 80048 BASIC METABOLIC PNL TOTAL CA: CPT

## 2022-10-04 PROCEDURE — 93010 ELECTROCARDIOGRAM REPORT: CPT | Performed by: INTERNAL MEDICINE

## 2022-10-04 PROCEDURE — 71046 X-RAY EXAM CHEST 2 VIEWS: CPT

## 2022-10-04 PROCEDURE — 85610 PROTHROMBIN TIME: CPT

## 2022-10-04 PROCEDURE — 85730 THROMBOPLASTIN TIME PARTIAL: CPT

## 2022-10-06 ENCOUNTER — TRANSCRIBE ORDERS (OUTPATIENT)
Dept: ADMINISTRATIVE | Facility: HOSPITAL | Age: 52
End: 2022-10-06

## 2022-10-06 ENCOUNTER — HOSPITAL ENCOUNTER (OUTPATIENT)
Dept: ULTRASOUND IMAGING | Facility: HOSPITAL | Age: 52
Discharge: HOME | End: 2022-10-06
Admitting: INTERNAL MEDICINE

## 2022-10-06 DIAGNOSIS — N18.31 STAGE 3A CHRONIC KIDNEY DISEASE (CKD): ICD-10-CM

## 2022-10-06 DIAGNOSIS — N18.31 STAGE 3A CHRONIC KIDNEY DISEASE (CKD): Primary | ICD-10-CM

## 2022-10-06 PROCEDURE — 76775 US EXAM ABDO BACK WALL LIM: CPT

## 2022-10-07 ENCOUNTER — HOSPITAL ENCOUNTER (OUTPATIENT)
Dept: PREADMISSION TESTING | Age: 52
Discharge: HOME | End: 2022-10-11
Payer: MEDICARE

## 2022-10-07 LAB — SARS-COV-2, PCR: NOT DETECTED

## 2022-10-07 PROCEDURE — U0003 INFECTIOUS AGENT DETECTION BY NUCLEIC ACID (DNA OR RNA); SEVERE ACUTE RESPIRATORY SYNDROME CORONAVIRUS 2 (SARS-COV-2) (CORONAVIRUS DISEASE [COVID-19]), AMPLIFIED PROBE TECHNIQUE, MAKING USE OF HIGH THROUGHPUT TECHNOLOGIES AS DESCRIBED BY CMS-2020-01-R: HCPCS

## 2022-10-07 PROCEDURE — U0005 INFEC AGEN DETEC AMPLI PROBE: HCPCS

## 2022-10-11 ENCOUNTER — ANESTHESIA (OUTPATIENT)
Dept: OPERATING ROOM | Age: 52
DRG: 470 | End: 2022-10-11
Payer: MEDICARE

## 2022-10-11 ENCOUNTER — ANESTHESIA EVENT (OUTPATIENT)
Dept: OPERATING ROOM | Age: 52
DRG: 470 | End: 2022-10-11
Payer: MEDICARE

## 2022-10-11 ENCOUNTER — HOSPITAL ENCOUNTER (INPATIENT)
Age: 52
LOS: 3 days | Discharge: HOME HEALTH | DRG: 470 | End: 2022-10-14
Attending: ORTHOPAEDIC SURGERY | Admitting: ORTHOPAEDIC SURGERY
Payer: MEDICARE

## 2022-10-11 DIAGNOSIS — M17.12 PRIMARY OSTEOARTHRITIS OF LEFT KNEE: Primary | ICD-10-CM

## 2022-10-11 PROCEDURE — 2580000003 HC RX 258: Performed by: ORTHOPAEDIC SURGERY

## 2022-10-11 PROCEDURE — 6360000002 HC RX W HCPCS: Performed by: NURSE ANESTHETIST, CERTIFIED REGISTERED

## 2022-10-11 PROCEDURE — 6360000002 HC RX W HCPCS: Performed by: ORTHOPAEDIC SURGERY

## 2022-10-11 PROCEDURE — 3700000000 HC ANESTHESIA ATTENDED CARE: Performed by: ORTHOPAEDIC SURGERY

## 2022-10-11 PROCEDURE — 6370000000 HC RX 637 (ALT 250 FOR IP): Performed by: ORTHOPAEDIC SURGERY

## 2022-10-11 PROCEDURE — 2500000003 HC RX 250 WO HCPCS: Performed by: ORTHOPAEDIC SURGERY

## 2022-10-11 PROCEDURE — 7100000000 HC PACU RECOVERY - FIRST 15 MIN: Performed by: ORTHOPAEDIC SURGERY

## 2022-10-11 PROCEDURE — 2709999900 HC NON-CHARGEABLE SUPPLY: Performed by: ORTHOPAEDIC SURGERY

## 2022-10-11 PROCEDURE — 3700000001 HC ADD 15 MINUTES (ANESTHESIA): Performed by: ORTHOPAEDIC SURGERY

## 2022-10-11 PROCEDURE — 64447 NJX AA&/STRD FEMORAL NRV IMG: CPT | Performed by: NURSE ANESTHETIST, CERTIFIED REGISTERED

## 2022-10-11 PROCEDURE — C1713 ANCHOR/SCREW BN/BN,TIS/BN: HCPCS | Performed by: ORTHOPAEDIC SURGERY

## 2022-10-11 PROCEDURE — 7100000001 HC PACU RECOVERY - ADDTL 15 MIN: Performed by: ORTHOPAEDIC SURGERY

## 2022-10-11 PROCEDURE — G0378 HOSPITAL OBSERVATION PER HR: HCPCS

## 2022-10-11 PROCEDURE — A4217 STERILE WATER/SALINE, 500 ML: HCPCS | Performed by: ORTHOPAEDIC SURGERY

## 2022-10-11 PROCEDURE — C1776 JOINT DEVICE (IMPLANTABLE): HCPCS | Performed by: ORTHOPAEDIC SURGERY

## 2022-10-11 PROCEDURE — 0SRD0J9 REPLACEMENT OF LEFT KNEE JOINT WITH SYNTHETIC SUBSTITUTE, CEMENTED, OPEN APPROACH: ICD-10-PCS | Performed by: ORTHOPAEDIC SURGERY

## 2022-10-11 PROCEDURE — 6370000000 HC RX 637 (ALT 250 FOR IP): Performed by: STUDENT IN AN ORGANIZED HEALTH CARE EDUCATION/TRAINING PROGRAM

## 2022-10-11 PROCEDURE — 3600000005 HC SURGERY LEVEL 5 BASE: Performed by: ORTHOPAEDIC SURGERY

## 2022-10-11 PROCEDURE — 2700000000 HC OXYGEN THERAPY PER DAY

## 2022-10-11 PROCEDURE — 2500000003 HC RX 250 WO HCPCS: Performed by: NURSE ANESTHETIST, CERTIFIED REGISTERED

## 2022-10-11 PROCEDURE — 6360000002 HC RX W HCPCS: Performed by: ANESTHESIOLOGY

## 2022-10-11 PROCEDURE — 3600000015 HC SURGERY LEVEL 5 ADDTL 15MIN: Performed by: ORTHOPAEDIC SURGERY

## 2022-10-11 DEVICE — COMPONENT PAT DIA35MM THK9MM KNEE POLY CEM CONVENTIONAL: Type: IMPLANTABLE DEVICE | Site: KNEE | Status: FUNCTIONAL

## 2022-10-11 DEVICE — IMPLANTABLE DEVICE: Type: IMPLANTABLE DEVICE | Site: KNEE | Status: FUNCTIONAL

## 2022-10-11 DEVICE — COMPONENT FEM SZ 7 STD L KNEE CO CHROM CEM POST STBL COR: Type: IMPLANTABLE DEVICE | Site: KNEE | Status: FUNCTIONAL

## 2022-10-11 DEVICE — IMPLANTABLE DEVICE
Type: IMPLANTABLE DEVICE | Site: KNEE | Status: FUNCTIONAL
Brand: PERSONA®

## 2022-10-11 DEVICE — PSN TIB STM 5 DEG SZ D L: Type: IMPLANTABLE DEVICE | Site: KNEE | Status: FUNCTIONAL

## 2022-10-11 DEVICE — CEMENT BNE 40GM HI VISC RADPQ FOR REV SURG: Type: IMPLANTABLE DEVICE | Site: KNEE | Status: FUNCTIONAL

## 2022-10-11 RX ORDER — SODIUM CHLORIDE 0.9 % (FLUSH) 0.9 %
5-40 SYRINGE (ML) INJECTION PRN
Status: DISCONTINUED | OUTPATIENT
Start: 2022-10-11 | End: 2022-10-11 | Stop reason: HOSPADM

## 2022-10-11 RX ORDER — SODIUM CHLORIDE 0.9 % (FLUSH) 0.9 %
5-40 SYRINGE (ML) INJECTION PRN
Status: DISCONTINUED | OUTPATIENT
Start: 2022-10-11 | End: 2022-10-14 | Stop reason: HOSPADM

## 2022-10-11 RX ORDER — ACETAMINOPHEN 325 MG/1
650 TABLET ORAL EVERY 6 HOURS
Status: DISCONTINUED | OUTPATIENT
Start: 2022-10-11 | End: 2022-10-14 | Stop reason: HOSPADM

## 2022-10-11 RX ORDER — OXYCODONE HCL 10 MG/1
10 TABLET, FILM COATED, EXTENDED RELEASE ORAL
Status: COMPLETED | OUTPATIENT
Start: 2022-10-11 | End: 2022-10-11

## 2022-10-11 RX ORDER — ROPIVACAINE HYDROCHLORIDE 2 MG/ML
INJECTION, SOLUTION EPIDURAL; INFILTRATION; PERINEURAL PRN
Status: DISCONTINUED | OUTPATIENT
Start: 2022-10-11 | End: 2022-10-11 | Stop reason: HOSPADM

## 2022-10-11 RX ORDER — TOPIRAMATE 100 MG/1
100 TABLET, FILM COATED ORAL NIGHTLY
Status: DISCONTINUED | OUTPATIENT
Start: 2022-10-11 | End: 2022-10-14 | Stop reason: HOSPADM

## 2022-10-11 RX ORDER — MELOXICAM 7.5 MG/1
3.75 TABLET ORAL DAILY
Status: DISCONTINUED | OUTPATIENT
Start: 2022-10-11 | End: 2022-10-12

## 2022-10-11 RX ORDER — ONDANSETRON 2 MG/ML
INJECTION INTRAMUSCULAR; INTRAVENOUS PRN
Status: DISCONTINUED | OUTPATIENT
Start: 2022-10-11 | End: 2022-10-11 | Stop reason: SDUPTHER

## 2022-10-11 RX ORDER — UMECLIDINIUM 62.5 UG/1
2 AEROSOL, POWDER ORAL DAILY
COMMUNITY

## 2022-10-11 RX ORDER — PANTOPRAZOLE SODIUM 40 MG/1
40 TABLET, DELAYED RELEASE ORAL
Status: DISCONTINUED | OUTPATIENT
Start: 2022-10-11 | End: 2022-10-14 | Stop reason: HOSPADM

## 2022-10-11 RX ORDER — HYDROMORPHONE HYDROCHLORIDE 1 MG/ML
0.25 INJECTION, SOLUTION INTRAMUSCULAR; INTRAVENOUS; SUBCUTANEOUS EVERY 5 MIN PRN
Status: DISCONTINUED | OUTPATIENT
Start: 2022-10-11 | End: 2022-10-11 | Stop reason: HOSPADM

## 2022-10-11 RX ORDER — MIDAZOLAM HYDROCHLORIDE 2 MG/2ML
2 INJECTION, SOLUTION INTRAMUSCULAR; INTRAVENOUS ONCE
Status: COMPLETED | OUTPATIENT
Start: 2022-10-11 | End: 2022-10-11

## 2022-10-11 RX ORDER — HYDROMORPHONE HYDROCHLORIDE 1 MG/ML
0.5 INJECTION, SOLUTION INTRAMUSCULAR; INTRAVENOUS; SUBCUTANEOUS EVERY 5 MIN PRN
Status: COMPLETED | OUTPATIENT
Start: 2022-10-11 | End: 2022-10-11

## 2022-10-11 RX ORDER — LIDOCAINE HYDROCHLORIDE 10 MG/ML
1 INJECTION, SOLUTION EPIDURAL; INFILTRATION; INTRACAUDAL; PERINEURAL
Status: DISCONTINUED | OUTPATIENT
Start: 2022-10-11 | End: 2022-10-11 | Stop reason: HOSPADM

## 2022-10-11 RX ORDER — PROPOFOL 10 MG/ML
INJECTION, EMULSION INTRAVENOUS PRN
Status: DISCONTINUED | OUTPATIENT
Start: 2022-10-11 | End: 2022-10-11 | Stop reason: SDUPTHER

## 2022-10-11 RX ORDER — DESVENLAFAXINE 100 MG/1
100 TABLET, EXTENDED RELEASE ORAL DAILY
Status: DISCONTINUED | OUTPATIENT
Start: 2022-10-11 | End: 2022-10-14 | Stop reason: HOSPADM

## 2022-10-11 RX ORDER — SENNA PLUS 8.6 MG/1
1 TABLET ORAL 2 TIMES DAILY
Status: DISCONTINUED | OUTPATIENT
Start: 2022-10-11 | End: 2022-10-14 | Stop reason: HOSPADM

## 2022-10-11 RX ORDER — DEXAMETHASONE SODIUM PHOSPHATE 10 MG/ML
INJECTION, SOLUTION INTRAMUSCULAR; INTRAVENOUS PRN
Status: DISCONTINUED | OUTPATIENT
Start: 2022-10-11 | End: 2022-10-11 | Stop reason: SDUPTHER

## 2022-10-11 RX ORDER — LIDOCAINE HYDROCHLORIDE 10 MG/ML
INJECTION, SOLUTION INFILTRATION; PERINEURAL
Status: DISCONTINUED | OUTPATIENT
Start: 2022-10-11 | End: 2022-10-11 | Stop reason: SDUPTHER

## 2022-10-11 RX ORDER — SODIUM CHLORIDE 9 MG/ML
INJECTION, SOLUTION INTRAVENOUS PRN
Status: DISCONTINUED | OUTPATIENT
Start: 2022-10-11 | End: 2022-10-11 | Stop reason: HOSPADM

## 2022-10-11 RX ORDER — OXYCODONE HYDROCHLORIDE 5 MG/1
10 TABLET ORAL EVERY 4 HOURS PRN
Status: DISCONTINUED | OUTPATIENT
Start: 2022-10-11 | End: 2022-10-12

## 2022-10-11 RX ORDER — ROCURONIUM BROMIDE 10 MG/ML
INJECTION, SOLUTION INTRAVENOUS PRN
Status: DISCONTINUED | OUTPATIENT
Start: 2022-10-11 | End: 2022-10-11 | Stop reason: SDUPTHER

## 2022-10-11 RX ORDER — POLYETHYLENE GLYCOL 3350 17 G/17G
17 POWDER, FOR SOLUTION ORAL 2 TIMES DAILY
Status: DISCONTINUED | OUTPATIENT
Start: 2022-10-11 | End: 2022-10-14 | Stop reason: HOSPADM

## 2022-10-11 RX ORDER — DEXAMETHASONE SODIUM PHOSPHATE 10 MG/ML
8 INJECTION, SOLUTION INTRAMUSCULAR; INTRAVENOUS ONCE
Status: DISCONTINUED | OUTPATIENT
Start: 2022-10-11 | End: 2022-10-11 | Stop reason: HOSPADM

## 2022-10-11 RX ORDER — OXYCODONE HYDROCHLORIDE 5 MG/1
5 TABLET ORAL EVERY 4 HOURS PRN
Status: DISCONTINUED | OUTPATIENT
Start: 2022-10-11 | End: 2022-10-12

## 2022-10-11 RX ORDER — SODIUM CHLORIDE, SODIUM LACTATE, POTASSIUM CHLORIDE, CALCIUM CHLORIDE 600; 310; 30; 20 MG/100ML; MG/100ML; MG/100ML; MG/100ML
INJECTION, SOLUTION INTRAVENOUS CONTINUOUS
Status: DISCONTINUED | OUTPATIENT
Start: 2022-10-11 | End: 2022-10-11 | Stop reason: HOSPADM

## 2022-10-11 RX ORDER — LABETALOL 20 MG/4 ML (5 MG/ML) INTRAVENOUS SYRINGE
PRN
Status: DISCONTINUED | OUTPATIENT
Start: 2022-10-11 | End: 2022-10-11 | Stop reason: SDUPTHER

## 2022-10-11 RX ORDER — ROPIVACAINE HYDROCHLORIDE 5 MG/ML
INJECTION, SOLUTION EPIDURAL; INFILTRATION; PERINEURAL
Status: DISCONTINUED | OUTPATIENT
Start: 2022-10-11 | End: 2022-10-11 | Stop reason: SDUPTHER

## 2022-10-11 RX ORDER — FENTANYL CITRATE 50 UG/ML
INJECTION, SOLUTION INTRAMUSCULAR; INTRAVENOUS PRN
Status: DISCONTINUED | OUTPATIENT
Start: 2022-10-11 | End: 2022-10-11 | Stop reason: SDUPTHER

## 2022-10-11 RX ORDER — CELECOXIB 100 MG/1
100 CAPSULE ORAL ONCE
Status: DISCONTINUED | OUTPATIENT
Start: 2022-10-11 | End: 2022-10-11

## 2022-10-11 RX ORDER — SCOLOPAMINE TRANSDERMAL SYSTEM 1 MG/1
1 PATCH, EXTENDED RELEASE TRANSDERMAL ONCE
Status: DISCONTINUED | OUTPATIENT
Start: 2022-10-11 | End: 2022-10-11

## 2022-10-11 RX ORDER — ACETAMINOPHEN 500 MG
1000 TABLET ORAL ONCE
Status: COMPLETED | OUTPATIENT
Start: 2022-10-11 | End: 2022-10-11

## 2022-10-11 RX ORDER — SODIUM CHLORIDE 0.9 % (FLUSH) 0.9 %
5-40 SYRINGE (ML) INJECTION EVERY 12 HOURS SCHEDULED
Status: DISCONTINUED | OUTPATIENT
Start: 2022-10-11 | End: 2022-10-14 | Stop reason: HOSPADM

## 2022-10-11 RX ORDER — SODIUM CHLORIDE 9 MG/ML
INJECTION, SOLUTION INTRAVENOUS PRN
Status: DISCONTINUED | OUTPATIENT
Start: 2022-10-11 | End: 2022-10-14 | Stop reason: HOSPADM

## 2022-10-11 RX ORDER — PREGABALIN 50 MG/1
100 CAPSULE ORAL 3 TIMES DAILY
Status: DISCONTINUED | OUTPATIENT
Start: 2022-10-11 | End: 2022-10-14 | Stop reason: HOSPADM

## 2022-10-11 RX ORDER — TIZANIDINE 4 MG/1
4 TABLET ORAL 3 TIMES DAILY PRN
Status: DISCONTINUED | OUTPATIENT
Start: 2022-10-11 | End: 2022-10-14 | Stop reason: HOSPADM

## 2022-10-11 RX ORDER — MELOXICAM 7.5 MG/1
7.5 TABLET ORAL DAILY
Status: DISCONTINUED | OUTPATIENT
Start: 2022-10-11 | End: 2022-10-11 | Stop reason: SDUPTHER

## 2022-10-11 RX ORDER — SODIUM CHLORIDE 0.9 % (FLUSH) 0.9 %
5-40 SYRINGE (ML) INJECTION EVERY 12 HOURS SCHEDULED
Status: DISCONTINUED | OUTPATIENT
Start: 2022-10-11 | End: 2022-10-11 | Stop reason: HOSPADM

## 2022-10-11 RX ORDER — DOCUSATE SODIUM 100 MG/1
100 CAPSULE, LIQUID FILLED ORAL 2 TIMES DAILY PRN
Status: DISCONTINUED | OUTPATIENT
Start: 2022-10-11 | End: 2022-10-14 | Stop reason: HOSPADM

## 2022-10-11 RX ORDER — LIDOCAINE HYDROCHLORIDE 10 MG/ML
INJECTION, SOLUTION INFILTRATION; PERINEURAL PRN
Status: DISCONTINUED | OUTPATIENT
Start: 2022-10-11 | End: 2022-10-11 | Stop reason: SDUPTHER

## 2022-10-11 RX ORDER — METOPROLOL SUCCINATE 50 MG/1
50 TABLET, EXTENDED RELEASE ORAL DAILY
Status: DISCONTINUED | OUTPATIENT
Start: 2022-10-12 | End: 2022-10-14 | Stop reason: HOSPADM

## 2022-10-11 RX ORDER — METOCLOPRAMIDE HYDROCHLORIDE 5 MG/ML
10 INJECTION INTRAMUSCULAR; INTRAVENOUS
Status: DISCONTINUED | OUTPATIENT
Start: 2022-10-11 | End: 2022-10-11 | Stop reason: HOSPADM

## 2022-10-11 RX ORDER — VANCOMYCIN HYDROCHLORIDE 1 G/20ML
INJECTION, POWDER, LYOPHILIZED, FOR SOLUTION INTRAVENOUS PRN
Status: DISCONTINUED | OUTPATIENT
Start: 2022-10-11 | End: 2022-10-11 | Stop reason: HOSPADM

## 2022-10-11 RX ORDER — FAMOTIDINE 20 MG/1
20 TABLET, FILM COATED ORAL ONCE
Status: DISCONTINUED | OUTPATIENT
Start: 2022-10-11 | End: 2022-10-11 | Stop reason: HOSPADM

## 2022-10-11 RX ORDER — MORPHINE SULFATE 2 MG/ML
2 INJECTION, SOLUTION INTRAMUSCULAR; INTRAVENOUS
Status: DISCONTINUED | OUTPATIENT
Start: 2022-10-11 | End: 2022-10-14 | Stop reason: HOSPADM

## 2022-10-11 RX ORDER — MORPHINE SULFATE 4 MG/ML
4 INJECTION, SOLUTION INTRAMUSCULAR; INTRAVENOUS
Status: DISCONTINUED | OUTPATIENT
Start: 2022-10-11 | End: 2022-10-14 | Stop reason: HOSPADM

## 2022-10-11 RX ORDER — ONDANSETRON 4 MG/1
4 TABLET, ORALLY DISINTEGRATING ORAL EVERY 8 HOURS PRN
Status: DISCONTINUED | OUTPATIENT
Start: 2022-10-11 | End: 2022-10-14 | Stop reason: HOSPADM

## 2022-10-11 RX ORDER — MIDAZOLAM HYDROCHLORIDE 2 MG/2ML
2 INJECTION, SOLUTION INTRAMUSCULAR; INTRAVENOUS
Status: COMPLETED | OUTPATIENT
Start: 2022-10-11 | End: 2022-10-11

## 2022-10-11 RX ORDER — DIPHENHYDRAMINE HYDROCHLORIDE 50 MG/ML
12.5 INJECTION INTRAMUSCULAR; INTRAVENOUS
Status: DISCONTINUED | OUTPATIENT
Start: 2022-10-11 | End: 2022-10-11 | Stop reason: HOSPADM

## 2022-10-11 RX ORDER — GAUZE BANDAGE 2" X 2"
50 BANDAGE TOPICAL DAILY
Status: DISCONTINUED | OUTPATIENT
Start: 2022-10-11 | End: 2022-10-14 | Stop reason: HOSPADM

## 2022-10-11 RX ORDER — APREPITANT 40 MG/1
40 CAPSULE ORAL ONCE
Status: COMPLETED | OUTPATIENT
Start: 2022-10-11 | End: 2022-10-11

## 2022-10-11 RX ORDER — TRANEXAMIC ACID 650 1/1
1950 TABLET ORAL
Status: COMPLETED | OUTPATIENT
Start: 2022-10-11 | End: 2022-10-11

## 2022-10-11 RX ORDER — ROPIVACAINE HYDROCHLORIDE 5 MG/ML
INJECTION, SOLUTION EPIDURAL; INFILTRATION; PERINEURAL
Status: COMPLETED
Start: 2022-10-11 | End: 2022-10-11

## 2022-10-11 RX ORDER — CELECOXIB 200 MG/1
200 CAPSULE ORAL ONCE
Status: DISCONTINUED | OUTPATIENT
Start: 2022-10-11 | End: 2022-10-11

## 2022-10-11 RX ADMIN — OXYCODONE 10 MG: 5 TABLET ORAL at 20:08

## 2022-10-11 RX ADMIN — SUGAMMADEX 300 MG: 100 INJECTION, SOLUTION INTRAVENOUS at 09:45

## 2022-10-11 RX ADMIN — Medication 3000 MG: at 08:25

## 2022-10-11 RX ADMIN — FENTANYL CITRATE 50 MCG: 50 INJECTION, SOLUTION INTRAMUSCULAR; INTRAVENOUS at 08:33

## 2022-10-11 RX ADMIN — HYDROMORPHONE HYDROCHLORIDE 0.5 MG: 1 INJECTION, SOLUTION INTRAMUSCULAR; INTRAVENOUS; SUBCUTANEOUS at 10:32

## 2022-10-11 RX ADMIN — PROPOFOL 30 MG: 10 INJECTION, EMULSION INTRAVENOUS at 08:42

## 2022-10-11 RX ADMIN — DEXAMETHASONE SODIUM PHOSPHATE 10 MG: 10 INJECTION, SOLUTION INTRAMUSCULAR; INTRAVENOUS at 08:39

## 2022-10-11 RX ADMIN — LABETALOL 20 MG/4 ML (5 MG/ML) INTRAVENOUS SYRINGE 5 MG: at 08:40

## 2022-10-11 RX ADMIN — ACETAMINOPHEN 650 MG: 325 TABLET ORAL at 20:08

## 2022-10-11 RX ADMIN — MIDAZOLAM HYDROCHLORIDE 2 MG: 2 INJECTION, SOLUTION INTRAMUSCULAR; INTRAVENOUS at 11:06

## 2022-10-11 RX ADMIN — PROPOFOL 50 MG: 10 INJECTION, EMULSION INTRAVENOUS at 08:35

## 2022-10-11 RX ADMIN — HYDROMORPHONE HYDROCHLORIDE 0.5 MG: 1 INJECTION, SOLUTION INTRAMUSCULAR; INTRAVENOUS; SUBCUTANEOUS at 10:43

## 2022-10-11 RX ADMIN — ROCURONIUM BROMIDE 50 MG: 10 INJECTION, SOLUTION INTRAVENOUS at 08:19

## 2022-10-11 RX ADMIN — HYDROMORPHONE HYDROCHLORIDE 0.5 MG: 1 INJECTION, SOLUTION INTRAMUSCULAR; INTRAVENOUS; SUBCUTANEOUS at 10:20

## 2022-10-11 RX ADMIN — TRANEXAMIC ACID 1950 MG: 650 TABLET ORAL at 07:10

## 2022-10-11 RX ADMIN — TOPIRAMATE 100 MG: 100 TABLET, FILM COATED ORAL at 20:08

## 2022-10-11 RX ADMIN — LIDOCAINE HYDROCHLORIDE 50 MG: 10 INJECTION, SOLUTION INFILTRATION; PERINEURAL at 08:19

## 2022-10-11 RX ADMIN — PREGABALIN 100 MG: 50 CAPSULE ORAL at 20:08

## 2022-10-11 RX ADMIN — SODIUM CHLORIDE, SODIUM LACTATE, POTASSIUM CHLORIDE, AND CALCIUM CHLORIDE: 600; 310; 30; 20 INJECTION, SOLUTION INTRAVENOUS at 09:10

## 2022-10-11 RX ADMIN — HYDROMORPHONE HYDROCHLORIDE 0.5 MG: 1 INJECTION, SOLUTION INTRAMUSCULAR; INTRAVENOUS; SUBCUTANEOUS at 11:24

## 2022-10-11 RX ADMIN — ROPIVACAINE HYDROCHLORIDE 20 ML: 5 INJECTION, SOLUTION EPIDURAL; INFILTRATION; PERINEURAL at 08:00

## 2022-10-11 RX ADMIN — LIDOCAINE HYDROCHLORIDE 10 ML: 10 INJECTION, SOLUTION INFILTRATION; PERINEURAL at 08:00

## 2022-10-11 RX ADMIN — PROPOFOL 150 MG: 10 INJECTION, EMULSION INTRAVENOUS at 08:19

## 2022-10-11 RX ADMIN — FENTANYL CITRATE 50 MCG: 50 INJECTION, SOLUTION INTRAMUSCULAR; INTRAVENOUS at 08:19

## 2022-10-11 RX ADMIN — SODIUM CHLORIDE, SODIUM LACTATE, POTASSIUM CHLORIDE, AND CALCIUM CHLORIDE: 600; 310; 30; 20 INJECTION, SOLUTION INTRAVENOUS at 06:56

## 2022-10-11 RX ADMIN — SENNOSIDES 8.6 MG: 8.6 TABLET, FILM COATED ORAL at 20:08

## 2022-10-11 RX ADMIN — OXYCODONE 10 MG: 5 TABLET ORAL at 12:45

## 2022-10-11 RX ADMIN — CEFAZOLIN SODIUM 3000 MG: 10 INJECTION, POWDER, FOR SOLUTION INTRAVENOUS at 16:34

## 2022-10-11 RX ADMIN — MIDAZOLAM HYDROCHLORIDE 2 MG: 2 INJECTION, SOLUTION INTRAMUSCULAR; INTRAVENOUS at 08:07

## 2022-10-11 RX ADMIN — HYDROMORPHONE HYDROCHLORIDE 0.5 MG: 1 INJECTION, SOLUTION INTRAMUSCULAR; INTRAVENOUS; SUBCUTANEOUS at 10:25

## 2022-10-11 RX ADMIN — ASPIRIN 325 MG: 325 TABLET, DELAYED RELEASE ORAL at 20:09

## 2022-10-11 RX ADMIN — FENTANYL CITRATE 50 MCG: 50 INJECTION, SOLUTION INTRAMUSCULAR; INTRAVENOUS at 08:35

## 2022-10-11 RX ADMIN — THIAMINE HCL TAB 100 MG 50 MG: 100 TAB at 17:30

## 2022-10-11 RX ADMIN — ACETAMINOPHEN 1000 MG: 500 TABLET ORAL at 07:10

## 2022-10-11 RX ADMIN — APREPITANT 40 MG: 40 CAPSULE ORAL at 07:10

## 2022-10-11 RX ADMIN — MORPHINE SULFATE 4 MG: 4 INJECTION, SOLUTION INTRAMUSCULAR; INTRAVENOUS at 21:06

## 2022-10-11 RX ADMIN — TIZANIDINE 4 MG: 4 TABLET ORAL at 20:18

## 2022-10-11 RX ADMIN — FENTANYL CITRATE 50 MCG: 50 INJECTION, SOLUTION INTRAMUSCULAR; INTRAVENOUS at 08:51

## 2022-10-11 RX ADMIN — ONDANSETRON 4 MG: 2 INJECTION INTRAMUSCULAR; INTRAVENOUS at 08:39

## 2022-10-11 RX ADMIN — OXYCODONE HYDROCHLORIDE 10 MG: 10 TABLET, FILM COATED, EXTENDED RELEASE ORAL at 07:10

## 2022-10-11 RX ADMIN — HYDROMORPHONE HYDROCHLORIDE 0.5 MG: 1 INJECTION, SOLUTION INTRAMUSCULAR; INTRAVENOUS; SUBCUTANEOUS at 11:14

## 2022-10-11 RX ADMIN — MORPHINE SULFATE 4 MG: 4 INJECTION, SOLUTION INTRAMUSCULAR; INTRAVENOUS at 17:37

## 2022-10-11 ASSESSMENT — PAIN DESCRIPTION - FREQUENCY
FREQUENCY: CONTINUOUS
FREQUENCY: CONTINUOUS

## 2022-10-11 ASSESSMENT — PAIN DESCRIPTION - LOCATION
LOCATION: LEG;KNEE
LOCATION: KNEE
LOCATION: KNEE;LEG
LOCATION: KNEE
LOCATION: KNEE

## 2022-10-11 ASSESSMENT — PAIN SCALES - GENERAL
PAINLEVEL_OUTOF10: 10
PAINLEVEL_OUTOF10: 10
PAINLEVEL_OUTOF10: 8
PAINLEVEL_OUTOF10: 10
PAINLEVEL_OUTOF10: 8
PAINLEVEL_OUTOF10: 10
PAINLEVEL_OUTOF10: 9
PAINLEVEL_OUTOF10: 10

## 2022-10-11 ASSESSMENT — ENCOUNTER SYMPTOMS: SHORTNESS OF BREATH: 1

## 2022-10-11 ASSESSMENT — LIFESTYLE VARIABLES: SMOKING_STATUS: 0

## 2022-10-11 ASSESSMENT — PAIN DESCRIPTION - DESCRIPTORS
DESCRIPTORS: ACHING;SORE;THROBBING
DESCRIPTORS: ACHING;DISCOMFORT;SHOOTING;THROBBING
DESCRIPTORS: THROBBING;DISCOMFORT
DESCRIPTORS: SORE;TENDER;THROBBING

## 2022-10-11 ASSESSMENT — PAIN DESCRIPTION - ORIENTATION
ORIENTATION: LEFT

## 2022-10-11 ASSESSMENT — PAIN DESCRIPTION - PAIN TYPE
TYPE: SURGICAL PAIN
TYPE: SURGICAL PAIN

## 2022-10-11 ASSESSMENT — PAIN - FUNCTIONAL ASSESSMENT
PAIN_FUNCTIONAL_ASSESSMENT: PREVENTS OR INTERFERES SOME ACTIVE ACTIVITIES AND ADLS

## 2022-10-11 ASSESSMENT — PAIN DESCRIPTION - ONSET
ONSET: PROGRESSIVE
ONSET: ON-GOING

## 2022-10-11 NOTE — OP NOTE
TOTAL KNEE ARTHROPLASTY OPERATIVE NOTE    NAME OF SURGEON / : Tony Edwards MD  PATIENT:   [facility]  Date: 10/11/2022        Time: 9:37 AM   Referring Physician: ________________________    PREOP DIAGNOSIS:  left Knee  Primary osteoarthritis   POSTOP DIAGNOSIS:  Same     PROCEDURE:    Left  Cemented Posterior Stabilized Knee arthroplasty  Bmi 53  Complex Primary, please add modifier -22. This procedure required 50 % more effort from the surgeon and staff. The thick adipose layer made the approach to the joint and exposure much more difficult than normal.  Extra deep retractors and more effort were required to maintain the joint in position and allow visualization to perform the procedure. The added weight of the extremity made it extremely difficult to manipulate the joint and to check range of motion and stability. The closure was prolonged due to the added length and depth of the wound. IMPLANTS:   Implant Name Type Inv.  Item Serial No.  Lot No. LRB No. Used Action   CEMENT BNE 40GM HI VISC RADPQ FOR REV SURG - [id number]  CEMENT BNE 40GM HI VISC RADPQ FOR REV SURG  [employer]- [id number] Left 2 Implanted   COMPONENT FEM SZ 7 STD L KNEE CO CHROM CAROL POST STBL COR - [id number]  COMPONENT FEM SZ 7 STD L KNEE CO CHROM CAROL POST STBL COR  [employer]- [id number] Left 1 Implanted   PSN TIB STM 5 DEG SZ D L - [id number]  PSN TIB STM 5 DEG SZ D L  [employer]- [id number] Left 1 Implanted   EXTENSION STEM L30MM [id number] KNEE TAPR CAROL PERSONA - [id number]  EXTENSION STEM L30MM [id number] KNEE TAPR CAROL PERSONA  [employer]- [id number] Left 1 Implanted   COMPONENT PAT GON08PG THK9MM KNEE POLY CAROL CONVENTIONAL - [id number]  COMPONENT PAT HFO06WD THK9MM KNEE POLY CAROL CONVENTIONAL  [employer]- [id number] Left 1 Implanted   SURFACE ARTC TIB CD FEM 6-9 H14MM LT KNEE VIVACIT-E CONSTRN - [id number]  SURFACE ARTC TIB CD FEM 6-9 the tibia. The external alignment guide set to cut 10 mm off the intact side at 7° degrees posterior slope was pinned in place. I stepped back and verified that the guide followed the anterior cortex of the tibia to the ankle. The tibial cut was made. Its thickness was 10 mm. The tibial fragment and osteophytes were removed. Posterior meniscal horns were resected. The extension gap was satisfactory. The epicondylar and AP femoral axes were marked with electrocautery. Femoral trials were laid on the distal femur to estimate the appropriate size. The femoral sizer, with posterior paddles set at 3 degrees of external rotation, and an anterior stylus was placed. The sizer reading matched the size predicted by the trial.   The pinholes were drilled for the 4 in 1 femoral cutting block. This block was impacted on the distal femur and sat flush with the proper rotation relative to the AP and epicondylar axes. A drill was advanced through the anterior slot to check for notching. The femoral block was moved anteriorly 2 mm to avoid notching and was fixed with medial and lateral screws and the remaining femoral cuts were made. Femoral osteophytes were removed with a rongeur. The flexion gap was satisfactory. Posterior femoral osteophytes were removed with a 3/4 inch curved osteotome and rongeur. The appropriate tibial post punch guide covered the tibia without overhang and sat flush. It was lined up with the medial third of the tibial tubercle. The tibia was reamed, then the keel punched. The femoral trial was impacted onto the femur. The box cut was made first with a recip saw and finished with regular saw. The insert for the box was placed. A 10 mm thick, posterior stabilized tibial liner was snapped in place and ROM and stability were checked.    The knee had full ROM and symmetric varus/valgus stability in flexion and extension after    *release of  NO RELEASES  * no extra resection was needed   *the appropriate size tibial liner was placed (see above)    Note: Stability to varus/valgus stress(mm):  Extension ( 2  ,  1 ) Mid Flexion (  2  ,  1  ) Flexion (  2  ,  1  )  *A CPS poly wasused to improve stability      The maximum patella thickness was 20 mm. The patella as resected with an oscillating saw and consistent thickness verified with caliper. The trial patella was placed and the overall thickness was restored to 21 mm. A femoral  bone plug placed, the femoral lug holes drilled and the trials were removed. The surfaces were rinsed with pulse lavage and dried. Two batches of cement  were mixed. Appropriate sized implants were cemented in place, a trial tibial liner placed, and the knee held in full extension until cement hardened. The capsule was injected with Ropivacaine. Excess cement was removed, and the actual tibial liner was snapped in place. No thumbs patella tracking was checked. No release was needed. Hemostasis was achieved with electrocautery. The wound was copiously irrigated with antibiotic irrigation. The capsule was closed with interrupted #2 vicryl. Subcutaneous tissue was closed with running 2-0 vicryl. Skin was closed with 3-0 vicryl and Prineo. A sterile dressing was applied. The patient was awakened, extubated and transferred to the recovery room in stable condition.             PLAN:  Full weight bearing, ROM, dvt prophylaxis      Electronically signed by Maribel Hillman MD on 10/11/2022 at 9:37 AM

## 2022-10-11 NOTE — CARE COORDINATION
BE Letter given to patient. Reviewed, discussed, answered all questions, and signed by patient. Signed copy placed in patient's chart.      10/11/22 1356   IMM Letter   Observation Status Letter date given: 10/11/22   Observation Status Letter time given: 1350   Observation Status Letter given to Patient/Family/Significant other/Guardian/POA/by: given to patient by Mayra Campuzano RN BSN CM   Electronically signed by Gita Mary RN, BSN on 10/11/2022 at 1:57 PM

## 2022-10-11 NOTE — ANESTHESIA PROCEDURE NOTES
Peripheral Block    Patient location during procedure: holding area  Reason for block: post-op pain management  Start time: 10/11/2022 8:00 AM  Staffing  Performed: anesthesiologist   Anesthesiologist: Rd Almendarez MD  Preanesthetic Checklist  Completed: patient identified, IV checked, site marked, risks and benefits discussed, surgical/procedural consents, equipment checked, pre-op evaluation, timeout performed, anesthesia consent given, oxygen available and monitors applied/VS acknowledged  Peripheral Block   Patient position: supine  Prep: ChloraPrep  Provider prep: mask and sterile gloves  Patient monitoring: cardiac monitor, continuous pulse ox, frequent blood pressure checks and IV access  Block type: Femoral  Adductor canal  Laterality: left  Injection technique: single-shot  Guidance: ultrasound guided  Local infiltration: ropivacaine  Infiltration strength: 0.5 %  Local infiltration: ropivacaine  Dose: 20 mL    Needle   Needle type: combined needle/nerve stimulator   Needle gauge: 22 G  Needle localization: ultrasound guidance  Needle length: 10 cm  Assessment   Injection assessment: negative aspiration for heme, no paresthesia on injection and local visualized surrounding nerve on ultrasound  Paresthesia pain: none  Slow fractionated injection: yes  Hemodynamics: stable  Real-time US image taken/store: yes  Outcomes: patient tolerated procedure well    Additional Notes  Under ultrasound (\"US\") guidance, a 22 gauge needle was inserted and placed in close proximity to the femoral nerve. Ultrasound was also used to visualize the spread of the anesthetic in close proximity to the nerve being blocked. The nerve appeared anatomically normal, and there were no abnormal pathological findings. A permanent image was recorded.      20 mL 0.5% Ropivacaine injected  Medications Administered  lidocaine 1 % - Subcuticular   10 mL - 10/11/2022 8:00:00 AM  ropivacaine (NAROPIN) injection 0.5% - Perineural   20 mL - 10/11/2022 8:00:00 AM

## 2022-10-11 NOTE — ANESTHESIA POSTPROCEDURE EVALUATION
Department of Anesthesiology  Postprocedure Note    Patient: Carolyn Melton  MRN: [id number]  YOB: 1970  Date of evaluation: 10/11/2022      Procedure Summary     Date: 10/11/22 Room / Location: [address]    Anesthesia Start: 0813 Anesthesia Stop: 5411    Procedure: LEFT KNEE TOTAL COMPLEX PRIMARY ARTHROPLASTY (Left: Knee) Diagnosis:       Primary osteoarthritis of left knee      (Primary osteoarthritis of left knee [M17.12])    Surgeons: Marga Haney MD Responsible Provider: SARAH Tate CRNA    Anesthesia Type: general, regional ASA Status: 4          Anesthesia Type: No value filed.     Cristobal Phase I: Cristobal Score: 9    Cristobal Phase II:        Anesthesia Post Evaluation    Patient location during evaluation: PACU  Patient participation: waiting for patient participation  Level of consciousness: sleepy but conscious  Pain score: 0  Airway patency: patent  Nausea & Vomiting: no nausea and no vomiting  Complications: no  Cardiovascular status: hemodynamically stable  Respiratory status: acceptable, spontaneous ventilation and face mask  Hydration status: euvolemic

## 2022-10-11 NOTE — DISCHARGE INSTRUCTIONS
[facility] [place] [facility]  Dr. Sandy Kaur      Total Knee and Uni Knee Replacement  Discharge Instructions     To prevent blood clots, you have been placed on the following medication:  Aspirin 81 mg twice a day for four weeks    Surgical Site Care: Showering is permitted on post op day 2 - Thursday  No submersion in a bath, swimming pool, whirlpool, etc    Physical Therapy:  You were given a prescription for outpatient therapy. Please schedule with [facility]  if convenient by calling [phone]. Otherwise, schedule with a therapist closer to your home  Work on range of motion. Goal for your first office visit is for you to fully straighten and bend your knee to at least a right angle   Don't walk for exercise (it will make you more sore)  Weight Bearing Status:  Full weight bearing with a walker     Pain Medications  You were given a prescription to fill at your pharmacy  Wean off pain medications as you deem appropriate as long as pain is under control  Take tylenol instead of the prescribed pain medicine as your pain improves    Cold packs                                                                                                               FEVER .5 or less        May be used as necessary                                                                    Take Tylenol x 2                                                                                                            Deep breath x 10   Please take a stool softener such as dulcolax or colace daily                                 Cough, cough, cough                                                                                                                        Recheck in 1 - 11/2                                                                                                                                            hours  Do not drive for three weeks  DO NOT SMOKE, VAPE OR CHEW!!!     Contact office if  Increased redness, swelling, drainage of any kind, and/or severe pain at surgery site. As well as new onset fevers and or chills. These could signify an infection. Calf tenderness to touch as well as increased swelling or redness. This could signify a clot. Any rash appears, increased  or new onset nausea/vomiting occur. This may indicate a reaction to a medication. Phone # [phone] ext [phone]. Leave message for my assistant. She will promptly return your call. If you have an absolute emergency, text me with your name and problem at [phone].  (Dr. Valeria Bond)  Or contact Ortho Navigator at 1 [phone]. [facility])    Follow up with Surgeon at scheduled appointment time. Thanks for the opportunity to care for you!

## 2022-10-11 NOTE — PROGRESS NOTES
The pt. Again c/o 10/10 pain. She has started the grunting, moaning again but still appears comfortable. LOC still lowered with the VERSED

## 2022-10-11 NOTE — PROGRESS NOTES
Report received from previous RN. Patient very briefly opens her to name. Received on 3L N/C and nasal airway in place. Vitals noted.

## 2022-10-11 NOTE — PROGRESS NOTES
Pt. Continues to c/o 10/10 pain. The versed does have her LOC lowered . .. she looks comfortable and is not grunting.

## 2022-10-11 NOTE — ANESTHESIA PRE PROCEDURE
Department of Anesthesiology  Preprocedure Note       Name:  Camilo Ospina   Age:  45 y.o.  :  1970                                          MRN:  [id number]         Date:  10/11/2022      Surgeon: Corina Lin):  Ly Shah MD    Procedure: Procedure(s):  LEFT KNEE TOTAL COMPLEX PRIMARY ARTHROPLASTY    Medications prior to admission:   Prior to Admission medications    Medication Sig Start Date End Date Taking? Authorizing Provider   Umeclidinium Bromide (INCRUSE ELLIPTA) 62.5 MCG/INH AEPB Inhale 2 puffs into the lungs daily    Historical Provider, MD   metoprolol succinate (TOPROL XL) 50 MG extended release tablet Take 1 tablet by mouth everyday  Patient taking differently: Take 50 mg by mouth daily 22   SARAH Waller   levothyroxine (SYNTHROID) 175 MCG tablet TAKE 1 TABLET BY MOUTH EVERY DAY  Patient taking differently: Take 175 mcg by mouth Daily TAKE 1 TABLET BY MOUTH EVERY DAY 22   SARAH Waller   vitamin D (ERGOCALCIFEROL) 1.25 MG (31488 UT) CAPS capsule Take 1 capsule by mouth once a week 22   SARAH Clay   Ubrogepant (UBRELVY) 100 MG TABS Take 1 tablet at the onset of migraine. May repeat once in 2 hours if no improvement. Do not exceed 2 tablets in 24 hours.  22   SARAH Waller   topiramate (TOPAMAX) 100 MG tablet Take 1 tablet by mouth nightly Indications: Backache 22   SARAH Waller   pantoprazole (PROTONIX) 40 MG tablet TAKE 1 TABLET BY MOUTH TWICE A DAY  Patient taking differently: Take 40 mg by mouth 2 times daily 22   SARAH Waller   ondansetron (ZOFRAN-ODT) 4 MG disintegrating tablet Take 1 tablet by mouth every 8 hours as needed for Nausea or Vomiting 22   SARAH Waller   desvenlafaxine succinate (PRISTIQ) 100 MG TB24 extended release tablet Take 1 tablet by mouth daily  Patient taking differently: Take 100 mg by mouth daily as needed 22 SARAH Nair   mirabegron [facility]) 25 MG TB24 Take 1 tablet by mouth daily 8/22/22   SARAH Osei   Fluticasone-Salmeterol,sensor, [facility]) 780-51 MCG/ACT AEPB Inhale 1-2 Doses into the lungs 2 times daily    Historical Provider, MD   Thiamine 50 MG CAPS Take 50 mg by mouth daily 1/7/22   SARAH Boateng   docusate sodium (COLACE) 100 MG capsule Take 100 mg by mouth 2 times daily as needed     Historical Provider, MD   pregabalin (LYRICA) 100 MG capsule Take 100 mg by mouth 3 times daily. Historical Provider, MD   OXYGEN Inhale 3 L/min into the lungs continuous     Historical Provider, MD   epoetin cheryl (EPOGEN;PROCRIT) 89240 UNIT/ML injection Inject 40,000 Units into the skin as needed  9/6/17   Historical Provider, MD   tiZANidine (ZANAFLEX) 4 MG tablet Take 4 mg by mouth 3 times daily as needed (muscle spasms)     Historical Provider, MD       Current medications:    No current facility-administered medications for this visit. No current outpatient medications on file.      Facility-Administered Medications Ordered in Other Visits   Medication Dose Route Frequency Provider Last Rate Last Admin    oxyCODONE (OXYCONTIN) extended release tablet 10 mg  10 mg Oral 60 Min Pre-Op Roberto Alexander MD        scopolamine (TRANSDERM-SCOP) transdermal patch 1 patch  1 patch TransDERmal Once Roberto Alexander MD        acetaminophen (TYLENOL) tablet 1,000 mg  1,000 mg Oral Once Roberto Alexander MD        dexamethasone (PF) (DECADRON) injection 8 mg  8 mg IntraVENous Once Roberto Alexander MD        tranexamic acid (LYSTEDA) tablet 1,950 mg  1,950 mg Oral On Call to OR Roberto Alexander MD        lactated ringers infusion   IntraVENous Continuous Roberto Alexander  mL/hr at 10/11/22 0656 New Bag at 10/11/22 0656    sodium chloride flush 0.9 % injection 5-40 mL  5-40 mL IntraVENous 2 times per day [employer] MD Mariela        sodium chloride flush 0.9 % injection 5-40 mL  5-40 mL IntraVENous PRN Roland Marks MD        0.9 % sodium chloride infusion   IntraVENous PRN Roland Marks MD        ceFAZolin (ANCEF) 3000 mg in dextrose 5 % 100 mL IVPB  3,000 mg IntraVENous On Call to [address], MD           Allergies: Allergies   Allergen Reactions    Tape Carry Diann Lozoyaa Fee      can use paper tape       Problem List:    Patient Active Problem List   Diagnosis Code    Irritable bowel syndrome with constipation K58.1    Acquired hypothyroidism E03.9    Essential hypertension I10    Depression F32. A    GERD (gastroesophageal reflux disease) K21.9    Short-term memory loss R41.3    Stage 3b chronic kidney disease (HCC) N18.32    Vitamin D deficiency E55.9    Degenerative disc disease, lumbar M51.36    OAB (overactive bladder) N32.81       Past Medical History:        Diagnosis Date    Acquired hypothyroidism     B12 deficiency     Chronic back pain     Chronic pancreatitis (HCC)     Colon polyps     Depression     Fibromyalgia     GERD (gastroesophageal reflux disease)     Headache     Hypertension     Irritable bowel syndrome     Kidney stones     Neuropathy     On home oxygen therapy     3 liters    PONV (postoperative nausea and vomiting)     Restless legs syndrome     Sarcoidosis 11/01/2016    Scoliosis     SVT (supraventricular tachycardia) ([facility])        Past Surgical History:        Procedure Laterality Date    ABLATION OF DYSRHYTHMIC FOCUS  2016    SVT    ANAL SPHINCTEROTOMY N/A 2/19/2020    LATERAL SPHINCTEROTOMY performed by Sharyle Croon, MD at [facility]  10/13/2014    Dr. Cheyenne GARNER-5 yr recall    COLONOSCOPY  05/20/2008    Dr. Haven Johansen: unremarkable    COLONOSCOPY  08/23/2017    Dr FloresM-5 yr recall    COLONOSCOPY  09/06/2018    Dr. Margery Romberg, normal-5 yr recall    HYSTERECTOMY ([address] UNKNOWN)      JOINT REPLACEMENT  12/30/2013    Patella joint replacement    LITHOTRIPSY      PLANTAR FASCIA SURGERY Left     MA REVISE MEDIAN N/CARPAL TUNNEL SURG Right 5/8/2018    CARPAL TUNNEL RELEASE performed by Alva Villegas MD at [facility] 48 Right 5/4/2017    KNEE TOTAL ARTHROPLASTY REVISION performed by Alva Villegas MD at [facility] 97 ARTHROSCOPY Left     SHOULDER ARTHROSCOPY Right 3/24/2022    RIGHT SHOULDER ARTHROSCOPIC, ROTATOR CUFF REPAIR, BICEPS TENODESIS performed by Alva Villegas MD at [facility] 3/15/2019    Dr Armin Mariscal non edematous hemorrhoids, anal fissure-BC    TOTAL KNEE ARTHROPLASTY Right 11/16/2015    ULNAR TUNNEL RELEASE      UPPER GASTROINTESTINAL ENDOSCOPY  10/16/2014    Dr. Vishal Mendoza  05/19/2008    Dr. Geovanni Garza: jaya neg, barretts neg,  [facility]    UPPER GASTROINTESTINAL ENDOSCOPY N/A 2/10/2016    Dr Louis Esparza-mild esophageal stricture dilated; reactive gastropathy    UPPER GASTROINTESTINAL ENDOSCOPY  08/23/2017    Dr Chad Stark Bilateral     RADIO ABLATION       Social History:    Social History     Tobacco Use    Smoking status: Never    Smokeless tobacco: Never    Tobacco comments:     disabled due to sarcoidosis   Substance Use Topics    Alcohol use: No                                Counseling given: Not Answered  Tobacco comments: disabled due to sarcoidosis      Vital Signs (Current): There were no vitals filed for this visit.                                            BP Readings from Last 3 Encounters:   10/11/22 (!) 142/98   08/22/22 128/82   04/29/22 132/86       NPO Status:                                                                                 BMI:   Wt Readings from Last 3 Encounters:   10/11/22 290 lb (131.5 kg)   10/04/22 280 lb (127 kg)   08/22/22 294 lb (133.4 kg)     There is no height or weight on file to calculate BMI.    CBC:   Lab Results   Component Value Date/Time    WBC 4.0 10/04/2022 02:10 PM    RBC 3.56 10/04/2022 02:10 PM    HGB 11.3 10/04/2022 02:10 PM    HCT 36.5 10/04/2022 02:10 PM    .5 10/04/2022 02:10 PM    RDW 13.2 10/04/2022 02:10 PM     10/04/2022 02:10 PM       CMP:   Lab Results   Component Value Date/Time     10/04/2022 02:10 PM    K 4.2 10/04/2022 02:10 PM     10/04/2022 02:10 PM    CO2 25 10/04/2022 02:10 PM    BUN 27 10/04/2022 02:10 PM    CREATININE 1.5 10/04/2022 02:10 PM    GFRAA 44 10/04/2022 02:10 PM    LABGLOM 36 10/04/2022 02:10 PM    GLUCOSE 95 10/04/2022 02:10 PM    PROT 7.2 08/22/2022 02:50 PM    CALCIUM 9.1 10/04/2022 02:10 PM    BILITOT 0.4 08/22/2022 02:50 PM    ALKPHOS 80 08/22/2022 02:50 PM    AST 20 08/22/2022 02:50 PM    ALT 14 08/22/2022 02:50 PM       POC Tests: No results for input(s): POCGLU, POCNA, POCK, POCCL, POCBUN, POCHEMO, POCHCT in the last 72 hours. Coags:   Lab Results   Component Value Date/Time    PROTIME 13.4 10/04/2022 02:10 PM    INR 1.03 10/04/2022 02:10 PM    APTT 30.7 10/04/2022 02:10 PM       HCG (If Applicable): No results found for: PREGTESTUR, PREGSERUM, HCG, HCGQUANT     ABGs:   Lab Results   Component Value Date/Time    PHART 7.290 02/15/2022 01:40 PM    PO2ART 94.0 02/15/2022 01:40 PM    CZH5QET 49.0 02/15/2022 01:40 PM    BLB9NPP 23.6 02/15/2022 01:40 PM    BEART -3.3 02/15/2022 01:40 PM    L6QHQYAE 95.7 02/15/2022 01:40 PM        Type & Screen (If Applicable):  No results found for: LABABO, LABRH    Drug/Infectious Status (If Applicable):  No results found for: HIV, HEPCAB    COVID-19 Screening (If Applicable):   Lab Results   Component Value Date/Time    COVID19 Not Detected 10/07/2022 12:40 PM           Anesthesia Evaluation  Patient summary reviewed and Nursing notes reviewed   history of anesthetic complications: PONV.   Airway: Mallampati: III       Mouth opening: < 3 FB   Dental:    (+) upper dentures Pulmonary:normal exam    (+) shortness of breath: no interval change,      (-) asthma, sleep apnea and not a current smoker                          ROS comment: Home oxygen  sarcoidosis   Cardiovascular:  Exercise tolerance: no interval change,   (+) hypertension:, dysrhythmias: SVT,     (-) pacemaker, past MI, CAD, CABG/stent,  angina and no hyperlipidemia    ECG reviewed               Beta Blocker:  Dose within 24 Hrs         Neuro/Psych:   (+) neuromuscular disease (fibromyalgia):, headaches:, psychiatric history:   (-) seizures and CVA           GI/Hepatic/Renal:   (+) GERD: well controlled, renal disease: kidney stones, morbid obesity     (-) liver disease       Endo/Other:    (+) hypothyroidism, blood dyscrasia (anemia): arthritis:., no malignancy/cancer. (-) diabetes mellitus, no malignancy/cancer               Abdominal:   (+) obese,           Vascular: negative vascular ROS. - DVT and PE. Other Findings:             Anesthesia Plan      general and regional     ASA 4     (Scop/emend preop  Adductor canal block)  Induction: intravenous. MIPS: Postoperative opioids intended and Prophylactic antiemetics administered. Anesthetic plan and risks discussed with patient.                         Divina Olivo MD   10/11/2022

## 2022-10-11 NOTE — PROGRESS NOTES
Patient opens eyes to name and will hold a conversation. Answering questions appropriately. Nasal airway removed.  Oxygen saturation monitored and charted on 3L N/C.

## 2022-10-11 NOTE — PROGRESS NOTES
4 Eyes Skin Assessment    Mindi Silver is being assessed upon: Admission    I agree that I, Ivon Buenrostro, RN, along with Jeimy RN (either 2 RN's or 1 LPN and 1 RN) have performed a thorough Head to Toe Skin Assessment on the patient. ALL assessment sites listed below have been assessed. Areas assessed by both nurses:     [x]   Head, Face, and Ears   [x]   Shoulders, Back, and Chest  [x]   Arms, Elbows, and Hands   [x]   Coccyx, Sacrum, and Ischium  [x]   Legs, Feet, and Heels    Does the Patient have Skin Breakdown?  No    Ra Prevention initiated: No  Wound Care Orders initiated: No    WOC nurse consulted for Pressure Injury (Stage 3,4, Unstageable, DTI, NWPT, and Complex wounds) and New or Established Ostomies: No, only redness to groin from purewick reaction no pressure areas noted         Primary Nurse eSignature: Ivon Buenrostro RN on 10/11/2022 at 6:50 PM      Co-Signer eSignature: {Esignature:[id number]}

## 2022-10-11 NOTE — PROGRESS NOTES
Pt. Continues to grunt, make painful type faces and report pain 10/10 after Dilaudid 2 mg IVP. Dr. Traci Tamayo was consulted via phone, gave order for Versed 2 mg, and another 1 mg Dilaudid IVP.

## 2022-10-12 LAB
ANION GAP SERPL CALCULATED.3IONS-SCNC: 8 MMOL/L (ref 7–19)
BUN BLDV-MCNC: 21 MG/DL (ref 6–20)
CALCIUM SERPL-MCNC: 8.8 MG/DL (ref 8.6–10)
CHLORIDE BLD-SCNC: 107 MMOL/L (ref 98–111)
CO2: 26 MMOL/L (ref 22–29)
CREAT SERPL-MCNC: 1.2 MG/DL (ref 0.5–0.9)
FERRITIN: 929.4 NG/ML (ref 13–150)
FOLATE: >20 NG/ML (ref 4.8–37.3)
GFR AFRICAN AMERICAN: 57
GFR NON-AFRICAN AMERICAN: 47
GLUCOSE BLD-MCNC: 120 MG/DL (ref 74–109)
HCT VFR BLD CALC: 31.4 % (ref 37–47)
HEMOGLOBIN: 10 G/DL (ref 12–16)
IRON SATURATION: 14 % (ref 14–50)
IRON: 28 UG/DL (ref 37–145)
POTASSIUM REFLEX MAGNESIUM: 4.7 MMOL/L (ref 3.5–5)
SODIUM BLD-SCNC: 141 MMOL/L (ref 136–145)
TOTAL IRON BINDING CAPACITY: 201 UG/DL (ref 250–400)
VITAMIN B-12: 992 PG/ML (ref 211–946)

## 2022-10-12 PROCEDURE — 96376 TX/PRO/DX INJ SAME DRUG ADON: CPT

## 2022-10-12 PROCEDURE — 51798 US URINE CAPACITY MEASURE: CPT

## 2022-10-12 PROCEDURE — 82746 ASSAY OF FOLIC ACID SERUM: CPT

## 2022-10-12 PROCEDURE — 97116 GAIT TRAINING THERAPY: CPT

## 2022-10-12 PROCEDURE — 51701 INSERT BLADDER CATHETER: CPT

## 2022-10-12 PROCEDURE — 6370000000 HC RX 637 (ALT 250 FOR IP): Performed by: ORTHOPAEDIC SURGERY

## 2022-10-12 PROCEDURE — 2580000003 HC RX 258: Performed by: ORTHOPAEDIC SURGERY

## 2022-10-12 PROCEDURE — 83540 ASSAY OF IRON: CPT

## 2022-10-12 PROCEDURE — 6360000002 HC RX W HCPCS: Performed by: ORTHOPAEDIC SURGERY

## 2022-10-12 PROCEDURE — 2700000000 HC OXYGEN THERAPY PER DAY

## 2022-10-12 PROCEDURE — 6360000002 HC RX W HCPCS: Performed by: STUDENT IN AN ORGANIZED HEALTH CARE EDUCATION/TRAINING PROGRAM

## 2022-10-12 PROCEDURE — 97165 OT EVAL LOW COMPLEX 30 MIN: CPT

## 2022-10-12 PROCEDURE — 96365 THER/PROPH/DIAG IV INF INIT: CPT

## 2022-10-12 PROCEDURE — 85018 HEMOGLOBIN: CPT

## 2022-10-12 PROCEDURE — 96366 THER/PROPH/DIAG IV INF ADDON: CPT

## 2022-10-12 PROCEDURE — 82607 VITAMIN B-12: CPT

## 2022-10-12 PROCEDURE — 97530 THERAPEUTIC ACTIVITIES: CPT

## 2022-10-12 PROCEDURE — 85014 HEMATOCRIT: CPT

## 2022-10-12 PROCEDURE — 6370000000 HC RX 637 (ALT 250 FOR IP): Performed by: STUDENT IN AN ORGANIZED HEALTH CARE EDUCATION/TRAINING PROGRAM

## 2022-10-12 PROCEDURE — 36415 COLL VENOUS BLD VENIPUNCTURE: CPT

## 2022-10-12 PROCEDURE — 80048 BASIC METABOLIC PNL TOTAL CA: CPT

## 2022-10-12 PROCEDURE — 83550 IRON BINDING TEST: CPT

## 2022-10-12 PROCEDURE — 97535 SELF CARE MNGMENT TRAINING: CPT

## 2022-10-12 PROCEDURE — 97162 PT EVAL MOD COMPLEX 30 MIN: CPT

## 2022-10-12 PROCEDURE — 1210000000 HC MED SURG R&B

## 2022-10-12 PROCEDURE — 94640 AIRWAY INHALATION TREATMENT: CPT

## 2022-10-12 PROCEDURE — 82728 ASSAY OF FERRITIN: CPT

## 2022-10-12 PROCEDURE — 96375 TX/PRO/DX INJ NEW DRUG ADDON: CPT

## 2022-10-12 RX ORDER — BUDESONIDE 0.5 MG/2ML
0.5 INHALANT ORAL 2 TIMES DAILY
Status: DISCONTINUED | OUTPATIENT
Start: 2022-10-12 | End: 2022-10-14 | Stop reason: HOSPADM

## 2022-10-12 RX ORDER — ARFORMOTEROL TARTRATE 15 UG/2ML
15 SOLUTION RESPIRATORY (INHALATION) 2 TIMES DAILY
Status: DISCONTINUED | OUTPATIENT
Start: 2022-10-12 | End: 2022-10-14 | Stop reason: HOSPADM

## 2022-10-12 RX ORDER — OXYCODONE HYDROCHLORIDE 5 MG/1
10 TABLET ORAL EVERY 4 HOURS PRN
Status: DISCONTINUED | OUTPATIENT
Start: 2022-10-12 | End: 2022-10-14 | Stop reason: HOSPADM

## 2022-10-12 RX ORDER — OXYCODONE HYDROCHLORIDE 5 MG/1
20 TABLET ORAL EVERY 4 HOURS PRN
Status: DISCONTINUED | OUTPATIENT
Start: 2022-10-12 | End: 2022-10-14 | Stop reason: HOSPADM

## 2022-10-12 RX ORDER — BUDESONIDE AND FORMOTEROL FUMARATE DIHYDRATE 160; 4.5 UG/1; UG/1
2 AEROSOL RESPIRATORY (INHALATION) 2 TIMES DAILY
Status: DISCONTINUED | OUTPATIENT
Start: 2022-10-12 | End: 2022-10-12 | Stop reason: CLARIF

## 2022-10-12 RX ADMIN — IPRATROPIUM BROMIDE 0.5 MG: 0.5 SOLUTION RESPIRATORY (INHALATION) at 14:00

## 2022-10-12 RX ADMIN — PREGABALIN 100 MG: 50 CAPSULE ORAL at 21:46

## 2022-10-12 RX ADMIN — OXYCODONE 20 MG: 5 TABLET ORAL at 17:27

## 2022-10-12 RX ADMIN — MORPHINE SULFATE 4 MG: 4 INJECTION, SOLUTION INTRAMUSCULAR; INTRAVENOUS at 01:37

## 2022-10-12 RX ADMIN — ACETAMINOPHEN 650 MG: 325 TABLET ORAL at 05:30

## 2022-10-12 RX ADMIN — PREGABALIN 100 MG: 50 CAPSULE ORAL at 08:30

## 2022-10-12 RX ADMIN — OXYCODONE 10 MG: 5 TABLET ORAL at 05:30

## 2022-10-12 RX ADMIN — ACETAMINOPHEN 650 MG: 325 TABLET ORAL at 01:37

## 2022-10-12 RX ADMIN — MORPHINE SULFATE 4 MG: 4 INJECTION, SOLUTION INTRAMUSCULAR; INTRAVENOUS at 07:03

## 2022-10-12 RX ADMIN — SODIUM CHLORIDE, PRESERVATIVE FREE 10 ML: 5 INJECTION INTRAVENOUS at 21:57

## 2022-10-12 RX ADMIN — IPRATROPIUM BROMIDE 0.5 MG: 0.5 SOLUTION RESPIRATORY (INHALATION) at 10:48

## 2022-10-12 RX ADMIN — PANTOPRAZOLE SODIUM 40 MG: 40 TABLET, DELAYED RELEASE ORAL at 17:27

## 2022-10-12 RX ADMIN — DESVENLAFAXINE 100 MG: 100 TABLET, EXTENDED RELEASE ORAL at 08:30

## 2022-10-12 RX ADMIN — BUDESONIDE 500 MCG: 0.5 SUSPENSION RESPIRATORY (INHALATION) at 20:01

## 2022-10-12 RX ADMIN — METOPROLOL SUCCINATE 50 MG: 50 TABLET, EXTENDED RELEASE ORAL at 08:30

## 2022-10-12 RX ADMIN — ARFORMOTEROL TARTRATE 15 MCG: 15 SOLUTION RESPIRATORY (INHALATION) at 10:53

## 2022-10-12 RX ADMIN — TOPIRAMATE 100 MG: 100 TABLET, FILM COATED ORAL at 21:45

## 2022-10-12 RX ADMIN — SENNOSIDES 8.6 MG: 8.6 TABLET, FILM COATED ORAL at 08:30

## 2022-10-12 RX ADMIN — CEFAZOLIN SODIUM 3000 MG: 10 INJECTION, POWDER, FOR SOLUTION INTRAVENOUS at 08:31

## 2022-10-12 RX ADMIN — CEFAZOLIN SODIUM 3000 MG: 10 INJECTION, POWDER, FOR SOLUTION INTRAVENOUS at 00:01

## 2022-10-12 RX ADMIN — LEVOTHYROXINE SODIUM 175 MCG: 125 TABLET ORAL at 05:30

## 2022-10-12 RX ADMIN — ACETAMINOPHEN 650 MG: 325 TABLET ORAL at 20:14

## 2022-10-12 RX ADMIN — THIAMINE HCL TAB 100 MG 50 MG: 100 TAB at 08:30

## 2022-10-12 RX ADMIN — OXYCODONE 20 MG: 5 TABLET ORAL at 13:27

## 2022-10-12 RX ADMIN — OXYCODONE 20 MG: 5 TABLET ORAL at 21:46

## 2022-10-12 RX ADMIN — OXYCODONE 20 MG: 5 TABLET ORAL at 09:34

## 2022-10-12 RX ADMIN — OXYCODONE 10 MG: 5 TABLET ORAL at 00:00

## 2022-10-12 RX ADMIN — ASPIRIN 325 MG: 325 TABLET, DELAYED RELEASE ORAL at 08:30

## 2022-10-12 RX ADMIN — BUDESONIDE 500 MCG: 0.5 SUSPENSION RESPIRATORY (INHALATION) at 10:53

## 2022-10-12 RX ADMIN — ARFORMOTEROL TARTRATE 15 MCG: 15 SOLUTION RESPIRATORY (INHALATION) at 20:01

## 2022-10-12 RX ADMIN — ACETAMINOPHEN 650 MG: 325 TABLET ORAL at 12:45

## 2022-10-12 RX ADMIN — CEFAZOLIN SODIUM 3000 MG: 10 INJECTION, POWDER, FOR SOLUTION INTRAVENOUS at 17:28

## 2022-10-12 RX ADMIN — IPRATROPIUM BROMIDE 0.5 MG: 0.5 SOLUTION RESPIRATORY (INHALATION) at 20:01

## 2022-10-12 RX ADMIN — SENNOSIDES 8.6 MG: 8.6 TABLET, FILM COATED ORAL at 21:46

## 2022-10-12 RX ADMIN — ASPIRIN 325 MG: 325 TABLET, DELAYED RELEASE ORAL at 21:45

## 2022-10-12 RX ADMIN — PREGABALIN 100 MG: 50 CAPSULE ORAL at 12:44

## 2022-10-12 RX ADMIN — MORPHINE SULFATE 4 MG: 4 INJECTION, SOLUTION INTRAMUSCULAR; INTRAVENOUS at 03:30

## 2022-10-12 RX ADMIN — PANTOPRAZOLE SODIUM 40 MG: 40 TABLET, DELAYED RELEASE ORAL at 05:30

## 2022-10-12 ASSESSMENT — PAIN DESCRIPTION - PAIN TYPE
TYPE: SURGICAL PAIN
TYPE: SURGICAL PAIN

## 2022-10-12 ASSESSMENT — PAIN SCALES - GENERAL
PAINLEVEL_OUTOF10: 8
PAINLEVEL_OUTOF10: 10
PAINLEVEL_OUTOF10: 9
PAINLEVEL_OUTOF10: 10
PAINLEVEL_OUTOF10: 9
PAINLEVEL_OUTOF10: 8
PAINLEVEL_OUTOF10: 8
PAINLEVEL_OUTOF10: 10

## 2022-10-12 ASSESSMENT — PAIN - FUNCTIONAL ASSESSMENT
PAIN_FUNCTIONAL_ASSESSMENT: PREVENTS OR INTERFERES SOME ACTIVE ACTIVITIES AND ADLS

## 2022-10-12 ASSESSMENT — PAIN DESCRIPTION - LOCATION
LOCATION: KNEE;LEG
LOCATION: HIP;LEG;KNEE
LOCATION: KNEE
LOCATION: LEG;KNEE
LOCATION: LEG;KNEE
LOCATION: KNEE

## 2022-10-12 ASSESSMENT — PAIN DESCRIPTION - FREQUENCY
FREQUENCY: CONTINUOUS
FREQUENCY: CONTINUOUS

## 2022-10-12 ASSESSMENT — PAIN DESCRIPTION - DESCRIPTORS
DESCRIPTORS: THROBBING
DESCRIPTORS: ACHING;THROBBING;DISCOMFORT
DESCRIPTORS: ACHING;SORE;THROBBING
DESCRIPTORS: ACHING;THROBBING;STABBING
DESCRIPTORS: ACHING;SORE;THROBBING
DESCRIPTORS: CRAMPING;DISCOMFORT;THROBBING

## 2022-10-12 ASSESSMENT — PAIN DESCRIPTION - ORIENTATION
ORIENTATION: RIGHT
ORIENTATION: LEFT
ORIENTATION: RIGHT

## 2022-10-12 ASSESSMENT — PAIN DESCRIPTION - ONSET
ONSET: ON-GOING
ONSET: ON-GOING

## 2022-10-12 NOTE — PROGRESS NOTES
Physical Therapy  Facility/Department: [facility] SURG SERVICES  Physical Therapy Initial Assessment    Name: John Hernandez  : 1970  MRN: [id number]  Date of Service: 10/12/2022    Discharge Recommendations:  Continue to assess pending progress, Patient would benefit from continued therapy after discharge (pt Sharon Sousa 112)          Patient Diagnosis(es): There were no encounter diagnoses. Past Medical History:  has a past medical history of Acquired hypothyroidism, B12 deficiency, Chronic back pain, Chronic pancreatitis ([facility] [address] Colon polyps, Depression, Fibromyalgia, GERD (gastroesophageal reflux disease), Headache, Hypertension, Irritable bowel syndrome, Kidney stones, Neuropathy, On home oxygen therapy, PONV (postoperative nausea and vomiting), Restless legs syndrome, Sarcoidosis, Scoliosis, and SVT (supraventricular tachycardia) ([facility] [address] Past Surgical History:  has a past surgical history that includes Hysterectomy; Lithotripsy; Cholecystectomy; Total knee arthroplasty (Right, 2015); Shoulder arthroscopy (Left); Ulnar tunnel release; Colonoscopy (10/13/2014); Colonoscopy (2008); Upper gastrointestinal endoscopy (10/16/2014); Upper gastrointestinal endoscopy (2008); Upper gastrointestinal endoscopy (N/A, 2/10/2016); Plantar fascia surgery (Left); joint replacement (2013); Revision total knee arthroplasty (Right, 2017); ablation of dysrhythmic focus (); Vein Surgery (Bilateral); pr revise median n/carpal tunnel surg (Right, 2018); Colonoscopy (2017); Upper gastrointestinal endoscopy (2017); Colonoscopy (2018); sigmoidoscopy (N/A, 3/15/2019); Sphincterotomy (N/A, 2020); Shoulder arthroscopy (Right, 3/24/2022); and Total knee arthroplasty (Left, 10/11/2022). Assessment   Body Structures, Functions, Activity Limitations Requiring Skilled Therapeutic Intervention: Decreased functional mobility ; Decreased strength;Decreased ROM; Decreased safe awareness;Decreased endurance; Increased pain  Assessment: pt WOULD BENEFIT FROM SKILLED PT FOR POST OP TKR PROTOCOL. Therapy Prognosis: Good  Decision Making: Medium Complexity  Requires PT Follow-Up: Yes  Activity Tolerance  Activity Tolerance: Patient tolerated treatment well     Plan   Physcial Therapy Plan  General Plan: 5-7 times per week  Therapy Duration: 2 Weeks  Current Treatment Recommendations: Strengthening, ROM, Balance training, Functional mobility training, Transfer training, Gait training, Pain management, Safety education & training, Patient/Caregiver education & training, Positioning, Therapeutic activities, Stair training  Additional Comments: WBAT  Safety Devices  Type of Devices: Call light within reach, Gait belt, Left in chair, Nurse notified     Restrictions  Restrictions/Precautions  Restrictions/Precautions: Weight Bearing  Lower Extremity Weight Bearing Restrictions  Left Lower Extremity Weight Bearing: Weight Bearing As Tolerated     Subjective   General  Diagnosis: L TKR, PREVIOUS R TKR  Follows Commands: Within Functional Limits  Subjective  Subjective: Pt STATES SHE IS READY TO AMB TO THE BR, CLEAN UP AND THEN AMB TO [address]. REPORTS PAIN IS NOW CONTROLLED. Social/Functional History  Social/Functional History  Home Access: Stairs to enter with rails  Entrance Stairs - Number of Steps: 3  ADL Assistance: Independent  Ambulation Assistance: Independent  Transfer Assistance: Independent  Vision/Hearing       Cognition   Orientation  Overall Orientation Status: Within Functional Limits  Cognition  Overall Cognitive Status: WFL     Objective   Heart Rate: 81  Heart Rate Source: Monitor  BP: 111/73  BP Location: Left lower arm  Patient Position: Supine  MAP (Calculated): 85.67  Resp: 14  SpO2: 100 %  O2 Device: Nasal cannula     Observation/Palpation  Observation: 02 3L. WEARS AT HOME.         AROM RLE (degrees)  RLE AROM: WFL  AROM LLE (degrees)  LLE General AROM: ABLE TO SIT EOB AND FLEX KNEE GROSSLY 80 DEG  Strength RLE  Strength RLE: WFL  Strength LLE  Comment: ABLE TO EXTEND KNEE AGAINST GRAVITY           Bed mobility  Supine to Sit: Minimal assistance  Transfers  Sit to Stand: Contact guard assistance  Stand to Sit: Contact guard assistance  Ambulation  Surface: Level tile  Device: Rolling Walker  Assistance: Contact guard assistance  Gait Deviations: Slow Nisa;Decreased step length;Decreased step height  Distance: 10 FT, 20 FT     Balance  Comments: ABLE TO STAND FOR PERSONAL HYGIENE AT [facility] WITHOUT LOB             Goals  Short Term Goals  Time Frame for Short Term Goals: 2 WKS  Short Term Goal 1:  FT WITH RW, SBA  Short Term Goal 2: STEPS, CGA       Education  Patient Education  Education Given To: Patient  Education Provided: Role of Therapy;Plan of Care;Transfer Training  Education Method: Demonstration;Verbal  Barriers to Learning: None  Education Outcome: Verbalized understanding      Therapy Time   Individual Concurrent Group Co-treatment   Time In           Time Out           Minutes                   Airam Smith PT   Electronically signed by Airam Smith PT on 10/12/2022 at 12:15 PM

## 2022-10-12 NOTE — CARE COORDINATION
Spoke with patient regarding MD orders for [facility] services. Patient agreeable and has chosen [facility]. Referral Faxed. [address] [place] [address] [phone]. [phone]. Please notify [facility] when patient discharges and fax DC Summary,  DC med list and any new [facility] orders. The Patient and/or patient representative was provided with a choice of provider and agrees   with the discharge plan. [x] Yes [] No    Freedom of choice list was provided with basic dialogue that supports the patient's individualized plan of care/goals, treatment preferences and shares the quality data associated with the providers.  [x] Yes [] No  Electronically signed by Chantell Shrestha on 10/12/2022 at 10:14 AM Abdomen soft, non-tender, no guarding.

## 2022-10-12 NOTE — CARE COORDINATION
IMM Letter given to patient. Reviewed, discussed, answered questions, and signed by patient. Signed copy placed in patient's chart.      10/12/22 5382   IMM Letter   IMM Letter given to Patient/Family/Significant other/Guardian/POA/by: given to patient by Mal Lamb RN BSN    IMM Letter date given: 10/12/22   IMM Letter time given: 3535   Electronically signed by Brian Buchanan RN, BSN on 10/12/2022 at 3:59 PM

## 2022-10-12 NOTE — PROGRESS NOTES
Occupational Therapy  Facility/Department: [facility] [address] [facility] Initial Assessment    Name: Jude Allan  : 1970  MRN: [id number]  Date of Service: 10/12/2022    Discharge Recommendations:  Patient would benefit from continued therapy after discharge  OT Equipment Recommendations  Other: Pt would benefit from tub transfer bench       Patient Diagnosis(es): There were no encounter diagnoses. Past Medical History:  has a past medical history of Acquired hypothyroidism, B12 deficiency, Chronic back pain, Chronic pancreatitis ([facility] [address] Colon polyps, Depression, Fibromyalgia, GERD (gastroesophageal reflux disease), Headache, Hypertension, Irritable bowel syndrome, Kidney stones, Neuropathy, On home oxygen therapy, PONV (postoperative nausea and vomiting), Restless legs syndrome, Sarcoidosis, Scoliosis, and SVT (supraventricular tachycardia) ([facility] [address] Past Surgical History:  has a past surgical history that includes Hysterectomy; Lithotripsy; Cholecystectomy; Total knee arthroplasty (Right, 2015); Shoulder arthroscopy (Left); Ulnar tunnel release; Colonoscopy (10/13/2014); Colonoscopy (2008); Upper gastrointestinal endoscopy (10/16/2014); Upper gastrointestinal endoscopy (2008); Upper gastrointestinal endoscopy (N/A, 2/10/2016); Plantar fascia surgery (Left); joint replacement (2013); Revision total knee arthroplasty (Right, 2017); ablation of dysrhythmic focus (); Vein Surgery (Bilateral); pr revise median n/carpal tunnel surg (Right, 2018); Colonoscopy (2017); Upper gastrointestinal endoscopy (2017); Colonoscopy (2018); sigmoidoscopy (N/A, 3/15/2019); Sphincterotomy (N/A, 2020); Shoulder arthroscopy (Right, 3/24/2022); and Total knee arthroplasty (Left, 10/11/2022). Treatment Diagnosis: L TKR      Assessment   Performance deficits / Impairments: Decreased functional mobility ; Decreased ADL status; Decreased strength;Decreased endurance;Decreased high-level IADLs  Assessment: Eval complete and tx initiated. Pt with decreased LE strength/ROM for bed mobility and ADL. Ed pt in AE for LB dressing and pt requires min A to manage socks. Pt cga/min A with sit to stand. Decreased overall endurance. Pt would benefit from skilled OT to increase independence and safety in ADL/functional mobility. Treatment Diagnosis: L TKR  REQUIRES OT FOLLOW-UP: Yes  Activity Tolerance  Activity Tolerance: Patient Tolerated treatment well        Plan   Occupational Therapy Plan  Times Per Week: 3-5  Current Treatment Recommendations: Functional mobility training, Endurance training, Patient/Caregiver education & training, Self-Care / ADL     Restrictions  Restrictions/Precautions  Restrictions/Precautions: (P) Weight Bearing  Lower Extremity Weight Bearing Restrictions  Left Lower Extremity Weight Bearing: (P) Weight Bearing As Tolerated    Subjective         Social/Functional History  Social/Functional History  Lives With: Family (brother who has PD; sister who has dementia)  Home Access: Stairs to enter with rails  Entrance Stairs - Number of Steps: 3  Bathroom Shower/Tub: Tub/Shower unit  Bathroom Toilet: Standard  ADL Assistance: Independent  Ambulation Assistance: Independent  Transfer Assistance: Independent  Additional Comments: 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 WEARS AT HOME. Safety Devices  Type of Devices: (P) Bed alarm in place;Call light within reach;Gait belt;Left in bed           ADL  Feeding: Independent  Grooming: Independent;Setup  UE Bathing: Setup  LE Bathing: Minimal assistance  UE Dressing: Setup  LE Dressing:  Moderate assistance  Toileting: Contact guard assistance     Activity Tolerance  Activity Tolerance: (P) Patient tolerated treatment well  Bed mobility  Supine to Sit: Minimal assistance  Sit to Supine: Moderate assistance  Scooting: Stand by assistance  Transfers  Sit to stand: Contact guard assistance  Stand to sit: Contact guard assistance  Vision  Vision: Impaired  Vision Exceptions: Wears glasses at all times  Hearing  Hearing: Within functional limits  Cognition  Overall Cognitive Status: WFL  Orientation  Overall Orientation Status: Within Functional Limits                     LUE AROM (degrees)  LUE AROM : WFL  LUE General AROM: shoulder to 90 degree flexion, 3/5  RUE AROM (degrees)  RUE General AROM: shoulder to 90 degree flexion, 3/5.                        Goals  Short Term Goals  Time Frame for Short Term Goals: 1-2 weeks  Short Term Goal 1: Pt will toilet with SBA  Short Term Goal 2: Toilet transfer with SBA  Short Term Goal 3: LB dressing with min A, AE  Short Term Goal 4: Pt will bathe with min A, AE prn  Short Term Goal 5: Pt will perform functional standing activities with SBA   Tx initiated: Ed of AE for ADL, initial movement strategies      Eddie Alvarez OT  Electronically signed by Eddie Alvarez OT on 10/12/2022 at 4:20 PM

## 2022-10-12 NOTE — PROGRESS NOTES
Patient was DTV at 10 am, patient will not allow bladder scan at this time due to brother being here. Will attempt again later.

## 2022-10-12 NOTE — PROGRESS NOTES
Physical Therapy  Name: Xin Lopez  MRN:  [id number]  Date of service:  10/12/2022     10/12/22 1618   Restrictions/Precautions   Restrictions/Precautions Weight Bearing   Lower Extremity Weight Bearing Restrictions   Left Lower Extremity Weight Bearing Weight Bearing As Tolerated   Subjective   Subjective Pt states that she needs to use the BR. Bed Mobility   Supine to Sit Minimal assistance   Sit to Supine Minimal assistance   Transfers   Sit to Stand Contact guard assistance   Stand to Sit Contact guard assistance   Comment pt stood from bedside and from toilet, able to stand at toilet with SBA to add powder and cream then stand at the sink for hand hygiene   Ambulation   Surface Level tile   Device Rolling Walker   Assistance Contact guard assistance   Gait Deviations Slow Nisa;Decreased step length;Decreased step height   Distance 10' x2   Short Term Goals   Time Frame for Short Term Goals 2 WKS   Short Term Goal 1  FT WITH RW, SBA   Short Term Goal 2 STEPS, CGA   Conditions Requiring Skilled Therapeutic Intervention   Body Structures, Functions, Activity Limitations Requiring Skilled Therapeutic Intervention Decreased functional mobility ; Decreased strength;Decreased ROM; Decreased safe awareness;Decreased endurance; Increased pain   Assessment Assisted pt to/from the BR, did well with ambulation and able to perform all self care. Assisted pt BTB and positioned for comfort with all needs in reach.    Activity Tolerance   Activity Tolerance Patient tolerated treatment well   PT Plan of Care   Wednesday X   Safety Devices   Type of Devices Bed alarm in place;Call light within reach;Gait belt;Left in bed         Electronically signed by aMr Sue PTA on 10/12/2022 at 4:20 PM

## 2022-10-12 NOTE — PROGRESS NOTES
Subjective:     Post-Operative Day: 1 No complaints    Objective:     Patient Vitals for the past 24 hrs:   BP Temp Temp src Pulse Resp SpO2   10/12/22 0723 111/73 (!) 96.6 °F (35.9 °C) Temporal 81 -- 100 %   10/12/22 0703 -- -- -- -- 16 --   10/12/22 0345 122/85 97.2 °F (36.2 °C) -- 86 16 100 %   10/12/22 0013 (!) 150/91 97.4 °F (36.3 °C) Temporal 92 18 100 %   10/11/22 1945 126/85 97 °F (36.1 °C) -- 79 18 100 %   10/11/22 1748 132/81 96.8 °F (36 °C) Temporal 72 18 100 %   10/11/22 1737 -- -- -- -- 16 --   10/11/22 1245 -- -- -- -- 16 --   10/11/22 1233 (!) 168/107 (!) 96.3 °F (35.7 °C) Temporal 82 16 100 %   10/11/22 1219 (!) 158/97 97.3 °F (36.3 °C) Temporal 72 12 96 %   10/11/22 1218 (!) 158/97 -- -- 74 11 96 %   10/11/22 1216 (!) 170/96 97.3 °F (36.3 °C) Temporal 80 15 97 %   10/11/22 1214 (!) 177/98 97.2 °F (36.2 °C) Temporal 71 11 97 %   10/11/22 1155 (!) 160/95 -- -- 75 11 96 %   10/11/22 1148 (!) 175/95 -- -- 74 12 94 %   10/11/22 1143 (!) 176/96 -- -- 78 10 --   10/11/22 1115 (!) 184/89 -- -- 81 14 99 %   10/11/22 1106 -- -- -- 82 -- --   10/11/22 1105 (!) 176/96 -- -- 81 12 95 %   10/11/22 1050 (!) 169/102 -- -- 85 21 97 %   10/11/22 1035 (!) 145/104 -- -- 87 14 97 %   10/11/22 1026 -- -- -- 86 -- --   10/11/22 1025 138/87 -- -- 86 17 94 %   10/11/22 1020 (!) 137/95 -- -- 88 14 91 %   10/11/22 1015 (!) 129/95 -- -- 86 10 93 %   10/11/22 1010 117/81 -- -- 88 19 96 %   10/11/22 1008 -- 97.1 °F (36.2 °C) -- -- -- --   10/11/22 1005 117/81 97.1 °F (36.2 °C) Temporal 91 19 96 %   10/11/22 0810 (!) 145/95 -- -- 72 20 100 %   10/11/22 0805 139/81 -- -- 68 18 100 %   10/11/22 0800 (!) 147/94 -- -- 78 18 100 %       General: Alert cooperative   Wound: Clean dry intact.   Moderate swelling   Neurovascular: Exam normal   DVT Exam: No evidence of DVT         Data Review:  Recent Labs     10/12/22  0224   HGB 10.0*     Recent Labs     10/12/22  0224      K 4.7   CREATININE 1.2*   GLUCOSE 120*     No results for input(s): POCGLU in the last 72 hours. No orders to display       Assessment:     Status Post left Total Knee Arthroplasty. Doing well postop without complication.    Plan:     Pain control  Tight blood glucose control  PT/OT  DVT prophylaxis  Ice and elevate  Discharge Home this week (wants [facility])

## 2022-10-12 NOTE — CONSULTS
Referring Provider: Dr. Tremaine Siegel  Reason for Consultation: Medical [facility]    Patient Care Team:  SARAH Cast as PCP - General (Family Nurse Practitioner)  [facility]  Marcos Solorio DO as Referring Physician (Internal Medicine)  SARAH Sierra as Nurse Practitioner (Family Nurse Practitioner)  SARAH Patton as Advanced Practice Nurse (Neurology)    Chief complaint Left knee pain    Subjective . History of present illness:      44 yo female with past medical history of hypertension who presents postoperatively from left total knee arthroplasty. She is having issues with left knee arthritis for many years that was progressive and resistant to conservative measures. She underwent uncomplicated left total knee arthroplasty on 10/11. She is doing well postoperatively complaining of mild postsurgical knee pain.       REVIEW OF SYSTEMS:    CONSTITUTIONAL:  Negative for anorexia, chills, fevers, night sweats and weight loss  EYES:  negative for eye dryness, icterus and redness  HEENT:   negative for dental problems, epistaxis, facial trauma and thrush  RESPIRATORY:  negative for chest tightness, cough, dyspnea on exertion, pneumonia and sputum  CARDIOVASCULAR: negative for chest pain, dyspnea, exertional chest pressure/discomfort, irregular heart beat, palpitations, paroxysmal nocturnal dyspnea and syncope  GASTROINTESTINAL:  negative for abdominal pain, hematemesis, jaundice, melena and rectal bleeding  MUSCULOSKELETAL:  negative for muscle weakness, myalgias and neck pain, chronic joint pain noted  NEUROLOGICAL:   negative for dizziness, headaches, seizures, speech problems, tremors and vertigo  INTEGUMENT: negative for pruritus, rash, skin color change and skin lesion(s)   A Full 14 point review of systems is negative outside those listed above and in the HPI      History    Past Medical History:   Diagnosis Date    Acquired hypothyroidism     B12 deficiency     Chronic back pain     Chronic pancreatitis (HCC)     Colon polyps     Depression     Fibromyalgia     GERD (gastroesophageal reflux disease)     Headache     Hypertension     Irritable bowel syndrome     Kidney stones     Neuropathy     On home oxygen therapy     3 liters    PONV (postoperative nausea and vomiting)     Restless legs syndrome     Sarcoidosis 11/01/2016    Scoliosis     SVT (supraventricular tachycardia) ([address]      Past Surgical History:   Procedure Laterality Date    ABLATION OF DYSRHYTHMIC FOCUS  2016    SVT    ANAL SPHINCTEROTOMY N/A 2/19/2020    LATERAL SPHINCTEROTOMY performed by Dada Simpson MD at [address]  10/13/2014    Dr. David Jasso AP-5 yr recall    COLONOSCOPY  05/20/2008    Dr. Ruth Lung: unremarkable    COLONOSCOPY  08/23/2017    Dr Mckeon-5 yr recall    COLONOSCOPY  09/06/2018    Dr. Doris Arredondo yr recall    HYSTERECTOMY (CERVIX STATUS UNKNOWN)      JOINT REPLACEMENT  12/30/2013    Patella joint replacement    LITHOTRIPSY      PLANTAR FASCIA SURGERY Left     CO REVISE MEDIAN N/CARPAL TUNNEL SURG Right 5/8/2018    CARPAL TUNNEL RELEASE performed by Blaze Lawson MD at [facility] Right 5/4/2017    KNEE TOTAL ARTHROPLASTY REVISION performed by Blaze Lawson MD at [facility] ARTHROSCOPY Left     SHOULDER ARTHROSCOPY Right 3/24/2022    RIGHT SHOULDER ARTHROSCOPIC, ROTATOR CUFF REPAIR, BICEPS TENODESIS performed by Blaze Lawson MD at [place] [address] N/A 3/15/2019    Dr Jayashree Garza non edematous hemorrhoids, anal fissure-BCM    TOTAL KNEE ARTHROPLASTY Right 11/16/2015    TOTAL KNEE ARTHROPLASTY Left 10/11/2022    LEFT KNEE TOTAL COMPLEX PRIMARY ARTHROPLASTY performed by Blaze Lawson MD at [address] [place] [address] ENDOSCOPY  10/16/2014    Dr. Akbar Burnham ENDOSCOPY  05/19/2008    Dr. Cleo Daniels: jaya neg, barretts neg,  [place]    UPPER GASTROINTESTINAL ENDOSCOPY N/A 2/10/2016    Dr Loyd Sharma esophageal stricture dilated; reactive gastropathy    UPPER GASTROINTESTINAL ENDOSCOPY  08/23/2017    Dr Rylee Kline Bilateral     RADIO ABLATION     Family History   Problem Relation Age of Onset    Lupus Mother     Kidney Disease Mother     High Blood Pressure Mother     Anemia Mother     Cancer Father         skin    Diabetes Father     Parkinsonism Brother     Celiac Disease Maternal Uncle     Celiac Disease Maternal Cousin     Heart Disease Other     Colon Polyps Neg Hx     Colon Cancer Neg Hx     Esophageal Cancer Neg Hx     Liver Cancer Neg Hx     Liver Disease Neg Hx     Stomach Cancer Neg Hx     Rectal Cancer Neg Hx      Social History     Tobacco Use    Smoking status: Never    Smokeless tobacco: Never    Tobacco comments:     disabled due to sarcoidosis   Vaping Use    Vaping Use: Never used   Substance Use Topics    Alcohol use: No    Drug use: No     Medications Prior to Admission: Umeclidinium Bromide (INCRUSE ELLIPTA) 62.5 MCG/INH AEPB, Inhale 2 puffs into the lungs daily  metoprolol succinate (TOPROL XL) 50 MG extended release tablet, Take 1 tablet by mouth everyday (Patient taking differently: Take 50 mg by mouth daily)  levothyroxine (SYNTHROID) 175 MCG tablet, TAKE 1 TABLET BY MOUTH EVERY DAY (Patient taking differently: Take 175 mcg by mouth Daily TAKE 1 TABLET BY MOUTH EVERY DAY)  vitamin D (ERGOCALCIFEROL) 1.25 MG (03902 UT) CAPS capsule, Take 1 capsule by mouth once a week Tuesday  Ubrogepant (UBRELVY) 100 MG TABS, Take 1 tablet at the onset of migraine. May repeat once in 2 hours if no improvement. Do not exceed 2 tablets in 24 hours.   topiramate (TOPAMAX) 100 MG tablet, Take 1 tablet by mouth nightly Indications: Backache  pantoprazole (PROTONIX) 40 MG tablet, TAKE 1 TABLET BY MOUTH TWICE A DAY (Patient taking differently: Take 40 mg by mouth 2 times daily)  ondansetron (ZOFRAN-ODT) 4 MG disintegrating tablet, Take 1 tablet by mouth every 8 hours as needed for Nausea or Vomiting  desvenlafaxine succinate (PRISTIQ) 100 MG TB24 extended release tablet, Take 1 tablet by mouth daily (Patient taking differently: Take 100 mg by mouth daily as needed)  mirabegron (MYRBETRIQ) 25 MG TB24, Take 1 tablet by mouth daily  Fluticasone-Salmeterol,sensor, (AIRDUO DIGIHALER) 232-14 MCG/ACT AEPB, Inhale 1-2 Doses into the lungs 2 times daily  Thiamine 50 MG CAPS, Take 50 mg by mouth daily  docusate sodium (COLACE) 100 MG capsule, Take 100 mg by mouth 2 times daily as needed   pregabalin (LYRICA) 100 MG capsule, Take 100 mg by mouth 3 times daily. OXYGEN, Inhale 3 L/min into the lungs continuous   epoetin cheryl (EPOGEN;PROCRIT) 36998 UNIT/ML injection, Inject 40,000 Units into the skin as needed   tiZANidine (ZANAFLEX) 4 MG tablet, Take 4 mg by mouth 3 times daily as needed (muscle spasms)   Tape Geetha.Cam tape]  Objective     Vital Signs   All pre-op and post op vitals reviewed           Physical Exam:  Constitutional: oriented to person, place, and time. appears well-developed. HEENT:   Head: Normocephalic and atraumatic. Eyes: Pupils are equal, round, and reactive to light. Neck: Neck supple. Cardiovascular: Regular rhythm and normal heart sounds. No lift or rub appreciated. Pulmonary/Chest: Effort normal and breath sounds normal. CTAB. No labored breating. Abdominal: Soft. Bowel sounds are normal. There is no appreciable  distension. There is no point tenderness. no rebounding or guarding. Musculoskeletal: Normal range of motion on other than surgically repaired joint . no edema or tenderness other than surgical area. Post-op changes noted  Neurological:  alert and oriented to person, place, and time. normal reflexes. No focal deficits  Skin: Skin is warm and dry. No new rashes appreciated.     Results Review:   I reviewed the patient's new imaging results and agree with the interpretation. Principal Problem:    Primary osteoarthritis of left knee  Resolved Problems:    * No resolved hospital problems. *      Assessment: 70-year-old female with past medical history of sarcoidosis, CKD, and hypertension who presents postoperatively from left TKA on 10/11. Plan:    Perioperative care  Starting bowel regimen, encourage ambulation with physical occupational therapy, encourage incentive spirometry. Hypertension  Pressures well controlled, no exacerbations more likely related to pain rather than progression of essential hypertension. Continue current meds. Chronic respiratory failure secondary to sarcoidosis w/ pulmonary complication  Continue home inhalers. Not in exacerbation at this time. CKD, stage III  Creatinine at baseline. Continue to monitor. Avoid nephrotoxic meds. Anemia of chronic disease  Most likely related to chronic sarcoidosis. No changes.     I discussed the patients findings and my recommendations with patient/family, staff and the Orthopaedic team.  Davis Galvan MD  10/12/22  7:59 AM

## 2022-10-13 ENCOUNTER — APPOINTMENT (OUTPATIENT)
Dept: GENERAL RADIOLOGY | Age: 52
DRG: 470 | End: 2022-10-13
Attending: ORTHOPAEDIC SURGERY
Payer: MEDICARE

## 2022-10-13 LAB
HCT VFR BLD CALC: 28.2 % (ref 37–47)
HEMOGLOBIN: 9 G/DL (ref 12–16)

## 2022-10-13 PROCEDURE — 2700000000 HC OXYGEN THERAPY PER DAY

## 2022-10-13 PROCEDURE — 1210000000 HC MED SURG R&B

## 2022-10-13 PROCEDURE — 6360000002 HC RX W HCPCS: Performed by: STUDENT IN AN ORGANIZED HEALTH CARE EDUCATION/TRAINING PROGRAM

## 2022-10-13 PROCEDURE — 85014 HEMATOCRIT: CPT

## 2022-10-13 PROCEDURE — 97116 GAIT TRAINING THERAPY: CPT

## 2022-10-13 PROCEDURE — 6370000000 HC RX 637 (ALT 250 FOR IP): Performed by: STUDENT IN AN ORGANIZED HEALTH CARE EDUCATION/TRAINING PROGRAM

## 2022-10-13 PROCEDURE — 2580000003 HC RX 258: Performed by: ORTHOPAEDIC SURGERY

## 2022-10-13 PROCEDURE — 85018 HEMOGLOBIN: CPT

## 2022-10-13 PROCEDURE — 6370000000 HC RX 637 (ALT 250 FOR IP): Performed by: ORTHOPAEDIC SURGERY

## 2022-10-13 PROCEDURE — 6360000002 HC RX W HCPCS: Performed by: ORTHOPAEDIC SURGERY

## 2022-10-13 PROCEDURE — 36415 COLL VENOUS BLD VENIPUNCTURE: CPT

## 2022-10-13 PROCEDURE — 94640 AIRWAY INHALATION TREATMENT: CPT

## 2022-10-13 PROCEDURE — 97530 THERAPEUTIC ACTIVITIES: CPT

## 2022-10-13 PROCEDURE — 71045 X-RAY EXAM CHEST 1 VIEW: CPT

## 2022-10-13 RX ORDER — PREDNISONE 20 MG/1
20 TABLET ORAL DAILY
Qty: 7 TABLET | Refills: 0 | Status: SHIPPED | OUTPATIENT
Start: 2022-10-13 | End: 2022-10-20

## 2022-10-13 RX ORDER — OXYCODONE HYDROCHLORIDE 5 MG/1
5 TABLET ORAL EVERY 4 HOURS PRN
Qty: 30 TABLET | Refills: 0 | Status: SHIPPED | OUTPATIENT
Start: 2022-10-13 | End: 2022-10-14 | Stop reason: HOSPADM

## 2022-10-13 RX ORDER — CEPHALEXIN 500 MG/1
500 CAPSULE ORAL 4 TIMES DAILY
Qty: 28 CAPSULE | Refills: 0 | Status: SHIPPED | OUTPATIENT
Start: 2022-10-13

## 2022-10-13 RX ORDER — ASPIRIN 81 MG/1
81 TABLET ORAL 2 TIMES DAILY
Qty: 60 TABLET | Refills: 0 | Status: SHIPPED | OUTPATIENT
Start: 2022-10-13

## 2022-10-13 RX ADMIN — CEFAZOLIN SODIUM 3000 MG: 10 INJECTION, POWDER, FOR SOLUTION INTRAVENOUS at 00:36

## 2022-10-13 RX ADMIN — LEVOTHYROXINE SODIUM 175 MCG: 125 TABLET ORAL at 05:44

## 2022-10-13 RX ADMIN — SODIUM CHLORIDE, PRESERVATIVE FREE 10 ML: 5 INJECTION INTRAVENOUS at 22:00

## 2022-10-13 RX ADMIN — ACETAMINOPHEN 650 MG: 325 TABLET ORAL at 05:43

## 2022-10-13 RX ADMIN — ASPIRIN 325 MG: 325 TABLET, DELAYED RELEASE ORAL at 20:01

## 2022-10-13 RX ADMIN — TOPIRAMATE 100 MG: 100 TABLET, FILM COATED ORAL at 22:00

## 2022-10-13 RX ADMIN — ACETAMINOPHEN 650 MG: 325 TABLET ORAL at 13:30

## 2022-10-13 RX ADMIN — CEFAZOLIN SODIUM 3000 MG: 10 INJECTION, POWDER, FOR SOLUTION INTRAVENOUS at 09:51

## 2022-10-13 RX ADMIN — PANTOPRAZOLE SODIUM 40 MG: 40 TABLET, DELAYED RELEASE ORAL at 15:56

## 2022-10-13 RX ADMIN — PANTOPRAZOLE SODIUM 40 MG: 40 TABLET, DELAYED RELEASE ORAL at 05:44

## 2022-10-13 RX ADMIN — BUDESONIDE 500 MCG: 0.5 SUSPENSION RESPIRATORY (INHALATION) at 19:22

## 2022-10-13 RX ADMIN — IPRATROPIUM BROMIDE 0.5 MG: 0.5 SOLUTION RESPIRATORY (INHALATION) at 07:34

## 2022-10-13 RX ADMIN — ARFORMOTEROL TARTRATE 15 MCG: 15 SOLUTION RESPIRATORY (INHALATION) at 07:33

## 2022-10-13 RX ADMIN — TIZANIDINE 4 MG: 4 TABLET ORAL at 15:57

## 2022-10-13 RX ADMIN — OXYCODONE 20 MG: 5 TABLET ORAL at 05:43

## 2022-10-13 RX ADMIN — OXYCODONE 20 MG: 5 TABLET ORAL at 19:51

## 2022-10-13 RX ADMIN — PREGABALIN 100 MG: 50 CAPSULE ORAL at 19:52

## 2022-10-13 RX ADMIN — SENNOSIDES 8.6 MG: 8.6 TABLET, FILM COATED ORAL at 09:57

## 2022-10-13 RX ADMIN — OXYCODONE 20 MG: 5 TABLET ORAL at 01:19

## 2022-10-13 RX ADMIN — THIAMINE HCL TAB 100 MG 50 MG: 100 TAB at 09:57

## 2022-10-13 RX ADMIN — PREGABALIN 100 MG: 50 CAPSULE ORAL at 09:54

## 2022-10-13 RX ADMIN — PREGABALIN 100 MG: 50 CAPSULE ORAL at 13:30

## 2022-10-13 RX ADMIN — ASPIRIN 325 MG: 325 TABLET, DELAYED RELEASE ORAL at 09:56

## 2022-10-13 RX ADMIN — METOPROLOL SUCCINATE 50 MG: 50 TABLET, EXTENDED RELEASE ORAL at 09:54

## 2022-10-13 RX ADMIN — ACETAMINOPHEN 650 MG: 325 TABLET ORAL at 01:19

## 2022-10-13 RX ADMIN — IPRATROPIUM BROMIDE 0.5 MG: 0.5 SOLUTION RESPIRATORY (INHALATION) at 19:22

## 2022-10-13 RX ADMIN — ACETAMINOPHEN 650 MG: 325 TABLET ORAL at 18:09

## 2022-10-13 RX ADMIN — ARFORMOTEROL TARTRATE 15 MCG: 15 SOLUTION RESPIRATORY (INHALATION) at 19:22

## 2022-10-13 RX ADMIN — POLYETHYLENE GLYCOL 3350 17 G: 17 POWDER, FOR SOLUTION ORAL at 09:57

## 2022-10-13 RX ADMIN — SENNOSIDES 8.6 MG: 8.6 TABLET, FILM COATED ORAL at 19:51

## 2022-10-13 RX ADMIN — POLYETHYLENE GLYCOL 3350 17 G: 17 POWDER, FOR SOLUTION ORAL at 19:51

## 2022-10-13 RX ADMIN — OXYCODONE 20 MG: 5 TABLET ORAL at 10:00

## 2022-10-13 RX ADMIN — DESVENLAFAXINE 100 MG: 100 TABLET, EXTENDED RELEASE ORAL at 09:55

## 2022-10-13 RX ADMIN — BUDESONIDE 500 MCG: 0.5 SUSPENSION RESPIRATORY (INHALATION) at 07:34

## 2022-10-13 ASSESSMENT — PAIN DESCRIPTION - DESCRIPTORS: DESCRIPTORS: ACHING

## 2022-10-13 ASSESSMENT — PAIN DESCRIPTION - LOCATION
LOCATION: KNEE
LOCATION: KNEE

## 2022-10-13 ASSESSMENT — ENCOUNTER SYMPTOMS
NAUSEA: 0
DIARRHEA: 0
CONSTIPATION: 0
COUGH: 1
CHEST TIGHTNESS: 0
WHEEZING: 0
SHORTNESS OF BREATH: 0
ABDOMINAL PAIN: 0

## 2022-10-13 ASSESSMENT — PAIN DESCRIPTION - ORIENTATION
ORIENTATION: LEFT
ORIENTATION: RIGHT

## 2022-10-13 ASSESSMENT — PAIN SCALES - GENERAL
PAINLEVEL_OUTOF10: 10
PAINLEVEL_OUTOF10: 7
PAINLEVEL_OUTOF10: 5
PAINLEVEL_OUTOF10: 8

## 2022-10-13 NOTE — PROGRESS NOTES
Daily Progress Note  Destinee Palumbo  MRN: [id number] LOS: 2    Admit Date: 10/11/2022   10/13/2022 8:07 AM    Subjective: Interval History:    Reviewed overnight events and nursing notes. Doing well this morning. Having mild cough. No other issues. Review of Systems   Constitutional:  Negative for chills and fever. Respiratory:  Positive for cough. Negative for chest tightness, shortness of breath and wheezing. Cardiovascular:  Negative for chest pain, palpitations and leg swelling. Gastrointestinal:  Negative for abdominal pain, constipation, diarrhea and nausea. DIET:  ADULT DIET; Regular  ADULT ORAL NUTRITION SUPPLEMENT; Standard High Calorie/High Protein Oral Supplement    Medications:      sodium chloride        budesonide  0.5 mg Nebulization BID    And    Arformoterol Tartrate  15 mcg Nebulization BID    ipratropium  0.5 mg Nebulization TID    sodium chloride flush  5-40 mL IntraVENous 2 times per day    acetaminophen  650 mg Oral Q6H    ceFAZolin (ANCEF) IVPB  3,000 mg IntraVENous Q8H    aspirin  325 mg Oral BID    senna  1 tablet Oral BID    polyethylene glycol  17 g Oral BID    desvenlafaxine succinate  100 mg Oral Daily    levothyroxine  175 mcg Oral Daily    metoprolol succinate  50 mg Oral Daily    pantoprazole  40 mg Oral BID AC    pregabalin  100 mg Oral TID    thiamine mononitrate  50 mg Oral Daily    topiramate  100 mg Oral Nightly         Objective:     Vitals: /74   Pulse (!) 104   Temp 98.2 °F (36.8 °C) (Temporal)   Resp 18   Ht 5' 2\" (1.575 m)   Wt 290 lb (131.5 kg)   SpO2 100%   BMI 53.04 kg/m²    Intake/Output Summary (Last 24 hours) at 10/13/2022 0807  Last data filed at 10/12/2022 0934  Gross per 24 hour   Intake 240 ml   Output --   Net 240 ml    Temp (24hrs), Av.4 °F (36.3 °C), Min:96.8 °F (36 °C), Max:98.2 °F (36.8 °C)    Glucose:  No results found for: POCGLU  Physical Examination:   Objective:  General Appearance:  Comfortable and well-appearing. Vital signs: (most recent): Blood pressure 104/74, pulse (!) 104, temperature 98.2 °F (36.8 °C), temperature source Temporal, resp. rate 18, height 5' 2\" (1.575 m), weight 290 lb (131.5 kg), SpO2 100 %, not currently breastfeeding. No fever. Lungs:  Normal effort and normal respiratory rate. Breath sounds clear to auscultation. She is not in respiratory distress. Heart: Normal rate. Regular rhythm. No murmur. Abdomen: Abdomen is soft and non-distended. Bowel sounds are normal.   There is no abdominal tenderness. Labs:  No results found for: CHEMIS, CBC     Imaging:  XR CHEST (2 VW)  Narrative: Clinical history: Surgical clearance  Finding: Examination of the chest in PA and lateral views shows that both lungs  are free of active disease. The heart is normal in size and configuration. There  is uncoiling atherosclerotic change of thoracic aorta. The trachea is in the  midline. The mediastinum and both hemidiaphragms are normal.The bony thorax  demonstrate osteopenia. Impression: 1.no acute cardiopulmonary process. 2.NO evidence of airspace consolidation or pulmonary venous congestion. Assessment and Plan:     Primary Problem:  Primary osteoarthritis of left knee    Hospital Problem list:  Principal Problem:    Primary osteoarthritis of left knee  Resolved Problems:    * No resolved hospital problems. *       Assessment: 58-year-old female with past medical history of sarcoidosis, CKD, and hypertension who presents postoperatively from left TKA on 10/11. Plan:    Perioperative care  Aspirin for DVT prophylaxis. Medically stable for discharge. Hypertension  Pressures well controlled, no changes     Chronic respiratory failure secondary to sarcoidosis w/ pulmonary complication  Continue home inhalers. Not in exacerbation at this time. CKD, stage III  Creatinine at baseline. Continue to monitor. Avoid nephrotoxic meds.      Anemia of chronic disease  Most likely related to chronic sarcoidosis. No changes. Will perform chest x-ray to evaluate for cough. Suspect this is due to endotracheal tube and less likely infectious process or sarcoidosis exacerbation. Further treatment pending CXR. Discharge planning:   Home    Reviewed treatment plans with the patient and/or family.      Code Status: Full Code    Electronically signed by Taylor Taveras MD on 10/13/2022 at 8:07 AM

## 2022-10-13 NOTE — PROGRESS NOTES
10/13/22 1000   Subjective   Subjective Patient in bed agrees to walk. Patient states she is on  3L O2 24/7 at home   Pain Assessment   Pain Level 10   Pain Location Knee   Pain Orientation Left   Vitals   O2 Device Nasal cannula   Comment 3L   Bed Mobility Training   Bed Mobility Training Yes   Overall Level of Assistance Minimum assistance  (Assist with LE's)   Supine to Sit Minimum assistance   Balance   Sitting Intact   Standing With support  (RW)   Gait Training   Gait Training Yes   Gait   Overall Level of Assistance Contact-guard assistance   Speed/Nisa Slow   Distance (ft) 50 Feet  (Steady NO LOB)   Assistive Device Walker, rolling   Other Specialty Interventions   Other Treatments/Modalities Patient declined  step training. Patient in BR post gait instructed to use call light when done.    PT Plan of Care   Thursday X             Electronically signed by Merlin Catalina, PTA on 10/13/2022 at 10:27 AM

## 2022-10-13 NOTE — PROGRESS NOTES
CLINICAL PHARMACY NOTE: MEDS TO BEDS    Total # of Prescriptions Filled: 5   The following medications were delivered to the patient:  Oxycodone 5 mg  Oxycodone 10   Prednisone 20 mg  Aspirin 81 mg  Cephalexin 500 mg    Additional Documentation:   Delivered Rx's to patients room. Gave Rx to patients brother Latonya Miller. Latonya Miller paid $2.00 copay with yang.

## 2022-10-13 NOTE — PROGRESS NOTES
Subjective:     Post-Operative Day: 2 No complaints    Objective:     Patient Vitals for the past 24 hrs:   BP Temp Temp src Pulse Resp SpO2   10/13/22 0613 -- -- -- -- 16 --   10/13/22 0530 111/79 97.8 °F (36.6 °C) Temporal 90 16 100 %   10/13/22 0149 -- -- -- -- 16 --   10/13/22 0119 132/87 97 °F (36.1 °C) Temporal 93 16 100 %   10/12/22 2216 -- -- -- -- 18 --   10/12/22 2128 118/87 97 °F (36.1 °C) Temporal 85 16 100 %   10/12/22 1727 -- -- -- -- 14 --   10/12/22 1620 90/80 96.8 °F (36 °C) Temporal 91 14 93 %   10/12/22 1427 -- -- -- 82 16 99 %   10/12/22 1327 -- -- -- -- 14 --   10/12/22 0934 -- -- -- -- 14 --   10/12/22 0723 111/73 (!) 96.6 °F (35.9 °C) Temporal 81 -- 100 %   10/12/22 0703 -- -- -- -- 16 --         General: Alert cooperative   Wound: Clean dry intact. Moderate swelling   Neurovascular: Exam normal   DVT Exam: No evidence of DVT    Coughing with yellow, clumpy sputum     Data Review:  Recent Labs     10/12/22  0224 10/13/22  0241   HGB 10.0* 9.0*       Recent Labs     10/12/22  0224      K 4.7   CREATININE 1.2*   GLUCOSE 120*       No results for input(s): POCGLU in the last 72 hours. No orders to display       Assessment:     Status Post left Total Knee Arthroplasty. Doing well postop without complication.    Plan:     Pain control  Tight blood glucose control  PT/OT  DVT prophylaxis  Ice and elevate  Discharge Home today (wants home health)

## 2022-10-13 NOTE — DISCHARGE SUMMARY
[facility] [place] [facility]  Dr. Mc Mesquite  Discharge Summary    The Stephanie Joiner is a 45 y.o. female underwent total knee replacement procedure without complication. Stephanie Joiner was admitted to the floor following her   recovery in the PACU.      Discharge Diagnosis   Left    Knee Replacement    Current Inpatient Medications    Current Facility-Administered Medications: oxyCODONE (ROXICODONE) immediate release tablet 10 mg, 10 mg, Oral, Q4H PRN **OR** oxyCODONE (ROXICODONE) immediate release tablet 20 mg, 20 mg, Oral, Q4H PRN  budesonide (PULMICORT) nebulizer suspension 500 mcg, 0.5 mg, Nebulization, BID **AND** Arformoterol Tartrate (BROVANA) nebulizer solution 15 mcg, 15 mcg, Nebulization, BID  ipratropium (ATROVENT) 0.02 % nebulizer solution 0.5 mg, 0.5 mg, Nebulization, TID  sodium chloride flush 0.9 % injection 5-40 mL, 5-40 mL, IntraVENous, 2 times per day  sodium chloride flush 0.9 % injection 5-40 mL, 5-40 mL, IntraVENous, PRN  0.9 % sodium chloride infusion, , IntraVENous, PRN  acetaminophen (TYLENOL) tablet 650 mg, 650 mg, Oral, Q6H  morphine (PF) injection 2 mg, 2 mg, IntraVENous, Q1H PRN **OR** morphine sulfate (PF) injection 4 mg, 4 mg, IntraVENous, Q1H PRN  ceFAZolin (ANCEF) 3000 mg in dextrose 5 % 100 mL IVPB, 3,000 mg, IntraVENous, Q8H  aspirin EC tablet 325 mg, 325 mg, Oral, BID  senna (SENOKOT) tablet 8.6 mg, 1 tablet, Oral, BID  polyethylene glycol (GLYCOLAX) packet 17 g, 17 g, Oral, BID  docusate sodium (COLACE) capsule 100 mg, 100 mg, Oral, BID PRN  desvenlafaxine succinate (PRISTIQ) extended release tablet 100 mg, 100 mg, Oral, Daily  levothyroxine (SYNTHROID) tablet 175 mcg, 175 mcg, Oral, Daily  metoprolol succinate (TOPROL XL) extended release tablet 50 mg, 50 mg, Oral, Daily  ondansetron (ZOFRAN-ODT) disintegrating tablet 4 mg, 4 mg, Oral, Q8H PRN  pantoprazole (PROTONIX) tablet 40 mg, 40 mg, Oral, BID AC  pregabalin (LYRICA) capsule 100 mg, 100 mg, Oral, TID  thiamine mononitrate tablet 50 mg, 50 mg, Oral, Daily  tiZANidine (ZANAFLEX) tablet 4 mg, 4 mg, Oral, TID PRN  topiramate (TOPAMAX) tablet 100 mg, 100 mg, Oral, Nightly    Post-operatively the patients diet was advanced as tolerated and their incision was checked on POD #1. The incision was clean, dry and intact with no signs of infection. The patient remained neurovascularly intact in the lower extremity and had intact pulses distally. Patients calf remained soft and showed no evidence of DVT. The patient was able to move their leg and ankle/foot without any problems post-operatively. Physical therapy and occupational therapy were consulted and began working with the patient post-operatively. The patient progressed with PT/OT as would be expected and continued to improve through their stay. The patients pain was initially controlled with IV medications but we were able to transition to oral pain medications soon after arrival to the floor and their pain remained under good control through their hospital stay. From a medical standpoint the patient remained stable and continued to have the medicine team follow throughout their stay. The patients dressing was changed/incison was checked on day of d/c. The patient will be discharged at this time to  Home   with their current diet restrictions and will continue to follow the total knee precautions outlined to them by us and PT/OT. Condition on Discharge: Stable    Plan  Followup at scheduled appointment time (1 month post-op). Patient was instructed on the use of pain medications, the signs and symptoms of infection, and was given our number to call should they have any questions or concerns following discharge.

## 2022-10-13 NOTE — PROGRESS NOTES
Patient is struggling with pain control. Patient would like to stay another night to attempt pain control. Patient was encouraged to use the ice to help with the swelling and pain by her nurse. She was refusing but has since consented to try the ice therapy. Her discharge order has been cancelled and Dr. Kala Donahue will be notified.   Electronically signed by James Haddad RN on 10/13/2022 at 2:08 PM

## 2022-10-14 ENCOUNTER — APPOINTMENT (OUTPATIENT)
Dept: LAB | Facility: HOSPITAL | Age: 52
End: 2022-10-14

## 2022-10-14 VITALS
WEIGHT: 290 LBS | OXYGEN SATURATION: 100 % | TEMPERATURE: 96.4 F | DIASTOLIC BLOOD PRESSURE: 71 MMHG | BODY MASS INDEX: 53.37 KG/M2 | RESPIRATION RATE: 16 BRPM | HEART RATE: 80 BPM | SYSTOLIC BLOOD PRESSURE: 107 MMHG | HEIGHT: 62 IN

## 2022-10-14 LAB
HCT VFR BLD CALC: 25.8 % (ref 37–47)
HEMOGLOBIN: 8.1 G/DL (ref 12–16)

## 2022-10-14 PROCEDURE — 97116 GAIT TRAINING THERAPY: CPT

## 2022-10-14 PROCEDURE — 94640 AIRWAY INHALATION TREATMENT: CPT

## 2022-10-14 PROCEDURE — 6360000002 HC RX W HCPCS: Performed by: STUDENT IN AN ORGANIZED HEALTH CARE EDUCATION/TRAINING PROGRAM

## 2022-10-14 PROCEDURE — 2700000000 HC OXYGEN THERAPY PER DAY

## 2022-10-14 PROCEDURE — 6370000000 HC RX 637 (ALT 250 FOR IP): Performed by: ORTHOPAEDIC SURGERY

## 2022-10-14 PROCEDURE — 2580000003 HC RX 258: Performed by: ORTHOPAEDIC SURGERY

## 2022-10-14 PROCEDURE — 97530 THERAPEUTIC ACTIVITIES: CPT

## 2022-10-14 PROCEDURE — 36415 COLL VENOUS BLD VENIPUNCTURE: CPT

## 2022-10-14 PROCEDURE — 85018 HEMOGLOBIN: CPT

## 2022-10-14 PROCEDURE — 85014 HEMATOCRIT: CPT

## 2022-10-14 PROCEDURE — 6370000000 HC RX 637 (ALT 250 FOR IP): Performed by: STUDENT IN AN ORGANIZED HEALTH CARE EDUCATION/TRAINING PROGRAM

## 2022-10-14 RX ORDER — OXYCODONE HYDROCHLORIDE 10 MG/1
10 TABLET ORAL EVERY 4 HOURS PRN
Qty: 30 TABLET | Refills: 0 | Status: SHIPPED | OUTPATIENT
Start: 2022-10-14 | End: 2022-10-17

## 2022-10-14 RX ADMIN — METOPROLOL SUCCINATE 50 MG: 50 TABLET, EXTENDED RELEASE ORAL at 09:24

## 2022-10-14 RX ADMIN — ACETAMINOPHEN 650 MG: 325 TABLET ORAL at 01:28

## 2022-10-14 RX ADMIN — ASPIRIN 325 MG: 325 TABLET, DELAYED RELEASE ORAL at 09:24

## 2022-10-14 RX ADMIN — DESVENLAFAXINE 100 MG: 100 TABLET, EXTENDED RELEASE ORAL at 09:24

## 2022-10-14 RX ADMIN — ARFORMOTEROL TARTRATE 15 MCG: 15 SOLUTION RESPIRATORY (INHALATION) at 06:53

## 2022-10-14 RX ADMIN — SENNOSIDES 8.6 MG: 8.6 TABLET, FILM COATED ORAL at 09:24

## 2022-10-14 RX ADMIN — OXYCODONE 20 MG: 5 TABLET ORAL at 01:28

## 2022-10-14 RX ADMIN — ACETAMINOPHEN 650 MG: 325 TABLET ORAL at 09:26

## 2022-10-14 RX ADMIN — SODIUM CHLORIDE, PRESERVATIVE FREE 10 ML: 5 INJECTION INTRAVENOUS at 09:31

## 2022-10-14 RX ADMIN — LEVOTHYROXINE SODIUM 175 MCG: 125 TABLET ORAL at 09:24

## 2022-10-14 RX ADMIN — PANTOPRAZOLE SODIUM 40 MG: 40 TABLET, DELAYED RELEASE ORAL at 05:03

## 2022-10-14 RX ADMIN — IPRATROPIUM BROMIDE 0.5 MG: 0.5 SOLUTION RESPIRATORY (INHALATION) at 06:53

## 2022-10-14 RX ADMIN — THIAMINE HCL TAB 100 MG 50 MG: 100 TAB at 09:23

## 2022-10-14 RX ADMIN — BUDESONIDE 500 MCG: 0.5 SUSPENSION RESPIRATORY (INHALATION) at 06:53

## 2022-10-14 RX ADMIN — PREGABALIN 100 MG: 50 CAPSULE ORAL at 09:24

## 2022-10-14 ASSESSMENT — PAIN DESCRIPTION - DESCRIPTORS: DESCRIPTORS: ACHING

## 2022-10-14 ASSESSMENT — ENCOUNTER SYMPTOMS
WHEEZING: 0
SHORTNESS OF BREATH: 0
CHEST TIGHTNESS: 0
DIARRHEA: 0
NAUSEA: 0
ABDOMINAL PAIN: 0
CONSTIPATION: 0
COUGH: 1

## 2022-10-14 ASSESSMENT — PAIN SCALES - GENERAL
PAINLEVEL_OUTOF10: 8
PAINLEVEL_OUTOF10: 0

## 2022-10-14 ASSESSMENT — PAIN DESCRIPTION - ORIENTATION: ORIENTATION: LEFT

## 2022-10-14 ASSESSMENT — PAIN DESCRIPTION - LOCATION: LOCATION: KNEE

## 2022-10-14 NOTE — PROGRESS NOTES
Physical Therapy  Will Imperial  [id number]     10/14/22 0826   Subjective   Subjective Agrees to work with therapy. Bed Mobility   Supine to Sit Contact guard assistance   Sit to Supine Contact guard assistance   Transfers   Sit to Stand Contact guard assistance   Stand to Sit Contact guard assistance   Ambulation   Surface Level tile   Device Rolling Walker   Assistance Contact guard assistance;Stand by assistance   Distance 10', 48'   Other Activities   Comment Assisted patient to/from the BR. Patient able to perform self care in BR. Patient tolerated standing at the sink to wash her hands. Patient tolerated increased gait distance, ambulating in the mary. Patient requested to return to bed after treatment. Patient positioned for comfort with all needs in reach and fresh ice pack applied. Patient declined stair training secondary to stating she feels comfortable with the stairs. Short Term Goals   Time Frame for Short Term Goals 2 WKS   Short Term Goal 1  FT WITH RW, SBA   Short Term Goal 2 STEPS, CGA   Activity Tolerance   Activity Tolerance Patient tolerated treatment well   PT Plan of Care   Friday X   Safety Devices   Type of Devices Bed alarm in place;Call light within reach; Left in bed   Electronically signed by Emiliano Jacome PTA on 10/14/2022 at 8:30 AM

## 2022-10-14 NOTE — PROGRESS NOTES
Patient discharged home today with [facility]. Went over all discharge instructions and new medications with patient and provided a copy of all new medications to take home. Patient verbalized understanding. Patient was stable upon discharge.    Electronically signed by Mian Lyles RN on 10/14/2022 at 10:55 AM

## 2022-10-14 NOTE — DISCHARGE SUMMARY
[facility]  Dr. Umesh Henriquez  Discharge Summary    The Stephanie Joiner is a 45 y.o. female underwent total knee replacement procedure without complication. Stephanie Joiner was admitted to the floor following her   recovery in the PACU.      Discharge Diagnosis   Left    Knee Replacement    Current Inpatient Medications    Current Facility-Administered Medications: oxyCODONE (ROXICODONE) immediate release tablet 10 mg, 10 mg, Oral, Q4H PRN **OR** oxyCODONE (ROXICODONE) immediate release tablet 20 mg, 20 mg, Oral, Q4H PRN  budesonide (PULMICORT) nebulizer suspension 500 mcg, 0.5 mg, Nebulization, BID **AND** Arformoterol Tartrate (BROVANA) nebulizer solution 15 mcg, 15 mcg, Nebulization, BID  ipratropium (ATROVENT) 0.02 % nebulizer solution 0.5 mg, 0.5 mg, Nebulization, TID  sodium chloride flush 0.9 % injection 5-40 mL, 5-40 mL, IntraVENous, 2 times per day  sodium chloride flush 0.9 % injection 5-40 mL, 5-40 mL, IntraVENous, PRN  0.9 % sodium chloride infusion, , IntraVENous, PRN  acetaminophen (TYLENOL) tablet 650 mg, 650 mg, Oral, Q6H  morphine (PF) injection 2 mg, 2 mg, IntraVENous, Q1H PRN **OR** morphine sulfate (PF) injection 4 mg, 4 mg, IntraVENous, Q1H PRN  aspirin EC tablet 325 mg, 325 mg, Oral, BID  senna (SENOKOT) tablet 8.6 mg, 1 tablet, Oral, BID  polyethylene glycol (GLYCOLAX) packet 17 g, 17 g, Oral, BID  docusate sodium (COLACE) capsule 100 mg, 100 mg, Oral, BID PRN  desvenlafaxine succinate (PRISTIQ) extended release tablet 100 mg, 100 mg, Oral, Daily  levothyroxine (SYNTHROID) tablet 175 mcg, 175 mcg, Oral, Daily  metoprolol succinate (TOPROL XL) extended release tablet 50 mg, 50 mg, Oral, Daily  ondansetron (ZOFRAN-ODT) disintegrating tablet 4 mg, 4 mg, Oral, Q8H PRN  pantoprazole (PROTONIX) tablet 40 mg, 40 mg, Oral, BID AC  pregabalin (LYRICA) capsule 100 mg, 100 mg, Oral, TID  thiamine mononitrate tablet 50 mg, 50 mg, Oral, Daily  tiZANidine (ZANAFLEX) tablet 4 mg, 4 mg, Oral, TID PRN  topiramate (TOPAMAX) tablet 100 mg, 100 mg, Oral, Nightly    Post-operatively the patients diet was advanced as tolerated and their incision was checked on POD #1. The incision was clean, dry and intact with no signs of infection. The patient remained neurovascularly intact in the lower extremity and had intact pulses distally. Patients calf remained soft and showed no evidence of DVT. The patient was able to move their leg and ankle/foot without any problems post-operatively. Physical therapy and occupational therapy were consulted and began working with the patient post-operatively. The patient progressed with PT/OT as would be expected and continued to improve through their stay. The patients pain was initially controlled with IV medications but we were able to transition to oral pain medications soon after arrival to the floor and their pain remained under good control through their hospital stay. From a medical standpoint the patient remained stable and continued to have the medicine team follow throughout their stay. The patients dressing was changed/incison was checked on day of d/c. The patient will be discharged at this time to  home  with their current diet restrictions and will continue to follow the total knee precautions outlined to them by us and PT/OT. Condition on Discharge: Stable    Plan  Followup at scheduled appointment time (1 month post-op). Patient was instructed on the use of pain medications, the signs and symptoms of infection, and was given our number to call should they have any questions or concerns following discharge.

## 2022-10-14 NOTE — PROGRESS NOTES
Patient discharged home today with [facility]. Facility notified of patient's discharge and all documents were faxed. P ; F .    Electronically signed by Esther Becerra RN on 10/14/2022 at 10:55 AM

## 2022-10-14 NOTE — PLAN OF CARE
Problem: Discharge Planning  Goal: Discharge to home or other facility with appropriate resources  Outcome: Progressing  Flowsheets (Taken 10/14/2022 0924)  Discharge to home or other facility with appropriate resources: Identify barriers to discharge with patient and caregiver     Problem: Pain  Goal: Verbalizes/displays adequate comfort level or baseline comfort level  Outcome: Progressing     Problem: Safety - Adult  Goal: Free from fall injury  Outcome: Progressing     Problem: ABCDS Injury Assessment  Goal: Absence of physical injury  Outcome: Progressing     Problem: Skin/Tissue Integrity - Adult  Goal: Skin integrity remains intact  Outcome: Progressing  Flowsheets (Taken 10/14/2022 0924)  Skin Integrity Remains Intact: Monitor for areas of redness and/or skin breakdown  Goal: Incisions, wounds, or drain sites healing without S/S of infection  Outcome: Progressing  Flowsheets (Taken 10/14/2022 0924)  Incisions, Wounds, or Drain Sites Healing Without Sign and Symptoms of Infection: TWICE DAILY: Assess and document skin integrity     Problem: Musculoskeletal - Adult  Goal: Return mobility to safest level of function  Outcome: Progressing  Flowsheets (Taken 10/14/2022 0924)  Return Mobility to Safest Level of Function: Assess patient stability and activity tolerance for standing, transferring and ambulating with or without assistive devices  Goal: Maintain proper alignment of affected body part  Outcome: Progressing  Flowsheets (Taken 10/14/2022 0924)  Maintain proper alignment of affected body part: Support and protect limb and body alignment per provider's orders  Goal: Return ADL status to a safe level of function  Outcome: Progressing  Flowsheets (Taken 10/14/2022 0924)  Return ADL Status to a Safe Level of Function: Administer medication as ordered     Problem: Genitourinary - Adult  Goal: Absence of urinary retention  Outcome: Progressing

## 2022-10-14 NOTE — PLAN OF CARE
Problem: Discharge Planning  Goal: Discharge to home or other facility with appropriate resources  10/14/2022 1129 by Sandra Lyons RN  Outcome: Completed  10/14/2022 1129 by Sandra Lyons RN  Outcome: Progressing  Flowsheets (Taken 10/14/2022 6194)  Discharge to home or other facility with appropriate resources: Identify barriers to discharge with patient and caregiver     Problem: Pain  Goal: Verbalizes/displays adequate comfort level or baseline comfort level  10/14/2022 1129 by Sandra Lyons RN  Outcome: Completed  10/14/2022 1129 by Sandra Lyons RN  Outcome: Progressing     Problem: Safety - Adult  Goal: Free from fall injury  10/14/2022 1129 by Sandra Lyons RN  Outcome: Completed  10/14/2022 1129 by Sandra Lyons RN  Outcome: Progressing     Problem: ABCDS Injury Assessment  Goal: Absence of physical injury  10/14/2022 1129 by Sandra Lyons RN  Outcome: Completed  10/14/2022 1129 by Sandra Lyons RN  Outcome: Progressing     Problem: Skin/Tissue Integrity - Adult  Goal: Skin integrity remains intact  10/14/2022 1129 by Sandra Lyons RN  Outcome: Completed  10/14/2022 1129 by Sandra Lyons RN  Outcome: Progressing  Flowsheets (Taken 10/14/2022 1229)  Skin Integrity Remains Intact: Monitor for areas of redness and/or skin breakdown  Goal: Incisions, wounds, or drain sites healing without S/S of infection  10/14/2022 1129 by Sandra Lyons RN  Outcome: Completed  10/14/2022 1129 by Sandra Lyons RN  Outcome: Progressing  Flowsheets (Taken 10/14/2022 0924)  Incisions, Wounds, or Drain Sites Healing Without Sign and Symptoms of Infection: TWICE DAILY: Assess and document skin integrity     Problem: Musculoskeletal - Adult  Goal: Return mobility to safest level of function  10/14/2022 1129 by Sandra Lyons RN  Outcome: Completed  10/14/2022 1129 by Sandra Lyons RN  Outcome: Progressing  Flowsheets (Taken 10/14/2022 0924)  Return Mobility to Safest Level of Function: Assess patient stability and activity tolerance for standing, transferring and ambulating with or without assistive devices  Goal: Maintain proper alignment of affected body part  10/14/2022 1129 by Kailee Felix RN  Outcome: Completed  10/14/2022 1129 by Kailee Felix RN  Outcome: Progressing  Flowsheets (Taken 10/14/2022 0897)  Maintain proper alignment of affected body part: Support and protect limb and body alignment per provider's orders  Goal: Return ADL status to a safe level of function  10/14/2022 1129 by Kailee Felix RN  Outcome: Completed  10/14/2022 1129 by Kailee Felix RN  Outcome: Progressing  Flowsheets (Taken 10/14/2022 0924)  Return ADL Status to a Safe Level of Function: Administer medication as ordered     Problem: Genitourinary - Adult  Goal: Absence of urinary retention  10/14/2022 1129 by Kailee Felix RN  Outcome: Completed  10/14/2022 1129 by Kailee Felix RN  Outcome: Progressing

## 2022-10-14 NOTE — PROGRESS NOTES
Subjective:     Post-Operative Day: 2 No complaints    Objective:     Patient Vitals for the past 24 hrs:   BP Temp Temp src Pulse Resp SpO2   10/14/22 0106 108/62 -- -- -- -- --   10/14/22 0100 (!) 81/56 97.5 °F (36.4 °C) -- 86 16 96 %   10/13/22 1936 (!) 102/52 -- -- -- -- --   10/13/22 1932 (!) 97/54 96.8 °F (36 °C) Temporal 90 16 100 %   10/13/22 1556 123/76 97.9 °F (36.6 °C) Temporal 94 16 100 %   10/13/22 0800 104/74 98.2 °F (36.8 °C) Temporal (!) 104 18 100 %   10/13/22 0613 -- -- -- -- 16 --         General: Alert cooperative   Wound: Clean dry intact. Moderate swelling   Neurovascular: Exam normal   DVT Exam: No evidence of DVT    Coughing with yellow, clumpy sputum     Data Review:  Recent Labs     10/13/22  0241 10/14/22  0304   HGB 9.0* 8.1*       Recent Labs     10/12/22  0224      K 4.7   CREATININE 1.2*   GLUCOSE 120*       No results for input(s): POCGLU in the last 72 hours. XR CHEST PORTABLE    (Results Pending)       Assessment:     Status Post left Total Knee Arthroplasty. Doing well postop without complication.    Plan:     Pain control  Tight blood glucose control  PT/OT  DVT prophylaxis  Ice and elevate  Discharge Home today (wants home health)

## 2022-10-14 NOTE — PROGRESS NOTES
Daily Progress Note  Raya Anaya  MRN: [id number] LOS: 3    Admit Date: 10/11/2022   10/14/2022 8:33 AM    Subjective: Interval History:    Reviewed overnight events and nursing notes. Doing okay this morning. Cough still present. Chest x-ray negative (radiology read still pending for nearly 24 hours). Review of Systems   Constitutional:  Negative for chills and fever. Respiratory:  Positive for cough. Negative for chest tightness, shortness of breath and wheezing. Cardiovascular:  Negative for chest pain, palpitations and leg swelling. Gastrointestinal:  Negative for abdominal pain, constipation, diarrhea and nausea. DIET:  ADULT DIET;  Regular  ADULT ORAL NUTRITION SUPPLEMENT; Standard High Calorie/High Protein Oral Supplement    Medications:      sodium chloride        budesonide  0.5 mg Nebulization BID    And    Arformoterol Tartrate  15 mcg Nebulization BID    ipratropium  0.5 mg Nebulization TID    sodium chloride flush  5-40 mL IntraVENous 2 times per day    acetaminophen  650 mg Oral Q6H    aspirin  325 mg Oral BID    senna  1 tablet Oral BID    polyethylene glycol  17 g Oral BID    desvenlafaxine succinate  100 mg Oral Daily    levothyroxine  175 mcg Oral Daily    metoprolol succinate  50 mg Oral Daily    pantoprazole  40 mg Oral BID AC    pregabalin  100 mg Oral TID    thiamine mononitrate  50 mg Oral Daily    topiramate  100 mg Oral Nightly         Objective:     Vitals: /71   Pulse 80   Temp (!) 96.4 °F (35.8 °C) (Temporal)   Resp 16   Ht 5' 2\" (1.575 m)   Wt 290 lb (131.5 kg)   SpO2 100%   BMI 53.04 kg/m²    Intake/Output Summary (Last 24 hours) at 10/14/2022 0833  Last data filed at 10/13/2022 0933  Gross per 24 hour   Intake 480 ml   Output --   Net 480 ml    Temp (24hrs), Av.2 °F (36.2 °C), Min:96.4 °F (35.8 °C), Max:97.9 °F (36.6 °C)    Glucose:  No results found for: POCGLU  Physical Examination:   Objective:  General Appearance:  Comfortable and well-appearing. Vital signs: (most recent): Blood pressure 107/71, pulse 80, temperature (!) 96.4 °F (35.8 °C), temperature source Temporal, resp. rate 16, height 5' 2\" (1.575 m), weight 290 lb (131.5 kg), SpO2 100 %, not currently breastfeeding. No fever. Lungs:  Normal effort and normal respiratory rate. Breath sounds clear to auscultation. She is not in respiratory distress. Heart: Normal rate. Regular rhythm. No murmur. Abdomen: Abdomen is soft and non-distended. Bowel sounds are normal.   There is no abdominal tenderness. Labs:  No results found for: CHEMIS, CBC     Imaging:  XR CHEST (2 VW)  Narrative: Clinical history: Surgical clearance  Finding: Examination of the chest in PA and lateral views shows that both lungs  are free of active disease. The heart is normal in size and configuration. There  is uncoiling atherosclerotic change of thoracic aorta. The trachea is in the  midline. The mediastinum and both hemidiaphragms are normal.The bony thorax  demonstrate osteopenia. Impression: 1.no acute cardiopulmonary process. 2.NO evidence of airspace consolidation or pulmonary venous congestion. Assessment and Plan:     Primary Problem:  Primary osteoarthritis of left knee    Hospital Problem list:  Principal Problem:    Primary osteoarthritis of left knee  Resolved Problems:    * No resolved hospital problems. *      Assessment: 55-year-old female with past medical history of sarcoidosis, CKD, and hypertension who presents postoperatively from left TKA on 10/11. Plan:    Perioperative care  Aspirin for DVT prophylaxis. Medically stable for discharge. Cough  Suspect this is due to bronchial inflammation related to endotracheal tube placement during surgery. Chest x-ray is negative and she has no increased oxygen and compared to her baseline. We will treat with outpatient steroids.      Hypertension  Pressures well controlled, no changes     Chronic respiratory failure secondary to sarcoidosis w/ pulmonary complication  Continue home inhalers. Not in exacerbation at this time. CKD, stage III  Creatinine at baseline. Continue to monitor. Avoid nephrotoxic meds. Anemia of chronic disease  Most likely related to chronic sarcoidosis. No changes. Discharge planning:   Home    Reviewed treatment plans with the patient and/or family.      Code Status: Full Code    Electronically signed by Wade Sosa MD on 10/14/2022 at 8:33 AM

## 2022-10-17 ENCOUNTER — TELEPHONE (OUTPATIENT)
Dept: INPATIENT UNIT | Age: 52
End: 2022-10-17

## 2022-10-17 ENCOUNTER — TELEPHONE (OUTPATIENT)
Dept: INTERNAL MEDICINE | Age: 52
End: 2022-10-17
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
## 2022-10-17 NOTE — TELEPHONE ENCOUNTER
Follow up phone call.    Electronically signed by Judith Kitchen RN on 10/17/2022 at 11:32 AM I have reviewed and confirmed nurses' notes...
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
## 2022-10-17 NOTE — TELEPHONE ENCOUNTER
I sent silvadene cream to her pharmacy. She will apply once daily for 7-10 days. If not improving or if worsening, she will have to be seen.

## 2022-11-30 ENCOUNTER — OFFICE VISIT (OUTPATIENT)
Dept: FAMILY MEDICINE CLINIC | Age: 52
End: 2022-11-30
Payer: MEDICARE

## 2022-11-30 VITALS
BODY MASS INDEX: 51.71 KG/M2 | TEMPERATURE: 97 F | HEIGHT: 62 IN | HEART RATE: 90 BPM | WEIGHT: 281 LBS | OXYGEN SATURATION: 99 % | SYSTOLIC BLOOD PRESSURE: 138 MMHG | DIASTOLIC BLOOD PRESSURE: 80 MMHG

## 2022-11-30 DIAGNOSIS — D50.9 IRON DEFICIENCY ANEMIA, UNSPECIFIED IRON DEFICIENCY ANEMIA TYPE: ICD-10-CM

## 2022-11-30 DIAGNOSIS — E03.9 ACQUIRED HYPOTHYROIDISM: ICD-10-CM

## 2022-11-30 DIAGNOSIS — I10 ESSENTIAL HYPERTENSION: ICD-10-CM

## 2022-11-30 DIAGNOSIS — E55.9 VITAMIN D DEFICIENCY: ICD-10-CM

## 2022-11-30 DIAGNOSIS — R00.2 PALPITATIONS: ICD-10-CM

## 2022-11-30 DIAGNOSIS — D86.9 SARCOIDOSIS: ICD-10-CM

## 2022-11-30 DIAGNOSIS — N18.32 STAGE 3B CHRONIC KIDNEY DISEASE (HCC): ICD-10-CM

## 2022-11-30 DIAGNOSIS — J96.11 CHRONIC RESPIRATORY FAILURE WITH HYPOXIA (HCC): ICD-10-CM

## 2022-11-30 DIAGNOSIS — I47.1 PSVT (PAROXYSMAL SUPRAVENTRICULAR TACHYCARDIA) (HCC): ICD-10-CM

## 2022-11-30 LAB
ALBUMIN SERPL-MCNC: 4.9 G/DL (ref 3.5–5.2)
ALP BLD-CCNC: 98 U/L (ref 35–104)
ALT SERPL-CCNC: 8 U/L (ref 5–33)
ANION GAP SERPL CALCULATED.3IONS-SCNC: 15 MMOL/L (ref 7–19)
AST SERPL-CCNC: 12 U/L (ref 5–32)
BASOPHILS ABSOLUTE: 0 K/UL (ref 0–0.2)
BASOPHILS RELATIVE PERCENT: 0.8 % (ref 0–1)
BILIRUB SERPL-MCNC: 0.4 MG/DL (ref 0.2–1.2)
BUN BLDV-MCNC: 14 MG/DL (ref 6–20)
C-REACTIVE PROTEIN: 0.66 MG/DL (ref 0–0.5)
CALCIUM SERPL-MCNC: 9.7 MG/DL (ref 8.6–10)
CHLORIDE BLD-SCNC: 103 MMOL/L (ref 98–111)
CO2: 23 MMOL/L (ref 22–29)
CREAT SERPL-MCNC: 1.6 MG/DL (ref 0.5–0.9)
EOSINOPHILS ABSOLUTE: 0.2 K/UL (ref 0–0.6)
EOSINOPHILS RELATIVE PERCENT: 5.5 % (ref 0–5)
FERRITIN: 575.4 NG/ML (ref 13–150)
FOLATE: 17 NG/ML (ref 4.8–37.3)
GFR SERPL CREATININE-BSD FRML MDRD: 38 ML/MIN/{1.73_M2}
GLUCOSE BLD-MCNC: 111 MG/DL (ref 74–109)
HCT VFR BLD CALC: 37.9 % (ref 37–47)
HEMOGLOBIN: 12.1 G/DL (ref 12–16)
IMMATURE GRANULOCYTES #: 0 K/UL
IRON SATURATION: 29 % (ref 14–50)
IRON: 67 UG/DL (ref 37–145)
LYMPHOCYTES ABSOLUTE: 0.6 K/UL (ref 1.1–4.5)
LYMPHOCYTES RELATIVE PERCENT: 15 % (ref 20–40)
MAGNESIUM: 2 MG/DL (ref 1.6–2.6)
MCH RBC QN AUTO: 31.5 PG (ref 27–31)
MCHC RBC AUTO-ENTMCNC: 31.9 G/DL (ref 33–37)
MCV RBC AUTO: 98.7 FL (ref 81–99)
MONOCYTES ABSOLUTE: 0.4 K/UL (ref 0–0.9)
MONOCYTES RELATIVE PERCENT: 10 % (ref 0–10)
NEUTROPHILS ABSOLUTE: 2.6 K/UL (ref 1.5–7.5)
NEUTROPHILS RELATIVE PERCENT: 68.2 % (ref 50–65)
PDW BLD-RTO: 13 % (ref 11.5–14.5)
PLATELET # BLD: 175 K/UL (ref 130–400)
PMV BLD AUTO: 9.8 FL (ref 9.4–12.3)
POTASSIUM SERPL-SCNC: 4.1 MMOL/L (ref 3.5–5)
RBC # BLD: 3.84 M/UL (ref 4.2–5.4)
SEDIMENTATION RATE, ERYTHROCYTE: 31 MM/HR (ref 0–25)
SODIUM BLD-SCNC: 141 MMOL/L (ref 136–145)
T4 FREE: 1.8 NG/DL (ref 0.93–1.7)
TOTAL IRON BINDING CAPACITY: 235 UG/DL (ref 250–400)
TOTAL PROTEIN: 7.5 G/DL (ref 6.6–8.7)
TSH SERPL DL<=0.05 MIU/L-ACNC: 0.36 UIU/ML (ref 0.27–4.2)
VITAMIN B-12: 956 PG/ML (ref 211–946)
VITAMIN D 25-HYDROXY: 43.8 NG/ML
WBC # BLD: 3.8 K/UL (ref 4.8–10.8)

## 2022-11-30 PROCEDURE — 3078F DIAST BP <80 MM HG: CPT | Performed by: NURSE PRACTITIONER

## 2022-11-30 PROCEDURE — 3074F SYST BP LT 130 MM HG: CPT | Performed by: NURSE PRACTITIONER

## 2022-11-30 PROCEDURE — G8427 DOCREV CUR MEDS BY ELIG CLIN: HCPCS | Performed by: NURSE PRACTITIONER

## 2022-11-30 PROCEDURE — 1036F TOBACCO NON-USER: CPT | Performed by: NURSE PRACTITIONER

## 2022-11-30 PROCEDURE — G8484 FLU IMMUNIZE NO ADMIN: HCPCS | Performed by: NURSE PRACTITIONER

## 2022-11-30 PROCEDURE — G8417 CALC BMI ABV UP PARAM F/U: HCPCS | Performed by: NURSE PRACTITIONER

## 2022-11-30 PROCEDURE — 99214 OFFICE O/P EST MOD 30 MIN: CPT | Performed by: NURSE PRACTITIONER

## 2022-11-30 PROCEDURE — 3017F COLORECTAL CA SCREEN DOC REV: CPT | Performed by: NURSE PRACTITIONER

## 2022-11-30 RX ORDER — METOPROLOL SUCCINATE 100 MG/1
TABLET, EXTENDED RELEASE ORAL
Qty: 90 TABLET | Refills: 1 | Status: SHIPPED | OUTPATIENT
Start: 2022-11-30

## 2022-11-30 ASSESSMENT — ENCOUNTER SYMPTOMS
CHEST TIGHTNESS: 0
SHORTNESS OF BREATH: 1
COUGH: 0
WHEEZING: 0
DIARRHEA: 0
SORE THROAT: 0
ABDOMINAL PAIN: 0
NAUSEA: 0
BACK PAIN: 1

## 2022-11-30 NOTE — PROGRESS NOTES
Johnson Vidales is a 45 y.o. female who presents today for  Chief Complaint   Patient presents with    3 Month Follow-Up       HPI:  She underwent L TKA in October per Dr. Prakash Russo with no complications. Doing fairly well. She complains of intermittent palpitations, tachycardia, sweating ongoing for the past few months but worse over the past several weeks. Symptoms occur with any activity. No chest pain. She has chronic sob on O2 which is stable. She states she sweats significantly with any activity. She has had decreased appetite. EKG in Oct prior to surgery was stable, sinus. Symptoms ongoing prior to that. She has a history of PSVT s/p ablation in 2016 per Dr. Carmelo Veliz. She was followed by Dr. Maisha Yang at [facility] in the remote past, last seen in 2016. She is taking metoprolol 50 mg daily. She has sarcoidosis. [place] [facility] [place] [facility] for her pulmonary issues, chronic respiratory failure. Remains on O2 2-3L. She complains of diffuse joint pain, worsening. She is followed by pain management at  for chronic back pain. They manage her oxycodone, Lyrica, tizanidine. States she also takes topiramate for these issues. Avoids NSAIDs due to CKD. I did refer her to nephrology for worsening CKD, GFR 39 in August.  We do not have records. Hypothyroidism  TSH elevated in August.  Levothyroxine increased to 175 mcg. She is taking daily. Hypertension  Compliant with medications. No adverse effects from medication. No lightheadedness, palpitations, or chest pain. She is followed by [facility] hematology, Dr. Erendira Reece for chronic KAR. Review of Systems   Constitutional:  Positive for appetite change and fatigue. Negative for chills and fever. HENT:  Negative for congestion, ear pain and sore throat. Respiratory:  Positive for shortness of breath (chronic, stable). Negative for cough, chest tightness and wheezing. Cardiovascular:  Positive for palpitations. Negative for chest pain. Gastrointestinal:  Negative for abdominal pain, diarrhea and nausea. Endocrine: Positive for heat intolerance. Musculoskeletal:  Positive for arthralgias, back pain and myalgias. Skin:  Negative for rash. Past Medical History:   Diagnosis Date    Acquired hypothyroidism     B12 deficiency     Chronic back pain     Chronic pancreatitis (HCC)     Colon polyps     Depression     Fibromyalgia     GERD (gastroesophageal reflux disease)     Headache     Hypertension     Irritable bowel syndrome     Kidney stones     Neuropathy     On home oxygen therapy     3 liters    PONV (postoperative nausea and vomiting)     Restless legs syndrome     Sarcoidosis 11/01/2016    Scoliosis     SVT (supraventricular tachycardia) ([facility])        Current Outpatient Medications   Medication Sig Dispense Refill    metoprolol succinate (TOPROL XL) 100 MG extended release tablet Take 1 tablet by mouth everyday 90 tablet 1    silver sulfADIAZINE (SILVADENE) 1 % cream Apply topically daily. 25 g 0    aspirin EC 81 MG EC tablet Take 1 tablet by mouth 2 times daily 60 tablet 0    cephALEXin (KEFLEX) 500 MG capsule Take 1 capsule by mouth 4 times daily 28 capsule 0    Umeclidinium Bromide (INCRUSE ELLIPTA) 62.5 MCG/INH AEPB Inhale 2 puffs into the lungs daily      levothyroxine (SYNTHROID) 175 MCG tablet TAKE 1 TABLET BY MOUTH EVERY DAY (Patient taking differently: Take 175 mcg by mouth Daily TAKE 1 TABLET BY MOUTH EVERY DAY) 90 tablet 1    vitamin D (ERGOCALCIFEROL) 1.25 MG (01017 UT) CAPS capsule Take 1 capsule by mouth once a week Tuesday 12 capsule 1    Ubrogepant (UBRELVY) 100 MG TABS Take 1 tablet at the onset of migraine. May repeat once in 2 hours if no improvement. Do not exceed 2 tablets in 24 hours.  10 tablet 3    topiramate (TOPAMAX) 100 MG tablet Take 1 tablet by mouth nightly Indications: Backache 90 tablet 1    pantoprazole (PROTONIX) 40 MG tablet TAKE 1 TABLET BY MOUTH TWICE A DAY (Patient taking differently: Take 40 mg by mouth 2 times daily) 180 tablet 1    ondansetron (ZOFRAN-ODT) 4 MG disintegrating tablet Take 1 tablet by mouth every 8 hours as needed for Nausea or Vomiting 30 tablet 1    desvenlafaxine succinate (PRISTIQ) 100 MG TB24 extended release tablet Take 1 tablet by mouth daily 90 tablet 1    mirabegron (MYRBETRIQ) 25 MG TB24 Take 1 tablet by mouth daily 90 tablet 1    Fluticasone-Salmeterol,sensor, (AIRDUO DIGIHALER) 232-14 MCG/ACT AEPB Inhale 1-2 Doses into the lungs 2 times daily      Thiamine 50 MG CAPS Take 50 mg by mouth daily 30 capsule 3    docusate sodium (COLACE) 100 MG capsule Take 100 mg by mouth 2 times daily as needed       pregabalin (LYRICA) 100 MG capsule Take 100 mg by mouth 3 times daily. OXYGEN Inhale 3 L/min into the lungs continuous       epoetin cheryl (EPOGEN;PROCRIT) 44205 UNIT/ML injection Inject 40,000 Units into the skin as needed       tiZANidine (ZANAFLEX) 4 MG tablet Take 4 mg by mouth 3 times daily as needed (muscle spasms)        No current facility-administered medications for this visit.        Allergies   Allergen Reactions    Tape [facility] Aina Zain      can use paper tape       Past Surgical History:   Procedure Laterality Date    ABLATION OF DYSRHYTHMIC FOCUS  2016    SVT    ANAL SPHINCTEROTOMY N/A 2/19/2020    LATERAL SPHINCTEROTOMY performed by Dat Bermudez MD at [address] [place] [address] Drive  10/13/2014    Dr. Ferd GARNER-5 yr recall    COLONOSCOPY  05/20/2008    Dr. Karyn Cherry: unremarkable    COLONOSCOPY  08/23/2017    Dr Mckeon-5 yr recall    COLONOSCOPY  09/06/2018    Dr. Roman Jones yr recall    HYSTERECTOMY (CERVIX STATUS UNKNOWN)      JOINT REPLACEMENT  12/30/2013    Patella joint replacement    LITHOTRIPSY      PLANTAR FASCIA SURGERY Left     MO REVISE MEDIAN N/CARPAL TUNNEL SURG Right 5/8/2018    CARPAL TUNNEL RELEASE performed by Jazzy Rod MD at [facility] Right 5/4/2017 KNEE TOTAL ARTHROPLASTY REVISION performed by Shahnaz Stark MD at [address] ARTHROSCOPY Left     SHOULDER ARTHROSCOPY Right 3/24/2022    RIGHT SHOULDER ARTHROSCOPIC, ROTATOR CUFF REPAIR, BICEPS [facility]ENODESIS [facility]/15[facility]    Dr Denis Peres non edematous hemorrhoids, anal fissure-BCM    TOTAL KNEE ARTHROPLASTY Right 11/16/2015    TOTAL KNEE ARTHROPLASTY Left 10/11/2022    LEFT KNEE TOTAL COMPLEX PRIMARY ARTHROPLASTY performed by Shahnaz Stark MD at [facility] ENDOSCOPY  10/16/2014    Dr. Bruce Juarez  05/19/2008    Dr. Sravan Wright: jaya neg, barretts neg,  [facility]    UPPER GASTROINTESTINAL ENDOSCOPY N/A 2/10/2016    Dr Andrez Esparza-mild esophageal stricture dilated; reactive gastropathy    UPPER GASTROINTESTINAL ENDOSCOPY  08/23/2017    Dr Moses-normal    VEIN SURGERY Bilateral     RADIO ABLATION       Social History     Tobacco Use    Smoking status: Never    Smokeless tobacco: Never    Tobacco comments:     disabled due to sarcoidosis   Vaping Use    Vaping Use: Never used   Substance Use Topics    Alcohol use: No    Drug use: No       Family History   Problem Relation Age of Onset    Lupus Mother     Kidney Disease Mother     High Blood Pressure Mother     Anemia Mother     Cancer Father         skin    Diabetes Father     Parkinsonism Brother     Celiac Disease Maternal Uncle     Celiac Disease Maternal Cousin     Heart Disease Other     Colon Polyps Neg Hx     Colon Cancer Neg Hx     Esophageal Cancer Neg Hx     Liver Cancer Neg Hx     Liver Disease Neg Hx     Stomach Cancer Neg Hx     Rectal Cancer Neg Hx        /80   Pulse 90   Temp 97 °F (36.1 °C)   Ht 5' 2\" (1.575 m)   Wt 281 lb (127.5 kg)   SpO2 99%   BMI 51.40 kg/m²     Physical Exam  Vitals reviewed. Constitutional:       General: She is not in acute distress. Appearance: Normal appearance. She is well-developed. HENT:      Head: Normocephalic. Nose: Nose normal.   Eyes:      Conjunctiva/sclera: Conjunctivae normal.      Pupils: Pupils are equal, round, and reactive to light. Neck:      Thyroid: No thyromegaly. Vascular: No carotid bruit or JVD. Trachea: No tracheal deviation. Cardiovascular:      Rate and Rhythm: Normal rate and regular rhythm. Heart sounds: Normal heart sounds. No murmur heard. Pulmonary:      Effort: Pulmonary effort is normal. No respiratory distress. Breath sounds: Normal breath sounds. No wheezing or rhonchi. Musculoskeletal:         General: Normal range of motion. Cervical back: Normal range of motion and neck supple. Lymphadenopathy:      Cervical: No cervical adenopathy. Skin:     General: Skin is warm and dry. Findings: No rash. Neurological:      Mental Status: She is alert. Psychiatric:         Mood and Affect: Mood normal.         Behavior: Behavior normal.         Thought Content: Thought content normal.       ASSESSMENT/PLAN:  1. Palpitations  -Check lab as ordered  -Zio patch  -Refer to cardiology. Consider echocardiogram but will defer to cardiology.  -Increase metoprolol ER to 100 mg daily.  - Magnesium; Future  - Longterm Continuous Cardiac Event Monitor; Future  - Dee Dee Vilchis MD, Cardiology, [place]    2. PSVT (paroxysmal supraventricular tachycardia) (HCC)  -As above  -Advised ER with any worsening symptoms, persistent tachycardia, worsening shortness of breath, chest pain, etc.  - Dee Dee Vilchis MD, Cardiology, [place]    3. Essential hypertension  -Stable, controlled. Increase metoprolol for palpitations as above  - metoprolol succinate (TOPROL XL) 100 MG extended release tablet; Take 1 tablet by mouth everyday  Dispense: 90 tablet; Refill: 1    4. Acquired hypothyroidism  -Repeat thyroid studies today.   Levothyroxine increased in August.  - Comprehensive Metabolic Panel; Future  - T4, Free; Future  - TSH; Future    5. Stage 3b chronic kidney disease ([address]  -Check renal function on lab  -Continue management per nephrology. We will request records    6. Iron deficiency anemia, unspecified iron deficiency anemia type  -Check CBC, iron studies  -Continue management per hematology at [facility] with Auto Differential; Future  - Ferritin; Future  - Iron and TIBC; Future  - Vitamin B12; Future  - Folate; Future    7. Vitamin D deficiency  -Check vitamin D level  - Vitamin D 25 Hydroxy; Future    8. Sarcoidosis  -Check lab as below  -Likely refer to rheumatology  - Sedimentation Rate; Future  - C-Reactive Protein; Future  - SHABNAM Screen with Reflex; Future    9. Chronic respiratory failure with hypoxia (HCC)  -Stable, continue management per pulmonology     Return in about 2 months (around 1/30/2023) for follow up, 30 minute visit. Mindi was seen today for 3 month follow-up. Diagnoses and all orders for this visit:    Palpitations  -     Magnesium; Future  -     Longterm Continuous Cardiac Event Monitor; Future  -     Isaiah Esqueda MD, Cardiology, [place]    PSVT (paroxysmal supraventricular tachycardia) [facility])  -     Isaiah Esqueda MD, Cardiology, [facility]    Essential hypertension  -     metoprolol succinate (TOPROL XL) 100 MG extended release tablet; Take 1 tablet by mouth everyday    Acquired hypothyroidism  -     Comprehensive Metabolic Panel; Future  -     T4, Free; Future  -     TSH; Future    Stage 3b chronic kidney disease (HCC)    Iron deficiency anemia, unspecified iron deficiency anemia type  -     CBC with Auto Differential; Future  -     Ferritin; Future  -     Iron and TIBC; Future  -     Vitamin B12; Future  -     Folate; Future    Vitamin D deficiency  -     Vitamin D 25 Hydroxy; Future    Sarcoidosis  -     Sedimentation Rate; Future  -     C-Reactive Protein; Future  -     SHABNAM Screen with Reflex;  Future    Chronic respiratory failure with hypoxia (HCC)    Medications Discontinued During This Encounter   Medication Reason    metoprolol succinate (TOPROL XL) 50 MG extended release tablet REORDER     There are no Patient Instructions on file for this visit. Patient voicesunderstanding and agrees to plans along with risks and benefits of plan. Counseling:  Hope ARCHANA Silver's case, medications and options were discussed in detail. Patient was instructed to call the office if she questionsregarding her treatment. Should her conditions worsen, she should return to office to be reassessed by SARAH Ayala. she Should to go the closest Emergency Department for any emergency. They verbalizedunderstanding the above instructions. Return in about 2 months (around 1/30/2023) for follow up, 30 minute visit.

## 2022-12-01 DIAGNOSIS — N18.32 STAGE 3B CHRONIC KIDNEY DISEASE (HCC): ICD-10-CM

## 2022-12-01 DIAGNOSIS — M25.50 POLYARTHRALGIA: ICD-10-CM

## 2022-12-01 DIAGNOSIS — E03.9 ACQUIRED HYPOTHYROIDISM: ICD-10-CM

## 2022-12-01 DIAGNOSIS — D86.9 SARCOIDOSIS: Primary | ICD-10-CM

## 2022-12-02 LAB — ANA IGG, ELISA: NORMAL

## 2022-12-03 DIAGNOSIS — I10 ESSENTIAL HYPERTENSION: ICD-10-CM

## 2022-12-05 RX ORDER — METOPROLOL SUCCINATE 50 MG/1
TABLET, EXTENDED RELEASE ORAL
Qty: 90 TABLET | Refills: 1 | OUTPATIENT
Start: 2022-12-05

## 2022-12-09 ENCOUNTER — HOSPITAL ENCOUNTER (OUTPATIENT)
Dept: NON INVASIVE DIAGNOSTICS | Age: 52
Discharge: HOME | End: 2022-12-09
Payer: MEDICARE

## 2022-12-09 DIAGNOSIS — R00.2 PALPITATIONS: ICD-10-CM

## 2022-12-09 PROCEDURE — 93246 EXT ECG>7D<15D RECORDING: CPT

## 2022-12-12 ENCOUNTER — TELEPHONE (OUTPATIENT)
Dept: ONCOLOGY | Facility: CLINIC | Age: 52
End: 2022-12-12

## 2022-12-12 NOTE — TELEPHONE ENCOUNTER
Caller: Bladimir Lynn    Relationship: Self    Best call back number: [phone]    What is the best time to reach you: ASAP    Who are you requesting to speak with (clinical staff, provider,  specific staff member): DR. CERVANTES'S NURSE    Do you know the name of the person who called: BLADIMIR    What was the call regarding:  PATIENT STATES SHE WENT TO HER PCP AS SHE HAS BEEN HAVING HORRIBLE SWEATING SPELLS SINCE November, HER PCP DID LAB WORK WHEN SHE SAW HER IN November BUT DID NOT FIND ANYTHING, PATIENT STATES SHE FEELS LIKE SHE IS ON FIRE & IT IS BECOMING DEBILITATING & IS WONDERING IF DR. CERVANTES CAN HELP.    Do you require a callback: YES, PLEASE

## 2022-12-13 NOTE — TELEPHONE ENCOUNTER
Notified Hope that Dr. Boggs recommended that she have labs done to check menopausal status and other endocrine abnormalities.

## 2022-12-14 ENCOUNTER — TELEPHONE (OUTPATIENT)
Dept: FAMILY MEDICINE CLINIC | Age: 52
End: 2022-12-14

## 2022-12-14 DIAGNOSIS — R23.2 HOT FLASHES: ICD-10-CM

## 2022-12-14 DIAGNOSIS — E03.9 ACQUIRED HYPOTHYROIDISM: Primary | ICD-10-CM

## 2022-12-14 NOTE — TELEPHONE ENCOUNTER
----- Message from Hoang Couch sent at 12/13/2022  1:13 PM CST -----  Subject: Referral Request    Reason for referral request? Having issue with blood she's hot all the   time. Provider patient wants to be referred to(if known):     Provider Phone Number(if known): Additional Information for Provider? patient stated that she has no in   mind but would like provider to refer her to someone.  She said that there   may be one at [facility]  ---------------------------------------------------------------------------  --------------  [address] [employer]    [phone]; OK to leave message on voicemail  ---------------------------------------------------------------------------  --------------

## 2022-12-14 NOTE — TELEPHONE ENCOUNTER
I referred her to [facility] cardiology because she was having palpitations and other issues that could be cardiac in nature. She is supposed to have gotten a zio patch placed. Did she do that? If she prefers [facility], we can refer to cardiology there.

## 2022-12-15 DIAGNOSIS — I10 ESSENTIAL HYPERTENSION: ICD-10-CM

## 2022-12-15 RX ORDER — METOPROLOL SUCCINATE 50 MG/1
TABLET, EXTENDED RELEASE ORAL
Qty: 30 TABLET | Refills: 1 | OUTPATIENT
Start: 2022-12-15

## 2022-12-15 NOTE — TELEPHONE ENCOUNTER
Referral order placed. You can try [address]. Otherwise it will likely need to be [facility] or [place].

## 2022-12-15 NOTE — TELEPHONE ENCOUNTER
The patient called and said that her Oncologist said she needs a referral to an Endocirnologist for her hormones. Please call the patient and let her know what to do. I offered the patient an appointment and she refused.

## 2022-12-16 ENCOUNTER — LAB (OUTPATIENT)
Dept: LAB | Facility: HOSPITAL | Age: 52
End: 2022-12-16

## 2022-12-16 DIAGNOSIS — D63.1 ANEMIA IN STAGE 3B CHRONIC KIDNEY DISEASE: ICD-10-CM

## 2022-12-16 DIAGNOSIS — N18.32 ANEMIA IN STAGE 3B CHRONIC KIDNEY DISEASE: ICD-10-CM

## 2022-12-16 LAB
BASOPHILS # BLD AUTO: 0.04 10*3/MM3 (ref 0–0.2)
BASOPHILS NFR BLD AUTO: 1 % (ref 0–1.5)
DEPRECATED RDW RBC AUTO: 46.6 FL (ref 37–54)
EOSINOPHIL # BLD AUTO: 0.12 10*3/MM3 (ref 0–0.4)
EOSINOPHIL NFR BLD AUTO: 3 % (ref 0.3–6.2)
ERYTHROCYTE [DISTWIDTH] IN BLOOD BY AUTOMATED COUNT: 12.9 % (ref 12.3–15.4)
FERRITIN SERPL-MCNC: 427.2 NG/ML (ref 13–150)
HCT VFR BLD AUTO: 37.5 % (ref 34–46.6)
HGB BLD-MCNC: 11.6 G/DL (ref 12–15.9)
IMM GRANULOCYTES # BLD AUTO: 0.02 10*3/MM3 (ref 0–0.05)
IMM GRANULOCYTES NFR BLD AUTO: 0.5 % (ref 0–0.5)
IRON 24H UR-MRATE: 82 MCG/DL (ref 37–145)
IRON SATN MFR SERPL: 26 % (ref 20–50)
LYMPHOCYTES # BLD AUTO: 0.85 10*3/MM3 (ref 0.7–3.1)
LYMPHOCYTES NFR BLD AUTO: 21.4 % (ref 19.6–45.3)
MCH RBC QN AUTO: 30.6 PG (ref 26.6–33)
MCHC RBC AUTO-ENTMCNC: 30.9 G/DL (ref 31.5–35.7)
MCV RBC AUTO: 98.9 FL (ref 79–97)
MONOCYTES # BLD AUTO: 0.54 10*3/MM3 (ref 0.1–0.9)
MONOCYTES NFR BLD AUTO: 13.6 % (ref 5–12)
NEUTROPHILS NFR BLD AUTO: 2.4 10*3/MM3 (ref 1.7–7)
NEUTROPHILS NFR BLD AUTO: 60.5 % (ref 42.7–76)
NRBC BLD AUTO-RTO: 0 /100 WBC (ref 0–0.2)
PLATELET # BLD AUTO: 177 10*3/MM3 (ref 140–450)
PMV BLD AUTO: 9.5 FL (ref 6–12)
RBC # BLD AUTO: 3.79 10*6/MM3 (ref 3.77–5.28)
TIBC SERPL-MCNC: 319 MCG/DL (ref 298–536)
TRANSFERRIN SERPL-MCNC: 214 MG/DL (ref 200–360)
WBC NRBC COR # BLD: 3.97 10*3/MM3 (ref 3.4–10.8)

## 2022-12-16 PROCEDURE — 84466 ASSAY OF TRANSFERRIN: CPT

## 2022-12-16 PROCEDURE — 36415 COLL VENOUS BLD VENIPUNCTURE: CPT

## 2022-12-16 PROCEDURE — 82607 VITAMIN B-12: CPT

## 2022-12-16 PROCEDURE — 83540 ASSAY OF IRON: CPT

## 2022-12-16 PROCEDURE — 82728 ASSAY OF FERRITIN: CPT

## 2022-12-16 PROCEDURE — 85025 COMPLETE CBC W/AUTO DIFF WBC: CPT

## 2022-12-16 PROCEDURE — 82746 ASSAY OF FOLIC ACID SERUM: CPT

## 2022-12-16 NOTE — TELEPHONE ENCOUNTER
HOPE IS CALLING BACK STATES THAT SHE REACHED OUT TO HER PCP AND SHE IS NOT GETTING A RESPONSE FROM THEM. SHE STATES SHE IS MISERABLE AN SHE NEEDS SOMEONE TO HELP HER GET A REFERRAL.    PLEASE ADVISE

## 2022-12-17 LAB
FOLATE SERPL-MCNC: 16.6 NG/ML (ref 4.78–24.2)
VIT B12 BLD-MCNC: 751 PG/ML (ref 211–946)

## 2022-12-30 RX ORDER — OXYBUTYNIN CHLORIDE 5 MG/1
TABLET ORAL
Qty: 90 TABLET | Refills: 1 | OUTPATIENT
Start: 2022-12-30

## 2022-12-30 NOTE — PROCEDURES
[facility] / Holter Monitor Report      Mindi Silver    : 1970      Test Date: 2022    Analysis Date: 2022    Date Interpreted: 2022        1. Nearly 11 days and 22 hours of rhythm are processed. 2.  The underlying rhythm is normal sinus rhythm at a mean heart rate of 86 bpm, with a range of 55 to 162 bpm.    3.  No triggered events    4.  1 brief run of 5 beats low SVT at a maximum rate of 96 bpm.    5.  No atrial fibrillation or ventricular tachycardia. 6.  No heart block or pauses. 7.  Isolated supraventricular ectopics were rare (less than 1%) with rare couplets and rare triplets. Isolated ventricular ectopics were rare (less than 1%) with rare couplets and no triplets.     Electronically signed by Fern Lora MD on 22

## 2023-01-05 ENCOUNTER — OFFICE VISIT (OUTPATIENT)
Age: 53
End: 2023-01-05
Payer: MEDICARE

## 2023-01-05 VITALS
HEIGHT: 62 IN | OXYGEN SATURATION: 98 % | HEART RATE: 66 BPM | TEMPERATURE: 98.1 F | WEIGHT: 287 LBS | BODY MASS INDEX: 52.81 KG/M2 | RESPIRATION RATE: 16 BRPM | DIASTOLIC BLOOD PRESSURE: 78 MMHG | SYSTOLIC BLOOD PRESSURE: 124 MMHG

## 2023-01-05 DIAGNOSIS — R19.7 DIARRHEA, UNSPECIFIED TYPE: ICD-10-CM

## 2023-01-05 DIAGNOSIS — R06.02 SOB (SHORTNESS OF BREATH): ICD-10-CM

## 2023-01-05 DIAGNOSIS — J06.9 URI WITH COUGH AND CONGESTION: Primary | ICD-10-CM

## 2023-01-05 DIAGNOSIS — Z11.52 ENCOUNTER FOR SCREENING FOR COVID-19: ICD-10-CM

## 2023-01-05 LAB
INFLUENZA A ANTIBODY: NEGATIVE
INFLUENZA B ANTIBODY: NEGATIVE
SARS-COV-2, PCR: DETECTED

## 2023-01-05 PROCEDURE — 3078F DIAST BP <80 MM HG: CPT | Performed by: NURSE PRACTITIONER

## 2023-01-05 PROCEDURE — 3017F COLORECTAL CA SCREEN DOC REV: CPT | Performed by: NURSE PRACTITIONER

## 2023-01-05 PROCEDURE — 87804 INFLUENZA ASSAY W/OPTIC: CPT | Performed by: NURSE PRACTITIONER

## 2023-01-05 PROCEDURE — G8427 DOCREV CUR MEDS BY ELIG CLIN: HCPCS | Performed by: NURSE PRACTITIONER

## 2023-01-05 PROCEDURE — 99213 OFFICE O/P EST LOW 20 MIN: CPT | Performed by: NURSE PRACTITIONER

## 2023-01-05 PROCEDURE — 1036F TOBACCO NON-USER: CPT | Performed by: NURSE PRACTITIONER

## 2023-01-05 PROCEDURE — G8484 FLU IMMUNIZE NO ADMIN: HCPCS | Performed by: NURSE PRACTITIONER

## 2023-01-05 PROCEDURE — G8417 CALC BMI ABV UP PARAM F/U: HCPCS | Performed by: NURSE PRACTITIONER

## 2023-01-05 PROCEDURE — 3074F SYST BP LT 130 MM HG: CPT | Performed by: NURSE PRACTITIONER

## 2023-01-05 RX ORDER — AZITHROMYCIN 250 MG/1
250 TABLET, FILM COATED ORAL SEE ADMIN INSTRUCTIONS
Qty: 6 TABLET | Refills: 0 | Status: SHIPPED | OUTPATIENT
Start: 2023-01-05 | End: 2023-01-10

## 2023-01-05 RX ORDER — LORATADINE 10 MG/1
10 TABLET ORAL DAILY
Qty: 30 TABLET | Refills: 1 | Status: SHIPPED | OUTPATIENT
Start: 2023-01-05 | End: 2023-03-06

## 2023-01-05 RX ORDER — BROMPHENIRAMINE MALEATE, PSEUDOEPHEDRINE HYDROCHLORIDE, AND DEXTROMETHORPHAN HYDROBROMIDE 2; 30; 10 MG/5ML; MG/5ML; MG/5ML
5 SYRUP ORAL 4 TIMES DAILY PRN
Qty: 118 ML | Refills: 0 | Status: SHIPPED | OUTPATIENT
Start: 2023-01-05 | End: 2023-01-10

## 2023-01-05 RX ORDER — FLUTICASONE PROPIONATE 50 MCG
2 SPRAY, SUSPENSION (ML) NASAL DAILY
Qty: 16 G | Refills: 0 | Status: SHIPPED | OUTPATIENT
Start: 2023-01-05

## 2023-01-05 ASSESSMENT — ENCOUNTER SYMPTOMS
COUGH: 1
SHORTNESS OF BREATH: 1
WHEEZING: 0
NAUSEA: 0
ALLERGIC/IMMUNOLOGIC NEGATIVE: 1
VOMITING: 0
DIARRHEA: 1
ABDOMINAL PAIN: 0
EYES NEGATIVE: 1

## 2023-01-05 NOTE — PATIENT INSTRUCTIONS
Completely finish antibiotic. You will receive a phone call regarding your COVID-19 test results and [facility] Follow-up with your PCP in 3 days if symptoms do not improve or if worsening; if symptoms suddenly change or worsen, including shortness of breath or difficulty swallowing, go to the emergency department. Patient verbalized understanding. Tylenol or Motrin for fever or pain as needed. Rest and increase fluid intake. Coolmist humidifier. Start Claritin daily. Start Flonase daily. Continue taking Mucinex for congestion. Take prescribed cough medication as needed. Use your albuterol inhaler as needed for shortness of breath. Gargle with warm salt water several times daily for sore throat.

## 2023-01-05 NOTE — PROGRESS NOTES
Mindi Silver (:  1970) is a 45 y.o. female,Established patient, here for evaluation of the following chief complaint(s):  Diarrhea, Congestion, Cough, Headache, and Drainage    Presents today complaining of cough, congestion, headache, sinus drainage, body aches, and diarrhea that began Monday. She has developed some occasional shortness of breath today. Patient does have sarcoidosis, and is currently wearing oxygen. She states she does have an albuterol inhaler at home that she may use as needed. She just started taking Mucinex for congestion. Denies fever, nausea, vomiting. Explained to patient that her rapid flu test today was negative. She is agreeable to COVID testing today. I am treating with an antibiotic for upper respiratory infection. She is to continue Mucinex. Cough medications being prescribed for bedtime as needed. Care instructions discussed. Patient verbalized understanding and agrees to plan of care. ASSESSMENT/PLAN:  1. URI with cough and congestion  -     POCT Influenza A/B  -     azithromycin (ZITHROMAX) 250 MG tablet; Take 1 tablet by mouth See Admin Instructions for 5 days 500mg on day 1 followed by 250mg on days 2 - 5, Disp-6 tablet, R-0Normal  2. Diarrhea, unspecified type  3. SOB (shortness of breath)  4.  Encounter for screening for COVID-19  -     COVID-19     Orders Placed This Encounter   Medications    azithromycin (ZITHROMAX) 250 MG tablet     Sig: Take 1 tablet by mouth See Admin Instructions for 5 days 500mg on day 1 followed by 250mg on days 2 - 5     Dispense:  6 tablet     Refill:  0    fluticasone (FLONASE) 50 MCG/ACT nasal spray     Si sprays by Each Nostril route daily     Dispense:  16 g     Refill:  0    loratadine (CLARITIN) 10 MG tablet     Sig: Take 1 tablet by mouth daily     Dispense:  30 tablet     Refill:  1    brompheniramine-pseudoephedrine-DM 2-30-10 MG/5ML syrup     Sig: Take 5 mLs by mouth 4 times daily as needed for Cough Dispense:  118 mL     Refill:  0          Return if symptoms worsen or fail to improve. Subjective   SUBJECTIVE/OBJECTIVE:  HPI    Review of Systems   Constitutional:  Positive for fatigue. Negative for chills and fever. HENT:  Positive for congestion and postnasal drip. Eyes: Negative. Respiratory:  Positive for cough and shortness of breath. Negative for wheezing. Cardiovascular: Negative. Gastrointestinal:  Positive for diarrhea. Negative for abdominal pain, nausea and vomiting. Endocrine: Negative. Genitourinary: Negative. Musculoskeletal:  Positive for myalgias. Skin: Negative. Allergic/Immunologic: Negative. Neurological:  Positive for headaches. Hematological: Negative. Psychiatric/Behavioral: Negative. Objective   Physical Exam  Vitals reviewed. HENT:      Right Ear: Tympanic membrane, ear canal and external ear normal.      Left Ear: Tympanic membrane, ear canal and external ear normal.      Nose:      Right Sinus: No maxillary sinus tenderness or frontal sinus tenderness. Left Sinus: No maxillary sinus tenderness or frontal sinus tenderness. Mouth/Throat:      Lips: Pink. Mouth: Mucous membranes are moist.      Pharynx: Posterior oropharyngeal erythema (postnasal drainage) present. No oropharyngeal exudate. Cardiovascular:      Rate and Rhythm: Normal rate and regular rhythm. Heart sounds: Normal heart sounds. Pulmonary:      Effort: Pulmonary effort is normal.      Breath sounds: Normal breath sounds. Lymphadenopathy:      Head:      Right side of head: No submandibular or tonsillar adenopathy. Left side of head: No submandibular or tonsillar adenopathy. Cervical:      Right cervical: No superficial cervical adenopathy. Left cervical: No superficial cervical adenopathy. Skin:     General: Skin is warm and dry. Neurological:      Mental Status: She is alert.           Patient Instructions     Completely finish antibiotic. You will receive a phone call regarding your COVID-19 test results and [facility] Follow-up with your PCP in 3 days if symptoms do not improve or if worsening; if symptoms suddenly change or worsen, including shortness of breath or difficulty swallowing, go to the emergency department. Patient verbalized understanding. Tylenol or Motrin for fever or pain as needed. Rest and increase fluid intake. Coolmist humidifier. Start Claritin daily. Start Flonase daily. Continue taking Mucinex for congestion. Take prescribed cough medication as needed. Use your albuterol inhaler as needed for shortness of breath. Gargle with warm salt water several times daily for sore throat. An electronic signature was used to authenticate this note. --SARAH Paris - CNP     EMR Dragon/translation disclaimer: Much of this encounter note is an electronic transcription/translation of spoken language to printed text. The electronic translation of spoken language may be erroneous, or at times, nonsensical words or phrases may be inadvertently transcribed.   Although I have reviewed the note for such errors, some may still exist.

## 2023-01-13 ENCOUNTER — HOSPITAL ENCOUNTER (EMERGENCY)
Age: 53
Discharge: HOME | End: 2023-01-14
Attending: EMERGENCY MEDICINE
Payer: MEDICARE

## 2023-01-13 VITALS
RESPIRATION RATE: 22 BRPM | HEIGHT: 62 IN | OXYGEN SATURATION: 94 % | BODY MASS INDEX: 51.53 KG/M2 | SYSTOLIC BLOOD PRESSURE: 131 MMHG | DIASTOLIC BLOOD PRESSURE: 99 MMHG | WEIGHT: 280 LBS | HEART RATE: 119 BPM | TEMPERATURE: 97.5 F

## 2023-01-13 DIAGNOSIS — R19.7 DIARRHEA OF PRESUMED INFECTIOUS ORIGIN: ICD-10-CM

## 2023-01-13 DIAGNOSIS — N18.32 STAGE 3B CHRONIC KIDNEY DISEASE (HCC): ICD-10-CM

## 2023-01-13 DIAGNOSIS — R53.1 GENERAL WEAKNESS: Primary | ICD-10-CM

## 2023-01-13 DIAGNOSIS — Z86.16 HISTORY OF COVID-19: ICD-10-CM

## 2023-01-13 LAB
ALBUMIN SERPL-MCNC: 4.2 G/DL (ref 3.5–5.2)
ALP BLD-CCNC: 152 U/L (ref 35–104)
ALT SERPL-CCNC: 41 U/L (ref 5–33)
ANION GAP SERPL CALCULATED.3IONS-SCNC: 11 MMOL/L (ref 7–19)
AST SERPL-CCNC: 46 U/L (ref 5–32)
ATYPICAL LYMPHOCYTE RELATIVE PERCENT: 1 % (ref 0–8)
BACTERIA: ABNORMAL /HPF
BANDED NEUTROPHILS RELATIVE PERCENT: 2 % (ref 0–5)
BASOPHILS ABSOLUTE: 0 K/UL (ref 0–0.2)
BASOPHILS RELATIVE PERCENT: 0 % (ref 0–1)
BILIRUB SERPL-MCNC: 0.4 MG/DL (ref 0.2–1.2)
BILIRUBIN URINE: ABNORMAL
BLOOD, URINE: ABNORMAL
BUN BLDV-MCNC: 23 MG/DL (ref 6–20)
CALCIUM SERPL-MCNC: 8.9 MG/DL (ref 8.6–10)
CHLORIDE BLD-SCNC: 113 MMOL/L (ref 98–111)
CLARITY: ABNORMAL
CO2: 21 MMOL/L (ref 22–29)
COARSE CASTS, UA: ABNORMAL /LPF (ref 0–5)
COLOR: YELLOW
CREAT SERPL-MCNC: 1.5 MG/DL (ref 0.5–0.9)
EOSINOPHILS ABSOLUTE: 0.02 K/UL (ref 0–0.6)
EOSINOPHILS RELATIVE PERCENT: 1 % (ref 0–5)
EPITHELIAL CELLS, UA: ABNORMAL /HPF
GFR SERPL CREATININE-BSD FRML MDRD: 41 ML/MIN/{1.73_M2}
GLUCOSE BLD-MCNC: 107 MG/DL (ref 74–109)
GLUCOSE URINE: NEGATIVE MG/DL
HCT VFR BLD CALC: 38.1 % (ref 37–47)
HEMOGLOBIN: 11.7 G/DL (ref 12–16)
HYPOCHROMIA: ABNORMAL
IMMATURE GRANULOCYTES #: 0 K/UL
KETONES, URINE: NEGATIVE MG/DL
LEUKOCYTE ESTERASE, URINE: NEGATIVE
LIPASE: 72 U/L (ref 13–60)
LYMPHOCYTES ABSOLUTE: 0.6 K/UL (ref 1.1–4.5)
LYMPHOCYTES RELATIVE PERCENT: 27 % (ref 20–40)
MACROCYTES: ABNORMAL
MCH RBC QN AUTO: 29.8 PG (ref 27–31)
MCHC RBC AUTO-ENTMCNC: 30.7 G/DL (ref 33–37)
MCV RBC AUTO: 97.2 FL (ref 81–99)
MONOCYTES ABSOLUTE: 0.3 K/UL (ref 0–0.9)
MONOCYTES RELATIVE PERCENT: 13 % (ref 0–10)
NEUTROPHILS ABSOLUTE: 1.2 K/UL (ref 1.5–7.5)
NEUTROPHILS RELATIVE PERCENT: 56 % (ref 50–65)
NITRITE, URINE: NEGATIVE
PDW BLD-RTO: 13.2 % (ref 11.5–14.5)
PH UA: 6 (ref 5–8)
PLATELET # BLD: 85 K/UL (ref 130–400)
PLATELET SLIDE REVIEW: ABNORMAL
PMV BLD AUTO: 10.4 FL (ref 9.4–12.3)
POTASSIUM SERPL-SCNC: 3.7 MMOL/L (ref 3.5–5)
PROTEIN UA: 100 MG/DL
RBC # BLD: 3.92 M/UL (ref 4.2–5.4)
RBC UA: ABNORMAL /HPF (ref 0–2)
SODIUM BLD-SCNC: 145 MMOL/L (ref 136–145)
SPECIFIC GRAVITY UA: >=1.03 (ref 1–1.03)
TOTAL PROTEIN: 7.3 G/DL (ref 6.6–8.7)
UROBILINOGEN, URINE: 0.2 E.U./DL
WBC # BLD: 2 K/UL (ref 4.8–10.8)
WBC UA: ABNORMAL /HPF (ref 0–5)

## 2023-01-13 PROCEDURE — 87507 IADNA-DNA/RNA PROBE TQ 12-25: CPT

## 2023-01-13 PROCEDURE — 85025 COMPLETE CBC W/AUTO DIFF WBC: CPT

## 2023-01-13 PROCEDURE — 36415 COLL VENOUS BLD VENIPUNCTURE: CPT

## 2023-01-13 PROCEDURE — 83690 ASSAY OF LIPASE: CPT

## 2023-01-13 PROCEDURE — 80053 COMPREHEN METABOLIC PANEL: CPT

## 2023-01-13 PROCEDURE — 81001 URINALYSIS AUTO W/SCOPE: CPT

## 2023-01-13 PROCEDURE — 93005 ELECTROCARDIOGRAM TRACING: CPT | Performed by: EMERGENCY MEDICINE

## 2023-01-13 PROCEDURE — 99284 EMERGENCY DEPT VISIT MOD MDM: CPT

## 2023-01-13 ASSESSMENT — PAIN DESCRIPTION - PAIN TYPE: TYPE: ACUTE PAIN

## 2023-01-13 ASSESSMENT — PAIN DESCRIPTION - DESCRIPTORS: DESCRIPTORS: CRAMPING

## 2023-01-13 ASSESSMENT — ENCOUNTER SYMPTOMS
RESPIRATORY NEGATIVE: 1
DIARRHEA: 1
EYES NEGATIVE: 1

## 2023-01-13 ASSESSMENT — PAIN - FUNCTIONAL ASSESSMENT: PAIN_FUNCTIONAL_ASSESSMENT: 0-10

## 2023-01-13 ASSESSMENT — PAIN SCALES - GENERAL: PAINLEVEL_OUTOF10: 5

## 2023-01-13 ASSESSMENT — PAIN DESCRIPTION - LOCATION: LOCATION: ABDOMEN

## 2023-01-13 ASSESSMENT — LIFESTYLE VARIABLES: HOW MANY STANDARD DRINKS CONTAINING ALCOHOL DO YOU HAVE ON A TYPICAL DAY: PATIENT DOES NOT DRINK

## 2023-01-13 ASSESSMENT — PAIN DESCRIPTION - FREQUENCY: FREQUENCY: CONTINUOUS

## 2023-01-14 LAB
ADENOVIRUS F 40 41 PCR: NOT DETECTED
ASTROVIRUS PCR: NOT DETECTED
CAMPYLOBACTER PCR: NOT DETECTED
CLOSTRIDIUM DIFFICILE, PCR: NOT DETECTED
CRYPTOSPORIDIUM PCR: NOT DETECTED
CYCLOSPORA CAYETANENSIS PCR: NOT DETECTED
E COLI ENTEROAGGREGATIVE PCR: NOT DETECTED
E COLI ENTEROPATHOGENIC PCR: NOT DETECTED
E COLI ENTEROTOXIGENIC PCR: NOT DETECTED
E COLI SHIGELLA/ENTEROINVASIVE PCR: NOT DETECTED
ENTAMOEBA HISTOLYTICA PCR: NOT DETECTED
GIARDIA LAMBLIA PCR: NOT DETECTED
NOROVIRUS GI GII PCR: NOT DETECTED
PLESIOMONAS SHIGELLOIDES PCR: NOT DETECTED
ROTAVIRUS A PCR: NOT DETECTED
SALMONELLA PCR: NOT DETECTED
SAPOVIRUS PCR: NOT DETECTED
SHIGA-LIKE TOXIN-PRODUCING E. COLI (STEC) STX1/STX2: NOT DETECTED
VIBRIO CHOLERAE PCR: NOT DETECTED
VIBRIO PCR: NOT DETECTED
YERSINIA ENTEROCOLITICA PCR: NOT DETECTED

## 2023-01-14 RX ORDER — ONDANSETRON 4 MG/1
4 TABLET, ORALLY DISINTEGRATING ORAL 3 TIMES DAILY PRN
Qty: 21 TABLET | Refills: 0 | Status: SHIPPED | OUTPATIENT
Start: 2023-01-13

## 2023-01-14 NOTE — ED PROVIDER NOTES
[facility] EMERGENCY DEPT  EMERGENCY DEPARTMENT ENCOUNTER      Pt Name: Xin Lopez  MRN: [id number]  [place] 1970  Date of evaluation: 1/13/2023  Provider: Eliana Stokes MD    CHIEF COMPLAINT       Chief Complaint   Patient presents with    Fatigue     Per ems pt has been feeling weak x3 days. Pt c/o abdominal pain and diarrhea. Pt recently had covid a few weeks ago. Abdominal Pain         HISTORY OF PRESENT ILLNESS   (Location/Symptom, Timing/Onset,Context/Setting, Quality, Duration, Modifying Factors, Severity)  Note limiting factors. Xin Lopez is a 45 y.o. female who presents to the emergency department with fatigue and no energy for a couple days. Has had diarrhea for about a week that has gradually gotten worse. Has had nausea but no vomiting. Has had chills but no fever. Diagnosed with covid about 2 weeks ago. Was put on azithromycin by urgent care on the fifth for a uri, when she was diagnosed with ovid       HPI    NursingNotes were reviewed. REVIEW OF SYSTEMS    (2-9 systems for level 4, 10 or more for level 5)     Review of Systems   Constitutional: Negative. HENT: Negative. Eyes: Negative. Respiratory: Negative. Cardiovascular: Negative. Gastrointestinal:  Positive for diarrhea. Genitourinary: Negative. Musculoskeletal: Negative. Skin: Negative. Neurological: Negative. Hematological: Negative. Psychiatric/Behavioral: Negative. A complete review of systems was performed and is negative except as noted above in the HPI.        PAST MEDICAL HISTORY     Past Medical History:   Diagnosis Date    Acquired hypothyroidism     B12 deficiency     Chronic back pain     Chronic pancreatitis ([address]     Colon polyps     Depression     Fibromyalgia     GERD (gastroesophageal reflux disease)     Headache     Hypertension     Irritable bowel syndrome     Kidney stones     Neuropathy     On home oxygen therapy     3 liters    PONV (postoperative nausea and vomiting)     Restless legs syndrome     Sarcoidosis 11/01/2016    Scoliosis     SVT (supraventricular tachycardia) ([address]          SURGICAL HISTORY       Past Surgical History:   Procedure Laterality Date    ABLATION OF DYSRHYTHMIC FOCUS  2016    SVT    ANAL SPHINCTEROTOMY N/A 2/19/2020    LATERAL SPHINCTEROTOMY performed by Tea Francis MD at [address] [facility] [address]  10/13/2014    Dr. Caitlyn Henley AP-5 yr recall    COLONOSCOPY  05/20/2008    Dr. Arnulfo Larios: unremarkable    COLONOSCOPY  08/23/2017    Dr FloresM-5 yr recall    COLONOSCOPY  09/06/2018    Dr. Machelle Betancur yr recall    HYSTERECTOMY (CERVIX STATUS UNKNOWN)      JOINT REPLACEMENT  12/30/2013    Patella joint replacement    LITHOTRIPSY      PLANTAR FASCIA SURGERY Left     NE NEUROPLASTY &/TRANSPOS MEDIAN NRV CARPAL TUNNE Right 5/8/2018    CARPAL TUNNEL RELEASE performed by Roberto Alexander MD at [facility] Right 5/4/2017    KNEE TOTAL ARTHROPLASTY REVISION performed by Roberto Alexander MD at [address] ARTHROSCOPY Left     SHOULDER ARTHROSCOPY Right 3/24/2022    RIGHT SHOULDER ARTHROSCOPIC, ROTATOR CUFF REPAIR, BICEPS TENODESIS performed by Roberto Alexander MD at [place] [address] N/A 3/15/2019    Dr Franca Fairchild non edematous hemorrhoids, anal fissure-BCM    TOTAL KNEE ARTHROPLASTY Right 11/16/2015    TOTAL KNEE ARTHROPLASTY Left 10/11/2022    LEFT KNEE TOTAL COMPLEX PRIMARY ARTHROPLASTY performed by Roberto Alexander MD at [address] [place] [address] ENDOSCOPY  10/16/2014    Dr. Brayden Nelson  05/19/2008    Dr. Arnulfo Larios: jaya neg, barretts neg,  [facility]    UPPER GASTROINTESTINAL ENDOSCOPY N/A 2/10/2016    Dr Celestina Esparza-mild esophageal stricture dilated; reactive gastropathy    UPPER GASTROINTESTINAL ENDOSCOPY  08/23/2017    Dr Isi Juarez CURRENT MEDICATIONS       Discharge Medication List as of 1/14/2023 12:00 AM        CONTINUE these medications which have NOT CHANGED    Details   fluticasone (FLONASE) 50 MCG/ACT nasal spray 2 sprays by Each Nostril route daily, Disp-16 g, R-0Normal      loratadine (CLARITIN) 10 MG tablet Take 1 tablet by mouth daily, Disp-30 tablet, R-1Normal      metoprolol succinate (TOPROL XL) 100 MG extended release tablet Take 1 tablet by mouth everyday, Disp-90 tablet, R-1Normal      silver sulfADIAZINE (SILVADENE) 1 % cream Apply topically daily. , Disp-25 g, R-0, Normal      aspirin EC 81 MG EC tablet Take 1 tablet by mouth 2 times daily, Disp-60 tablet, R-0Normal      cephALEXin (KEFLEX) 500 MG capsule Take 1 capsule by mouth 4 times daily, Disp-28 capsule, R-0Normal      Umeclidinium Bromide (INCRUSE ELLIPTA) 62.5 MCG/INH AEPB Inhale 2 puffs into the lungs dailyHistorical Med      levothyroxine (SYNTHROID) 175 MCG tablet TAKE 1 TABLET BY MOUTH EVERY DAY, Disp-90 tablet, R-1Normal      vitamin D (ERGOCALCIFEROL) 1.25 MG (79183 UT) CAPS capsule Take 1 capsule by mouth once a week Tuesday, Disp-12 capsule, R-1Normal      Ubrogepant (UBRELVY) 100 MG TABS Take 1 tablet at the onset of migraine. May repeat once in 2 hours if no improvement. Do not exceed 2 tablets in 24 hours. , Disp-10 tablet, R-3Normal      topiramate (TOPAMAX) 100 MG tablet Take 1 tablet by mouth nightly Indications: Backache, Disp-90 tablet, R-1Normal      pantoprazole (PROTONIX) 40 MG tablet TAKE 1 TABLET BY MOUTH TWICE A DAY, Disp-180 tablet, R-1Normal      desvenlafaxine succinate (PRISTIQ) 100 MG TB24 extended release tablet Take 1 tablet by mouth daily, Disp-90 tablet, R-1Normal      mirabegron (MYRBETRIQ) 25 MG TB24 Take 1 tablet by mouth daily, Disp-90 tablet, R-1Normal      Fluticasone-Salmeterol,sensor, (AIRDUO DIGIHALER) 232-14 MCG/ACT AEPB Inhale 1-2 Doses into the lungs 2 times dailyHistorical Med      Thiamine 50 MG CAPS Take 50 mg by mouth daily, Disp-30 capsule, R-3Normal      docusate sodium (COLACE) 100 MG capsule Take 100 mg by mouth 2 times daily as needed Historical Med      pregabalin (LYRICA) 100 MG capsule Take 100 mg by mouth 3 times daily. Historical Med      OXYGEN Inhale 3 L/min into the lungs continuous Historical Med      epoetin cheryl (EPOGEN;PROCRIT) 35147 UNIT/ML injection Inject 40,000 Units into the skin as needed Historical Med      tiZANidine (ZANAFLEX) 4 MG tablet Take 4 mg by mouth 3 times daily as needed (muscle spasms) Historical Med             ALLERGIES     Tape Linzie Raman tape]    FAMILY HISTORY       Family History   Problem Relation Age of Onset    Lupus Mother     Kidney Disease Mother     High Blood Pressure Mother     Anemia Mother     Cancer Father         skin    Diabetes Father     Parkinsonism Brother     Celiac Disease Maternal Uncle     Celiac Disease Maternal Cousin     Heart Disease Other     Colon Polyps Neg Hx     Colon Cancer Neg Hx     Esophageal Cancer Neg Hx     Liver Cancer Neg Hx     Liver Disease Neg Hx     Stomach Cancer Neg Hx     Rectal Cancer Neg Hx           SOCIAL HISTORY       Social History     Socioeconomic History    Marital status: Single     Spouse name: None    Number of children: None    Years of education: None    Highest education level: None   Tobacco Use    Smoking status: Never    Smokeless tobacco: Never    Tobacco comments:     disabled due to sarcoidosis   Vaping Use    Vaping Use: Never used   Substance and Sexual Activity    Alcohol use: No    Drug use: No       SCREENINGS    Nate Coma Scale  Eye Opening: Spontaneous  Best Verbal Response: Oriented  Best Motor Response: Obeys commands  [place] Coma Scale Score: 15        PHYSICAL EXAM    (up to 7 for level 4, 8 or more for level 5)     ED Triage Vitals   BP Temp Temp Source Heart Rate Resp SpO2 Height Weight   01/13/23 2154 01/13/23 2158 01/13/23 2158 01/13/23 2154 01/13/23 2154 01/13/23 2154 01/13/23 2154 01/13/23 2154 (!) 136/113 97.5 °F (36.4 °C) Oral (!) 112 22 96 % 5' 2\" (1.575 m) 280 lb (127 kg)       Physical Exam  Vitals and nursing note reviewed. Constitutional:       General: She is not in acute distress. Appearance: She is well-developed. She is not toxic-appearing or diaphoretic. HENT:      Head: Normocephalic and atraumatic. Mouth/Throat:      Mouth: Mucous membranes are moist.      Pharynx: Oropharynx is clear. Eyes:      General: No scleral icterus. Right eye: No discharge. Left eye: No discharge. Pupils: Pupils are equal, round, and reactive to light. Cardiovascular:      Rate and Rhythm: Normal rate and regular rhythm. Pulmonary:      Effort: Pulmonary effort is normal. No respiratory distress. Breath sounds: No stridor. Abdominal:      General: There is no distension. Tenderness: There is no abdominal tenderness. Musculoskeletal:         General: No deformity. Normal range of motion. Cervical back: Normal range of motion. Skin:     General: Skin is warm and dry. Neurological:      General: No focal deficit present. Mental Status: She is alert and oriented to person, place, and time. GCS: GCS eye subscore is 4. GCS verbal subscore is 5. GCS motor subscore is 6. Cranial Nerves: No cranial nerve deficit. Motor: No abnormal muscle tone. Psychiatric:         Behavior: Behavior normal.         Thought Content:  Thought content normal.         Judgment: Judgment normal.       DIAGNOSTIC RESULTS     EKG: All EKG's are interpreted by the Emergency Department Physician who either signs or Co-signs this chart in the absence of a cardiologist.        RADIOLOGY:   Non-plain film images such as CT, Ultrasound and MRI are read by the radiologist. Plainradiographic images are visualized and preliminarily interpreted by the emergency physician with the below findings:        Interpretation per the Radiologist below, if available at the time of this note:    No orders to display         ED BEDSIDE ULTRASOUND:   Performed by ED Physician - none    LABS:  Labs Reviewed   CBC WITH AUTO DIFFERENTIAL - Abnormal; Notable for the following components:       Result Value    WBC 2.0 (*)     RBC 3.92 (*)     Hemoglobin 11.7 (*)     MCHC 30.7 (*)     Platelets 85 (*)     Monocytes % 13.0 (*)     Neutrophils Absolute 1.2 (*)     Lymphocytes Absolute 0.6 (*)     Macrocytes 1+ (*)     Hypochromia 1+ (*)     All other components within normal limits   COMPREHENSIVE METABOLIC PANEL - Abnormal; Notable for the following components:    Chloride 113 (*)     CO2 21 (*)     BUN 23 (*)     Creatinine 1.5 (*)     Est, Glom Filt Rate 41 (*)     Alkaline Phosphatase 152 (*)     ALT 41 (*)     AST 46 (*)     All other components within normal limits   LIPASE - Abnormal; Notable for the following components:    Lipase 72 (*)     All other components within normal limits   URINALYSIS - Abnormal; Notable for the following components:    Clarity, UA CLOUDY (*)     Bilirubin Urine SMALL (*)     Blood, Urine TRACE-INTACT (*)     Protein,  (*)     All other components within normal limits   MICROSCOPIC URINALYSIS - Abnormal; Notable for the following components:    Bacteria, UA Rare (*)     All other components within normal limits   GASTROINTESTINAL PANEL, MOLECULAR       All other labs were within normal range or not returned as of this dictation. EMERGENCY DEPARTMENT COURSE and DIFFERENTIALDIAGNOSIS/MDM:   Vitals:    Vitals:    01/13/23 2154 01/13/23 2158 01/13/23 2215   BP: (!) 136/113  (!) 131/99   Pulse: (!) 112  (!) 119   Resp: 22     Temp:  97.5 °F (36.4 °C)    TempSrc:  Oral    SpO2: 96%  94%   Weight: 280 lb (127 kg)     Height: 5' 2\" (1.575 m)         MDM    ED Course as of 01/14/23 0600   Fri Jan 13, 2023   2325 Specific Gravity, UA: >=1.030 [PUNEET]   2325 Pt tachycardic. Urine is concentrated. Renal function at baseline.  No electrolyte derangements [PUNEET]      ED Course User Index  [PUNEET] Alivia Manning MD     GI PCR panel negative. Suspect symptoms are related to COVID-19 infection. No signs of severe dehydration. Hemodynamically stable. Symptoms improved after treatment. Heart rate improved after IV fluids. No indication for hospitalization at this time. Plan for discharge symptomatic treatment for nausea and encouraged p.o. hydration    Evaluation and work-up here revealed no signs of emergent or life-threatening pathology that would necessitate admission for further work-up or management at this time. Patient is felt to be stable for discharge home with return precautions for worsening of the condition or development of new concerning symptoms. Patient was encouraged to follow-up with their primary care doctor in the appropriate timeframe. Necessary prescriptions and information have been provided for treatment at home. Patient voices understanding and agreement with the plan. CONSULTS:  None    PROCEDURES:  Unless otherwise notedbelow, none     Procedures    FINAL IMPRESSION     1. General weakness    2. Diarrhea of presumed infectious origin    3. History of COVID-19    4. Stage 3b chronic kidney disease [facility])          DISPOSITION/PLAN   DISPOSITION Decision To Discharge 01/14/2023 02:20:26 AM      No notes of EC Admission Criteria type on file.     PATIENT REFERRED TO:  [facility] EMERGENCY DEPT  [facility]  [phone]    If symptoms worsen    SARAH Rivera  [facility]   [address] [facility] [address] [phone]      As needed    DISCHARGE MEDICATIONS:  Discharge Medication List as of 1/14/2023 12:00 AM             (Please note that portions of this note were completed with a voice recognition program.  Efforts were made to edit the dictations butoccasionally words are mis-transcribed.)    Alivia Pearson MD (electronically signed)  Emergency Physician          Alivia Mannign MD  01/14/23 0600

## 2023-01-16 ENCOUNTER — CLINICAL DOCUMENTATION (OUTPATIENT)
Dept: NEUROLOGY | Age: 53
End: 2023-01-16

## 2023-01-16 LAB
EKG P AXIS: 46 DEGREES
EKG P-R INTERVAL: 162 MS
EKG Q-T INTERVAL: 324 MS
EKG QRS DURATION: 98 MS
EKG QTC CALCULATION (BAZETT): 422 MS
EKG T AXIS: 26 DEGREES

## 2023-01-16 PROCEDURE — 93010 ELECTROCARDIOGRAM REPORT: CPT | Performed by: INTERNAL MEDICINE

## 2023-01-16 RX ORDER — MIRABEGRON 25 MG/1
TABLET, FILM COATED, EXTENDED RELEASE ORAL
Qty: 90 TABLET | Refills: 1 | Status: SHIPPED | OUTPATIENT
Start: 2023-01-16

## 2023-01-16 NOTE — TELEPHONE ENCOUNTER
Mindi Silver called to request a refill on her medication.       Last office visit : 11/30/2022   Next office visit : Visit date not found     Requested Prescriptions     Pending Prescriptions Disp Refills    MYRBETRIQ 25 MG TB24 [Pharmacy Med Name: Eddie Her ER 25 MG TABLET] 90 tablet 1     Sig: TAKE 1 TABLET BY MOUTH EVERY DAY            Alvino Moon

## 2023-01-16 NOTE — PROGRESS NOTES
JOSUE HANDY Case ID: [id number] help?  Call us at [phone]  Outcome  Approvedtoday  Your request has been approved  Drug  Ubrelvy 100MG tablets  Form  [employer] Electronic PA Form (3814 NCPDP)

## 2023-01-21 DIAGNOSIS — I10 ESSENTIAL HYPERTENSION: ICD-10-CM

## 2023-01-21 DIAGNOSIS — E03.9 ACQUIRED HYPOTHYROIDISM: ICD-10-CM

## 2023-01-21 RX ORDER — FLUTICASONE PROPIONATE 50 MCG
SPRAY, SUSPENSION (ML) NASAL
OUTPATIENT
Start: 2023-01-21

## 2023-01-23 RX ORDER — DESVENLAFAXINE 100 MG/1
TABLET, EXTENDED RELEASE ORAL
Qty: 90 TABLET | Refills: 1 | Status: SHIPPED | OUTPATIENT
Start: 2023-01-23

## 2023-01-23 RX ORDER — METOPROLOL SUCCINATE 50 MG/1
TABLET, EXTENDED RELEASE ORAL
Qty: 30 TABLET | Refills: 1 | OUTPATIENT
Start: 2023-01-23

## 2023-01-23 RX ORDER — LEVOTHYROXINE SODIUM 175 UG/1
TABLET ORAL
Qty: 90 TABLET | Refills: 1 | Status: SHIPPED | OUTPATIENT
Start: 2023-01-23

## 2023-01-23 NOTE — TELEPHONE ENCOUNTER
Mindi Silver called to request a refill on her medication.       Last office visit : 11/30/2022   Next office visit : Visit date not found     Requested Prescriptions     Pending Prescriptions Disp Refills    desvenlafaxine succinate (PRISTIQ) 100 MG TB24 extended release tablet [Pharmacy Med Name: DESVENLAFAXINE SUCCNT ER 100MG] 90 tablet 1     Sig: TAKE 1 TABLET BY MOUTH EVERY DAY    levothyroxine (SYNTHROID) 175 MCG tablet [Pharmacy Med Name: LEVOTHYROXINE 175 MCG TABLET] 90 tablet 1     Sig: TAKE 1 TABLET BY MOUTH EVERY DAY     Refused Prescriptions Disp Refills    metoprolol succinate (TOPROL XL) 50 MG extended release tablet [Pharmacy Med Name: METOPROLOL SUCC ER 50 MG TAB] 30 tablet 1     Sig: TAKE 1 TABLET BY MOUTH EVERY DAY            [employer] [facility]

## 2023-01-27 RX ORDER — LORATADINE 10 MG/1
TABLET ORAL
Qty: 30 TABLET | Refills: 1 | OUTPATIENT
Start: 2023-01-27

## 2023-02-22 ENCOUNTER — OFFICE VISIT (OUTPATIENT)
Dept: PRIMARY CARE CLINIC | Age: 53
End: 2023-02-22
Payer: MEDICARE

## 2023-02-22 VITALS
HEIGHT: 62 IN | HEART RATE: 89 BPM | DIASTOLIC BLOOD PRESSURE: 84 MMHG | BODY MASS INDEX: 50.05 KG/M2 | TEMPERATURE: 97.8 F | WEIGHT: 272 LBS | OXYGEN SATURATION: 99 % | SYSTOLIC BLOOD PRESSURE: 128 MMHG

## 2023-02-22 DIAGNOSIS — J44.9 CHRONIC OBSTRUCTIVE PULMONARY DISEASE, UNSPECIFIED COPD TYPE (HCC): ICD-10-CM

## 2023-02-22 DIAGNOSIS — N18.32 STAGE 3B CHRONIC KIDNEY DISEASE (HCC): ICD-10-CM

## 2023-02-22 DIAGNOSIS — I47.1 PSVT (PAROXYSMAL SUPRAVENTRICULAR TACHYCARDIA) (HCC): ICD-10-CM

## 2023-02-22 DIAGNOSIS — E55.9 VITAMIN D DEFICIENCY: ICD-10-CM

## 2023-02-22 DIAGNOSIS — E03.9 ACQUIRED HYPOTHYROIDISM: ICD-10-CM

## 2023-02-22 DIAGNOSIS — F33.0 MILD EPISODE OF RECURRENT MAJOR DEPRESSIVE DISORDER (HCC): ICD-10-CM

## 2023-02-22 DIAGNOSIS — E03.9 ACQUIRED HYPOTHYROIDISM: Primary | ICD-10-CM

## 2023-02-22 DIAGNOSIS — I10 ESSENTIAL HYPERTENSION: ICD-10-CM

## 2023-02-22 PROBLEM — I47.10 PSVT (PAROXYSMAL SUPRAVENTRICULAR TACHYCARDIA): Status: ACTIVE | Noted: 2023-02-22

## 2023-02-22 LAB
ALBUMIN SERPL-MCNC: 4.1 G/DL (ref 3.5–5.2)
ALP BLD-CCNC: 74 U/L (ref 35–104)
ALT SERPL-CCNC: 12 U/L (ref 5–33)
ANION GAP SERPL CALCULATED.3IONS-SCNC: 10 MMOL/L (ref 7–19)
AST SERPL-CCNC: 15 U/L (ref 5–32)
BILIRUB SERPL-MCNC: 0.4 MG/DL (ref 0.2–1.2)
BUN BLDV-MCNC: 20 MG/DL (ref 6–20)
CALCIUM SERPL-MCNC: 8.7 MG/DL (ref 8.6–10)
CHLORIDE BLD-SCNC: 110 MMOL/L (ref 98–111)
CO2: 23 MMOL/L (ref 22–29)
CREAT SERPL-MCNC: 1.4 MG/DL (ref 0.5–0.9)
GFR SERPL CREATININE-BSD FRML MDRD: 44 ML/MIN/{1.73_M2}
GLUCOSE BLD-MCNC: 99 MG/DL (ref 74–109)
POTASSIUM SERPL-SCNC: 4.5 MMOL/L (ref 3.5–5)
SODIUM BLD-SCNC: 143 MMOL/L (ref 136–145)
T4 FREE: 1.82 NG/DL (ref 0.93–1.7)
TOTAL PROTEIN: 6.7 G/DL (ref 6.6–8.7)
TSH SERPL DL<=0.05 MIU/L-ACNC: 0.06 UIU/ML (ref 0.27–4.2)

## 2023-02-22 PROCEDURE — G8484 FLU IMMUNIZE NO ADMIN: HCPCS | Performed by: NURSE PRACTITIONER

## 2023-02-22 PROCEDURE — 3023F SPIROM DOC REV: CPT | Performed by: NURSE PRACTITIONER

## 2023-02-22 PROCEDURE — 99214 OFFICE O/P EST MOD 30 MIN: CPT | Performed by: NURSE PRACTITIONER

## 2023-02-22 PROCEDURE — 1036F TOBACCO NON-USER: CPT | Performed by: NURSE PRACTITIONER

## 2023-02-22 PROCEDURE — G8417 CALC BMI ABV UP PARAM F/U: HCPCS | Performed by: NURSE PRACTITIONER

## 2023-02-22 PROCEDURE — G8427 DOCREV CUR MEDS BY ELIG CLIN: HCPCS | Performed by: NURSE PRACTITIONER

## 2023-02-22 PROCEDURE — 3079F DIAST BP 80-89 MM HG: CPT | Performed by: NURSE PRACTITIONER

## 2023-02-22 PROCEDURE — 3074F SYST BP LT 130 MM HG: CPT | Performed by: NURSE PRACTITIONER

## 2023-02-22 PROCEDURE — 3017F COLORECTAL CA SCREEN DOC REV: CPT | Performed by: NURSE PRACTITIONER

## 2023-02-22 RX ORDER — FLUTICASONE FUROATE, UMECLIDINIUM BROMIDE AND VILANTEROL TRIFENATATE 200; 62.5; 25 UG/1; UG/1; UG/1
1 POWDER RESPIRATORY (INHALATION) DAILY
Qty: 1 EACH | Refills: 0
Start: 2023-02-22

## 2023-02-22 ASSESSMENT — PATIENT HEALTH QUESTIONNAIRE - PHQ9
2. FEELING DOWN, DEPRESSED OR HOPELESS: 0
4. FEELING TIRED OR HAVING LITTLE ENERGY: 0
10. IF YOU CHECKED OFF ANY PROBLEMS, HOW DIFFICULT HAVE THESE PROBLEMS MADE IT FOR YOU TO DO YOUR WORK, TAKE CARE OF THINGS AT HOME, OR GET ALONG WITH OTHER PEOPLE: 0
SUM OF ALL RESPONSES TO PHQ QUESTIONS 1-9: 1
1. LITTLE INTEREST OR PLEASURE IN DOING THINGS: 0
7. TROUBLE CONCENTRATING ON THINGS, SUCH AS READING THE NEWSPAPER OR WATCHING TELEVISION: 0
5. POOR APPETITE OR OVEREATING: 1
3. TROUBLE FALLING OR STAYING ASLEEP: 0
9. THOUGHTS THAT YOU WOULD BE BETTER OFF DEAD, OR OF HURTING YOURSELF: 0
SUM OF ALL RESPONSES TO PHQ QUESTIONS 1-9: 1
SUM OF ALL RESPONSES TO PHQ QUESTIONS 1-9: 1
6. FEELING BAD ABOUT YOURSELF - OR THAT YOU ARE A FAILURE OR HAVE LET YOURSELF OR YOUR FAMILY DOWN: 0
DEPRESSION UNABLE TO ASSESS: PT REFUSES
SUM OF ALL RESPONSES TO PHQ9 QUESTIONS 1 & 2: 0
SUM OF ALL RESPONSES TO PHQ QUESTIONS 1-9: 1
8. MOVING OR SPEAKING SO SLOWLY THAT OTHER PEOPLE COULD HAVE NOTICED. OR THE OPPOSITE, BEING SO FIGETY OR RESTLESS THAT YOU HAVE BEEN MOVING AROUND A LOT MORE THAN USUAL: 0

## 2023-02-22 ASSESSMENT — ENCOUNTER SYMPTOMS
ABDOMINAL PAIN: 0
CHEST TIGHTNESS: 0
WHEEZING: 0
COUGH: 0
SORE THROAT: 0
DIARRHEA: 0
SHORTNESS OF BREATH: 1
NAUSEA: 0

## 2023-02-22 NOTE — PROGRESS NOTES
Luann Lucio is a 45 y.o. female who presents today for  Chief Complaint   Patient presents with    Follow-up       HPI:  She had COVID in January. She was seen in [facility] urgent care. She was given a Z-Nick for possible bacterial infection. Following this she was seen in [facility] ER on 1/14 with abdominal pain, fatigue. GI panel was negative. Her GI symptoms have resolved. She is gradually recovering from [place]. Still fatigued but improving. She was referred to endocrinology for hypothyroidism, ongoing palpitations, hot flashes. Her oncologist felt she needed to see an endocrinologist.  She has not heard anything about referral.    Levojannette Arredondo in December showed sinus rhythm. Metoprolol was increased to 100 mg. She is tolerating well. Palpitations and tachycardia have improved. She does have a history of PSVT s/p ablation in 2016 per Dr. Tristen Lopez. She was followed by Dr. Lea Jacobs at [facility] in the remote past, last seen in 2016. Chronic respiratory failure, sarcoidosis stable. Followed by [facility] pulmonology. Remains on O2 2-3 L. Dr. Barbara Clements recently switched her to Trelegy inhaler. CKD stable. Followed by nephrology. Followed by pain management for chronic back pain. They are managing her oxycodone, Lyrica, tizanidine. Chronic KAR followed by [facility] hematology, Dr. Ziggy Horn. Stable. Has appointment next week. Hypothyroidism  Symptoms are currently well controlled. No temperature intolerance, mood issues, or fatigue reported. Tolerating current medication without adverse effects. Review of Systems   Constitutional:  Positive for fatigue. Negative for chills and fever. HENT:  Negative for congestion, ear pain and sore throat. Respiratory:  Positive for shortness of breath (chronic, stable). Negative for cough, chest tightness and wheezing. Cardiovascular:  Negative for chest pain. Gastrointestinal:  Negative for abdominal pain, diarrhea and nausea.    Musculoskeletal:  Negative for arthralgias and myalgias. Skin:  Negative for rash. Neurological:  Negative for dizziness and light-headedness. Past Medical History:   Diagnosis Date    Acquired hypothyroidism     B12 deficiency     Chronic back pain     Chronic pancreatitis (HCC)     Colon polyps     Depression     Fibromyalgia     GERD (gastroesophageal reflux disease)     Headache     Hypertension     Irritable bowel syndrome     Kidney stones     Neuropathy     On home oxygen therapy     3 liters    PONV (postoperative nausea and vomiting)     Restless legs syndrome     Sarcoidosis 11/01/2016    Scoliosis     SVT (supraventricular tachycardia) ([facility])        Current Outpatient Medications   Medication Sig Dispense Refill    fluticasone-umeclidin-vilant (TRELEGY ELLIPTA) 200-62.5-25 MCG/ACT AEPB inhaler Inhale 1 puff into the lungs daily 1 each 0    desvenlafaxine succinate (PRISTIQ) 100 MG TB24 extended release tablet TAKE 1 TABLET BY MOUTH EVERY DAY 90 tablet 1    levothyroxine (SYNTHROID) 175 MCG tablet TAKE 1 TABLET BY MOUTH EVERY DAY 90 tablet 1    MYRBETRIQ 25 MG TB24 TAKE 1 TABLET BY MOUTH EVERY DAY 90 tablet 1    ondansetron (ZOFRAN-ODT) 4 MG disintegrating tablet Take 1 tablet by mouth 3 times daily as needed for Nausea or Vomiting 21 tablet 0    fluticasone (FLONASE) 50 MCG/ACT nasal spray 2 sprays by Each Nostril route daily 16 g 0    loratadine (CLARITIN) 10 MG tablet Take 1 tablet by mouth daily 30 tablet 1    metoprolol succinate (TOPROL XL) 100 MG extended release tablet Take 1 tablet by mouth everyday 90 tablet 1    silver sulfADIAZINE (SILVADENE) 1 % cream Apply topically daily. 25 g 0    vitamin D (ERGOCALCIFEROL) 1.25 MG (91445 UT) CAPS capsule Take 1 capsule by mouth once a week Tuesday 12 capsule 1    Ubrogepant (UBRELVY) 100 MG TABS Take 1 tablet at the onset of migraine. May repeat once in 2 hours if no improvement. Do not exceed 2 tablets in 24 hours.  10 tablet 3    topiramate (TOPAMAX) 100 MG tablet Take 1 tablet by mouth nightly Indications: Backache 90 tablet 1    pantoprazole (PROTONIX) 40 MG tablet TAKE 1 TABLET BY MOUTH TWICE A DAY (Patient taking differently: Take 40 mg by mouth 2 times daily) 180 tablet 1    Fluticasone-Salmeterol,sensor, (AIRDUO DIGIHALER) 232-14 MCG/ACT AEPB Inhale 1-2 Doses into the lungs 2 times daily      Thiamine 50 MG CAPS Take 50 mg by mouth daily 30 capsule 3    docusate sodium (COLACE) 100 MG capsule Take 100 mg by mouth 2 times daily as needed       pregabalin (LYRICA) 100 MG capsule Take 100 mg by mouth 3 times daily. OXYGEN Inhale 3 L/min into the lungs continuous       epoetin cheryl (EPOGEN;PROCRIT) 53310 UNIT/ML injection Inject 40,000 Units into the skin as needed       tiZANidine (ZANAFLEX) 4 MG tablet Take 4 mg by mouth 3 times daily as needed (muscle spasms)       aspirin EC 81 MG EC tablet Take 1 tablet by mouth 2 times daily (Patient not taking: Reported on 2/22/2023) 60 tablet 0     No current facility-administered medications for this visit.        Allergies   Allergen Reactions    Tape Carlos Eucedapaulina Savage      can use paper tape       Past Surgical History:   Procedure Laterality Date    ABLATION OF DYSRHYTHMIC FOCUS  2016    SVT    ANAL SPHINCTEROTOMY N/A 2/19/2020    LATERAL SPHINCTEROTOMY performed by Madan Mcnally MD at [address]  10/13/2014    Dr. Gonzalo GARNER-5 yr recall    COLONOSCOPY  05/20/2008    Dr. Luis Mcpherson: unremarkable    COLONOSCOPY  08/23/2017    Dr Flores-5 yr recall    COLONOSCOPY  09/06/2018    Dr. Susi Pinedo yr recall    HYSTERECTOMY (CERVIX STATUS UNKNOWN)      JOINT REPLACEMENT  12/30/2013    Patella joint replacement    LITHOTRIPSY      PLANTAR FASCIA SURGERY Left     AR NEUROPLASTY &/TRANSPOS MEDIAN NRV CARPAL TUNNE Right 5/8/2018    CARPAL TUNNEL RELEASE performed by Noe Hays MD at [address] Right 5/4/2017    KNEE TOTAL ARTHROPLASTY REVISION performed by Alva Villegas MD at [facility] ARTHROSCOPY Left     SHOULDER ARTHROSCOPY Right 3/24/2022    RIGHT SHOULDER ARTHROSCOPIC, ROTATOR CUFF REPAIR, BICEPS TENODESIS performed by Alva Villegas MD at [address] [facility] N/A 3/15/2019    Dr Maxi Gonzalez non edematous hemorrhoids, anal fissure-BCM    TOTAL KNEE ARTHROPLASTY Right 11/16/2015    TOTAL KNEE ARTHROPLASTY Left 10/11/2022    LEFT KNEE TOTAL COMPLEX PRIMARY ARTHROPLASTY performed by Alav Villegas MD at [address] [facility] ENDOSCOPY  10/16/2014    Dr. Melva Vasquez  05/19/2008    Dr. Geovanni Garza: jaya neg, barretts neg,  [facility]    UPPER GASTROINTESTINAL ENDOSCOPY N/A 2/10/2016    Dr Louis Esparza-mild esophageal stricture dilated; reactive gastropathy    UPPER GASTROINTESTINAL ENDOSCOPY  08/23/2017    Dr Moses-normal    VEIN SURGERY Bilateral     RADIO ABLATION       Social History     Tobacco Use    Smoking status: Never    Smokeless tobacco: Never    Tobacco comments:     disabled due to sarcoidosis   Vaping Use    Vaping Use: Never used   Substance Use Topics    Alcohol use: No    Drug use: No       Family History   Problem Relation Age of Onset    Lupus Mother     Kidney Disease Mother     High Blood Pressure Mother     Anemia Mother     Cancer Father         skin    Diabetes Father     Parkinsonism Brother     Celiac Disease Maternal Uncle     Celiac Disease Maternal Cousin     Heart Disease Other     Colon Polyps Neg Hx     Colon Cancer Neg Hx     Esophageal Cancer Neg Hx     Liver Cancer Neg Hx     Liver Disease Neg Hx     Stomach Cancer Neg Hx     Rectal Cancer Neg Hx        /84   Pulse 89   Temp 97.8 °F (36.6 °C)   Ht 5' 2\" (1.575 m)   Wt 272 lb (123.4 kg)   SpO2 99%   BMI 49.75 kg/m²     Physical Exam  Vitals reviewed. Constitutional:       General: She is not in acute distress. Appearance: Normal appearance. She is well-developed. HENT:      Head: Normocephalic. Eyes:      Conjunctiva/sclera: Conjunctivae normal.      Pupils: Pupils are equal, round, and reactive to light. Neck:      Thyroid: No thyromegaly. Vascular: No carotid bruit or JVD. Trachea: No tracheal deviation. Cardiovascular:      Rate and Rhythm: Normal rate and regular rhythm. Heart sounds: Normal heart sounds. No murmur heard. Pulmonary:      Effort: Pulmonary effort is normal. No respiratory distress. Breath sounds: Normal breath sounds. No wheezing or rhonchi. Musculoskeletal:         General: Normal range of motion. Cervical back: Normal range of motion and neck supple. Lymphadenopathy:      Cervical: No cervical adenopathy. Skin:     General: Skin is warm and dry. Findings: No rash. Neurological:      Mental Status: She is alert. Psychiatric:         Mood and Affect: Mood normal.         Behavior: Behavior normal.         Thought Content: Thought content normal.       ASSESSMENT/PLAN:  1. Essential hypertension  -Stable, continue current medication    2. Stage 3b chronic kidney disease ([facility]  -Check labs today  -Continue adequate daily hydration, avoid NSAIDs  -Continue management per nephrology  - Comprehensive Metabolic Panel; Future    3. Acquired hypothyroidism  -Check thyroid testing today  -Continue levothyroxine at current dose pending lab  -Since symptomatically improved, we can likely hold off on endocrinology referral  - T4, Free; Future  - TSH; Future    4. Mild episode of recurrent major depressive disorder ([facility]  -Stable, continue Pristiq    5. Vitamin D deficiency  -Stable, continue weekly ergocalciferol    6. PSVT (paroxysmal supraventricular tachycardia) (HCC)  -Stable, continue metoprolol  -Refer back to cardiology with any worsening symptoms    7. Chronic obstructive pulmonary disease, unspecified COPD type ([facility]  -Stable, continue management per [facility] pulmonology    8.  Body mass index (BMI) 45.0-49.9, adult [facility])  -She will work on dietary changes, calorie restriction. Unable to exercise due to chronic medical issues, sarcoidosis/COPD     Return in about 3 months (around 5/22/2023) for 30 minute visit, medicare annual wellness. Mindi was seen today for follow-up. Diagnoses and all orders for this visit:    Acquired hypothyroidism  -     T4, Free; Future  -     TSH; Future    Essential hypertension    Stage 3b chronic kidney disease ([facility]  -     Comprehensive Metabolic Panel; Future    Mild episode of recurrent major depressive disorder (HCC)    Vitamin D deficiency    PSVT (paroxysmal supraventricular tachycardia) ([facility])    Chronic obstructive pulmonary disease, unspecified COPD type ([facility]    Body mass index (BMI) 45.0-49.9, adult ([facility]    Other orders  -     fluticasone-umeclidin-vilant (TRELEGY ELLIPTA) 200-62.5-25 MCG/ACT AEPB inhaler; Inhale 1 puff into the lungs daily    Medications Discontinued During This Encounter   Medication Reason    cephALEXin (KEFLEX) 500 MG capsule Therapy completed    Umeclidinium Bromide (INCRUSE ELLIPTA) 62.5 MCG/INH AEPB LIST CLEANUP     There are no Patient Instructions on file for this visit. Patient voicesunderstanding and agrees to plans along with risks and benefits of plan. Counseling:  Mindi Silver's case, medications and options were discussed in detail. Patient was instructed to call the office if she questionsregarding her treatment. Should her conditions worsen, she should return to office to be reassessed by SARAH Gary. she Should to go the closest Emergency Department for any emergency. They verbalizedunderstanding the above instructions. Return in about 3 months (around 5/22/2023) for 30 minute visit, medicare annual wellness.

## 2023-02-22 NOTE — PROGRESS NOTES
[facility] ON[facility] GROUP HEMATOLOGY AND ONCOLOGY  [address]  [facility] [address] [phone]    Patient Name: Stacy Lynn  Encounter Date: 02/27/2023  YOB: 1970  Patient Number: [id number]       REASON FOR VISIT: Ms. Stacy Lynn is a 52-year-old female who returns in followup of anemia from chronic kidney disease, with leukopenia and intermittent thrombocytopenia without unifying diagnosis (likely contribution from methotrexate). She is seen 46 months since last receipt of IV Injectafer and 11.5-.10.5 months since last receipt of 2 units PRBC transfusions. She is here alone. The history is again muddled by liberal symptom reporting.      I have reviewed the HPI and verified with the patient the accuracy of it. No changes to interval history since the information was documented. Fermín Boggs MD 02/27/23     DIAGNOSTIC ABNORMALITIES:   1. Labs, 09/16/2014 Dr. Waters's office. TIBC 303 (225 - 420), serum iron 43 (42 - 180), and iron saturation 14.2%.   2. Labs, 10/03/2014. Hemoglobin 9.8, hematocrit 30.2, MCV 94.1, platelets 157,000, WBC 2.9, with 52.3 segs (ANC 1.5), 36.8 lymphocytes, 10.9 mid cells. Glucose 80, BUN 16, creatinine 0.9 (GFR 71.6), sodium 142, potassium 3.9, chloride 109, bicarbonate 19, calcium 8.6, albumin 3.5, total protein 6.3, bilirubin 0.4, alkaline phosphatase 67, , AST 12, ALT 20, phosphorus 4.6 (each normal). Serum iron 49, iron saturation 15.8%, ferritin 32, , folate 13.6. HIV antibodies screen nonreactive. ELENA negative. T4 of 7.1, TSH 0.6, sed rate 8 (each normal). Rheumatoid factor less than 10. ANCA less than 1:20.  3. Colonoscopy, 10/13/2014: - One 7 mm polyp in the ascending colon resected and retrieved. The examination was otherwise normal on direct and retroflexion views. Recommendation: - Repeat colonoscopy in 5 years for surveillance.  4. Comprehensive Report, 10/15/2014: FINAL DIAGNOSIS.  Peripheral blood: Leukopenia/neutropenia; normocytic anemia. Comprehensive Comments: The cause of the cytopenias is not apparent from review of this peripheral blood smear. Differential diagnosis includes chronic disease, autoimmunity, drug effects, infection, etc. Although there are no morphologic features of myeloid dysplasia, please be aware that peripheral blood findings are not always representative of underlying bone marrow abnormalities. FLOW IMPRESSION: Peripheral blood: No increase in myeloid or lymphoid blasts identified. Findings consistent with mild granulocytic left shift. No immunophenotypically abnormal B- or T-cell population identified.  5. EGD performed on 10/16/2014 at [facility]. IMPRESSION: Normal esophagus. Normal stomach. Normal first part of the duodenum and 2nd part of the duodenum Biopsied . Pathology: Small bowel biopsy. Benign small intestinal type mucosa with no significant histopathologic changes.  6. Labs, 05/29/2015. IgG 827, IgA 168, IgM 140. Immunofixation showed no monoclonal immunoglobulins detected. Free kappa light chain 1.26, free lambda light chain 1.17, kappa/lambda light chains 1.08 (each normal).  7. UPIEP, 06/09/2015. 24-hour urine 82.8 mg. Immunofixation not reported.  8. [facility], CT-guided bone marrow, 06/29/2015. Impression: Technically successful CT-guided bone marrow aspirate without immediate complications. No core bone marrow sample could be obtained. PATHOLOGY: Surgical pathology performed on 06/29/2015 at [facility] was revealing for post iliac crest bone marrow biopsy and aspirate: Normocellular bone marrow of 40%. No increase in blasts. Iron staining is 1+. Flow cytometry shows no evidence of an abnormal myeloid maturation, increase in blasts, or lymphoproliferative disorder. Complete blood count and peripheral blood smear review: Normochromic normocytic anemia. Leukopenia. No circulating blasts.  9. Peripheral blood comprehensive report,  06/19/2017: PANCYTOPENIA. COMPREHENSIVE DIAGNOSIS.. Review of peripheral blood smear: Leukopenia with relative reactive appearing eosinophilia. Normochromic, normocytic anemia. Mild thrombocytopenia. See comment. COMPREHENSIVE COMMENT. When compared to a peripheral blood specimen reviewed on this patient in 10/2014 ([id number]), the white blood cell count has decreased from 2.7 K/mL to 1.8 currently. The hemoglobin level has also decreased from 9.9 g/dL to 8.7 g/dL. The platelet counts are similar. As stated in the prior specimen, the cause of the cytopenias is not apparent from the peripheral blood smear examination. Etiologies to consider include chronic diseases particularly autoimmune disease, infections and drugs/toxins. Myelodysplasia is in the differential diagnosis but that diagnosis can be difficult to establish on a peripheral blood specimen. Correlation recommended. Flow cytometry study after erythrolysis reveals no circulating myeloid or lymphoid blasts. Myeloid and monocytic lineages are represented by mature granulocytes and monocytes. Phenotypically unremarkable eosinophils are mildly elevated at 10.5% of the total WBC. Basophils are not increased. It must be noted that the diagnosis of a myeloid stem cell disorder, if clinically suspected, is best made using bone marrow specimens. Peripheral blood is not always representative of abnormalities involving the bone marrow. The B cells show no evidence of clonality or antigenic aberrancy. The T cells show normal expression of the pan T-cell antigens. The NK cells account for 23.7% of the total lymphocytes.  10. [facility]: Endoscopy: 08/23/2017. Impressions: - Normal esophagus. - Non-erosive gastritis. Biopsied. - Normal first part of the duodenum and 2nd part of the duodenum. Biopsied.   11. [facility]: Colonoscopy: 08/23/2017. Impressions: - The entire examined colon is normal. Biopsied.     PREVIOUS INTERVENTIONS:   1. IV Venofer, 11/10/2014  - 11/20/2014 (1000 mg)  2. INFeD 500 mg on 07/19/2016 and 05/01/2017  3. 2 units PRBC transfusions, 09/01/2017  4. Injectafer 750 mg IV, 04/23/2019        Problem List Items Addressed This Visit    None    Oncology/Hematology History    No history exists.       PAST MEDICAL HISTORY:  ALLERGIES:  Allergies   Allergen Reactions   • Adhesive Tape Hives   • Tape Hives     CURRENT MEDICATIONS:  Outpatient Encounter Medications as of 2/27/2023   Medication Sig Dispense Refill   • docusate sodium (COLACE) 100 MG capsule Take 100 mg by mouth.     • esomeprazole (nexIUM) 40 MG capsule TAKE 1 CAPSULE BY MOUTH TWICE DAILY BEFORE  MEALS 60 capsule 0   • levothyroxine (SYNTHROID, LEVOTHROID) 175 MCG tablet Take 175 mcg by mouth Daily.     • LYRICA 100 MG capsule Take 100 mg by mouth Every 8 (Eight) Hours As Needed.  1   • metoprolol succinate XL (TOPROL-XL) 25 MG 24 hr tablet Take 100 mg by mouth Daily.     • O2 (OXYGEN) Inhale 2 L/min 1 (One) Time. Continuous     • ondansetron (ZOFRAN) 4 MG tablet Take 4 mg by mouth Every 8 (Eight) Hours As Needed for Nausea or Vomiting.     • oxybutynin (DITROPAN) 5 MG tablet Take 5 mg by mouth every night at bedtime.  5   • oxyCODONE-acetaminophen (PERCOCET) 7.5-325 MG per tablet Take 1 tablet by mouth Every 8 (Eight) Hours As Needed.     • polyethylene glycol (MIRALAX) powder Take 17 g by mouth.     • PROCRIT 81962 UNIT/ML injection Inject 1 Units under the skin As Needed (WHEN BLOOD COUNT [hemoglobin] BELOW 11).     • tiZANidine (ZANAFLEX) 4 MG tablet Take 4 mg by mouth At Night As Needed for Muscle Spasms.     • topiramate (TOPAMAX) 100 MG tablet Take 100 mg by mouth Every Night.     • Umeclidinium Bromide (INCRUSE ELLIPTA) 62.5 MCG/INH aerosol powder  Inhale 1 puff Daily.     • vitamin B-6 (PYRIDOXINE) 50 MG tablet Take 50 mg by mouth Daily.     • vitamin D (ERGOCALCIFEROL) 1.25 MG (33866 UT) capsule capsule Take 50,000 Units by mouth 1 (One) Time Per Week.     • Fluticasone  Furoate-Vilanterol (Breo Ellipta) 200-25 MCG/INH inhaler Inhale 1 puff Daily for 30 days. 1 each 11   • [DISCONTINUED] Folbee 2.5-25-1 MG tablet tablet TAKE 1 TABLET BY MOUTH EVERY DAY 30 tablet 0     No facility-administered encounter medications on file as of 2/27/2023.     ADULT ILLNESSES:   Iron deficiency anemia ( ICD-10:D50.9 ;Iron deficiency anemia, unspecified   Anemia ( ICD-10:D64.9 ;Anemia, unspecified   Anemia in chronic kidney disease ( ICD-10:D63.1 ;Anemia in chronic kidney disease   Chronic kidney disease ( ICD-10:N18.3 ;Chronic kidney disease, stage 3 (moderate)   Chronic pancreatitis ( ICD-10:K86.1 ;Other chronic pancreatitis   Depression ( ICD-10:F32.9 ;Major depressive disorder, single episode, unspecified   Fatigue ( ICD-10:R53.82 ;Chronic fatigue, unspecified   Fibromyalgia syndrome ( ICD-10:M79.7 ;Fibromyalgia   Gastroesophageal reflux disease ( ICD-10:K21.9 ;Gastro-esophageal reflux disease without esophagitis   Hypertension ( ICD-10:I10 ;Essential (primary) hypertension   Hypothyroidism ( ICD-10:E03.9 ;Hypothyroidism, unspecified   Irritable bowel syndrome ( ICD-10:K58.9 ;Irritable bowel syndrome without diarrhea   Kidney stone ( ICD-10:N20.0 ;Calculus of kidney   Knee pain ( meniscal disorder,Dr. Dasilva; ICD-10:M25.569 ;Pain in unspecified knee )   Leukopenia ( ICD-10:D72.819 ;Decreased white blood cell count, unspecified   Macular degeneration ( ICD-10:H35.30 ;Unspecified macular degeneration   Migraines ( ICD-10:G43.909 ;Migraine, unspecified, not intractable, without status migrainosus   Obesity ( ICD-10:E66.9 ;Obesity, unspecified   Peripheral neuropathy ( ICD-10:G62.9 ;Polyneuropathy, unspecified   Sarcoidosis ( ICD-10:D86.0 ;Sarcoidosis of lung   Scoliosis ( ICD-10:M41.9 ;Scoliosis, unspecified   Urge incontinence of urine ( ICD-10:N39.41 ;Urge incontinence   Vitamin B12 deficiency ( ICD-10:E53.8 ;Deficiency of other specified B group vitamins    SURGERIES:   Total knee replacement,  right, 11/06/2015, Dr. Dasilva   Colonoscopy, 2009   Shoulder repair, epicondyle, right, 2002   Shoulder repair, arthroscopy, 2001   Ulnar transposition; ulnar neuropathy, 2002,1999   Kidney stone, 2001   Hysterectomy, total, 2004   Colonoscopy, 09/06/2018. Impression: The examination was otherwise normal on direct and retroflexion views. No specimens collected. Repeat 5 years   Colonoscopy, 08/23/2017. [facility]. Impression> The entire examined colon is normal. Biopsied   Decompression of median nerve, (carpal tunnel release), 05/08/2018, Dr. Pathak   Operative procedure on knee, replacement, 01/2014, Dr. Dasilva   Endoscopy, 08/23/2017, [facility]. Impression: Normal esophagus. Non-erosive gastritis. Biopsied. Normal 1st part of the duodenum and 2nd part of the duodenum. Biopsied   Radiofrequency ablation (RFA) left leg on 01/10/2018 by Dr. Montgomery. Left lower extremity venous ultrasound, 01/19/2018. No deep venous thrombosis (DVT) left lower extremity. Was started on Eliquis. Lymphedema pumps and compression stockings prescribed. Followup 1 month   Revision surgery was done last 05/04/2017. Dr. Pathak   Cholecystectomy, remote  Right rotator cuff and bicep tendon repair last 03/24/2022  Left total knee replacement, 10/9/2022.  Dr. Pathak      ADULT ILLNESSES:  Patient Active Problem List   Diagnosis Code   • Palpitations R00.2   • Thrombocytopenia (HCC) D69.6   • Sarcoidosis D86.9   • Essential hypertension I10   • Hypothyroidism E03.9   • Dyspnea on exertion R06.09   • Morbidly obese (HCC) E66.01   • PFO (patent foramen ovale) Q21.12   • Iron deficiency anemia D50.9   • Generalized weakness R53.1   • Volume depletion, gastrointestinal loss E86.9   • Viral enterocolitis A08.4   • Colon polyps K63.5   • Epigastric pain R10.13   • Bloating R14.0   • Early satiety R68.81   • Weight loss R63.4   • Abdominal cramping R10.9   • Nausea R11.0   • NSAID long-term use Z79.1   •  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, T45.1X5A   • Anemia in chronic kidney disease (CKD) N18.9, D63.1   • Varicose veins of both lower extremities with pain I83.813   • Lymphedema I89.0   • Venous (peripheral) insufficiency I87.2   • Neuropathy due to medical condition (HCC) G63   • Venous insufficiency I87.2   • Chest pain R07.9   • Bloody stool K92.1   • Constipation K59.00   • Anal or rectal pain K62.89   • Rectal bleeding K62.5   • Hx of colonic polyps Z86.010   • Other hemorrhoids K64.8   • Chronic respiratory failure with hypoxia (HCC) J96.11   • Diarrhea R19.7   • Irritable bowel syndrome with constipation K58.1   • Overweight E66.3   • 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 Diallo    • CHOLECYSTECTOMY     • COLONOSCOPY  10/13/2014   • COLONOSCOPY N/A 8/23/2017    Procedure: COLONOSCOPY WITH ANESTHESIA;  Surgeon: Jeanette Mclaughlin MD;  Location: [facility] ENDOSCOPY;  Service:    • COLONOSCOPY N/A 9/6/2018    Procedure: COLONOSCOPY WITH ANESTHESIA;  Surgeon: Jeanette Mclaughlin MD;  Location: [facility] ENDOSCOPY;  Service: Gastroenterology   • ENDOSCOPY N/A 8/23/2017    Procedure: ESOPHAGOGASTRODUODENOSCOPY WITH ANESTHESIA;  Surgeon: Jeanette Mclaughlin MD;  Location: [facility] ENDOSCOPY;  Service:    • ENDOVENOUS ABLATION SAPHENOUS VEIN W/ LASER Right 03/30/2018   • HYSTERECTOMY     • JOINT REPLACEMENT Right     PATELLA REPLACED   • KIDNEY STONE SURGERY     • KNEE ARTHROSCOPY     • KNEE SURGERY Right    • LATERAL EPICONDYLE RELEASE     • PATELLA SURGERY     • REPLACEMENT TOTAL KNEE     • SHOULDER ARTHROSCOPY     • ULNAR NERVE DECOMPRESSION     • VARICOSE VEIN SURGERY Left 1/10/2018    Procedure: LEFT SAPHENOUS VEIN RADIO FREQUENCY ABLATION;  Surgeon: Kvng Montgomery DO;   Location:  [facility] OR;  Service:    • VARICOSE VEIN SURGERY Right 3/30/2018    Procedure: RIGHT LOWER EXTREMITY VENAGRAM, RIGHT LOWER EXTREMITY SAPHENOUS VEIN RADIO FREQUENCY ABLATION;  Surgeon: Kvng Montgomery DO;  Location:  [facility] OR;  Service: Vascular     HEALTH MAINTENANCE ITEMS:  Health Maintenance Due   Topic Date Due   • COVID-19 Vaccine (1) Never done   • TDAP/TD VACCINES (1 - Tdap) Never done   • HEPATITIS C SCREENING  Never done   • PAP SMEAR  Never done   • ANNUAL WELLNESS VISIT  07/28/2017   • ZOSTER VACCINE (1 of 2) Never done   • MAMMOGRAM  03/08/2021   • INFLUENZA VACCINE  08/01/2022       <no information>  Last Completed Colonoscopy          COLORECTAL CANCER SCREENING (COLONOSCOPY - Every 5 Years) Next due on 3/15/2024    03/15/2019  Outside Procedure: NH SIGMOIDOSCOPY,BIOPSY,[facility] COLONOSCOPY W/BIOPSY SINGLE/MULTIPLE    09/06/2018  Surgical Procedure: COLONOSCOPY    09/06/2018  COLONOSCOPY    08/23/2017  Surgical Procedure: COLONOSCOPY    08/23/2017  COLONOSCOPY    Only the first 5 history entries have been loaded, but more history exists.              Immunization History   Administered Date(s) Administered   • Pneumococcal Conjugate 13-Valent (PCV13) 11/09/2018, 04/01/2019   • Pneumococcal Polysaccharide (PPSV23) 09/26/2017, 10/01/2018     Last Completed Mammogram     This patient has no relevant Health Maintenance data.            FAMILY HISTORY:  Family History   Problem Relation Age of Onset   • Arrhythmia Mother    • Heart disease Mother    • Kidney disease Mother    • Hypertension Mother    • Heart disease Father    • Diabetes Father    • Cancer Father    • Heart disease Brother    • Heart disease Maternal Aunt    • Heart disease Maternal Uncle    • Heart disease Paternal Aunt    • Heart disease Paternal Uncle    • Diabetes Paternal Uncle    • Hypertension Paternal Uncle    • Heart disease Maternal Grandmother    • Heart disease Maternal Grandfather    • Heart disease Paternal Grandmother   "  • Cancer Paternal Grandmother    • Heart disease Paternal Grandfather    • Cancer Paternal Uncle    • Hypertension Paternal Uncle    • Colon cancer Neg Hx    • Colon polyps Neg Hx      SOCIAL HISTORY:  Social History     Socioeconomic History   • Marital status: Single   Tobacco Use   • Smoking status: Never   • Smokeless tobacco: Never   Vaping Use   • Vaping Use: Never used   Substance and Sexual Activity   • Alcohol use: Never   • Drug use: Never   • Sexual activity: Never       REVIEW OF SYSTEMS:  Constitutional:   The patient's appetite is good. Her energy is chronically low. She manages her personal ADLs. She lives with her sister and brother. She manages her ADLs including helping with light indoor chores but not any errands and does not drive. She has lost 17 lb (in addition to 3 lb at her prior visit - had gained 26 lb at her visit prior to that) since her last visit. \"Since the knee surgery last 10/2022.\" She has no fevers or chills but has intermittent non-drenching night sweats. Her sleep habits seem appropriate.  Ear/Nose/Throat/Mouth:   She reports no ear pains, sinus symptoms, sore throat, nosebleeds, or sore tongue. She has migraine headaches. She denies any hoarseness, change in voice quality.   Ocular:   She reports no eye pain, significant change in visual acuity, double vision, or blurry vision.  Respiratory:   She reports baseline exertional dyspnea and is short of breath with her routine activities. She has chronic cough, sometimes with thick, white/yellow phlegm production but has no significant shortness of breathing at rest or unexplained chest wall pain. Says she is off prednisone since last 08/2016 by Dr. Scherer (for the sarcoid). Says the methotrexate has been stopped by Dr. Scherer. Has been on round the clock O2 since 04/2018.  Cardiovascular:   She reports no exertional chest pain, chest pressure, or chest heaviness. She reports no claudication. She reports no palpitations or " "symptomatic orthostasis.  Gastrointestinal:   She reports chronic nausea but no dysphagia, vomiting, or postprandial abdominal pain but admits to early satiety, increased bloating, and cramping. She has no change in bowel habits without dark discoloration of the stool (off oral iron). She reports intermittent rectal bleeding when she is constipated. \"Not of late.\" She has intermittent diarrhea from IBS. Is intolerant of oral iron (cramps, constipation). Last repeat colonoscopy, 09/06/2018 (above). Hemorrhoids?.  Genitourinary:   She reports no urinary burning, frequency, dribbling, or discoloration. She reports difficulty controlling her bladder and has trouble holding in her urine but has not needed protective pads. She has no need to urinate frequently through the night.  Musculoskeletal:   She reports that she had left TKR, 10/9/2022 and right rotator cuff and bicep tendon repair last 03/24/2022.  She has intermittent right knee discomfort in spite of total knee replacement (TKR). Says revision surgery was done last 05/04/2017 - Dr. Kwon. She has chronic arthralgias of the hips and lower back with distal radiculitis to the buttocks. She has myalgias of the calves and has nighttime leg cramping. She is now seen by Dr. Schmitt of Pain Management. Says a nerve stimulator couldn't be placed due to her scoliosis. Is off morphine.  Is now on Percocet 7.5 every 8 hours as needed  Extremities:   She reports chronic trouble with fluid retention and significant leg swelling. Is no longer using compression leg pumps.  Endocrine:   She reports no problems with excess thirst, excessive urination, vasomotor instability.  Heme/Lymphatic:   She reports easy bruising but no unexplained bleeding, petechial rashes, or swollen glands.  Skin:   She reports chronic itching and rashes but no lesions which won't heal.  Neuro:   She reports postural dizziness but no loss of consciousness, seizures, or fainting spells. She reports no " "weakness of her face, arms, or legs. She has no difficulty with speech. She has no tremors. She has relief of paresthesias of the right hand since the carpal tunnel release on 05/08/2018 (Dr. Kwon).  Psych:   She admits to intermittent depression and anxiety. She reports occasional mood swings. She reports no history of suicide attempts.    VITAL SIGNS: /76   Pulse 117   Temp 97.8 °F (36.6 °C)   Resp 18   Ht 162.6 cm (64\")   Wt 126 kg (277 lb)   SpO2 100%   BMI 47.55 kg/m² Body surface area is 2.25 meters squared.  Pain Score    02/27/23 1334   PainSc:   6   PainLoc: Back         PHYSICAL EXAMINATION:   General:   She is a very-pleasant, morbidly obese and modestly-kept middle-aged female who is comfortable at rest. She arrived in the exam room ambulatory. She appears to be her stated age. Her skin color is still a bit pale.   Head/Neck:   The patient is anicteric and atraumatic. She is wearing a surgical mask today.  She is wearing O2 via cannula, tethered to a portable O2 tank. The trachea is midline. The neck is supple without evidence of jugular venous distention or cervical adenopathy.   Eyes:   The pupils are equal, round, and reactive to light. The extraocular movements are full. There is no scleral jaundice or erythema.   Chest:   The respiratory efforts are normal and unhindered. The breath sounds are diminished bilaterally with vague basilar rales. There are no wheezes, rhonchi, or asymmetry of breath sounds.  Cardiovascular:   The patient has a regular cardiac rate and rhythm without murmurs, rubs, or gallops. The peripheral pulses are equal and full.  Abdomen:   The belly is soft and globose. Her habitus precludes an adequate exam but there is no rebound or guarding. There is no organomegaly, mass-effect, or tenderness. Bowel sounds are active and of normal character.  Extremities:   There is no evidence of cyanosis, clubbing, with 3+ (chronic) leg/ankle edema.    Rheumatologic:   There " is no overt evidence of rheumatoid deformities of the hands. There is no sausaging of the fingers. There is no sign of active synovitis. The gait is slow and less antalgic, now favoring the left knee.  She does not use a cane nor walker  Cutaneous:   There are no overt rashes, disseminated lesions, purpura, or petechiae.   Lymphatics:   There is no evidence of adenopathy in the cervical, supraclavicular, or axillary areas.   Neurologic:   The patient is alert, oriented, cooperative, and pleasant. She is appropriately conversant. She ambulated into the exam room without assistance but declined transfer from chair to exam table. There is no overt dysfunction of the motor, sensory, cerebellar systems.  Psych:   Mood and affect are appropriate for circumstance. Eye contact is appropriate. Normal judgement and decision making.        LABS    Lab Results - Last 18 Months   Lab Units 02/24/23  1436 01/13/23  2220 12/16/22  1435 11/30/22  1519 10/14/22  0304 10/13/22  0241 10/12/22  0224 10/04/22  1410 09/09/22  1358 08/22/22  1450 06/23/22  1334 03/10/22  1351 02/11/22  1124 01/03/22  1359 12/17/21  1306 10/22/21  1125 09/10/21  1347   HEMOGLOBIN g/dL 10.6* 11.7* 11.6* 12.1 8.1* 9.0*   < > 11.3* 10.9* 11.7* 11.5* 11.2* 10.5*   < > 10.7* 9.7* 9.2*   HEMATOCRIT % 33.8* 38.1 37.5 37.9 25.8* 28.2*   < > 36.5* 34.6 37.5 36.0 35.6 33.3*   < > 35.2 31.2* 29.0*   MCV fL 97.1* 97.2 98.9* 98.7  --   --   --  102.5* 100.3* 101.6* 98.6* 101.1* 102.1*   < > 100.0* 100.0* 98.3*   WBC 10*3/mm3 2.39* 2.0* 3.97 3.8*  --   --   --  4.0* 3.75 4.2* 3.22* 3.20* 2.84*   < > 2.72* 3.23* 3.31*   RDW % 14.7 13.2 12.9 13.0  --   --   --  13.2 13.2 13.2 13.4 14.0 13.9   < > 13.7 13.9 13.6   MPV fL 10.3 10.4 9.5 9.8  --   --   --  10.4 10.1 10.5 10.2 9.9 9.9   < > 9.7 9.8 10.0   PLATELETS 10*3/mm3 149 85* 177 175  --   --   --  137 139* 134 123* 111* 117*   < > 129* 145 132*   IMM GRAN % % 0.4  --  0.5  --   --   --   --   --   --   --   --   --   --    --  0.4 0.3 0.3   NEUTROS ABS 10*3/mm3 1.47* 1.2* 2.40 2.6  --   --   --  2.4 2.06 2.9 1.64* 1.63* 1.61*   < > 1.39* 1.56* 1.70   LYMPHS ABS 10*3/mm3 0.52* 0.6* 0.85 0.6*  --   --   --  1.1  --  0.8*  --   --   --    < > 0.82 1.11 1.02   MONOS ABS 10*3/mm3 0.28 0.30 0.54 0.40  --   --   --  0.40  --  0.30  --   --   --    < > 0.31 0.34 0.43   EOS ABS 10*3/mm3 0.09 0.02 0.12 0.20  --   --   --  0.20 0.23 0.10 0.19 0.17 0.14   < > 0.16 0.17 0.12   BASOS ABS 10*3/mm3 0.02 0.00 0.04 0.00  --   --   --  0.00 0.04 0.00  --  0.03 0.06   < > 0.03 0.04 0.03   IMMATURE GRANS (ABS) 10*3/mm3 0.01 0.0 0.02 0.0  --   --   --  0.0  --  0.0  --   --   --    < > 0.01 0.01 0.01   NRBC /100 WBC 0.0  --  0.0  --   --   --   --   --   --   --   --  1.0*  --   --  0.0 0.0 0.0   NEUTROPHIL % %  --   --   --   --   --   --   --   --  55.0  --  51.0 50.0 56.6  --   --   --   --    MONOCYTES % %  --   --   --   --   --   --   --   --  14.0*  --  14.0* 4.2* 7.1  --   --   --   --    BASOPHIL % %  --   --   --   --   --   --   --   --  1.0  --   --  1.0 2.0*  --   --   --   --    ATYP LYMPH % %  --   --   --   --   --   --   --   --   --   --   --  10.4* 1.0  --   --   --   --    ANISOCYTOSIS   --   --   --   --   --   --   --   --   --   --   --  Slight/1+  --   --   --   --   --     < > = values in this interval not displayed.       Lab Results - Last 18 Months   Lab Units 10/04/22  1410 09/09/22  1358 08/22/22  1450 06/23/22  1334 03/10/22  1351 02/15/22  1340 01/03/22  1359 12/17/21  1306 11/16/21  1410 10/22/21  1125 09/29/21  1518 09/10/21  1347   GLUCOSE mg/dL 95 86 93 96 93  --  83 95 95 97   < > 109*   SODIUM mmol/L 142 142 141 143 140  --  145 140 144 141   < > 142   POTASSIUM mmol/L 4.2 4.0 4.1 4.0 4.4 4.1 4.2 3.9 4.2 4.3   < > 4.2   TOTAL CO2 mmol/L 25  --  29  --   --   --  23 -- 22  --    < >  --    CO2 mmol/L  --  25.0  --  24.0 23.0  --   --  25.0  --  24.0  --  22.0   CHLORIDE mmol/L 105 109* 102 110* 109*  --  109 108*  111 108*   < > 112*   ANION GAP mmol/L 12 8.0 10 9.0 8.0  --  13 7.0 11 9.0   < > 8.0   CREATININE mg/dL 1.5* 1.58* 1.4* 1.54* 1.25*  --  1.3* 1.40* 1.3* 1.30*   < > 1.35*   BUN mg/dL 27* 25* 19 21* 27*  --  31* 21* 24* 28*   < > 31*   BUN / CREAT RATIO   --  15.8  --  13.6 21.6  --   --  15.0  --  21.5  --  23.0   CALCIUM mg/dL 9.1 9.1 9.0 8.8 8.8  --  8.8 8.7 8.7 8.7   < > 8.5*   EGFR IF NONAFRICN AM  36*  --  39*  --   --   --  43* 39* 43* 43*   < > 41*   ALK PHOS U/L  --  81 80 83 75  --  75 71 70 66   < > 64   TOTAL PROTEIN g/dL  --  7.1 7.2 6.9 6.9  --  6.9 7.0 6.6 6.5   < > 6.4   ALT (SGPT) U/L  --  12 14 16 17  --  17 16 18 22   < > 14   AST (SGOT) U/L  --  19 20 22 23  --  28 22 22 26   < > 17   BILIRUBIN mg/dL  --  0.3 0.4 0.2 0.4  --  0.3 0.4 0.3 0.2   < > 0.2   ALBUMIN g/dL  --  4.70 4.6 4.40 4.40  --  4.3 4.50 4.2 4.20   < > 4.30   GLOBULIN gm/dL  --  2.4  --  2.5 2.5  --   --  2.5  --  2.3  --  2.1    < > = values in this interval not displayed.         Lab Results - Last 18 Months   Lab Units 02/24/23  1436 02/22/23  1557 12/16/22  1435 11/30/22  1519 10/12/22  0224 09/09/22  1358 08/22/22  1450 06/23/22  1334 12/17/21  1306 11/16/21  1410 10/22/21  1125 09/29/21  1518   IRON mcg/dL 82  --  82 67 28* 95  --  77   < >  --    < >  --    TIBC mcg/dL 302  --  319 235* 201* 305  --  292*   < >  --    < >  --    IRON SATURATION % 27  --  26 29 14 31  --  26   < >  --    < >  --    FERRITIN ng/mL 574.40*  --  427.20* 575.4* 929.4* 421.80*  --  367.90*   < >  --    < >  --    TSH uIU/mL  --  0.056*  --  0.361  --   --  10.030*  --   --  5.960*  --  9.460*   FOLATE ng/mL 8.93  --  16.60 17 >20.0 >20.00  --  >20.00   < >  --    < >  --     < > = values in this interval not displayed.       ASSESSMENT:   1. Macrocytic anemia, with contributions from iron deficiency (1+ marrow iron), iron underutilization/anemia of chronic disease and chronic kidney disease and methotrexate. Michael Hgb 7.9, 08/31/2017 requiring 2  units PRBC transfusions on 09/01/2017.   a. Negative EGD/colonoscopy (above), negative SPIEP, negative UPIEP.   --Stable, Hgb 10.6 on 2/24/2023 (prior range: Hgb 7.9 - 12.3).   b. Endoscopy: 08/23/2017. Impressions: - Normal esophagus. - Non-erosive gastritis. Biopsied. - Normal 1st part of the duodenum and 2nd part of the duodenum. Biopsied.   c. [facility]: Colonoscopy: 08/23/2017. Impressions: - The entire examined colon is normal. Biopsied.   d. Colonoscopy, 09/06/2018. Impressions: - The examination was otherwise normal on direct and retroflexion views. No specimens collected. Repeat 5 years  e.  06/2015-bone marrow biopsy with 1+ marrow iron, but otherwise nondiagnostic.  2. Leukopenia. No unifying diagnosis. Likely medication (methotrexate) associated.   --WBC 2.39; ANC 1.47, 2/24/2023 (prior range: WBC 2.45 - 4.2; ANC 1.05 - 2.6).   3. Thrombocytopenia.   --149,000, 2/24/2023 (prior range: 85,000 - 188,000).  4. Chronic kidney disease, Stage III.   --Stable, GFR 44 mL/min on 2/24/2023 (prior range: 26 - 56.6 mL/min).   5. Hypothyroidism.   6. Gastroesophageal reflux disease, now with early satiety, bloating, cramps.   7. B12 deficiency. Receives B12 injections per Dr. Waters. Previously received B12, 1000 mcg IM daily x 3 then weekly x 4 beginning 09/07/2018.   8. Fibromyalgia with chronic pain syndrome.   9. Irritable bowel syndrome with chronic diarrhea. Colonoscopy, 10/2014.   10. Depression.   11. Chronic neck and low back pain with scoliosis. She is now seen by Dr. Schmitt of Pain Management. Says a nerve stimulator will be placed on 04/23/2019.   12. Peripheral neuropathy and swelling of the legs.   a. Seen by Dr. Montgomery, 09/07/2017 and 12/08/2017. Impression: Venous insufficiency and lymphedema.   b. Underwent RFA left leg on 01/10/2018 by Dr. Montgomery. Lower extremity venous ultrasound, 01/19/2018. No deep venous thrombosis (DVT) left lower extremity. Was started on anticoagulation (Eliquis).  Lymphedema pumps and compression stockings prescribed.   13. Degenerative joint disease (DJD) knees. Underwent right knee surgery on 11/06/2015 and revision on 05/04/2017. Dr. Pathak.   14. Obesity. Has stabilized.   15. Pulmonary sarcoidosis. Followed by Dr. Scherer. Was started on methotrexate sometime 05/2017. Is now on O2 since 04/2018.   16. Chronic fatigue, contributions from all above.   17. Admitted [facility] 09/25/2016 through 09/26/2016 for chest pain. DSE negative. Discharged for non-cardiac chest pain.   18. Leg edema. Underwent RFA left leg on 01/10/2018 by Dr. Montgomery. LE venous US, 01/19/2018. No DVT left LE. Eliquis stopped last 02/2018. Lymphedema pumps and compression stockings prescribed.   19. Rectal bleeding. Colonoscopy, 09/06/2018. Impressions: The examination was otherwise normal on direct and retroflexion views. No specimens collected. Repeat 5 years.  20.  Right rotator cuff and right bicep tendon injuries.    --Had surgery 03/24/2022, Dr. Pathak  21.  Left TKR, 10/9/2022    PLAN:   1. Apprised of labs from 2/24/2023 with stable leukopenia/normal ANC, stable anemia, normal platelets, low (stable) GFR, normal LFTs, otherwise stable CMP, repleted iron/B12/folate     2.  Previously discussed prior SPIEP with normal immunoglobulin levels, negative TAVIA for monoclonal proteins, normal kappa/lambda light chains, negative 24 hour UPIEP, normal ESR, negative rheumatoid factor, normal TSH/T4, normal B12/folate, negative HIV screen, low iron studies, negative ELENA, normal LDH and negative comprehensive blood report (no peripheral blood evidence for dysplasia and negative flow cytometry).  Also noted previous bone marrow biopsy showing 1+ but otherwise nondiagnostic.  3. Again reviewed her medications. Stopped methotrexate, diclofenac, gabapentin.  Remains on Nexium (blood dyscrasias), gabapentin, Lortab (neutropenia, thrombocytopenia), Topamax (anemia, leukopenia). Would discontinue as many of these as  possible. Defer to [facility].   4. Draw serum iron, Fe sat, ferritin, B12, folate every 8 weeks.   5. CBC every 8 weeks with Procrit 40,000 units subcutaneously if Hgb less than 10 and less than 30 - BHP   6. Continue management per primary care and other specialists.   7. Return to the [place] office in 24 weeks with pre-office CMP, B12/folate, serum iron, Fe sat, ferritin, and CBC with differential.     MEDICAL DECISION MAKING: Moderate Complexity   AMOUNT OF DATA: Moderate    I spent ~39 minutes caring for Stacy on this date of service. This time includes time spent by me in the following activities: preparing for the visit, reviewing tests, performing a medically appropriate examination and/or evaluation, counseling and educating the patient/family/caregiver, ordering medications, tests, or procedures and documenting information in the medical record    cc: MD Weston Ambrose MD

## 2023-02-24 ENCOUNTER — LAB (OUTPATIENT)
Dept: LAB | Facility: HOSPITAL | Age: 53
End: 2023-02-24
Payer: MEDICARE

## 2023-02-24 DIAGNOSIS — Z13.6 ENCOUNTER FOR LIPID SCREENING FOR CARDIOVASCULAR DISEASE: ICD-10-CM

## 2023-02-24 DIAGNOSIS — N18.32 ANEMIA IN STAGE 3B CHRONIC KIDNEY DISEASE: ICD-10-CM

## 2023-02-24 DIAGNOSIS — I10 ESSENTIAL HYPERTENSION: Primary | ICD-10-CM

## 2023-02-24 DIAGNOSIS — E03.9 ACQUIRED HYPOTHYROIDISM: ICD-10-CM

## 2023-02-24 DIAGNOSIS — E55.9 VITAMIN D DEFICIENCY: ICD-10-CM

## 2023-02-24 DIAGNOSIS — D63.1 ANEMIA IN STAGE 3B CHRONIC KIDNEY DISEASE: ICD-10-CM

## 2023-02-24 DIAGNOSIS — Z13.220 ENCOUNTER FOR LIPID SCREENING FOR CARDIOVASCULAR DISEASE: ICD-10-CM

## 2023-02-24 DIAGNOSIS — N18.32 STAGE 3B CHRONIC KIDNEY DISEASE (HCC): ICD-10-CM

## 2023-02-24 LAB
BASOPHILS # BLD AUTO: 0.02 10*3/MM3 (ref 0–0.2)
BASOPHILS NFR BLD AUTO: 0.8 % (ref 0–1.5)
DEPRECATED RDW RBC AUTO: 52.9 FL (ref 37–54)
EOSINOPHIL # BLD AUTO: 0.09 10*3/MM3 (ref 0–0.4)
EOSINOPHIL NFR BLD AUTO: 3.8 % (ref 0.3–6.2)
ERYTHROCYTE [DISTWIDTH] IN BLOOD BY AUTOMATED COUNT: 14.7 % (ref 12.3–15.4)
FERRITIN SERPL-MCNC: 574.4 NG/ML (ref 13–150)
HCT VFR BLD AUTO: 33.8 % (ref 34–46.6)
HGB BLD-MCNC: 10.6 G/DL (ref 12–15.9)
IMM GRANULOCYTES # BLD AUTO: 0.01 10*3/MM3 (ref 0–0.05)
IMM GRANULOCYTES NFR BLD AUTO: 0.4 % (ref 0–0.5)
IRON 24H UR-MRATE: 82 MCG/DL (ref 37–145)
IRON SATN MFR SERPL: 27 % (ref 20–50)
LYMPHOCYTES # BLD AUTO: 0.52 10*3/MM3 (ref 0.7–3.1)
LYMPHOCYTES NFR BLD AUTO: 21.8 % (ref 19.6–45.3)
MCH RBC QN AUTO: 30.5 PG (ref 26.6–33)
MCHC RBC AUTO-ENTMCNC: 31.4 G/DL (ref 31.5–35.7)
MCV RBC AUTO: 97.1 FL (ref 79–97)
MONOCYTES # BLD AUTO: 0.28 10*3/MM3 (ref 0.1–0.9)
MONOCYTES NFR BLD AUTO: 11.7 % (ref 5–12)
NEUTROPHILS NFR BLD AUTO: 1.47 10*3/MM3 (ref 1.7–7)
NEUTROPHILS NFR BLD AUTO: 61.5 % (ref 42.7–76)
NRBC BLD AUTO-RTO: 0 /100 WBC (ref 0–0.2)
PLATELET # BLD AUTO: 149 10*3/MM3 (ref 140–450)
PMV BLD AUTO: 10.3 FL (ref 6–12)
RBC # BLD AUTO: 3.48 10*6/MM3 (ref 3.77–5.28)
TIBC SERPL-MCNC: 302 MCG/DL (ref 298–536)
TRANSFERRIN SERPL-MCNC: 203 MG/DL (ref 200–360)
WBC NRBC COR # BLD: 2.39 10*3/MM3 (ref 3.4–10.8)

## 2023-02-24 PROCEDURE — 83540 ASSAY OF IRON: CPT

## 2023-02-24 PROCEDURE — 82607 VITAMIN B-12: CPT

## 2023-02-24 PROCEDURE — 85025 COMPLETE CBC W/AUTO DIFF WBC: CPT

## 2023-02-24 PROCEDURE — 84466 ASSAY OF TRANSFERRIN: CPT

## 2023-02-24 PROCEDURE — 82728 ASSAY OF FERRITIN: CPT

## 2023-02-24 PROCEDURE — 82746 ASSAY OF FOLIC ACID SERUM: CPT

## 2023-02-24 PROCEDURE — 36415 COLL VENOUS BLD VENIPUNCTURE: CPT

## 2023-02-24 RX ORDER — LEVOTHYROXINE SODIUM 0.15 MG/1
150 TABLET ORAL DAILY
Qty: 90 TABLET | Refills: 2 | Status: SHIPPED | OUTPATIENT
Start: 2023-02-24

## 2023-02-25 LAB
FOLATE SERPL-MCNC: 8.93 NG/ML (ref 4.78–24.2)
VIT B12 BLD-MCNC: 366 PG/ML (ref 211–946)

## 2023-02-27 ENCOUNTER — OFFICE VISIT (OUTPATIENT)
Dept: ONCOLOGY | Facility: CLINIC | Age: 53
End: 2023-02-27
Payer: MEDICARE

## 2023-02-27 VITALS
TEMPERATURE: 97.8 F | DIASTOLIC BLOOD PRESSURE: 76 MMHG | SYSTOLIC BLOOD PRESSURE: 124 MMHG | HEIGHT: 64 IN | WEIGHT: 277 LBS | OXYGEN SATURATION: 100 % | BODY MASS INDEX: 47.29 KG/M2 | HEART RATE: 117 BPM | RESPIRATION RATE: 18 BRPM

## 2023-02-27 DIAGNOSIS — D50.9 IRON DEFICIENCY ANEMIA, UNSPECIFIED IRON DEFICIENCY ANEMIA TYPE: ICD-10-CM

## 2023-02-27 DIAGNOSIS — D69.6 THROMBOCYTOPENIA: Primary | ICD-10-CM

## 2023-02-27 PROBLEM — J44.9 CHRONIC OBSTRUCTIVE PULMONARY DISEASE, UNSPECIFIED: Status: ACTIVE | Noted: 2023-02-22

## 2023-02-27 PROCEDURE — 99214 OFFICE O/P EST MOD 30 MIN: CPT | Performed by: INTERNAL MEDICINE

## 2023-03-02 ENCOUNTER — OFFICE VISIT (OUTPATIENT)
Dept: PULMONOLOGY | Facility: CLINIC | Age: 53
End: 2023-03-02
Payer: MEDICARE

## 2023-03-02 VITALS
SYSTOLIC BLOOD PRESSURE: 128 MMHG | BODY MASS INDEX: 46.78 KG/M2 | HEIGHT: 64 IN | DIASTOLIC BLOOD PRESSURE: 82 MMHG | OXYGEN SATURATION: 97 % | WEIGHT: 274 LBS | HEART RATE: 106 BPM

## 2023-03-02 DIAGNOSIS — D86.9 SARCOIDOSIS: ICD-10-CM

## 2023-03-02 DIAGNOSIS — J96.11 CHRONIC RESPIRATORY FAILURE WITH HYPOXIA: Primary | ICD-10-CM

## 2023-03-02 PROCEDURE — 99214 OFFICE O/P EST MOD 30 MIN: CPT | Performed by: INTERNAL MEDICINE

## 2023-03-02 NOTE — PROCEDURES
Walking Oximetry  Performed by: Windy Hancock CMA  Authorized by: Celestine Scherer MD     Rest on O2 @ Liters:  2  SAT %:  97  Exercise on O2 @ Liters:  2  SAT %:  98

## 2023-03-02 NOTE — PROGRESS NOTES
"Background:  A patient with hx sarcoidosis and chronic respiratory failure   Chief Complaint  Sarcoidosis    Subjective    History of Present Illness     Stacy Lynn is here for follow up with [facility].  History of Present Illness  She has chronic pain which is worse today affected by changes in weather.  Breathing has been ok.  When she has to walk distances she gets more     Tobacco Use: Low Risk    • Smoking Tobacco Use: Never   • Smokeless Tobacco Use: Never   • Passive Exposure: Not on file      Current Outpatient Medications   Medication Instructions   • docusate sodium (COLACE) 100 mg, Oral   • esomeprazole (nexIUM) 40 MG capsule TAKE 1 CAPSULE BY MOUTH TWICE DAILY BEFORE  MEALS   • Fluticasone Furoate-Vilanterol (Breo Ellipta) 200-25 MCG/INH inhaler 1 puff, Inhalation, Daily   • levothyroxine (SYNTHROID, LEVOTHROID) 175 mcg, Oral, Daily   • Lyrica 100 mg, Oral, Every 8 Hours PRN   • metoprolol succinate XL (TOPROL-XL) 100 mg, Oral, Daily   • O2 (OXYGEN) 2 L/min, Inhalation, Once, Continuous    • ondansetron (ZOFRAN) 4 mg, Oral, Every 8 Hours PRN   • oxybutynin (DITROPAN) 5 mg, Oral, Every Night at Bedtime   • oxyCODONE-acetaminophen (PERCOCET) 7.5-325 MG per tablet 1 tablet, Oral, Every 8 Hours PRN   • polyethylene glycol (MIRALAX) 17 g, Oral   • Procrit 1 Units, Subcutaneous, As Needed   • tiZANidine (ZANAFLEX) 4 mg, Oral, Nightly PRN   • topiramate (TOPAMAX) 100 mg, Oral, Nightly   • Umeclidinium Bromide (INCRUSE ELLIPTA) 62.5 MCG/INH aerosol powder  1 puff, Inhalation, Daily   • vitamin B-6 (PYRIDOXINE) 50 mg, Oral, Daily   • vitamin D (ERGOCALCIFEROL) 50,000 Units, Oral, Weekly      Objective     Vital Signs:   /82   Pulse 106   Ht 162.6 cm (64\")   Wt 124 kg (274 lb)   SpO2 97% Comment: 2L  BMI 47.03 kg/m²   Physical Exam  Constitutional:       General: She is not in acute distress.     Appearance: She is well-developed. She is not " ill-appearing or toxic-appearing.   HENT:      Head: Atraumatic.   Eyes:      General: No scleral icterus.     Conjunctiva/sclera: Conjunctivae normal.   Cardiovascular:      Rate and Rhythm: Normal rate and regular rhythm.      Heart sounds: S1 normal and S2 normal.   Pulmonary:      Effort: Pulmonary effort is normal.      Breath sounds: Normal breath sounds.   Abdominal:      General: There is no distension.   Musculoskeletal:         General: No deformity.      Cervical back: Neck supple.   Skin:     Coloration: Skin is not pale.      Findings: No rash.   Neurological:      Mental Status: She is alert.        Result Review  Data Reviewed:         PFT Values        Some values may be hidden. Unless noted otherwise, only the newest values recorded on each date are displayed.         Old Values PFT Results 6/23/21   No data to display.      Pre Drug PFT Results 6/23/21   FVC 59   FEV1 50   FEF 25-75% 27   FEV1/FVC 69.3      Post Drug PFT Results 6/23/21   No data to display.      Other Tests PFT Results 6/23/21   TLC 83      DLCO 96   D/VAsb 145                      Assessment and Plan    Diagnoses and all orders for this visit:    1. Chronic respiratory failure with hypoxia (HCC) (Primary)  -     Walking Oximetry; Future  -     Walking Oximetry    2. Sarcoidosis    will give trial of trelegy to see if there is any effect on stamina with maximizing steroids and addition of anticholinergic  Continue chronic pain management  Continue oxygen, benefiting, compliant.  Flow apparently adequate based on walking oximetry today.  Conserving device is providing adequate O2 delivery    Follow Up   Return in about 4 months (around 7/2/2023).  Patient was given instructions and counseling regarding her condition or for health maintenance advice. Please see specific information pulled into the AVS if appropriate.    Electronically signed by Celestine Scherer MD, 3/2/2023, 21:33 CST

## 2023-03-06 ENCOUNTER — TELEPHONE (OUTPATIENT)
Dept: PRIMARY CARE CLINIC | Age: 53
End: 2023-03-06

## 2023-03-06 NOTE — TELEPHONE ENCOUNTER
Pt left  requesting a call back, no further information was provided other than saying it involves a medication

## 2023-03-08 ENCOUNTER — OFFICE VISIT (OUTPATIENT)
Dept: NEUROLOGY | Age: 53
End: 2023-03-08
Payer: MEDICARE

## 2023-03-08 VITALS
WEIGHT: 272 LBS | DIASTOLIC BLOOD PRESSURE: 91 MMHG | HEIGHT: 62 IN | BODY MASS INDEX: 50.05 KG/M2 | OXYGEN SATURATION: 98 % | SYSTOLIC BLOOD PRESSURE: 137 MMHG | HEART RATE: 82 BPM

## 2023-03-08 DIAGNOSIS — G43.009 MIGRAINE WITHOUT AURA AND WITHOUT STATUS MIGRAINOSUS, NOT INTRACTABLE: Primary | ICD-10-CM

## 2023-03-08 DIAGNOSIS — R41.3 MEMORY LOSS: ICD-10-CM

## 2023-03-08 PROCEDURE — G8427 DOCREV CUR MEDS BY ELIG CLIN: HCPCS | Performed by: NURSE PRACTITIONER

## 2023-03-08 PROCEDURE — 3080F DIAST BP >= 90 MM HG: CPT | Performed by: NURSE PRACTITIONER

## 2023-03-08 PROCEDURE — 99213 OFFICE O/P EST LOW 20 MIN: CPT | Performed by: NURSE PRACTITIONER

## 2023-03-08 PROCEDURE — G8484 FLU IMMUNIZE NO ADMIN: HCPCS | Performed by: NURSE PRACTITIONER

## 2023-03-08 PROCEDURE — 96116 NUBHVL XM PHYS/QHP 1ST HR: CPT | Performed by: NURSE PRACTITIONER

## 2023-03-08 PROCEDURE — G8417 CALC BMI ABV UP PARAM F/U: HCPCS | Performed by: NURSE PRACTITIONER

## 2023-03-08 PROCEDURE — 3017F COLORECTAL CA SCREEN DOC REV: CPT | Performed by: NURSE PRACTITIONER

## 2023-03-08 PROCEDURE — 3075F SYST BP GE 130 - 139MM HG: CPT | Performed by: NURSE PRACTITIONER

## 2023-03-08 PROCEDURE — 1036F TOBACCO NON-USER: CPT | Performed by: NURSE PRACTITIONER

## 2023-03-08 RX ORDER — OXYCODONE AND ACETAMINOPHEN 7.5; 325 MG/1; MG/1
TABLET ORAL
COMMUNITY
Start: 2023-01-16

## 2023-03-08 NOTE — PROGRESS NOTES
[id number] [facility] Neurology Office Note      Patient:   Eva Kim  MR#:    [id number]  Account Number:                         YOB: 1970  Date of Evaluation:  3/8/2023  Time of Note:                          2:50 PM  Primary/Referring Physician:  SARAH Carbajal   Consulting Physician:  Ross Lynn DNP, APRN    FOLLOW UP    Chief Complaint   Patient presents with    Follow-up    Memory Loss     C/o worsening memory loss     HISTORY OF PRESENT ILLNESS    Mindi Moreira is a 45y.o. year old female here for follow up of memory loss and headaches. She is alone at today's appointment, able to provide a good history. Notes primarily short term memory loss. She will misplace items. Noting more word finding difficulty. She might walk into a room and forget why she went in there. Some repetition of stories and questions noted. She does note depression/anxiety, no SI/HI, on Pristiq. Memory loss is not interfering with ADLs. She reports forgetting a few medication doses recently. Her brother is helping with finances. She does not drive. Sister with epilepsy and possible early onset dementia. She is on oxygen for sarcoidosis, follows with Dr. Liyah Sanabria for this. She is on chronic opoid therapy for back pain. Family history with grandmother having dementia. Headaches are improved since last visit. No change in characteristics of headaches. Pain is bilateral frontal with radiation globally. Pain is described as sharp. She notes nausea, light/sound sensitivity. Denies vision changes or vision loss. Denies focal symptoms with headaches. She is on Topamax, states she is taking this for her back pain, filled through primary. She is taking Ubrelvy prn and this is beneficial. No prior triptan therapy. She does have a history of SVT. She notes 2-4 migraines in a month now. Family history with mother having brain aneurysm. Carries a diagnosis of fibromyalgia.      Past Medical History:   Diagnosis Date    Acquired hypothyroidism     B12 deficiency     Chronic back pain     Chronic pancreatitis (HCC)     Colon polyps     Depression     Fibromyalgia     GERD (gastroesophageal reflux disease)     Headache     Hypertension     Irritable bowel syndrome     Kidney stones     Neuropathy     On home oxygen therapy     3 liters    PONV (postoperative nausea and vomiting)     Restless legs syndrome     Sarcoidosis 11/01/2016    Scoliosis     SVT (supraventricular tachycardia) ([address]        Past Surgical History:   Procedure Laterality Date    ABLATION OF DYSRHYTHMIC FOCUS  2016    SVT    ANAL SPHINCTEROTOMY N/A 2/19/2020    LATERAL SPHINCTEROTOMY performed by Agustin Grajeda MD at [address]  10/13/2014    Dr. Lorrie Montiel AP-5 yr recall    COLONOSCOPY  05/20/2008    Dr. Maria Eugenia Zarate: unremarkable    COLONOSCOPY  08/23/2017    Dr FloresM-5 yr recall    COLONOSCOPY  09/06/2018    Dr. Oden Bounds yr recall    HYSTERECTOMY (CERVIX STATUS UNKNOWN)      JOINT REPLACEMENT  12/30/2013    Patella joint replacement    LITHOTRIPSY      PLANTAR FASCIA SURGERY Left     MS NEUROPLASTY &/TRANSPOS MEDIAN NRV CARPAL TUNNE Right 5/8/2018    CARPAL TUNNEL RELEASE performed by Sary Espino MD at [facility] Right 5/4/2017    KNEE TOTAL ARTHROPLASTY REVISION performed by Sary Espino MD at [facility] ARTHROSCOPY Left     SHOULDER ARTHROSCOPY Right 3/24/2022    RIGHT SHOULDER ARTHROSCOPIC, ROTATOR CUFF REPAIR, BICEPS TENODESIS performed by Sary Espino MD at [place] [address] N/A 3/15/2019    Dr Bernardino Rush non edematous hemorrhoids, anal fissure-BC    TOTAL KNEE ARTHROPLASTY Right 11/16/2015    TOTAL KNEE ARTHROPLASTY Left 10/11/2022    LEFT KNEE TOTAL COMPLEX PRIMARY ARTHROPLASTY performed by Sary Espino MD at [address] [place] [address] ENDOSCOPY  10/16/2014    Dr. Jen Bhatt GASTROINTESTINAL ENDOSCOPY  05/19/2008    Dr. Villegas Breanna: jaya neg, barretts neg,  [facility]    UPPER GASTROINTESTINAL ENDOSCOPY N/A 2/10/2016    Dr Dustin Esparza-mild esophageal stricture dilated; reactive gastropathy    UPPER GASTROINTESTINAL ENDOSCOPY  08/23/2017    Dr Jose Carlos Johnson Bilateral     RADIO ABLATION       Family History   Problem Relation Age of Onset    Lupus Mother     Kidney Disease Mother     High Blood Pressure Mother     Anemia Mother     Cancer Father         skin    Diabetes Father     Parkinsonism Brother     Celiac Disease Maternal Uncle     Celiac Disease Maternal Cousin     Heart Disease Other     Colon Polyps Neg Hx     Colon Cancer Neg Hx     Esophageal Cancer Neg Hx     Liver Cancer Neg Hx     Liver Disease Neg Hx     Stomach Cancer Neg Hx     Rectal Cancer Neg Hx        Social History     Socioeconomic History    Marital status: Single     Spouse name: Not on file    Number of children: Not on file    Years of education: Not on file    Highest education level: Not on file   Occupational History    Not on file   Tobacco Use    Smoking status: Never    Smokeless tobacco: Never    Tobacco comments:     disabled due to sarcoidosis   Vaping Use    Vaping Use: Never used   Substance and Sexual Activity    Alcohol use: No    Drug use: No    Sexual activity: Not on file   Other Topics Concern    Not on file   Social History Narrative    Not on file     Social Determinants of Health     Financial Resource Strain: Not on file   Food Insecurity: Not on file   Transportation Needs: Not on file   Physical Activity: Not on file   Stress: Not on file   Social Connections: Not on file   Intimate Partner Violence: Not on file   Housing Stability: Not on file       Current Outpatient Medications   Medication Sig Dispense Refill    oxyCODONE-acetaminophen (PERCOCET) 7.5-325 MG per tablet TAKE 1 TABLET BY MOUTH EVERY 6 HOURS AS NEEDED      levothyroxine (SYNTHROID) 150 MCG tablet Take 1 tablet by mouth Daily 90 tablet 2    MYRBETRIQ 25 MG TB24 TAKE 1 TABLET BY MOUTH EVERY DAY 90 tablet 1    ondansetron (ZOFRAN-ODT) 4 MG disintegrating tablet Take 1 tablet by mouth 3 times daily as needed for Nausea or Vomiting 21 tablet 0    metoprolol succinate (TOPROL XL) 100 MG extended release tablet Take 1 tablet by mouth everyday 90 tablet 1    silver sulfADIAZINE (SILVADENE) 1 % cream Apply topically daily. 25 g 0    vitamin D (ERGOCALCIFEROL) 1.25 MG (56340 UT) CAPS capsule Take 1 capsule by mouth once a week Tuesday 12 capsule 1    Ubrogepant (UBRELVY) 100 MG TABS Take 1 tablet at the onset of migraine. May repeat once in 2 hours if no improvement. Do not exceed 2 tablets in 24 hours. 10 tablet 3    topiramate (TOPAMAX) 100 MG tablet Take 1 tablet by mouth nightly Indications: Backache 90 tablet 1    pantoprazole (PROTONIX) 40 MG tablet TAKE 1 TABLET BY MOUTH TWICE A DAY (Patient taking differently: Take 40 mg by mouth 2 times daily) 180 tablet 1    Thiamine 50 MG CAPS Take 50 mg by mouth daily 30 capsule 3    docusate sodium (COLACE) 100 MG capsule Take 100 mg by mouth 2 times daily as needed       pregabalin (LYRICA) 100 MG capsule Take 100 mg by mouth 3 times daily. OXYGEN Inhale 2 L/min into the lungs continuous      epoetin cheryl (EPOGEN;PROCRIT) 21483 UNIT/ML injection Inject 40,000 Units into the skin as needed       tiZANidine (ZANAFLEX) 4 MG tablet Take 4 mg by mouth 3 times daily as needed (muscle spasms)        No current facility-administered medications for this visit.        Allergies   Allergen Reactions    Tape [Adhesive Tape]      can use paper tape     REVIEW OF SYSTEMS  Constitutional: []Fever []Sweats []Chills [] Recent Injury [x] Denies all unless marked  HEENT:[]Headache  [] Head Injury [] Hearing Loss  [] Sore Throat  [] Ear Ache [x] Denies all unless marked  Spine:  [] Neck pain  [] Back pain  [] Sciaticia  [x] Denies all unless marked  Cardiovascular:[]Heart Disease []Palpitations [] Chest Pain [x] Denies all unless marked  Pulmonary: []Shortness of Breath []Cough   [x] Denies all unless marked  Psychiatric/Behavioral:[] Depression [] Anxiety [x] Denies all unless marked  Gastrointestinal: []Nausea  []Vomiting  []Abdominal Pain  []Constipation  []Diarrhea  [x] Denies all unless marked  Genitourinary:   [] Frequency  [] Urgency  [] Dysuria [] Incontinence  [x] Denies all unless marked  Extremities: []Pain  []Swelling  [x] Denies all unless marked  Musculoskeletal: [] Myalgias  [] Joint Pain  [] Arthritis [] Muscle Cramps [] Muscle Twitches  [x] Denies all unless marked  Sleep: []Insomnia[]Snoring []Restless Legs  []Sleep Apnea  []Daytime Sleepiness  [x] Denies all unless marked  Skin:[] Rash [] Color Change [x] Denies all unless marked   Neurological:[]Visual Disturbance [x] Memory Loss []Loss of Balance []Slurred Speech []Weakness []Seizures  [] Dizziness [x] Denies all unless marked    The MA has completed the ROS with the patient. I have reviewed it in its' entirety with the patient and agree with the documentation. PHYSICAL EXAM  BP (!) 137/91   Pulse 82   Ht 5' 2\" (1.575 m)   Wt 272 lb (123.4 kg)   SpO2 98%   BMI 49.75 kg/m²       Constitutional - No acute distress    HEENT- Conjunctiva normal.  No scars, masses, or lesions over external nose or ears, no neck masses noted, no jugular vein distension, no bruit  Cardiac- Regular rate and rhythm  Pulmonary- Good expansion, normal effort without use of accessory muscles  Musculoskeletal - No significant wasting of muscles noted, no bony deformities  Extremities - No clubbing, cyanosis or edema  Skin - Warm, dry, and intact.   No rash, erythema, or pallor  Psychiatric - Mood, affect, and behavior appear normal      NEUROLOGICAL EXAM     Mental status   [x] Awake, alert, oriented   []Affect attention and concentration appear appropriate  []Recent and remote memory appears unremarkable  []Speech normal without dysarthria or aphasia, comprehension and repetition intact. COMMENTS:MoCA 16/30     Cranial Nerves [x]No VF deficit to confrontation,  no papilledema on fundoscopic exam.  [x]PERRLA, EOMI, no nystagmus, conjugate eye movements, no ptosis  [x]Face symmetric  [x]Facial sensation intact  [x]Tongue midline no atrophy or fasciculations present  [x]Palate midline, hearing to finger rub normal bilaterally  [x]Shoulder shrug and SCM testing normal bilaterally  COMMENTS:    Motor   [x]5/5 strength x 4 extremities  [x]Normal bulk and tone  [x]No tremor present  [x]No rigidity or bradykinesia noted  COMMENTS:   Sensory  [x]Sensation intact to light touch, pin prick, vibration, and proprioception BLE  [x]Sensation intact to light touch, pin prick, vibration, and proprioception BUE  COMMENTS:   Coordination [x]FTN normal bilaterally   [x]HTS normal bilaterally  [x]HARVEY normal bilaterally.    COMMENTS:   Reflexes  [x]Symmetric and non-pathological  [x]Toes down going bilaterally  [x]No clonus present  COMMENTS:    Gait                  [x]Normal steady gait    []Ataxic    []Spastic     []Magnetic     []Shuffling  COMMENTS:        LABS RECORD AND IMAGING REVIEW (As below and per HPI)    Lab Results   Component Value Date    IYQBYOAH04 956 (H) 11/30/2022     Lab Results   Component Value Date    WBC 2.0 (L) 01/13/2023    HGB 11.7 (L) 01/13/2023    HCT 38.1 01/13/2023    MCV 97.2 01/13/2023    PLT 85 (L) 01/13/2023     Lab Results   Component Value Date     02/22/2023    K 4.5 02/22/2023     02/22/2023    CO2 23 02/22/2023    BUN 20 02/22/2023    CREATININE 1.4 (H) 02/22/2023    GLUCOSE 99 02/22/2023    CALCIUM 8.7 02/22/2023    PROT 6.7 02/22/2023    LABALBU 4.1 02/22/2023    BILITOT 0.4 02/22/2023    ALKPHOS 74 02/22/2023    AST 15 02/22/2023    ALT 12 02/22/2023    LABGLOM 44 (A) 02/22/2023    GFRAA 57 (L) 10/12/2022    GLOB 2.7 03/02/2016     Lab Results   Component Value Date    CHOL 181 11/16/2021    TRIG 100 11/16/2021    HDL 59 (L) 11/16/2021 [address] 102 11/16/2021     Lab Results   Component Value Date    TSH 0.056 (L) 02/22/2023    T4FREE 1.82 (H) 02/22/2023     Lab Results   Component Value Date    CRP 0.66 (H) 11/30/2022    SEDRATE 31 (H) 11/30/2022     MRI brain (1/2022)-   Impression   1. A few foci of abnormal T2 signal in the superficial subcortical   white matter in the frontal lobes bilaterally may represent foci of   ischemia due to small vessel disease. The second and less likely   possibility is foci of demyelination. Clinical correlation and further   follow-up may be obtained. 2. Chronic sinusitis. Signed by Dr Arnav Lee     MRA head (1/2022)-   Impression   1. No evidence of intracranial aneurysm is identified. 2. 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 Signed by Dr Lloyd Campa. Mj     Reviewed referral records     ASSESSMENT:    Tomi North is a 45y.o. year old female here for follow up of headaches and memory loss. Clinically unchanged from prior although patient reports her memory has worsened. Prior MoCA 16/30, question full participation in test. MRI brain with migraine related changes. MRA head negative. Lab work reviewed and unremarkable outside of low thiamine, now on replacement. Given subjective worsening of memory will add neuropsych testing today. Hold off on memory agent. Suspect memory loss is multi factorial with polypharmacy, depression/anxiety, hypoxia (on oxygen) all playing a role. Suspect migraine headaches, will add Ubrelvy prn. Triptans contraindicated related to cardiac history. She is on Topamax for preventative but today reports she is taking it for back pain, somewhat unclear. Given history of kidney stones would recommend against use of Topamax but patient is hesitant to switch. There are other more migraine specific medications that could be beneficial. Consider re-addressing this down the line. ICD-10-CM    1.  Migraine without aura and without status migrainosus, not intractable  G43.009       2. Memory loss  R41.3 NH NEUROBEHAVIORAL STATUS XM PHYS/QHP 1ST HOUR        PLAN:  1. Neuropsych testing   2. Discussed Topamax and risk of renal stones. Typically taken for migraine preventative so unclear if she is taking this for back pain or migraine. Could consider alternative medication for migraine preventative, patient hesitant to stop Topamax however. 3. Continue Ubrelvy prn.   4. Continue thiamine supplement. Discussed safety concerns, increase supervision, limited to no driving, medication monitoring/management. Recommend 30 minutes daily exercise, daily mental stimulation, and healthy diet with fish, fruits, vegetables, fiber and water. Hold off on memory agent for now. 5. Return in about 3 months (around 6/8/2023) for follow up, sooner if worsening.     Siria Joshi DNP, APRN

## 2023-03-13 DIAGNOSIS — J96.11 CHRONIC RESPIRATORY FAILURE WITH HYPOXIA: ICD-10-CM

## 2023-03-13 DIAGNOSIS — D86.9 SARCOIDOSIS, UNSPECIFIED: ICD-10-CM

## 2023-03-13 RX ORDER — UMECLIDINIUM 62.5 UG/1
AEROSOL, POWDER ORAL
Qty: 30 EACH | Refills: 11 | Status: SHIPPED | OUTPATIENT
Start: 2023-03-13

## 2023-03-13 NOTE — TELEPHONE ENCOUNTER
Pharmacy sent a request for refills on Incruse. Routed to Dr. Scherer.  Rx Refill Note  Requested Prescriptions     Pending Prescriptions Disp Refills   • Incruse Ellipta 62.5 MCG/ACT aerosol powder  [Pharmacy Med Name: INCRUSE ELLIPTA 62.5 MCG INH] 30 each 11     Sig: INHALE 1 PUFF DAILY FOR 30 DAYS.      Last office visit with prescribing clinician: 3/2/2023   Last telemedicine visit with prescribing clinician: 7/7/2023   Next office visit with prescribing clinician: 7/13/2023                         Would you like a call back once the refill request has been completed: [] Yes [] No    If the office needs to give you a call back, can they leave a voicemail: [] Yes [] No    Dusty Perez CMA  03/13/23, 09:07 CDT

## 2023-04-03 DIAGNOSIS — I10 ESSENTIAL HYPERTENSION: ICD-10-CM

## 2023-04-03 RX ORDER — TOPIRAMATE 100 MG/1
100 TABLET, FILM COATED ORAL NIGHTLY
Qty: 90 TABLET | Refills: 2 | Status: SHIPPED | OUTPATIENT
Start: 2023-04-03

## 2023-04-03 RX ORDER — OXYBUTYNIN CHLORIDE 5 MG/1
TABLET ORAL
Qty: 90 TABLET | Refills: 2 | OUTPATIENT
Start: 2023-04-03

## 2023-04-03 RX ORDER — FLUTICASONE PROPIONATE 50 MCG
SPRAY, SUSPENSION (ML) NASAL
OUTPATIENT
Start: 2023-04-03

## 2023-04-03 RX ORDER — METOPROLOL SUCCINATE 100 MG/1
TABLET, EXTENDED RELEASE ORAL
Qty: 90 TABLET | Refills: 2 | Status: SHIPPED | OUTPATIENT
Start: 2023-04-03

## 2023-04-03 RX ORDER — METOPROLOL SUCCINATE 50 MG/1
TABLET, EXTENDED RELEASE ORAL
Qty: 90 TABLET | Refills: 2 | Status: SHIPPED | OUTPATIENT
Start: 2023-04-03 | End: 2023-05-26

## 2023-04-05 DIAGNOSIS — J96.11 CHRONIC RESPIRATORY FAILURE WITH HYPOXIA: Primary | ICD-10-CM

## 2023-04-05 RX ORDER — FLUTICASONE FUROATE, UMECLIDINIUM BROMIDE AND VILANTEROL TRIFENATATE 200; 62.5; 25 UG/1; UG/1; UG/1
1 POWDER RESPIRATORY (INHALATION) DAILY
Qty: 60 EACH | Refills: 5 | Status: SHIPPED | OUTPATIENT
Start: 2023-04-05

## 2023-04-05 NOTE — TELEPHONE ENCOUNTER
Patient is requesting a prescription for Trelegy to be sent to her pharmacy. Routed request to Dr. Scherer.  Rx Refill Note  Requested Prescriptions     Pending Prescriptions Disp Refills   • Fluticasone-Umeclidin-Vilant (Trelegy Ellipta) 200-62.5-25 MCG/ACT aerosol powder  60 each 5     Sig: Inhale 1 puff Daily.      Last office visit with prescribing clinician: 3/2/2023   Last telemedicine visit with prescribing clinician: 7/7/2023   Next office visit with prescribing clinician: 7/13/2023                         Would you like a call back once the refill request has been completed: [] Yes [] No    If the office needs to give you a call back, can they leave a voicemail: [] Yes [] No    Dusty Perez CMA  04/05/23, 12:50 CDT

## 2023-04-28 ENCOUNTER — LAB (OUTPATIENT)
Dept: LAB | Facility: HOSPITAL | Age: 53
End: 2023-04-28
Payer: MEDICARE

## 2023-04-28 DIAGNOSIS — D63.1 ANEMIA IN STAGE 3B CHRONIC KIDNEY DISEASE: ICD-10-CM

## 2023-04-28 DIAGNOSIS — D50.9 IRON DEFICIENCY ANEMIA, UNSPECIFIED IRON DEFICIENCY ANEMIA TYPE: ICD-10-CM

## 2023-04-28 DIAGNOSIS — D69.6 THROMBOCYTOPENIA: ICD-10-CM

## 2023-04-28 DIAGNOSIS — N18.32 ANEMIA IN STAGE 3B CHRONIC KIDNEY DISEASE: ICD-10-CM

## 2023-04-28 LAB
BASOPHILS # BLD AUTO: 0.01 10*3/MM3 (ref 0–0.2)
BASOPHILS NFR BLD AUTO: 0.4 % (ref 0–1.5)
DEPRECATED RDW RBC AUTO: 48.4 FL (ref 37–54)
EOSINOPHIL # BLD AUTO: 0.11 10*3/MM3 (ref 0–0.4)
EOSINOPHIL NFR BLD AUTO: 4.8 % (ref 0.3–6.2)
ERYTHROCYTE [DISTWIDTH] IN BLOOD BY AUTOMATED COUNT: 13.6 % (ref 12.3–15.4)
FERRITIN SERPL-MCNC: 366.1 NG/ML (ref 13–150)
HCT VFR BLD AUTO: 34 % (ref 34–46.6)
HGB BLD-MCNC: 10.3 G/DL (ref 12–15.9)
IMM GRANULOCYTES # BLD AUTO: 0 10*3/MM3 (ref 0–0.05)
IMM GRANULOCYTES NFR BLD AUTO: 0 % (ref 0–0.5)
IRON 24H UR-MRATE: 61 MCG/DL (ref 37–145)
IRON SATN MFR SERPL: 22 % (ref 20–50)
LYMPHOCYTES # BLD AUTO: 0.65 10*3/MM3 (ref 0.7–3.1)
LYMPHOCYTES NFR BLD AUTO: 28.6 % (ref 19.6–45.3)
MCH RBC QN AUTO: 29.6 PG (ref 26.6–33)
MCHC RBC AUTO-ENTMCNC: 30.3 G/DL (ref 31.5–35.7)
MCV RBC AUTO: 97.7 FL (ref 79–97)
MONOCYTES # BLD AUTO: 0.31 10*3/MM3 (ref 0.1–0.9)
MONOCYTES NFR BLD AUTO: 13.7 % (ref 5–12)
NEUTROPHILS NFR BLD AUTO: 1.19 10*3/MM3 (ref 1.7–7)
NEUTROPHILS NFR BLD AUTO: 52.5 % (ref 42.7–76)
NRBC BLD AUTO-RTO: 0 /100 WBC (ref 0–0.2)
PLATELET # BLD AUTO: 125 10*3/MM3 (ref 140–450)
PMV BLD AUTO: 9.9 FL (ref 6–12)
RBC # BLD AUTO: 3.48 10*6/MM3 (ref 3.77–5.28)
TIBC SERPL-MCNC: 279 MCG/DL (ref 298–536)
TRANSFERRIN SERPL-MCNC: 187 MG/DL (ref 200–360)
WBC NRBC COR # BLD: 2.27 10*3/MM3 (ref 3.4–10.8)

## 2023-04-28 PROCEDURE — 85025 COMPLETE CBC W/AUTO DIFF WBC: CPT

## 2023-04-28 PROCEDURE — 82607 VITAMIN B-12: CPT

## 2023-04-28 PROCEDURE — 84466 ASSAY OF TRANSFERRIN: CPT

## 2023-04-28 PROCEDURE — 83540 ASSAY OF IRON: CPT

## 2023-04-28 PROCEDURE — 82746 ASSAY OF FOLIC ACID SERUM: CPT

## 2023-04-28 PROCEDURE — 36415 COLL VENOUS BLD VENIPUNCTURE: CPT

## 2023-04-28 PROCEDURE — 82728 ASSAY OF FERRITIN: CPT

## 2023-04-29 LAB
FOLATE SERPL-MCNC: 4.38 NG/ML (ref 4.78–24.2)
VIT B12 BLD-MCNC: 359 PG/ML (ref 211–946)

## 2023-04-29 RX ORDER — LORATADINE 10 MG/1
TABLET ORAL
Qty: 30 TABLET | Refills: 1 | OUTPATIENT
Start: 2023-04-29

## 2023-05-01 ENCOUNTER — TELEPHONE (OUTPATIENT)
Dept: ONCOLOGY | Facility: CLINIC | Age: 53
End: 2023-05-01
Payer: MEDICARE

## 2023-05-01 RX ORDER — CYANOCOBALAMIN/FOLIC AC/VIT B6 1-2.5-25MG
1 TABLET ORAL DAILY
Qty: 30 TABLET | Refills: 0 | Status: SHIPPED | OUTPATIENT
Start: 2023-05-01

## 2023-05-01 NOTE — TELEPHONE ENCOUNTER
----- Message from Fermín Boggs MD sent at 4/29/2023  7:35 PM CDT -----  Call in folbe dre qd # 30  ----- Message -----  From: Lab, Background User  Sent: 4/28/2023   2:18 PM CDT  To: Fermín Boggs MD

## 2023-05-01 NOTE — TELEPHONE ENCOUNTER
Notified patient Stacy Lynn, she was informed her Folate level is down @ 4.38. Dr Boggs is sending in prescription Folbee for her to use x 30 days to help bring levels back into normal range. Patient v/u.   Requested prescription to be sent to [facility]

## 2023-05-19 DIAGNOSIS — E55.9 VITAMIN D DEFICIENCY: ICD-10-CM

## 2023-05-19 DIAGNOSIS — Z13.6 ENCOUNTER FOR LIPID SCREENING FOR CARDIOVASCULAR DISEASE: ICD-10-CM

## 2023-05-19 DIAGNOSIS — N18.32 STAGE 3B CHRONIC KIDNEY DISEASE (HCC): ICD-10-CM

## 2023-05-19 DIAGNOSIS — I10 ESSENTIAL HYPERTENSION: ICD-10-CM

## 2023-05-19 DIAGNOSIS — Z13.220 ENCOUNTER FOR LIPID SCREENING FOR CARDIOVASCULAR DISEASE: ICD-10-CM

## 2023-05-19 DIAGNOSIS — E03.9 ACQUIRED HYPOTHYROIDISM: ICD-10-CM

## 2023-05-19 LAB
25(OH)D3 SERPL-MCNC: 38.5 NG/ML
ALBUMIN SERPL-MCNC: 4.1 G/DL (ref 3.5–5.2)
ALP SERPL-CCNC: 79 U/L (ref 35–104)
ALT SERPL-CCNC: 14 U/L (ref 5–33)
ANION GAP SERPL CALCULATED.3IONS-SCNC: 8 MMOL/L (ref 7–19)
AST SERPL-CCNC: 22 U/L (ref 5–32)
BASOPHILS # BLD: 0 K/UL (ref 0–0.2)
BASOPHILS NFR BLD: 1.1 % (ref 0–1)
BILIRUB SERPL-MCNC: 0.4 MG/DL (ref 0.2–1.2)
BUN SERPL-MCNC: 24 MG/DL (ref 6–20)
CALCIUM SERPL-MCNC: 9 MG/DL (ref 8.6–10)
CHLORIDE SERPL-SCNC: 108 MMOL/L (ref 98–111)
CHOLEST SERPL-MCNC: 184 MG/DL (ref 160–199)
CO2 SERPL-SCNC: 26 MMOL/L (ref 22–29)
CREAT SERPL-MCNC: 1.5 MG/DL (ref 0.5–0.9)
EOSINOPHIL # BLD: 0.1 K/UL (ref 0–0.6)
EOSINOPHIL NFR BLD: 4.9 % (ref 0–5)
ERYTHROCYTE [DISTWIDTH] IN BLOOD BY AUTOMATED COUNT: 13.1 % (ref 11.5–14.5)
GLUCOSE SERPL-MCNC: 94 MG/DL (ref 74–109)
HCT VFR BLD AUTO: 34.3 % (ref 37–47)
HDLC SERPL-MCNC: 51 MG/DL (ref 65–121)
HGB BLD-MCNC: 10.6 G/DL (ref 12–16)
IMM GRANULOCYTES # BLD: 0 K/UL
LDLC SERPL CALC-MCNC: 109 MG/DL
LYMPHOCYTES # BLD: 0.7 K/UL (ref 1.1–4.5)
LYMPHOCYTES NFR BLD: 24.6 % (ref 20–40)
MCH RBC QN AUTO: 31.1 PG (ref 27–31)
MCHC RBC AUTO-ENTMCNC: 30.9 G/DL (ref 33–37)
MCV RBC AUTO: 100.6 FL (ref 81–99)
MONOCYTES # BLD: 0.3 K/UL (ref 0–0.9)
MONOCYTES NFR BLD: 10.9 % (ref 0–10)
NEUTROPHILS # BLD: 1.7 K/UL (ref 1.5–7.5)
NEUTS SEG NFR BLD: 58.5 % (ref 50–65)
PLATELET # BLD AUTO: 122 K/UL (ref 130–400)
PMV BLD AUTO: 10.5 FL (ref 9.4–12.3)
POTASSIUM SERPL-SCNC: 4.8 MMOL/L (ref 3.5–5)
PROT SERPL-MCNC: 6.5 G/DL (ref 6.6–8.7)
RBC # BLD AUTO: 3.41 M/UL (ref 4.2–5.4)
SODIUM SERPL-SCNC: 142 MMOL/L (ref 136–145)
T4 FREE SERPL-MCNC: 1.34 NG/DL (ref 0.93–1.7)
TRIGL SERPL-MCNC: 118 MG/DL (ref 0–149)
TSH SERPL DL<=0.005 MIU/L-ACNC: 0.52 UIU/ML (ref 0.27–4.2)
WBC # BLD AUTO: 2.8 K/UL (ref 4.8–10.8)

## 2023-05-26 ENCOUNTER — OFFICE VISIT (OUTPATIENT)
Dept: PRIMARY CARE CLINIC | Age: 53
End: 2023-05-26

## 2023-05-26 VITALS
WEIGHT: 285 LBS | DIASTOLIC BLOOD PRESSURE: 80 MMHG | TEMPERATURE: 97.4 F | SYSTOLIC BLOOD PRESSURE: 130 MMHG | BODY MASS INDEX: 52.44 KG/M2 | HEART RATE: 76 BPM | OXYGEN SATURATION: 99 % | HEIGHT: 62 IN

## 2023-05-26 DIAGNOSIS — N18.32 STAGE 3B CHRONIC KIDNEY DISEASE (HCC): ICD-10-CM

## 2023-05-26 DIAGNOSIS — Z12.31 SCREENING MAMMOGRAM FOR BREAST CANCER: ICD-10-CM

## 2023-05-26 DIAGNOSIS — Z53.20 COLONOSCOPY REFUSED: ICD-10-CM

## 2023-05-26 DIAGNOSIS — D69.6 THROMBOCYTOPENIA, UNSPECIFIED (HCC): ICD-10-CM

## 2023-05-26 DIAGNOSIS — F33.0 MILD EPISODE OF RECURRENT MAJOR DEPRESSIVE DISORDER (HCC): ICD-10-CM

## 2023-05-26 DIAGNOSIS — Z00.00 INITIAL MEDICARE ANNUAL WELLNESS VISIT: Primary | ICD-10-CM

## 2023-05-26 DIAGNOSIS — I10 ESSENTIAL HYPERTENSION: ICD-10-CM

## 2023-05-26 DIAGNOSIS — E03.9 ACQUIRED HYPOTHYROIDISM: ICD-10-CM

## 2023-05-26 DIAGNOSIS — Z78.0 POSTMENOPAUSAL: ICD-10-CM

## 2023-05-26 DIAGNOSIS — R26.81 GAIT INSTABILITY: ICD-10-CM

## 2023-05-26 DIAGNOSIS — H91.93 BILATERAL HEARING LOSS, UNSPECIFIED HEARING LOSS TYPE: ICD-10-CM

## 2023-05-26 DIAGNOSIS — H53.9 VISION CHANGES: ICD-10-CM

## 2023-05-26 SDOH — ECONOMIC STABILITY: FOOD INSECURITY: WITHIN THE PAST 12 MONTHS, YOU WORRIED THAT YOUR FOOD WOULD RUN OUT BEFORE YOU GOT MONEY TO BUY MORE.: NEVER TRUE

## 2023-05-26 SDOH — ECONOMIC STABILITY: INCOME INSECURITY: HOW HARD IS IT FOR YOU TO PAY FOR THE VERY BASICS LIKE FOOD, HOUSING, MEDICAL CARE, AND HEATING?: NOT HARD AT ALL

## 2023-05-26 SDOH — ECONOMIC STABILITY: HOUSING INSECURITY
IN THE LAST 12 MONTHS, WAS THERE A TIME WHEN YOU DID NOT HAVE A STEADY PLACE TO SLEEP OR SLEPT IN A SHELTER (INCLUDING NOW)?: NO

## 2023-05-26 SDOH — ECONOMIC STABILITY: FOOD INSECURITY: WITHIN THE PAST 12 MONTHS, THE FOOD YOU BOUGHT JUST DIDN'T LAST AND YOU DIDN'T HAVE MONEY TO GET MORE.: NEVER TRUE

## 2023-05-26 ASSESSMENT — PATIENT HEALTH QUESTIONNAIRE - PHQ9
3. TROUBLE FALLING OR STAYING ASLEEP: 3
SUM OF ALL RESPONSES TO PHQ QUESTIONS 1-9: 13
SUM OF ALL RESPONSES TO PHQ QUESTIONS 1-9: 13
8. MOVING OR SPEAKING SO SLOWLY THAT OTHER PEOPLE COULD HAVE NOTICED. OR THE OPPOSITE, BEING SO FIGETY OR RESTLESS THAT YOU HAVE BEEN MOVING AROUND A LOT MORE THAN USUAL: 0
SUM OF ALL RESPONSES TO PHQ QUESTIONS 1-9: 13
4. FEELING TIRED OR HAVING LITTLE ENERGY: 3
7. TROUBLE CONCENTRATING ON THINGS, SUCH AS READING THE NEWSPAPER OR WATCHING TELEVISION: 3
6. FEELING BAD ABOUT YOURSELF - OR THAT YOU ARE A FAILURE OR HAVE LET YOURSELF OR YOUR FAMILY DOWN: 0
10. IF YOU CHECKED OFF ANY PROBLEMS, HOW DIFFICULT HAVE THESE PROBLEMS MADE IT FOR YOU TO DO YOUR WORK, TAKE CARE OF THINGS AT HOME, OR GET ALONG WITH OTHER PEOPLE: 0
5. POOR APPETITE OR OVEREATING: 3
SUM OF ALL RESPONSES TO PHQ QUESTIONS 1-9: 13
2. FEELING DOWN, DEPRESSED OR HOPELESS: 1
9. THOUGHTS THAT YOU WOULD BE BETTER OFF DEAD, OR OF HURTING YOURSELF: 0
SUM OF ALL RESPONSES TO PHQ9 QUESTIONS 1 & 2: 1
1. LITTLE INTEREST OR PLEASURE IN DOING THINGS: 0

## 2023-05-26 ASSESSMENT — ENCOUNTER SYMPTOMS
ABDOMINAL PAIN: 0
SHORTNESS OF BREATH: 1
WHEEZING: 0
COUGH: 0
CHEST TIGHTNESS: 0
SORE THROAT: 0
NAUSEA: 0
DIARRHEA: 0

## 2023-05-26 ASSESSMENT — LIFESTYLE VARIABLES
HOW OFTEN DO YOU HAVE A DRINK CONTAINING ALCOHOL: NEVER
HOW MANY STANDARD DRINKS CONTAINING ALCOHOL DO YOU HAVE ON A TYPICAL DAY: PATIENT DOES NOT DRINK

## 2023-05-26 NOTE — PROGRESS NOTES
1323   BP: 130/80   Pulse: 76   Temp: 97.4 °F (36.3 °C)   SpO2: 99%   Weight: 285 lb (129.3 kg)   Height: 5' 2\" (1.575 m)      Body mass index is 52.13 kg/m². Allergies   Allergen Reactions    Tape [Adhesive Tape]      can use paper tape     Prior to Visit Medications    Medication Sig Taking? Authorizing Provider   brexpiprazole (REXULTI) 0.5 MG TABS tablet Take 1 tablet by mouth daily Yes SARAH Tobias   topiramate (TOPAMAX) 100 MG tablet TAKE 1 TABLET BY MOUTH NIGHTLY INDICATIONS: BACKACHE Yes SARAH Botello   metoprolol succinate (TOPROL XL) 100 MG extended release tablet TAKE 1 TABLET BY MOUTH EVERY DAY Yes SARAH Botello   oxyCODONE-acetaminophen (PERCOCET) 7.5-325 MG per tablet TAKE 1 TABLET BY MOUTH EVERY 6 HOURS AS NEEDED Yes Historical Provider, MD   levothyroxine (SYNTHROID) 150 MCG tablet Take 1 tablet by mouth Daily Yes SARAH Botello   MYRBETRIQ 25 MG TB24 TAKE 1 TABLET BY MOUTH EVERY DAY Yes SARAH Botello   ondansetron (ZOFRAN-ODT) 4 MG disintegrating tablet Take 1 tablet by mouth 3 times daily as needed for Nausea or Vomiting Yes Lilly Shepard MD   silver sulfADIAZINE (SILVADENE) 1 % cream Apply topically daily. Yes SARAH Tobias   vitamin D (ERGOCALCIFEROL) 1.25 MG (70930 UT) CAPS capsule Take 1 capsule by mouth once a week Tuesday Yes SARAH Botello   Ubrogepant (UBRELVY) 100 MG TABS Take 1 tablet at the onset of migraine. May repeat once in 2 hours if no improvement. Do not exceed 2 tablets in 24 hours.  Yes SARAH Botello   pantoprazole (PROTONIX) 40 MG tablet TAKE 1 TABLET BY MOUTH TWICE A DAY  Patient taking differently: Take 1 tablet by mouth 2 times daily Yes SARAH Tobias   Thiamine 50 MG CAPS Take 50 mg by mouth daily Yes SARAH Hernandez   docusate sodium (COLACE) 100 MG capsule Take 1 capsule by mouth 2 times daily as needed Yes Historical Provider, MD
against both UVA and UVB radiation with an SPF of 30 or greater. Reapply every 2 to 3 hours or after sweating, drying off with a towel, or swimming. Always wear a seat belt when traveling in a car. Always wear a helmet when riding a bicycle or motorcycle. Patient voicesunderstanding and agrees to plans along with risks and benefits of plan. Counseling:  Hope ARCHANA Silver's case, medications and options were discussed in detail. Patient was instructed to call the office if she questionsregarding her treatment. Should her conditions worsen, she should return to office to be reassessed by SARAH Suazo. she Should to go the closest Emergency Department for any emergency. They verbalizedunderstanding the above instructions. Return in about 3 months (around 8/26/2023) for follow up, 30 minute visit.

## 2023-06-29 ENCOUNTER — TELEPHONE (OUTPATIENT)
Dept: PRIMARY CARE CLINIC | Age: 53
End: 2023-06-29

## 2023-06-29 DIAGNOSIS — M25.50 POLYARTHRALGIA: ICD-10-CM

## 2023-06-29 DIAGNOSIS — D86.9 SARCOIDOSIS: ICD-10-CM

## 2023-06-29 NOTE — TELEPHONE ENCOUNTER
No, we have discussed previously. New referral placed. Preventive Health Recommendations  Female Ages 18 to 25      Yearly exam:    See your health care provider every year in order to  Review health changes.    Discuss preventive care.     Review your medicines if your doctor has prescribed any.      You should be tested each year for STDs (sexually transmitted diseases).      Starting at age 21, get a Pap test every three years. If you have an abnormal result, your doctor may have you test more often.      If you are at risk for diabetes, you should have a diabetes test (fasting glucose).    Shots:    Get a flu shot each year.    Get a tetanus shot every 10 years.    Consider getting the shot (vaccine) that prevents cervical cancer (Gardasil).    Nutrition:    Eat at least 5 servings of fruits and vegetables each day.  Eat whole-grain bread, whole-wheat pasta and brown rice, quinoa, faro, or barley instead of white grains and rice.  For bone health:  Eat calcium-rich foods 2-3 times daily. Also take vitamin D (1000 IUs) each day.  Your total calcium intake should be 1000mg per day (3 servings of dairy daily or other calcium rich foods. Your body cannot absorb more than 500mg at one time)    Lifestyle  Exercise at least 150 minutes a week each week (30 minutes a day, 5 days a week). This will help you control your weight and prevent disease. If your goal is to lose weight you will need to increase the duration and intensity of your exercise regimen  Limit alcohol to one drink per day.  No smoking.    Wear sunscreen to prevent skin cancer.  See your dentist every six months for an exam and cleaning.

## 2023-06-29 NOTE — TELEPHONE ENCOUNTER
----- Message from Leena Brown sent at 6/28/2023  8:48 AM CDT -----  Subject: Referral Request    Reason for referral request? Pt wants a new rheumatology referral to   replace the current one (to [facility]) in [place] from PCP [place]. Pt wants to see an arthritis doctor in [facility] where she lives: Dr. Darryl Haynes at [facility]. Pt needs to be seen for her   sarcoidosis and polyarthralgia. Provider patient wants to be referred to(if known):     Provider Phone Number(if known):[phone]    Additional Information for Provider?  Pt already spoke to this doctor's   office and all she needs is the referral. Pt would like Chris to call her   back with an update.  ---------------------------------------------------------------------------  --------------  Goliad [facility]    [phone]; OK to leave message on voicemail  ---------------------------------------------------------------------------  --------------

## 2023-06-29 NOTE — TELEPHONE ENCOUNTER
Pt called and wants a new rheumatology referral to   replace the current one ([facility]rant Steele) in [place] from Pt wants to see an arthritis doctor in [facility] where she lives: Dr. Ananth Horvath at [facility]. Does she need to be seen for this referral?  Please advise?

## 2023-06-30 NOTE — TELEPHONE ENCOUNTER
Dr Grant Yepez is the closest one that accepts this pt's insurance, Dr Ralph Mobley does not accept [employer]

## 2023-07-24 ENCOUNTER — TELEPHONE (OUTPATIENT)
Dept: PRIMARY CARE CLINIC | Age: 53
End: 2023-07-24

## 2023-07-24 DIAGNOSIS — M51.36 DEGENERATIVE DISC DISEASE, LUMBAR: ICD-10-CM

## 2023-07-24 DIAGNOSIS — M25.50 POLYARTHRALGIA: Primary | ICD-10-CM

## 2023-07-24 NOTE — TELEPHONE ENCOUNTER
She is wanting a new pain management referral to Isela Arredondo   [facility] [phone]. She said she has called them and they are willing to see her and just need a referral. She said the one she is currently seeing is not wanting to do anything further other than give her percocet so she would like to change. Please advise?

## 2023-08-11 ENCOUNTER — TELEPHONE (OUTPATIENT)
Dept: GASTROENTEROLOGY | Facility: CLINIC | Age: 53
End: 2023-08-11
Payer: MEDICARE

## 2023-08-11 NOTE — TELEPHONE ENCOUNTER
"Pt called me just now because she has rec'd several letters from our office about scheduling a colonoscopy. She tells me \"I haven't been a patient of Dr. Mclaughlin's for years.\" I advised her that she was on our recall list for a 5 year colon. She tells me she ended up in [facility] 3 years or so ago and was referred to [facility] so they are handling all her care. I am going to remove her from my recall list so that she doesn't get any more letters.   "

## 2023-08-17 DIAGNOSIS — I10 ESSENTIAL HYPERTENSION: ICD-10-CM

## 2023-08-17 DIAGNOSIS — N18.32 STAGE 3B CHRONIC KIDNEY DISEASE (HCC): ICD-10-CM

## 2023-08-17 DIAGNOSIS — E03.9 ACQUIRED HYPOTHYROIDISM: ICD-10-CM

## 2023-08-17 LAB
ALBUMIN SERPL-MCNC: 4.4 G/DL (ref 3.5–5.2)
ALP SERPL-CCNC: 76 U/L (ref 35–104)
ALT SERPL-CCNC: 21 U/L (ref 5–33)
ANION GAP SERPL CALCULATED.3IONS-SCNC: 12 MMOL/L (ref 7–19)
AST SERPL-CCNC: 22 U/L (ref 5–32)
BASOPHILS # BLD: 0 K/UL (ref 0–0.2)
BASOPHILS NFR BLD: 1.1 % (ref 0–1)
BILIRUB SERPL-MCNC: 0.3 MG/DL (ref 0.2–1.2)
BUN SERPL-MCNC: 27 MG/DL (ref 6–20)
CALCIUM SERPL-MCNC: 8.9 MG/DL (ref 8.6–10)
CHLORIDE SERPL-SCNC: 105 MMOL/L (ref 98–111)
CO2 SERPL-SCNC: 25 MMOL/L (ref 22–29)
CREAT SERPL-MCNC: 1.2 MG/DL (ref 0.5–0.9)
EOSINOPHIL # BLD: 0.1 K/UL (ref 0–0.6)
EOSINOPHIL NFR BLD: 3.6 % (ref 0–5)
ERYTHROCYTE [DISTWIDTH] IN BLOOD BY AUTOMATED COUNT: 13.6 % (ref 11.5–14.5)
GLUCOSE SERPL-MCNC: 84 MG/DL (ref 74–109)
HCT VFR BLD AUTO: 35.2 % (ref 37–47)
HGB BLD-MCNC: 10.8 G/DL (ref 12–16)
IMM GRANULOCYTES # BLD: 0 K/UL
LYMPHOCYTES # BLD: 0.6 K/UL (ref 1.1–4.5)
LYMPHOCYTES NFR BLD: 20.4 % (ref 20–40)
MCH RBC QN AUTO: 30.7 PG (ref 27–31)
MCHC RBC AUTO-ENTMCNC: 30.7 G/DL (ref 33–37)
MCV RBC AUTO: 100 FL (ref 81–99)
MONOCYTES # BLD: 0.4 K/UL (ref 0–0.9)
MONOCYTES NFR BLD: 13.1 % (ref 0–10)
NEUTROPHILS # BLD: 1.7 K/UL (ref 1.5–7.5)
NEUTS SEG NFR BLD: 61.8 % (ref 50–65)
PLATELET # BLD AUTO: 124 K/UL (ref 130–400)
PMV BLD AUTO: 10.6 FL (ref 9.4–12.3)
POTASSIUM SERPL-SCNC: 4.5 MMOL/L (ref 3.5–5)
PROT SERPL-MCNC: 6.9 G/DL (ref 6.6–8.7)
RBC # BLD AUTO: 3.52 M/UL (ref 4.2–5.4)
SODIUM SERPL-SCNC: 142 MMOL/L (ref 136–145)
T4 FREE SERPL-MCNC: 1.22 NG/DL (ref 0.93–1.7)
TSH SERPL DL<=0.005 MIU/L-ACNC: 1.2 UIU/ML (ref 0.27–4.2)
WBC # BLD AUTO: 2.7 K/UL (ref 4.8–10.8)

## 2023-08-18 DIAGNOSIS — E03.9 ACQUIRED HYPOTHYROIDISM: ICD-10-CM

## 2023-08-18 DIAGNOSIS — F33.0 MILD EPISODE OF RECURRENT MAJOR DEPRESSIVE DISORDER (HCC): Primary | ICD-10-CM

## 2023-08-18 NOTE — TELEPHONE ENCOUNTER
Her levothyroxine dose should be 150 mcg daily. Can we verify with her what she is taking?   I decreased the dose in Feb.

## 2023-08-21 RX ORDER — LEVOTHYROXINE SODIUM 175 UG/1
TABLET ORAL
Qty: 90 TABLET | Refills: 2 | OUTPATIENT
Start: 2023-08-21

## 2023-08-21 RX ORDER — DESVENLAFAXINE 100 MG/1
TABLET, EXTENDED RELEASE ORAL
Qty: 90 TABLET | Refills: 2 | Status: SHIPPED | OUTPATIENT
Start: 2023-08-21

## 2023-08-22 NOTE — PROGRESS NOTES
[facility] GROUP HEMATOLOGY AND ONCOLOGY  [address]  [facility] [address] [phone]    Patient Name: Stacy Lynn  Encounter Date: 06/28/2023  YOB: 1970  Patient Number: [id number]       REASON FOR VISIT: Ms. Stacy Lynn is a 53-year-old female who returns in followup of anemia from chronic kidney disease, with leukopenia and intermittent thrombocytopenia without unifying diagnosis (likely contribution from methotrexate). She is seen 52 months since last receipt of IV Injectafer and last receipt of 2 units PRBC on 9/1/17. She is here alone. The history is again muddled by liberal symptom reporting.      I have reviewed the HPI and verified with the patient the accuracy of it. No changes to interval history since the information was documented. Fermín Boggs MD 08/28/23      DIAGNOSTIC ABNORMALITIES:   Labs, 09/16/2014 Dr. Waters's office. TIBC 303 (225 - 420), serum iron 43 (42 - 180), and iron saturation 14.2%.   Labs, 10/03/2014. Hemoglobin 9.8, hematocrit 30.2, MCV 94.1, platelets 157,000, WBC 2.9, with 52.3 segs (ANC 1.5), 36.8 lymphocytes, 10.9 mid cells. Glucose 80, BUN 16, creatinine 0.9 (GFR 71.6), sodium 142, potassium 3.9, chloride 109, bicarbonate 19, calcium 8.6, albumin 3.5, total protein 6.3, bilirubin 0.4, alkaline phosphatase 67, , AST 12, ALT 20, phosphorus 4.6 (each normal). Serum iron 49, iron saturation 15.8%, ferritin 32, , folate 13.6. HIV antibodies screen nonreactive. ELENA negative. T4 of 7.1, TSH 0.6, sed rate 8 (each normal). Rheumatoid factor less than 10. ANCA less than 1:20.  Colonoscopy, 10/13/2014: - One 7 mm polyp in the ascending colon resected and retrieved. The examination was otherwise normal on direct and retroflexion views. Recommendation: - Repeat colonoscopy in 5 years for surveillance.  Comprehensive Report, 10/15/2014: FINAL DIAGNOSIS. Peripheral blood:  Leukopenia/neutropenia; normocytic anemia. Comprehensive Comments: The cause of the cytopenias is not apparent from review of this peripheral blood smear. Differential diagnosis includes chronic disease, autoimmunity, drug effects, infection, etc. Although there are no morphologic features of myeloid dysplasia, please be aware that peripheral blood findings are not always representative of underlying bone marrow abnormalities. FLOW IMPRESSION: Peripheral blood: No increase in myeloid or lymphoid blasts identified. Findings consistent with mild granulocytic left shift. No immunophenotypically abnormal B- or T-cell population identified.  EGD performed on 10/16/2014 at [facility]. IMPRESSION: Normal esophagus. Normal stomach. Normal first part of the duodenum and 2nd part of the duodenum Biopsied . Pathology: Small bowel biopsy. Benign small intestinal type mucosa with no significant histopathologic changes.  Labs, 05/29/2015. IgG 827, IgA 168, IgM 140. Immunofixation showed no monoclonal immunoglobulins detected. Free kappa light chain 1.26, free lambda light chain 1.17, kappa/lambda light chains 1.08 (each normal).  UPIEP, 06/09/2015. 24-hour urine 82.8 mg. Immunofixation not reported.  [facility], CT-guided bone marrow, 06/29/2015. Impression: Technically successful CT-guided bone marrow aspirate without immediate complications. No core bone marrow sample could be obtained. PATHOLOGY: Surgical pathology performed on 06/29/2015 at [facility] was revealing for post iliac crest bone marrow biopsy and aspirate: Normocellular bone marrow of 40%. No increase in blasts. Iron staining is 1+. Flow cytometry shows no evidence of an abnormal myeloid maturation, increase in blasts, or lymphoproliferative disorder. Complete blood count and peripheral blood smear review: Normochromic normocytic anemia. Leukopenia. No circulating blasts.  Peripheral blood comprehensive report, 06/19/2017: PANCYTOPENIA.  COMPREHENSIVE DIAGNOSIS.. Review of peripheral blood smear: Leukopenia with relative reactive appearing eosinophilia. Normochromic, normocytic anemia. Mild thrombocytopenia. See comment. COMPREHENSIVE COMMENT. When compared to a peripheral blood specimen reviewed on this patient in 10/2014 ([id number]), the white blood cell count has decreased from 2.7 K/mL to 1.8 currently. The hemoglobin level has also decreased from 9.9 g/dL to 8.7 g/dL. The platelet counts are similar. As stated in the prior specimen, the cause of the cytopenias is not apparent from the peripheral blood smear examination. Etiologies to consider include chronic diseases particularly autoimmune disease, infections and drugs/toxins. Myelodysplasia is in the differential diagnosis but that diagnosis can be difficult to establish on a peripheral blood specimen. Correlation recommended. Flow cytometry study after erythrolysis reveals no circulating myeloid or lymphoid blasts. Myeloid and monocytic lineages are represented by mature granulocytes and monocytes. Phenotypically unremarkable eosinophils are mildly elevated at 10.5% of the total WBC. Basophils are not increased. It must be noted that the diagnosis of a myeloid stem cell disorder, if clinically suspected, is best made using bone marrow specimens. Peripheral blood is not always representative of abnormalities involving the bone marrow. The B cells show no evidence of clonality or antigenic aberrancy. The T cells show normal expression of the pan T-cell antigens. The NK cells account for 23.7% of the total lymphocytes.  [facility]: Endoscopy: 08/23/2017. Impressions: - Normal esophagus. - Non-erosive gastritis. Biopsied. - Normal first part of the duodenum and 2nd part of the duodenum. Biopsied.   [facility]: Colonoscopy: 08/23/2017. Impressions: - The entire examined colon is normal. Biopsied.     PREVIOUS INTERVENTIONS:   IV Venofer, 11/10/2014 - 11/20/2014 (1000 mg)  INFeD 500 mg  on 07/19/2016 and 05/01/2017  2 units PRBC transfusions, 09/01/2017  Injectafer 750 mg IV, 04/23/2019        Problem List Items Addressed This Visit    None    Oncology/Hematology History    No history exists.       PAST MEDICAL HISTORY:  ALLERGIES:  Allergies   Allergen Reactions    Adhesive Tape Hives    Tape Hives     CURRENT MEDICATIONS:  Outpatient Encounter Medications as of 8/28/2023   Medication Sig Dispense Refill    docusate sodium (COLACE) 100 MG capsule Take 1 capsule by mouth.      esomeprazole (nexIUM) 40 MG capsule TAKE 1 CAPSULE BY MOUTH TWICE DAILY BEFORE  MEALS 60 capsule 0    Fluticasone-Umeclidin-Vilant (Trelegy Ellipta) 200-62.5-25 MCG/ACT aerosol powder  Inhale 1 puff Daily. 60 each 5    folic acid-vit B6-vit B12 (Folbee) 2.5-25-1 MG tablet tablet Take 1 tablet by mouth Daily. 30 tablet 0    levothyroxine (SYNTHROID, LEVOTHROID) 150 MCG tablet       LYRICA 100 MG capsule Take 1 capsule by mouth 3 (Three) Times a Day.  1    O2 (OXYGEN) Inhale 2 L/min 1 (One) Time. Continuous      ondansetron (ZOFRAN) 4 MG tablet Take 1 tablet by mouth Every 8 (Eight) Hours As Needed for Nausea or Vomiting.      oxyCODONE-acetaminophen (PERCOCET) 7.5-325 MG per tablet Take 1 tablet by mouth Every 8 (Eight) Hours As Needed.      polyethylene glycol (MIRALAX) powder Take 17 g by mouth.      PROCRIT 95195 UNIT/ML injection Inject 1 Units under the skin As Needed (WHEN BLOOD COUNT [hemoglobin] BELOW 11).      Rexulti 0.5 MG tablet       tiZANidine (ZANAFLEX) 4 MG tablet Take 1 tablet by mouth At Night As Needed for Muscle Spasms.      topiramate (TOPAMAX) 100 MG tablet Take 1 tablet by mouth Every Night.      vitamin D (ERGOCALCIFEROL) 1.25 MG (98840 UT) capsule capsule Take 1 capsule by mouth 1 (One) Time Per Week.      levothyroxine (SYNTHROID, LEVOTHROID) 175 MCG tablet Take 1 tablet by mouth Daily.      metoprolol succinate XL (TOPROL-XL) 25 MG 24 hr tablet Take 4 tablets by mouth Daily.      oxybutynin  (DITROPAN) 5 MG tablet Take 1 tablet by mouth every night at bedtime.  5    vitamin B-6 (PYRIDOXINE) 50 MG tablet Take 1 tablet by mouth Daily.       No facility-administered encounter medications on file as of 8/28/2023.     ADULT ILLNESSES:   Iron deficiency anemia ( ICD-10:D50.9 ;Iron deficiency anemia, unspecified   Anemia ( ICD-10:D64.9 ;Anemia, unspecified   Anemia in chronic kidney disease ( ICD-10:D63.1 ;Anemia in chronic kidney disease   Chronic kidney disease ( ICD-10:N18.3 ;Chronic kidney disease, stage 3 (moderate)   Chronic pancreatitis ( ICD-10:K86.1 ;Other chronic pancreatitis   Depression ( ICD-10:F32.9 ;Major depressive disorder, single episode, unspecified   Fatigue ( ICD-10:R53.82 ;Chronic fatigue, unspecified   Fibromyalgia syndrome ( ICD-10:M79.7 ;Fibromyalgia   Gastroesophageal reflux disease ( ICD-10:K21.9 ;Gastro-esophageal reflux disease without esophagitis   Hypertension ( ICD-10:I10 ;Essential (primary) hypertension   Hypothyroidism ( ICD-10:E03.9 ;Hypothyroidism, unspecified   Irritable bowel syndrome ( ICD-10:K58.9 ;Irritable bowel syndrome without diarrhea   Kidney stone ( ICD-10:N20.0 ;Calculus of kidney   Knee pain ( meniscal disorder,Dr. Dasilva; ICD-10:M25.569 ;Pain in unspecified knee )   Leukopenia ( ICD-10:D72.819 ;Decreased white blood cell count, unspecified   Macular degeneration ( ICD-10:H35.30 ;Unspecified macular degeneration   Migraines ( ICD-10:G43.909 ;Migraine, unspecified, not intractable, without status migrainosus   Obesity ( ICD-10:E66.9 ;Obesity, unspecified   Peripheral neuropathy ( ICD-10:G62.9 ;Polyneuropathy, unspecified   Sarcoidosis ( ICD-10:D86.0 ;Sarcoidosis of lung   Scoliosis ( ICD-10:M41.9 ;Scoliosis, unspecified   Urge incontinence of urine ( ICD-10:N39.41 ;Urge incontinence   Vitamin B12 deficiency ( ICD-10:E53.8 ;Deficiency of other specified B group vitamins    SURGERIES:   Total knee replacement, right, 11/06/2015, Dr. Dasilva   Colonoscopy,  2009   Shoulder repair, epicondyle, right, 2002   Shoulder repair, arthroscopy, 2001   Ulnar transposition; ulnar neuropathy, 2002,1999   Kidney stone, 2001   Hysterectomy, total, 2004   Colonoscopy, 09/06/2018. Impression: The examination was otherwise normal on direct and retroflexion views. No specimens collected. Repeat 5 years   Colonoscopy, 08/23/2017. [facility]. Impression> The entire examined colon is normal. Biopsied   Decompression of median nerve, (carpal tunnel release), 05/08/2018, Dr. Pathak   Operative procedure on knee, replacement, 01/2014, Dr. Dasilva   Endoscopy, 08/23/2017, [facility]. Impression: Normal esophagus. Non-erosive gastritis. Biopsied. Normal 1st part of the duodenum and 2nd part of the duodenum. Biopsied   Radiofrequency ablation (RFA) left leg on 01/10/2018 by Dr. Montgomery. Left lower extremity venous ultrasound, 01/19/2018. No deep venous thrombosis (DVT) left lower extremity. Was started on Eliquis. Lymphedema pumps and compression stockings prescribed. Followup 1 month   Revision surgery was done last 05/04/2017. Dr. Pathak   Cholecystectomy, remote  Right rotator cuff and bicep tendon repair last 03/24/2022  Left total knee replacement, 10/9/2022.  Dr. Pathak      ADULT ILLNESSES:  Patient Active Problem List   Diagnosis Code    Palpitations R00.2    Thrombocytopenia D69.6    Sarcoidosis D86.9    Essential hypertension I10    Hypothyroidism E03.9    Dyspnea on exertion R06.09    Morbidly obese E66.01    PFO (patent foramen ovale) Q21.12    Iron deficiency anemia D50.9    Generalized weakness R53.1    Volume depletion, gastrointestinal loss E86.9    Viral enterocolitis A08.4    Colon polyps K63.5    Epigastric pain R10.13    Bloating R14.0    Early satiety R68.81    Weight loss R63.4    Abdominal cramping R10.9    Nausea R11.0    NSAID long-term use Z79.1    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, T45.1X5A    Anemia in chronic kidney  disease (CKD) N18.9, D63.1    Varicose veins of both lower extremities with pain I83.813    Lymphedema I89.0    Venous (peripheral) insufficiency I87.2    Neuropathy due to medical condition G63    Venous insufficiency I87.2    Chest pain R07.9    Bloody stool K92.1    Constipation K59.00    Anal or rectal pain K62.89    Rectal bleeding K62.5    Hx of colonic polyps Z86.010    Other hemorrhoids K64.8    Chronic respiratory failure with hypoxia J96.11    Diarrhea R19.7    Irritable bowel syndrome with constipation K58.1    Overweight E66.3    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 Diallo     CHOLECYSTECTOMY      COLONOSCOPY  10/13/2014    COLONOSCOPY N/A 8/23/2017    Procedure: COLONOSCOPY WITH ANESTHESIA;  Surgeon: Jeanette Mclaughlin MD;  Location: [facility] ENDOSCOPY;  Service:     COLONOSCOPY N/A 9/6/2018    Procedure: COLONOSCOPY WITH ANESTHESIA;  Surgeon: Jeanette Mclaughlin MD;  Location: [facility] ENDOSCOPY;  Service: Gastroenterology    ENDOSCOPY N/A 8/23/2017    Procedure: ESOPHAGOGASTRODUODENOSCOPY WITH ANESTHESIA;  Surgeon: Jeanette Mclaughlin MD;  Location: [facility] ENDOSCOPY;  Service:     ENDOVENOUS ABLATION SAPHENOUS VEIN W/ LASER Right 03/30/2018    HYSTERECTOMY      JOINT REPLACEMENT Right     PATELLA REPLACED    KIDNEY STONE SURGERY      KNEE ARTHROSCOPY      KNEE SURGERY Right     LATERAL EPICONDYLE RELEASE      PATELLA SURGERY      REPLACEMENT TOTAL KNEE      SHOULDER ARTHROSCOPY      ULNAR NERVE DECOMPRESSION      VARICOSE VEIN SURGERY Left 1/10/2018    Procedure: LEFT SAPHENOUS VEIN RADIO FREQUENCY ABLATION;  Surgeon: Kvng Montgomery DO;  Location: [facility] OR;  Service:     VARICOSE VEIN SURGERY Right 3/30/2018    Procedure: RIGHT LOWER EXTREMITY VENAGRAM, RIGHT LOWER EXTREMITY SAPHENOUS  VEIN RADIO FREQUENCY ABLATION;  Surgeon: Kvng Montgomery DO;  Location:  [facility] OR;  Service: Vascular     HEALTH MAINTENANCE ITEMS:  Health Maintenance Due   Topic Date Due    COVID-19 Vaccine (1) Never done    TDAP/TD VACCINES (1 - Tdap) Never done    HEPATITIS C SCREENING  Never done    PAP SMEAR  Never done    ZOSTER VACCINE (1 of 2) Never done    MAMMOGRAM  03/08/2021       <no information>  Last Completed Colonoscopy            COLORECTAL CANCER SCREENING (COLONOSCOPY - Every 5 Years) Next due on 3/15/2024      03/15/2019  Outside Procedure: [place] SIGMOIDOSCOPY,BIOPSY,[place] COLONOSCOPY W/BIOPSY SINGLE/MULTIPLE    09/06/2018  Surgical Procedure: COLONOSCOPY    09/06/2018  COLONOSCOPY    08/23/2017  Surgical Procedure: COLONOSCOPY    08/23/2017  COLONOSCOPY    Only the first 5 history entries have been loaded, but more history exists.                  Immunization History   Administered Date(s) Administered    Pneumococcal Conjugate 13-Valent (PCV13) 11/09/2018, 04/01/2019    Pneumococcal Polysaccharide (PPSV23) 09/26/2017, 10/01/2018     Last Completed Mammogram       This patient has no relevant Health Maintenance data.              FAMILY HISTORY:  Family History   Problem Relation Age of Onset    Arrhythmia Mother     Heart disease Mother     Kidney disease Mother     Hypertension Mother     Heart disease Father     Diabetes Father     Cancer Father     Heart disease Brother     Heart disease Maternal Aunt     Heart disease Maternal Uncle     Heart disease Paternal Aunt     Heart disease Paternal Uncle     Diabetes Paternal Uncle     Hypertension Paternal Uncle     Heart disease Maternal Grandmother     Heart disease Maternal Grandfather     Heart disease Paternal Grandmother     Cancer Paternal Grandmother     Heart disease Paternal Grandfather     Cancer Paternal Uncle     Hypertension Paternal Uncle     Colon cancer Neg Hx     Colon polyps Neg Hx      SOCIAL HISTORY:  Social History     Socioeconomic History     Marital status: Single   Tobacco Use    Smoking status: Never    Smokeless tobacco: Never   Vaping Use    Vaping Use: Never used   Substance and Sexual Activity    Alcohol use: Never    Drug use: Never    Sexual activity: Never       REVIEW OF SYSTEMS:  Constitutional:   The patient's appetite is good. Her energy is chronically low. She manages her personal ADLs. She lives with her sister and brother. She manages her ADLs including helping with light indoor chores but not any errands and does not drive. She has regained 8 lb (had lost 20 lb at her 2 prior visits) since her last visit. She has no fevers or chills but has intermittent non-drenching night sweats. Her sleep habits seem appropriate.  Ear/Nose/Throat/Mouth:   She reports no ear pains, sinus symptoms, sore throat, nosebleeds, or sore tongue. She has migraine headaches. She denies any hoarseness, change in voice quality.   Ocular:   She reports no eye pain, significant change in visual acuity, double vision, or blurry vision.  Respiratory:   She reports baseline exertional dyspnea and is short of breath with her routine activities. She has chronic cough, sometimes with thick, white/yellow phlegm production but has no significant shortness of breathing at rest or unexplained chest wall pain. Says she is off prednisone since last 08/2016 by Dr. Scherer (for the sarcoid). Says the methotrexate has been stopped by Dr. Scherer. Has been on round the clock O2 since 04/2018.  Cardiovascular:   She reports no exertional chest pain, chest pressure, or chest heaviness. She reports no claudication. She reports no palpitations or symptomatic orthostasis.  Gastrointestinal:   She reports chronic nausea but no dysphagia, vomiting, or postprandial abdominal pain but admits to early satiety, increased bloating, and cramping. She has no change in bowel habits without dark discoloration of the stool (off oral iron). She reports intermittent rectal bleeding when she is  "constipated. \"Not of late.\" She has intermittent diarrhea from IBS. Is intolerant of oral iron (cramps, constipation). Last repeat colonoscopy, 09/06/2018 (above). Hemorrhoids?.  Genitourinary:   She reports no urinary burning, frequency, dribbling, or discoloration. She reports difficulty controlling her bladder and has trouble holding in her urine but has not needed protective pads. She has no need to urinate frequently through the night.  Musculoskeletal:   She reports that she had left TKR, 10/9/2022 and right rotator cuff and bicep tendon repair last 03/24/2022.  She has intermittent right knee discomfort in spite of total knee replacement (TKR). Says revision surgery was done last 05/04/2017 - Dr. Kwon. She has chronic arthralgias of the hips and lower back with distal radiculitis to the buttocks. She has myalgias of the calves and has nighttime leg cramping. She is now seen by Pain Management. Says a nerve stimulator couldn't be placed due to her scoliosis. Is off morphine.  Is now on Percocet 7.5 every 8 hours as needed  Extremities:   She reports chronic trouble with fluid retention and significant leg swelling. Is no longer using compression leg pumps.  Endocrine:   She reports no problems with excess thirst, excessive urination, vasomotor instability.  Heme/Lymphatic:   She reports easy bruising but no unexplained bleeding, petechial rashes, or swollen glands.  Skin:   She reports chronic itching and rashes but no lesions which won't heal.  Neuro:   She reports postural dizziness but no loss of consciousness, seizures, or fainting spells. She reports no weakness of her face, arms, or legs. She has no difficulty with speech. She has no tremors. She has relief of paresthesias of the right hand since the carpal tunnel release on 05/08/2018 (Dr. Kwon).  Psych:   She admits to intermittent depression and anxiety. She reports occasional mood swings. She reports no history of suicide attempts.    VITAL " "SIGNS: /88   Pulse 63   Temp 97.4 øF (36.3 øC) (Temporal)   Resp 18   Ht 162.6 cm (64\")   Wt 129 kg (285 lb 4.8 oz)   SpO2 100%   BMI 48.97 kg/mý Body surface area is 2.27 meters squared.  Pain Score    08/28/23 1435   PainSc:   7   PainLoc: Hip           PHYSICAL EXAMINATION:   General:   She is a very-pleasant, morbidly obese and modestly-kept middle-aged female who is comfortable at rest. She arrived in the exam room ambulatory. She appears to be her stated age. Her skin color is still a bit pale.   Head/Neck:   The patient is anicteric and atraumatic. She is wearing a surgical mask today.  She is wearing O2 via cannula, tethered to a portable O2 tank. The trachea is midline. The neck is supple without evidence of jugular venous distention or cervical adenopathy.   Eyes:   The pupils are equal, round, and reactive to light. The extraocular movements are full. There is no scleral jaundice or erythema.   Chest:   The respiratory efforts are normal and unhindered. The breath sounds are diminished bilaterally with vague basilar rales. There are no wheezes, rhonchi, or asymmetry of breath sounds.  Cardiovascular:   The patient has a regular cardiac rate and rhythm  Abdomen:   The belly is soft and globose. Her habitus precludes an adequate exam but there is no rebound or guarding. There is no organomegaly, mass-effect, or tenderness. Bowel sounds are active and of normal character.  Extremities:   There is no evidence of cyanosis, clubbing, with 3+ (chronic) leg/ankle edema.    Rheumatologic:   There is no overt evidence of rheumatoid deformities of the hands. There is no sausaging of the fingers. There is no sign of active synovitis. The gait is slow and less antalgic, again favoring the left knee.  She does not use a cane nor walker  Cutaneous:   There are no overt rashes, disseminated lesions, purpura, or petechiae.   Lymphatics:   There is no evidence of adenopathy in the cervical, supraclavicular, or " axillary areas.   Neurologic:   The patient is alert, oriented, cooperative, and pleasant. She is appropriately conversant. She ambulated into the exam room without assistance but declined transfer from chair to exam table. There is no overt dysfunction of the motor, sensory, cerebellar systems.  Psych:   Mood and affect are appropriate for circumstance. Eye contact is appropriate. Normal judgement and decision making.        LABS    Lab Results - Last 18 Months   Lab Units 08/24/23  1418 08/17/23  1433 07/14/23  1410 05/19/23  1234 04/28/23  1409 02/24/23  1436 01/13/23  2220 12/16/22  1435 10/04/22  1410 09/09/22  1358 08/22/22  1450 06/23/22  1334 03/10/22  1351   HEMOGLOBIN g/dL 10.2* 10.8* 10.9* 10.6* 10.3* 10.6* 11.7* 11.6*   < > 10.9*   < > 11.5* 11.2*   HEMATOCRIT % 33.8* 35.2* 36.0 34.3* 34.0 33.8* 38.1 37.5   < > 34.6   < > 36.0 35.6   MCV fL 99.4* 100.0* 97.6* 100.6* 97.7* 97.1* 97.2 98.9*   < > 100.3*   < > 98.6* 101.1*   WBC 10*3/mm3 2.81* 2.7* 2.44* 2.8* 2.27* 2.39* 2.0* 3.97   < > 3.75   < > 3.22* 3.20*   RDW % 13.7 13.6 13.2 13.1 13.6 14.7 13.2 12.9   < > 13.2   < > 13.4 14.0   MPV fL 10.1 10.6 10.5 10.5 9.9 10.3 10.4 9.5   < > 10.1   < > 10.2 9.9   PLATELETS 10*3/mm3 121* 124* 126* 122* 125* 149 85* 177   < > 139*   < > 123* 111*   IMM GRAN % % 0.4  --   --   --  0.0 0.4  --  0.5  --   --   --   --   --    NEUTROS ABS 10*3/mm3 1.79 1.7 1.33* 1.7 1.19* 1.47* 1.2* 2.40   < > 2.06   < > 1.64* 1.63*   LYMPHS ABS 10*3/mm3 0.58* 0.6* 0.67* 0.7* 0.65* 0.52* 0.6* 0.85   < >  --    < >  --   --    MONOS ABS 10*3/mm3 0.33 0.40 0.30 0.30 0.31 0.28 0.30 0.54   < >  --    < >  --   --    EOS ABS 10*3/mm3 0.08 0.10 0.10 0.10 0.11 0.09 0.02 0.12   < > 0.23   < > 0.19 0.17   BASOS ABS 10*3/mm3 0.02 0.00 0.03 0.00 0.01 0.02 0.00 0.04   < > 0.04   < >  --  0.03   IMMATURE GRANS (ABS) 10*3/mm3 0.01 0.0  --  0.0 0.00 0.01 0.0 0.02   < >  --    < >  --   --    NRBC /100 WBC 0.0  --   --   --  0.0 0.0  --  0.0  --   --    --   --  1.0*   NEUTROPHIL % %  --   --   --   --   --   --   --   --   --  55.0  --  51.0 50.0   MONOCYTES % %  --   --   --   --   --   --   --   --   --  14.0*  --  14.0* 4.2*   BASOPHIL % %  --   --   --   --   --   --   --   --   --  1.0  --   --  1.0   ATYP LYMPH % %  --   --   --   --   --   --   --   --   --   --   --   --  10.4*   ANISOCYTOSIS   --   --   --   --   --   --   --   --   --   --   --   --  Slight/1+    < > = values in this interval not displayed.       Lab Results - Last 18 Months   Lab Units 08/24/23  1418 10/04/22  1410 09/09/22  1358 08/22/22  1450 06/23/22  1334 03/10/22  1351   GLUCOSE mg/dL 93 95 86 93 96 93   SODIUM mmol/L 143 142 142 141 143 140   POTASSIUM mmol/L 4.1 4.2 4.0 4.1 4.0 4.4   TOTAL CO2 mmol/L  --  25  --  29  --   --    CO2 mmol/L 25.0  --  25.0  --  24.0 23.0   CHLORIDE mmol/L 109* 105 109* 102 110* 109*   ANION GAP mmol/L 9.0 12 8.0 10 9.0 8.0   CREATININE mg/dL 1.25* 1.5* 1.58* 1.4* 1.54* 1.25*   BUN mg/dL 21* 27* 25* 19 21* 27*   BUN / CREAT RATIO  16.8  --  15.8  --  13.6 21.6   CALCIUM mg/dL 8.6 9.1 9.1 9.0 8.8 8.8   EGFR IF NONAFRICN AM   --  36*  --  39*  --   --    ALK PHOS U/L 72  --  81 80 83 75   TOTAL PROTEIN g/dL 6.7  --  7.1 7.2 6.9 6.9   ALT (SGPT) U/L 23  --  12 14 16 17   AST (SGOT) U/L 22  --  19 20 22 23   BILIRUBIN mg/dL 0.3  --  0.3 0.4 0.2 0.4   ALBUMIN g/dL 4.4  --  4.70 4.6 4.40 4.40   GLOBULIN gm/dL 2.3  --  2.4  --  2.5 2.5         Lab Results - Last 18 Months   Lab Units 08/24/23  1418 08/17/23  1433 07/14/23  1410 05/19/23  1234 04/28/23  1409 02/24/23  1436 02/22/23  1557 12/16/22  1435 11/30/22  1519 09/09/22  1358 08/22/22  1450   IRON mcg/dL 59  --  74  --  61 82  --  82 67   < >  --    TIBC mcg/dL 262*  --  264*  --  279* 302  --  319 235*   < >  --    IRON SATURATION %  --   --   --   --   --   --   --   --  29   < >  --    IRON SATURATION (TSAT) % 22  --  28  --  22 27  --  26  --    < >  --    FERRITIN ng/mL 329.20*  --  340.00*  --   366.10* 574.40*  --  427.20* 575.4*   < >  --    TSH uIU/mL  --  1.200  --  0.519  --   --  0.056*  --  0.361  --  10.030*   FOLATE ng/mL >20.00  --  11.60  --  4.38* 8.93  --  16.60 17   < >  --     < > = values in this interval not displayed.       ASSESSMENT:   1. Macrocytic anemia, with contributions from iron deficiency (1+ marrow iron), iron underutilization/anemia of chronic disease and chronic kidney disease and methotrexate. Michael Hgb 7.9, 08/31/2017 requiring 2 units PRBC transfusions on 09/01/2017.   a. Negative EGD/colonoscopy (above), negative SPIEP, negative UPIEP.   --Stable, Hgb 10.6 on 2/24/2023 (prior range: Hgb 7.9 - 12.3).   b. Endoscopy: 08/23/2017. Impressions: - Normal esophagus. - Non-erosive gastritis. Biopsied. - Normal 1st part of the duodenum and 2nd part of the duodenum. Biopsied.   c. [facility]: Colonoscopy: 08/23/2017. Impressions: - The entire examined colon is normal. Biopsied.   d. Colonoscopy, 09/06/2018. Impressions: - The examination was otherwise normal on direct and retroflexion views. No specimens collected. Repeat 5 years  e.  06/2015-bone marrow biopsy with 1+ marrow iron, but otherwise nondiagnostic.  2. Leukopenia. No unifying diagnosis. Likely medication (methotrexate) associated.   --WBC 2.81; ANC 1.79, 8/24/23 (prior range: WBC 2.44 - 4.2; ANC 1.05 - 2.6).   3. Thrombocytopenia.   --121,000, 8/24/23 (prior range: 85,000 - 188,000).  4. Chronic kidney disease, Stage III.   --Stable, GFR 51 mL/min on 8/24/23 (prior range: 26 - 56.6 mL/min).   5. Hypothyroidism.   6. Gastroesophageal reflux disease, now with early satiety, bloating, cramps.   7. B12 deficiency. Receives B12 injections per Dr. Waters. Previously received B12, 1000 mcg IM daily x 3 then weekly x 4 beginning 09/07/2018.   8. Fibromyalgia with chronic pain syndrome.   9. Irritable bowel syndrome with chronic diarrhea. Colonoscopy, 10/2014.   10. Depression.   11. Chronic neck and low back pain with  scoliosis. She is now seen by Dr. Schmitt of Pain Management. Says a nerve stimulator will be placed on 04/23/2019.   12. Peripheral neuropathy and swelling of the legs.   a. Seen by Dr. Montgomery, 09/07/2017 and 12/08/2017. Impression: Venous insufficiency and lymphedema.   b. Underwent RFA left leg on 01/10/2018 by Dr. Montgomery. Lower extremity venous ultrasound, 01/19/2018. No deep venous thrombosis (DVT) left lower extremity. Was started on anticoagulation (Eliquis). Lymphedema pumps and compression stockings prescribed.   13. Degenerative joint disease (DJD) knees. Underwent right knee surgery on 11/06/2015 and revision on 05/04/2017. Dr. Pathak.   14. Obesity. Has stabilized.   15. Pulmonary sarcoidosis. Followed by Dr. Scherer. Was started on methotrexate sometime 05/2017. Is now on O2 since 04/2018.   16. Chronic fatigue, contributions from all above.   17. Admitted [facility] 09/25/2016 through 09/26/2016 for chest pain. DSE negative. Discharged for non-cardiac chest pain.   18. Leg edema. Underwent RFA left leg on 01/10/2018 by Dr. Montgomery. LE venous US, 01/19/2018. No DVT left LE. Eliquis stopped last 02/2018. Lymphedema pumps and compression stockings prescribed.   19. Rectal bleeding. Colonoscopy, 09/06/2018. Impressions: The examination was otherwise normal on direct and retroflexion views. No specimens collected. Repeat 5 years.  20.  Right rotator cuff and right bicep tendon injuries.    --Had surgery 03/24/2022, Dr. Pathak  21.  Left TKR, 10/9/2022    PLAN:   1. Apprised of labs from 8/24/23 with stable leukopenia/normal ANC, stable anemia, stable platelets, repleted iron levels (ferritin 329, fe 59, fe sat 22%), repleted B12/folate, low (stable) GFR, normal LFTs, otherwise stable CMP.     2.  Previously discussed prior SPIEP with normal immunoglobulin levels, negative TAVIA for monoclonal proteins, normal kappa/lambda light chains, negative 24 hour UPIEP, normal ESR, negative rheumatoid factor, normal TSH/T4,  normal B12/folate, negative HIV screen, low iron studies, negative ELENA, normal LDH and negative comprehensive blood report (no peripheral blood evidence for dysplasia and negative flow cytometry).  Also noted previous bone marrow biopsy showing 1+ but otherwise nondiagnostic.  3. Again reviewed her medications. Stopped methotrexate, diclofenac, gabapentin.  Remains on Nexium (blood dyscrasias), gabapentin, Lortab (neutropenia, thrombocytopenia), Topamax (anemia, leukopenia). Would discontinue as many of these as possible. Defer to [facility] John.   4. Draw serum iron, Fe sat, ferritin, B12, folate every 8 weeks.   5. CBC every 8 weeks with Procrit 40,000 units subcutaneously if Hgb less than 10 and less than 30 - BHP   6. Continue management per primary care and other specialists.   7. Return to the [place] office in 24 weeks with pre-office CMP, B12/folate, serum iron, Fe sat, ferritin, and CBC with differential.     MEDICAL DECISION MAKING: Moderate Complexity   AMOUNT OF DATA: Moderate    I spent ~30 minutes caring for Stacy on this date of service. This time includes time spent by me in the following activities: preparing for the visit, reviewing tests, performing a medically appropriate examination and/or evaluation, counseling and educating the patient/family/caregiver, ordering medications, tests, or procedures and documenting information in the medical record    cc: MD Weston Ambrose MD

## 2023-08-24 ENCOUNTER — LAB (OUTPATIENT)
Dept: LAB | Facility: HOSPITAL | Age: 53
End: 2023-08-24
Payer: MEDICARE

## 2023-08-24 DIAGNOSIS — D69.6 THROMBOCYTOPENIA: ICD-10-CM

## 2023-08-24 DIAGNOSIS — D50.9 IRON DEFICIENCY ANEMIA, UNSPECIFIED IRON DEFICIENCY ANEMIA TYPE: ICD-10-CM

## 2023-08-24 LAB
ALBUMIN SERPL-MCNC: 4.4 G/DL (ref 3.5–5.2)
ALBUMIN/GLOB SERPL: 1.9 G/DL
ALP SERPL-CCNC: 72 U/L (ref 39–117)
ALT SERPL W P-5'-P-CCNC: 23 U/L (ref 1–33)
ANION GAP SERPL CALCULATED.3IONS-SCNC: 9 MMOL/L (ref 5–15)
AST SERPL-CCNC: 22 U/L (ref 1–32)
BASOPHILS # BLD AUTO: 0.02 10*3/MM3 (ref 0–0.2)
BASOPHILS NFR BLD AUTO: 0.7 % (ref 0–1.5)
BILIRUB SERPL-MCNC: 0.3 MG/DL (ref 0–1.2)
BUN SERPL-MCNC: 21 MG/DL (ref 6–20)
BUN/CREAT SERPL: 16.8 (ref 7–25)
CALCIUM SPEC-SCNC: 8.6 MG/DL (ref 8.6–10.5)
CHLORIDE SERPL-SCNC: 109 MMOL/L (ref 98–107)
CO2 SERPL-SCNC: 25 MMOL/L (ref 22–29)
CREAT SERPL-MCNC: 1.25 MG/DL (ref 0.57–1)
DEPRECATED RDW RBC AUTO: 49.6 FL (ref 37–54)
EGFRCR SERPLBLD CKD-EPI 2021: 51 ML/MIN/1.73
EOSINOPHIL # BLD AUTO: 0.08 10*3/MM3 (ref 0–0.4)
EOSINOPHIL NFR BLD AUTO: 2.8 % (ref 0.3–6.2)
ERYTHROCYTE [DISTWIDTH] IN BLOOD BY AUTOMATED COUNT: 13.7 % (ref 12.3–15.4)
FERRITIN SERPL-MCNC: 329.2 NG/ML (ref 13–150)
GLOBULIN UR ELPH-MCNC: 2.3 GM/DL
GLUCOSE SERPL-MCNC: 93 MG/DL (ref 65–99)
HCT VFR BLD AUTO: 33.8 % (ref 34–46.6)
HGB BLD-MCNC: 10.2 G/DL (ref 12–15.9)
IMM GRANULOCYTES # BLD AUTO: 0.01 10*3/MM3 (ref 0–0.05)
IMM GRANULOCYTES NFR BLD AUTO: 0.4 % (ref 0–0.5)
IRON 24H UR-MRATE: 59 MCG/DL (ref 37–145)
IRON SATN MFR SERPL: 22 % (ref 20–50)
LYMPHOCYTES # BLD AUTO: 0.58 10*3/MM3 (ref 0.7–3.1)
LYMPHOCYTES NFR BLD AUTO: 20.6 % (ref 19.6–45.3)
MCH RBC QN AUTO: 30 PG (ref 26.6–33)
MCHC RBC AUTO-ENTMCNC: 30.2 G/DL (ref 31.5–35.7)
MCV RBC AUTO: 99.4 FL (ref 79–97)
MONOCYTES # BLD AUTO: 0.33 10*3/MM3 (ref 0.1–0.9)
MONOCYTES NFR BLD AUTO: 11.7 % (ref 5–12)
NEUTROPHILS NFR BLD AUTO: 1.79 10*3/MM3 (ref 1.7–7)
NEUTROPHILS NFR BLD AUTO: 63.8 % (ref 42.7–76)
NRBC BLD AUTO-RTO: 0 /100 WBC (ref 0–0.2)
PLATELET # BLD AUTO: 121 10*3/MM3 (ref 140–450)
PMV BLD AUTO: 10.1 FL (ref 6–12)
POTASSIUM SERPL-SCNC: 4.1 MMOL/L (ref 3.5–5.2)
PROT SERPL-MCNC: 6.7 G/DL (ref 6–8.5)
RBC # BLD AUTO: 3.4 10*6/MM3 (ref 3.77–5.28)
SODIUM SERPL-SCNC: 143 MMOL/L (ref 136–145)
TIBC SERPL-MCNC: 262 MCG/DL (ref 298–536)
TRANSFERRIN SERPL-MCNC: 176 MG/DL (ref 200–360)
WBC NRBC COR # BLD: 2.81 10*3/MM3 (ref 3.4–10.8)

## 2023-08-24 PROCEDURE — 82746 ASSAY OF FOLIC ACID SERUM: CPT

## 2023-08-24 PROCEDURE — 36415 COLL VENOUS BLD VENIPUNCTURE: CPT

## 2023-08-24 PROCEDURE — 82607 VITAMIN B-12: CPT

## 2023-08-24 PROCEDURE — 84466 ASSAY OF TRANSFERRIN: CPT

## 2023-08-24 PROCEDURE — 80053 COMPREHEN METABOLIC PANEL: CPT

## 2023-08-24 PROCEDURE — 82728 ASSAY OF FERRITIN: CPT

## 2023-08-24 PROCEDURE — 83540 ASSAY OF IRON: CPT

## 2023-08-24 PROCEDURE — 85025 COMPLETE CBC W/AUTO DIFF WBC: CPT

## 2023-08-25 LAB
FOLATE SERPL-MCNC: >20 NG/ML (ref 4.78–24.2)
VIT B12 BLD-MCNC: 600 PG/ML (ref 211–946)

## 2023-08-28 ENCOUNTER — OFFICE VISIT (OUTPATIENT)
Dept: ONCOLOGY | Facility: CLINIC | Age: 53
End: 2023-08-28
Payer: MEDICARE

## 2023-08-28 VITALS
SYSTOLIC BLOOD PRESSURE: 138 MMHG | HEART RATE: 63 BPM | TEMPERATURE: 97.4 F | BODY MASS INDEX: 48.71 KG/M2 | WEIGHT: 285.3 LBS | OXYGEN SATURATION: 100 % | RESPIRATION RATE: 18 BRPM | HEIGHT: 64 IN | DIASTOLIC BLOOD PRESSURE: 88 MMHG

## 2023-08-28 DIAGNOSIS — D63.1 ANEMIA IN STAGE 3B CHRONIC KIDNEY DISEASE: Primary | ICD-10-CM

## 2023-08-28 DIAGNOSIS — N18.32 ANEMIA IN STAGE 3B CHRONIC KIDNEY DISEASE: Primary | ICD-10-CM

## 2023-08-28 DIAGNOSIS — E53.8 DEFICIENCY OF OTHER SPECIFIED B GROUP VITAMINS: ICD-10-CM

## 2023-08-28 RX ORDER — LEVOTHYROXINE SODIUM 0.15 MG/1
TABLET ORAL
COMMUNITY

## 2023-08-28 RX ORDER — CYANOCOBALAMIN/FOLIC AC/VIT B6 1-2.5-25MG
TABLET ORAL
Qty: 30 TABLET | Refills: 0 | Status: SHIPPED | OUTPATIENT
Start: 2023-08-28

## 2023-08-28 RX ORDER — BREXPIPRAZOLE 0.5 MG/1
TABLET ORAL
COMMUNITY

## 2023-09-06 ENCOUNTER — OFFICE VISIT (OUTPATIENT)
Dept: PRIMARY CARE CLINIC | Age: 53
End: 2023-09-06
Payer: MEDICARE

## 2023-09-06 VITALS
HEART RATE: 72 BPM | OXYGEN SATURATION: 99 % | DIASTOLIC BLOOD PRESSURE: 80 MMHG | SYSTOLIC BLOOD PRESSURE: 134 MMHG | BODY MASS INDEX: 52.44 KG/M2 | WEIGHT: 285 LBS | HEIGHT: 62 IN | TEMPERATURE: 98.3 F

## 2023-09-06 DIAGNOSIS — D86.9 SARCOIDOSIS: ICD-10-CM

## 2023-09-06 DIAGNOSIS — E03.9 ACQUIRED HYPOTHYROIDISM: ICD-10-CM

## 2023-09-06 DIAGNOSIS — E55.9 VITAMIN D DEFICIENCY: ICD-10-CM

## 2023-09-06 DIAGNOSIS — M51.36 DEGENERATIVE DISC DISEASE, LUMBAR: ICD-10-CM

## 2023-09-06 DIAGNOSIS — M25.551 RIGHT HIP PAIN: ICD-10-CM

## 2023-09-06 DIAGNOSIS — I10 ESSENTIAL HYPERTENSION: ICD-10-CM

## 2023-09-06 DIAGNOSIS — D69.6 THROMBOCYTOPENIA, UNSPECIFIED (HCC): ICD-10-CM

## 2023-09-06 DIAGNOSIS — F33.0 MILD EPISODE OF RECURRENT MAJOR DEPRESSIVE DISORDER (HCC): ICD-10-CM

## 2023-09-06 DIAGNOSIS — N18.32 STAGE 3B CHRONIC KIDNEY DISEASE (HCC): ICD-10-CM
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
## 2023-09-06 PROCEDURE — 3079F DIAST BP 80-89 MM HG: CPT | Performed by: NURSE PRACTITIONER

## 2023-09-06 PROCEDURE — 99214 OFFICE O/P EST MOD 30 MIN: CPT | Performed by: NURSE PRACTITIONER

## 2023-09-06 PROCEDURE — 3075F SYST BP GE 130 - 139MM HG: CPT | Performed by: NURSE PRACTITIONER

## 2023-09-06 ASSESSMENT — ENCOUNTER SYMPTOMS
COUGH: 0
SHORTNESS OF BREATH: 0
DIARRHEA: 0
NAUSEA: 0
ABDOMINAL PAIN: 0
CHEST TIGHTNESS: 0
SORE THROAT: 0
BACK PAIN: 1
WHEEZING: 0

## 2023-09-06 NOTE — PROGRESS NOTES
tablet; Refill: 1    4. Essential hypertension  -Stable, continue metoprolol  - CBC with Auto Differential; Future    5. Sarcoidosis  -Stable, continue management per [facility] pulmonology, Trelegy, O2    6. Stage 3b chronic kidney disease (HCC)  -Stable, continue management per nephrology  - Comprehensive Metabolic Panel; Future    7. Vitamin D deficiency  -Stable, continue weekly ergocalciferol  -Vitamin D level in 3 months  - Vitamin D 25 Hydroxy; Future    8. Thrombocytopenia, unspecified  -Stable, continue management per [facility] oncology    9. Acquired hypothyroidism  -Stable, continue levothyroxine at current dose  -Thyroid studies in 3 months  - T4, Free; Future  - TSH; Future    10. Degenerative disc disease, lumbar  -Stable, recently requested referral to new pain management. Discussed that she will likely need exit letter from her current pain management. Referral has already been sent. Return in about 3 months (around 12/6/2023) for office visit extended, thyroid, hypertension. Mindi was seen today for 3 month follow-up. Diagnoses and all orders for this visit:    Right hip pain  -     XR HIP 2-3 VW W PELVIS RIGHT; Future    Fall, initial encounter  -     XR HIP 2-3 VW W PELVIS RIGHT; Future    Mild episode of recurrent major depressive disorder (HCC)  -     brexpiprazole (REXULTI) 1 MG TABS tablet; Take 1 tablet by mouth daily    Essential hypertension  -     CBC with Auto Differential; Future    Sarcoidosis    Stage 3b chronic kidney disease ([address])  -     Comprehensive Metabolic Panel; Future    Vitamin D deficiency  -     Vitamin D 25 Hydroxy; Future    Thrombocytopenia, unspecified    Acquired hypothyroidism  -     T4, Free; Future  -     TSH; Future    Degenerative disc disease, lumbar      Medications Discontinued During This Encounter   Medication Reason    brexpiprazole (REXULTI) 0.5 MG TABS tablet REORDER     There are no Patient Instructions on file for this visit.     Patient

## 2023-09-15 ENCOUNTER — HOSPITAL ENCOUNTER (OUTPATIENT)
Dept: WOMENS IMAGING | Age: 53
Discharge: HOME | End: 2023-09-15
Payer: MEDICARE

## 2023-09-15 DIAGNOSIS — Z78.0 POSTMENOPAUSAL: ICD-10-CM

## 2023-09-15 DIAGNOSIS — Z12.31 SCREENING MAMMOGRAM FOR BREAST CANCER: ICD-10-CM

## 2023-09-15 PROCEDURE — 77063 BREAST TOMOSYNTHESIS BI: CPT

## 2023-09-19 PROBLEM — M85.852 OSTEOPENIA OF NECK OF LEFT FEMUR: Status: ACTIVE | Noted: 2023-09-19

## 2023-09-28 DIAGNOSIS — E53.8 DEFICIENCY OF OTHER SPECIFIED B GROUP VITAMINS: ICD-10-CM

## 2023-09-28 RX ORDER — CYANOCOBALAMIN/FOLIC AC/VIT B6 1-2.5-25MG
TABLET ORAL
Qty: 30 TABLET | Refills: 0 | Status: SHIPPED | OUTPATIENT
Start: 2023-09-28

## 2023-09-29 ENCOUNTER — LAB (OUTPATIENT)
Dept: LAB | Facility: HOSPITAL | Age: 53
End: 2023-09-29
Payer: MEDICARE

## 2023-09-29 DIAGNOSIS — D69.6 THROMBOCYTOPENIA: ICD-10-CM

## 2023-09-29 DIAGNOSIS — D50.9 IRON DEFICIENCY ANEMIA, UNSPECIFIED IRON DEFICIENCY ANEMIA TYPE: ICD-10-CM

## 2023-09-29 LAB
BASOPHILS # BLD AUTO: 0.03 10*3/MM3 (ref 0–0.2)
BASOPHILS NFR BLD AUTO: 0.8 % (ref 0–1.5)
DEPRECATED RDW RBC AUTO: 51.4 FL (ref 37–54)
EOSINOPHIL # BLD AUTO: 0.12 10*3/MM3 (ref 0–0.4)
EOSINOPHIL NFR BLD AUTO: 3.2 % (ref 0.3–6.2)
ERYTHROCYTE [DISTWIDTH] IN BLOOD BY AUTOMATED COUNT: 14 % (ref 12.3–15.4)
FERRITIN SERPL-MCNC: 342.7 NG/ML (ref 13–150)
HCT VFR BLD AUTO: 36.1 % (ref 34–46.6)
HGB BLD-MCNC: 11.1 G/DL (ref 12–15.9)
IMM GRANULOCYTES # BLD AUTO: 0.01 10*3/MM3 (ref 0–0.05)
IMM GRANULOCYTES NFR BLD AUTO: 0.3 % (ref 0–0.5)
IRON 24H UR-MRATE: 74 MCG/DL (ref 37–145)
IRON SATN MFR SERPL: 25 % (ref 20–50)
LYMPHOCYTES # BLD AUTO: 0.8 10*3/MM3 (ref 0.7–3.1)
LYMPHOCYTES NFR BLD AUTO: 21.2 % (ref 19.6–45.3)
MCH RBC QN AUTO: 30.6 PG (ref 26.6–33)
MCHC RBC AUTO-ENTMCNC: 30.7 G/DL (ref 31.5–35.7)
MCV RBC AUTO: 99.4 FL (ref 79–97)
MONOCYTES # BLD AUTO: 0.41 10*3/MM3 (ref 0.1–0.9)
MONOCYTES NFR BLD AUTO: 10.9 % (ref 5–12)
NEUTROPHILS NFR BLD AUTO: 2.4 10*3/MM3 (ref 1.7–7)
NEUTROPHILS NFR BLD AUTO: 63.6 % (ref 42.7–76)
NRBC BLD AUTO-RTO: 0 /100 WBC (ref 0–0.2)
PLATELET # BLD AUTO: 131 10*3/MM3 (ref 140–450)
PMV BLD AUTO: 9.9 FL (ref 6–12)
RBC # BLD AUTO: 3.63 10*6/MM3 (ref 3.77–5.28)
TIBC SERPL-MCNC: 291 MCG/DL (ref 298–536)
TRANSFERRIN SERPL-MCNC: 195 MG/DL (ref 200–360)
WBC NRBC COR # BLD: 3.77 10*3/MM3 (ref 3.4–10.8)

## 2023-09-29 PROCEDURE — 82607 VITAMIN B-12: CPT

## 2023-09-29 PROCEDURE — 83540 ASSAY OF IRON: CPT

## 2023-09-29 PROCEDURE — 82728 ASSAY OF FERRITIN: CPT

## 2023-09-29 PROCEDURE — 85025 COMPLETE CBC W/AUTO DIFF WBC: CPT

## 2023-09-29 PROCEDURE — 84466 ASSAY OF TRANSFERRIN: CPT

## 2023-09-29 PROCEDURE — 82746 ASSAY OF FOLIC ACID SERUM: CPT

## 2023-09-29 PROCEDURE — 36415 COLL VENOUS BLD VENIPUNCTURE: CPT

## 2023-09-30 LAB
FOLATE SERPL-MCNC: >20 NG/ML (ref 4.78–24.2)
VIT B12 BLD-MCNC: 454 PG/ML (ref 211–946)

## 2023-10-01 DIAGNOSIS — K21.9 GASTROESOPHAGEAL REFLUX DISEASE, UNSPECIFIED WHETHER ESOPHAGITIS PRESENT: ICD-10-CM

## 2023-10-02 RX ORDER — PANTOPRAZOLE SODIUM 40 MG/1
TABLET, DELAYED RELEASE ORAL
Qty: 180 TABLET | Refills: 2 | Status: SHIPPED | OUTPATIENT
Start: 2023-10-02

## 2023-10-02 NOTE — TELEPHONE ENCOUNTER
Hope called requesting a refill of the below medication which has been pended for you:     Requested Prescriptions     Pending Prescriptions Disp Refills    pantoprazole (PROTONIX) 40 MG tablet [Pharmacy Med Name: PANTOPRAZOLE SOD DR 40 MG TAB] 180 tablet 1     Sig: TAKE 1 TABLET BY MOUTH TWICE A DAY       Last Appointment Date: 9/6/2023  Next Appointment Date: 12/6/2023    Allergies   Allergen Reactions    Tape [Adhesive Rae Doran      can use paper tape

## 2023-10-05 ENCOUNTER — TELEPHONE (OUTPATIENT)
Dept: PULMONOLOGY | Facility: CLINIC | Age: 53
End: 2023-10-05

## 2023-10-05 NOTE — TELEPHONE ENCOUNTER
Hub staff attempted to follow warm transfer process and was unsuccessful     Caller: Stacy Lynn    Relationship to patient: Self    Best call back number: [phone] - OKAY TO LEAVE INFO ON VOICEMAIL    Patient is needing: PATIENT CALLED SPECIFICALLY REQUESTING TO SPEAK WITH A NURSE OF 'S. SHE STATED SHE IS HAVING EYE SURGERY AND HAS BEEN TOLD SHE NEEDS AN APPOINTMENT WIT DR.KELLY SINGH. FIRST AVAILABLE IS IN DECEMBER. EYE SURGERY IS SCHEDULED FOR NOVEMBER 7TH. PLEASE CALL TO ADVISE.

## 2023-10-05 NOTE — TELEPHONE ENCOUNTER
Left detailed message for patient to call Dr. Cuevas's office to see if they need her to see Dr. Scherer before the surgery.  We have faxed the pulmonary assessment form back to them.

## 2023-10-12 ENCOUNTER — TELEPHONE (OUTPATIENT)
Dept: PRIMARY CARE CLINIC | Age: 53
End: 2023-10-12

## 2023-10-12 NOTE — TELEPHONE ENCOUNTER
Pt left message on VM. Tried to call back NA. LM to call back. Told Chris will be back in office tomorrow. Unsure what she needed msg only said to call back to discuss issues.

## 2023-10-17 ENCOUNTER — TELEPHONE (OUTPATIENT)
Dept: PRIMARY CARE CLINIC | Age: 53
End: 2023-10-17

## 2023-10-17 DIAGNOSIS — M51.36 DEGENERATIVE DISC DISEASE, LUMBAR: Primary | ICD-10-CM

## 2023-10-17 RX ORDER — TIZANIDINE 4 MG/1
4 TABLET ORAL 3 TIMES DAILY PRN
Qty: 90 TABLET | Refills: 0 | Status: SHIPPED | OUTPATIENT
Start: 2023-10-17

## 2023-10-17 NOTE — TELEPHONE ENCOUNTER
Patient called and is wanting to know if she can get a refill from you on her Lyrica 100 mg 3 times a day until she can get in with pain management mid November. She is also requesting a one time refill of her Tizanidine to be sent to [facility] [place]. Please advise?

## 2023-10-24 ENCOUNTER — TELEPHONE (OUTPATIENT)
Dept: PRIMARY CARE CLINIC | Age: 53
End: 2023-10-24

## 2023-10-24 NOTE — TELEPHONE ENCOUNTER
I had advised that she would need a UDS for me to refill the lyrica. That is why it was not sent in. Tizanidine was sent in at her request.  I can send in a brief rx for hydrocodone just until she sees pain mgt. I am unable to prescribe oxycodone. UDS must be obtained before hydrocodone or lyrica could be sent. I will prescribe just until she sees pain mgt. Please see when appt is scheduled.

## 2023-10-24 NOTE — TELEPHONE ENCOUNTER
Patient called and is wanting something for pain until she has her appointment with Pain Management on 11/8/23. She had requested Lyrica and was sent in Tizanidine.

## 2023-11-01 ENCOUNTER — HOSPITAL ENCOUNTER (OUTPATIENT)
Dept: GENERAL RADIOLOGY | Facility: HOSPITAL | Age: 53
Discharge: HOME | End: 2023-11-01
Admitting: PAIN MEDICINE
Payer: MEDICARE

## 2023-11-01 ENCOUNTER — TRANSCRIBE ORDERS (OUTPATIENT)
Dept: ADMINISTRATIVE | Facility: HOSPITAL | Age: 53
End: 2023-11-01
Payer: MEDICARE

## 2023-11-01 DIAGNOSIS — M47.816 LUMBAR SPONDYLOSIS: Primary | ICD-10-CM

## 2023-11-01 DIAGNOSIS — M47.816 LUMBAR SPONDYLOSIS: ICD-10-CM

## 2023-11-01 PROCEDURE — 72110 X-RAY EXAM L-2 SPINE 4/>VWS: CPT

## 2023-12-15 ENCOUNTER — OFFICE VISIT (OUTPATIENT)
Dept: PRIMARY CARE CLINIC | Age: 53
End: 2023-12-15
Payer: MEDICARE

## 2023-12-15 VITALS
DIASTOLIC BLOOD PRESSURE: 74 MMHG | TEMPERATURE: 98 F | HEART RATE: 89 BPM | SYSTOLIC BLOOD PRESSURE: 132 MMHG | HEIGHT: 62 IN | WEIGHT: 293 LBS | OXYGEN SATURATION: 99 % | BODY MASS INDEX: 53.92 KG/M2

## 2023-12-15 DIAGNOSIS — R13.10 DYSPHAGIA, UNSPECIFIED TYPE: ICD-10-CM

## 2023-12-15 DIAGNOSIS — M51.36 DEGENERATIVE DISC DISEASE, LUMBAR: ICD-10-CM

## 2023-12-15 DIAGNOSIS — D86.9 SARCOIDOSIS: ICD-10-CM

## 2023-12-15 DIAGNOSIS — E03.9 ACQUIRED HYPOTHYROIDISM: ICD-10-CM

## 2023-12-15 DIAGNOSIS — M79.10 MYALGIA: ICD-10-CM

## 2023-12-15 DIAGNOSIS — M25.50 POLYARTHRALGIA: ICD-10-CM

## 2023-12-15 DIAGNOSIS — I10 ESSENTIAL HYPERTENSION: ICD-10-CM

## 2023-12-15 DIAGNOSIS — E55.9 VITAMIN D DEFICIENCY: ICD-10-CM

## 2023-12-15 DIAGNOSIS — D69.6 THROMBOCYTOPENIA, UNSPECIFIED (HCC): ICD-10-CM

## 2023-12-15 DIAGNOSIS — F33.0 MILD EPISODE OF RECURRENT MAJOR DEPRESSIVE DISORDER (HCC): ICD-10-CM

## 2023-12-15 DIAGNOSIS — R10.10 PAIN OF UPPER ABDOMEN: ICD-10-CM

## 2023-12-15 DIAGNOSIS — N18.32 STAGE 3B CHRONIC KIDNEY DISEASE (HCC): ICD-10-CM

## 2023-12-15 DIAGNOSIS — Z51.81 THERAPEUTIC DRUG MONITORING: ICD-10-CM

## 2023-12-15 DIAGNOSIS — K21.9 GASTROESOPHAGEAL REFLUX DISEASE, UNSPECIFIED WHETHER ESOPHAGITIS PRESENT: ICD-10-CM

## 2023-12-15 PROCEDURE — 99214 OFFICE O/P EST MOD 30 MIN: CPT | Performed by: NURSE PRACTITIONER

## 2023-12-15 PROCEDURE — 3078F DIAST BP <80 MM HG: CPT | Performed by: NURSE PRACTITIONER

## 2023-12-15 PROCEDURE — 3075F SYST BP GE 130 - 139MM HG: CPT | Performed by: NURSE PRACTITIONER

## 2023-12-15 RX ORDER — DESVENLAFAXINE 100 MG/1
100 TABLET, EXTENDED RELEASE ORAL DAILY
Qty: 30 TABLET | Refills: 5 | Status: SHIPPED | OUTPATIENT
Start: 2023-12-15

## 2023-12-15 RX ORDER — TIZANIDINE 4 MG/1
4 TABLET ORAL 3 TIMES DAILY PRN
Qty: 90 TABLET | Refills: 2 | Status: SHIPPED | OUTPATIENT
Start: 2023-12-15

## 2023-12-15 RX ORDER — BUPRENORPHINE HYDROCHLORIDE 450 UG/1
FILM, SOLUBLE BUCCAL
COMMUNITY
Start: 2023-11-29

## 2023-12-15 NOTE — PROGRESS NOTES
[facility] [employer] updated today. No refill for Lyrica is needed today. - tiZANidine (ZANAFLEX) 4 MG tablet; Take 1 tablet by mouth 3 times daily as needed (muscle spasms)  Dispense: 90 tablet; Refill: 2    6. Polyarthralgia  -As above    7. Myalgia  -As above    8. Dysphagia, unspecified type  -Refer to GI for evaluation. Likely needs EGD  -Continue Protonix 40 mg bid  - SARAH Ng, Gastroenterology, [place]    9. Pain of upper abdomen  -As above  - SARAH Bloom, Gastroenterology, [place]    10. Gastroesophageal reflux disease, unspecified whether esophagitis present  -As above    11. Vitamin D deficiency  -Stable, continue weekly ergocalciferol  -She will get lab in 3 months as previously ordered    15. Therapeutic drug monitoring    - POCT Rapid Drug Screen    13. Acquired hypothyroidism  -Stable, continue levothyroxine 150 mcg daily  -She will get thyroid labs in 3 months prior to appointment as previously ordered    14. Thrombocytopenia  -Pancytopenia, stable. Continue management per [facility] hematology         Return in about 3 months (around 3/15/2024) for office visit extended, hypertension, depression, thyroid. Hope was seen today for follow-up. Diagnoses and all orders for this visit:    Mild episode of recurrent major depressive disorder (HCC)  -     desvenlafaxine succinate (PRISTIQ) 100 MG TB24 extended release tablet; Take 1 tablet by mouth daily    Essential hypertension    Sarcoidosis    Stage 3b chronic kidney disease (HCC)    Degenerative disc disease, lumbar  -     tiZANidine (ZANAFLEX) 4 MG tablet;  Take 1 tablet by mouth 3 times daily as needed (muscle spasms)    Polyarthralgia    Myalgia    Dysphagia, unspecified type  -     SARAH Ng, Gastroenterology, [place]    Pain of upper abdomen  -     SARAH Bloom, Gastroenterology [facility]    Gastroesophageal reflux disease, unspecified whether esophagitis present    Vitamin D

## 2023-12-22 ENCOUNTER — LAB (OUTPATIENT)
Dept: LAB | Facility: HOSPITAL | Age: 53
End: 2023-12-22
Payer: MEDICARE

## 2023-12-22 DIAGNOSIS — D50.9 IRON DEFICIENCY ANEMIA, UNSPECIFIED IRON DEFICIENCY ANEMIA TYPE: ICD-10-CM

## 2023-12-22 DIAGNOSIS — D69.6 THROMBOCYTOPENIA: ICD-10-CM

## 2023-12-22 LAB
BASOPHILS # BLD AUTO: 0.02 10*3/MM3 (ref 0–0.2)
BASOPHILS NFR BLD AUTO: 0.8 % (ref 0–1.5)
DEPRECATED RDW RBC AUTO: 48.4 FL (ref 37–54)
EOSINOPHIL # BLD AUTO: 0.15 10*3/MM3 (ref 0–0.4)
EOSINOPHIL NFR BLD AUTO: 5.9 % (ref 0.3–6.2)
ERYTHROCYTE [DISTWIDTH] IN BLOOD BY AUTOMATED COUNT: 13.2 % (ref 12.3–15.4)
FERRITIN SERPL-MCNC: 274.2 NG/ML (ref 13–150)
FOLATE SERPL-MCNC: >20 NG/ML (ref 4.78–24.2)
HCT VFR BLD AUTO: 31.9 % (ref 34–46.6)
HGB BLD-MCNC: 9.7 G/DL (ref 12–15.9)
IMM GRANULOCYTES # BLD AUTO: 0.01 10*3/MM3 (ref 0–0.05)
IMM GRANULOCYTES NFR BLD AUTO: 0.4 % (ref 0–0.5)
IRON 24H UR-MRATE: 63 MCG/DL (ref 37–145)
IRON SATN MFR SERPL: 22 % (ref 20–50)
LYMPHOCYTES # BLD AUTO: 0.53 10*3/MM3 (ref 0.7–3.1)
LYMPHOCYTES NFR BLD AUTO: 20.8 % (ref 19.6–45.3)
MCH RBC QN AUTO: 30.3 PG (ref 26.6–33)
MCHC RBC AUTO-ENTMCNC: 30.4 G/DL (ref 31.5–35.7)
MCV RBC AUTO: 99.7 FL (ref 79–97)
MONOCYTES # BLD AUTO: 0.34 10*3/MM3 (ref 0.1–0.9)
MONOCYTES NFR BLD AUTO: 13.3 % (ref 5–12)
NEUTROPHILS NFR BLD AUTO: 1.5 10*3/MM3 (ref 1.7–7)
NEUTROPHILS NFR BLD AUTO: 58.8 % (ref 42.7–76)
NRBC BLD AUTO-RTO: 0 /100 WBC (ref 0–0.2)
PLATELET # BLD AUTO: 115 10*3/MM3 (ref 140–450)
PMV BLD AUTO: 9.9 FL (ref 6–12)
RBC # BLD AUTO: 3.2 10*6/MM3 (ref 3.77–5.28)
TIBC SERPL-MCNC: 282 MCG/DL (ref 298–536)
TRANSFERRIN SERPL-MCNC: 189 MG/DL (ref 200–360)
VIT B12 BLD-MCNC: 548 PG/ML (ref 211–946)
WBC NRBC COR # BLD AUTO: 2.55 10*3/MM3 (ref 3.4–10.8)

## 2023-12-22 PROCEDURE — 36415 COLL VENOUS BLD VENIPUNCTURE: CPT

## 2023-12-22 PROCEDURE — 85025 COMPLETE CBC W/AUTO DIFF WBC: CPT

## 2023-12-22 PROCEDURE — 83540 ASSAY OF IRON: CPT

## 2023-12-22 PROCEDURE — 84466 ASSAY OF TRANSFERRIN: CPT

## 2023-12-22 PROCEDURE — 82746 ASSAY OF FOLIC ACID SERUM: CPT

## 2023-12-22 PROCEDURE — 82607 VITAMIN B-12: CPT

## 2023-12-22 PROCEDURE — 82728 ASSAY OF FERRITIN: CPT

## 2024-01-04 ENCOUNTER — CLINICAL DOCUMENTATION (OUTPATIENT)
Dept: NEUROLOGY | Age: 54
End: 2024-01-04

## 2024-01-04 NOTE — PROGRESS NOTES
Outcome   Approvedtoday  PA Case: [id number], Status: Approved, Coverage Starts on: 1/1/2024 12:00:00 AM, Coverage Ends on: 1/3/2025 12:00:00 AM.  Drug    Ubrelvy 100MG tablets   Form    [place] (2017 [place])

## 2024-01-23 ENCOUNTER — TELEPHONE (OUTPATIENT)
Dept: PRIMARY CARE CLINIC | Age: 54
End: 2024-01-23

## 2024-01-23 DIAGNOSIS — G89.29 CHRONIC BILATERAL LOW BACK PAIN WITHOUT SCIATICA: ICD-10-CM

## 2024-01-23 DIAGNOSIS — M54.50 CHRONIC BILATERAL LOW BACK PAIN WITHOUT SCIATICA: ICD-10-CM

## 2024-01-23 DIAGNOSIS — M51.36 DEGENERATIVE DISC DISEASE, LUMBAR: Primary | ICD-10-CM

## 2024-01-23 RX ORDER — PREGABALIN 100 MG/1
100 CAPSULE ORAL 3 TIMES DAILY
Qty: 90 CAPSULE | Refills: 2 | Status: SHIPPED | OUTPATIENT
Start: 2024-01-23 | End: 2024-04-22

## 2024-01-23 NOTE — TELEPHONE ENCOUNTER
Pt states you ok'd taking over her lyrica. She already has a UDS in the system. Ok to send to her pharmacy? [facility] [place].

## 2024-02-20 ENCOUNTER — HOSPITAL ENCOUNTER (OUTPATIENT)
Dept: MRI IMAGING | Age: 54
Discharge: HOME | End: 2024-02-20
Payer: MEDICARE

## 2024-02-20 DIAGNOSIS — M25.551 RIGHT HIP PAIN: ICD-10-CM

## 2024-02-20 PROCEDURE — 73721 MRI JNT OF LWR EXTRE W/O DYE: CPT

## 2024-02-23 ENCOUNTER — LAB (OUTPATIENT)
Dept: LAB | Facility: HOSPITAL | Age: 54
End: 2024-02-23
Payer: MEDICARE

## 2024-02-23 DIAGNOSIS — D69.6 THROMBOCYTOPENIA: ICD-10-CM

## 2024-02-23 DIAGNOSIS — D50.9 IRON DEFICIENCY ANEMIA, UNSPECIFIED IRON DEFICIENCY ANEMIA TYPE: ICD-10-CM

## 2024-02-23 LAB
BASOPHILS # BLD AUTO: 0.02 10*3/MM3 (ref 0–0.2)
BASOPHILS NFR BLD AUTO: 0.8 % (ref 0–1.5)
DEPRECATED RDW RBC AUTO: 46 FL (ref 37–54)
EOSINOPHIL # BLD AUTO: 0.13 10*3/MM3 (ref 0–0.4)
EOSINOPHIL NFR BLD AUTO: 5.2 % (ref 0.3–6.2)
ERYTHROCYTE [DISTWIDTH] IN BLOOD BY AUTOMATED COUNT: 13 % (ref 12.3–15.4)
FERRITIN SERPL-MCNC: 328.4 NG/ML (ref 13–150)
HCT VFR BLD AUTO: 33.6 % (ref 34–46.6)
HGB BLD-MCNC: 10.6 G/DL (ref 12–15.9)
IMM GRANULOCYTES # BLD AUTO: 0.01 10*3/MM3 (ref 0–0.05)
IMM GRANULOCYTES NFR BLD AUTO: 0.4 % (ref 0–0.5)
IRON 24H UR-MRATE: 69 MCG/DL (ref 37–145)
IRON SATN MFR SERPL: 26 % (ref 20–50)
LYMPHOCYTES # BLD AUTO: 0.75 10*3/MM3 (ref 0.7–3.1)
LYMPHOCYTES NFR BLD AUTO: 30.1 % (ref 19.6–45.3)
MCH RBC QN AUTO: 30.5 PG (ref 26.6–33)
MCHC RBC AUTO-ENTMCNC: 31.5 G/DL (ref 31.5–35.7)
MCV RBC AUTO: 96.6 FL (ref 79–97)
MONOCYTES # BLD AUTO: 0.27 10*3/MM3 (ref 0.1–0.9)
MONOCYTES NFR BLD AUTO: 10.8 % (ref 5–12)
NEUTROPHILS NFR BLD AUTO: 1.31 10*3/MM3 (ref 1.7–7)
NEUTROPHILS NFR BLD AUTO: 52.7 % (ref 42.7–76)
NRBC BLD AUTO-RTO: 0 /100 WBC (ref 0–0.2)
PLATELET # BLD AUTO: 119 10*3/MM3 (ref 140–450)
PMV BLD AUTO: 10.5 FL (ref 6–12)
RBC # BLD AUTO: 3.48 10*6/MM3 (ref 3.77–5.28)
TIBC SERPL-MCNC: 262 MCG/DL (ref 298–536)
TRANSFERRIN SERPL-MCNC: 176 MG/DL (ref 200–360)
WBC NRBC COR # BLD AUTO: 2.49 10*3/MM3 (ref 3.4–10.8)

## 2024-02-23 PROCEDURE — 85025 COMPLETE CBC W/AUTO DIFF WBC: CPT

## 2024-02-23 PROCEDURE — 82607 VITAMIN B-12: CPT

## 2024-02-23 PROCEDURE — 82728 ASSAY OF FERRITIN: CPT

## 2024-02-23 PROCEDURE — 36415 COLL VENOUS BLD VENIPUNCTURE: CPT

## 2024-02-23 PROCEDURE — 84466 ASSAY OF TRANSFERRIN: CPT

## 2024-02-23 PROCEDURE — 82746 ASSAY OF FOLIC ACID SERUM: CPT

## 2024-02-23 PROCEDURE — 83540 ASSAY OF IRON: CPT

## 2024-02-24 LAB
FOLATE SERPL-MCNC: 13.5 NG/ML (ref 4.78–24.2)
VIT B12 BLD-MCNC: 441 PG/ML (ref 211–946)

## 2024-03-04 ENCOUNTER — OFFICE VISIT (OUTPATIENT)
Dept: ONCOLOGY | Facility: CLINIC | Age: 54
End: 2024-03-04
Payer: MEDICARE

## 2024-03-04 VITALS
HEIGHT: 64 IN | BODY MASS INDEX: 48.86 KG/M2 | TEMPERATURE: 96.7 F | SYSTOLIC BLOOD PRESSURE: 134 MMHG | RESPIRATION RATE: 18 BRPM | HEART RATE: 94 BPM | WEIGHT: 286.2 LBS | DIASTOLIC BLOOD PRESSURE: 68 MMHG | OXYGEN SATURATION: 99 %

## 2024-03-04 DIAGNOSIS — D50.9 IRON DEFICIENCY ANEMIA, UNSPECIFIED IRON DEFICIENCY ANEMIA TYPE: Primary | ICD-10-CM

## 2024-03-04 DIAGNOSIS — D69.6 THROMBOCYTOPENIA: ICD-10-CM

## 2024-03-04 DIAGNOSIS — E53.8 DEFICIENCY OF OTHER SPECIFIED B GROUP VITAMINS: ICD-10-CM

## 2024-03-04 PROCEDURE — 99214 OFFICE O/P EST MOD 30 MIN: CPT | Performed by: INTERNAL MEDICINE

## 2024-03-04 PROCEDURE — 3075F SYST BP GE 130 - 139MM HG: CPT | Performed by: INTERNAL MEDICINE

## 2024-03-04 PROCEDURE — 1159F MED LIST DOCD IN RCRD: CPT | Performed by: INTERNAL MEDICINE

## 2024-03-04 PROCEDURE — 1125F AMNT PAIN NOTED PAIN PRSNT: CPT | Performed by: INTERNAL MEDICINE

## 2024-03-04 PROCEDURE — 3078F DIAST BP <80 MM HG: CPT | Performed by: INTERNAL MEDICINE

## 2024-03-04 RX ORDER — CYANOCOBALAMIN/FOLIC AC/VIT B6 1-2.5-25MG
1 TABLET ORAL DAILY
Qty: 30 TABLET | Refills: 0 | Status: SHIPPED | OUTPATIENT
Start: 2024-03-04

## 2024-03-04 NOTE — PROGRESS NOTES
[facility] GROUP HEMATOLOGY AND ONCOLOGY  [address]  [facility] [address] [phone]    Patient Name: Stacy Lynn  Encounter Date: 03/04/2024  YOB: 1970  Patient Number: [id number]     REASON FOR VISIT: Ms. Stacy Lynn is a 53-year-old female who returns in followup of anemia from chronic kidney disease, with leukopenia and intermittent thrombocytopenia without unifying diagnosis (prior use of methotrexate). She is seen 60 months since last receipt of IV Injectafer and last receipt of 2 units PRBC on 9/1/17. She is here alone. The history is again muddled by liberal symptom reporting.     I have reviewed the HPI and verified with the patient the accuracy of it. No changes to interval history since the information was documented. Fermín Boggs MD 03/04/24         DIAGNOSTIC ABNORMALITIES:   Labs, 09/16/2014 Dr. Waters's office. TIBC 303 (225 - 420), serum iron 43 (42 - 180), and iron saturation 14.2%.   Labs, 10/03/2014. Hemoglobin 9.8, hematocrit 30.2, MCV 94.1, platelets 157,000, WBC 2.9, with 52.3 segs (ANC 1.5), 36.8 lymphocytes, 10.9 mid cells. Glucose 80, BUN 16, creatinine 0.9 (GFR 71.6), sodium 142, potassium 3.9, chloride 109, bicarbonate 19, calcium 8.6, albumin 3.5, total protein 6.3, bilirubin 0.4, alkaline phosphatase 67, , AST 12, ALT 20, phosphorus 4.6 (each normal). Serum iron 49, iron saturation 15.8%, ferritin 32, , folate 13.6. HIV antibodies screen nonreactive. ELENA negative. T4 of 7.1, TSH 0.6, sed rate 8 (each normal). Rheumatoid factor less than 10. ANCA less than 1:20.  Colonoscopy, 10/13/2014: - One 7 mm polyp in the ascending colon resected and retrieved. The examination was otherwise normal on direct and retroflexion views. Recommendation: - Repeat colonoscopy in 5 years for surveillance.  Comprehensive Report, 10/15/2014: FINAL DIAGNOSIS. Peripheral blood: Leukopenia/neutropenia;  normocytic anemia. Comprehensive Comments: The cause of the cytopenias is not apparent from review of this peripheral blood smear. Differential diagnosis includes chronic disease, autoimmunity, drug effects, infection, etc. Although there are no morphologic features of myeloid dysplasia, please be aware that peripheral blood findings are not always representative of underlying bone marrow abnormalities. FLOW IMPRESSION: Peripheral blood: No increase in myeloid or lymphoid blasts identified. Findings consistent with mild granulocytic left shift. No immunophenotypically abnormal B- or T-cell population identified.  EGD performed on 10/16/2014 at [facility]. IMPRESSION: Normal esophagus. Normal stomach. Normal first part of the duodenum and 2nd part of the duodenum Biopsied . Pathology: Small bowel biopsy. Benign small intestinal type mucosa with no significant histopathologic changes.  Labs, 05/29/2015. IgG 827, IgA 168, IgM 140. Immunofixation showed no monoclonal immunoglobulins detected. Free kappa light chain 1.26, free lambda light chain 1.17, kappa/lambda light chains 1.08 (each normal).  UPIEP, 06/09/2015. 24-hour urine 82.8 mg. Immunofixation not reported.  [facility], CT-guided bone marrow, 06/29/2015. Impression: Technically successful CT-guided bone marrow aspirate without immediate complications. No core bone marrow sample could be obtained. PATHOLOGY: Surgical pathology performed on 06/29/2015 at [facility] was revealing for post iliac crest bone marrow biopsy and aspirate: Normocellular bone marrow of 40%. No increase in blasts. Iron staining is 1+. Flow cytometry shows no evidence of an abnormal myeloid maturation, increase in blasts, or lymphoproliferative disorder. Complete blood count and peripheral blood smear review: Normochromic normocytic anemia. Leukopenia. No circulating blasts.  Peripheral blood comprehensive report, 06/19/2017: PANCYTOPENIA. COMPREHENSIVE DIAGNOSIS..  Review of peripheral blood smear: Leukopenia with relative reactive appearing eosinophilia. Normochromic, normocytic anemia. Mild thrombocytopenia. See comment. COMPREHENSIVE COMMENT. When compared to a peripheral blood specimen reviewed on this patient in 10/2014 ([phone] [id number]), the white blood cell count has decreased from 2.7 K/mL to 1.8 currently. The hemoglobin level has also decreased from 9.9 g/dL to 8.7 g/dL. The platelet counts are similar. As stated in the prior specimen, the cause of the cytopenias is not apparent from the peripheral blood smear examination. Etiologies to consider include chronic diseases particularly autoimmune disease, infections and drugs/toxins. Myelodysplasia is in the differential diagnosis but that diagnosis can be difficult to establish on a peripheral blood specimen. Correlation recommended. Flow cytometry study after erythrolysis reveals no circulating myeloid or lymphoid blasts. Myeloid and monocytic lineages are represented by mature granulocytes and monocytes. Phenotypically unremarkable eosinophils are mildly elevated at 10.5% of the total WBC. Basophils are not increased. It must be noted that the diagnosis of a myeloid stem cell disorder, if clinically suspected, is best made using bone marrow specimens. Peripheral blood is not always representative of abnormalities involving the bone marrow. The B cells show no evidence of clonality or antigenic aberrancy. The T cells show normal expression of the pan T-cell antigens. The NK cells account for 23.7% of the total lymphocytes.  [facility]: Endoscopy: 08/23/2017. Impressions: - Normal esophagus. - Non-erosive gastritis. Biopsied. - Normal first part of the duodenum and 2nd part of the duodenum. Biopsied.   [facility]: Colonoscopy: 08/23/2017. Impressions: - The entire examined colon is normal. Biopsied.     PREVIOUS INTERVENTIONS:   IV Venofer, 11/10/2014 - 11/20/2014 (1000 mg)  INFeD 500 mg on 07/19/2016 and  05/01/2017  2 units PRBC transfusions, 09/01/2017  Injectafer 750 mg IV, 04/23/2019        Problem List Items Addressed This Visit          Other    Thrombocytopenia    Iron deficiency anemia - Primary     Oncology/Hematology History    No history exists.       PAST MEDICAL HISTORY:  ALLERGIES:  Allergies   Allergen Reactions    Adhesive Tape Hives    Tape Hives     CURRENT MEDICATIONS:  Outpatient Encounter Medications as of 3/4/2024   Medication Sig Dispense Refill    docusate sodium (COLACE) 100 MG capsule Take 1 capsule by mouth.      esomeprazole (nexIUM) 40 MG capsule TAKE 1 CAPSULE BY MOUTH TWICE DAILY BEFORE  MEALS 60 capsule 0    Fluticasone-Umeclidin-Vilant (Trelegy Ellipta) 200-62.5-25 MCG/ACT aerosol powder  Inhale 1 puff Daily. 60 each 5    Folbee 2.5-25-1 MG tablet tablet TAKE 1 TABLET BY MOUTH EVERY DAY 30 tablet 0    levothyroxine (SYNTHROID, LEVOTHROID) 150 MCG tablet       LYRICA 100 MG capsule Take 1 capsule by mouth 3 (Three) Times a Day.  1    O2 (OXYGEN) Inhale 2 L/min 1 (One) Time. Continuous      ondansetron (ZOFRAN) 4 MG tablet Take 1 tablet by mouth Every 8 (Eight) Hours As Needed for Nausea or Vomiting.      oxyCODONE-acetaminophen (PERCOCET) 7.5-325 MG per tablet Take 1 tablet by mouth Every 8 (Eight) Hours As Needed.      polyethylene glycol (MIRALAX) powder Take 17 g by mouth.      PROCRIT 12992 UNIT/ML injection Inject 1 Units under the skin As Needed (WHEN BLOOD COUNT [hemoglobin] BELOW 11).      Rexulti 0.5 MG tablet       tiZANidine (ZANAFLEX) 4 MG tablet Take 1 tablet by mouth At Night As Needed for Muscle Spasms.      topiramate (TOPAMAX) 100 MG tablet Take 1 tablet by mouth Every Night.      vitamin D (ERGOCALCIFEROL) 1.25 MG (36052 UT) capsule capsule Take 1 capsule by mouth 1 (One) Time Per Week.       No facility-administered encounter medications on file as of 3/4/2024.     ADULT ILLNESSES:   Iron deficiency anemia ( ICD-10:D50.9 ;Iron deficiency anemia, unspecified   Anemia  ( ICD-10:D64.9 ;Anemia, unspecified   Anemia in chronic kidney disease ( ICD-10:D63.1 ;Anemia in chronic kidney disease   Chronic kidney disease ( ICD-10:N18.3 ;Chronic kidney disease, stage 3 (moderate)   Chronic pancreatitis ( ICD-10:K86.1 ;Other chronic pancreatitis   Depression ( ICD-10:F32.9 ;Major depressive disorder, single episode, unspecified   Fatigue ( ICD-10:R53.82 ;Chronic fatigue, unspecified   Fibromyalgia syndrome ( ICD-10:M79.7 ;Fibromyalgia   Gastroesophageal reflux disease ( ICD-10:K21.9 ;Gastro-esophageal reflux disease without esophagitis   Hypertension ( ICD-10:I10 ;Essential (primary) hypertension   Hypothyroidism ( ICD-10:E03.9 ;Hypothyroidism, unspecified   Irritable bowel syndrome ( ICD-10:K58.9 ;Irritable bowel syndrome without diarrhea   Kidney stone ( ICD-10:N20.0 ;Calculus of kidney   Knee pain ( meniscal disorder,Dr. Dasilva; ICD-10:M25.569 ;Pain in unspecified knee )   Leukopenia ( ICD-10:D72.819 ;Decreased white blood cell count, unspecified   Macular degeneration ( ICD-10:H35.30 ;Unspecified macular degeneration   Migraines ( ICD-10:G43.909 ;Migraine, unspecified, not intractable, without status migrainosus   Obesity ( ICD-10:E66.9 ;Obesity, unspecified   Peripheral neuropathy ( ICD-10:G62.9 ;Polyneuropathy, unspecified   Sarcoidosis ( ICD-10:D86.0 ;Sarcoidosis of lung   Scoliosis ( ICD-10:M41.9 ;Scoliosis, unspecified   Urge incontinence of urine ( ICD-10:N39.41 ;Urge incontinence   Vitamin B12 deficiency ( ICD-10:E53.8 ;Deficiency of other specified B group vitamins    SURGERIES:   Total knee replacement, right, 11/06/2015, Dr. Dasilva   Colonoscopy, 2009   Shoulder repair, epicondyle, right, 2002   Shoulder repair, arthroscopy, 2001   Ulnar transposition; ulnar neuropathy, 2002,1999   Kidney stone, 2001   Hysterectomy, total, 2004   Colonoscopy, 09/06/2018. Impression: The examination was otherwise normal on direct and retroflexion views. No specimens collected. Repeat 5  years   Colonoscopy, 08/23/2017. [facility]. Impression> The entire examined colon is normal. Biopsied   Decompression of median nerve, (carpal tunnel release), 05/08/2018, Dr. Pathak   Operative procedure on knee, replacement, 01/2014, Dr. Dasilva   Endoscopy, 08/23/2017, [facility]. Impression: Normal esophagus. Non-erosive gastritis. Biopsied. Normal 1st part of the duodenum and 2nd part of the duodenum. Biopsied   Radiofrequency ablation (RFA) left leg on 01/10/2018 by Dr. Montgomery. Left lower extremity venous ultrasound, 01/19/2018. No deep venous thrombosis (DVT) left lower extremity. Was started on Eliquis. Lymphedema pumps and compression stockings prescribed. Followup 1 month   Revision surgery was done last 05/04/2017. Dr. Pathak   Cholecystectomy, remote  Right rotator cuff and bicep tendon repair last 03/24/2022  Left total knee replacement, 10/9/2022.  Dr. Pathak      ADULT ILLNESSES:  Patient Active Problem List   Diagnosis Code    Palpitations R00.2    Thrombocytopenia D69.6    Sarcoidosis D86.9    Essential hypertension I10    Hypothyroidism E03.9    Dyspnea on exertion R06.09    Morbidly obese E66.01    PFO (patent foramen ovale) Q21.12    Iron deficiency anemia D50.9    Generalized weakness R53.1    Volume depletion, gastrointestinal loss E86.9    Viral enterocolitis A08.4    Colon polyps K63.5    Epigastric pain R10.13    Bloating R14.0    Early satiety R68.81    Weight loss R63.4    Abdominal cramping R10.9    Nausea R11.0    NSAID long-term use Z79.1    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, T45.1X5A    Anemia in chronic kidney disease (CKD) N18.9, D63.1    Varicose veins of both lower extremities with pain I83.813    Lymphedema I89.0    Venous (peripheral) insufficiency I87.2    Neuropathy due to medical condition G63    Venous insufficiency I87.2    Chest pain R07.9    Bloody stool K92.1    Constipation K59.00    Anal or rectal pain K62.89    Rectal  bleeding K62.5    Hx of colonic polyps Z86.010    Other hemorrhoids K64.8    Chronic respiratory failure with hypoxia J96.11    Diarrhea R19.7    Irritable bowel syndrome with constipation K58.1    Overweight E66.3    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 Diallo     CHOLECYSTECTOMY      COLONOSCOPY  10/13/2014    COLONOSCOPY N/A 8/23/2017    Procedure: COLONOSCOPY WITH ANESTHESIA;  Surgeon: Jeanette Mclaughlin MD;  Location:  [facility];  Service:     COLONOSCOPY N/A 9/6/2018    Procedure: COLONOSCOPY WITH ANESTHESIA;  Surgeon: Jeanette Mclaughlin MD;  Location: [facility] ENDOSCOPY;  Service: Gastroenterology    ENDOSCOPY N/A 8/23/2017    Procedure: ESOPHAGOGASTRODUODENOSCOPY WITH ANESTHESIA;  Surgeon: Jeanette Mclaughlin MD;  Location: [facility] ENDOSCOPY;  Service:     ENDOVENOUS ABLATION SAPHENOUS VEIN W/ LASER Right 03/30/2018    HYSTERECTOMY      JOINT REPLACEMENT Right     PATELLA REPLACED    KIDNEY STONE SURGERY      KNEE ARTHROSCOPY      KNEE SURGERY Right     LATERAL EPICONDYLE RELEASE      PATELLA SURGERY      REPLACEMENT TOTAL KNEE      SHOULDER ARTHROSCOPY      ULNAR NERVE DECOMPRESSION      VARICOSE VEIN SURGERY Left 1/10/2018    Procedure: LEFT SAPHENOUS VEIN RADIO FREQUENCY ABLATION;  Surgeon: Kvng Montgomery DO;  Location: [facility] OR;  Service:     VARICOSE VEIN SURGERY Right 3/30/2018    Procedure: RIGHT LOWER EXTREMITY VENAGRAM, RIGHT LOWER EXTREMITY SAPHENOUS VEIN RADIO FREQUENCY ABLATION;  Surgeon: Kvng Montgomery DO;  Location:  [facility] OR;  Service: Vascular     HEALTH MAINTENANCE ITEMS:  Health Maintenance Due   Topic Date Due    TDAP/TD VACCINES (1 - Tdap) Never done    HEPATITIS C SCREENING  Never done    PAP SMEAR  Never done    ZOSTER VACCINE (1 of 2) Never done    BMI FOLLOWUP   10/01/2021    INFLUENZA VACCINE  08/01/2023    COVID-19 Vaccine (1 - 2023-24 season) Never done       <no information>  Last Completed Colonoscopy            COLORECTAL CANCER SCREENING (COLONOSCOPY - Every 5 Years) Next due on 3/15/2024      03/15/2019  Outside Procedure: MO SIGMOIDOSCOPY,BIOPSY    03/15/2019  Outside Claim: MO COLONOSCOPY W/BIOPSY SINGLE/MULTIPLE    09/06/2018  Surgical Procedure: COLONOSCOPY    09/06/2018  COLONOSCOPY    08/23/2017  Surgical Procedure: COLONOSCOPY    Only the first 5 history entries have been loaded, but more history exists.                  Immunization History   Administered Date(s) Administered    Pneumococcal Conjugate 13-Valent (PCV13) 11/09/2018, 04/01/2019    Pneumococcal Polysaccharide (PPSV23) 09/26/2017, 10/01/2018     Last Completed Mammogram            MAMMOGRAM (Every 2 Years) Next due on 9/15/2025      09/15/2023  MAMMO SCREENING BILATERAL W CAD    03/08/2019  Mammo Screening Digital Tomosynthesis Bilateral With CAD    12/04/2014  MAMMOGRAPHY SCREENING BILATERAL    11/04/2013  Outside Claim: INACTIVE -HC MAMMOGRAM SCREENING BILAT DIGITAL W CAD    11/04/2013  Outside Claim: CHG COMPUTER-AIDED DETECTION SCREENING MAMMOGRAPHY    Only the first 5 history entries have been loaded, but more history exists.                      FAMILY HISTORY:  Family History   Problem Relation Age of Onset    Arrhythmia Mother     Heart disease Mother     Kidney disease Mother     Hypertension Mother     Heart disease Father     Diabetes Father     Cancer Father     Heart disease Brother     Heart disease Maternal Aunt     Heart disease Maternal Uncle     Heart disease Paternal Aunt     Heart disease Paternal Uncle     Diabetes Paternal Uncle     Hypertension Paternal Uncle     Heart disease Maternal Grandmother     Heart disease Maternal Grandfather     Heart disease Paternal Grandmother     Cancer Paternal Grandmother     Heart disease Paternal Grandfather     Cancer Paternal Uncle      Hypertension Paternal Uncle     Colon cancer Neg Hx     Colon polyps Neg Hx      SOCIAL HISTORY:  Social History     Socioeconomic History    Marital status: Single   Tobacco Use    Smoking status: Never    Smokeless tobacco: Never   Vaping Use    Vaping status: Never Used   Substance and Sexual Activity    Alcohol use: Never    Drug use: Never    Sexual activity: Never       REVIEW OF SYSTEMS:  Constitutional:   The patient's appetite is good. Her energy is chronically low. She manages her personal ADLs. She lives with her sister and brother. She manages her ADLs including helping with light indoor chores but not any errands and does not drive. She has gained 1 lb (in addition to 8 lb at her prior visit  - had lost 20 lb at her 2 visits prior to that) since her last visit. She has no fevers or chills but has intermittent non-drenching night sweats. Her sleep habits seem appropriate.  Ear/Nose/Throat/Mouth:   She reports no ear pains, sinus symptoms, sore throat, nosebleeds, or sore tongue. She has migraine headaches. She denies any hoarseness, change in voice quality.   Ocular:   She reports no eye pain, significant change in visual acuity, double vision, or blurry vision.  Respiratory:   She reports baseline exertional dyspnea and is short of breath with her routine activities. She has chronic cough, sometimes with thick, white/yellow phlegm production but has no significant shortness of breathing at rest or unexplained chest wall pain. Says she is off prednisone since last 08/2016 by Dr. Scherer (for the sarcoid). Says the methotrexate has been stopped by Dr. Scherer. Has been on round the clock O2 since 04/2018.  Cardiovascular:   She reports no exertional chest pain, chest pressure, or chest heaviness. She reports no claudication. She reports no palpitations or symptomatic orthostasis.  Gastrointestinal:   She reports chronic nausea but no dysphagia, vomiting, or postprandial abdominal pain but admits to early  "satiety, increased bloating, and cramping. She has no change in bowel habits without dark discoloration of the stool (off oral iron). She reports intermittent rectal bleeding when she is constipated. \"Not lately.\" She has intermittent diarrhea from IBS. Is intolerant of oral iron (cramps, constipation). Last repeat colonoscopy, 09/06/2018 (above). Hemorrhoids?.  Genitourinary:   She reports no urinary burning, frequency, dribbling, or discoloration. She reports difficulty controlling her bladder and has trouble holding in her urine but has not needed protective pads. She has no need to urinate frequently through the night. Had JAYDA/BSO, 2003 for endometriosis  Musculoskeletal:   She had left TKR, 10/9/2022 and right rotator cuff and bicep tendon repair last 03/24/2022.  She has intermittent right hip and right knee discomfort in spite of total knee replacement (TKR). Says revision surgery was done last 05/04/2017 - Dr. Kwon. She has chronic arthralgias of the hips and lower back with distal radiculitis to the buttocks. She has myalgias of the calves and has nighttime leg cramping. She is seen by Pain Management. Says a nerve stimulator couldn't be placed due to her scoliosis. Is off morphine.  Is now on Percocet 7.5 every 8 hours as needed  Extremities:   She reports chronic trouble with fluid retention and significant leg swelling. Is no longer using compression leg pumps.  Endocrine:   She reports no problems with excess thirst, excessive urination, vasomotor instability.  Heme/Lymphatic:   She reports easy bruising but no unexplained bleeding, petechial rashes, or swollen glands.  Skin:   She reports chronic itching and rashes but no lesions which won't heal.  Neuro:   She reports postural dizziness but no loss of consciousness, seizures, or fainting spells. She reports no weakness of her face, arms, or legs. She has no difficulty with speech. She has no tremors. She has relief of paresthesias of the right hand " "since the carpal tunnel release on 05/08/2018 (Dr. Kwon).  Psych:   She admits to intermittent depression and anxiety. She reports occasional mood swings. She reports no history of suicide attempts.      VITAL SIGNS: /68   Pulse 94   Temp 96.7 °F (35.9 °C) (Temporal)   Resp 18   Ht 162.6 cm (64\")   Wt 130 kg (286 lb 3.2 oz)   SpO2 99%   BMI 49.13 kg/m² Body surface area is 2.28 meters squared.  Pain Score    03/04/24 1408   PainSc:   6   PainLoc: Hip         I have reexamined the patient and the results are consistent with the previously documented exam. Fermín Boggs MD      PHYSICAL EXAMINATION:   General:   She is a very-pleasant, morbidly obese and modestly-kept middle-aged female who is comfortable at rest. She arrived in the exam room ambulatory. She appears to be her stated age. Her skin color is still a bit pale.   Head/Neck:   The patient is anicteric and atraumatic. She is wearing a surgical mask today.  She is wearing O2 via cannula, tethered to a portable O2 tank. The trachea is midline. The neck is supple without evidence of jugular venous distention or cervical adenopathy.   Eyes:   The pupils are equal, round, and reactive to light. The extraocular movements are full. There is no scleral jaundice or erythema.   Chest:   The respiratory efforts are normal and unhindered. The breath sounds are diminished bilaterally with vague basilar rales. There are no wheezes, rhonchi, or asymmetry of breath sounds.  Cardiovascular:   The patient has a regular cardiac rate and rhythm  Abdomen:   The belly is soft and globose. Her habitus precludes an adequate exam but there is no rebound or guarding. There is no organomegaly, mass-effect, or tenderness. Bowel sounds are active and of normal character.  Extremities:   There is no evidence of cyanosis, clubbing, with 3+ (chronic) leg/ankle edema.    Rheumatologic:   There is no overt evidence of rheumatoid deformities of the hands. There is no " sausaging of the fingers. There is no sign of active synovitis. The gait is slow and less antalgic, again favoring the left knee.  She does not use a cane nor walker  Cutaneous:   There are no overt rashes, disseminated lesions, purpura, or petechiae.   Lymphatics:   There is no evidence of adenopathy in the cervical, supraclavicular, or axillary areas.   Neurologic:   The patient is alert, oriented, cooperative, and pleasant. She is appropriately conversant. She ambulated into the exam room without assistance but declined transfer from chair to exam table. There is no overt dysfunction of the motor, sensory, cerebellar systems.  Psych:   Mood and affect are appropriate for circumstance. Eye contact is appropriate. Normal judgement and decision making.        LABS    Lab Results - Last 18 Months   Lab Units 02/23/24  1352 12/22/23  1321 09/29/23  1356 08/24/23  1418 08/17/23  1433 07/14/23  1410 05/19/23  1234 04/28/23  1409 02/24/23  1436 10/04/22  1410 09/09/22  1358   HEMOGLOBIN g/dL 10.6* 9.7* 11.1* 10.2* 10.8* 10.9* 10.6* 10.3* 10.6*   < > 10.9*   HEMATOCRIT % 33.6* 31.9* 36.1 33.8* 35.2* 36.0 34.3* 34.0 33.8*   < > 34.6   MCV fL 96.6 99.7* 99.4* 99.4* 100.0* 97.6* 100.6* 97.7* 97.1*   < > 100.3*   WBC 10*3/mm3 2.49* 2.55* 3.77 2.81* 2.7* 2.44* 2.8* 2.27* 2.39*   < > 3.75   RDW % 13.0 13.2 14.0 13.7 13.6 13.2 13.1 13.6 14.7   < > 13.2   MPV fL 10.5 9.9 9.9 10.1 10.6 10.5 10.5 9.9 10.3   < > 10.1   PLATELETS 10*3/mm3 119* 115* 131* 121* 124* 126* 122* 125* 149   < > 139*   IMM GRAN % % 0.4 0.4 0.3 0.4  --   --   --  0.0 0.4   < >  --    NEUTROS ABS 10*3/mm3 1.31* 1.50* 2.40 1.79 1.7 1.33* 1.7 1.19* 1.47*   < > 2.06   LYMPHS ABS 10*3/mm3 0.75 0.53* 0.80 0.58* 0.6* 0.67* 0.7* 0.65* 0.52*   < >  --    MONOS ABS 10*3/mm3 0.27 0.34 0.41 0.33 0.40 0.30 0.30 0.31 0.28   < >  --    EOS ABS 10*3/mm3 0.13 0.15 0.12 0.08 0.10 0.10 0.10 0.11 0.09   < > 0.23   BASOS ABS 10*3/mm3 0.02 0.02 0.03 0.02 0.00 0.03 0.00 0.01 0.02    < > 0.04   IMMATURE GRANS (ABS) 10*3/mm3 0.01 0.01 0.01 0.01 0.0  --  0.0 0.00 0.01   < >  --    NRBC /100 WBC 0.0 0.0 0.0 0.0  --   --   --  0.0 0.0   < >  --    NEUTROPHIL % %  --   --   --   --   --   --   --   --   --   --  55.0   MONOCYTES % %  --   --   --   --   --   --   --   --   --   --  14.0*   BASOPHIL % %  --   --   --   --   --   --   --   --   --   --  1.0    < > = values in this interval not displayed.       Lab Results - Last 18 Months   Lab Units 08/24/23  1418 10/04/22  1410 09/09/22  1358   GLUCOSE mg/dL 93 95 86   SODIUM mmol/L 143 142 142   POTASSIUM mmol/L 4.1 4.2 4.0   TOTAL CO2 mmol/L  --  25  --    CO2 mmol/L 25.0  --  25.0   CHLORIDE mmol/L 109* 105 109*   ANION GAP mmol/L 9.0 12 8.0   CREATININE mg/dL 1.25* 1.5* 1.58*   BUN mg/dL 21* 27* 25*   BUN / CREAT RATIO  16.8  --  15.8   CALCIUM mg/dL 8.6 9.1 9.1   EGFR IF NONAFRICN AM   --  36*  --    ALK PHOS U/L 72  --  81   TOTAL PROTEIN g/dL 6.7  --  7.1   ALT (SGPT) U/L 23  --  12   AST (SGOT) U/L 22  --  19   BILIRUBIN mg/dL 0.3  --  0.3   ALBUMIN g/dL 4.4  --  4.70   GLOBULIN gm/dL 2.3  --  2.4         Lab Results - Last 18 Months   Lab Units 02/23/24  1352 12/22/23  1321 09/29/23  1356 08/24/23  1418 08/17/23  1433 07/14/23  1410 05/19/23  1234 04/28/23  1409 02/24/23  1436 02/22/23  1557 12/16/22  1435 11/30/22  1519   IRON mcg/dL 69 63 74 59  --  74  --  61   < >  --    < > 67   TIBC mcg/dL 262* 282* 291* 262*  --  264*  --  279*   < >  --    < > 235*   IRON SATURATION %  --   --   --   --   --   --   --   --   --   --   --  29   IRON SATURATION (TSAT) % 26 22 25 22  --  28  --  22   < >  --    < >  --    FERRITIN ng/mL 328.40* 274.20* 342.70* 329.20*  --  340.00*  --  366.10*   < >  --    < > 575.4*   TSH uIU/mL  --   --   --   --  1.200  --  0.519  --   --  0.056*  --  0.361   FOLATE ng/mL 13.50 >20.00 >20.00 >20.00  --  11.60  --  4.38*   < >  --    < > 17.0    < > = values in this interval not displayed.       ASSESSMENT:   1.  Macrocytic anemia, with contributions from iron deficiency (1+ marrow iron), iron underutilization/anemia of chronic disease and chronic kidney disease and methotrexate. Michael Hgb 7.9, 08/31/2017 requiring 2 units PRBC transfusions on 09/01/2017.   a. Negative EGD/colonoscopy (above), negative SPIEP, negative UPIEP.   --Stable, Hgb 10.6 on 2/23/24 (prior range: Hgb 7.9 - 12.3).   b. Endoscopy: 08/23/2017. Impressions: - Normal esophagus. - Non-erosive gastritis. Biopsied. - Normal 1st part of the duodenum and 2nd part of the duodenum. Biopsied.   c. [facility]: Colonoscopy: 08/23/2017. Impressions: - The entire examined colon is normal. Biopsied.   d. Colonoscopy, 09/06/2018. Impressions: - The examination was otherwise normal on direct and retroflexion views. No specimens collected. Repeat 5 years  e.  06/2015-bone marrow biopsy with 1+ marrow iron, but otherwise nondiagnostic.  2. Leukopenia. No unifying diagnosis. Likely medication (prior use of methotrexate) associated.   --WBC 2.49; ANC 1.31, 2/23/24 (prior range: WBC 2.44 - 4.2; ANC 1.05 - 2.6).   3. Thrombocytopenia. Stable  --119,000, 2/23/24 (prior range: 85,000 - 188,000).  4. Chronic kidney disease, Stage III.   --Stable, GFR 51 mL/min on 8/24/23 (prior range: 26 - 56.6 mL/min).   5. Hypothyroidism.   6. Gastroesophageal reflux disease, now with early satiety, bloating, cramps.   7. B12 deficiency. Receives B12 injections per Dr. Waters. Previously received B12, 1000 mcg IM daily x 3 then weekly x 4 beginning 09/07/2018.   8. Fibromyalgia with chronic pain syndrome.   9. Irritable bowel syndrome with chronic diarrhea. Colonoscopy, 10/2014.   10. Depression.   11. Chronic neck and low back pain with scoliosis. She is now seen by Dr. Schmitt of Pain Management. Says a nerve stimulator will be placed on 04/23/2019.   12. Peripheral neuropathy and swelling of the legs.   a. Seen by Dr. Montgomery, 09/07/2017 and 12/08/2017. Impression: Venous insufficiency  and lymphedema.   b. Underwent RFA left leg on 01/10/2018 by Dr. Montgomery. Lower extremity venous ultrasound, 01/19/2018. No deep venous thrombosis (DVT) left lower extremity. Was started on anticoagulation (Eliquis). Lymphedema pumps and compression stockings prescribed.   13. Degenerative joint disease (DJD) knees. Underwent right knee surgery on 11/06/2015 and revision on 05/04/2017. Dr. Pathak.   14. Obesity. Has stabilized.   15. Pulmonary sarcoidosis. Followed by Dr. Scherer. Was started on methotrexate sometime 05/2017. Is now on O2 since 04/2018.   16. Chronic fatigue, contributions from all above.   17. Admitted [facility] 09/25/2016 through 09/26/2016 for chest pain. DSE negative. Discharged for non-cardiac chest pain.   18. Leg edema. Underwent RFA left leg on 01/10/2018 by Dr. Montgomery. LE venous US, 01/19/2018. No DVT left LE. Eliquis stopped last 02/2018. Lymphedema pumps and compression stockings prescribed.   19. Rectal bleeding. Colonoscopy, 09/06/2018. Impressions: The examination was otherwise normal on direct and retroflexion views. No specimens collected. Repeat 5 years.  20.  Right rotator cuff and right bicep tendon injuries.    --Had surgery 03/24/2022, Dr. Pathak  21.  Left TKR, 10/9/2022    PLAN:   1.  Apprised of labs from 2/23/24 with stable leukopenia/normal ANC, stable anemia, stable platelets, repleted iron levels (ferritin 328, fe 69, fe sat 26%), B12/folate p, CMP p.     2.  Previously discussed prior SPIEP with normal immunoglobulin levels, negative TAVIA for monoclonal proteins, normal kappa/lambda light chains, negative 24 hour UPIEP, normal ESR, negative rheumatoid factor, normal TSH/T4, normal B12/folate, negative HIV screen, low iron studies, negative ELENA, normal LDH and negative comprehensive blood report (no peripheral blood evidence for dysplasia and negative flow cytometry).  Also noted previous bone marrow biopsy showing 1+ but otherwise nondiagnostic.  3. Again reviewed her  medications. Stopped methotrexate, diclofenac, gabapentin.  Remains on Nexium (blood dyscrasias), gabapentin, Lortab (neutropenia, thrombocytopenia), Topamax (anemia, leukopenia). Would discontinue as many of these as possible. Defer to MOHIT John.   4. Draw serum iron, Fe sat, ferritin, B12, folate every 8 weeks.   5. CBC every 8 weeks with Procrit 40,000 units subcutaneously if Hgb less than 10 and less than 30 - BHP   6. Continue management per primary care and other specialists.   7. Return to the [facility] office in 24 weeks with pre-office CMP, B12/folate, serum iron, Fe sat, ferritin, and CBC with differential. To see MOHIT Mccarty     MEDICAL DECISION MAKING: Moderate Complexity   AMOUNT OF DATA: Moderate    I spent ~37 minutes caring for Hope on this date of service. This time includes time spent by me in the following activities: preparing for the visit, reviewing tests, performing a medically appropriate examination and/or evaluation, counseling and educating the patient/family/caregiver, ordering medications, tests, or procedures and documenting information in the medical record    cc: MD Weston Ambrose MD

## 2024-03-13 DIAGNOSIS — M51.36 DEGENERATIVE DISC DISEASE, LUMBAR: ICD-10-CM

## 2024-03-13 RX ORDER — TIZANIDINE 4 MG/1
4 TABLET ORAL 3 TIMES DAILY PRN
Qty: 270 TABLET | Refills: 1 | Status: SHIPPED | OUTPATIENT
Start: 2024-03-13

## 2024-03-13 NOTE — TELEPHONE ENCOUNTER
Mindi Silver called to request a refill on her medication.      Last office visit : 12/15/2023   Next office visit : 3/26/2024     Requested Prescriptions     Pending Prescriptions Disp Refills    tiZANidine (ZANAFLEX) 4 MG tablet [Pharmacy Med Name: TIZANIDINE HCL 4 MG TABLET] 270 tablet      Sig: TAKE 1 TABLET BY MOUTH 3 TIMES DAILY AS NEEDED (MUSCLE SPASMS).            Kera Sorensen

## 2024-03-16 NOTE — PROGRESS NOTES
"Background:  A patient with hx sarcoidosis and chronic respiratory failure   Chief Complaint  Sarcoidosis    Subjective    History of Present Illness       Stacy Lynn presents to [facility].  She has a lot of sweats at night.  She has chronic dyspnea on exertion and leg swelling with lymphedema.  PFO was corrected a few years ago and she did not have pulmonary hypertension then.  Swelling is worse, dyspnea is worse, fatigue with any exertion is worse.       Objective     Vital Signs:   /88   Pulse 76   Ht 160 cm (62.99\")   Wt 125 kg (275 lb 6.4 oz)   SpO2 100% Comment: 2L via NC  BMI 48.80 kg/m²   Physical Exam  Constitutional:       General: She is not in acute distress.     Appearance: She is well-developed. She is not ill-appearing or toxic-appearing.   HENT:      Head: Atraumatic.   Eyes:      General: No scleral icterus.     Conjunctiva/sclera: Conjunctivae normal.   Cardiovascular:      Rate and Rhythm: Normal rate and regular rhythm.      Heart sounds: S1 normal and S2 normal.   Pulmonary:      Effort: Pulmonary effort is normal.      Breath sounds: Normal breath sounds. No wheezing.   Abdominal:      General: There is no distension.   Musculoskeletal:         General: No deformity.      Cervical back: Neck supple.      Right lower leg: Edema present.      Left lower leg: Edema present.   Skin:     Coloration: Skin is not pale.      Findings: No erythema or rash.   Neurological:      Mental Status: She is alert.        Result Review  Data Reviewed:{ Labs  Result Review  Imaging  Media :23}                    Assessment and Plan  {CC Problem List  Visit Diagnosis  ROS  Review (Popup)  Health Maintenance  Quality  BestPractice  Medications  SmartSets  SnapShot Encounters  Media :23}   Problem List Items Addressed This Visit        Pulmonary Problems    Chronic respiratory failure with hypoxia (CMS/HCC)    Relevant Orders    CT " Chest With Contrast Diagnostic    Adult Transthoracic Echo Complete W/ Cont if Necessary Per Protocol       Other    Sarcoidosis    Relevant Orders    CT Chest With Contrast Diagnostic    Adult Transthoracic Echo Complete W/ Cont if Necessary Per Protocol    Pulmonary Function Test    Morbidly obese (CMS/HCC)    PFO (patent foramen ovale)    Overview     Per 9/2016 Echo          Lymphedema      Other Visit Diagnoses     Shortness of breath    -  Primary    Relevant Orders    Adult Transthoracic Echo Complete W/ Cont if Necessary Per Protocol       She continues with chronic problems with poor functionality, poor exercise tolerance, dyspnea.  Most recent data did not suggest active sarcoid.  Will check echo, follow up PFT and imaging.  Obesity and chronic lymphedema could be contributing.  She also has restriction on prior PFT, may be partly related to body habitus as well as to lung disease; ct will help discern that.    Follow Up {Instructions Charge Capture  Follow-up Communications :23}   No follow-ups on file.  Patient was given instructions and counseling regarding her condition or for health maintenance advice. Please see specific information pulled into the AVS if appropriate.    Electronically signed by Celestine Scherer MD, 6/3/2021, 14:15 CDT     complains of pain/discomfort

## 2024-04-01 RX ORDER — TOPIRAMATE 100 MG/1
100 TABLET, FILM COATED ORAL NIGHTLY
Qty: 90 TABLET | Refills: 2 | Status: SHIPPED | OUTPATIENT
Start: 2024-04-01

## 2024-04-09 ENCOUNTER — PROCEDURE VISIT (OUTPATIENT)
Dept: PULMONOLOGY | Facility: CLINIC | Age: 54
End: 2024-04-09
Payer: MEDICARE

## 2024-04-09 ENCOUNTER — OFFICE VISIT (OUTPATIENT)
Dept: PULMONOLOGY | Facility: CLINIC | Age: 54
End: 2024-04-09
Payer: MEDICARE

## 2024-04-09 VITALS
HEIGHT: 62 IN | HEART RATE: 90 BPM | WEIGHT: 287 LBS | DIASTOLIC BLOOD PRESSURE: 80 MMHG | SYSTOLIC BLOOD PRESSURE: 132 MMHG | BODY MASS INDEX: 52.81 KG/M2 | OXYGEN SATURATION: 98 %

## 2024-04-09 DIAGNOSIS — J96.11 CHRONIC RESPIRATORY FAILURE WITH HYPOXIA: ICD-10-CM

## 2024-04-09 DIAGNOSIS — D86.9 SARCOIDOSIS, UNSPECIFIED: Primary | ICD-10-CM

## 2024-04-09 RX ORDER — ALBUTEROL SULFATE 90 UG/1
2 AEROSOL, METERED RESPIRATORY (INHALATION) EVERY 4 HOURS PRN
Qty: 18 G | Refills: 11 | Status: SHIPPED | OUTPATIENT
Start: 2024-04-09

## 2024-04-09 RX ORDER — FLUTICASONE FUROATE, UMECLIDINIUM BROMIDE AND VILANTEROL TRIFENATATE 200; 62.5; 25 UG/1; UG/1; UG/1
1 POWDER RESPIRATORY (INHALATION) DAILY
Qty: 60 EACH | Refills: 5 | Status: SHIPPED | OUTPATIENT
Start: 2024-04-09

## 2024-04-09 NOTE — PROCEDURES
Spirometry with Diffusion Capacity    Performed by: Windy Snowden, RRT  Authorized by: Celestine Scherer MD     Pre Drug % Predicted    FVC: 47%   FEV1: 51%   FEF 25-75%: 59%   FEV1/FVC: 86%   DLCO: 77%   D/VAsb: 109%    Interpretation   Spirometry   Spirometry shows moderate restriction. There is reduced midflow suggesting small airway/airflow obstruction.   Overall comments: Results show decline when compared with results from 2-7 years ago  Electronically signed by Celestine Scherer MD, 4/9/2024, 14:30 CDT

## 2024-04-09 NOTE — PROGRESS NOTES
"Background:  A patient with hx sarcoidosis and chronic respiratory failure   Chief Complaint  Chronic respiratory failure with hypoxia    Subjective    History of Present Illness     Stacy Lynn is here for follow up with [facility].  History of Present Illness  She has intermittent sensation of severe dyspnea, feeling like she is falling off a mountain, happens randomly.  She needs a trelegy refill.     Tobacco Use: Low Risk  (4/9/2024)    Patient History     Smoking Tobacco Use: Never     Smokeless Tobacco Use: Never     Passive Exposure: Not on file      Current Outpatient Medications   Medication Instructions    albuterol sulfate  (90 Base) MCG/ACT inhaler 2 puffs, Inhalation, Every 4 Hours PRN, Disp 1 formulary-preferred inhaler    docusate sodium (COLACE) 100 mg, Oral    esomeprazole (nexIUM) 40 MG capsule TAKE 1 CAPSULE BY MOUTH TWICE DAILY BEFORE  MEALS    Fluticasone-Umeclidin-Vilant (Trelegy Ellipta) 200-62.5-25 MCG/ACT inhaler 1 puff, Inhalation, Daily    folic acid-vit B6-vit B12 (Folbee) 2.5-25-1 MG tablet tablet 1 tablet, Oral, Daily    levothyroxine (SYNTHROID, LEVOTHROID) 150 MCG tablet     Lyrica 100 mg, Oral, 3 Times Daily    O2 (OXYGEN) 2 L/min, Inhalation, Once, Continuous     ondansetron (ZOFRAN) 4 mg, Oral, Every 8 Hours PRN    oxyCODONE-acetaminophen (PERCOCET) 7.5-325 MG per tablet 1 tablet, Oral, Every 8 Hours PRN    polyethylene glycol (MIRALAX) 17 g, Oral    Procrit 1 Units, Subcutaneous, As Needed    Rexulti 0.5 MG tablet     tiZANidine (ZANAFLEX) 4 mg, Oral, Nightly PRN    topiramate (TOPAMAX) 100 mg, Oral, Nightly    vitamin D (ERGOCALCIFEROL) 50,000 Units, Oral, Weekly      Objective     Vital Signs:   /80   Pulse 90   Ht 157.5 cm (62\")   Wt 130 kg (287 lb)   SpO2 98% Comment: 2L  BMI 52.49 kg/m²   Physical Exam  Constitutional:       General: She is not in acute distress.     Appearance: She is well-developed. " She is not ill-appearing or toxic-appearing.   HENT:      Head: Atraumatic.   Eyes:      General: No scleral icterus.     Conjunctiva/sclera: Conjunctivae normal.   Cardiovascular:      Rate and Rhythm: Normal rate and regular rhythm.      Heart sounds: S1 normal and S2 normal.   Pulmonary:      Effort: Pulmonary effort is normal.      Breath sounds: Normal breath sounds.   Abdominal:      General: There is no distension.   Musculoskeletal:         General: No deformity.      Cervical back: Neck supple.   Skin:     Coloration: Skin is not pale.      Findings: No rash.   Neurological:      Mental Status: She is alert.        Result Review  Data Reviewed:         PFT Values          4/9/2024    11:15   Pre Drug PFT Results   FVC 47   FEV1 51   FEF 25-75% 59   FEV1/FVC 86   Other Tests PFT Results   DLCO 77   D/VAsb 109                Assessment and Plan    Diagnoses and all orders for this visit:    1. Sarcoidosis, unspecified (Primary)  -     Spirometry with Diffusion Capacity  -     Fluticasone-Umeclidin-Vilant (Trelegy Ellipta) 200-62.5-25 MCG/ACT inhaler; Inhale 1 puff Daily.  Dispense: 60 each; Refill: 5  -     albuterol sulfate  (90 Base) MCG/ACT inhaler; Inhale 2 puffs Every 4 (Four) Hours As Needed for Wheezing or Shortness of Air. Disp 1 formulary-preferred inhaler  Dispense: 18 g; Refill: 11  -     XR Chest 2 View; Future    2. Chronic respiratory failure with hypoxia  -     Fluticasone-Umeclidin-Vilant (Trelegy Ellipta) 200-62.5-25 MCG/ACT inhaler; Inhale 1 puff Daily.  Dispense: 60 each; Refill: 5  -     albuterol sulfate  (90 Base) MCG/ACT inhaler; Inhale 2 puffs Every 4 (Four) Hours As Needed for Wheezing or Shortness of Air. Disp 1 formulary-preferred inhaler  Dispense: 18 g; Refill: 11    She has restrictive disease which has worsened although dlco remains normal  Follow up cxr to evaluate for any infilttive lung disease or other markers of sarcoidosis progression  Continue inhalers, add  albuterol  Hold off systemic treatment for now    Follow Up   Return in about 4 months (around 8/9/2024).  Patient was given instructions and counseling regarding her condition or for health maintenance advice. Please see specific information pulled into the AVS if appropriate.    Electronically signed by Celestine Scherer MD, 4/9/2024, 17:44 CDT

## 2024-04-11 ENCOUNTER — HOSPITAL ENCOUNTER (OUTPATIENT)
Dept: GENERAL RADIOLOGY | Facility: HOSPITAL | Age: 54
Discharge: HOME | End: 2024-04-11
Payer: MEDICARE

## 2024-04-11 DIAGNOSIS — D86.9 SARCOIDOSIS, UNSPECIFIED: ICD-10-CM

## 2024-04-11 PROCEDURE — 71046 X-RAY EXAM CHEST 2 VIEWS: CPT

## 2024-04-11 NOTE — PROGRESS NOTES
Let pt know this looked ok.  Breathing is a little shallow but nothing else of concern.Nothing else to do for now.  Keep follow up as planned

## 2024-04-24 DIAGNOSIS — F33.0 MILD EPISODE OF RECURRENT MAJOR DEPRESSIVE DISORDER (HCC): ICD-10-CM

## 2024-04-24 DIAGNOSIS — E03.9 ACQUIRED HYPOTHYROIDISM: ICD-10-CM

## 2024-04-24 DIAGNOSIS — I10 ESSENTIAL HYPERTENSION: ICD-10-CM

## 2024-04-26 RX ORDER — LEVOTHYROXINE SODIUM 0.15 MG/1
150 TABLET ORAL DAILY
Qty: 90 TABLET | Refills: 2 | Status: SHIPPED | OUTPATIENT
Start: 2024-04-26

## 2024-04-26 RX ORDER — LEVOTHYROXINE SODIUM 0.15 MG/1
150 TABLET ORAL DAILY
Qty: 90 TABLET | Refills: 2 | OUTPATIENT
Start: 2024-04-26

## 2024-04-26 RX ORDER — LEVOTHYROXINE SODIUM 175 UG/1
TABLET ORAL
Qty: 90 TABLET | Refills: 1 | OUTPATIENT
Start: 2024-04-26

## 2024-04-26 RX ORDER — METOPROLOL SUCCINATE 50 MG/1
TABLET, EXTENDED RELEASE ORAL
Qty: 90 TABLET | Refills: 2 | OUTPATIENT
Start: 2024-04-26

## 2024-04-26 RX ORDER — BREXPIPRAZOLE 1 MG/1
1 TABLET ORAL DAILY
Qty: 30 TABLET | Refills: 5 | Status: SHIPPED | OUTPATIENT
Start: 2024-04-26

## 2024-04-26 RX ORDER — METOPROLOL SUCCINATE 100 MG/1
TABLET, EXTENDED RELEASE ORAL
Qty: 90 TABLET | Refills: 2 | Status: SHIPPED | OUTPATIENT
Start: 2024-04-26

## 2024-05-04 DIAGNOSIS — M51.36 DEGENERATIVE DISC DISEASE, LUMBAR: ICD-10-CM

## 2024-05-04 DIAGNOSIS — M54.50 CHRONIC BILATERAL LOW BACK PAIN WITHOUT SCIATICA: ICD-10-CM

## 2024-05-04 DIAGNOSIS — G89.29 CHRONIC BILATERAL LOW BACK PAIN WITHOUT SCIATICA: ICD-10-CM

## 2024-05-06 RX ORDER — PREGABALIN 100 MG/1
100 CAPSULE ORAL 3 TIMES DAILY
Qty: 90 CAPSULE | Refills: 2 | Status: SHIPPED | OUTPATIENT
Start: 2024-05-06 | End: 2024-05-20 | Stop reason: SDUPTHER

## 2024-05-06 NOTE — TELEPHONE ENCOUNTER
Mindi ARCHANA Silver called to request a refill on her medication.      Last office visit : 12/15/2023   Next office visit : 5/20/2024     Last UDS:   Amphetamine Screen, Urine   Date Value Ref Range Status   12/18/2023 neg  Final     Barbiturate Screen, Urine   Date Value Ref Range Status   12/18/2023 neg  Final     Benzodiazepine Screen, Urine   Date Value Ref Range Status   12/18/2023 neg  Final     Buprenorphine Urine   Date Value Ref Range Status   12/18/2023 neg  Final     Cocaine Metabolite Screen, Urine   Date Value Ref Range Status   12/18/2023 neg  Final     Gabapentin Screen, Urine   Date Value Ref Range Status   12/18/2023 neg  Final     MDMA, Urine   Date Value Ref Range Status   12/18/2023 neg  Final     Opiate Scrn, Ur   Date Value Ref Range Status   12/18/2023 neg  Final     Oxycodone Screen, Ur   Date Value Ref Range Status   12/18/2023 neg  Final     PCP Screen, Urine   Date Value Ref Range Status   12/18/2023 neg  Final     Propoxyphene Screen, Urine   Date Value Ref Range Status   12/18/2023 neg  Final     THC Screen, Urine   Date Value Ref Range Status   12/18/2023 neg  Final     Tricyclic Antidepressants, Urine   Date Value Ref Range Status   12/18/2023 positive  Final       Last Tang: 5/6/24  Medication Contract: 12/20/23   Last Fill: 1/23/24      Requested Prescriptions     Pending Prescriptions Disp Refills    pregabalin (LYRICA) 100 MG capsule [Pharmacy Med Name: PREGABALIN 100 MG CAPSULE] 90 capsule 2     Sig: Take 1 capsule by mouth 3 times daily for 90 days. Max Daily Amount: 300 mg         Please approve or refuse this medication.   Kera Sorensen

## 2024-05-17 ENCOUNTER — TELEPHONE (OUTPATIENT)
Dept: PRIMARY CARE CLINIC | Age: 54
End: 2024-05-17

## 2024-05-17 NOTE — TELEPHONE ENCOUNTER
Rashaun from [employer] called today at [phone]. He's requesting if we received the Requisition form.It was sent on 05-, for genetic testing.

## 2024-05-20 ENCOUNTER — OFFICE VISIT (OUTPATIENT)
Dept: PRIMARY CARE CLINIC | Age: 54
End: 2024-05-20
Payer: MEDICARE

## 2024-05-20 VITALS
HEART RATE: 82 BPM | TEMPERATURE: 97.2 F | SYSTOLIC BLOOD PRESSURE: 128 MMHG | DIASTOLIC BLOOD PRESSURE: 78 MMHG | WEIGHT: 283 LBS | OXYGEN SATURATION: 98 % | HEIGHT: 62 IN | BODY MASS INDEX: 52.08 KG/M2 | RESPIRATION RATE: 20 BRPM

## 2024-05-20 DIAGNOSIS — E55.9 VITAMIN D DEFICIENCY: ICD-10-CM

## 2024-05-20 DIAGNOSIS — M54.50 CHRONIC BILATERAL LOW BACK PAIN WITHOUT SCIATICA: ICD-10-CM

## 2024-05-20 DIAGNOSIS — N18.32 STAGE 3B CHRONIC KIDNEY DISEASE (HCC): ICD-10-CM

## 2024-05-20 DIAGNOSIS — D69.6 THROMBOCYTOPENIA, UNSPECIFIED (HCC): ICD-10-CM

## 2024-05-20 DIAGNOSIS — K21.9 GASTROESOPHAGEAL REFLUX DISEASE, UNSPECIFIED WHETHER ESOPHAGITIS PRESENT: ICD-10-CM

## 2024-05-20 DIAGNOSIS — E03.9 ACQUIRED HYPOTHYROIDISM: ICD-10-CM

## 2024-05-20 DIAGNOSIS — M51.36 DEGENERATIVE DISC DISEASE, LUMBAR: ICD-10-CM

## 2024-05-20 DIAGNOSIS — Z11.59 ENCOUNTER FOR HEPATITIS C SCREENING TEST FOR LOW RISK PATIENT: ICD-10-CM

## 2024-05-20 DIAGNOSIS — G43.709 CHRONIC MIGRAINE W/O AURA W/O STATUS MIGRAINOSUS, NOT INTRACTABLE: ICD-10-CM

## 2024-05-20 DIAGNOSIS — J44.9 CHRONIC OBSTRUCTIVE PULMONARY DISEASE, UNSPECIFIED COPD TYPE (HCC): ICD-10-CM

## 2024-05-20 DIAGNOSIS — F33.0 MILD EPISODE OF RECURRENT MAJOR DEPRESSIVE DISORDER (HCC): ICD-10-CM

## 2024-05-20 DIAGNOSIS — I10 ESSENTIAL HYPERTENSION: Primary | ICD-10-CM

## 2024-05-20 DIAGNOSIS — I10 ESSENTIAL HYPERTENSION: ICD-10-CM

## 2024-05-20 DIAGNOSIS — J96.11 CHRONIC RESPIRATORY FAILURE WITH HYPOXIA (HCC): ICD-10-CM

## 2024-05-20 DIAGNOSIS — N32.81 OAB (OVERACTIVE BLADDER): ICD-10-CM

## 2024-05-20 DIAGNOSIS — G89.29 CHRONIC BILATERAL LOW BACK PAIN WITHOUT SCIATICA: ICD-10-CM

## 2024-05-20 DIAGNOSIS — I47.10 PSVT (PAROXYSMAL SUPRAVENTRICULAR TACHYCARDIA) (HCC): ICD-10-CM

## 2024-05-20 LAB
25(OH)D3 SERPL-MCNC: 44.2 NG/ML
ALBUMIN SERPL-MCNC: 4.3 G/DL (ref 3.5–5.2)
ALP SERPL-CCNC: 79 U/L (ref 35–104)
ALT SERPL-CCNC: 16 U/L (ref 5–33)
ANION GAP SERPL CALCULATED.3IONS-SCNC: 11 MMOL/L (ref 7–19)
AST SERPL-CCNC: 16 U/L (ref 5–32)
BASOPHILS # BLD: 0 K/UL (ref 0–0.2)
BASOPHILS NFR BLD: 1.2 % (ref 0–1)
BILIRUB SERPL-MCNC: 0.3 MG/DL (ref 0.2–1.2)
BUN SERPL-MCNC: 38 MG/DL (ref 6–20)
CALCIUM SERPL-MCNC: 8.7 MG/DL (ref 8.6–10)
CHLORIDE SERPL-SCNC: 106 MMOL/L (ref 98–111)
CO2 SERPL-SCNC: 23 MMOL/L (ref 22–29)
CREAT SERPL-MCNC: 1.6 MG/DL (ref 0.5–0.9)
EOSINOPHIL # BLD: 0.2 K/UL (ref 0–0.6)
EOSINOPHIL NFR BLD: 5.1 % (ref 0–5)
ERYTHROCYTE [DISTWIDTH] IN BLOOD BY AUTOMATED COUNT: 13.3 % (ref 11.5–14.5)
GLUCOSE SERPL-MCNC: 93 MG/DL (ref 74–109)
HCT VFR BLD AUTO: 35.3 % (ref 37–47)
HGB BLD-MCNC: 11 G/DL (ref 12–16)
IMM GRANULOCYTES # BLD: 0 K/UL
LYMPHOCYTES # BLD: 0.8 K/UL (ref 1.1–4.5)
LYMPHOCYTES NFR BLD: 23 % (ref 20–40)
MCH RBC QN AUTO: 30.2 PG (ref 27–31)
MCHC RBC AUTO-ENTMCNC: 31.2 G/DL (ref 33–37)
MCV RBC AUTO: 97 FL (ref 81–99)
MONOCYTES # BLD: 0.4 K/UL (ref 0–0.9)
MONOCYTES NFR BLD: 12.2 % (ref 0–10)
NEUTROPHILS # BLD: 1.9 K/UL (ref 1.5–7.5)
NEUTS SEG NFR BLD: 57.9 % (ref 50–65)
PLATELET # BLD AUTO: 141 K/UL (ref 130–400)
PMV BLD AUTO: 10.4 FL (ref 9.4–12.3)
POTASSIUM SERPL-SCNC: 4.7 MMOL/L (ref 3.5–5)
PROT SERPL-MCNC: 6.9 G/DL (ref 6.6–8.7)
RBC # BLD AUTO: 3.64 M/UL (ref 4.2–5.4)
SODIUM SERPL-SCNC: 140 MMOL/L (ref 136–145)
T4 FREE SERPL-MCNC: 1.55 NG/DL (ref 0.93–1.7)
TSH SERPL DL<=0.005 MIU/L-ACNC: 0.31 UIU/ML (ref 0.27–4.2)
WBC # BLD AUTO: 3.4 K/UL (ref 4.8–10.8)

## 2024-05-20 PROCEDURE — 3078F DIAST BP <80 MM HG: CPT | Performed by: NURSE PRACTITIONER

## 2024-05-20 PROCEDURE — 3074F SYST BP LT 130 MM HG: CPT | Performed by: NURSE PRACTITIONER

## 2024-05-20 PROCEDURE — 99214 OFFICE O/P EST MOD 30 MIN: CPT | Performed by: NURSE PRACTITIONER

## 2024-05-20 RX ORDER — PREGABALIN 100 MG/1
100 CAPSULE ORAL 3 TIMES DAILY
Qty: 90 CAPSULE | Refills: 2 | Status: CANCELLED | OUTPATIENT
Start: 2024-05-20 | End: 2024-08-18

## 2024-05-20 RX ORDER — PREGABALIN 100 MG/1
100 CAPSULE ORAL 3 TIMES DAILY
Qty: 270 CAPSULE | Refills: 1 | Status: SHIPPED | OUTPATIENT
Start: 2024-05-20 | End: 2024-11-16

## 2024-05-20 RX ORDER — PANTOPRAZOLE SODIUM 40 MG/1
40 TABLET, DELAYED RELEASE ORAL 2 TIMES DAILY
Qty: 180 TABLET | Refills: 2 | Status: SHIPPED | OUTPATIENT
Start: 2024-05-20

## 2024-05-20 RX ORDER — DESVENLAFAXINE 100 MG/1
100 TABLET, EXTENDED RELEASE ORAL DAILY
Qty: 90 TABLET | Refills: 2 | Status: SHIPPED | OUTPATIENT
Start: 2024-05-20

## 2024-05-20 RX ORDER — METOPROLOL SUCCINATE 100 MG/1
100 TABLET, EXTENDED RELEASE ORAL DAILY
Qty: 90 TABLET | Refills: 2 | Status: SHIPPED | OUTPATIENT
Start: 2024-05-20

## 2024-05-20 RX ORDER — ERGOCALCIFEROL 1.25 MG/1
CAPSULE ORAL
Qty: 24 CAPSULE | Refills: 2 | Status: SHIPPED | OUTPATIENT
Start: 2024-05-20

## 2024-05-20 ASSESSMENT — PATIENT HEALTH QUESTIONNAIRE - PHQ9
SUM OF ALL RESPONSES TO PHQ9 QUESTIONS 1 & 2: 2
6. FEELING BAD ABOUT YOURSELF - OR THAT YOU ARE A FAILURE OR HAVE LET YOURSELF OR YOUR FAMILY DOWN: NOT AT ALL
4. FEELING TIRED OR HAVING LITTLE ENERGY: NEARLY EVERY DAY
5. POOR APPETITE OR OVEREATING: NEARLY EVERY DAY
8. MOVING OR SPEAKING SO SLOWLY THAT OTHER PEOPLE COULD HAVE NOTICED. OR THE OPPOSITE, BEING SO FIGETY OR RESTLESS THAT YOU HAVE BEEN MOVING AROUND A LOT MORE THAN USUAL: NOT AT ALL
10. IF YOU CHECKED OFF ANY PROBLEMS, HOW DIFFICULT HAVE THESE PROBLEMS MADE IT FOR YOU TO DO YOUR WORK, TAKE CARE OF THINGS AT HOME, OR GET ALONG WITH OTHER PEOPLE: VERY DIFFICULT
SUM OF ALL RESPONSES TO PHQ QUESTIONS 1-9: 13
9. THOUGHTS THAT YOU WOULD BE BETTER OFF DEAD, OR OF HURTING YOURSELF: NOT AT ALL
7. TROUBLE CONCENTRATING ON THINGS, SUCH AS READING THE NEWSPAPER OR WATCHING TELEVISION: NEARLY EVERY DAY
3. TROUBLE FALLING OR STAYING ASLEEP: MORE THAN HALF THE DAYS
SUM OF ALL RESPONSES TO PHQ QUESTIONS 1-9: 13
1. LITTLE INTEREST OR PLEASURE IN DOING THINGS: NOT AT ALL
SUM OF ALL RESPONSES TO PHQ QUESTIONS 1-9: 13
2. FEELING DOWN, DEPRESSED OR HOPELESS: MORE THAN HALF THE DAYS
SUM OF ALL RESPONSES TO PHQ QUESTIONS 1-9: 13

## 2024-05-20 ASSESSMENT — ENCOUNTER SYMPTOMS
SHORTNESS OF BREATH: 1
ABDOMINAL PAIN: 0
DIARRHEA: 0
NAUSEA: 0
SORE THROAT: 0
CHEST TIGHTNESS: 0
WHEEZING: 0
COUGH: 0

## 2024-05-20 NOTE — TELEPHONE ENCOUNTER
Discussed at appointment today.  Patient states this was initiated by her insurance and she does not wish to proceed with this.  She did not request this.

## 2024-05-20 NOTE — PROGRESS NOTES
chronic kidney disease (HCC)  -Stable, continue management per nephrology.  Continue to avoid NSAIDs.  Continue adequate daily hydration.    6. Chronic obstructive pulmonary disease, unspecified COPD type ([facility])  -Stable, continue Trelegy, management per [facility]    7. Chronic respiratory failure with hypoxia ([facility])  -Continue supplemental O2 manage per pulmonology    8. Thrombocytopenia, unspecified ([facility])  -Stable, continue management per [facility]    9. PSVT (paroxysmal supraventricular tachycardia) ([facility])  -Stable, continue metoprolol    10. OAB (overactive bladder)  -Stable, continue Myrbetriq 25 mg daily  - mirabegron (MYRBETRIQ) 25 MG TB24; Take 1 tablet by mouth daily  Dispense: 90 tablet; Refill: 2    11. Gastroesophageal reflux disease, unspecified whether esophagitis present  -Stable, continue Protonix 40 mg bid  - pantoprazole (PROTONIX) 40 MG tablet; Take 1 tablet by mouth 2 times daily  Dispense: 180 tablet; Refill: 2    12. Vitamin D deficiency  -Stable, continue ergocalciferol twice weekly which she has taken chronically.  - vitamin D (ERGOCALCIFEROL) 1.25 MG (04721 UT) CAPS capsule; Twice a week  Dispense: 24 capsule; Refill: 2    13. Body mass index (BMI) 50.0-59.9, adult ([facility])  -She will continue efforts with healthy diet, weight loss.  -Discussed GLP-1 agonists today.  She states pain management wanted her to discuss with me regarding weight loss.  She prefers to avoid due to potential side effects.  Discussed that cost and availability could be an issue as well.         Return in about 6 months (around 11/20/2024) for physical.    Hope was seen today for follow-up and medication refill.    Diagnoses and all orders for this visit:    Chronic bilateral low back pain without sciatica  -     pregabalin (LYRICA) 100 MG capsule; Take 1 capsule by mouth 3 times daily for 180 days. Max Daily Amount: 300 mg    Degenerative disc disease, lumbar  -     pregabalin (LYRICA) 100 MG capsule;

## 2024-05-22 ENCOUNTER — TELEPHONE (OUTPATIENT)
Dept: NEUROSURGERY | Age: 54
End: 2024-05-22

## 2024-05-22 NOTE — TELEPHONE ENCOUNTER
1st attempt to contact patient. I left a voicemail instructing patient to call back at [phone] to schedule their new patient appointment       SCS consult

## 2024-05-22 NOTE — TELEPHONE ENCOUNTER
[facility] New Patient Questionnaire    Diagnosis/Reason for Referral/Is this related to Work Comp? No  SCS Consult        2. Who is completing questionnaire?      Patient X Caregiver Family      3. Has the patient had any previous spinal/brain surgeries? NO        A. If yes, what is the name of the facility in which the surgery was performed?       B. Procedure/Surgery performed?       C. Who was the surgeon?       D. When was the surgery?    MM/YY       E. Did the patient improve after the surgery?        4. Is this a second opinion?   If yes, Dr. Felton would like to review patient first before making the appointment.      5. Have MRI Images been obtain within the last year?     Yes X No      XR  CT     If yes, where was the imaging performed? [facility]     If yes, what part of the body?      Lumbar X Cervical  Thoracic  Brain     If yes, when was it obtained? 11/01/23          Note: if the scan was performed at a facility other than [facility], the disc will need to be brought to the appointment or we need to reach out to obtain the disc.     A. Was the patient instructed to provide the disc?      Yes   No X      8. Has the patient had a NCV/EMG within the last year?      Yes  No X     If yes, where was it performed and date?      MM/YY  Location:      9. Has the patient been to Physical Therapy?      Yes  No X     If yes, what location, how long attended, and last visit?    Location:        Therapy Lasted:    Date of Last Visit:      10. Has the patient been to Pain Management?     Yes X No     If yes, what location and last visit     Location: [facility]   Last Visit:  5/13/24   Is it helping?    11. Has your primary care provider or referring provider ordered any testing prior to this appointment that has not occurred?                   If yes, what is the location and date scheduled or performed          Location           Date Schedule           Date occurred

## 2024-07-15 ENCOUNTER — OFFICE VISIT (OUTPATIENT)
Dept: NEUROSURGERY | Age: 54
End: 2024-07-15
Payer: MEDICARE

## 2024-07-15 VITALS
BODY MASS INDEX: 52.08 KG/M2 | HEIGHT: 62 IN | DIASTOLIC BLOOD PRESSURE: 84 MMHG | RESPIRATION RATE: 18 BRPM | WEIGHT: 283 LBS | OXYGEN SATURATION: 99 % | SYSTOLIC BLOOD PRESSURE: 132 MMHG | HEART RATE: 80 BPM

## 2024-07-15 DIAGNOSIS — G89.29 CHRONIC LEFT-SIDED LOW BACK PAIN WITH LEFT-SIDED SCIATICA: ICD-10-CM

## 2024-07-15 DIAGNOSIS — M54.42 CHRONIC LEFT-SIDED LOW BACK PAIN WITH LEFT-SIDED SCIATICA: ICD-10-CM

## 2024-07-15 DIAGNOSIS — M41.9 SCOLIOSIS OF THORACOLUMBAR SPINE, UNSPECIFIED SCOLIOSIS TYPE: Primary | ICD-10-CM

## 2024-07-15 PROCEDURE — 3079F DIAST BP 80-89 MM HG: CPT | Performed by: NEUROLOGICAL SURGERY

## 2024-07-15 PROCEDURE — 3075F SYST BP GE 130 - 139MM HG: CPT | Performed by: NEUROLOGICAL SURGERY

## 2024-07-15 PROCEDURE — 99203 OFFICE O/P NEW LOW 30 MIN: CPT | Performed by: NEUROLOGICAL SURGERY

## 2024-07-15 ASSESSMENT — ENCOUNTER SYMPTOMS
EYES NEGATIVE: 1
RESPIRATORY NEGATIVE: 1
GASTROINTESTINAL NEGATIVE: 1
BACK PAIN: 1

## 2024-07-15 NOTE — PROGRESS NOTES
[facility]  Office Visit        Chief Complaint   Patient presents with    New Patient     Establishing care     Results     Imaging in PACS    Other     SCS Consult. Patient states she has not had a trial yet. She states she has pain that radiates from her lower back into her LLE that is becoming more constant. She is currently taking Lyrica and Tizanidine to help manage the pain.     Numbness     Patient states she does have tingling in her LLE.        HISTORY OF PRESENT ILLNESS:      The patient is a 54 y.o. female who presents as a referral from pain management with complaints of worsening back pain and left leg pain.  She has multiple medical problems including sarcoidosis and chronic respiratory failure requiring home O2.  She also has a history of pancytopenia that is followed by hematology.  She has known scoliosis but denies ever seeing a spine surgeon in the past.  She complains of pain on the left side of her lower back that will radiate down her left lower extremity.  She also describes tingling in her left leg.  She denies focal weakness.  She has attempted physical therapy and as well as injections with pain management.  She is being considered for a trial of spinal cord stimulation.       MEDICAL HISTORY:             Past Medical History:   Diagnosis Date    Acquired hypothyroidism     B12 deficiency     Chronic back pain     Chronic pancreatitis (HCC)     Colon polyps     Depression     Fibromyalgia     GERD (gastroesophageal reflux disease)     Headache     Hypertension     Irritable bowel syndrome     Kidney stones     Neuropathy     On home oxygen therapy     3 liters    PONV (postoperative nausea and vomiting)     Restless legs syndrome     Sarcoidosis 11/01/2016    Scoliosis     SVT (supraventricular tachycardia) (HCC)        Past Surgical History:   Procedure Laterality Date    ABLATION OF DYSRHYTHMIC FOCUS  2016    SVT    ANAL SPHINCTEROTOMY N/A 02/19/2020    LATERAL SPHINCTEROTOMY

## 2024-07-25 ENCOUNTER — LAB (OUTPATIENT)
Dept: LAB | Facility: HOSPITAL | Age: 54
End: 2024-07-25
Payer: MEDICARE

## 2024-07-25 DIAGNOSIS — N18.32 ANEMIA IN STAGE 3B CHRONIC KIDNEY DISEASE: ICD-10-CM

## 2024-07-25 DIAGNOSIS — D63.1 ANEMIA IN STAGE 3B CHRONIC KIDNEY DISEASE: ICD-10-CM

## 2024-07-25 LAB
BASOPHILS # BLD AUTO: 0.02 10*3/MM3 (ref 0–0.2)
BASOPHILS NFR BLD AUTO: 0.9 % (ref 0–1.5)
DEPRECATED RDW RBC AUTO: 47.2 FL (ref 37–54)
EOSINOPHIL # BLD AUTO: 0.09 10*3/MM3 (ref 0–0.4)
EOSINOPHIL NFR BLD AUTO: 4.1 % (ref 0.3–6.2)
ERYTHROCYTE [DISTWIDTH] IN BLOOD BY AUTOMATED COUNT: 13.2 % (ref 12.3–15.4)
FERRITIN SERPL-MCNC: 354.1 NG/ML (ref 13–150)
HCT VFR BLD AUTO: 32.8 % (ref 34–46.6)
HGB BLD-MCNC: 10.3 G/DL (ref 12–15.9)
IMM GRANULOCYTES # BLD AUTO: 0.01 10*3/MM3 (ref 0–0.05)
IMM GRANULOCYTES NFR BLD AUTO: 0.5 % (ref 0–0.5)
IRON 24H UR-MRATE: 80 MCG/DL (ref 37–145)
IRON SATN MFR SERPL: 29 % (ref 20–50)
LYMPHOCYTES # BLD AUTO: 0.52 10*3/MM3 (ref 0.7–3.1)
LYMPHOCYTES NFR BLD AUTO: 23.7 % (ref 19.6–45.3)
MCH RBC QN AUTO: 30.7 PG (ref 26.6–33)
MCHC RBC AUTO-ENTMCNC: 31.4 G/DL (ref 31.5–35.7)
MCV RBC AUTO: 97.6 FL (ref 79–97)
MONOCYTES # BLD AUTO: 0.3 10*3/MM3 (ref 0.1–0.9)
MONOCYTES NFR BLD AUTO: 13.7 % (ref 5–12)
NEUTROPHILS NFR BLD AUTO: 1.25 10*3/MM3 (ref 1.7–7)
NEUTROPHILS NFR BLD AUTO: 57.1 % (ref 42.7–76)
PLATELET # BLD AUTO: 148 10*3/MM3 (ref 140–450)
PMV BLD AUTO: 10.1 FL (ref 6–12)
RBC # BLD AUTO: 3.36 10*6/MM3 (ref 3.77–5.28)
TIBC SERPL-MCNC: 277 MCG/DL (ref 298–536)
TRANSFERRIN SERPL-MCNC: 186 MG/DL (ref 200–360)
WBC NRBC COR # BLD AUTO: 2.19 10*3/MM3 (ref 3.4–10.8)

## 2024-07-25 PROCEDURE — 82728 ASSAY OF FERRITIN: CPT

## 2024-07-25 PROCEDURE — 36415 COLL VENOUS BLD VENIPUNCTURE: CPT

## 2024-07-25 PROCEDURE — 82607 VITAMIN B-12: CPT

## 2024-07-25 PROCEDURE — 82746 ASSAY OF FOLIC ACID SERUM: CPT

## 2024-07-25 PROCEDURE — 84466 ASSAY OF TRANSFERRIN: CPT

## 2024-07-25 PROCEDURE — 83540 ASSAY OF IRON: CPT

## 2024-07-25 PROCEDURE — 85025 COMPLETE CBC W/AUTO DIFF WBC: CPT

## 2024-07-26 LAB
FOLATE SERPL-MCNC: 7.03 NG/ML (ref 4.78–24.2)
VIT B12 BLD-MCNC: 432 PG/ML (ref 211–946)

## 2024-08-06 ENCOUNTER — HOSPITAL ENCOUNTER (OUTPATIENT)
Dept: MRI IMAGING | Age: 54
Discharge: HOME | End: 2024-08-06
Attending: NEUROLOGICAL SURGERY
Payer: MEDICARE

## 2024-08-06 DIAGNOSIS — M54.42 CHRONIC LEFT-SIDED LOW BACK PAIN WITH LEFT-SIDED SCIATICA: ICD-10-CM

## 2024-08-06 DIAGNOSIS — G89.29 CHRONIC LEFT-SIDED LOW BACK PAIN WITH LEFT-SIDED SCIATICA: ICD-10-CM

## 2024-08-06 DIAGNOSIS — M41.9 SCOLIOSIS OF THORACOLUMBAR SPINE, UNSPECIFIED SCOLIOSIS TYPE: ICD-10-CM

## 2024-08-06 PROCEDURE — 72148 MRI LUMBAR SPINE W/O DYE: CPT

## 2024-08-06 PROCEDURE — 72146 MRI CHEST SPINE W/O DYE: CPT

## 2024-08-07 ENCOUNTER — OFFICE VISIT (OUTPATIENT)
Dept: NEUROSURGERY | Age: 54
End: 2024-08-07
Payer: MEDICARE

## 2024-08-07 VITALS
BODY MASS INDEX: 52.09 KG/M2 | WEIGHT: 283.07 LBS | DIASTOLIC BLOOD PRESSURE: 88 MMHG | SYSTOLIC BLOOD PRESSURE: 128 MMHG | HEART RATE: 77 BPM | HEIGHT: 62 IN

## 2024-08-07 DIAGNOSIS — G89.29 CHRONIC LEFT-SIDED LOW BACK PAIN WITH LEFT-SIDED SCIATICA: ICD-10-CM

## 2024-08-07 DIAGNOSIS — R93.7 ABNORMAL MRI, THORACIC SPINE: Primary | ICD-10-CM

## 2024-08-07 DIAGNOSIS — M41.9 SCOLIOSIS OF THORACOLUMBAR SPINE, UNSPECIFIED SCOLIOSIS TYPE: ICD-10-CM

## 2024-08-07 DIAGNOSIS — M54.42 CHRONIC LEFT-SIDED LOW BACK PAIN WITH LEFT-SIDED SCIATICA: ICD-10-CM

## 2024-08-07 PROCEDURE — 3079F DIAST BP 80-89 MM HG: CPT | Performed by: NEUROLOGICAL SURGERY

## 2024-08-07 PROCEDURE — 3074F SYST BP LT 130 MM HG: CPT | Performed by: NEUROLOGICAL SURGERY

## 2024-08-07 PROCEDURE — 99214 OFFICE O/P EST MOD 30 MIN: CPT | Performed by: NEUROLOGICAL SURGERY

## 2024-08-07 ASSESSMENT — ENCOUNTER SYMPTOMS
GASTROINTESTINAL NEGATIVE: 1
BACK PAIN: 1
EYES NEGATIVE: 1
RESPIRATORY NEGATIVE: 1

## 2024-08-07 NOTE — PROGRESS NOTES
NEUROSURGERY FOLLOW UP      Chief Complaint:   Chief Complaint   Patient presents with    Follow-up     Patient states that her back pain is the same as last visit.     Results     MRI T/L 8/6/24         Interval Update:    Patient returns for planned follow-up and MRI review.  Her complaints are unchanged.  She continues to endorse left-sided thoracic and lumbar back pain as well as pain and numbness in her left leg.  She denies weakness.      HPI:     The patient is a 54 y.o. female who presents as a referral from pain management with complaints of worsening back pain and left leg pain.  She has multiple medical problems including sarcoidosis and chronic respiratory failure requiring home O2.  She also has a history of pancytopenia that is followed by hematology.  She has known scoliosis but denies ever seeing a spine surgeon in the past.  She complains of pain on the left side of her lower back that will radiate down her left lower extremity.  She also describes tingling in her left leg.  She denies focal weakness.  She has attempted physical therapy and as well as injections with pain management.  She is being considered for a trial of spinal cord stimulation.     ROS:    Constitutional: Negative.    HENT: Negative.     Eyes: Negative.    Respiratory: Negative.     Cardiovascular: Negative.    Gastrointestinal: Negative.    Genitourinary: Negative.    Musculoskeletal:  Positive for back pain, joint pain and myalgias.   Skin: Negative.    Neurological:  Positive for tingling and weakness.   Endo/Heme/Allergies: Negative.    Psychiatric/Behavioral: Negative.      Review of systems was obtained by the medical assistant and reviewed by myself.    Objective:    /88   Pulse 77   Ht 1.575 m (5' 2.01\")   Wt 128.4 kg (283 lb 1.1 oz)   BMI 51.76 kg/m²         Physical Exam:    General: alert, cooperative, no distress  Cardiorespiratory: unlabored breathing      Neurologic Exam:    Mental Status: Alert, oriented,

## 2024-08-08 ENCOUNTER — TELEPHONE (OUTPATIENT)
Dept: NEUROSURGERY | Age: 54
End: 2024-08-08

## 2024-08-08 DIAGNOSIS — M51.36 DEGENERATIVE DISC DISEASE, LUMBAR: ICD-10-CM

## 2024-08-08 NOTE — TELEPHONE ENCOUNTER
Patient called to get an update on her appointment that was in process of being scheduled at [facility] for a CT scan. Please return patient's call.

## 2024-08-09 DIAGNOSIS — M51.36 DEGENERATIVE DISC DISEASE, LUMBAR: ICD-10-CM

## 2024-08-09 DIAGNOSIS — G89.29 CHRONIC BILATERAL LOW BACK PAIN WITHOUT SCIATICA: ICD-10-CM

## 2024-08-09 DIAGNOSIS — M54.50 CHRONIC BILATERAL LOW BACK PAIN WITHOUT SCIATICA: ICD-10-CM

## 2024-08-09 RX ORDER — TIZANIDINE 4 MG/1
4 TABLET ORAL 3 TIMES DAILY PRN
Qty: 270 TABLET | Refills: 1 | Status: SHIPPED | OUTPATIENT
Start: 2024-08-09

## 2024-08-09 RX ORDER — PREGABALIN 100 MG/1
100 CAPSULE ORAL 3 TIMES DAILY
Qty: 270 CAPSULE | Refills: 0 | Status: SHIPPED | OUTPATIENT
Start: 2024-08-09 | End: 2024-11-07

## 2024-08-09 NOTE — TELEPHONE ENCOUNTER
Mindi MAGAÑA Adrianne called to request a refill on her medication.      Last office visit : 5/20/2024   Next office visit : 11/19/2024     Last UDS:   Benzodiazepine Screen, Urine   Date Value Ref Range Status   12/18/2023 neg  Final     Buprenorphine Urine   Date Value Ref Range Status   12/18/2023 neg  Final     Cocaine Metabolite Screen, Urine   Date Value Ref Range Status   12/18/2023 neg  Final     Gabapentin Screen, Urine   Date Value Ref Range Status   12/18/2023 neg  Final     Oxycodone Screen, Ur   Date Value Ref Range Status   12/18/2023 neg  Final     Propoxyphene Screen, Urine   Date Value Ref Range Status   12/18/2023 neg  Final     THC Screen, Urine   Date Value Ref Range Status   12/18/2023 neg  Final     Tricyclic Antidepressants, Urine   Date Value Ref Range Status   12/18/2023 positive  Final       Last Tang: 8/9/24  Medication Contract: 12/15/23   Last Fill: 5/20/24    Requested Prescriptions     Pending Prescriptions Disp Refills    pregabalin (LYRICA) 100 MG capsule 270 capsule 1     Sig: Take 1 capsule by mouth 3 times daily for 180 days. Max Daily Amount: 300 mg         Please approve or refuse this medication.   Kera Sorensen

## 2024-08-09 NOTE — TELEPHONE ENCOUNTER
Mindi Silver called to request a refill on her medication.      Last office visit : 5/20/2024   Next office visit : 11/19/2024     Requested Prescriptions     Pending Prescriptions Disp Refills    tiZANidine (ZANAFLEX) 4 MG tablet [Pharmacy Med Name: TIZANIDINE HCL 4 MG TABLET] 270 tablet 1     Sig: TAKE 1 TABLET BY MOUTH 3 TIMES DAILY AS NEEDED (MUSCLE SPASMS).            Kera Sorensen

## 2024-08-15 ENCOUNTER — TRANSCRIBE ORDERS (OUTPATIENT)
Dept: ADMINISTRATIVE | Facility: HOSPITAL | Age: 54
End: 2024-08-15
Payer: MEDICARE

## 2024-08-15 DIAGNOSIS — R93.7 ABNORMAL MRI, THORACIC SPINE: Primary | ICD-10-CM

## 2024-08-15 DIAGNOSIS — M54.42 CHRONIC MIDLINE LOW BACK PAIN WITH LEFT-SIDED SCIATICA: ICD-10-CM

## 2024-08-15 DIAGNOSIS — G89.29 CHRONIC MIDLINE LOW BACK PAIN WITH LEFT-SIDED SCIATICA: ICD-10-CM

## 2024-08-15 DIAGNOSIS — M41.9 SCOLIOSIS OF THORACOLUMBAR SPINE, UNSPECIFIED SCOLIOSIS TYPE: ICD-10-CM

## 2024-09-10 ENCOUNTER — TELEPHONE (OUTPATIENT)
Dept: ONCOLOGY | Facility: CLINIC | Age: 54
End: 2024-09-10
Payer: MEDICARE

## 2024-09-10 NOTE — TELEPHONE ENCOUNTER
Caller: Stacy Lynn    Relationship: Self    Best call back number: [phone]     What is the best time to reach you: ASAP    Who are you requesting to speak with (clinical staff, provider,  specific staff member): SCHEDULING        What was the call regarding: PT SAYS SHE NEEDS TO COME FOR A LAB ON 9/13, AHEAD OF HER 9/16 APPT WITH [facility], BUT SHE IS NOT CURRENTLY SCHEDULED FOR THE LAB.  ASKING IF SHE NEEDS ORDERS TO TAKE DOWNSTAIRS WITH HER WHEN SHE GOES OR IF THAT NEEDS TO BE SCHEDULED.  PLEASE CALL PT TO ADVISE.

## 2024-09-11 ENCOUNTER — TELEPHONE (OUTPATIENT)
Dept: NEUROSURGERY | Age: 54
End: 2024-09-11

## 2024-09-11 ENCOUNTER — HOSPITAL ENCOUNTER (OUTPATIENT)
Dept: CT IMAGING | Facility: HOSPITAL | Age: 54
Discharge: HOME | End: 2024-09-11
Admitting: NEUROLOGICAL SURGERY
Payer: MEDICARE

## 2024-09-11 DIAGNOSIS — R93.7 ABNORMAL MRI, THORACIC SPINE: ICD-10-CM

## 2024-09-11 DIAGNOSIS — G89.29 CHRONIC MIDLINE LOW BACK PAIN WITH LEFT-SIDED SCIATICA: ICD-10-CM

## 2024-09-11 DIAGNOSIS — M54.42 CHRONIC MIDLINE LOW BACK PAIN WITH LEFT-SIDED SCIATICA: ICD-10-CM

## 2024-09-11 DIAGNOSIS — M41.9 SCOLIOSIS OF THORACOLUMBAR SPINE, UNSPECIFIED SCOLIOSIS TYPE: ICD-10-CM

## 2024-09-11 PROCEDURE — 72128 CT CHEST SPINE W/O DYE: CPT

## 2024-09-13 ENCOUNTER — LAB (OUTPATIENT)
Dept: LAB | Facility: HOSPITAL | Age: 54
End: 2024-09-13
Payer: MEDICARE

## 2024-09-13 DIAGNOSIS — E53.8 B12 DEFICIENCY: ICD-10-CM

## 2024-09-13 DIAGNOSIS — D50.9 IRON DEFICIENCY ANEMIA, UNSPECIFIED IRON DEFICIENCY ANEMIA TYPE: ICD-10-CM

## 2024-09-13 DIAGNOSIS — D50.9 IRON DEFICIENCY ANEMIA, UNSPECIFIED IRON DEFICIENCY ANEMIA TYPE: Primary | ICD-10-CM

## 2024-09-13 DIAGNOSIS — D69.6 THROMBOCYTOPENIA: ICD-10-CM

## 2024-09-13 LAB
ALBUMIN SERPL-MCNC: 4.1 G/DL (ref 3.5–5.2)
ALBUMIN/GLOB SERPL: 1.6 G/DL
ALP SERPL-CCNC: 77 U/L (ref 39–117)
ALT SERPL W P-5'-P-CCNC: 18 U/L (ref 1–33)
ANION GAP SERPL CALCULATED.3IONS-SCNC: 12 MMOL/L (ref 5–15)
AST SERPL-CCNC: 20 U/L (ref 1–32)
BASOPHILS # BLD AUTO: 0.03 10*3/MM3 (ref 0–0.2)
BASOPHILS NFR BLD AUTO: 1 % (ref 0–1.5)
BILIRUB SERPL-MCNC: 0.2 MG/DL (ref 0–1.2)
BUN SERPL-MCNC: 18 MG/DL (ref 6–20)
BUN/CREAT SERPL: 13.3 (ref 7–25)
CALCIUM SPEC-SCNC: 8.7 MG/DL (ref 8.6–10.5)
CHLORIDE SERPL-SCNC: 112 MMOL/L (ref 98–107)
CO2 SERPL-SCNC: 19 MMOL/L (ref 22–29)
CREAT SERPL-MCNC: 1.35 MG/DL (ref 0.57–1)
DEPRECATED RDW RBC AUTO: 43.6 FL (ref 37–54)
EGFRCR SERPLBLD CKD-EPI 2021: 46.2 ML/MIN/1.73
EOSINOPHIL # BLD AUTO: 0.14 10*3/MM3 (ref 0–0.4)
EOSINOPHIL NFR BLD AUTO: 4.5 % (ref 0.3–6.2)
ERYTHROCYTE [DISTWIDTH] IN BLOOD BY AUTOMATED COUNT: 13 % (ref 12.3–15.4)
FERRITIN SERPL-MCNC: 363.3 NG/ML (ref 13–150)
GLOBULIN UR ELPH-MCNC: 2.6 GM/DL
GLUCOSE SERPL-MCNC: 104 MG/DL (ref 65–99)
HCT VFR BLD AUTO: 33.3 % (ref 34–46.6)
HGB BLD-MCNC: 10.8 G/DL (ref 12–15.9)
IMM GRANULOCYTES # BLD AUTO: 0.02 10*3/MM3 (ref 0–0.05)
IMM GRANULOCYTES NFR BLD AUTO: 0.6 % (ref 0–0.5)
IRON 24H UR-MRATE: 85 MCG/DL (ref 37–145)
IRON SATN MFR SERPL: 29 % (ref 20–50)
LYMPHOCYTES # BLD AUTO: 0.84 10*3/MM3 (ref 0.7–3.1)
LYMPHOCYTES NFR BLD AUTO: 27.1 % (ref 19.6–45.3)
MCH RBC QN AUTO: 29.8 PG (ref 26.6–33)
MCHC RBC AUTO-ENTMCNC: 32.4 G/DL (ref 31.5–35.7)
MCV RBC AUTO: 92 FL (ref 79–97)
MONOCYTES # BLD AUTO: 0.28 10*3/MM3 (ref 0.1–0.9)
MONOCYTES NFR BLD AUTO: 9 % (ref 5–12)
NEUTROPHILS NFR BLD AUTO: 1.79 10*3/MM3 (ref 1.7–7)
NEUTROPHILS NFR BLD AUTO: 57.8 % (ref 42.7–76)
PLATELET # BLD AUTO: 133 10*3/MM3 (ref 140–450)
PMV BLD AUTO: 10.3 FL (ref 6–12)
POTASSIUM SERPL-SCNC: 4.2 MMOL/L (ref 3.5–5.2)
PROT SERPL-MCNC: 6.7 G/DL (ref 6–8.5)
RBC # BLD AUTO: 3.62 10*6/MM3 (ref 3.77–5.28)
SODIUM SERPL-SCNC: 143 MMOL/L (ref 136–145)
TIBC SERPL-MCNC: 297 MCG/DL (ref 298–536)
TRANSFERRIN SERPL-MCNC: 199 MG/DL (ref 200–360)
WBC NRBC COR # BLD AUTO: 3.1 10*3/MM3 (ref 3.4–10.8)

## 2024-09-13 PROCEDURE — 82728 ASSAY OF FERRITIN: CPT

## 2024-09-13 PROCEDURE — 84466 ASSAY OF TRANSFERRIN: CPT

## 2024-09-13 PROCEDURE — 82746 ASSAY OF FOLIC ACID SERUM: CPT

## 2024-09-13 PROCEDURE — 36415 COLL VENOUS BLD VENIPUNCTURE: CPT

## 2024-09-13 PROCEDURE — 82607 VITAMIN B-12: CPT

## 2024-09-13 PROCEDURE — 85025 COMPLETE CBC W/AUTO DIFF WBC: CPT

## 2024-09-13 PROCEDURE — 83540 ASSAY OF IRON: CPT

## 2024-09-13 PROCEDURE — 80053 COMPREHEN METABOLIC PANEL: CPT

## 2024-09-14 LAB
FOLATE SERPL-MCNC: >20 NG/ML (ref 4.78–24.2)
VIT B12 BLD-MCNC: 782 PG/ML (ref 211–946)

## 2024-09-17 ENCOUNTER — OFFICE VISIT (OUTPATIENT)
Dept: PRIMARY CARE CLINIC | Age: 54
End: 2024-09-17
Payer: MEDICARE

## 2024-09-17 VITALS
DIASTOLIC BLOOD PRESSURE: 84 MMHG | HEART RATE: 71 BPM | OXYGEN SATURATION: 99 % | SYSTOLIC BLOOD PRESSURE: 128 MMHG | BODY MASS INDEX: 52.81 KG/M2 | TEMPERATURE: 97.9 F | WEIGHT: 287 LBS | HEIGHT: 62 IN

## 2024-09-17 DIAGNOSIS — Z23 NEED FOR PNEUMOCOCCAL VACCINATION: ICD-10-CM

## 2024-09-17 DIAGNOSIS — J44.9 CHRONIC OBSTRUCTIVE PULMONARY DISEASE, UNSPECIFIED COPD TYPE (HCC): Primary | ICD-10-CM

## 2024-09-17 DIAGNOSIS — D86.9 SARCOIDOSIS: ICD-10-CM

## 2024-09-17 DIAGNOSIS — J96.11 CHRONIC RESPIRATORY FAILURE WITH HYPOXIA (HCC): ICD-10-CM

## 2024-09-17 PROCEDURE — G0009 ADMIN PNEUMOCOCCAL VACCINE: HCPCS | Performed by: NURSE PRACTITIONER

## 2024-09-17 PROCEDURE — 90677 PCV20 VACCINE IM: CPT | Performed by: NURSE PRACTITIONER

## 2024-09-17 PROCEDURE — 3079F DIAST BP 80-89 MM HG: CPT | Performed by: NURSE PRACTITIONER

## 2024-09-17 PROCEDURE — 3074F SYST BP LT 130 MM HG: CPT | Performed by: NURSE PRACTITIONER

## 2024-09-17 PROCEDURE — 99213 OFFICE O/P EST LOW 20 MIN: CPT | Performed by: NURSE PRACTITIONER

## 2024-09-17 SDOH — ECONOMIC STABILITY: FOOD INSECURITY: WITHIN THE PAST 12 MONTHS, THE FOOD YOU BOUGHT JUST DIDN'T LAST AND YOU DIDN'T HAVE MONEY TO GET MORE.: OFTEN TRUE

## 2024-09-17 SDOH — ECONOMIC STABILITY: FOOD INSECURITY: WITHIN THE PAST 12 MONTHS, YOU WORRIED THAT YOUR FOOD WOULD RUN OUT BEFORE YOU GOT MONEY TO BUY MORE.: OFTEN TRUE

## 2024-09-17 SDOH — ECONOMIC STABILITY: INCOME INSECURITY: HOW HARD IS IT FOR YOU TO PAY FOR THE VERY BASICS LIKE FOOD, HOUSING, MEDICAL CARE, AND HEATING?: VERY HARD

## 2024-09-18 ASSESSMENT — ENCOUNTER SYMPTOMS
DIARRHEA: 0
CHEST TIGHTNESS: 0
SORE THROAT: 0
SHORTNESS OF BREATH: 1
NAUSEA: 0
ABDOMINAL PAIN: 0
COUGH: 0
WHEEZING: 0

## 2024-09-20 ENCOUNTER — TELEPHONE (OUTPATIENT)
Dept: PULMONOLOGY | Facility: CLINIC | Age: 54
End: 2024-09-20
Payer: MEDICARE

## 2024-09-26 ENCOUNTER — OFFICE VISIT (OUTPATIENT)
Dept: PULMONOLOGY | Facility: CLINIC | Age: 54
End: 2024-09-26
Payer: MEDICARE

## 2024-09-26 VITALS
HEIGHT: 62 IN | WEIGHT: 280.6 LBS | DIASTOLIC BLOOD PRESSURE: 90 MMHG | HEART RATE: 90 BPM | SYSTOLIC BLOOD PRESSURE: 120 MMHG | BODY MASS INDEX: 51.64 KG/M2 | OXYGEN SATURATION: 98 %

## 2024-09-26 DIAGNOSIS — D86.9 SARCOIDOSIS: ICD-10-CM

## 2024-09-26 DIAGNOSIS — J96.11 CHRONIC RESPIRATORY FAILURE WITH HYPOXIA: Primary | ICD-10-CM

## 2024-09-26 RX ORDER — BREXPIPRAZOLE 1 MG/1
1 TABLET ORAL DAILY
COMMUNITY

## 2024-09-26 RX ORDER — AZITHROMYCIN 250 MG/1
TABLET, FILM COATED ORAL
Qty: 17 TABLET | Refills: 0 | Status: SHIPPED | OUTPATIENT
Start: 2024-09-26

## 2024-09-26 RX ORDER — BUPRENORPHINE HYDROCHLORIDE 600 UG/1
FILM, SOLUBLE BUCCAL
COMMUNITY
Start: 2024-08-20

## 2024-09-26 RX ORDER — DESVENLAFAXINE 100 MG/1
100 TABLET, EXTENDED RELEASE ORAL DAILY
COMMUNITY

## 2024-09-30 ENCOUNTER — HOSPITAL ENCOUNTER (OUTPATIENT)
Dept: MRI IMAGING | Age: 54
Discharge: HOME | End: 2024-09-30
Attending: ORTHOPAEDIC SURGERY
Payer: MEDICARE

## 2024-09-30 DIAGNOSIS — M25.561 RIGHT KNEE PAIN, UNSPECIFIED CHRONICITY: ICD-10-CM

## 2024-09-30 PROCEDURE — 73721 MRI JNT OF LWR EXTRE W/O DYE: CPT

## 2024-10-09 ENCOUNTER — TRANSCRIBE ORDERS (OUTPATIENT)
Dept: ADMINISTRATIVE | Facility: HOSPITAL | Age: 54
End: 2024-10-09
Payer: MEDICARE

## 2024-10-09 ENCOUNTER — TELEPHONE (OUTPATIENT)
Dept: NEUROSURGERY | Age: 54
End: 2024-10-09

## 2024-10-09 DIAGNOSIS — R93.7 ABNORMAL MRI, THORACIC SPINE: Primary | ICD-10-CM

## 2024-10-09 DIAGNOSIS — G89.29 CHRONIC LEFT-SIDED LOW BACK PAIN WITH LEFT-SIDED SCIATICA: ICD-10-CM

## 2024-10-09 DIAGNOSIS — M41.9 SCOLIOSIS OF THORACOLUMBAR SPINE, UNSPECIFIED SCOLIOSIS TYPE: ICD-10-CM

## 2024-10-09 DIAGNOSIS — M54.42 CHRONIC LEFT-SIDED LOW BACK PAIN WITH LEFT-SIDED SCIATICA: ICD-10-CM

## 2024-10-09 NOTE — TELEPHONE ENCOUNTER
Per Dr Felton, [facility] did not complete correct imaging. I called and spoke with Betty at [facility] imaging and she stated that she only received the CT T order not the LP part of the order. I re faxed all imaging orders to number provided [phone].    LVM for patient to call back to r/s appt. Called and LVM for [facility]  Vandana to let her know that appt is being r/s per Dr Felton.

## 2024-10-10 ENCOUNTER — TELEPHONE (OUTPATIENT)
Dept: PULMONOLOGY | Facility: CLINIC | Age: 54
End: 2024-10-10
Payer: MEDICARE

## 2024-10-10 NOTE — TELEPHONE ENCOUNTER
Caller: Stacy Lynn    Relationship to patient: Self    Best call back number: [phone]    Patient is needing: PT JUST GOT A CALL FROM [facility]. THEY NEED CLINICAL NOTES FOR THE PT SO THEY DONT CANCEL HER CONTRACT. PLEASE CALL PT TO ADVISE

## 2024-10-10 NOTE — TELEPHONE ENCOUNTER
Patient did not qualify for portable oxygen at last visit.  I spoke to local [facility] and faxed the most recent office notes. Paperwork for oxygen in media.

## 2024-10-14 ENCOUNTER — TRANSCRIBE ORDERS (OUTPATIENT)
Dept: ADMINISTRATIVE | Facility: HOSPITAL | Age: 54
End: 2024-10-14
Payer: MEDICARE

## 2024-10-14 ENCOUNTER — HOSPITAL ENCOUNTER (OUTPATIENT)
Dept: GENERAL RADIOLOGY | Facility: HOSPITAL | Age: 54
Discharge: HOME | End: 2024-10-14
Payer: MEDICARE

## 2024-10-14 ENCOUNTER — HOSPITAL ENCOUNTER (OUTPATIENT)
Dept: CT IMAGING | Facility: HOSPITAL | Age: 54
Discharge: HOME | End: 2024-10-14
Payer: MEDICARE

## 2024-10-14 VITALS
HEIGHT: 63 IN | TEMPERATURE: 97 F | WEIGHT: 292.11 LBS | SYSTOLIC BLOOD PRESSURE: 139 MMHG | RESPIRATION RATE: 18 BRPM | HEART RATE: 55 BPM | OXYGEN SATURATION: 100 % | BODY MASS INDEX: 51.76 KG/M2 | DIASTOLIC BLOOD PRESSURE: 78 MMHG

## 2024-10-14 DIAGNOSIS — M54.42 CHRONIC LEFT-SIDED LOW BACK PAIN WITH LEFT-SIDED SCIATICA: ICD-10-CM

## 2024-10-14 DIAGNOSIS — M41.9 SCOLIOSIS OF THORACOLUMBAR SPINE, UNSPECIFIED SCOLIOSIS TYPE: ICD-10-CM

## 2024-10-14 DIAGNOSIS — R93.7 ABNORMAL MRI, THORACIC SPINE: ICD-10-CM

## 2024-10-14 DIAGNOSIS — R93.7 ABNORMAL MRI, THORACIC SPINE: Primary | ICD-10-CM

## 2024-10-14 DIAGNOSIS — G89.29 CHRONIC RIGHT-SIDED LOW BACK PAIN WITH LEFT-SIDED SCIATICA: ICD-10-CM

## 2024-10-14 DIAGNOSIS — M54.42 CHRONIC RIGHT-SIDED LOW BACK PAIN WITH LEFT-SIDED SCIATICA: ICD-10-CM

## 2024-10-14 DIAGNOSIS — G89.29 CHRONIC LEFT-SIDED LOW BACK PAIN WITH LEFT-SIDED SCIATICA: ICD-10-CM

## 2024-10-14 LAB
APTT PPP: 29 SECONDS (ref 24.5–36)
BASOPHILS # BLD AUTO: 0.03 10*3/MM3 (ref 0–0.2)
BASOPHILS NFR BLD AUTO: 0.8 % (ref 0–1.5)
DEPRECATED RDW RBC AUTO: 49.1 FL (ref 37–54)
EOSINOPHIL # BLD AUTO: 0.13 10*3/MM3 (ref 0–0.4)
EOSINOPHIL NFR BLD AUTO: 3.7 % (ref 0.3–6.2)
ERYTHROCYTE [DISTWIDTH] IN BLOOD BY AUTOMATED COUNT: 13.7 % (ref 12.3–15.4)
HCT VFR BLD AUTO: 35.5 % (ref 34–46.6)
HGB BLD-MCNC: 11 G/DL (ref 12–15.9)
IMM GRANULOCYTES # BLD AUTO: 0.01 10*3/MM3 (ref 0–0.05)
IMM GRANULOCYTES NFR BLD AUTO: 0.3 % (ref 0–0.5)
INR PPP: 0.92 (ref 0.91–1.09)
LYMPHOCYTES # BLD AUTO: 0.72 10*3/MM3 (ref 0.7–3.1)
LYMPHOCYTES NFR BLD AUTO: 20.3 % (ref 19.6–45.3)
MCH RBC QN AUTO: 30.1 PG (ref 26.6–33)
MCHC RBC AUTO-ENTMCNC: 31 G/DL (ref 31.5–35.7)
MCV RBC AUTO: 97 FL (ref 79–97)
MONOCYTES # BLD AUTO: 0.38 10*3/MM3 (ref 0.1–0.9)
MONOCYTES NFR BLD AUTO: 10.7 % (ref 5–12)
NEUTROPHILS NFR BLD AUTO: 2.28 10*3/MM3 (ref 1.7–7)
NEUTROPHILS NFR BLD AUTO: 64.2 % (ref 42.7–76)
PLATELET # BLD AUTO: 141 10*3/MM3 (ref 140–450)
PMV BLD AUTO: 10.2 FL (ref 6–12)
PROTHROMBIN TIME: 12.7 SECONDS (ref 11.8–14.8)
RBC # BLD AUTO: 3.66 10*6/MM3 (ref 3.77–5.28)
WBC NRBC COR # BLD AUTO: 3.55 10*3/MM3 (ref 3.4–10.8)

## 2024-10-14 PROCEDURE — 85610 PROTHROMBIN TIME: CPT | Performed by: RADIOLOGY

## 2024-10-14 PROCEDURE — 85730 THROMBOPLASTIN TIME PARTIAL: CPT | Performed by: RADIOLOGY

## 2024-10-14 PROCEDURE — 72129 CT CHEST SPINE W/DYE: CPT

## 2024-10-14 PROCEDURE — 72240 MYELOGRAPHY NECK SPINE: CPT

## 2024-10-14 PROCEDURE — 62303 MYELOGRAPHY LUMBAR INJECTION: CPT

## 2024-10-14 PROCEDURE — 85025 COMPLETE CBC W/AUTO DIFF WBC: CPT | Performed by: RADIOLOGY

## 2024-10-14 PROCEDURE — 77003 FLUOROGUIDE FOR SPINE INJECT: CPT

## 2024-10-14 PROCEDURE — 62284 INJECTION FOR MYELOGRAM: CPT

## 2024-10-14 PROCEDURE — 25510000001 IOPAMIDOL 61 % SOLUTION: Performed by: NEUROLOGICAL SURGERY

## 2024-10-14 RX ORDER — METOPROLOL TARTRATE 100 MG
100 TABLET ORAL DAILY
COMMUNITY

## 2024-10-14 RX ORDER — IOPAMIDOL 612 MG/ML
15 INJECTION, SOLUTION INTRATHECAL
Status: COMPLETED | OUTPATIENT
Start: 2024-10-14 | End: 2024-10-14

## 2024-10-14 RX ADMIN — IOPAMIDOL 15 ML: 612 INJECTION, SOLUTION INTRATHECAL at 12:24

## 2024-10-25 ENCOUNTER — LAB (OUTPATIENT)
Dept: LAB | Facility: HOSPITAL | Age: 54
End: 2024-10-25
Payer: MEDICARE

## 2024-10-25 DIAGNOSIS — E53.8 B12 DEFICIENCY: ICD-10-CM

## 2024-10-25 DIAGNOSIS — D50.9 IRON DEFICIENCY ANEMIA, UNSPECIFIED IRON DEFICIENCY ANEMIA TYPE: ICD-10-CM

## 2024-10-25 LAB
ALBUMIN SERPL-MCNC: 4.2 G/DL (ref 3.5–5.2)
ALBUMIN/GLOB SERPL: 1.6 G/DL
ALP SERPL-CCNC: 88 U/L (ref 39–117)
ALT SERPL W P-5'-P-CCNC: 15 U/L (ref 1–33)
ANION GAP SERPL CALCULATED.3IONS-SCNC: 9 MMOL/L (ref 5–15)
AST SERPL-CCNC: 21 U/L (ref 1–32)
BASOPHILS # BLD AUTO: 0.02 10*3/MM3 (ref 0–0.2)
BASOPHILS NFR BLD AUTO: 0.6 % (ref 0–1.5)
BILIRUB SERPL-MCNC: 0.5 MG/DL (ref 0–1.2)
BUN SERPL-MCNC: 18 MG/DL (ref 6–20)
BUN/CREAT SERPL: 15.5 (ref 7–25)
CALCIUM SPEC-SCNC: 8.6 MG/DL (ref 8.6–10.5)
CHLORIDE SERPL-SCNC: 113 MMOL/L (ref 98–107)
CO2 SERPL-SCNC: 21 MMOL/L (ref 22–29)
CREAT SERPL-MCNC: 1.16 MG/DL (ref 0.57–1)
DEPRECATED RDW RBC AUTO: 46.5 FL (ref 37–54)
EGFRCR SERPLBLD CKD-EPI 2021: 55.4 ML/MIN/1.73
EOSINOPHIL # BLD AUTO: 0.13 10*3/MM3 (ref 0–0.4)
EOSINOPHIL NFR BLD AUTO: 4.1 % (ref 0.3–6.2)
ERYTHROCYTE [DISTWIDTH] IN BLOOD BY AUTOMATED COUNT: 13.7 % (ref 12.3–15.4)
FERRITIN SERPL-MCNC: 265.9 NG/ML (ref 13–150)
GLOBULIN UR ELPH-MCNC: 2.7 GM/DL
GLUCOSE SERPL-MCNC: 107 MG/DL (ref 65–99)
HCT VFR BLD AUTO: 36.2 % (ref 34–46.6)
HGB BLD-MCNC: 11.7 G/DL (ref 12–15.9)
IMM GRANULOCYTES # BLD AUTO: 0.01 10*3/MM3 (ref 0–0.05)
IMM GRANULOCYTES NFR BLD AUTO: 0.3 % (ref 0–0.5)
IRON 24H UR-MRATE: 86 MCG/DL (ref 37–145)
IRON SATN MFR SERPL: 29 % (ref 20–50)
LYMPHOCYTES # BLD AUTO: 0.66 10*3/MM3 (ref 0.7–3.1)
LYMPHOCYTES NFR BLD AUTO: 21 % (ref 19.6–45.3)
MCH RBC QN AUTO: 30.1 PG (ref 26.6–33)
MCHC RBC AUTO-ENTMCNC: 32.3 G/DL (ref 31.5–35.7)
MCV RBC AUTO: 93.1 FL (ref 79–97)
MONOCYTES # BLD AUTO: 0.33 10*3/MM3 (ref 0.1–0.9)
MONOCYTES NFR BLD AUTO: 10.5 % (ref 5–12)
NEUTROPHILS NFR BLD AUTO: 2 10*3/MM3 (ref 1.7–7)
NEUTROPHILS NFR BLD AUTO: 63.5 % (ref 42.7–76)
NRBC BLD AUTO-RTO: 0 /100 WBC (ref 0–0.2)
PLATELET # BLD AUTO: 154 10*3/MM3 (ref 140–450)
PMV BLD AUTO: 9.9 FL (ref 6–12)
POTASSIUM SERPL-SCNC: 4.1 MMOL/L (ref 3.5–5.2)
PROT SERPL-MCNC: 6.9 G/DL (ref 6–8.5)
RBC # BLD AUTO: 3.89 10*6/MM3 (ref 3.77–5.28)
SODIUM SERPL-SCNC: 143 MMOL/L (ref 136–145)
TIBC SERPL-MCNC: 301 MCG/DL (ref 298–536)
TRANSFERRIN SERPL-MCNC: 202 MG/DL (ref 200–360)
WBC NRBC COR # BLD AUTO: 3.15 10*3/MM3 (ref 3.4–10.8)

## 2024-10-25 PROCEDURE — 36415 COLL VENOUS BLD VENIPUNCTURE: CPT

## 2024-10-25 PROCEDURE — 82728 ASSAY OF FERRITIN: CPT

## 2024-10-25 PROCEDURE — 83540 ASSAY OF IRON: CPT

## 2024-10-25 PROCEDURE — 82746 ASSAY OF FOLIC ACID SERUM: CPT

## 2024-10-25 PROCEDURE — 85025 COMPLETE CBC W/AUTO DIFF WBC: CPT

## 2024-10-25 PROCEDURE — 82607 VITAMIN B-12: CPT

## 2024-10-25 PROCEDURE — 84466 ASSAY OF TRANSFERRIN: CPT

## 2024-10-25 PROCEDURE — 80053 COMPREHEN METABOLIC PANEL: CPT

## 2024-10-26 LAB
FOLATE SERPL-MCNC: >20 NG/ML (ref 4.78–24.2)
VIT B12 BLD-MCNC: 501 PG/ML (ref 211–946)

## 2024-10-27 DIAGNOSIS — D86.9 SARCOIDOSIS, UNSPECIFIED: ICD-10-CM

## 2024-10-27 DIAGNOSIS — J96.11 CHRONIC RESPIRATORY FAILURE WITH HYPOXIA: ICD-10-CM

## 2024-10-27 DIAGNOSIS — E53.8 DEFICIENCY OF OTHER SPECIFIED B GROUP VITAMINS: ICD-10-CM

## 2024-10-28 RX ORDER — TOPIRAMATE 100 MG/1
100 TABLET, FILM COATED ORAL NIGHTLY
Qty: 90 TABLET | Refills: 2 | OUTPATIENT
Start: 2024-10-28

## 2024-10-28 RX ORDER — FLUTICASONE FUROATE, UMECLIDINIUM BROMIDE AND VILANTEROL TRIFENATATE 200; 62.5; 25 UG/1; UG/1; UG/1
1 POWDER RESPIRATORY (INHALATION) DAILY
Qty: 60 EACH | Refills: 5 | Status: SHIPPED | OUTPATIENT
Start: 2024-10-28

## 2024-10-28 NOTE — TELEPHONE ENCOUNTER
Please send refill to [facility] [address]. Thank you  Requested Prescriptions     Pending Prescriptions Disp Refills    Trelegy Ellipta 200-62.5-25 MCG/ACT inhaler [Pharmacy Med Name: TRELEGY ELLIPTA 200-62.5-25] 60 each 5     Sig: INHALE 1 PUFF DAILY      Last office visit with prescribing clinician: 9/26/2024   Last telemedicine visit with prescribing clinician: Visit date not found   Next office visit with prescribing clinician: 1/30/2025                         Would you like a call back once the refill request has been completed: [] Yes [] No    If the office needs to give you a call back, can they leave a voicemail: [] Yes [] No    Jesusita Brown MA  10/28/24, 08:50 CDT

## 2024-10-30 ENCOUNTER — OFFICE VISIT (OUTPATIENT)
Dept: NEUROSURGERY | Age: 54
End: 2024-10-30
Payer: MEDICARE

## 2024-10-30 ENCOUNTER — PREP FOR PROCEDURE (OUTPATIENT)
Dept: NEUROSURGERY | Age: 54
End: 2024-10-30

## 2024-10-30 VITALS
DIASTOLIC BLOOD PRESSURE: 87 MMHG | OXYGEN SATURATION: 98 % | HEIGHT: 62 IN | WEIGHT: 287 LBS | BODY MASS INDEX: 52.81 KG/M2 | HEART RATE: 86 BPM | SYSTOLIC BLOOD PRESSURE: 132 MMHG

## 2024-10-30 DIAGNOSIS — G89.29 CHRONIC LEFT-SIDED LOW BACK PAIN WITH LEFT-SIDED SCIATICA: Primary | ICD-10-CM

## 2024-10-30 DIAGNOSIS — M54.42 ACUTE BACK PAIN WITH SCIATICA, LEFT: ICD-10-CM

## 2024-10-30 DIAGNOSIS — M54.42 CHRONIC LEFT-SIDED LOW BACK PAIN WITH LEFT-SIDED SCIATICA: Primary | ICD-10-CM

## 2024-10-30 DIAGNOSIS — M41.9 SCOLIOSIS OF THORACOLUMBAR SPINE, UNSPECIFIED SCOLIOSIS TYPE: ICD-10-CM

## 2024-10-30 PROCEDURE — 3079F DIAST BP 80-89 MM HG: CPT | Performed by: NEUROLOGICAL SURGERY

## 2024-10-30 PROCEDURE — 99215 OFFICE O/P EST HI 40 MIN: CPT | Performed by: NEUROLOGICAL SURGERY

## 2024-10-30 PROCEDURE — 3075F SYST BP GE 130 - 139MM HG: CPT | Performed by: NEUROLOGICAL SURGERY

## 2024-10-30 RX ORDER — CYANOCOBALAMIN/FOLIC AC/VIT B6 1-2.5-25MG
1 TABLET ORAL DAILY
COMMUNITY
Start: 2024-10-07

## 2024-10-30 RX ORDER — ALBUTEROL SULFATE 90 UG/1
1 INHALANT RESPIRATORY (INHALATION) 4 TIMES DAILY
COMMUNITY
Start: 2024-09-07

## 2024-10-30 RX ORDER — CYANOCOBALAMIN/FOLIC AC/VIT B6 1-2.5-25MG
1 TABLET ORAL DAILY
Qty: 30 TABLET | Refills: 0 | Status: SHIPPED | OUTPATIENT
Start: 2024-10-30

## 2024-10-30 RX ORDER — BUPRENORPHINE HYDROCHLORIDE 600 UG/1
FILM, SOLUBLE BUCCAL
COMMUNITY
Start: 2024-08-20

## 2024-10-30 RX ORDER — FLUTICASONE FUROATE, UMECLIDINIUM BROMIDE AND VILANTEROL TRIFENATATE 200; 62.5; 25 UG/1; UG/1; UG/1
1 POWDER RESPIRATORY (INHALATION) DAILY
COMMUNITY
Start: 2024-10-10

## 2024-10-30 RX ORDER — BUDESONIDE, GLYCOPYRROLATE, AND FORMOTEROL FUMARATE 160; 9; 4.8 UG/1; UG/1; UG/1
AEROSOL, METERED RESPIRATORY (INHALATION)
COMMUNITY

## 2024-10-30 ASSESSMENT — ENCOUNTER SYMPTOMS
EYES NEGATIVE: 1
RESPIRATORY NEGATIVE: 1
BACK PAIN: 1
GASTROINTESTINAL NEGATIVE: 1

## 2024-10-30 NOTE — PROGRESS NOTES
[facility] Neurosurgery  Office Visit        Chief Complaint   Patient presents with    Follow-up     Patient states that her pain is a 7 out of 10 today in the middle of her back.     Results     Discuss CT myelogram        HISTORY OF PRESENT ILLNESS:      Interval Update:    Patient returns for planned follow-up after undergoing a thoracic CT myelogram to evaluate for possible ventral cord herniation.  No evidence of this was seen.  She continues to complain of severe back pain and left leg pain.  She is requesting to proceed with implantation of a spinal cord stimulator, as she has undergone a trial of spinal cord stimulation with the [employer] system stating she had excellent (~75%) pain relief during her trial.      HPI:     The patient is a 54 y.o. female who presents as a referral from pain management with complaints of worsening back pain and left leg pain. She has multiple medical problems including sarcoidosis and chronic respiratory failure requiring home O2. She also has a history of pancytopenia that is followed by hematology. She has known scoliosis but denies ever seeing a spine surgeon in the past. She complains of pain on the left side of her lower back that will radiate down her left lower extremity. She also describes tingling in her left leg. She denies focal weakness. She has attempted physical therapy and as well as injections with pain management. She is being considered for a trial of spinal cord stimulation.       MEDICAL HISTORY:             Past Medical History:   Diagnosis Date    Acquired hypothyroidism     B12 deficiency     Chronic back pain     Chronic pancreatitis (HCC)     Colon polyps     Depression     Fibromyalgia     GERD (gastroesophageal reflux disease)     Headache     Hypertension     Irritable bowel syndrome     Kidney stones     Neuropathy     On home oxygen therapy     3 liters    PONV (postoperative nausea and vomiting)     Restless legs syndrome     Sarcoidosis

## 2024-10-30 NOTE — PROGRESS NOTES
Review of Systems   Constitutional: Negative.    HENT: Negative.     Eyes: Negative.    Respiratory: Negative.     Cardiovascular: Negative.    Gastrointestinal: Negative.    Genitourinary: Negative.    Musculoskeletal:  Positive for back pain and myalgias.   Skin: Negative.    Endo/Heme/Allergies: Negative.    Psychiatric/Behavioral: Negative.

## 2024-11-09 DIAGNOSIS — M54.50 CHRONIC BILATERAL LOW BACK PAIN WITHOUT SCIATICA: ICD-10-CM

## 2024-11-09 DIAGNOSIS — G89.29 CHRONIC BILATERAL LOW BACK PAIN WITHOUT SCIATICA: ICD-10-CM

## 2024-11-09 DIAGNOSIS — M51.369 DEGENERATIVE DISC DISEASE, LUMBAR: ICD-10-CM

## 2024-11-11 DIAGNOSIS — G89.29 CHRONIC BILATERAL LOW BACK PAIN WITHOUT SCIATICA: ICD-10-CM

## 2024-11-11 DIAGNOSIS — M54.50 CHRONIC BILATERAL LOW BACK PAIN WITHOUT SCIATICA: ICD-10-CM

## 2024-11-11 DIAGNOSIS — M51.369 DEGENERATIVE DISC DISEASE, LUMBAR: ICD-10-CM

## 2024-11-11 RX ORDER — PREGABALIN 100 MG/1
100 CAPSULE ORAL 3 TIMES DAILY
Qty: 261 CAPSULE | Refills: 1 | OUTPATIENT
Start: 2024-11-11 | End: 2025-02-09

## 2024-11-12 ENCOUNTER — OFFICE VISIT (OUTPATIENT)
Dept: ONCOLOGY | Facility: CLINIC | Age: 54
End: 2024-11-12
Payer: MEDICARE

## 2024-11-12 VITALS
OXYGEN SATURATION: 98 % | RESPIRATION RATE: 16 BRPM | SYSTOLIC BLOOD PRESSURE: 126 MMHG | WEIGHT: 282 LBS | BODY MASS INDEX: 51.89 KG/M2 | HEIGHT: 62 IN | TEMPERATURE: 97.2 F | HEART RATE: 85 BPM | DIASTOLIC BLOOD PRESSURE: 88 MMHG

## 2024-11-12 DIAGNOSIS — G89.29 CHRONIC BILATERAL LOW BACK PAIN WITHOUT SCIATICA: ICD-10-CM

## 2024-11-12 DIAGNOSIS — M51.369 DEGENERATIVE DISC DISEASE, LUMBAR: ICD-10-CM

## 2024-11-12 DIAGNOSIS — D63.1 ANEMIA IN STAGE 3A CHRONIC KIDNEY DISEASE: ICD-10-CM

## 2024-11-12 DIAGNOSIS — D50.9 IRON DEFICIENCY ANEMIA, UNSPECIFIED IRON DEFICIENCY ANEMIA TYPE: Primary | ICD-10-CM

## 2024-11-12 DIAGNOSIS — M54.50 CHRONIC BILATERAL LOW BACK PAIN WITHOUT SCIATICA: ICD-10-CM

## 2024-11-12 DIAGNOSIS — N18.31 ANEMIA IN STAGE 3A CHRONIC KIDNEY DISEASE: ICD-10-CM

## 2024-11-12 DIAGNOSIS — E53.8 DEFICIENCY OF OTHER SPECIFIED B GROUP VITAMINS: ICD-10-CM

## 2024-11-12 PROCEDURE — 99213 OFFICE O/P EST LOW 20 MIN: CPT | Performed by: NURSE PRACTITIONER

## 2024-11-12 PROCEDURE — 1125F AMNT PAIN NOTED PAIN PRSNT: CPT | Performed by: NURSE PRACTITIONER

## 2024-11-12 PROCEDURE — 3079F DIAST BP 80-89 MM HG: CPT | Performed by: NURSE PRACTITIONER

## 2024-11-12 PROCEDURE — 3074F SYST BP LT 130 MM HG: CPT | Performed by: NURSE PRACTITIONER

## 2024-11-12 RX ORDER — CYANOCOBALAMIN/FOLIC AC/VIT B6 1-2.5-25MG
1 TABLET ORAL DAILY
Qty: 90 TABLET | Refills: 2 | Status: SHIPPED | OUTPATIENT
Start: 2024-11-12

## 2024-11-12 RX ORDER — PREGABALIN 100 MG/1
100 CAPSULE ORAL 3 TIMES DAILY
Qty: 261 CAPSULE | Refills: 1 | OUTPATIENT
Start: 2024-11-12 | End: 2025-02-10

## 2024-11-12 NOTE — PROGRESS NOTES
[facility] HEMATOLOGY & ONCOLOGY  [address]  [place] [address] [phone]       Patient:  Stacy Lynn  YOB: 1970  Date of Service: 12/23/2024  MRN: [id number]   Primary Care Physician: Alexa Tavares APRN     Hematology History:  Ms. Lynn is a 54 y.o.  female who returns in followup of anemia from chronic kidney disease, with leukopenia and intermittent thrombocytopenia without unifying diagnosis (prior use of methotrexate).   PREVIOUS INTERVENTIONS:   IV Venofer, 11/10/2014 - 11/20/2014 (1000 mg)  INFeD 500 mg on 07/19/2016 and 05/01/2017  2 units PRBC transfusions, 09/01/2017  Injectafer 750 mg IV, 04/23/2019    Interval History:     Pt presents to clinic today for continued follow up.     She has no acute complaints today.   She had labs drawn and results were reviewed with her in office.      Subjective      Review of Systems   Constitutional:  Negative for fatigue and fever.        C/o drenching nights sweats for the past year   HENT:  Negative for trouble swallowing.    Respiratory:  Positive for shortness of breath (uses supplemental oxygen at night). Negative for cough.    Cardiovascular:  Positive for leg swelling. Negative for chest pain and palpitations.   Gastrointestinal:  Positive for diarrhea (Chronic IBS). Negative for nausea and vomiting.   Endocrine: Positive for heat intolerance.   Genitourinary:  Negative for hematuria.   Musculoskeletal:  Positive for back pain. Negative for arthralgias.   Skin:  Negative for rash, skin lesions and wound.   Neurological:  Positive for confusion. Negative for dizziness, syncope and memory problem.   Psychiatric/Behavioral:  Positive for depressed mood. Negative for suicidal ideas. The patient is nervous/anxious.         Controlled with medication.   Denies S/I     Stacy Lynn reports a pain score of 7.  Given her pain assessment as noted, treatment options  were discussed and the following options were decided upon as a follow-up plan to address the patient's pain:  Sees [facility] and plans to have stimulator placed  .             Objective      Vitals:    11/12/24 1315   BP: 126/88   Pulse: 85   Resp: 16   Temp: 97.2 °F (36.2 °C)   SpO2: 98%     Physical Exam  Constitutional:       Appearance: Normal appearance.   HENT:      Head: Normocephalic and atraumatic.   Cardiovascular:      Rate and Rhythm: Normal rate and regular rhythm.   Pulmonary:      Effort: Pulmonary effort is normal.      Breath sounds: Normal breath sounds.   Abdominal:      General: Bowel sounds are normal.      Palpations: Abdomen is soft.   Musculoskeletal:      Right lower leg: No edema.      Left lower leg: No edema.   Skin:     General: Skin is warm and dry.   Neurological:      Mental Status: She is alert and oriented to person, place, and time.   Psychiatric:         Attention and Perception: Attention normal.         Mood and Affect: Mood normal.         Judgment: Judgment normal.           Results:       Lab Results   Component Value Date    HGB 11.7 (L) 10/25/2024    HCT 36.2 10/25/2024    MCV 93.1 10/25/2024     10/25/2024    WBC 3.15 (L) 10/25/2024    NEUTROABS 2.00 10/25/2024    LYMPHSABS 0.66 (L) 10/25/2024    MONOSABS 0.33 10/25/2024    EOSABS 0.13 10/25/2024    BASOSABS 0.02 10/25/2024     Lab Results   Component Value Date    GLUCOSE 107 (H) 10/25/2024    BUN 18 10/25/2024    CREATININE 1.16 (H) 10/25/2024     10/25/2024    K 4.1 10/25/2024     (H) 10/25/2024    CO2 21.0 (L) 10/25/2024    CALCIUM 8.6 10/25/2024    PROTEINTOT 6.9 10/25/2024    ALBUMIN 4.2 10/25/2024    BILITOT 0.5 10/25/2024    ALKPHOS 88 10/25/2024    AST 21 10/25/2024    ALT 15 10/25/2024              Assessment    1. Iron deficiency anemia, unspecified iron deficiency anemia type    2. Deficiency of other specified B group vitamins    3. Anemia in stage 3a chronic kidney disease         Anemia in CKD Stage IIIa  Iron Deficiency   CBC & Differential (10/25/2024 13:30)  Comprehensive Metabolic Panel (10/25/2024 13:30)  Iron and TIBC (10/25/2024 13:30)  Ferritin (10/25/2024 13:30)  -Stable for observation   Followed by Dr. Gallegos.  Sees him in January         Plan:   Labs in 3 months.   RTC in 6 months with labs one week before visit.   Pt voices understanding and agrees to plan.     Zohra Campa, APRN  12/23/2024

## 2024-11-12 NOTE — TELEPHONE ENCOUNTER
Mindi MAGAÑA Adrianne called to request a refill on her medication.      Last office visit : 9/17/2024   Next office visit : 11/19/2024     Last UDS:   Benzodiazepine Screen, Urine   Date Value Ref Range Status   12/18/2023 neg  Final     Buprenorphine Urine   Date Value Ref Range Status   12/18/2023 neg  Final     Cocaine Metabolite Screen, Urine   Date Value Ref Range Status   12/18/2023 neg  Final     Gabapentin Screen, Urine   Date Value Ref Range Status   12/18/2023 neg  Final     Oxycodone Screen, Ur   Date Value Ref Range Status   12/18/2023 neg  Final     Propoxyphene Screen, Urine   Date Value Ref Range Status   12/18/2023 neg  Final     THC Screen, Urine   Date Value Ref Range Status   12/18/2023 neg  Final     Tricyclic Antidepressants, Urine   Date Value Ref Range Status   12/18/2023 positive  Final       Last Tang: 11/12/24  Medication Contract: 125/20/23   Last Fill: 5/20/24      Requested Prescriptions     Pending Prescriptions Disp Refills    pregabalin (LYRICA) 100 MG capsule 270 capsule 0     Sig: Take 1 capsule by mouth 3 times daily for 90 days. Max Daily Amount: 300 mg         Please approve or refuse this medication.   Kera Sorensen

## 2024-11-13 RX ORDER — PREGABALIN 100 MG/1
100 CAPSULE ORAL 3 TIMES DAILY
Qty: 270 CAPSULE | Refills: 0 | Status: SHIPPED | OUTPATIENT
Start: 2024-11-13 | End: 2025-02-11

## 2024-11-18 DIAGNOSIS — N18.32 STAGE 3B CHRONIC KIDNEY DISEASE (HCC): ICD-10-CM

## 2024-11-18 DIAGNOSIS — E03.9 ACQUIRED HYPOTHYROIDISM: ICD-10-CM

## 2024-11-18 DIAGNOSIS — E55.9 VITAMIN D DEFICIENCY: ICD-10-CM

## 2024-11-18 DIAGNOSIS — Z11.59 ENCOUNTER FOR HEPATITIS C SCREENING TEST FOR LOW RISK PATIENT: ICD-10-CM

## 2024-11-18 LAB
25(OH)D3 SERPL-MCNC: 69.8 NG/ML
ALBUMIN SERPL-MCNC: 4.2 G/DL (ref 3.5–5.2)
ALP SERPL-CCNC: 80 U/L (ref 35–104)
ALT SERPL-CCNC: 12 U/L (ref 5–33)
ANION GAP SERPL CALCULATED.3IONS-SCNC: 11 MMOL/L (ref 7–19)
AST SERPL-CCNC: 19 U/L (ref 5–32)
BASOPHILS # BLD: 0 K/UL (ref 0–0.2)
BASOPHILS NFR BLD: 0.8 % (ref 0–1)
BILIRUB SERPL-MCNC: 0.4 MG/DL (ref 0.2–1.2)
BUN SERPL-MCNC: 27 MG/DL (ref 6–20)
CALCIUM SERPL-MCNC: 8.8 MG/DL (ref 8.6–10)
CHLORIDE SERPL-SCNC: 106 MMOL/L (ref 98–111)
CHOLEST SERPL-MCNC: 159 MG/DL (ref 0–199)
CO2 SERPL-SCNC: 22 MMOL/L (ref 22–29)
CREAT SERPL-MCNC: 1.5 MG/DL (ref 0.5–0.9)
EOSINOPHIL # BLD: 0.1 K/UL (ref 0–0.6)
EOSINOPHIL NFR BLD: 2.5 % (ref 0–5)
ERYTHROCYTE [DISTWIDTH] IN BLOOD BY AUTOMATED COUNT: 13.6 % (ref 11.5–14.5)
GLUCOSE SERPL-MCNC: 94 MG/DL (ref 70–99)
HCT VFR BLD AUTO: 36.4 % (ref 37–47)
HCV AB SERPL QL IA: NORMAL
HDLC SERPL-MCNC: 57 MG/DL (ref 40–60)
HGB BLD-MCNC: 11.4 G/DL (ref 12–16)
IMM GRANULOCYTES # BLD: 0 K/UL
LDLC SERPL CALC-MCNC: 80 MG/DL
LYMPHOCYTES # BLD: 0.9 K/UL (ref 1.1–4.5)
LYMPHOCYTES NFR BLD: 18.4 % (ref 20–40)
MCH RBC QN AUTO: 30.5 PG (ref 27–31)
MCHC RBC AUTO-ENTMCNC: 31.3 G/DL (ref 33–37)
MCV RBC AUTO: 97.3 FL (ref 81–99)
MONOCYTES # BLD: 0.5 K/UL (ref 0–0.9)
MONOCYTES NFR BLD: 9.4 % (ref 0–10)
NEUTROPHILS # BLD: 3.3 K/UL (ref 1.5–7.5)
NEUTS SEG NFR BLD: 68.5 % (ref 50–65)
PLATELET # BLD AUTO: 154 K/UL (ref 130–400)
PMV BLD AUTO: 10.5 FL (ref 9.4–12.3)
POTASSIUM SERPL-SCNC: 4.5 MMOL/L (ref 3.5–5)
PROT SERPL-MCNC: 6.7 G/DL (ref 6.4–8.3)
RBC # BLD AUTO: 3.74 M/UL (ref 4.2–5.4)
SODIUM SERPL-SCNC: 139 MMOL/L (ref 136–145)
T4 FREE SERPL-MCNC: 1.75 NG/DL (ref 0.93–1.7)
TRIGL SERPL-MCNC: 110 MG/DL (ref 0–149)
TSH SERPL DL<=0.005 MIU/L-ACNC: 0.03 UIU/ML (ref 0.27–4.2)
WBC # BLD AUTO: 4.9 K/UL (ref 4.8–10.8)

## 2024-11-19 ENCOUNTER — OFFICE VISIT (OUTPATIENT)
Dept: PRIMARY CARE CLINIC | Age: 54
End: 2024-11-19

## 2024-11-19 VITALS
BODY MASS INDEX: 52.63 KG/M2 | WEIGHT: 286 LBS | OXYGEN SATURATION: 98 % | SYSTOLIC BLOOD PRESSURE: 118 MMHG | TEMPERATURE: 98 F | DIASTOLIC BLOOD PRESSURE: 78 MMHG | HEART RATE: 87 BPM | HEIGHT: 62 IN

## 2024-11-19 DIAGNOSIS — D86.9 SARCOIDOSIS: ICD-10-CM

## 2024-11-19 DIAGNOSIS — E03.9 ACQUIRED HYPOTHYROIDISM: ICD-10-CM

## 2024-11-19 DIAGNOSIS — Z00.00 MEDICARE ANNUAL WELLNESS VISIT, SUBSEQUENT: Primary | ICD-10-CM

## 2024-11-19 DIAGNOSIS — Z51.81 MEDICATION MONITORING ENCOUNTER: ICD-10-CM

## 2024-11-19 DIAGNOSIS — J44.9 CHRONIC OBSTRUCTIVE PULMONARY DISEASE, UNSPECIFIED COPD TYPE (HCC): ICD-10-CM

## 2024-11-19 DIAGNOSIS — E55.9 VITAMIN D DEFICIENCY: ICD-10-CM

## 2024-11-19 DIAGNOSIS — N18.32 STAGE 3B CHRONIC KIDNEY DISEASE (HCC): ICD-10-CM

## 2024-11-19 DIAGNOSIS — D69.6 THROMBOCYTOPENIA, UNSPECIFIED (HCC): ICD-10-CM

## 2024-11-19 DIAGNOSIS — J96.11 CHRONIC RESPIRATORY FAILURE WITH HYPOXIA: ICD-10-CM

## 2024-11-19 DIAGNOSIS — K58.0 IRRITABLE BOWEL SYNDROME WITH DIARRHEA: ICD-10-CM

## 2024-11-19 DIAGNOSIS — M54.50 CHRONIC BILATERAL LOW BACK PAIN WITHOUT SCIATICA: ICD-10-CM

## 2024-11-19 DIAGNOSIS — I10 ESSENTIAL HYPERTENSION: ICD-10-CM

## 2024-11-19 DIAGNOSIS — G89.29 CHRONIC BILATERAL LOW BACK PAIN WITHOUT SCIATICA: ICD-10-CM

## 2024-11-19 DIAGNOSIS — F33.0 MILD EPISODE OF RECURRENT MAJOR DEPRESSIVE DISORDER (HCC): ICD-10-CM

## 2024-11-19 LAB

## 2024-11-19 RX ORDER — DICYCLOMINE HYDROCHLORIDE 10 MG/1
10 CAPSULE ORAL
Qty: 270 CAPSULE | Refills: 2 | Status: SHIPPED | OUTPATIENT
Start: 2024-11-19

## 2024-11-19 ASSESSMENT — PATIENT HEALTH QUESTIONNAIRE - PHQ9
4. FEELING TIRED OR HAVING LITTLE ENERGY: NEARLY EVERY DAY
10. IF YOU CHECKED OFF ANY PROBLEMS, HOW DIFFICULT HAVE THESE PROBLEMS MADE IT FOR YOU TO DO YOUR WORK, TAKE CARE OF THINGS AT HOME, OR GET ALONG WITH OTHER PEOPLE: SOMEWHAT DIFFICULT
SUM OF ALL RESPONSES TO PHQ QUESTIONS 1-9: 15
2. FEELING DOWN, DEPRESSED OR HOPELESS: SEVERAL DAYS
SUM OF ALL RESPONSES TO PHQ QUESTIONS 1-9: 15
8. MOVING OR SPEAKING SO SLOWLY THAT OTHER PEOPLE COULD HAVE NOTICED. OR THE OPPOSITE, BEING SO FIGETY OR RESTLESS THAT YOU HAVE BEEN MOVING AROUND A LOT MORE THAN USUAL: NOT AT ALL
7. TROUBLE CONCENTRATING ON THINGS, SUCH AS READING THE NEWSPAPER OR WATCHING TELEVISION: NEARLY EVERY DAY
6. FEELING BAD ABOUT YOURSELF - OR THAT YOU ARE A FAILURE OR HAVE LET YOURSELF OR YOUR FAMILY DOWN: NOT AT ALL
9. THOUGHTS THAT YOU WOULD BE BETTER OFF DEAD, OR OF HURTING YOURSELF: NOT AT ALL
3. TROUBLE FALLING OR STAYING ASLEEP: NEARLY EVERY DAY
5. POOR APPETITE OR OVEREATING: NEARLY EVERY DAY
1. LITTLE INTEREST OR PLEASURE IN DOING THINGS: MORE THAN HALF THE DAYS
SUM OF ALL RESPONSES TO PHQ QUESTIONS 1-9: 15
SUM OF ALL RESPONSES TO PHQ QUESTIONS 1-9: 15
SUM OF ALL RESPONSES TO PHQ9 QUESTIONS 1 & 2: 3

## 2024-11-19 ASSESSMENT — ENCOUNTER SYMPTOMS
ABDOMINAL PAIN: 0
NAUSEA: 0
DIARRHEA: 1
CHEST TIGHTNESS: 0
BACK PAIN: 1
COUGH: 0
SORE THROAT: 0
SHORTNESS OF BREATH: 0
WHEEZING: 0

## 2024-11-19 NOTE — PROGRESS NOTES
Habits/Diet:  Do you eat balanced/healthy meals regularly?: (!) No  Interventions:  Discussed healthy diet    Abnormal BMI (obese):  Body mass index is 52.31 kg/m². (!) Abnormal  Interventions:  Discussed healthy lifestyle         Hearing Screen:  Do you or your family notice any trouble with your hearing that hasn't been managed with hearing aids?: (!) Yes    Interventions:  Patient declines any further evaluation or treatment    Vision Screen:  Do you have difficulty driving, watching TV, or doing any of your daily activities because of your eyesight?: (!) Yes  Have you had an eye exam within the past year?: Appointment is scheduled  Interventions:   Patient encouraged to make appointment with their eye specialist    Safety:  Do you have non-slip mats or non-slip surfaces or shower bars or grab bars in your shower or bathtub?: (!) No  Interventions:  Safety measures reviewed    ADL's:   Patient reports needing help with:  Select all that apply: (!) Dressing  Select all that apply: (!) Laundry, Housekeeping, Food Preparation, Transportation  Interventions:  Has assistance from family    Advanced Directives:  Do you have a Living Will?: (!) No    Intervention:  has NO advanced directive - information provided                     Objective   Vitals:    11/19/24 1315   BP: 118/78   Pulse: 87   Temp: 98 °F (36.7 °C)   SpO2: 98%   Weight: 129.7 kg (286 lb)   Height: 1.575 m (5' 2\")      Body mass index is 52.31 kg/m².                  Allergies   Allergen Reactions    Tape [Adhesive Tape]      can use paper tape     Prior to Visit Medications    Medication Sig Taking? Authorizing Provider   pregabalin (LYRICA) 100 MG capsule Take 1 capsule by mouth 3 times daily for 90 days. Max Daily Amount: 300 mg Yes Ghazal Arriola APRN   albuterol sulfate HFA (PROVENTIL;VENTOLIN;PROAIR) 108 (90 Base) MCG/ACT inhaler Inhale 1 puff into the lungs 4 times daily Yes Provider, MD SONAL Clark 200-62.5-25 MCG/ACT 
can't swallow.  What things would you still want to be able to do after you receive life-support methods? Would you want to be able to walk? To speak? To eat on your own? To live without the help of machines?  Do you want certain Adventism practices performed if you become very ill?  If you have a choice, where do you want to be cared for? In your home? At a hospital or nursing home?  If you have a choice at the end of your life, where would you prefer to die? At home? In a hospital or nursing home? Somewhere else?  Would you prefer to be buried or cremated?  Do you want your organs to be donated after you die?  What should you do with your living will?  Make sure that your family members and your health care agent have copies of your living will (also called a declaration).  Give your doctor a copy of your living will. Ask to have it kept as part of your medical record. If you have more than one doctor, make sure that each one has a copy.  Put a copy of your living will where it can be easily found. For example, some people may put a copy on their refrigerator door. If you are using a digital copy, be sure your doctor, family members, and health care agent know how to find and access it.  Where can you learn more?  Go to https://www.iCoolh[employer].net/patientEd and enter K356 to learn more about \"Learning About Living [place].\"  Current as of: November 16, 2023  Content Version: 14.2  © 2024 [employer].   Care instructions adapted under license by [employer]. If you have questions about a medical condition or this instruction, always ask your healthcare professional. [employer] disclaims any warranty or liability for your use of this information.           Learning About Medical Power of   What is a medical power of ?     A medical power of , also called a durable power of  for health care, is one type of the legal forms called advance directives. It lets you

## 2024-11-19 NOTE — PATIENT INSTRUCTIONS
information.      Personalized Preventive Plan for Mindi Silver - 11/19/2024  Medicare offers a range of preventive health benefits. Some of the tests and screenings are paid in full while other may be subject to a deductible, co-insurance, and/or copay.    Some of these benefits include a comprehensive review of your medical history including lifestyle, illnesses that may run in your family, and various assessments and screenings as appropriate.    After reviewing your medical record and screening and assessments performed today your provider may have ordered immunizations, labs, imaging, and/or referrals for you.  A list of these orders (if applicable) as well as your Preventive Care list are included within your After Visit Summary for your review.    Other Preventive Recommendations:    A preventive eye exam performed by an eye specialist is recommended every 1-2 years to screen for glaucoma; cataracts, macular degeneration, and other eye disorders.  A preventive dental visit is recommended every 6 months.  Try to get at least 150 minutes of exercise per week or 10,000 steps per day on a pedometer .  Order or download the FREE \"Exercise & Physical Activity: Your Everyday Guide\" from [employer]. Call 1-337.253.7916 or search [employer] online.  You need 2112-0462 mg of calcium and 7822-1113 IU of vitamin D per day. It is possible to meet your calcium requirement with diet alone, but a vitamin D supplement is usually necessary to meet this goal.  When exposed to the sun, use a sunscreen that protects against both UVA and UVB radiation with an SPF of 30 or greater. Reapply every 2 to 3 hours or after sweating, drying off with a towel, or swimming.  Always wear a seat belt when traveling in a car. Always wear a helmet when riding a bicycle or motorcycle.

## 2024-11-21 ENCOUNTER — TELEPHONE (OUTPATIENT)
Dept: NEUROSURGERY | Age: 54
End: 2024-11-21

## 2024-11-25 NOTE — TELEPHONE ENCOUNTER
Patient missed a call to schedule pre-op, this was not from our office and has now been scheduled.

## 2024-11-26 ENCOUNTER — TELEPHONE (OUTPATIENT)
Dept: NEUROSURGERY | Age: 54
End: 2024-11-26

## 2024-12-06 ENCOUNTER — HOSPITAL ENCOUNTER (OUTPATIENT)
Dept: PREADMISSION TESTING | Age: 54
Discharge: HOME | End: 2024-12-10
Payer: MEDICARE

## 2024-12-06 VITALS — HEIGHT: 62 IN | WEIGHT: 285 LBS | BODY MASS INDEX: 52.44 KG/M2

## 2024-12-06 LAB
EKG P AXIS: 53 DEGREES
EKG P-R INTERVAL: 160 MS
EKG Q-T INTERVAL: 358 MS
EKG QRS DURATION: 98 MS
EKG QTC CALCULATION (BAZETT): 384 MS
EKG T AXIS: 22 DEGREES
MRSA DNA SPEC QL NAA+PROBE: NOT DETECTED

## 2024-12-06 PROCEDURE — 93010 ELECTROCARDIOGRAM REPORT: CPT | Performed by: INTERNAL MEDICINE

## 2024-12-06 PROCEDURE — 93005 ELECTROCARDIOGRAM TRACING: CPT | Performed by: ANESTHESIOLOGY

## 2024-12-06 PROCEDURE — 87641 MR-STAPH DNA AMP PROBE: CPT

## 2024-12-06 NOTE — DISCHARGE INSTRUCTIONS
The day before surgery you will receive a phone call from the surgery nurse to let you know what time to arrive on the day of surgery. This call will usually be between 2-4 PM. If you do not receive a phone call by 4 PM the day before your surgery please call [phone] and let them know you have not received an arrival time. If your surgery is on Monday, your call will be on the Friday before your Monday surgery. Please check your voicemail as they may leave a message with that information.      The morning of surgery, you may take all your prescribed medications with a sip of water unless instructed not to take.  Any exceptions to this would be listed below:  Always follow your surgeon or providers instructions on taking blood thinners.  HOLD all supplements the morning of surgery.    Take your prescribed betablocker  metoprolol      with a sip of water the morning of surgery.      PREOPERATIVE GUIDELINES WHEN RECEIVING ANESTHESIA    Do not eat anything after midnight the night before your surgery. You may have water up to 2 hours before your arrival time. No gum or candy the morning of surgery.  This is extremely important for your safety.    Take a bath (or shower) the night before your surgery and you may brush your teeth the morning of your surgery.    You will be scheduled to arrive at the hospital 2 hours before your surgery, or follow your surgeon's instructions.    Dress comfortably.  Wear loose clothing that will be easy to remove and comfortable for your trip home.    You may wear eyeglasses but bring your cases with you as they must be remove before your surgery. If you wear contacts they will have to be removed before your surgery.    Hearing aids and dentures will need to be removed before your surgery. If you wear dentures, do not glue them in the morning of surgery.     Do not wear any jewelry, including body jewelry.  All jewelry will need to be removed prior to your surgery. This includes

## 2024-12-12 ENCOUNTER — ANESTHESIA (OUTPATIENT)
Dept: OPERATING ROOM | Age: 54
End: 2024-12-12
Payer: MEDICARE

## 2024-12-12 ENCOUNTER — ANESTHESIA EVENT (OUTPATIENT)
Dept: OPERATING ROOM | Age: 54
End: 2024-12-12
Payer: MEDICARE

## 2024-12-12 ENCOUNTER — APPOINTMENT (OUTPATIENT)
Dept: GENERAL RADIOLOGY | Age: 54
End: 2024-12-12
Attending: NEUROLOGICAL SURGERY
Payer: MEDICARE

## 2024-12-12 ENCOUNTER — HOSPITAL ENCOUNTER (OUTPATIENT)
Age: 54
Setting detail: OUTPATIENT SURGERY
Discharge: HOME | End: 2024-12-12
Attending: NEUROLOGICAL SURGERY | Admitting: NEUROLOGICAL SURGERY
Payer: MEDICARE

## 2024-12-12 VITALS
HEART RATE: 73 BPM | RESPIRATION RATE: 20 BRPM | OXYGEN SATURATION: 94 % | BODY MASS INDEX: 52.44 KG/M2 | TEMPERATURE: 97.5 F | WEIGHT: 285 LBS | SYSTOLIC BLOOD PRESSURE: 137 MMHG | HEIGHT: 62 IN | DIASTOLIC BLOOD PRESSURE: 78 MMHG

## 2024-12-12 DIAGNOSIS — G89.29 CHRONIC BILATERAL LOW BACK PAIN WITH RIGHT-SIDED SCIATICA: Primary | ICD-10-CM

## 2024-12-12 DIAGNOSIS — M54.41 CHRONIC BILATERAL LOW BACK PAIN WITH RIGHT-SIDED SCIATICA: Primary | ICD-10-CM

## 2024-12-12 PROCEDURE — 7100000010 HC PHASE II RECOVERY - FIRST 15 MIN: Performed by: NEUROLOGICAL SURGERY

## 2024-12-12 PROCEDURE — 2580000003 HC RX 258: Performed by: NEUROLOGICAL SURGERY

## 2024-12-12 PROCEDURE — 72020 X-RAY EXAM OF SPINE 1 VIEW: CPT

## 2024-12-12 PROCEDURE — 3600000004 HC SURGERY LEVEL 4 BASE: Performed by: NEUROLOGICAL SURGERY

## 2024-12-12 PROCEDURE — 2500000003 HC RX 250 WO HCPCS

## 2024-12-12 PROCEDURE — 2500000003 HC RX 250 WO HCPCS: Performed by: NEUROLOGICAL SURGERY

## 2024-12-12 PROCEDURE — C9290 INJ, BUPIVACAINE LIPOSOME: HCPCS | Performed by: NEUROLOGICAL SURGERY

## 2024-12-12 PROCEDURE — 2720000010 HC SURG SUPPLY STERILE: Performed by: NEUROLOGICAL SURGERY

## 2024-12-12 PROCEDURE — 63655 IMPLANT NEUROELECTRODES: CPT | Performed by: NEUROLOGICAL SURGERY

## 2024-12-12 PROCEDURE — C1778 LEAD, NEUROSTIMULATOR: HCPCS | Performed by: NEUROLOGICAL SURGERY

## 2024-12-12 PROCEDURE — 2500000003 HC RX 250 WO HCPCS: Performed by: ANESTHESIOLOGY

## 2024-12-12 PROCEDURE — 6360000002 HC RX W HCPCS: Performed by: NEUROLOGICAL SURGERY

## 2024-12-12 PROCEDURE — 6360000002 HC RX W HCPCS

## 2024-12-12 PROCEDURE — 7100000000 HC PACU RECOVERY - FIRST 15 MIN: Performed by: NEUROLOGICAL SURGERY

## 2024-12-12 PROCEDURE — A4217 STERILE WATER/SALINE, 500 ML: HCPCS | Performed by: NEUROLOGICAL SURGERY

## 2024-12-12 PROCEDURE — 6370000000 HC RX 637 (ALT 250 FOR IP): Performed by: NEUROLOGICAL SURGERY

## 2024-12-12 PROCEDURE — 3700000001 HC ADD 15 MINUTES (ANESTHESIA): Performed by: NEUROLOGICAL SURGERY

## 2024-12-12 PROCEDURE — 2709999900 HC NON-CHARGEABLE SUPPLY: Performed by: NEUROLOGICAL SURGERY

## 2024-12-12 PROCEDURE — 63685 INS/RPLC SPI NPG/RCVR POCKET: CPT | Performed by: NEUROLOGICAL SURGERY

## 2024-12-12 PROCEDURE — C1787 PATIENT PROGR, NEUROSTIM: HCPCS | Performed by: NEUROLOGICAL SURGERY

## 2024-12-12 PROCEDURE — C1897 LEAD, NEUROSTIM TEST KIT: HCPCS | Performed by: NEUROLOGICAL SURGERY

## 2024-12-12 PROCEDURE — 2580000003 HC RX 258: Performed by: ANESTHESIOLOGY

## 2024-12-12 PROCEDURE — 3600000014 HC SURGERY LEVEL 4 ADDTL 15MIN: Performed by: NEUROLOGICAL SURGERY

## 2024-12-12 PROCEDURE — 3700000000 HC ANESTHESIA ATTENDED CARE: Performed by: NEUROLOGICAL SURGERY

## 2024-12-12 PROCEDURE — 6360000002 HC RX W HCPCS: Performed by: ANESTHESIOLOGY

## 2024-12-12 PROCEDURE — 7100000001 HC PACU RECOVERY - ADDTL 15 MIN: Performed by: NEUROLOGICAL SURGERY

## 2024-12-12 PROCEDURE — 7100000011 HC PHASE II RECOVERY - ADDTL 15 MIN: Performed by: NEUROLOGICAL SURGERY

## 2024-12-12 PROCEDURE — C1820 GENERATOR NEURO RECHG BAT SY: HCPCS | Performed by: NEUROLOGICAL SURGERY

## 2024-12-12 DEVICE — IMPLANTABLE PULSE GENERATOR KIT
Type: IMPLANTABLE DEVICE | Site: BACK | Status: FUNCTIONAL
Brand: WAVEWRITER ALPHA™ PRIME

## 2024-12-12 DEVICE — 50CM 8 CONTACT TRIAL LEAD KIT
Type: IMPLANTABLE DEVICE | Site: BACK | Status: FUNCTIONAL
Brand: LINEAR™ ST

## 2024-12-12 DEVICE — 70CM 4X8 SURGICAL LEAD KIT
Type: IMPLANTABLE DEVICE | Site: BACK | Status: FUNCTIONAL
Brand: COVEREDGE™ 32

## 2024-12-12 RX ORDER — SODIUM CHLORIDE 9 MG/ML
INJECTION, SOLUTION INTRAVENOUS PRN
Status: DISCONTINUED | OUTPATIENT
Start: 2024-12-12 | End: 2024-12-12 | Stop reason: HOSPADM

## 2024-12-12 RX ORDER — LIDOCAINE HYDROCHLORIDE 10 MG/ML
1 INJECTION, SOLUTION EPIDURAL; INFILTRATION; INTRACAUDAL; PERINEURAL
Status: DISCONTINUED | OUTPATIENT
Start: 2024-12-12 | End: 2024-12-12 | Stop reason: HOSPADM

## 2024-12-12 RX ORDER — ONDANSETRON 2 MG/ML
INJECTION INTRAMUSCULAR; INTRAVENOUS
Status: DISCONTINUED | OUTPATIENT
Start: 2024-12-12 | End: 2024-12-12 | Stop reason: SDUPTHER

## 2024-12-12 RX ORDER — HYDROMORPHONE HYDROCHLORIDE 1 MG/ML
0.25 INJECTION, SOLUTION INTRAMUSCULAR; INTRAVENOUS; SUBCUTANEOUS EVERY 5 MIN PRN
Status: DISCONTINUED | OUTPATIENT
Start: 2024-12-12 | End: 2024-12-12 | Stop reason: HOSPADM

## 2024-12-12 RX ORDER — DEXAMETHASONE SODIUM PHOSPHATE 10 MG/ML
INJECTION, SOLUTION INTRAMUSCULAR; INTRAVENOUS
Status: DISCONTINUED | OUTPATIENT
Start: 2024-12-12 | End: 2024-12-12 | Stop reason: SDUPTHER

## 2024-12-12 RX ORDER — APREPITANT 40 MG/1
40 CAPSULE ORAL ONCE
Status: COMPLETED | OUTPATIENT
Start: 2024-12-12 | End: 2024-12-12

## 2024-12-12 RX ORDER — EPHEDRINE SULFATE/0.9% NACL/PF 25 MG/5 ML
SYRINGE (ML) INTRAVENOUS
Status: DISCONTINUED | OUTPATIENT
Start: 2024-12-12 | End: 2024-12-12 | Stop reason: SDUPTHER

## 2024-12-12 RX ORDER — MIDAZOLAM HYDROCHLORIDE 2 MG/2ML
2 INJECTION, SOLUTION INTRAMUSCULAR; INTRAVENOUS
Status: DISCONTINUED | OUTPATIENT
Start: 2024-12-12 | End: 2024-12-12 | Stop reason: HOSPADM

## 2024-12-12 RX ORDER — SODIUM CHLORIDE 0.9 % (FLUSH) 0.9 %
5-40 SYRINGE (ML) INJECTION EVERY 12 HOURS SCHEDULED
Status: DISCONTINUED | OUTPATIENT
Start: 2024-12-12 | End: 2024-12-12 | Stop reason: HOSPADM

## 2024-12-12 RX ORDER — SCOLOPAMINE TRANSDERMAL SYSTEM 1 MG/1
1 PATCH, EXTENDED RELEASE TRANSDERMAL
Status: CANCELLED | OUTPATIENT
Start: 2024-12-12 | End: 2024-12-15

## 2024-12-12 RX ORDER — FENTANYL CITRATE 50 UG/ML
INJECTION, SOLUTION INTRAMUSCULAR; INTRAVENOUS
Status: DISCONTINUED | OUTPATIENT
Start: 2024-12-12 | End: 2024-12-12 | Stop reason: SDUPTHER

## 2024-12-12 RX ORDER — HYDROCODONE BITARTRATE AND ACETAMINOPHEN 10; 325 MG/1; MG/1
1 TABLET ORAL EVERY 6 HOURS PRN
Qty: 30 TABLET | Refills: 0 | Status: SHIPPED | OUTPATIENT
Start: 2024-12-12 | End: 2024-12-20

## 2024-12-12 RX ORDER — METOCLOPRAMIDE HYDROCHLORIDE 5 MG/ML
10 INJECTION INTRAMUSCULAR; INTRAVENOUS
Status: DISCONTINUED | OUTPATIENT
Start: 2024-12-12 | End: 2024-12-12 | Stop reason: HOSPADM

## 2024-12-12 RX ORDER — PROPOFOL 10 MG/ML
INJECTION, EMULSION INTRAVENOUS
Status: DISCONTINUED | OUTPATIENT
Start: 2024-12-12 | End: 2024-12-12 | Stop reason: SDUPTHER

## 2024-12-12 RX ORDER — NALOXONE HYDROCHLORIDE 0.4 MG/ML
INJECTION, SOLUTION INTRAMUSCULAR; INTRAVENOUS; SUBCUTANEOUS PRN
Status: DISCONTINUED | OUTPATIENT
Start: 2024-12-12 | End: 2024-12-12 | Stop reason: HOSPADM

## 2024-12-12 RX ORDER — HYDROCODONE BITARTRATE AND ACETAMINOPHEN 10; 325 MG/1; MG/1
1 TABLET ORAL
Status: COMPLETED | OUTPATIENT
Start: 2024-12-12 | End: 2024-12-12

## 2024-12-12 RX ORDER — SODIUM CHLORIDE 0.9 % (FLUSH) 0.9 %
5-40 SYRINGE (ML) INJECTION PRN
Status: DISCONTINUED | OUTPATIENT
Start: 2024-12-12 | End: 2024-12-12 | Stop reason: HOSPADM

## 2024-12-12 RX ORDER — DIPHENHYDRAMINE HYDROCHLORIDE 50 MG/ML
12.5 INJECTION INTRAMUSCULAR; INTRAVENOUS
Status: DISCONTINUED | OUTPATIENT
Start: 2024-12-12 | End: 2024-12-12 | Stop reason: HOSPADM

## 2024-12-12 RX ORDER — HYDROMORPHONE HYDROCHLORIDE 1 MG/ML
0.5 INJECTION, SOLUTION INTRAMUSCULAR; INTRAVENOUS; SUBCUTANEOUS EVERY 5 MIN PRN
Status: DISCONTINUED | OUTPATIENT
Start: 2024-12-12 | End: 2024-12-12 | Stop reason: HOSPADM

## 2024-12-12 RX ORDER — SODIUM CHLORIDE, SODIUM LACTATE, POTASSIUM CHLORIDE, CALCIUM CHLORIDE 600; 310; 30; 20 MG/100ML; MG/100ML; MG/100ML; MG/100ML
INJECTION, SOLUTION INTRAVENOUS CONTINUOUS
Status: DISCONTINUED | OUTPATIENT
Start: 2024-12-12 | End: 2024-12-12 | Stop reason: HOSPADM

## 2024-12-12 RX ORDER — LIDOCAINE HYDROCHLORIDE 10 MG/ML
INJECTION, SOLUTION INFILTRATION; PERINEURAL
Status: DISCONTINUED | OUTPATIENT
Start: 2024-12-12 | End: 2024-12-12 | Stop reason: SDUPTHER

## 2024-12-12 RX ORDER — ROCURONIUM BROMIDE 10 MG/ML
INJECTION, SOLUTION INTRAVENOUS
Status: DISCONTINUED | OUTPATIENT
Start: 2024-12-12 | End: 2024-12-12 | Stop reason: SDUPTHER

## 2024-12-12 RX ADMIN — ONDANSETRON 4 MG: 2 INJECTION INTRAMUSCULAR; INTRAVENOUS at 10:42

## 2024-12-12 RX ADMIN — PHENYLEPHRINE HYDROCHLORIDE 200 MCG: 10 INJECTION INTRAVENOUS at 10:50

## 2024-12-12 RX ADMIN — FENTANYL CITRATE 50 MCG: 0.05 INJECTION, SOLUTION INTRAMUSCULAR; INTRAVENOUS at 09:42

## 2024-12-12 RX ADMIN — Medication 3000 MG: at 07:49

## 2024-12-12 RX ADMIN — PHENYLEPHRINE HYDROCHLORIDE 80 MCG: 10 INJECTION INTRAVENOUS at 09:34

## 2024-12-12 RX ADMIN — LIDOCAINE HYDROCHLORIDE 50 MG: 10 INJECTION, SOLUTION INFILTRATION; PERINEURAL at 07:45

## 2024-12-12 RX ADMIN — PHENYLEPHRINE HYDROCHLORIDE 80 MCG: 10 INJECTION INTRAVENOUS at 10:29

## 2024-12-12 RX ADMIN — PROPOFOL 150 MG: 10 INJECTION, EMULSION INTRAVENOUS at 07:39

## 2024-12-12 RX ADMIN — PHENYLEPHRINE HYDROCHLORIDE 80 MCG: 10 INJECTION INTRAVENOUS at 09:03

## 2024-12-12 RX ADMIN — FENTANYL CITRATE 100 MCG: 0.05 INJECTION, SOLUTION INTRAMUSCULAR; INTRAVENOUS at 07:39

## 2024-12-12 RX ADMIN — PHENYLEPHRINE HYDROCHLORIDE 80 MCG: 10 INJECTION INTRAVENOUS at 10:16

## 2024-12-12 RX ADMIN — PHENYLEPHRINE HYDROCHLORIDE 80 MCG: 10 INJECTION INTRAVENOUS at 10:08

## 2024-12-12 RX ADMIN — DEXAMETHASONE SODIUM PHOSPHATE 10 MG: 10 INJECTION, SOLUTION INTRAMUSCULAR; INTRAVENOUS at 10:08

## 2024-12-12 RX ADMIN — SODIUM CHLORIDE, PRESERVATIVE FREE 20 MG: 5 INJECTION INTRAVENOUS at 07:26

## 2024-12-12 RX ADMIN — EPHEDRINE SULFATE 5 MG: 5 INJECTION INTRAVENOUS at 10:50

## 2024-12-12 RX ADMIN — PROPOFOL 75 MCG/KG/MIN: 10 INJECTION, EMULSION INTRAVENOUS at 07:45

## 2024-12-12 RX ADMIN — EPHEDRINE SULFATE 10 MG: 5 INJECTION INTRAVENOUS at 10:31

## 2024-12-12 RX ADMIN — PHENYLEPHRINE HYDROCHLORIDE 80 MCG: 10 INJECTION INTRAVENOUS at 10:05

## 2024-12-12 RX ADMIN — APREPITANT 40 MG: 40 CAPSULE ORAL at 07:27

## 2024-12-12 RX ADMIN — HYDROCODONE BITARTRATE AND ACETAMINOPHEN 1 TABLET: 10; 325 TABLET ORAL at 12:58

## 2024-12-12 RX ADMIN — SODIUM CHLORIDE, POTASSIUM CHLORIDE, SODIUM LACTATE AND CALCIUM CHLORIDE: 600; 310; 30; 20 INJECTION, SOLUTION INTRAVENOUS at 06:17

## 2024-12-12 RX ADMIN — ROCURONIUM BROMIDE 70 MG: 10 INJECTION, SOLUTION INTRAVENOUS at 07:39

## 2024-12-12 RX ADMIN — SUGAMMADEX 300 MG: 100 INJECTION, SOLUTION INTRAVENOUS at 08:54

## 2024-12-12 ASSESSMENT — PAIN DESCRIPTION - DESCRIPTORS
DESCRIPTORS: ACHING;DISCOMFORT
DESCRIPTORS: ACHING;DISCOMFORT

## 2024-12-12 ASSESSMENT — PAIN - FUNCTIONAL ASSESSMENT
PAIN_FUNCTIONAL_ASSESSMENT: NONE - DENIES PAIN
PAIN_FUNCTIONAL_ASSESSMENT: ACTIVITIES ARE NOT PREVENTED
PAIN_FUNCTIONAL_ASSESSMENT: 0-10
PAIN_FUNCTIONAL_ASSESSMENT: 0-10
PAIN_FUNCTIONAL_ASSESSMENT: ACTIVITIES ARE NOT PREVENTED

## 2024-12-12 ASSESSMENT — LIFESTYLE VARIABLES: SMOKING_STATUS: 0

## 2024-12-12 ASSESSMENT — PAIN SCALES - GENERAL: PAINLEVEL_OUTOF10: 10

## 2024-12-12 ASSESSMENT — PAIN SCALES - WONG BAKER: WONGBAKER_NUMERICALRESPONSE: HURTS A LITTLE BIT

## 2024-12-12 ASSESSMENT — PAIN DESCRIPTION - LOCATION: LOCATION: BACK

## 2024-12-12 ASSESSMENT — PAIN DESCRIPTION - ORIENTATION: ORIENTATION: UPPER;MID

## 2024-12-12 ASSESSMENT — ENCOUNTER SYMPTOMS: SHORTNESS OF BREATH: 1

## 2024-12-12 NOTE — ANESTHESIA PRE PROCEDURE
PHART 7.290 02/15/2022 01:40 PM    PO2ART 94.0 02/15/2022 01:40 PM    QPI0XVZ 49.0 02/15/2022 01:40 PM    ADF3DXX 23.6 02/15/2022 01:40 PM    BEART -3.3 02/15/2022 01:40 PM    U8BUZRLS 95.7 02/15/2022 01:40 PM        Type & Screen (If Applicable):  No results found for: \"LABABO\"    Drug/Infectious Status (If Applicable):  Lab Results   Component Value Date/Time    HIV Non-reactive 01/03/2022 01:59 PM       COVID-19 Screening (If Applicable):   Lab Results   Component Value Date/Time    COVID19 DETECTED 01/05/2023 12:48 PM           Anesthesia Evaluation  Patient summary reviewed and Nursing notes reviewed   history of anesthetic complications: PONV.  Airway: Mallampati: III       Mouth opening: < 3 FB   Dental:    (+) upper dentures  Comment: Lower partial out    Pulmonary:normal exam    (+)   shortness of breath: no interval change,             (-) asthma, sleep apnea and not a current smoker                          ROS comment: Home oxygen  sarcoidosis   Cardiovascular:  Exercise tolerance: no interval change  (+) hypertension:, dysrhythmias: SVT    (-) pacemaker, past MI, CAD, CABG/stent,  angina and no hyperlipidemia    ECG reviewed               Beta Blocker:  Dose within 24 Hrs         Neuro/Psych:   (+) neuromuscular disease (fibromyalgia):, headaches:, psychiatric history:   (-) seizures and CVA           GI/Hepatic/Renal:   (+) GERD: well controlled, renal disease: kidney stones and CRI, morbid obesity     (-) liver disease       Endo/Other:    (+) hypothyroidism, blood dyscrasia (anemia): arthritis:., no malignancy/cancer.    (-) diabetes mellitus, no malignancy/cancer               Abdominal:   (+) obese          Vascular: negative vascular ROS.    - DVT and PE.      Other Findings:             Anesthesia Plan      general and TIVA     ASA 4     (Scop/emend/pepcid in preop.  Tiva for ponv control.  Mac if surgeon desires  )  Induction: intravenous.    MIPS: Postoperative opioids intended and

## 2024-12-12 NOTE — H&P
[facility] Neurosurgery  History and Physical        No chief complaint on file.      HISTORY OF PRESENT ILLNESS:      Interval Update:    Patient returns for planned follow-up after undergoing a thoracic CT myelogram to evaluate for possible ventral cord herniation.  No evidence of this was seen.  She continues to complain of severe back pain and left leg pain.  She is requesting to proceed with implantation of a spinal cord stimulator, as she has undergone a trial of spinal cord stimulation with the [employer] system stating she had excellent (~75%) pain relief during her trial.      HPI:     The patient is a 54 y.o. female who presents as a referral from pain management with complaints of worsening back pain and left leg pain. She has multiple medical problems including sarcoidosis and chronic respiratory failure requiring home O2. She also has a history of pancytopenia that is followed by hematology. She has known scoliosis but denies ever seeing a spine surgeon in the past. She complains of pain on the left side of her lower back that will radiate down her left lower extremity. She also describes tingling in her left leg. She denies focal weakness. She has attempted physical therapy and as well as injections with pain management. She is being considered for a trial of spinal cord stimulation.       MEDICAL HISTORY:             Past Medical History:   Diagnosis Date    Acquired hypothyroidism     Asthma     B12 deficiency     Chronic back pain     Chronic pancreatitis (HCC)     Colon polyps     Depression     Fibromyalgia     GERD (gastroesophageal reflux disease)     Headache     Hypertension     Irritable bowel syndrome     Kidney stones     Neuropathy     On home oxygen therapy     3 liters    PONV (postoperative nausea and vomiting)     Restless legs syndrome     Sarcoidosis 11/01/2016    Scoliosis     SVT (supraventricular tachycardia) (HCC)        Past Surgical History:   Procedure Laterality Date     current outpatient medications on file.    Allergies:  Chlorhexidine and Tape [adhesive tape]    Social History:   Social History     Tobacco Use   Smoking Status Never   Smokeless Tobacco Never   Tobacco Comments    disabled due to sarcoidosis     Social History     Substance and Sexual Activity   Alcohol Use No         Family History:   Family History   Problem Relation Age of Onset    Lupus Mother     Kidney Disease Mother     High Blood Pressure Mother     Anemia Mother     Cancer Father         skin    Diabetes Father     Parkinsonism Brother     Celiac Disease Maternal Uncle     Celiac Disease Maternal Cousin     Heart Disease Other     Colon Polyps Neg Hx     Colon Cancer Neg Hx     Esophageal Cancer Neg Hx     Liver Cancer Neg Hx     Liver Disease Neg Hx     Stomach Cancer Neg Hx     Rectal Cancer Neg Hx        REVIEW OF SYSTEMS:    Constitutional: Negative.    HENT: Negative.     Eyes: Negative.    Respiratory: Negative.     Cardiovascular: Negative.    Gastrointestinal: Negative.    Genitourinary: Negative.    Musculoskeletal:  Positive for back pain and myalgias.   Skin: Negative.    Endo/Heme/Allergies: Negative.    Psychiatric/Behavioral: Negative.       Review of Systems was obtained by the medical assistant and reviewed by myself.      PHYSICAL EXAM:    Vitals:    12/12/24 0610   BP: (!) 127/93   Pulse: 83   Resp: 16   Temp: 96.9 °F (36.1 °C)   SpO2: 99%       Constitutional: Appears well-developed and well-nourished.   Eyes - conjunctiva normal.  Pupils react to light  Ear, nose,throat -Normal pinna and tragus, No scars, or lesions over external nose or ears, no obvious atrophy of tongue  Neck- symmetric, trachea midline, no jugular vein distension  Respiration- chest wall symmetric, normal effort without use of accessory muscles  Musculoskeletal - no significant wasting of muscles noted, no bony deformities, symmetric bulk  Extremities- no clubbing, cyanosis or edema  Skin - warm, dry, and intact.

## 2024-12-12 NOTE — ANESTHESIA POSTPROCEDURE EVALUATION
Department of Anesthesiology  Postprocedure Note    Patient: Mindi Silver  MRN: [id number]  YOB: 1970  Date of evaluation: 12/12/2024    Procedure Summary       Date: 12/12/24 Room / Location: [address]    Anesthesia Start: 0734 Anesthesia Stop: 1125    Procedure: THORACIC LAMINECTOMY FOR SPINAL CORD STIMULATOR PERMANENT IMPLANT Diagnosis:       Acute back pain with sciatica, left      (Acute back pain with sciatica, left [M54.42])    Surgeons: Ronak Felton MD Responsible Provider: Christina Zabala APRN - CRNA    Anesthesia Type: general, TIVA ASA Status: 4            Anesthesia Type: No value filed.    Cristobal Phase I: Cristobal Score: 5    Cristobal Phase II:      Anesthesia Post Evaluation    Patient location during evaluation: PACU  Patient participation: waiting for patient participation  Level of consciousness: sleepy but conscious  Pain score: 0  Airway patency: patent  Nausea & Vomiting: no nausea and no vomiting  Cardiovascular status: hemodynamically stable  Respiratory status: acceptable and spontaneous ventilation  Hydration status: stable  Comments: BP (!) 98/57   Pulse 73   Temp (!) 96.3 °F (35.7 °C)   Resp 13   Ht 1.575 m (5' 2\")   Wt 129.3 kg (285 lb)   SpO2 94%   BMI 52.13 kg/m²     Pain management: adequate    No notable events documented.

## 2024-12-12 NOTE — PROGRESS NOTES
Discharge instructions discussed with patient and brother. Both state understanding. VSS. Patient states repositioning has improved her pain but the pain remain 10. Meds given.

## 2024-12-12 NOTE — OP NOTE
terminals were cleaned, dried, placed in the impulse generator and secured with the hex  to the torque limit.  We then tested lead impedances and noted they were nominal except for to contacts in one lead channel.  We were satisfied with this.  The stimulator was then activated and bilateral EMG activity was confirmed with neuromonitoring.  With satisfactory impedances and confirmation of bilateral stimulation, we felt we had achieved our operative goals.  We then irrigated both incisions and they were closed in layers with absorbable suture.  The skin was then closed with staples.  Both incisions were then cleaned and bacitracin ointment and a Mepliex  dressing were applied.  The drapes were then removed and the patient was flipped into the supine position and control of the operation was returned to Anesthesia for extubation and transport to recovery.  All sponge, needle, and instrument counts were correct at the end of the procedure.  There were no apparent complications.

## 2024-12-12 NOTE — DISCHARGE INSTRUCTIONS
PLEASE FOLLOW ANY POST OP INSTRUCTIONS PROVIDED TO YOU BY DR. NAVA, DO NOT SUBMERGE IN ANY WATER UNTIL CLEARED BY MD    PLEASE TAKE YOUR BOX WITH YOU TO FIRST POST OP APPOINTMENT.    Your incision is to stay dry for one week. You should not remove or change the dressing. If the dressing becomes wet or saturated call the office to determine what should be done. You may shower, but stand with your incision site/dressing away from the shower head.   It is normal to have some swelling at the incision site.   Do not lift anything heavier than a coffee cup and newspaper! No exceptions.   Do not push and pull with your arms. This includes sweeping, mopping, vacuuming, and removing laundry from the washer and dryer. You may fold clothes but do not lift the basket full of clothes!  Do not drive until cleared by Dr. Nava. This will be AT LEAST 2 weeks, it could be longer.  Limit gentle twisting, you should not do anything extreme! Do not bend down to pick anything up.   Walk every day. Walking around in your house is fine to begin. Increase distance slowly. Walk short distances several times a day instead of long distances once a day.   You have symptoms of infection such as: Increased pain, swelling, warmth, or redness, Red streaks leading from the incision, Pus draining from the incision, A fever please call the office immediately.

## 2024-12-23 ENCOUNTER — OFFICE VISIT (OUTPATIENT)
Dept: NEUROSURGERY | Age: 54
End: 2024-12-23

## 2024-12-23 VITALS
SYSTOLIC BLOOD PRESSURE: 118 MMHG | WEIGHT: 285 LBS | HEART RATE: 68 BPM | HEIGHT: 62 IN | BODY MASS INDEX: 52.44 KG/M2 | DIASTOLIC BLOOD PRESSURE: 66 MMHG

## 2024-12-23 DIAGNOSIS — G89.29 CHRONIC LEFT-SIDED LOW BACK PAIN WITH LEFT-SIDED SCIATICA: Primary | ICD-10-CM

## 2024-12-23 DIAGNOSIS — M54.42 CHRONIC LEFT-SIDED LOW BACK PAIN WITH LEFT-SIDED SCIATICA: Primary | ICD-10-CM

## 2024-12-23 PROCEDURE — 99024 POSTOP FOLLOW-UP VISIT: CPT | Performed by: NEUROLOGICAL SURGERY

## 2024-12-23 RX ORDER — SULFAMETHOXAZOLE AND TRIMETHOPRIM 800; 160 MG/1; MG/1
1 TABLET ORAL 2 TIMES DAILY
Qty: 28 TABLET | Refills: 0 | Status: SHIPPED | OUTPATIENT
Start: 2024-12-23 | End: 2025-01-06

## 2024-12-23 NOTE — PROGRESS NOTES
NEUROSURGERY POSTOPERATIVE FOLLOW UP NOTE      Chief Complaint:   Chief Complaint   Patient presents with    Wound Check     Patient states she is doing ok since surgery, she has one area that is still hurting but feels that this will resolve after her stimulator is programmed.          Date of Surgery:  12/12/2024    Procedure Performed:  Thoracic laminectomy for implantation of spinal cord stimulator and generator      Interval Update:    Patient returns for her first follow-up visit after surgery.  She reports the expected postoperative pain.  She feels she is getting reasonable coverage from her stimulator, but would like to work on programming.      HPI:     Patient returns for planned follow-up after undergoing a thoracic CT myelogram to evaluate for possible ventral cord herniation. No evidence of this was seen. She continues to complain of severe back pain and left leg pain. She is requesting to proceed with implantation of a spinal cord stimulator, as she has undergone a trial of spinal cord stimulation with the [employer] system stating she had excellent (~75%) pain relief during her trial.       Objective:    /66   Pulse 68   Ht 1.575 m (5' 2\")   Wt 129.3 kg (285 lb)   BMI 52.13 kg/m²         Physical Exam:    General: alert, cooperative, no distress  Cardiorespiratory: unlabored breathing  Wound: Thoracic incision C/D/I with staples, gluteal incision with surrounding erythema, no fluctuance or drainage.  Staples removed.      Neurologic Exam:    Mental Status: Alert, oriented, thought content appropriate  Cranial Nerves: PERRL, EOMI, symmetric facies, tongue midline  Motor: Motor exam is symmetrical 5 out of 5 all extremities bilaterally  Somatosensory: normal light touch sensation      Imaging:    No new imaging          Assessment and Plan:    54 y.o. F returns for her first postoperative visit after implantation of a [employer] spinal cord stimulator.  She is doing well.  Her

## 2025-01-07 ENCOUNTER — TELEPHONE (OUTPATIENT)
Dept: NEUROSURGERY | Age: 55
End: 2025-01-07

## 2025-01-07 NOTE — TELEPHONE ENCOUNTER
Due to weather yesterday 1/6/25 appt with Dr Felton to be r/s. Called and spoke with patient, she VU r/s appt time and date for tomorrow for WC. Ollie Talavera notified and confirmed as well.

## 2025-01-08 ENCOUNTER — OFFICE VISIT (OUTPATIENT)
Dept: NEUROSURGERY | Age: 55
End: 2025-01-08

## 2025-01-08 VITALS
WEIGHT: 285.06 LBS | BODY MASS INDEX: 52.46 KG/M2 | DIASTOLIC BLOOD PRESSURE: 82 MMHG | HEIGHT: 62 IN | HEART RATE: 77 BPM | SYSTOLIC BLOOD PRESSURE: 118 MMHG

## 2025-01-08 DIAGNOSIS — G89.29 CHRONIC LEFT-SIDED LOW BACK PAIN WITH LEFT-SIDED SCIATICA: Primary | ICD-10-CM

## 2025-01-08 DIAGNOSIS — M54.42 CHRONIC LEFT-SIDED LOW BACK PAIN WITH LEFT-SIDED SCIATICA: Primary | ICD-10-CM

## 2025-01-08 PROCEDURE — 99024 POSTOP FOLLOW-UP VISIT: CPT | Performed by: NEUROLOGICAL SURGERY

## 2025-01-08 NOTE — PROGRESS NOTES
NEUROSURGERY POSTOPERATIVE FOLLOW UP NOTE      Chief Complaint:   Chief Complaint   Patient presents with    Follow-up         Date of Surgery:  12/12/2024    Procedure Performed:  Thoracic laminectomy for implantation of spinal cord stimulator and generator      Interval Update:    Patient returns for her second follow-up visit after surgery.  She states she is doing well.  She reports good coverage from her stimulator, but would like to see if her programming can be adjusted to cover some lower thoracic back pain.  She completed a course of Bactrim after our last visit due to some erythema surrounding her gluteal incision and this has improved.      HPI:     Patient returns for planned follow-up after undergoing a thoracic CT myelogram to evaluate for possible ventral cord herniation. No evidence of this was seen. She continues to complain of severe back pain and left leg pain. She is requesting to proceed with implantation of a spinal cord stimulator, as she has undergone a trial of spinal cord stimulation with the [employer] system stating she had excellent (~75%) pain relief during her trial.       Objective:    /82   Pulse 77   Ht 1.575 m (5' 2.01\")   Wt 129.3 kg (285 lb 0.9 oz)   BMI 52.12 kg/m²         Physical Exam:    General: alert, cooperative, no distress  Cardiorespiratory: unlabored breathing  Wound: Thoracic and gluteal incisions C/D/I.      Neurologic Exam:    Mental Status: Alert, oriented, thought content appropriate  Cranial Nerves: PERRL, EOMI, symmetric facies, tongue midline  Motor: Motor exam is symmetrical 5 out of 5 all extremities bilaterally  Somatosensory: normal light touch sensation      Imaging:    No new imaging          Assessment and Plan:    54 y.o. F returns for her first postoperative visit after implantation of a [employer] spinal cord stimulator.  She is doing well.  Her postoperative pain is improving and the [employer] team his here to adjust

## 2025-01-16 DIAGNOSIS — M51.369 DEGENERATIVE DISC DISEASE, LUMBAR: ICD-10-CM

## 2025-01-16 RX ORDER — TOPIRAMATE 100 MG/1
100 TABLET, FILM COATED ORAL NIGHTLY
Qty: 90 TABLET | Refills: 1 | Status: SHIPPED | OUTPATIENT
Start: 2025-01-16

## 2025-01-18 DIAGNOSIS — E03.9 ACQUIRED HYPOTHYROIDISM: ICD-10-CM

## 2025-01-18 DIAGNOSIS — F33.0 MILD EPISODE OF RECURRENT MAJOR DEPRESSIVE DISORDER (HCC): ICD-10-CM

## 2025-01-20 RX ORDER — LEVOTHYROXINE SODIUM 150 UG/1
150 TABLET ORAL DAILY
Qty: 90 TABLET | Refills: 2 | Status: SHIPPED | OUTPATIENT
Start: 2025-01-20

## 2025-01-20 RX ORDER — DESVENLAFAXINE 100 MG/1
100 TABLET, EXTENDED RELEASE ORAL DAILY
Qty: 90 TABLET | Refills: 2 | Status: SHIPPED | OUTPATIENT
Start: 2025-01-20

## 2025-01-20 NOTE — TELEPHONE ENCOUNTER
Mindi Silver called to request a refill on her medication.      Last office visit : 11/19/2024   Next office visit : 2/17/2025     Requested Prescriptions     Pending Prescriptions Disp Refills    levothyroxine (SYNTHROID) 150 MCG tablet [Pharmacy Med Name: LEVOTHYROXINE 150 MCG TABLET] 90 tablet 2     Sig: TAKE 1 TABLET BY MOUTH EVERY DAY    desvenlafaxine succinate (PRISTIQ) 100 MG TB24 extended release tablet [Pharmacy Med Name: DESVENLAFAXINE SUCCNT ER 100MG] 90 tablet 2     Sig: TAKE 1 TABLET BY MOUTH EVERY DAY            Kera Sorensen

## 2025-02-25 ENCOUNTER — OFFICE VISIT (OUTPATIENT)
Dept: PRIMARY CARE CLINIC | Age: 55
End: 2025-02-25

## 2025-02-25 VITALS
DIASTOLIC BLOOD PRESSURE: 80 MMHG | HEIGHT: 62 IN | BODY MASS INDEX: 53.92 KG/M2 | SYSTOLIC BLOOD PRESSURE: 134 MMHG | TEMPERATURE: 97.2 F | HEART RATE: 80 BPM | OXYGEN SATURATION: 90 % | WEIGHT: 293 LBS

## 2025-02-25 DIAGNOSIS — N18.32 STAGE 3B CHRONIC KIDNEY DISEASE (HCC): ICD-10-CM

## 2025-02-25 DIAGNOSIS — D86.9 SARCOIDOSIS: ICD-10-CM

## 2025-02-25 DIAGNOSIS — I10 ESSENTIAL HYPERTENSION: ICD-10-CM

## 2025-02-25 DIAGNOSIS — E55.9 VITAMIN D DEFICIENCY: ICD-10-CM

## 2025-02-25 DIAGNOSIS — J20.9 ACUTE BRONCHITIS, UNSPECIFIED ORGANISM: ICD-10-CM

## 2025-02-25 DIAGNOSIS — J44.9 CHRONIC OBSTRUCTIVE PULMONARY DISEASE, UNSPECIFIED COPD TYPE (HCC): ICD-10-CM

## 2025-02-25 DIAGNOSIS — E03.9 ACQUIRED HYPOTHYROIDISM: ICD-10-CM

## 2025-02-25 DIAGNOSIS — F33.0 MILD EPISODE OF RECURRENT MAJOR DEPRESSIVE DISORDER: ICD-10-CM

## 2025-02-25 DIAGNOSIS — K58.0 IRRITABLE BOWEL SYNDROME WITH DIARRHEA: ICD-10-CM

## 2025-02-25 DIAGNOSIS — M54.41 CHRONIC BILATERAL LOW BACK PAIN WITH RIGHT-SIDED SCIATICA: ICD-10-CM

## 2025-02-25 DIAGNOSIS — G89.29 CHRONIC BILATERAL LOW BACK PAIN WITH RIGHT-SIDED SCIATICA: ICD-10-CM

## 2025-02-25 DIAGNOSIS — M51.362 DEGENERATION OF INTERVERTEBRAL DISC OF LUMBAR REGION WITH DISCOGENIC BACK PAIN AND LOWER EXTREMITY PAIN: ICD-10-CM

## 2025-02-25 DIAGNOSIS — Z13.31 POSITIVE DEPRESSION SCREENING: ICD-10-CM

## 2025-02-25 DIAGNOSIS — R27.0 ATAXIA: ICD-10-CM

## 2025-02-25 PROBLEM — K58.1 IRRITABLE BOWEL SYNDROME WITH CONSTIPATION: Status: RESOLVED | Noted: 2019-01-17 | Resolved: 2025-02-25

## 2025-02-25 PROBLEM — R41.3 SHORT-TERM MEMORY LOSS: Status: RESOLVED | Noted: 2021-09-29 | Resolved: 2025-02-25

## 2025-02-25 PROBLEM — M54.42 ACUTE BACK PAIN WITH SCIATICA, LEFT: Status: RESOLVED | Noted: 2024-10-30 | Resolved: 2025-02-25

## 2025-02-25 PROBLEM — J96.11 CHRONIC RESPIRATORY FAILURE WITH HYPOXIA (HCC): Status: RESOLVED | Noted: 2024-05-20 | Resolved: 2025-02-25

## 2025-02-25 LAB
25(OH)D3 SERPL-MCNC: 71.6 NG/ML
ALBUMIN SERPL-MCNC: 4.5 G/DL (ref 3.5–5.2)
ALP SERPL-CCNC: 105 U/L (ref 35–104)
ALT SERPL-CCNC: 34 U/L (ref 5–33)
ANION GAP SERPL CALCULATED.3IONS-SCNC: 16 MMOL/L (ref 8–16)
AST SERPL-CCNC: 34 U/L (ref 5–32)
BASOPHILS # BLD: 0 K/UL (ref 0–0.2)
BASOPHILS NFR BLD: 0.9 % (ref 0–1)
BILIRUB SERPL-MCNC: 0.3 MG/DL (ref 0.2–1.2)
BUN SERPL-MCNC: 23 MG/DL (ref 6–20)
CALCIUM SERPL-MCNC: 9 MG/DL (ref 8.6–10)
CHLORIDE SERPL-SCNC: 104 MMOL/L (ref 98–107)
CO2 SERPL-SCNC: 20 MMOL/L (ref 22–29)
CREAT SERPL-MCNC: 1.4 MG/DL (ref 0.5–0.9)
EOSINOPHIL # BLD: 0.1 K/UL (ref 0–0.6)
EOSINOPHIL NFR BLD: 3.8 % (ref 0–5)
ERYTHROCYTE [DISTWIDTH] IN BLOOD BY AUTOMATED COUNT: 14.1 % (ref 11.5–14.5)
GLUCOSE SERPL-MCNC: 97 MG/DL (ref 70–99)
HCT VFR BLD AUTO: 38.9 % (ref 37–47)
HGB BLD-MCNC: 12.2 G/DL (ref 12–16)
IMM GRANULOCYTES # BLD: 0 K/UL
LYMPHOCYTES # BLD: 0.9 K/UL (ref 1.1–4.5)
LYMPHOCYTES NFR BLD: 27.6 % (ref 20–40)
MCH RBC QN AUTO: 30.2 PG (ref 27–31)
MCHC RBC AUTO-ENTMCNC: 31.4 G/DL (ref 33–37)
MCV RBC AUTO: 96.3 FL (ref 81–99)
MONOCYTES # BLD: 0.3 K/UL (ref 0–0.9)
MONOCYTES NFR BLD: 7.9 % (ref 0–10)
NEUTROPHILS # BLD: 2 K/UL (ref 1.5–7.5)
NEUTS SEG NFR BLD: 59.2 % (ref 50–65)
PLATELET # BLD AUTO: 167 K/UL (ref 130–400)
PMV BLD AUTO: 9.5 FL (ref 9.4–12.3)
POTASSIUM SERPL-SCNC: 4.8 MMOL/L (ref 3.5–5.1)
PROT SERPL-MCNC: 7.6 G/DL (ref 6.4–8.3)
RBC # BLD AUTO: 4.04 M/UL (ref 4.2–5.4)
SODIUM SERPL-SCNC: 140 MMOL/L (ref 136–145)
T4 FREE SERPL-MCNC: 1.06 NG/DL (ref 0.93–1.7)
TSH SERPL DL<=0.005 MIU/L-ACNC: 4.2 UIU/ML (ref 0.27–4.2)
WBC # BLD AUTO: 3.4 K/UL (ref 4.8–10.8)

## 2025-02-25 RX ORDER — METHYLPREDNISOLONE 4 MG/1
TABLET ORAL
Qty: 1 KIT | Refills: 0 | Status: SHIPPED | OUTPATIENT
Start: 2025-02-25 | End: 2025-03-03

## 2025-02-25 RX ORDER — BENZONATATE 200 MG/1
200 CAPSULE ORAL 3 TIMES DAILY PRN
Qty: 30 CAPSULE | Refills: 0 | Status: SHIPPED | OUTPATIENT
Start: 2025-02-25 | End: 2025-03-07

## 2025-02-25 RX ORDER — CEFDINIR 300 MG/1
300 CAPSULE ORAL 2 TIMES DAILY
Qty: 20 CAPSULE | Refills: 0 | Status: SHIPPED | OUTPATIENT
Start: 2025-02-25 | End: 2025-03-07

## 2025-02-25 RX ORDER — PREGABALIN 100 MG/1
100 CAPSULE ORAL 3 TIMES DAILY
Qty: 270 CAPSULE | Refills: 0 | Status: SHIPPED | OUTPATIENT
Start: 2025-02-25 | End: 2025-05-26

## 2025-02-25 SDOH — ECONOMIC STABILITY: FOOD INSECURITY: WITHIN THE PAST 12 MONTHS, THE FOOD YOU BOUGHT JUST DIDN'T LAST AND YOU DIDN'T HAVE MONEY TO GET MORE.: OFTEN TRUE

## 2025-02-25 SDOH — ECONOMIC STABILITY: FOOD INSECURITY: WITHIN THE PAST 12 MONTHS, YOU WORRIED THAT YOUR FOOD WOULD RUN OUT BEFORE YOU GOT MONEY TO BUY MORE.: OFTEN TRUE

## 2025-02-25 ASSESSMENT — PATIENT HEALTH QUESTIONNAIRE - PHQ9
10. IF YOU CHECKED OFF ANY PROBLEMS, HOW DIFFICULT HAVE THESE PROBLEMS MADE IT FOR YOU TO DO YOUR WORK, TAKE CARE OF THINGS AT HOME, OR GET ALONG WITH OTHER PEOPLE: NOT DIFFICULT AT ALL
SUM OF ALL RESPONSES TO PHQ QUESTIONS 1-9: 12
5. POOR APPETITE OR OVEREATING: NEARLY EVERY DAY
SUM OF ALL RESPONSES TO PHQ QUESTIONS 1-9: 12
SUM OF ALL RESPONSES TO PHQ QUESTIONS 1-9: 12
3. TROUBLE FALLING OR STAYING ASLEEP: NEARLY EVERY DAY
7. TROUBLE CONCENTRATING ON THINGS, SUCH AS READING THE NEWSPAPER OR WATCHING TELEVISION: SEVERAL DAYS
9. THOUGHTS THAT YOU WOULD BE BETTER OFF DEAD, OR OF HURTING YOURSELF: NOT AT ALL
6. FEELING BAD ABOUT YOURSELF - OR THAT YOU ARE A FAILURE OR HAVE LET YOURSELF OR YOUR FAMILY DOWN: NOT AT ALL
SUM OF ALL RESPONSES TO PHQ9 QUESTIONS 1 & 2: 2
8. MOVING OR SPEAKING SO SLOWLY THAT OTHER PEOPLE COULD HAVE NOTICED. OR THE OPPOSITE, BEING SO FIGETY OR RESTLESS THAT YOU HAVE BEEN MOVING AROUND A LOT MORE THAN USUAL: NOT AT ALL
1. LITTLE INTEREST OR PLEASURE IN DOING THINGS: SEVERAL DAYS
SUM OF ALL RESPONSES TO PHQ QUESTIONS 1-9: 12
4. FEELING TIRED OR HAVING LITTLE ENERGY: NEARLY EVERY DAY
2. FEELING DOWN, DEPRESSED OR HOPELESS: SEVERAL DAYS

## 2025-02-25 ASSESSMENT — ENCOUNTER SYMPTOMS
ABDOMINAL PAIN: 0
SORE THROAT: 0
DIARRHEA: 0
WHEEZING: 0
BACK PAIN: 1
CHEST TIGHTNESS: 0
SHORTNESS OF BREATH: 0
COUGH: 1
NAUSEA: 0

## 2025-02-25 NOTE — PROGRESS NOTES
effects from medication    She is taking ergocalciferol twice weekly for chronic vitamin D deficiency.    Review of Systems   Constitutional:  Negative for chills and fever.   HENT:  Positive for congestion. Negative for ear pain and sore throat.    Respiratory:  Positive for cough. Negative for chest tightness, shortness of breath and wheezing.    Cardiovascular:  Negative for chest pain.   Gastrointestinal:  Negative for abdominal pain, diarrhea and nausea.   Musculoskeletal:  Positive for back pain (Chronic). Negative for arthralgias and myalgias.   Skin:  Negative for rash.       Past Medical History:   Diagnosis Date    Acquired hypothyroidism     Asthma     B12 deficiency     Chronic back pain     Chronic pancreatitis (HCC)     Chronic respiratory failure with hypoxia (HCC) 05/20/2024    Colon polyps     Depression     Fibromyalgia     GERD (gastroesophageal reflux disease)     Headache     Hypertension     Irritable bowel syndrome     Kidney stones     Neuropathy     On home oxygen therapy     3 liters    PONV (postoperative nausea and vomiting)     Restless legs syndrome     Sarcoidosis 11/01/2016    Scoliosis     SVT (supraventricular tachycardia)        Current Outpatient Medications   Medication Sig Dispense Refill    pregabalin (LYRICA) 100 MG capsule Take 1 capsule by mouth 3 times daily for 90 days. Max Daily Amount: 300 mg 270 capsule 0    tiZANidine (ZANAFLEX) 4 MG tablet Take 1 tablet by mouth 3 times daily as needed (muscle spasms) 270 tablet 1    cefdinir (OMNICEF) 300 MG capsule Take 1 capsule by mouth 2 times daily for 10 days 20 capsule 0    methylPREDNISolone (MEDROL DOSEPACK) 4 MG tablet Take by mouth. 1 kit 0    benzonatate (TESSALON) 200 MG capsule Take 1 capsule by mouth 3 times daily as needed for Cough 30 capsule 0    levothyroxine (SYNTHROID) 150 MCG tablet TAKE 1 TABLET BY MOUTH EVERY DAY 90 tablet 2    desvenlafaxine succinate (PRISTIQ) 100 MG TB24 extended release tablet TAKE 1

## 2025-03-02 DIAGNOSIS — N18.32 STAGE 3B CHRONIC KIDNEY DISEASE (HCC): Primary | ICD-10-CM

## 2025-03-02 DIAGNOSIS — R74.8 ELEVATED LIVER ENZYMES: ICD-10-CM

## 2025-03-02 DIAGNOSIS — E03.9 ACQUIRED HYPOTHYROIDISM: ICD-10-CM

## 2025-03-05 ENCOUNTER — OFFICE VISIT (OUTPATIENT)
Age: 55
End: 2025-03-05
Payer: MEDICARE

## 2025-03-05 VITALS — BODY MASS INDEX: 53 KG/M2 | WEIGHT: 288 LBS | HEIGHT: 62 IN

## 2025-03-05 DIAGNOSIS — M25.561 RIGHT KNEE PAIN, UNSPECIFIED CHRONICITY: Primary | ICD-10-CM

## 2025-03-05 DIAGNOSIS — Z96.651 S/P REVISION OF TOTAL KNEE, RIGHT: ICD-10-CM

## 2025-03-05 PROCEDURE — 3017F COLORECTAL CA SCREEN DOC REV: CPT | Performed by: ORTHOPAEDIC SURGERY

## 2025-03-05 PROCEDURE — 99213 OFFICE O/P EST LOW 20 MIN: CPT | Performed by: ORTHOPAEDIC SURGERY

## 2025-03-05 PROCEDURE — 1036F TOBACCO NON-USER: CPT | Performed by: ORTHOPAEDIC SURGERY

## 2025-03-05 PROCEDURE — G8427 DOCREV CUR MEDS BY ELIG CLIN: HCPCS | Performed by: ORTHOPAEDIC SURGERY

## 2025-03-05 PROCEDURE — G8417 CALC BMI ABV UP PARAM F/U: HCPCS | Performed by: ORTHOPAEDIC SURGERY

## 2025-03-05 NOTE — PROGRESS NOTES
[facility]  [facility]  [address]  [facility] [address]  [phone]         Mindi Silver (: 1970) is a 54 y.o. female, patient, here for evaluation of the following chief complaint(s): Knee Pain (Right )  .         Patient's PCP: Ghazal Arriola APRN     Patient's Last Appointment in this Department was on Visit date not found      Subjective:     Chief Complaint   Patient presents with    Knee Pain     Right         History of Present Illness  The patient presents for evaluation of right knee pain.    She reports persistent pain in her right knee, localized to the anterior aspect. The pain is constant and severe enough to disrupt her sleep. She experiences difficulty in kneeling and ascending or descending stairs due to the pain. Additionally, she reports an audible sensation of movement within the knee. Despite initial improvement following surgery, she began experiencing intermittent pain approximately 2 months ago, which has since escalated to a level that is intolerable. The pain is exacerbated by activities such as standing up, sitting down, and walking. She also reports discomfort when bending her knee during sleep. An injection administered in  provided temporary relief for a period of 2 weeks. She is currently on Suboxone for pain management. She was last seen in 2023, at which time an MRI was performed. She underwent a right knee revision in , which was successful until 2024 when she developed pain over the top of her kneecap.    She attributes her inability to lose weight to thyroid issues, as diagnosed by a weight . She is unable to afford Ozempic.    MEDICATIONS  Current: Suboxone              Medications  Current Outpatient Medications   Medication Sig Dispense Refill    pregabalin (LYRICA) 100 MG capsule Take 1 capsule by mouth 3 times daily for 90 days. Max

## 2025-03-25 ENCOUNTER — OFFICE VISIT (OUTPATIENT)
Dept: PRIMARY CARE CLINIC | Age: 55
End: 2025-03-25
Payer: MEDICARE

## 2025-03-25 VITALS
HEART RATE: 87 BPM | WEIGHT: 293 LBS | DIASTOLIC BLOOD PRESSURE: 82 MMHG | SYSTOLIC BLOOD PRESSURE: 128 MMHG | OXYGEN SATURATION: 99 % | HEIGHT: 62 IN | BODY MASS INDEX: 53.92 KG/M2

## 2025-03-25 DIAGNOSIS — E03.9 ACQUIRED HYPOTHYROIDISM: ICD-10-CM

## 2025-03-25 DIAGNOSIS — L25.9 CONTACT DERMATITIS, UNSPECIFIED CONTACT DERMATITIS TYPE, UNSPECIFIED TRIGGER: ICD-10-CM

## 2025-03-25 PROCEDURE — 99213 OFFICE O/P EST LOW 20 MIN: CPT | Performed by: NURSE PRACTITIONER

## 2025-03-25 PROCEDURE — G8417 CALC BMI ABV UP PARAM F/U: HCPCS | Performed by: NURSE PRACTITIONER

## 2025-03-25 PROCEDURE — 3017F COLORECTAL CA SCREEN DOC REV: CPT | Performed by: NURSE PRACTITIONER

## 2025-03-25 PROCEDURE — 3079F DIAST BP 80-89 MM HG: CPT | Performed by: NURSE PRACTITIONER

## 2025-03-25 PROCEDURE — G8427 DOCREV CUR MEDS BY ELIG CLIN: HCPCS | Performed by: NURSE PRACTITIONER

## 2025-03-25 PROCEDURE — 3074F SYST BP LT 130 MM HG: CPT | Performed by: NURSE PRACTITIONER

## 2025-03-25 PROCEDURE — 1036F TOBACCO NON-USER: CPT | Performed by: NURSE PRACTITIONER

## 2025-03-25 RX ORDER — METHYLPREDNISOLONE 4 MG/1
TABLET ORAL
Qty: 1 KIT | Refills: 0 | Status: SHIPPED | OUTPATIENT
Start: 2025-03-25 | End: 2025-03-31

## 2025-03-25 RX ORDER — LEVOTHYROXINE SODIUM 150 UG/1
150 TABLET ORAL DAILY
Qty: 90 TABLET | Refills: 3 | Status: SHIPPED | OUTPATIENT
Start: 2025-03-25

## 2025-03-25 RX ORDER — LORATADINE 10 MG/1
10 TABLET ORAL DAILY
Qty: 90 TABLET | Refills: 1 | Status: SHIPPED | OUTPATIENT
Start: 2025-03-25

## 2025-03-25 RX ORDER — TRIAMCINOLONE ACETONIDE 1 MG/G
CREAM TOPICAL
Qty: 28 G | Refills: 0 | Status: SHIPPED | OUTPATIENT
Start: 2025-03-25

## 2025-03-25 ASSESSMENT — ENCOUNTER SYMPTOMS
ABDOMINAL PAIN: 0
DIARRHEA: 0
CHEST TIGHTNESS: 0
SORE THROAT: 0
WHEEZING: 0
SHORTNESS OF BREATH: 0
COUGH: 0
NAUSEA: 0

## 2025-03-25 NOTE — PROGRESS NOTES
Ms.Hope ARCHANA Silver is a 54 y.o. female who presents today for  Chief Complaint   Patient presents with    Rash     Rash came up on right lower leg last week.        HPI:  History of Present Illness  The patient presents for evaluation of a rash on her right leg.    She reports the onset of an itchy rash on her right leg, first noticed last week. The rash has since spread slightly downwards but remains localized to the right leg. She has not been exposed to any known allergens or environmental factors. She has not introduced any new medications into her regimen and continues to use the same detergent. She does not take daily allergy medication and has not used Benadryl for this condition. This is her first experience with such a rash, and she has no history of eczema. Her outdoor activities are limited to her front porch, and she has not experienced any recent fevers or unusual respiratory symptoms. She describes the rash as dry, patchy, and itchy, but not painful.  She has attempted self-treatment with over-the-counter cortisone and Gold Bond, both of which were ineffective.  She has chronic leg swelling which is stable.    MEDICATIONS  Current: Cortisone cream, Gold Bond    Results      Review of Systems   Constitutional:  Negative for chills and fever.   HENT:  Negative for congestion, ear pain and sore throat.    Respiratory:  Negative for cough, chest tightness, shortness of breath and wheezing.    Cardiovascular:  Negative for chest pain.   Gastrointestinal:  Negative for abdominal pain, diarrhea and nausea.   Musculoskeletal:  Negative for arthralgias and myalgias.   Skin:  Positive for rash.   Neurological:  Negative for dizziness and light-headedness.       Past Medical History:   Diagnosis Date    Acquired hypothyroidism     Asthma     B12 deficiency     Chronic back pain     Chronic pancreatitis (HCC)     Chronic respiratory failure with hypoxia ([facility]) 05/20/2024    Colon polyps     Depression

## 2025-04-02 ENCOUNTER — TELEPHONE (OUTPATIENT)
Dept: NEUROSURGERY | Age: 55
End: 2025-04-02

## 2025-04-06 DIAGNOSIS — N32.81 OAB (OVERACTIVE BLADDER): ICD-10-CM

## 2025-04-07 RX ORDER — MIRABEGRON 25 MG/1
25 TABLET, FILM COATED, EXTENDED RELEASE ORAL DAILY
Qty: 90 TABLET | Refills: 3 | Status: SHIPPED | OUTPATIENT
Start: 2025-04-07

## 2025-04-07 NOTE — TELEPHONE ENCOUNTER
Mindi Silver called to request a refill on her medication.      Last office visit : 3/25/2025   Next office visit : 5/30/2025     Requested Prescriptions     Pending Prescriptions Disp Refills    MYRBETRIQ 25 MG TB24 [Pharmacy Med Name: MYRBETRIQ ER 25 MG TABLET] 90 tablet 3     Sig: TAKE 1 TABLET BY MOUTH EVERY DAY            Kellie Eduardo MA

## 2025-04-21 ENCOUNTER — OFFICE VISIT (OUTPATIENT)
Dept: NEUROSURGERY | Age: 55
End: 2025-04-21
Payer: MEDICARE

## 2025-04-21 ENCOUNTER — HOSPITAL ENCOUNTER (OUTPATIENT)
Dept: GENERAL RADIOLOGY | Age: 55
Discharge: HOME | End: 2025-04-21
Payer: MEDICARE

## 2025-04-21 VITALS
HEIGHT: 62 IN | WEIGHT: 293 LBS | HEART RATE: 88 BPM | SYSTOLIC BLOOD PRESSURE: 118 MMHG | DIASTOLIC BLOOD PRESSURE: 78 MMHG | BODY MASS INDEX: 53.92 KG/M2

## 2025-04-21 DIAGNOSIS — M54.41 CHRONIC BILATERAL LOW BACK PAIN WITH RIGHT-SIDED SCIATICA: ICD-10-CM

## 2025-04-21 DIAGNOSIS — M54.41 CHRONIC BILATERAL LOW BACK PAIN WITH RIGHT-SIDED SCIATICA: Primary | ICD-10-CM

## 2025-04-21 DIAGNOSIS — G89.29 CHRONIC BILATERAL LOW BACK PAIN WITH RIGHT-SIDED SCIATICA: ICD-10-CM

## 2025-04-21 DIAGNOSIS — G89.29 CHRONIC BILATERAL LOW BACK PAIN WITH RIGHT-SIDED SCIATICA: Primary | ICD-10-CM

## 2025-04-21 PROCEDURE — G8427 DOCREV CUR MEDS BY ELIG CLIN: HCPCS | Performed by: NEUROLOGICAL SURGERY

## 2025-04-21 PROCEDURE — G8417 CALC BMI ABV UP PARAM F/U: HCPCS | Performed by: NEUROLOGICAL SURGERY

## 2025-04-21 PROCEDURE — 3017F COLORECTAL CA SCREEN DOC REV: CPT | Performed by: NEUROLOGICAL SURGERY

## 2025-04-21 PROCEDURE — 1036F TOBACCO NON-USER: CPT | Performed by: NEUROLOGICAL SURGERY

## 2025-04-21 PROCEDURE — 3074F SYST BP LT 130 MM HG: CPT | Performed by: NEUROLOGICAL SURGERY

## 2025-04-21 PROCEDURE — 99213 OFFICE O/P EST LOW 20 MIN: CPT | Performed by: NEUROLOGICAL SURGERY

## 2025-04-21 PROCEDURE — 3078F DIAST BP <80 MM HG: CPT | Performed by: NEUROLOGICAL SURGERY

## 2025-04-21 PROCEDURE — 72080 X-RAY EXAM THORACOLMB 2/> VW: CPT

## 2025-04-21 ASSESSMENT — ENCOUNTER SYMPTOMS
BACK PAIN: 1
RESPIRATORY NEGATIVE: 1
GASTROINTESTINAL NEGATIVE: 1
EYES NEGATIVE: 1

## 2025-04-21 NOTE — PROGRESS NOTES
Review of Systems   Constitutional: Negative.    HENT: Negative.     Eyes: Negative.    Respiratory: Negative.     Cardiovascular: Negative.    Gastrointestinal: Negative.    Genitourinary: Negative.    Musculoskeletal:  Positive for back pain, falls, joint pain and myalgias.   Skin: Negative.    Neurological:  Positive for weakness.   Endo/Heme/Allergies: Negative.    Psychiatric/Behavioral: Negative.

## 2025-04-21 NOTE — PROGRESS NOTES
NEUROSURGERY POSTOPERATIVE FOLLOW UP NOTE      Chief Complaint:   Chief Complaint   Patient presents with    Follow-up     Patient states that she fell two weeks ago and is now having back pain.          Date of Surgery:  12/12/2024    Procedure Performed:  Thoracic laminectomy for implantation of spinal cord stimulator and generator      Interval Update:    Patient returns for a planned follow-up visit after surgery.  She states she lost her balance and fell two weeks ago and has been experiencing severe right-sided back pain near the thoracolumbar junction.  She denies radicular pain or new numbness or weakness.  She continues to use her stimulator without difficulty.      HPI:     Patient returns for planned follow-up after undergoing a thoracic CT myelogram to evaluate for possible ventral cord herniation. No evidence of this was seen. She continues to complain of severe back pain and left leg pain. She is requesting to proceed with implantation of a spinal cord stimulator, as she has undergone a trial of spinal cord stimulation with the [employer] system stating she had excellent (~75%) pain relief during her trial.       Objective:    /78   Pulse 88   Ht 1.575 m (5' 2.01\")   Wt (!) 136.1 kg (300 lb 0.7 oz)   BMI 54.87 kg/m²         Physical Exam:    General: alert, cooperative, no distress  Cardiorespiratory: unlabored breathing  Wound: Thoracic and gluteal incisions healed.      Neurologic Exam:    Mental Status: Alert, oriented, thought content appropriate  Cranial Nerves: PERRL, EOMI, symmetric facies, tongue midline  Motor: Motor exam is symmetrical 5 out of 5 all extremities bilaterally  Somatosensory: normal light touch sensation      Imaging:    No new imaging          Assessment and Plan:    55 y.o. F returns for a planned postoperative visit after implantation of a [employer] spinal cord stimulator.  She is doing well with regards to her stimulator.  Unfortunately, she had a

## 2025-05-05 ENCOUNTER — HOSPITAL ENCOUNTER (OUTPATIENT)
Dept: NUCLEAR MEDICINE | Age: 55
Discharge: HOME | End: 2025-05-07
Attending: ORTHOPAEDIC SURGERY
Payer: MEDICARE

## 2025-05-05 PROCEDURE — A9503 TC99M MEDRONATE: HCPCS | Performed by: ORTHOPAEDIC SURGERY

## 2025-05-05 PROCEDURE — 3430000000 HC RX DIAGNOSTIC RADIOPHARMACEUTICAL: Performed by: ORTHOPAEDIC SURGERY

## 2025-05-05 PROCEDURE — 78315 BONE IMAGING 3 PHASE: CPT

## 2025-05-05 RX ORDER — TC 99M MEDRONATE 20 MG/10ML
20 INJECTION, POWDER, LYOPHILIZED, FOR SOLUTION INTRAVENOUS
Status: COMPLETED | OUTPATIENT
Start: 2025-05-05 | End: 2025-05-05

## 2025-05-05 RX ADMIN — TC 99M MEDRONATE 20 MILLICURIE: 20 INJECTION, POWDER, LYOPHILIZED, FOR SOLUTION INTRAVENOUS at 14:15

## 2025-05-16 ENCOUNTER — TELEPHONE (OUTPATIENT)
Dept: PRIMARY CARE CLINIC | Age: 55
End: 2025-05-16

## 2025-05-16 DIAGNOSIS — F33.0 MILD EPISODE OF RECURRENT MAJOR DEPRESSIVE DISORDER: ICD-10-CM

## 2025-05-19 ENCOUNTER — PATIENT MESSAGE (OUTPATIENT)
Dept: NEUROSURGERY | Age: 55
End: 2025-05-19

## 2025-05-19 NOTE — TELEPHONE ENCOUNTER
Spoke with patient, she VU to cancel appt with Dr Felton, states that she does not have issues to discuss.

## 2025-05-23 DIAGNOSIS — N18.32 STAGE 3B CHRONIC KIDNEY DISEASE (HCC): ICD-10-CM

## 2025-05-23 DIAGNOSIS — R74.8 ELEVATED LIVER ENZYMES: ICD-10-CM

## 2025-05-23 DIAGNOSIS — E03.9 ACQUIRED HYPOTHYROIDISM: ICD-10-CM

## 2025-05-23 DIAGNOSIS — K21.9 GASTROESOPHAGEAL REFLUX DISEASE, UNSPECIFIED WHETHER ESOPHAGITIS PRESENT: ICD-10-CM

## 2025-05-23 LAB
ALBUMIN SERPL-MCNC: 4.2 G/DL (ref 3.5–5.2)
ALP SERPL-CCNC: 72 U/L (ref 35–104)
ALT SERPL-CCNC: 19 U/L (ref 10–35)
ANION GAP SERPL CALCULATED.3IONS-SCNC: 12 MMOL/L (ref 8–16)
AST SERPL-CCNC: 22 U/L (ref 10–35)
BASOPHILS # BLD: 0 K/UL (ref 0–0.2)
BASOPHILS NFR BLD: 0.8 % (ref 0–1)
BILIRUB SERPL-MCNC: 0.2 MG/DL (ref 0.2–1.2)
BUN SERPL-MCNC: 18 MG/DL (ref 6–20)
CALCIUM SERPL-MCNC: 8.4 MG/DL (ref 8.6–10)
CHLORIDE SERPL-SCNC: 108 MMOL/L (ref 98–107)
CO2 SERPL-SCNC: 20 MMOL/L (ref 22–29)
CREAT SERPL-MCNC: 1.3 MG/DL (ref 0.5–0.9)
EOSINOPHIL # BLD: 0.1 K/UL (ref 0–0.6)
EOSINOPHIL NFR BLD: 5.2 % (ref 0–5)
ERYTHROCYTE [DISTWIDTH] IN BLOOD BY AUTOMATED COUNT: 14.1 % (ref 11.5–14.5)
GLUCOSE SERPL-MCNC: 92 MG/DL (ref 70–99)
HCT VFR BLD AUTO: 32.8 % (ref 37–47)
HGB BLD-MCNC: 10.2 G/DL (ref 12–16)
IMM GRANULOCYTES # BLD: 0 K/UL
LYMPHOCYTES # BLD: 0.7 K/UL (ref 1.1–4.5)
LYMPHOCYTES NFR BLD: 25.8 % (ref 20–40)
MCH RBC QN AUTO: 31.2 PG (ref 27–31)
MCHC RBC AUTO-ENTMCNC: 31.1 G/DL (ref 33–37)
MCV RBC AUTO: 100.3 FL (ref 81–99)
MONOCYTES # BLD: 0.4 K/UL (ref 0–0.9)
MONOCYTES NFR BLD: 13.9 % (ref 0–10)
NEUTROPHILS # BLD: 1.4 K/UL (ref 1.5–7.5)
NEUTS SEG NFR BLD: 53.9 % (ref 50–65)
PLATELET # BLD AUTO: 149 K/UL (ref 130–400)
PMV BLD AUTO: 10.6 FL (ref 9.4–12.3)
POTASSIUM SERPL-SCNC: 4.3 MMOL/L (ref 3.5–5.1)
PROT SERPL-MCNC: 6.7 G/DL (ref 6.4–8.3)
RBC # BLD AUTO: 3.27 M/UL (ref 4.2–5.4)
SODIUM SERPL-SCNC: 140 MMOL/L (ref 136–145)
TSH SERPL DL<=0.005 MIU/L-ACNC: 0.95 UIU/ML (ref 0.27–4.2)
WBC # BLD AUTO: 2.5 K/UL (ref 4.8–10.8)

## 2025-05-23 RX ORDER — PANTOPRAZOLE SODIUM 40 MG/1
40 TABLET, DELAYED RELEASE ORAL 2 TIMES DAILY
Qty: 180 TABLET | Refills: 0 | Status: SHIPPED | OUTPATIENT
Start: 2025-05-23

## 2025-05-24 DIAGNOSIS — I10 ESSENTIAL HYPERTENSION: ICD-10-CM

## 2025-05-26 RX ORDER — METOPROLOL SUCCINATE 100 MG/1
100 TABLET, EXTENDED RELEASE ORAL DAILY
Qty: 90 TABLET | Refills: 1 | Status: SHIPPED | OUTPATIENT
Start: 2025-05-26

## 2025-05-30 ENCOUNTER — RESULTS FOLLOW-UP (OUTPATIENT)
Dept: PRIMARY CARE CLINIC | Age: 55
End: 2025-05-30

## 2025-05-30 ENCOUNTER — OFFICE VISIT (OUTPATIENT)
Dept: PRIMARY CARE CLINIC | Age: 55
End: 2025-05-30

## 2025-05-30 VITALS
OXYGEN SATURATION: 98 % | BODY MASS INDEX: 53.92 KG/M2 | WEIGHT: 293 LBS | DIASTOLIC BLOOD PRESSURE: 80 MMHG | HEART RATE: 89 BPM | SYSTOLIC BLOOD PRESSURE: 132 MMHG | TEMPERATURE: 97.6 F | HEIGHT: 62 IN

## 2025-05-30 DIAGNOSIS — J44.9 CHRONIC OBSTRUCTIVE PULMONARY DISEASE, UNSPECIFIED COPD TYPE (HCC): ICD-10-CM

## 2025-05-30 DIAGNOSIS — D86.9 SARCOIDOSIS: ICD-10-CM

## 2025-05-30 DIAGNOSIS — E03.9 ACQUIRED HYPOTHYROIDISM: ICD-10-CM

## 2025-05-30 DIAGNOSIS — M51.362 DEGENERATION OF INTERVERTEBRAL DISC OF LUMBAR REGION WITH DISCOGENIC BACK PAIN AND LOWER EXTREMITY PAIN: ICD-10-CM

## 2025-05-30 DIAGNOSIS — G89.29 CHRONIC BILATERAL LOW BACK PAIN WITH RIGHT-SIDED SCIATICA: ICD-10-CM

## 2025-05-30 DIAGNOSIS — M54.41 CHRONIC BILATERAL LOW BACK PAIN WITH RIGHT-SIDED SCIATICA: ICD-10-CM

## 2025-05-30 DIAGNOSIS — Z59.86 FINANCIAL INSECURITY: ICD-10-CM

## 2025-05-30 DIAGNOSIS — I10 ESSENTIAL HYPERTENSION: ICD-10-CM

## 2025-05-30 DIAGNOSIS — N18.32 STAGE 3B CHRONIC KIDNEY DISEASE (HCC): ICD-10-CM

## 2025-05-30 DIAGNOSIS — D50.9 IRON DEFICIENCY ANEMIA, UNSPECIFIED IRON DEFICIENCY ANEMIA TYPE: ICD-10-CM

## 2025-05-30 DIAGNOSIS — E55.9 VITAMIN D DEFICIENCY: ICD-10-CM

## 2025-05-30 DIAGNOSIS — R27.0 ATAXIA: Primary | ICD-10-CM

## 2025-05-30 DIAGNOSIS — F33.0 MILD EPISODE OF RECURRENT MAJOR DEPRESSIVE DISORDER: ICD-10-CM

## 2025-05-30 DIAGNOSIS — D72.819 CHRONIC LEUKOPENIA: ICD-10-CM

## 2025-05-30 DIAGNOSIS — Z91.81 AT HIGH RISK FOR FALLS: ICD-10-CM

## 2025-05-30 RX ORDER — PREGABALIN 100 MG/1
100 CAPSULE ORAL 3 TIMES DAILY
Qty: 270 CAPSULE | Refills: 0 | Status: SHIPPED | OUTPATIENT
Start: 2025-05-30 | End: 2025-08-28

## 2025-05-30 SDOH — ECONOMIC STABILITY: FOOD INSECURITY: WITHIN THE PAST 12 MONTHS, THE FOOD YOU BOUGHT JUST DIDN'T LAST AND YOU DIDN'T HAVE MONEY TO GET MORE.: NEVER TRUE

## 2025-05-30 SDOH — ECONOMIC STABILITY: FOOD INSECURITY: WITHIN THE PAST 12 MONTHS, YOU WORRIED THAT YOUR FOOD WOULD RUN OUT BEFORE YOU GOT MONEY TO BUY MORE.: NEVER TRUE

## 2025-05-30 SDOH — ECONOMIC STABILITY - INCOME SECURITY: FINANCIAL INSECURITY: Z59.86

## 2025-05-30 ASSESSMENT — ENCOUNTER SYMPTOMS
ABDOMINAL PAIN: 0
COUGH: 0
WHEEZING: 0
NAUSEA: 0
SHORTNESS OF BREATH: 0
CHEST TIGHTNESS: 0
BACK PAIN: 1
DIARRHEA: 0
SORE THROAT: 0

## 2025-05-30 NOTE — PATIENT INSTRUCTIONS
assistance:  Contact your local office for assistance.   [employer] [place]   [address] [place], [place] [address]  [phone]    [employer] [place]  [address] [place], [place] [address]  [phone]    [employer] [place]  [address] [place], [place] [address]  [phone]    [employer] [place]  [address] [place], [place]   [phone]    [employer] [place]  [address] [place], [place] [address]  [phone]      [employer] [facility] of [facility]  [address] [place], [place] [address]  [phone]    Housing Authority [address] [address]  [phone]    [facility] Section 8 Housing Program  [address] [place] [address]  [phone]  [phone]  https://www.[facility].gov/Lists of hospitals in the [place]/renting     [employer] [place]  [address] [place], [place] [address]  [phone]    [employer] [place]  [address] [place], [place] [address]  [phone]      Other Support     [facility] Supportive Housing- assistance for Veterans, [facility]   Website: https://www.[facility].gov/Lists of hospitals in the [place]/homeless/helping_veterans/[facility]  https://www.[facility].gov/Lists of hospitals in the [place]/homeownership/foreclosure  [phone] or [phone]    [employer]- rent assistance for [facility] residents only  [address] [place], [place] [address]  Website: https://www.[employer]/  [phone]    [facility]- rent assistance for [facility] residents only  [address] [place], [place] [address]  Website: https://[facility].org/  [phone]    [employer]- Serving free hot lunches and providing assistance with a variety of needs, including housing.  [address] [facility], [place] [address]  Website:

## 2025-05-30 NOTE — PROGRESS NOTES
individuals. First come; first served. Pets  are not welcome.    David Melo   [address] [place] [address] [phone]   Offers rent-free temporary housing for those displaced by disaster or due to no fault of their own. Those seeking housing must apply in person and complete an application process which includes a background check and drug screen. If approved, residents will have up to 12 months of rent-free housing while they regroup, recuperate, or rebuild as they recover from the cause of their displacement.      General Housing Assistance    Search for subsidized apartments:  Subsidized apartments owners offer reduced rent to low-income tenants. Contact or visit the management office of each apartment building that interests you.   Website: https://resources.hud.gov/    Apply for Public Housing and Housing Choice Vouchers assistance:  Contact your local office for assistance.   [employer]ousing Authority [address] [address]  [phone]    Housing Authority [facility]  [address] [place], [place] [address]  [phone]    Housing Authority [facility]  [address], [place] [address]  [phone]    Housing Authority [facility]  [address], [place]   [phone]    Housing Authority [facility]  [address] [facility] [address] [place], [place] [address]  [phone]      Housing Authority [address] [address]  [phone]    Housing Authority [address] [address]  [phone]    [facility] Section 8 Housing Program  [address] [place] [address]  [phone]  [phone]  https://www.hud.gov/[facility]/[place]/renting     Housing Authority [facility]  [address] [place], [place] [address]  [phone]    Housing Authority [facility]  [address] [place], [place] [address]  [phone]      Other Support     [place]

## 2025-06-11 ENCOUNTER — TELEPHONE (OUTPATIENT)
Dept: PRIMARY CARE CLINIC | Age: 55
End: 2025-06-11

## 2025-06-11 NOTE — TELEPHONE ENCOUNTER
Attempted to make contact with [place] [phone]. Left a voicemail and call back number will attempt to make contact again. 6/11/25.        See referrals tab for further details.

## 2025-06-16 ENCOUNTER — OFFICE VISIT (OUTPATIENT)
Age: 55
End: 2025-06-16
Payer: MEDICARE

## 2025-06-16 VITALS — WEIGHT: 293 LBS | HEIGHT: 62 IN | BODY MASS INDEX: 53.92 KG/M2

## 2025-06-16 DIAGNOSIS — D86.9 SARCOIDOSIS, UNSPECIFIED: ICD-10-CM

## 2025-06-16 DIAGNOSIS — J96.11 CHRONIC RESPIRATORY FAILURE WITH HYPOXIA: ICD-10-CM

## 2025-06-16 DIAGNOSIS — Z71.2 PERSON CONSULTING FOR EXPLANATION OF EXAMINATION OR TEST FINDING: ICD-10-CM

## 2025-06-16 DIAGNOSIS — Z96.651 S/P REVISION OF TOTAL KNEE, RIGHT: Primary | ICD-10-CM

## 2025-06-16 PROCEDURE — 99214 OFFICE O/P EST MOD 30 MIN: CPT | Performed by: PHYSICIAN ASSISTANT

## 2025-06-16 PROCEDURE — 1036F TOBACCO NON-USER: CPT | Performed by: PHYSICIAN ASSISTANT

## 2025-06-16 PROCEDURE — G8427 DOCREV CUR MEDS BY ELIG CLIN: HCPCS | Performed by: PHYSICIAN ASSISTANT

## 2025-06-16 PROCEDURE — 3017F COLORECTAL CA SCREEN DOC REV: CPT | Performed by: PHYSICIAN ASSISTANT

## 2025-06-16 PROCEDURE — G8417 CALC BMI ABV UP PARAM F/U: HCPCS | Performed by: PHYSICIAN ASSISTANT

## 2025-06-16 RX ORDER — FLUTICASONE FUROATE, UMECLIDINIUM BROMIDE AND VILANTEROL TRIFENATATE 200; 62.5; 25 UG/1; UG/1; UG/1
1 POWDER RESPIRATORY (INHALATION) DAILY
Qty: 60 EACH | Refills: 5 | OUTPATIENT
Start: 2025-06-16

## 2025-06-16 ASSESSMENT — ENCOUNTER SYMPTOMS: SHORTNESS OF BREATH: 0

## 2025-06-16 NOTE — PROGRESS NOTES
[facility]  [facility]  [address] [facility] [place] [address]  Dept: [phone]  Dept Fax: [phone]  Loc: [phone]     Mindi Silver (:  1970) is a 55 y.o. female,Established patient, here for evaluation of the following:    Chief Complaint   Patient presents with    Follow-up     R knee  Review bone scan           Subjective   Patient is a 55-year-old  female presented to clinic with continued right knee pain.  She was last in office on 3/5/2025 when Dr. Hanley ordered a bone scan.  History of this right knee: Patient underwent a total revision to a DePuy revision system on 2017.  She reports she was doing well until pain began in .  She locates most of the pain in the front of her knee.        Allergies   Allergen Reactions    Chlorhexidine Hives     rash    Tape [Adhesive Tape]      can use paper tape     Past Surgical History:   Procedure Laterality Date    ABLATION OF DYSRHYTHMIC FOCUS      SVT    ANAL SPHINCTEROTOMY N/A 2020    LATERAL SPHINCTEROTOMY performed by Fitz Villar MD at [facility] OR    CATARACT EXTRACTION, BILATERAL  2023    CHOLECYSTECTOMY      COLONOSCOPY  10/13/2014    Dr. Moses-Tubular AP-5 yr recall    COLONOSCOPY  2008    Dr. Bynum: unremarkable    COLONOSCOPY  2017    Dr Moses-BCM-5 yr recall    COLONOSCOPY  2018    Dr. 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

## 2025-06-16 NOTE — TELEPHONE ENCOUNTER
Rx Refill Note  Requested Prescriptions      No prescriptions requested or ordered in this encounter      Last office visit with prescribing clinician: 9/26/2024   Last telemedicine visit with prescribing clinician: Visit date not found   Next office visit with prescribing clinician: Visit date not found                         Would you like a call back once the refill request has been completed: [] Yes [] No    If the office needs to give you a call back, can they leave a voicemail: [] Yes [] No    Dno Flaherty MA  06/16/25, 07:36 CDT

## 2025-06-20 ENCOUNTER — TELEPHONE (OUTPATIENT)
Dept: HEMATOLOGY | Age: 55
End: 2025-06-20

## 2025-06-20 NOTE — TELEPHONE ENCOUNTER
Called pt. to remind them of appointment on 06/25/2025 and had to leave a detailed voicemail with appointment date and time. Reminded patient to just come at appointment time, and to not come at the lab appointment time. We have now moved to the [facility] [place] that is located between our old office and the ER at the [facility]. Letting the Pt know that our front entrance faces the [place] and leaving our address. Reminded pt to eat well and be well hydrated for their labs.

## 2025-06-25 ENCOUNTER — TELEPHONE (OUTPATIENT)
Dept: PRIMARY CARE CLINIC | Age: 55
End: 2025-06-25

## 2025-06-25 NOTE — TELEPHONE ENCOUNTER
Attempted to contact Hope again just to follow up on resources given. Resources were given. Barriers were addressed. This call was just to make sure no other barriers needed to be addressed and the resources given benefited her. Referral completed but follow up unable to be completed.6/25/25.      See referrals tab for further details.

## 2025-07-06 RX ORDER — TOPIRAMATE 100 MG/1
100 TABLET, FILM COATED ORAL NIGHTLY
Qty: 90 TABLET | Refills: 1 | Status: SHIPPED | OUTPATIENT
Start: 2025-07-06

## 2025-08-20 DIAGNOSIS — K58.0 IRRITABLE BOWEL SYNDROME WITH DIARRHEA: ICD-10-CM

## 2025-08-20 RX ORDER — DICYCLOMINE HYDROCHLORIDE 10 MG/1
10 CAPSULE ORAL
Qty: 270 CAPSULE | Refills: 2 | Status: SHIPPED | OUTPATIENT
Start: 2025-08-20

## 2025-08-21 DIAGNOSIS — K21.9 GASTROESOPHAGEAL REFLUX DISEASE, UNSPECIFIED WHETHER ESOPHAGITIS PRESENT: ICD-10-CM

## 2025-08-21 RX ORDER — PANTOPRAZOLE SODIUM 40 MG/1
40 TABLET, DELAYED RELEASE ORAL 2 TIMES DAILY
Qty: 180 TABLET | Refills: 0 | Status: SHIPPED | OUTPATIENT
Start: 2025-08-21

## (undated) DEVICE — DISCONTINUED NO SUB IDED TG GLOVE SURG SENSICARE ALOE LT LF PF ST GRN SZ 8

## (undated) DEVICE — SOLUTION IRRIG 3000ML 0.9% SOD CHL USP UROMATIC PLAS CONT

## (undated) DEVICE — GLV SURG NEOLON 2G PF LF 7.5 STRL

## (undated) DEVICE — BANDAGE COMPR W6XL12FT SGL LAYERED NO CLSR EXSANGUATION

## (undated) DEVICE — BNDG ELAS W/CLIP 6IN 10YD LF STRL

## (undated) DEVICE — 4.5 MM INCISOR PLUS STRAIGHT                                    BLADES, POWER/EP-1, VIOLET, PACKAGED                                    6 PER BOX, STERILE

## (undated) DEVICE — GLOVE SURG SZ 85 CRM LTX FREE POLYISOPRENE POLYMER BEAD ANTI

## (undated) DEVICE — BNDG ELAS ECON W/CLIP 4IN 5YD LF STRL

## (undated) DEVICE — GW DBL END .025IN 150CM 1.5MM J AND STR

## (undated) DEVICE — GLOVE SURG SZ 8 L12IN FNGR THK94MIL TRNSLUC YEL LTX HYDRGEL

## (undated) DEVICE — FRCP BX RADJAW4 NDL 2.8 240 STD OG

## (undated) DEVICE — SYSTEM SKIN CLSR 22CM DERMBND PRINEO

## (undated) DEVICE — SPK10281 JACKSON TABLE KIT: Brand: SPK10281 JACKSON TABLE KIT

## (undated) DEVICE — OSCILLATOR BLADE, 25.4 X 90 X 1.27 MM (.050"): Brand: CONMED

## (undated) DEVICE — DECANTER VI VENT W/ VLV FOR ASEP TRNSF OF FLD

## (undated) DEVICE — MINOR CDS: Brand: MEDLINE INDUSTRIES, INC.

## (undated) DEVICE — REMOTE CONTROL KIT: Brand: FREELINK™

## (undated) DEVICE — MASK,OXYGEN,MED CONC,ADLT,7' TUB, UC: Brand: PENDING

## (undated) DEVICE — BNDG ADHS CURAD FLX/FABRC 1X3IN STRL LF

## (undated) DEVICE — Device: Brand: DEFENDO AIR/WATER/SUCTION AND BIOPSY VALVE

## (undated) DEVICE — 3M™ IOBAN™ 2 ANTIMICROBIAL INCISE DRAPE 6650EZ: Brand: IOBAN™ 2

## (undated) DEVICE — SUTURE VCRL SZ 3-0 L27IN ABSRB UD L19MM PS-2 3/8 CIR PRIM J427H

## (undated) DEVICE — PK TURNOVER RM ADV

## (undated) DEVICE — NEPTUNE E-SEP SMOKE EVACUATION PENCIL, COATED, 70MM BLADE, ROCKER SWITCH: Brand: NEPTUNE E-SEP

## (undated) DEVICE — SUTURE VCRL SZ 2-0 L36IN ABSRB UD L36MM CT-1 1/2 CIR J945H

## (undated) DEVICE — SHOULDER CDS

## (undated) DEVICE — GLOVE SURG SZ 85 L12IN FNGR THK79MIL GRN LTX FREE

## (undated) DEVICE — Device

## (undated) DEVICE — SUTURE ABSRB BRAID COAT UD CP NO 2 27IN VCRL J195H

## (undated) DEVICE — DRAPE,ARM,LASER,STERILE,7" X 96": Brand: MEDLINE

## (undated) DEVICE — THREE QUARTER SHEET: Brand: CONVERTORS

## (undated) DEVICE — T-MAX DISPOSABLE FACE MASK 8 PER BOX

## (undated) DEVICE — CHLORAPREP 26ML ORANGE

## (undated) DEVICE — PAD,PREPPING,CUFFED,24X48,7",NONSTERILE: Brand: MEDLINE

## (undated) DEVICE — BAND BAG 36" X 36": Brand: TIDI

## (undated) DEVICE — UNDERGLOVE SURG SZ 8 FNGR THK0.21MIL GRN LTX BEAD CUF

## (undated) DEVICE — SYR LL TP 10ML STRL

## (undated) DEVICE — ENDO KIT,LOURDES HOSPITAL: Brand: MEDLINE INDUSTRIES, INC.

## (undated) DEVICE — SUTURE ETHLN SZ 3-0 L18IN NONABSORBABLE BLK FS-1 L24MM 3/8 663H

## (undated) DEVICE — SPNG GZ STRL 2S 4X4 12PLY

## (undated) DEVICE — TOOL 21BA60 LEGEND 21CM 6MM BA: Brand: MIDAS REX

## (undated) DEVICE — GLOVE SURG SZ 85 L12IN FNGR THK87MIL DK GRN LTX FREE ISOLEX

## (undated) DEVICE — SUPER TURBOVAC 90 WITH INTEGRATED FINGER SWITCHES IFS: Brand: COBLATION

## (undated) DEVICE — GEL ULTRASND HI VISC 20G PACKET STRL

## (undated) DEVICE — NDL SPINE 20G 3 1/2 YEL STRL 1P/U

## (undated) DEVICE — TOOL MR8-14MH30 MR8 14CM MATCH 3MM: Brand: MIDAS REX MR8

## (undated) DEVICE — TBG SMPL FLTR LINE NASL 02/C02 A/ BX/100

## (undated) DEVICE — SENSR O2 OXIMAX FNGR A/ 18IN NONSTR

## (undated) DEVICE — ST TB EXT STANDARDBORE 30IN

## (undated) DEVICE — MSK O2 MD CONCENTR A/ LF 7FT 1P/U

## (undated) DEVICE — ST INF 2NDARY 20DRP VNT/NOVNT 30IN

## (undated) DEVICE — SMARTGOWN BREATHABLE SURGICAL GOWN: Brand: CONVERTORS

## (undated) DEVICE — AGENT HEMOSTATIC SURGIFLOW MATRIX KIT W/THROMBIN

## (undated) DEVICE — SYRINGE MED 20ML STD CLR PLAS LUERLOCK TIP N CTRL DISP

## (undated) DEVICE — SUTURE ETHLN SZ 4-0 L18IN NONABSORBABLE BLK L19MM PS-2 3/8 1667H

## (undated) DEVICE — ENDOGATOR AUXILIARY WATER JET CONNECTOR: Brand: ENDOGATOR

## (undated) DEVICE — PADDING UNDERCAST W4INXL4YD 100% COT CRIMPED FINISH WBRL II

## (undated) DEVICE — Device: Brand: MEDEX

## (undated) DEVICE — SPONGE LAP W18XL18IN WHT COT 4 PLY FLD STRUNG RADPQ DISP ST

## (undated) DEVICE — GAUZE,SPONGE,FLUFF,6"X6.75",STRL,10/TRAY: Brand: MEDLINE

## (undated) DEVICE — TUBING PMP IRRIG GOFLO

## (undated) DEVICE — DUAL CUT SAGITTAL BLADE

## (undated) DEVICE — BANDAGE COMPR W6XL80IN COT E 1 LAYR CLP CLSR PREM GRD CONTEX

## (undated) DEVICE — C-ARM: Brand: UNBRANDED

## (undated) DEVICE — CONMED SCOPE SAVER BITE BLOCK, 20X27 MM: Brand: SCOPE SAVER

## (undated) DEVICE — STERILE POLYISOPRENE POWDER-FREE SURGICAL GLOVES: Brand: PROTEXIS

## (undated) DEVICE — GW SS .018 60CM

## (undated) DEVICE — TUBING, SUCTION, 1/4" X 20', STRAIGHT: Brand: MEDLINE INDUSTRIES, INC.

## (undated) DEVICE — CVR PROB GEN PURP W ISOSILK 6X48

## (undated) DEVICE — DYONICS 4.0 MM ELITE                                    ACROMIOBLASTER STRAIGHT DISPOSABLE                                    BURRS, SAGE GREEN, 10000 MAXIMUM                                    RPM, PACKAGED 6 PER BOX, STERILE

## (undated) DEVICE — BLADE LARYNSCP HNDL MAC 3 DISP CURAVIEW LED

## (undated) DEVICE — CUFF,BP,DISP,1 TUBE,ADULT,HP: Brand: MEDLINE

## (undated) DEVICE — APPL CHLORAPREP W/TINT 26ML ORNG

## (undated) DEVICE — INTENDED FOR TISSUE SEPARATION, AND OTHER PROCEDURES THAT REQUIRE A SHARP SURGICAL BLADE TO PUNCTURE OR CUT.: Brand: BARD-PARKER ® STAINLESS STEEL BLADES

## (undated) DEVICE — SHEATH INTRO MICRO 7F 11CM

## (undated) DEVICE — CATH VASC RF CLOSUREFAST 7CM 7F100CM

## (undated) DEVICE — ACCY PA100-A LEGEND LUB/DIFFUSER 4 PACK: Brand: MIDAS REX®

## (undated) DEVICE — ELECTRODE ES AD PED L2.5IN TEF INSUL MOD NONCORDED BLDE TIP

## (undated) DEVICE — SUTURE VICRYL + SZ 2-0 L18IN ABSRB UD CT1 L36MM 1/2 CIR VCP839D

## (undated) DEVICE — Device: Brand: POWER-FLO®

## (undated) DEVICE — 3M™ STERI-STRIP™ REINFORCED ADHESIVE SKIN CLOSURES, R1547, 1/2 IN X 4 IN (12 MM X 100 MM), 6 STRIPS/ENVELOPE: Brand: 3M™ STERI-STRIP™

## (undated) DEVICE — RECIPROCATING BLADE DOUBLE SIDE (74.0 X 0.77MM)

## (undated) DEVICE — FORCEPS BX L240CM JAW DIA2.4MM ORNG L CAP W/ NDL DISP RAD

## (undated) DEVICE — CONTAINER SPECIMEN BLISTER ST

## (undated) DEVICE — SOLUTION IV IRRIG POUR BRL 0.9% SODIUM CHL 2F7124

## (undated) DEVICE — OR CABLE 2X8 61CM AND EXTENSION

## (undated) DEVICE — THE CHANNEL CLEANING BRUSH IS A NYLON FLEXI BRUSH ATTACHED TO A FLEXIBLE PLASTIC SHEATH DESIGNED TO SAFELY REMOVE DEBRIS FROM FLEXIBLE ENDOSCOPES.

## (undated) DEVICE — BANDAGE COMPR W3INXL15FT BGE E SGL LAYERED CLP CLSR

## (undated) DEVICE — PANTS MATERNITY KNIT XXL/XXXL

## (undated) DEVICE — GLOVE SURG SZ 8 CRM LTX FREE POLYISOPRENE POLYMER BEAD ANTI

## (undated) DEVICE — GOWN,PREVENTION PLUS,XL,ST,24/CS: Brand: MEDLINE

## (undated) DEVICE — ZIMMER® STERILE DISPOSABLE TOURNIQUET CUFF WITH PLC, DUAL PORT, SINGLE BLADDER, 34 IN. (86 CM)

## (undated) DEVICE — AMBU AURA-I U SIZE 4, DISPOSABLE LARYNGEAL MASK: Brand: AURA-I

## (undated) DEVICE — SURGICAL PROCEDURE PACK KNEE TOT DBD CDS LOURDES HOSP LF

## (undated) DEVICE — TUNNELER

## (undated) DEVICE — SHOULDER STABILIZATION KIT,                                    DISPOSABLE 12 PER BOX

## (undated) DEVICE — SHEET,DRAPE,53X77,STERILE: Brand: MEDLINE

## (undated) DEVICE — DRAPE PELV MICROVASIVE STD SHT W/ FLD PCH NONFENESTRATED

## (undated) DEVICE — U-DRAPE: Brand: CONVERTORS

## (undated) DEVICE — ELECTRODE ELECSURG L 10.2 CM PTFE COAT MONOPOLAR BLADE NSTK

## (undated) DEVICE — SUTURE VICRYL + SZ 1 L18IN ABSRB UD L36MM CT-1 1/2 CIR VCP841D

## (undated) DEVICE — TUNNELER NEUROSTIMULATOR L35CM LNG FOR SPNL CRD STIM SYS

## (undated) DEVICE — PAD MINOR UNIVERSAL: Brand: MEDLINE INDUSTRIES, INC.

## (undated) DEVICE — BANDAGE,GAUZE,BULKEE II,4.5"X4.1YD,STRL: Brand: MEDLINE

## (undated) DEVICE — CEMENT MIXING SYSTEM WITH FEMORAL BREAKWAY NOZZLE: Brand: REVOLUTION

## (undated) DEVICE — TOWEL,OR,DSP,ST,BLUE,DLX,4/PK,20PK/CS: Brand: MEDLINE

## (undated) DEVICE — DRAPE,EXTREMITY,89X128,STERILE: Brand: MEDLINE

## (undated) DEVICE — CANNULA ARTHSCP L7CM DIA7MM TRNSLUC THRD FLX W/ NO SQUIRT

## (undated) DEVICE — NEEDLE SUT PASS FOR ROT CUF LABRAL REP MULTFI SCORPION

## (undated) DEVICE — DRESSING FOAM W4XL12IN SIL RECT ADH WTRPRF FLM BK W/ BORD

## (undated) DEVICE — DRAPE,SHOULDER,BEACH CHAIR,STERILE: Brand: MEDLINE

## (undated) DEVICE — Z INACTIVE USE 2660664 SOLUTION IRRIG 3000ML 0.9% SOD CHL USP UROMATIC PLAS CONT

## (undated) DEVICE — FORCEP BPLR IRIS

## (undated) DEVICE — SUTURE PERMA-HAND 0 L18IN NONABSORBABLE BLK MO-7 L22MM 1/2 C041D

## (undated) DEVICE — LARGE BONE HALL BLADE, RECIPROCATOR, 12.5 X 76 X 1.27 MM: Brand: HALL

## (undated) DEVICE — SYR SLP TP 10ML DISP

## (undated) DEVICE — PASSING ELEVATOR: Brand: PRECISION ™